# Patient Record
Sex: MALE | Race: WHITE | NOT HISPANIC OR LATINO | Employment: OTHER | URBAN - METROPOLITAN AREA
[De-identification: names, ages, dates, MRNs, and addresses within clinical notes are randomized per-mention and may not be internally consistent; named-entity substitution may affect disease eponyms.]

---

## 2023-04-04 DIAGNOSIS — I10 HYPERTENSION, UNSPECIFIED TYPE: Primary | ICD-10-CM

## 2023-04-04 RX ORDER — SPIRONOLACTONE 25 MG/1
25 TABLET ORAL
COMMUNITY
Start: 2023-02-28 | End: 2023-04-04 | Stop reason: SDUPTHER

## 2023-04-05 RX ORDER — SPIRONOLACTONE 25 MG/1
25 TABLET ORAL DAILY
Qty: 90 TABLET | Refills: 1 | Status: SHIPPED | OUTPATIENT
Start: 2023-04-05 | End: 2023-06-19

## 2023-05-30 ENCOUNTER — APPOINTMENT (OUTPATIENT)
Dept: PRIMARY CARE | Facility: CLINIC | Age: 68
End: 2023-05-30
Payer: MEDICARE

## 2023-06-13 PROBLEM — E66.9 OBESITY: Status: ACTIVE | Noted: 2023-06-13

## 2023-06-13 LAB
ALANINE AMINOTRANSFERASE (SGPT) (U/L) IN SER/PLAS: 23 U/L (ref 10–52)
ALBUMIN (G/DL) IN SER/PLAS: 4.6 G/DL (ref 3.4–5)
ALKALINE PHOSPHATASE (U/L) IN SER/PLAS: 65 U/L (ref 33–136)
ANION GAP IN SER/PLAS: 13 MMOL/L (ref 10–20)
ASPARTATE AMINOTRANSFERASE (SGOT) (U/L) IN SER/PLAS: 16 U/L (ref 9–39)
BILIRUBIN TOTAL (MG/DL) IN SER/PLAS: 0.4 MG/DL (ref 0–1.2)
CALCIUM (MG/DL) IN SER/PLAS: 10.3 MG/DL (ref 8.6–10.3)
CARBON DIOXIDE, TOTAL (MMOL/L) IN SER/PLAS: 24 MMOL/L (ref 21–32)
CHLORIDE (MMOL/L) IN SER/PLAS: 103 MMOL/L (ref 98–107)
CREATININE (MG/DL) IN SER/PLAS: 1.17 MG/DL (ref 0.5–1.3)
GFR MALE: 68 ML/MIN/1.73M2
GLUCOSE (MG/DL) IN SER/PLAS: 146 MG/DL (ref 74–99)
POTASSIUM (MMOL/L) IN SER/PLAS: 5 MMOL/L (ref 3.5–5.3)
PROSTATE SPECIFIC AG (NG/ML) IN SER/PLAS: 1.31 NG/ML (ref 0–4)
PROTEIN TOTAL: 8.1 G/DL (ref 6.4–8.2)
SODIUM (MMOL/L) IN SER/PLAS: 135 MMOL/L (ref 136–145)
THYROTROPIN (MIU/L) IN SER/PLAS BY DETECTION LIMIT <= 0.05 MIU/L: 9.36 MIU/L (ref 0.44–3.98)
THYROXINE (T4) FREE (NG/DL) IN SER/PLAS: 0.66 NG/DL (ref 0.61–1.12)
UREA NITROGEN (MG/DL) IN SER/PLAS: 21 MG/DL (ref 6–23)

## 2023-06-14 LAB
ESTIMATED AVERAGE GLUCOSE FOR HBA1C: 131 MG/DL
HEMOGLOBIN A1C/HEMOGLOBIN TOTAL IN BLOOD: 6.2 %

## 2023-06-19 ENCOUNTER — OFFICE VISIT (OUTPATIENT)
Dept: PRIMARY CARE | Facility: CLINIC | Age: 68
End: 2023-06-19
Payer: MEDICARE

## 2023-06-19 VITALS
SYSTOLIC BLOOD PRESSURE: 144 MMHG | OXYGEN SATURATION: 95 % | BODY MASS INDEX: 34.27 KG/M2 | DIASTOLIC BLOOD PRESSURE: 82 MMHG | HEIGHT: 72 IN | WEIGHT: 253 LBS | TEMPERATURE: 97.8 F | HEART RATE: 91 BPM

## 2023-06-19 DIAGNOSIS — R73.03 PREDIABETES: ICD-10-CM

## 2023-06-19 DIAGNOSIS — F32.0 MILD MAJOR DEPRESSION, SINGLE EPISODE (CMS-HCC): ICD-10-CM

## 2023-06-19 DIAGNOSIS — Z00.00 ROUTINE GENERAL MEDICAL EXAMINATION AT HEALTH CARE FACILITY: Primary | ICD-10-CM

## 2023-06-19 DIAGNOSIS — I10 BENIGN ESSENTIAL HYPERTENSION: ICD-10-CM

## 2023-06-19 DIAGNOSIS — E78.5 HYPERLIPIDEMIA, UNSPECIFIED HYPERLIPIDEMIA TYPE: ICD-10-CM

## 2023-06-19 DIAGNOSIS — K59.09 CHRONIC CONSTIPATION: ICD-10-CM

## 2023-06-19 DIAGNOSIS — E03.9 HYPOTHYROIDISM, UNSPECIFIED TYPE: ICD-10-CM

## 2023-06-19 PROBLEM — J30.9 ALLERGIC RHINITIS: Status: ACTIVE | Noted: 2021-09-15

## 2023-06-19 PROBLEM — I25.10 ATHEROSCLEROSIS OF NATIVE CORONARY ARTERY OF NATIVE HEART WITHOUT ANGINA PECTORIS: Status: ACTIVE | Noted: 2023-06-19

## 2023-06-19 PROBLEM — E55.9 VITAMIN D DEFICIENCY: Status: ACTIVE | Noted: 2023-06-19

## 2023-06-19 PROBLEM — E11.9 DIABETES MELLITUS, TYPE II (MULTI): Status: ACTIVE | Noted: 2023-06-19

## 2023-06-19 PROCEDURE — G0439 PPPS, SUBSEQ VISIT: HCPCS | Performed by: FAMILY MEDICINE

## 2023-06-19 PROCEDURE — 99214 OFFICE O/P EST MOD 30 MIN: CPT | Performed by: FAMILY MEDICINE

## 2023-06-19 PROCEDURE — 1159F MED LIST DOCD IN RCRD: CPT | Performed by: FAMILY MEDICINE

## 2023-06-19 PROCEDURE — 4010F ACE/ARB THERAPY RXD/TAKEN: CPT | Performed by: FAMILY MEDICINE

## 2023-06-19 PROCEDURE — 3044F HG A1C LEVEL LT 7.0%: CPT | Performed by: FAMILY MEDICINE

## 2023-06-19 PROCEDURE — 3077F SYST BP >= 140 MM HG: CPT | Performed by: FAMILY MEDICINE

## 2023-06-19 PROCEDURE — 3079F DIAST BP 80-89 MM HG: CPT | Performed by: FAMILY MEDICINE

## 2023-06-19 PROCEDURE — 1160F RVW MEDS BY RX/DR IN RCRD: CPT | Performed by: FAMILY MEDICINE

## 2023-06-19 PROCEDURE — 1170F FXNL STATUS ASSESSED: CPT | Performed by: FAMILY MEDICINE

## 2023-06-19 RX ORDER — LEVOTHYROXINE SODIUM 200 UG/1
TABLET ORAL
Qty: 120 TABLET | Refills: 2 | Status: SHIPPED | OUTPATIENT
Start: 2023-06-19 | End: 2023-09-20 | Stop reason: SDUPTHER

## 2023-06-19 RX ORDER — ATORVASTATIN CALCIUM 40 MG/1
40 TABLET, FILM COATED ORAL DAILY
Qty: 90 TABLET | Refills: 1 | Status: SHIPPED | OUTPATIENT
Start: 2023-06-19 | End: 2023-06-20 | Stop reason: SDUPTHER

## 2023-06-19 RX ORDER — SERTRALINE HYDROCHLORIDE 100 MG/1
TABLET, FILM COATED ORAL
COMMUNITY
End: 2023-06-19 | Stop reason: ALTCHOICE

## 2023-06-19 RX ORDER — ATORVASTATIN CALCIUM 40 MG/1
40 TABLET, FILM COATED ORAL DAILY
COMMUNITY
End: 2023-06-19 | Stop reason: SDUPTHER

## 2023-06-19 RX ORDER — ELETRIPTAN HYDROBROMIDE 20 MG/1
TABLET, FILM COATED ORAL
COMMUNITY

## 2023-06-19 RX ORDER — LEVOTHYROXINE SODIUM 200 UG/1
TABLET ORAL
COMMUNITY
End: 2023-06-19 | Stop reason: SDUPTHER

## 2023-06-19 RX ORDER — VERAPAMIL HYDROCHLORIDE 240 MG/1
240 TABLET, FILM COATED, EXTENDED RELEASE ORAL NIGHTLY
Qty: 90 TABLET | Refills: 1 | Status: SHIPPED | OUTPATIENT
Start: 2023-06-19 | End: 2023-09-20 | Stop reason: SDUPTHER

## 2023-06-19 RX ORDER — AMOXICILLIN 250 MG
1 CAPSULE ORAL 2 TIMES DAILY
Qty: 180 TABLET | Refills: 1 | Status: SHIPPED | OUTPATIENT
Start: 2023-06-19 | End: 2023-09-20 | Stop reason: SDUPTHER

## 2023-06-19 RX ORDER — ACETAMINOPHEN 500 MG
TABLET ORAL
COMMUNITY

## 2023-06-19 RX ORDER — IRBESARTAN 300 MG/1
300 TABLET ORAL DAILY
COMMUNITY
End: 2023-06-19 | Stop reason: SDUPTHER

## 2023-06-19 RX ORDER — BUPROPION HYDROCHLORIDE 150 MG/1
150 TABLET ORAL EVERY MORNING
Qty: 90 TABLET | Refills: 1 | Status: SHIPPED | OUTPATIENT
Start: 2023-06-19 | End: 2023-09-20 | Stop reason: SDUPTHER

## 2023-06-19 RX ORDER — VERAPAMIL HYDROCHLORIDE 240 MG/1
TABLET, FILM COATED, EXTENDED RELEASE ORAL
COMMUNITY
Start: 2017-05-02 | End: 2023-06-19 | Stop reason: SDUPTHER

## 2023-06-19 RX ORDER — ASPIRIN 81 MG/1
TABLET ORAL
COMMUNITY
Start: 2018-09-18

## 2023-06-19 RX ORDER — MINERAL OIL
ENEMA (ML) RECTAL
COMMUNITY

## 2023-06-19 RX ORDER — IRBESARTAN 300 MG/1
300 TABLET ORAL DAILY
Qty: 90 TABLET | Refills: 1 | Status: SHIPPED | OUTPATIENT
Start: 2023-06-19 | End: 2023-09-20 | Stop reason: SDUPTHER

## 2023-06-19 RX ORDER — SPIRONOLACTONE 50 MG/1
50 TABLET, FILM COATED ORAL DAILY
Qty: 90 TABLET | Refills: 1 | Status: SHIPPED | OUTPATIENT
Start: 2023-06-19 | End: 2023-09-20 | Stop reason: SDUPTHER

## 2023-06-19 RX ORDER — AMITRIPTYLINE HYDROCHLORIDE 100 MG/1
100 TABLET ORAL NIGHTLY
COMMUNITY
End: 2023-09-20 | Stop reason: SDUPTHER

## 2023-06-19 ASSESSMENT — ACTIVITIES OF DAILY LIVING (ADL)
DOING_HOUSEWORK: INDEPENDENT
GROCERY_SHOPPING: INDEPENDENT
MANAGING_FINANCES: INDEPENDENT
DRESSING: INDEPENDENT
BATHING: INDEPENDENT
TAKING_MEDICATION: INDEPENDENT

## 2023-06-19 ASSESSMENT — PATIENT HEALTH QUESTIONNAIRE - PHQ9
2. FEELING DOWN, DEPRESSED OR HOPELESS: NOT AT ALL
1. LITTLE INTEREST OR PLEASURE IN DOING THINGS: NOT AT ALL
SUM OF ALL RESPONSES TO PHQ9 QUESTIONS 1 AND 2: 0

## 2023-06-19 NOTE — PATIENT INSTRUCTIONS
- Thyroid medication: Take 1 tab PO daily except 2 tabs on Wednesdays and Sundays.    - Increase spironolactone to 50mg to help with BP    - Decrease sertraline to 100mg x1 week then stop. Start bupropion XL 150mg.

## 2023-06-19 NOTE — PROGRESS NOTES
"Subjective   Reason for Visit: Timoteo Victoria is an 68 y.o. male here for a Medicare Wellness visit.     Past Medical, Surgical, and Family History reviewed and updated in chart.         He presents for follow-up of chronic conditions and annual wellness visit.  Overall he has been feeling down.  Mom has been in and out of the hospital.  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  Is taking the sertraline, but does not feel that it is helping.    There was some confusion over his dose of thyroid medication.  He still has been taking the 200 mcg dose once a day.  Did not increase as instructed at last visit.    Blood pressures have been elevated.  He is taking his blood pressure medication as prescribed.  No missed doses.        Patient Care Team:  Dafne Bedolla DO as PCP - General  Dafne Bedolla DO as PCP - [employer] PCP     Review of Systems   Constitutional:  Negative for chills and fever.   Respiratory:  Negative for cough and shortness of breath.    Cardiovascular:  Negative for chest pain.   Gastrointestinal:  Negative for abdominal pain, diarrhea, nausea and vomiting.   Genitourinary:  Negative for dysuria.       Objective   Vitals:  /82   Pulse 91   Temp 36.6 °C (97.8 °F)   Ht 1.816 m (5' 11.5\")   Wt 115 kg (253 lb)   SpO2 95%   BMI 34.79 kg/m²       Physical Exam  Constitutional:       General: He is not in acute distress.     Appearance: Normal appearance.   HENT:      Head: Normocephalic.      Mouth/Throat:      Mouth: Mucous membranes are moist.   Eyes:      Extraocular Movements: Extraocular movements intact.      Conjunctiva/sclera: Conjunctivae normal.   Cardiovascular:      Rate and Rhythm: Normal rate and regular rhythm.      Heart sounds: No murmur heard.  Pulmonary:      Breath sounds: No wheezing or rhonchi.   Musculoskeletal:      Cervical back: Neck supple.   Skin:     General: Skin is warm and dry.   Neurological:      Mental Status: He is alert. "   Psychiatric:         Mood and Affect: Mood normal.         Behavior: Behavior normal.         Assessment/Plan   Problem List Items Addressed This Visit       Benign essential hypertension    Current Assessment & Plan     Increase spironolactone to 50mg. Continue verapamil and irbesartan.         Relevant Medications    irbesartan (Avapro) 300 mg tablet    spironolactone (Aldactone) 50 mg tablet    verapamil SR (Calan-SR) 240 mg ER tablet    Hyperlipidemia    Relevant Medications    atorvastatin (Lipitor) 40 mg tablet    Other Relevant Orders    Lipid Panel    Comprehensive Metabolic Panel    Hypothyroidism    Current Assessment & Plan     Increase levothyroxine to 1 tab PO daily except 2 tabs on Wednesdays and Sundays. Recheck labs in 12 weeks.            Relevant Medications    levothyroxine (Synthroid, Levoxyl) 200 mcg tablet    Other Relevant Orders    TSH with reflex to Free T4 if abnormal    Mild major depression, single episode (CMS/HCC)    Current Assessment & Plan     Decrease sertraline to 100mg x1 week then stop. Start bupropion XL 150mg.         Relevant Medications    buPROPion XL (Wellbutrin XL) 150 mg 24 hr tablet    Prediabetes    Relevant Orders    Hemoglobin A1C     Other Visit Diagnoses       Routine general medical examination at health care facility    -  Primary    Chronic constipation        Relevant Medications    sennosides-docusate sodium (Hope-Colace) 8.6-50 mg tablet

## 2023-06-20 RX ORDER — ATORVASTATIN CALCIUM 40 MG/1
40 TABLET, FILM COATED ORAL DAILY
Qty: 90 TABLET | Refills: 1 | Status: SHIPPED | OUTPATIENT
Start: 2023-06-20 | End: 2023-09-20 | Stop reason: SDUPTHER

## 2023-06-20 ASSESSMENT — ENCOUNTER SYMPTOMS
CHILLS: 0
NAUSEA: 0
DYSURIA: 0
ABDOMINAL PAIN: 0
SHORTNESS OF BREATH: 0
COUGH: 0
FEVER: 0
VOMITING: 0
DIARRHEA: 0

## 2023-06-20 NOTE — ASSESSMENT & PLAN NOTE
Increase levothyroxine to 1 tab PO daily except 2 tabs on Wednesdays and Sundays. Recheck labs in 12 weeks.

## 2023-09-13 ENCOUNTER — LAB (OUTPATIENT)
Dept: LAB | Facility: LAB | Age: 68
End: 2023-09-13
Payer: MEDICARE

## 2023-09-13 DIAGNOSIS — R73.03 PREDIABETES: ICD-10-CM

## 2023-09-13 DIAGNOSIS — E03.9 HYPOTHYROIDISM, UNSPECIFIED TYPE: ICD-10-CM

## 2023-09-13 DIAGNOSIS — E78.5 HYPERLIPIDEMIA, UNSPECIFIED HYPERLIPIDEMIA TYPE: ICD-10-CM

## 2023-09-13 LAB
ALANINE AMINOTRANSFERASE (SGPT) (U/L) IN SER/PLAS: 22 U/L (ref 10–52)
ALBUMIN (G/DL) IN SER/PLAS: 4.2 G/DL (ref 3.4–5)
ALKALINE PHOSPHATASE (U/L) IN SER/PLAS: 56 U/L (ref 33–136)
ANION GAP IN SER/PLAS: 11 MMOL/L (ref 10–20)
ASPARTATE AMINOTRANSFERASE (SGOT) (U/L) IN SER/PLAS: 16 U/L (ref 9–39)
BILIRUBIN TOTAL (MG/DL) IN SER/PLAS: 0.3 MG/DL (ref 0–1.2)
CALCIUM (MG/DL) IN SER/PLAS: 8.8 MG/DL (ref 8.6–10.3)
CARBON DIOXIDE, TOTAL (MMOL/L) IN SER/PLAS: 26 MMOL/L (ref 21–32)
CHLORIDE (MMOL/L) IN SER/PLAS: 106 MMOL/L (ref 98–107)
CHOLESTEROL (MG/DL) IN SER/PLAS: 117 MG/DL (ref 0–199)
CHOLESTEROL IN HDL (MG/DL) IN SER/PLAS: 28.6 MG/DL
CHOLESTEROL/HDL RATIO: 4.1
CREATININE (MG/DL) IN SER/PLAS: 1.06 MG/DL (ref 0.5–1.3)
ESTIMATED AVERAGE GLUCOSE FOR HBA1C: 128 MG/DL
GFR MALE: 77 ML/MIN/1.73M2
GLUCOSE (MG/DL) IN SER/PLAS: 139 MG/DL (ref 74–99)
HEMOGLOBIN A1C/HEMOGLOBIN TOTAL IN BLOOD: 6.1 %
LDL: 48 MG/DL (ref 0–99)
NON HDL CHOLESTEROL: 88 MG/DL
POTASSIUM (MMOL/L) IN SER/PLAS: 4.5 MMOL/L (ref 3.5–5.3)
PROTEIN TOTAL: 7 G/DL (ref 6.4–8.2)
SODIUM (MMOL/L) IN SER/PLAS: 138 MMOL/L (ref 136–145)
THYROTROPIN (MIU/L) IN SER/PLAS BY DETECTION LIMIT <= 0.05 MIU/L: 0.54 MIU/L (ref 0.44–3.98)
TRIGLYCERIDE (MG/DL) IN SER/PLAS: 204 MG/DL (ref 0–149)
UREA NITROGEN (MG/DL) IN SER/PLAS: 17 MG/DL (ref 6–23)
VLDL: 41 MG/DL (ref 0–40)

## 2023-09-13 PROCEDURE — 83036 HEMOGLOBIN GLYCOSYLATED A1C: CPT

## 2023-09-13 PROCEDURE — 80061 LIPID PANEL: CPT

## 2023-09-13 PROCEDURE — 36415 COLL VENOUS BLD VENIPUNCTURE: CPT

## 2023-09-13 PROCEDURE — 80053 COMPREHEN METABOLIC PANEL: CPT

## 2023-09-13 PROCEDURE — 84443 ASSAY THYROID STIM HORMONE: CPT

## 2023-09-20 ENCOUNTER — OFFICE VISIT (OUTPATIENT)
Dept: PRIMARY CARE | Facility: CLINIC | Age: 68
End: 2023-09-20
Payer: MEDICARE

## 2023-09-20 VITALS
HEART RATE: 95 BPM | BODY MASS INDEX: 34.66 KG/M2 | OXYGEN SATURATION: 97 % | TEMPERATURE: 97.6 F | WEIGHT: 252 LBS | SYSTOLIC BLOOD PRESSURE: 128 MMHG | DIASTOLIC BLOOD PRESSURE: 78 MMHG

## 2023-09-20 DIAGNOSIS — E78.5 HYPERLIPIDEMIA, UNSPECIFIED HYPERLIPIDEMIA TYPE: Chronic | ICD-10-CM

## 2023-09-20 DIAGNOSIS — F17.210 NICOTINE DEPENDENCE, CIGARETTES, UNCOMPLICATED: ICD-10-CM

## 2023-09-20 DIAGNOSIS — E03.9 HYPOTHYROIDISM, UNSPECIFIED TYPE: Primary | ICD-10-CM

## 2023-09-20 DIAGNOSIS — R73.03 PREDIABETES: ICD-10-CM

## 2023-09-20 DIAGNOSIS — G44.019 EPISODIC CLUSTER HEADACHE, NOT INTRACTABLE: ICD-10-CM

## 2023-09-20 DIAGNOSIS — E55.9 VITAMIN D DEFICIENCY: ICD-10-CM

## 2023-09-20 DIAGNOSIS — Z12.5 SCREENING FOR PROSTATE CANCER: ICD-10-CM

## 2023-09-20 DIAGNOSIS — J30.9 ALLERGIC RHINITIS, UNSPECIFIED SEASONALITY, UNSPECIFIED TRIGGER: ICD-10-CM

## 2023-09-20 DIAGNOSIS — I10 BENIGN ESSENTIAL HYPERTENSION: Chronic | ICD-10-CM

## 2023-09-20 DIAGNOSIS — F32.0 MILD MAJOR DEPRESSION, SINGLE EPISODE (CMS-HCC): Chronic | ICD-10-CM

## 2023-09-20 DIAGNOSIS — K59.09 CHRONIC CONSTIPATION: Chronic | ICD-10-CM

## 2023-09-20 PROBLEM — G43.009 MIGRAINE WITHOUT AURA AND WITHOUT STATUS MIGRAINOSUS, NOT INTRACTABLE: Chronic | Status: ACTIVE | Noted: 2023-09-20

## 2023-09-20 PROCEDURE — 99406 BEHAV CHNG SMOKING 3-10 MIN: CPT | Performed by: FAMILY MEDICINE

## 2023-09-20 PROCEDURE — 3078F DIAST BP <80 MM HG: CPT | Performed by: FAMILY MEDICINE

## 2023-09-20 PROCEDURE — 1160F RVW MEDS BY RX/DR IN RCRD: CPT | Performed by: FAMILY MEDICINE

## 2023-09-20 PROCEDURE — 1159F MED LIST DOCD IN RCRD: CPT | Performed by: FAMILY MEDICINE

## 2023-09-20 PROCEDURE — 3074F SYST BP LT 130 MM HG: CPT | Performed by: FAMILY MEDICINE

## 2023-09-20 PROCEDURE — 99214 OFFICE O/P EST MOD 30 MIN: CPT | Performed by: FAMILY MEDICINE

## 2023-09-20 RX ORDER — VERAPAMIL HYDROCHLORIDE 240 MG/1
240 TABLET, FILM COATED, EXTENDED RELEASE ORAL NIGHTLY
Qty: 90 TABLET | Refills: 1 | Status: SHIPPED | OUTPATIENT
Start: 2023-09-20 | End: 2023-12-21 | Stop reason: SDUPTHER

## 2023-09-20 RX ORDER — ATORVASTATIN CALCIUM 40 MG/1
40 TABLET, FILM COATED ORAL DAILY
Qty: 90 TABLET | Refills: 1 | Status: SHIPPED | OUTPATIENT
Start: 2023-09-20 | End: 2023-12-21 | Stop reason: SDUPTHER

## 2023-09-20 RX ORDER — AMOXICILLIN 250 MG
1 CAPSULE ORAL 2 TIMES DAILY
Qty: 180 TABLET | Refills: 1 | Status: SHIPPED | OUTPATIENT
Start: 2023-09-20 | End: 2023-12-21 | Stop reason: SDUPTHER

## 2023-09-20 RX ORDER — LEVOTHYROXINE SODIUM 200 UG/1
TABLET ORAL
Qty: 120 TABLET | Refills: 2 | Status: SHIPPED | OUTPATIENT
Start: 2023-09-20 | End: 2023-12-21 | Stop reason: SDUPTHER

## 2023-09-20 RX ORDER — SPIRONOLACTONE 50 MG/1
50 TABLET, FILM COATED ORAL DAILY
Qty: 90 TABLET | Refills: 1 | Status: SHIPPED | OUTPATIENT
Start: 2023-09-20 | End: 2023-12-21 | Stop reason: SDUPTHER

## 2023-09-20 RX ORDER — BUPROPION HYDROCHLORIDE 150 MG/1
150 TABLET ORAL EVERY MORNING
Qty: 90 TABLET | Refills: 1 | Status: SHIPPED | OUTPATIENT
Start: 2023-09-20 | End: 2023-12-21 | Stop reason: SDUPTHER

## 2023-09-20 RX ORDER — IRBESARTAN 300 MG/1
300 TABLET ORAL DAILY
Qty: 90 TABLET | Refills: 1 | Status: SHIPPED | OUTPATIENT
Start: 2023-09-20 | End: 2023-12-21 | Stop reason: SDUPTHER

## 2023-09-20 RX ORDER — AMITRIPTYLINE HYDROCHLORIDE 100 MG/1
100 TABLET ORAL NIGHTLY
Qty: 90 TABLET | Refills: 1 | Status: SHIPPED | OUTPATIENT
Start: 2023-09-20 | End: 2023-12-21 | Stop reason: SDUPTHER

## 2023-09-20 ASSESSMENT — ENCOUNTER SYMPTOMS
ABDOMINAL PAIN: 0
SHORTNESS OF BREATH: 0
DYSURIA: 0
COUGH: 0
DIARRHEA: 0
VOMITING: 0
CHILLS: 0
FEVER: 0
NAUSEA: 0

## 2023-09-20 NOTE — PROGRESS NOTES
Subjective   Patient ID: Timoteo Victoria is a 68 y.o. male who presents for Hypertension (Recheck), Hypothyroidism (Review BW), and Hyperlipidemia.    Rolo presents for follow-up of chronic conditions.  Overall he has been feeling well.  Is taking increased dose of thyroid medication as instructed.  Has not felt any different on this dose.    Mood has been stable.  Wellbutrin is working well.  Due to his levels, he is not ready to quit smoking yet.  He has reduced his amount of cigarettes per day.    Rarely has cluster headaches.  Amitriptyline working well.         Review of Systems   Constitutional:  Negative for chills and fever.   Respiratory:  Negative for cough and shortness of breath.    Cardiovascular:  Negative for chest pain.   Gastrointestinal:  Negative for abdominal pain, diarrhea, nausea and vomiting.   Genitourinary:  Negative for dysuria.       Objective   /78   Pulse 95   Temp 36.4 °C (97.6 °F)   Wt 114 kg (252 lb)   SpO2 97%   BMI 34.66 kg/m²     Physical Exam  Constitutional:       General: He is not in acute distress.     Appearance: Normal appearance.   HENT:      Head: Normocephalic.      Mouth/Throat:      Mouth: Mucous membranes are moist.   Eyes:      Extraocular Movements: Extraocular movements intact.      Conjunctiva/sclera: Conjunctivae normal.   Cardiovascular:      Rate and Rhythm: Normal rate and regular rhythm.      Heart sounds: No murmur heard.  Pulmonary:      Breath sounds: No wheezing or rhonchi.   Musculoskeletal:      Cervical back: Neck supple.   Skin:     General: Skin is warm and dry.   Neurological:      Mental Status: He is alert.   Psychiatric:         Mood and Affect: Mood normal.         Behavior: Behavior normal.         Assessment/Plan   Problem List Items Addressed This Visit       Allergic rhinitis    Benign essential hypertension (Chronic)     Controlled.          Relevant Medications    irbesartan (Avapro) 300 mg tablet    spironolactone (Aldactone)  50 mg tablet    verapamil SR (Calan-SR) 240 mg ER tablet    Other Relevant Orders    Comprehensive Metabolic Panel    Hyperlipidemia (Chronic)    Relevant Medications    atorvastatin (Lipitor) 40 mg tablet    Other Relevant Orders    Comprehensive Metabolic Panel    Lipid Panel    Hypothyroidism - Primary     TSH stabilized.  Continue current dose of levothyroxine.         Relevant Medications    levothyroxine (Synthroid, Levoxyl) 200 mcg tablet    Mild major depression, single episode (CMS/HCC) (Chronic)    Relevant Medications    buPROPion XL (Wellbutrin XL) 150 mg 24 hr tablet    Prediabetes     HgbA1c stabilized; medication not required.          Relevant Orders    Hemoglobin A1C    Nicotine dependence, cigarettes, uncomplicated     Spent >3 min but <10 minutes recommending smoking cessation. Patient not ready to quit.         Chronic constipation (Chronic)    Relevant Medications    sennosides-docusate sodium (Hope-Colace) 8.6-50 mg tablet    Episodic cluster headache, not intractable     Continue amitriptyline, eletriptan prn.          Relevant Medications    amitriptyline (Elavil) 100 mg tablet     Other Visit Diagnoses       Screening for prostate cancer        Relevant Orders    Prostate Specific Antigen    Vitamin D deficiency        Relevant Orders    Vitamin D 25-Hydroxy,Total (for eval of Vitamin D levels)

## 2023-12-13 ENCOUNTER — OFFICE VISIT (OUTPATIENT)
Dept: PRIMARY CARE | Facility: CLINIC | Age: 68
End: 2023-12-13
Payer: MEDICARE

## 2023-12-13 VITALS
TEMPERATURE: 97.3 F | DIASTOLIC BLOOD PRESSURE: 70 MMHG | OXYGEN SATURATION: 94 % | SYSTOLIC BLOOD PRESSURE: 124 MMHG | HEART RATE: 100 BPM

## 2023-12-13 DIAGNOSIS — J20.9 ACUTE BRONCHITIS, UNSPECIFIED ORGANISM: Primary | ICD-10-CM

## 2023-12-13 PROCEDURE — 1160F RVW MEDS BY RX/DR IN RCRD: CPT | Performed by: FAMILY MEDICINE

## 2023-12-13 PROCEDURE — 3078F DIAST BP <80 MM HG: CPT | Performed by: FAMILY MEDICINE

## 2023-12-13 PROCEDURE — 99214 OFFICE O/P EST MOD 30 MIN: CPT | Performed by: FAMILY MEDICINE

## 2023-12-13 PROCEDURE — 3074F SYST BP LT 130 MM HG: CPT | Performed by: FAMILY MEDICINE

## 2023-12-13 PROCEDURE — 87636 SARSCOV2 & INF A&B AMP PRB: CPT

## 2023-12-13 PROCEDURE — 87634 RSV DNA/RNA AMP PROBE: CPT

## 2023-12-13 PROCEDURE — 1159F MED LIST DOCD IN RCRD: CPT | Performed by: FAMILY MEDICINE

## 2023-12-13 RX ORDER — BENZONATATE 100 MG/1
100 CAPSULE ORAL 3 TIMES DAILY PRN
Qty: 42 CAPSULE | Refills: 0 | Status: SHIPPED | OUTPATIENT
Start: 2023-12-13 | End: 2023-12-21 | Stop reason: SDUPTHER

## 2023-12-13 RX ORDER — AZITHROMYCIN 250 MG/1
TABLET, FILM COATED ORAL
Qty: 6 TABLET | Refills: 0 | Status: SHIPPED | OUTPATIENT
Start: 2023-12-13 | End: 2023-12-18

## 2023-12-13 ASSESSMENT — ENCOUNTER SYMPTOMS
DIARRHEA: 0
CHILLS: 0
SINUS PRESSURE: 0
MYALGIAS: 1
FATIGUE: 1
WHEEZING: 0
SHORTNESS OF BREATH: 0
VOMITING: 0
FEVER: 0
COUGH: 1

## 2023-12-13 NOTE — PROGRESS NOTES
Subjective   Patient ID: Timoteo Victoria is a 68 y.o. male who presents for Cough (Chest congestion x 2 to 3 weeks), Generalized Body Aches, and Fatigue (X 1 week).    Rolo has been coughing for the last 2 or 3 weeks.  Cough is productive of yellow sputum.  Has not felt short of breath.  Has had occasional wheezing.  Illness acutely worsened yesterday.  Felt like he got hit by a truck.  Has been fatigued and having headaches.  Has been taking over-the-counter medicine with limited improvement.         Review of Systems   Constitutional:  Positive for fatigue. Negative for chills and fever.   HENT:  Positive for congestion. Negative for sinus pressure.    Respiratory:  Positive for cough. Negative for shortness of breath and wheezing.    Gastrointestinal:  Negative for diarrhea and vomiting.   Musculoskeletal:  Positive for myalgias.       Objective   /70   Pulse 100   Temp 36.3 °C (97.3 °F)   SpO2 94%     Physical Exam  Constitutional:       General: He is not in acute distress.     Appearance: He is not toxic-appearing.   HENT:      Right Ear: Tympanic membrane normal.      Left Ear: Tympanic membrane normal.      Nose: Congestion and rhinorrhea present.   Cardiovascular:      Rate and Rhythm: Normal rate and regular rhythm.      Heart sounds: No murmur heard.  Pulmonary:      Effort: No respiratory distress.      Breath sounds: No wheezing or rales.   Neurological:      Mental Status: He is alert.         Assessment/Plan   Diagnoses and all orders for this visit:  Acute bronchitis, unspecified organism  Comments:  Start azithromycin. Plan to add prednisone if COVID/flu/RSV negative. Wear mask around mom.  Orders:  -     Sars-CoV-2 PCR, Symptomatic; Future  -     Influenza A, and B PCR; Future  -     RSV PCR; Future  -     azithromycin (Zithromax) 250 mg tablet; Take 2 tablets (500 mg) by mouth once daily for 1 day, THEN 1 tablet (250 mg) once daily for 4 days. Take 2 tabs (500 mg) by mouth today, than  1 daily for 4 days..  -     benzonatate (Tessalon) 100 mg capsule; Take 1 capsule (100 mg) by mouth 3 times a day as needed for cough. Do not crush or chew.

## 2023-12-14 ENCOUNTER — TELEPHONE (OUTPATIENT)
Dept: PRIMARY CARE | Facility: CLINIC | Age: 68
End: 2023-12-14
Payer: MEDICARE

## 2023-12-14 DIAGNOSIS — U07.1 COVID: Primary | ICD-10-CM

## 2023-12-14 LAB
FLUAV RNA RESP QL NAA+PROBE: NOT DETECTED
FLUBV RNA RESP QL NAA+PROBE: NOT DETECTED
RSV RNA RESP QL NAA+PROBE: NOT DETECTED
SARS-COV-2 RNA RESP QL NAA+PROBE: DETECTED

## 2023-12-14 RX ORDER — PREDNISONE 20 MG/1
40 TABLET ORAL DAILY
Qty: 14 TABLET | Refills: 0 | Status: SHIPPED | OUTPATIENT
Start: 2023-12-14 | End: 2023-12-21

## 2023-12-14 NOTE — TELEPHONE ENCOUNTER
COVID+.  It might be a bit late for Paxlovid since he's been having the illness for weeks.  Some steroids might be helpful- I can send it in for him.

## 2023-12-18 ENCOUNTER — TELEPHONE (OUTPATIENT)
Dept: PRIMARY CARE | Facility: CLINIC | Age: 68
End: 2023-12-18

## 2023-12-18 ENCOUNTER — LAB (OUTPATIENT)
Dept: LAB | Facility: LAB | Age: 68
End: 2023-12-18
Payer: MEDICARE

## 2023-12-21 DIAGNOSIS — E78.5 HYPERLIPIDEMIA, UNSPECIFIED HYPERLIPIDEMIA TYPE: Chronic | ICD-10-CM

## 2023-12-21 DIAGNOSIS — F32.0 MILD MAJOR DEPRESSION, SINGLE EPISODE (CMS-HCC): Chronic | ICD-10-CM

## 2023-12-21 DIAGNOSIS — E03.9 HYPOTHYROIDISM, UNSPECIFIED TYPE: ICD-10-CM

## 2023-12-21 DIAGNOSIS — K59.09 CHRONIC CONSTIPATION: Chronic | ICD-10-CM

## 2023-12-21 DIAGNOSIS — G44.019 EPISODIC CLUSTER HEADACHE, NOT INTRACTABLE: ICD-10-CM

## 2023-12-21 DIAGNOSIS — I10 BENIGN ESSENTIAL HYPERTENSION: Chronic | ICD-10-CM

## 2023-12-21 DIAGNOSIS — J20.9 ACUTE BRONCHITIS, UNSPECIFIED ORGANISM: ICD-10-CM

## 2023-12-21 NOTE — TELEPHONE ENCOUNTER
Pr requests refills on all rx sent to [facility]  Verapamil  Spironolactone  Sennosides-Docusate sodium  Irbesartan  Bupropion  Atorvastatin  Amitriptyline  Levothyroxine  Benzonatate  Azithromycin

## 2023-12-22 ENCOUNTER — APPOINTMENT (OUTPATIENT)
Dept: PRIMARY CARE | Facility: CLINIC | Age: 68
End: 2023-12-22
Payer: MEDICARE

## 2023-12-22 RX ORDER — IRBESARTAN 300 MG/1
300 TABLET ORAL DAILY
Qty: 90 TABLET | Refills: 1 | Status: SHIPPED | OUTPATIENT
Start: 2023-12-22 | End: 2024-03-20 | Stop reason: SDUPTHER

## 2023-12-22 RX ORDER — LEVOTHYROXINE SODIUM 200 UG/1
TABLET ORAL
Qty: 120 TABLET | Refills: 2 | Status: SHIPPED | OUTPATIENT
Start: 2023-12-22 | End: 2024-03-20 | Stop reason: SDUPTHER

## 2023-12-22 RX ORDER — ATORVASTATIN CALCIUM 40 MG/1
40 TABLET, FILM COATED ORAL DAILY
Qty: 90 TABLET | Refills: 1 | Status: SHIPPED | OUTPATIENT
Start: 2023-12-22 | End: 2024-03-20 | Stop reason: SDUPTHER

## 2023-12-22 RX ORDER — BUPROPION HYDROCHLORIDE 150 MG/1
150 TABLET ORAL EVERY MORNING
Qty: 90 TABLET | Refills: 1 | Status: SHIPPED | OUTPATIENT
Start: 2023-12-22 | End: 2024-03-20 | Stop reason: ALTCHOICE

## 2023-12-22 RX ORDER — AMOXICILLIN 250 MG
1 CAPSULE ORAL 2 TIMES DAILY
Qty: 180 TABLET | Refills: 1 | Status: SHIPPED | OUTPATIENT
Start: 2023-12-22 | End: 2024-03-20 | Stop reason: SDUPTHER

## 2023-12-22 RX ORDER — AZITHROMYCIN 250 MG/1
TABLET, FILM COATED ORAL
Qty: 6 TABLET | Refills: 0 | OUTPATIENT
Start: 2023-12-22 | End: 2023-12-26

## 2023-12-22 RX ORDER — AMITRIPTYLINE HYDROCHLORIDE 100 MG/1
100 TABLET ORAL NIGHTLY
Qty: 90 TABLET | Refills: 1 | Status: SHIPPED | OUTPATIENT
Start: 2023-12-22 | End: 2024-03-20 | Stop reason: SDUPTHER

## 2023-12-22 RX ORDER — VERAPAMIL HYDROCHLORIDE 240 MG/1
240 TABLET, FILM COATED, EXTENDED RELEASE ORAL NIGHTLY
Qty: 90 TABLET | Refills: 1 | Status: SHIPPED | OUTPATIENT
Start: 2023-12-22 | End: 2024-03-20 | Stop reason: SDUPTHER

## 2023-12-22 RX ORDER — BENZONATATE 100 MG/1
100 CAPSULE ORAL 3 TIMES DAILY PRN
Qty: 42 CAPSULE | Refills: 0 | Status: SHIPPED | OUTPATIENT
Start: 2023-12-22 | End: 2024-01-21

## 2023-12-22 RX ORDER — SPIRONOLACTONE 50 MG/1
50 TABLET, FILM COATED ORAL DAILY
Qty: 90 TABLET | Refills: 1 | Status: SHIPPED | OUTPATIENT
Start: 2023-12-22 | End: 2024-03-20 | Stop reason: SDUPTHER

## 2023-12-23 ENCOUNTER — HOSPITAL ENCOUNTER (EMERGENCY)
Facility: HOSPITAL | Age: 68
Discharge: HOME | End: 2023-12-23
Attending: EMERGENCY MEDICINE
Payer: MEDICARE

## 2023-12-23 VITALS
WEIGHT: 245 LBS | SYSTOLIC BLOOD PRESSURE: 163 MMHG | DIASTOLIC BLOOD PRESSURE: 84 MMHG | TEMPERATURE: 98.9 F | OXYGEN SATURATION: 99 % | HEIGHT: 71 IN | HEART RATE: 104 BPM | BODY MASS INDEX: 34.3 KG/M2 | RESPIRATION RATE: 16 BRPM

## 2023-12-23 DIAGNOSIS — L02.91 ABSCESS: Primary | ICD-10-CM

## 2023-12-23 PROCEDURE — 99283 EMERGENCY DEPT VISIT LOW MDM: CPT | Performed by: EMERGENCY MEDICINE

## 2023-12-23 RX ORDER — SULFAMETHOXAZOLE AND TRIMETHOPRIM 800; 160 MG/1; MG/1
2 TABLET ORAL 2 TIMES DAILY
Qty: 28 TABLET | Refills: 0 | Status: SHIPPED | OUTPATIENT
Start: 2023-12-23 | End: 2023-12-30

## 2023-12-23 ASSESSMENT — PAIN SCALES - GENERAL: PAINLEVEL_OUTOF10: 5 - MODERATE PAIN

## 2023-12-23 ASSESSMENT — PAIN - FUNCTIONAL ASSESSMENT: PAIN_FUNCTIONAL_ASSESSMENT: 0-10

## 2023-12-24 NOTE — ED PROVIDER NOTES
HPI   Chief Complaint   Patient presents with    Rectal Pain     Pt has a sore spot/ lump near his rectum that is painful for 4 days       HPI:  68-year-old male with history of hypertension presents emergency department with chief complaint of perianal abscess.  He says that he has been trying to squeeze this area and yesterday took a heated needle and attempted to drain it however just got a little bit of blood but no pus.  The patient denies any fevers or chills no chest discomfort no shortness of breath no abdominal complaints has been having normal bowel movements and urinating normally.  Denies any abdominal complaints.    Limitations to history: None  Independent Historians: None  External Records Reviewed: EMR records  ------------------------------------------------------------------------------------------------------------------------------------------  ROS: a ten point review of systems was performed and negative except as per HPI.  ------------------------------------------------------------------------------------------------------------------------------------------  PMH / PSH: as per HPI, reviewed in EMR and discussed with the patient []  MEDS:  reviewed in EMR and discussed with the patient []  ALLERGIES: reviewed in EMR[]  SocH:  as per HPI, otherwise reviewed in EMR []  FH:  as per HPI, otherwise reviewed in EMR []  ------------------------------------------------------------------------------------------------------------------------------------------  Physical Exam:  VS: As documented in the triage note and EMR flowsheet from this visit was reviewed  General: Well appearing. No acute distress.   Eyes:  Extraocular movements grossly intact. No scleral icterus.   HEENT: Atraumatic. Normocephalic.    Neck: Supple. No gross masses  GI: Soft, non-distended  MSK: Symmetric muscle bulk. No gross step offs or deformities.  Skin: Warm, dry, no obvious rash.  : Small nickel sized area of firm induration  present in the perineum no areas of fluctuance no surrounding cellulitis small external hemorrhoid not engorged  Neuro: Speech fluent. Awake. Alert. Appropriate conversation.  Psych: Appropriate mood and affect for situation  ------------------------------------------------------------------------------------------------------------------------------------------  Hospital Course / Medical Decision Making:  Patient is well-appearing on my assessment physical exam was reassuring he does have a small nickel sized area of induration in the perineum consistent with early abscess formation.  Currently no areas of fluctuant and not amenable to I&D at the present time.  Patient was counseled to do warm compresses sitz bath soaks and will be placed on a course of Bactrim to help aid with resolution of the painful abscess.  He was advised of signs and symptoms of concern for which she would need to return immediately to the emergency department for which includes spreading of the infection worsening pain inability to take his medications fevers chills or any other concerns.  He voiced understanding and agreed with the plan of care.  He was discharged from the ED in stable condition    Final diagnosis and disposition: Abscess            Estrellita Weber MD                                      Misa Coma Scale Score: 15                  Patient History   Past Medical History:   Diagnosis Date    Personal history of other endocrine, nutritional and metabolic disease     History of hypothyroidism     Past Surgical History:   Procedure Laterality Date    CHOLECYSTECTOMY  09/18/2018    Cholecystectomy    CORONARY ARTERY BYPASS GRAFT  09/18/2018    CABG    HERNIA REPAIR  09/18/2018    Inguinal Hernia Repair     Family History   Problem Relation Name Age of Onset    COPD Mother       Social History     Tobacco Use    Smoking status: Every Day     Packs/day: 1     Types: Cigarettes    Smokeless tobacco: Never   Substance Use Topics     Alcohol use: Never    Drug use: Never       Physical Exam   ED Triage Vitals [12/23/23 2053]   Temp Heart Rate Resp BP   37.2 °C (98.9 °F) 104 16 163/84      SpO2 Temp Source Heart Rate Source Patient Position   99 % Tympanic Monitor --      BP Location FiO2 (%)     -- --       Physical Exam    ED Course & MDM   Diagnoses as of 12/23/23 2122   Abscess       Medical Decision Making      Procedure  Procedures     Estrellita Weber MD  12/23/23 2126

## 2024-03-12 ENCOUNTER — LAB (OUTPATIENT)
Dept: LAB | Facility: LAB | Age: 69
End: 2024-03-12
Payer: MEDICARE

## 2024-03-12 DIAGNOSIS — I10 BENIGN ESSENTIAL HYPERTENSION: Chronic | ICD-10-CM

## 2024-03-12 DIAGNOSIS — R73.03 PREDIABETES: ICD-10-CM

## 2024-03-12 DIAGNOSIS — Z12.5 SCREENING FOR PROSTATE CANCER: ICD-10-CM

## 2024-03-12 DIAGNOSIS — E78.5 HYPERLIPIDEMIA, UNSPECIFIED HYPERLIPIDEMIA TYPE: Chronic | ICD-10-CM

## 2024-03-12 DIAGNOSIS — E55.9 VITAMIN D DEFICIENCY: ICD-10-CM

## 2024-03-12 LAB
25(OH)D3 SERPL-MCNC: 66 NG/ML (ref 30–100)
ALBUMIN SERPL BCP-MCNC: 4.3 G/DL (ref 3.4–5)
ALP SERPL-CCNC: 55 U/L (ref 33–136)
ALT SERPL W P-5'-P-CCNC: 25 U/L (ref 10–52)
ANION GAP SERPL CALC-SCNC: 12 MMOL/L (ref 10–20)
AST SERPL W P-5'-P-CCNC: 16 U/L (ref 9–39)
BILIRUB SERPL-MCNC: 0.4 MG/DL (ref 0–1.2)
BUN SERPL-MCNC: 20 MG/DL (ref 6–23)
CALCIUM SERPL-MCNC: 9.7 MG/DL (ref 8.6–10.3)
CHLORIDE SERPL-SCNC: 101 MMOL/L (ref 98–107)
CHOLEST SERPL-MCNC: 129 MG/DL (ref 0–199)
CHOLESTEROL/HDL RATIO: 3.6
CO2 SERPL-SCNC: 24 MMOL/L (ref 21–32)
CREAT SERPL-MCNC: 1.18 MG/DL (ref 0.5–1.3)
EGFRCR SERPLBLD CKD-EPI 2021: 68 ML/MIN/1.73M*2
GLUCOSE SERPL-MCNC: 145 MG/DL (ref 74–99)
HDLC SERPL-MCNC: 35.5 MG/DL
LDLC SERPL CALC-MCNC: 68 MG/DL
NON HDL CHOLESTEROL: 94 MG/DL (ref 0–149)
POTASSIUM SERPL-SCNC: 4.6 MMOL/L (ref 3.5–5.3)
PROT SERPL-MCNC: 7.1 G/DL (ref 6.4–8.2)
PSA SERPL-MCNC: 0.71 NG/ML
SODIUM SERPL-SCNC: 132 MMOL/L (ref 136–145)
TRIGL SERPL-MCNC: 128 MG/DL (ref 0–149)
VLDL: 26 MG/DL (ref 0–40)

## 2024-03-12 PROCEDURE — 82306 VITAMIN D 25 HYDROXY: CPT

## 2024-03-12 PROCEDURE — 83036 HEMOGLOBIN GLYCOSYLATED A1C: CPT

## 2024-03-12 PROCEDURE — 36415 COLL VENOUS BLD VENIPUNCTURE: CPT

## 2024-03-12 PROCEDURE — 80053 COMPREHEN METABOLIC PANEL: CPT

## 2024-03-12 PROCEDURE — G0103 PSA SCREENING: HCPCS

## 2024-03-12 PROCEDURE — 80061 LIPID PANEL: CPT

## 2024-03-13 LAB
EST. AVERAGE GLUCOSE BLD GHB EST-MCNC: 143 MG/DL
HBA1C MFR BLD: 6.6 %

## 2024-03-20 ENCOUNTER — OFFICE VISIT (OUTPATIENT)
Dept: PRIMARY CARE | Facility: CLINIC | Age: 69
End: 2024-03-20
Payer: MEDICARE

## 2024-03-20 VITALS
TEMPERATURE: 97.4 F | WEIGHT: 254 LBS | DIASTOLIC BLOOD PRESSURE: 82 MMHG | HEART RATE: 76 BPM | BODY MASS INDEX: 35.43 KG/M2 | SYSTOLIC BLOOD PRESSURE: 130 MMHG

## 2024-03-20 DIAGNOSIS — E87.1 HYPONATREMIA: ICD-10-CM

## 2024-03-20 DIAGNOSIS — I10 BENIGN ESSENTIAL HYPERTENSION: Chronic | ICD-10-CM

## 2024-03-20 DIAGNOSIS — E11.9 TYPE 2 DIABETES MELLITUS WITHOUT COMPLICATION, WITHOUT LONG-TERM CURRENT USE OF INSULIN (MULTI): ICD-10-CM

## 2024-03-20 DIAGNOSIS — E03.9 HYPOTHYROIDISM, UNSPECIFIED TYPE: ICD-10-CM

## 2024-03-20 DIAGNOSIS — E66.01 OBESITY, MORBID (MULTI): ICD-10-CM

## 2024-03-20 DIAGNOSIS — F32.0 MILD MAJOR DEPRESSION, SINGLE EPISODE (CMS-HCC): Chronic | ICD-10-CM

## 2024-03-20 DIAGNOSIS — G44.019 EPISODIC CLUSTER HEADACHE, NOT INTRACTABLE: ICD-10-CM

## 2024-03-20 DIAGNOSIS — K59.09 CHRONIC CONSTIPATION: Chronic | ICD-10-CM

## 2024-03-20 DIAGNOSIS — Z00.00 ROUTINE GENERAL MEDICAL EXAMINATION AT HEALTH CARE FACILITY: Primary | ICD-10-CM

## 2024-03-20 DIAGNOSIS — E78.5 HYPERLIPIDEMIA, UNSPECIFIED HYPERLIPIDEMIA TYPE: Chronic | ICD-10-CM

## 2024-03-20 DIAGNOSIS — Z78.9 FULL CODE STATUS: ICD-10-CM

## 2024-03-20 PROBLEM — E55.9 VITAMIN D DEFICIENCY: Status: ACTIVE | Noted: 2024-03-20

## 2024-03-20 PROBLEM — I25.10 ATHEROSCLEROSIS OF CORONARY ARTERY: Status: ACTIVE | Noted: 2022-08-04

## 2024-03-20 PROCEDURE — 3079F DIAST BP 80-89 MM HG: CPT | Performed by: FAMILY MEDICINE

## 2024-03-20 PROCEDURE — 3075F SYST BP GE 130 - 139MM HG: CPT | Performed by: FAMILY MEDICINE

## 2024-03-20 PROCEDURE — 1159F MED LIST DOCD IN RCRD: CPT | Performed by: FAMILY MEDICINE

## 2024-03-20 PROCEDURE — 4010F ACE/ARB THERAPY RXD/TAKEN: CPT | Performed by: FAMILY MEDICINE

## 2024-03-20 PROCEDURE — 1158F ADVNC CARE PLAN TLK DOCD: CPT | Performed by: FAMILY MEDICINE

## 2024-03-20 PROCEDURE — 1170F FXNL STATUS ASSESSED: CPT | Performed by: FAMILY MEDICINE

## 2024-03-20 PROCEDURE — 1160F RVW MEDS BY RX/DR IN RCRD: CPT | Performed by: FAMILY MEDICINE

## 2024-03-20 PROCEDURE — G0439 PPPS, SUBSEQ VISIT: HCPCS | Performed by: FAMILY MEDICINE

## 2024-03-20 PROCEDURE — 1123F ACP DISCUSS/DSCN MKR DOCD: CPT | Performed by: FAMILY MEDICINE

## 2024-03-20 PROCEDURE — 3048F LDL-C <100 MG/DL: CPT | Performed by: FAMILY MEDICINE

## 2024-03-20 PROCEDURE — 99214 OFFICE O/P EST MOD 30 MIN: CPT | Performed by: FAMILY MEDICINE

## 2024-03-20 PROCEDURE — 3044F HG A1C LEVEL LT 7.0%: CPT | Performed by: FAMILY MEDICINE

## 2024-03-20 RX ORDER — SPIRONOLACTONE 50 MG/1
50 TABLET, FILM COATED ORAL DAILY
Qty: 90 TABLET | Refills: 1 | Status: SHIPPED | OUTPATIENT
Start: 2024-03-20 | End: 2025-03-20

## 2024-03-20 RX ORDER — AMOXICILLIN 250 MG
1 CAPSULE ORAL 2 TIMES DAILY
Qty: 180 TABLET | Refills: 1 | Status: SHIPPED | OUTPATIENT
Start: 2024-03-20 | End: 2025-03-20

## 2024-03-20 RX ORDER — LEVOTHYROXINE SODIUM 200 UG/1
TABLET ORAL
Qty: 120 TABLET | Refills: 2 | Status: SHIPPED | OUTPATIENT
Start: 2024-03-20

## 2024-03-20 RX ORDER — AMITRIPTYLINE HYDROCHLORIDE 100 MG/1
100 TABLET ORAL NIGHTLY
Qty: 90 TABLET | Refills: 1 | Status: SHIPPED | OUTPATIENT
Start: 2024-03-20

## 2024-03-20 RX ORDER — VERAPAMIL HYDROCHLORIDE 240 MG/1
240 TABLET, FILM COATED, EXTENDED RELEASE ORAL NIGHTLY
Qty: 90 TABLET | Refills: 1 | Status: SHIPPED | OUTPATIENT
Start: 2024-03-20

## 2024-03-20 RX ORDER — BUPROPION HYDROCHLORIDE 300 MG/1
300 TABLET ORAL EVERY MORNING
Qty: 90 TABLET | Refills: 1 | Status: SHIPPED | OUTPATIENT
Start: 2024-03-20 | End: 2024-06-05 | Stop reason: ALTCHOICE

## 2024-03-20 RX ORDER — IRBESARTAN 300 MG/1
300 TABLET ORAL DAILY
Qty: 90 TABLET | Refills: 1 | Status: SHIPPED | OUTPATIENT
Start: 2024-03-20

## 2024-03-20 RX ORDER — BUPROPION HYDROCHLORIDE 150 MG/1
150 TABLET ORAL EVERY MORNING
Qty: 90 TABLET | Refills: 1 | Status: CANCELLED | OUTPATIENT
Start: 2024-03-20 | End: 2024-09-16

## 2024-03-20 RX ORDER — ATORVASTATIN CALCIUM 40 MG/1
40 TABLET, FILM COATED ORAL DAILY
Qty: 90 TABLET | Refills: 1 | Status: SHIPPED | OUTPATIENT
Start: 2024-03-20

## 2024-03-20 ASSESSMENT — PATIENT HEALTH QUESTIONNAIRE - PHQ9
SUM OF ALL RESPONSES TO PHQ9 QUESTIONS 1 AND 2: 0
2. FEELING DOWN, DEPRESSED OR HOPELESS: NOT AT ALL
1. LITTLE INTEREST OR PLEASURE IN DOING THINGS: NOT AT ALL

## 2024-03-20 ASSESSMENT — ACTIVITIES OF DAILY LIVING (ADL)
BATHING: INDEPENDENT
GROCERY_SHOPPING: INDEPENDENT
TAKING_MEDICATION: INDEPENDENT
MANAGING_FINANCES: INDEPENDENT
DRESSING: INDEPENDENT
DOING_HOUSEWORK: INDEPENDENT

## 2024-03-20 NOTE — PROGRESS NOTES
Subjective   Reason for Visit: Timoteo Victoria is an 68 y.o. male here for a Medicare Wellness visit.     Past Medical, Surgical, and Family History reviewed and updated in chart.         Rolo has been taking care of his mother who recently transitioned to hospice care.  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 Has received good support from hospice. Has been experiencing more depression as he su with the current situation.    Bps have been stable.              Patient Care Team:  Dafne Bedolla DO as PCP - General  Dafne Bedolla DO as PCP - [employer] PCP     Review of Systems   Constitutional:  Negative for chills and fever.   Respiratory:  Negative for cough and shortness of breath.    Cardiovascular:  Negative for chest pain.   Gastrointestinal:  Negative for abdominal pain, diarrhea, nausea and vomiting.   Psychiatric/Behavioral:  Positive for dysphoric mood.        Objective   Vitals:  /82   Pulse 76   Temp 36.3 °C (97.4 °F)   Wt 115 kg (254 lb)   BMI 35.43 kg/m²       Physical Exam  Constitutional:       General: He is not in acute distress.     Appearance: Normal appearance.   HENT:      Head: Normocephalic.      Mouth/Throat:      Mouth: Mucous membranes are moist.   Eyes:      Extraocular Movements: Extraocular movements intact.      Conjunctiva/sclera: Conjunctivae normal.   Cardiovascular:      Rate and Rhythm: Normal rate and regular rhythm.      Heart sounds: No murmur heard.  Pulmonary:      Effort: Pulmonary effort is normal.      Breath sounds: No wheezing or rhonchi.   Musculoskeletal:      Cervical back: Neck supple.   Skin:     General: Skin is warm and dry.   Neurological:      General: No focal deficit present.      Mental Status: He is alert.   Psychiatric:         Mood and Affect: Mood normal.         Behavior: Behavior normal.         Assessment/Plan   Problem List Items Addressed This Visit       Obesity, morbid  (CMS/HCC)    Benign essential hypertension (Chronic)    Relevant Medications    irbesartan (Avapro) 300 mg tablet    spironolactone (Aldactone) 50 mg tablet    verapamil SR (Calan-SR) 240 mg ER tablet    Other Relevant Orders    Comprehensive Metabolic Panel    Hyperlipidemia (Chronic)    Relevant Medications    atorvastatin (Lipitor) 40 mg tablet    Other Relevant Orders    Lipid Panel    Hypothyroidism    Relevant Medications    levothyroxine (Synthroid, Levoxyl) 200 mcg tablet    Other Relevant Orders    Thyroid Stimulating Hormone    Mild major depression, single episode (CMS/HCC) (Chronic)    Current Assessment & Plan     Increase bupropion to 300mg.          Relevant Medications    buPROPion XL (Wellbutrin XL) 300 mg 24 hr tablet    Chronic constipation (Chronic)    Relevant Medications    sennosides-docusate sodium (Hope-Colace) 8.6-50 mg tablet    Episodic cluster headache, not intractable    Relevant Medications    amitriptyline (Elavil) 100 mg tablet    Type 2 diabetes mellitus (CMS/HCC)    Relevant Orders    Hemoglobin A1C    Hyponatremia    Current Assessment & Plan     Check serum and urine sodium/osmolality to further evaluate.          Relevant Orders    Basic Metabolic Panel    Osmolality    Osmolality, urine    Sodium, urine, random     Other Visit Diagnoses       Routine general medical examination at health care facility    -  Primary    Recommend Tdap and Shingrix.    Full code status        Code discussion conducted. Patient would like to remain full code.    Relevant Orders    Full code (Completed)

## 2024-03-22 PROBLEM — E66.01 OBESITY, MORBID (MULTI): Status: ACTIVE | Noted: 2023-06-13

## 2024-03-22 PROBLEM — E87.1 HYPONATREMIA: Status: ACTIVE | Noted: 2024-03-22

## 2024-03-22 ASSESSMENT — ENCOUNTER SYMPTOMS
DIARRHEA: 0
VOMITING: 0
NAUSEA: 0
COUGH: 0
DYSPHORIC MOOD: 1
CHILLS: 0
ABDOMINAL PAIN: 0
FEVER: 0
SHORTNESS OF BREATH: 0

## 2024-06-05 ENCOUNTER — TELEPHONE (OUTPATIENT)
Dept: PRIMARY CARE | Facility: CLINIC | Age: 69
End: 2024-06-05
Payer: MEDICARE

## 2024-06-05 DIAGNOSIS — F33.1 DEPRESSION, MAJOR, RECURRENT, MODERATE (MULTI): ICD-10-CM

## 2024-06-05 NOTE — TELEPHONE ENCOUNTER
Pt requesting starting a higher dose of depression medication. Would you like to see pt before increasing? If so can we take a block?

## 2024-06-06 NOTE — TELEPHONE ENCOUNTER
Attempted to call patient. Left message telling him to call back the office. Ok to increase his Wellbutrin. Looks like his insurance won't cover the 40mg tablet, but I can send in a 150mg tab and 300mg tab to take together. Just lmk what pharmacy, and I will send.

## 2024-06-07 RX ORDER — BUPROPION HYDROCHLORIDE 300 MG/1
300 TABLET ORAL EVERY MORNING
Qty: 90 TABLET | Refills: 1 | Status: SHIPPED | OUTPATIENT
Start: 2024-06-07

## 2024-06-07 RX ORDER — BUPROPION HYDROCHLORIDE 150 MG/1
150 TABLET ORAL EVERY MORNING
Qty: 90 TABLET | Refills: 1 | Status: SHIPPED | OUTPATIENT
Start: 2024-06-07 | End: 2024-12-04

## 2024-06-07 NOTE — TELEPHONE ENCOUNTER
Pt returned call- pt is agreeable to doing the additional 150mg dose on top of the 300mg.  Pt asked for this to be sent to [facility] (in EMR) Thanks, CG

## 2024-07-17 ENCOUNTER — LAB (OUTPATIENT)
Dept: LAB | Facility: LAB | Age: 69
End: 2024-07-17
Payer: MEDICARE

## 2024-07-17 ENCOUNTER — APPOINTMENT (OUTPATIENT)
Dept: PRIMARY CARE | Facility: CLINIC | Age: 69
End: 2024-07-17
Payer: MEDICARE

## 2024-07-17 DIAGNOSIS — E87.1 HYPONATREMIA: ICD-10-CM

## 2024-07-17 LAB
ANION GAP SERPL CALC-SCNC: 15 MMOL/L (ref 10–20)
BUN SERPL-MCNC: 14 MG/DL (ref 6–23)
CALCIUM SERPL-MCNC: 8.8 MG/DL (ref 8.6–10.3)
CHLORIDE SERPL-SCNC: 95 MMOL/L (ref 98–107)
CO2 SERPL-SCNC: 21 MMOL/L (ref 21–32)
CREAT SERPL-MCNC: 1.19 MG/DL (ref 0.5–1.3)
EGFRCR SERPLBLD CKD-EPI 2021: 67 ML/MIN/1.73M*2
GLUCOSE SERPL-MCNC: 114 MG/DL (ref 74–99)
POTASSIUM SERPL-SCNC: 4.7 MMOL/L (ref 3.5–5.3)
SODIUM SERPL-SCNC: 126 MMOL/L (ref 136–145)

## 2024-07-17 PROCEDURE — 36415 COLL VENOUS BLD VENIPUNCTURE: CPT

## 2024-07-17 PROCEDURE — 83930 ASSAY OF BLOOD OSMOLALITY: CPT

## 2024-07-17 PROCEDURE — 80048 BASIC METABOLIC PNL TOTAL CA: CPT

## 2024-07-18 LAB — OSMOLALITY SERPL: 264 MOSM/KG (ref 280–300)

## 2024-07-19 ENCOUNTER — APPOINTMENT (OUTPATIENT)
Dept: PRIMARY CARE | Facility: CLINIC | Age: 69
End: 2024-07-19
Payer: MEDICARE

## 2024-07-23 ENCOUNTER — TELEPHONE (OUTPATIENT)
Dept: PRIMARY CARE | Facility: CLINIC | Age: 69
End: 2024-07-23
Payer: MEDICARE

## 2024-07-23 DIAGNOSIS — E87.1 HYPONATREMIA: Primary | ICD-10-CM

## 2024-07-23 NOTE — TELEPHONE ENCOUNTER
Please let Rolo know that his sodium came back very low on recent labs. Unfortunately the lab did not collect the urine sample that was ordered. Has he been having any symptoms of low sodium such as nausea, vomiting, or  severe headaches? If he is having symptoms, he should go to the ER for a recheck. If not, I would like to have him go to the lab for some additional testing. He will need both a blood draw and urine sample.

## 2024-07-29 ENCOUNTER — LAB (OUTPATIENT)
Dept: LAB | Facility: LAB | Age: 69
End: 2024-07-29
Payer: MEDICARE

## 2024-07-29 DIAGNOSIS — E87.1 HYPONATREMIA: ICD-10-CM

## 2024-07-29 LAB
ANION GAP SERPL CALC-SCNC: 16 MMOL/L (ref 10–20)
BUN SERPL-MCNC: 17 MG/DL (ref 6–23)
CALCIUM SERPL-MCNC: 8.7 MG/DL (ref 8.6–10.3)
CHLORIDE SERPL-SCNC: 95 MMOL/L (ref 98–107)
CO2 SERPL-SCNC: 23 MMOL/L (ref 21–32)
CREAT SERPL-MCNC: 1.19 MG/DL (ref 0.5–1.3)
CREAT UR-MCNC: 113.5 MG/DL (ref 20–370)
EGFRCR SERPLBLD CKD-EPI 2021: 67 ML/MIN/1.73M*2
GLUCOSE SERPL-MCNC: 139 MG/DL (ref 74–99)
OSMOLALITY SERPL: 276 MOSM/KG (ref 280–300)
POTASSIUM SERPL-SCNC: 4.5 MMOL/L (ref 3.5–5.3)
SODIUM SERPL-SCNC: 129 MMOL/L (ref 136–145)
SODIUM UR-SCNC: 47 MMOL/L
SODIUM/CREAT UR-RTO: 41 MMOL/G CREAT

## 2024-07-29 PROCEDURE — 80048 BASIC METABOLIC PNL TOTAL CA: CPT

## 2024-07-29 PROCEDURE — 83935 ASSAY OF URINE OSMOLALITY: CPT

## 2024-07-29 PROCEDURE — 36415 COLL VENOUS BLD VENIPUNCTURE: CPT

## 2024-07-29 PROCEDURE — 83930 ASSAY OF BLOOD OSMOLALITY: CPT

## 2024-07-29 PROCEDURE — 82570 ASSAY OF URINE CREATININE: CPT

## 2024-07-29 PROCEDURE — 84300 ASSAY OF URINE SODIUM: CPT

## 2024-07-30 ENCOUNTER — TELEPHONE (OUTPATIENT)
Dept: PRIMARY CARE | Facility: CLINIC | Age: 69
End: 2024-07-30
Payer: MEDICARE

## 2024-07-30 DIAGNOSIS — E87.1 HYPONATREMIA: Primary | ICD-10-CM

## 2024-07-30 LAB — OSMOLALITY UR: 387 MOSM/KG (ref 200–1200)

## 2024-07-30 NOTE — TELEPHONE ENCOUNTER
----- Message from Lester Zambrano sent at 7/30/2024  9:25 AM EDT -----  Has to be first thing in the morning to get bloodwork

## 2024-07-30 NOTE — TELEPHONE ENCOUNTER
Pt called and states that he is not sure when he will be able to get the blood work done because his son has his truck and not sure when he will have it back but will get bw done as soon as he can

## 2024-07-30 NOTE — RESULT ENCOUNTER NOTE
We need some follow up blood work to keep figuring out what is causing his low sodium.  His kidneys are concentrating his sodium well so we need to figure out what else could be going on.

## 2024-07-30 NOTE — TELEPHONE ENCOUNTER
----- Message from Lester Zambrano sent at 7/30/2024  9:25 AM EDT -----  We need some follow up blood work to keep figuring out what is causing his low sodium.  His kidneys are concentrating his sodium well so we need to figure out what else could be going on.

## 2024-08-13 ENCOUNTER — TELEPHONE (OUTPATIENT)
Dept: PRIMARY CARE | Facility: CLINIC | Age: 69
End: 2024-08-13
Payer: MEDICARE

## 2024-08-14 NOTE — TELEPHONE ENCOUNTER
Unfortunately he does not meet medical criteria for exemption based on his current diagnoses. If there is something new going on, please make an appointment to discuss.

## 2024-09-17 ENCOUNTER — LAB (OUTPATIENT)
Dept: LAB | Facility: LAB | Age: 69
End: 2024-09-17
Payer: MEDICARE

## 2024-09-17 DIAGNOSIS — E78.5 HYPERLIPIDEMIA, UNSPECIFIED HYPERLIPIDEMIA TYPE: Chronic | ICD-10-CM

## 2024-09-17 DIAGNOSIS — E03.9 HYPOTHYROIDISM, UNSPECIFIED TYPE: ICD-10-CM

## 2024-09-17 DIAGNOSIS — E11.9 TYPE 2 DIABETES MELLITUS WITHOUT COMPLICATION, WITHOUT LONG-TERM CURRENT USE OF INSULIN (MULTI): ICD-10-CM

## 2024-09-17 DIAGNOSIS — I10 BENIGN ESSENTIAL HYPERTENSION: Chronic | ICD-10-CM

## 2024-09-17 LAB
ALBUMIN SERPL BCP-MCNC: 4 G/DL (ref 3.4–5)
ALP SERPL-CCNC: 74 U/L (ref 33–136)
ALT SERPL W P-5'-P-CCNC: 13 U/L (ref 10–52)
ANION GAP SERPL CALC-SCNC: 13 MMOL/L (ref 10–20)
AST SERPL W P-5'-P-CCNC: 11 U/L (ref 9–39)
BILIRUB SERPL-MCNC: 0.5 MG/DL (ref 0–1.2)
BUN SERPL-MCNC: 9 MG/DL (ref 6–23)
CALCIUM SERPL-MCNC: 8.8 MG/DL (ref 8.6–10.3)
CHLORIDE SERPL-SCNC: 103 MMOL/L (ref 98–107)
CHOLEST SERPL-MCNC: 113 MG/DL (ref 0–199)
CHOLESTEROL/HDL RATIO: 3.4
CO2 SERPL-SCNC: 24 MMOL/L (ref 21–32)
CREAT SERPL-MCNC: 1.03 MG/DL (ref 0.5–1.3)
EGFRCR SERPLBLD CKD-EPI 2021: 79 ML/MIN/1.73M*2
GLUCOSE SERPL-MCNC: 118 MG/DL (ref 74–99)
HDLC SERPL-MCNC: 33.4 MG/DL
LDLC SERPL CALC-MCNC: 61 MG/DL
NON HDL CHOLESTEROL: 80 MG/DL (ref 0–149)
POTASSIUM SERPL-SCNC: 3.9 MMOL/L (ref 3.5–5.3)
PROT SERPL-MCNC: 6.8 G/DL (ref 6.4–8.2)
SODIUM SERPL-SCNC: 136 MMOL/L (ref 136–145)
TRIGL SERPL-MCNC: 93 MG/DL (ref 0–149)
TSH SERPL-ACNC: <0.01 MIU/L (ref 0.44–3.98)
VLDL: 19 MG/DL (ref 0–40)

## 2024-09-17 PROCEDURE — 83036 HEMOGLOBIN GLYCOSYLATED A1C: CPT

## 2024-09-17 PROCEDURE — 84443 ASSAY THYROID STIM HORMONE: CPT

## 2024-09-17 PROCEDURE — 36415 COLL VENOUS BLD VENIPUNCTURE: CPT

## 2024-09-17 PROCEDURE — 80053 COMPREHEN METABOLIC PANEL: CPT

## 2024-09-17 PROCEDURE — 80061 LIPID PANEL: CPT

## 2024-09-18 LAB
EST. AVERAGE GLUCOSE BLD GHB EST-MCNC: 105 MG/DL
HBA1C MFR BLD: 5.3 %

## 2024-09-25 ENCOUNTER — APPOINTMENT (OUTPATIENT)
Dept: PRIMARY CARE | Facility: CLINIC | Age: 69
End: 2024-09-25
Payer: MEDICARE

## 2024-09-30 DIAGNOSIS — I10 BENIGN ESSENTIAL HYPERTENSION: Chronic | ICD-10-CM

## 2024-09-30 RX ORDER — SPIRONOLACTONE 50 MG/1
50 TABLET, FILM COATED ORAL DAILY
Qty: 90 TABLET | Refills: 0 | Status: SHIPPED | OUTPATIENT
Start: 2024-09-30 | End: 2025-09-30

## 2024-09-30 NOTE — TELEPHONE ENCOUNTER
Pt called requesting refill on spirolactone.  States he ran out over the weekend.  Please send to [facility]

## 2024-10-08 ENCOUNTER — LAB (OUTPATIENT)
Dept: LAB | Facility: LAB | Age: 69
End: 2024-10-08
Payer: MEDICARE

## 2024-10-08 ENCOUNTER — APPOINTMENT (OUTPATIENT)
Dept: PRIMARY CARE | Facility: CLINIC | Age: 69
End: 2024-10-08
Payer: MEDICARE

## 2024-10-08 ENCOUNTER — TELEPHONE (OUTPATIENT)
Dept: PRIMARY CARE | Facility: CLINIC | Age: 69
End: 2024-10-08

## 2024-10-08 VITALS
BODY MASS INDEX: 30.96 KG/M2 | TEMPERATURE: 97.1 F | OXYGEN SATURATION: 100 % | HEART RATE: 81 BPM | WEIGHT: 222 LBS | DIASTOLIC BLOOD PRESSURE: 64 MMHG | SYSTOLIC BLOOD PRESSURE: 132 MMHG

## 2024-10-08 DIAGNOSIS — N40.1 BENIGN PROSTATIC HYPERPLASIA WITH URINARY HESITANCY: Primary | ICD-10-CM

## 2024-10-08 DIAGNOSIS — R39.11 URINARY HESITANCY: ICD-10-CM

## 2024-10-08 DIAGNOSIS — Z12.5 SCREENING FOR PROSTATE CANCER: ICD-10-CM

## 2024-10-08 DIAGNOSIS — E11.9 TYPE 2 DIABETES MELLITUS WITHOUT COMPLICATION, WITHOUT LONG-TERM CURRENT USE OF INSULIN (MULTI): ICD-10-CM

## 2024-10-08 DIAGNOSIS — K59.09 CHRONIC CONSTIPATION: Chronic | ICD-10-CM

## 2024-10-08 DIAGNOSIS — R63.4 WEIGHT LOSS: ICD-10-CM

## 2024-10-08 DIAGNOSIS — I10 BENIGN ESSENTIAL HYPERTENSION: Chronic | ICD-10-CM

## 2024-10-08 DIAGNOSIS — E03.9 HYPOTHYROIDISM, UNSPECIFIED TYPE: ICD-10-CM

## 2024-10-08 DIAGNOSIS — G44.019 EPISODIC CLUSTER HEADACHE, NOT INTRACTABLE: ICD-10-CM

## 2024-10-08 DIAGNOSIS — R13.19 ESOPHAGEAL DYSPHAGIA: Primary | ICD-10-CM

## 2024-10-08 DIAGNOSIS — F33.1 DEPRESSION, MAJOR, RECURRENT, MODERATE: ICD-10-CM

## 2024-10-08 DIAGNOSIS — R39.11 BENIGN PROSTATIC HYPERPLASIA WITH URINARY HESITANCY: Primary | ICD-10-CM

## 2024-10-08 DIAGNOSIS — E78.5 HYPERLIPIDEMIA, UNSPECIFIED HYPERLIPIDEMIA TYPE: Chronic | ICD-10-CM

## 2024-10-08 PROBLEM — E87.1 HYPONATREMIA: Status: RESOLVED | Noted: 2024-03-22 | Resolved: 2024-10-08

## 2024-10-08 LAB
APPEARANCE UR: CLEAR
BILIRUB UR STRIP.AUTO-MCNC: NEGATIVE MG/DL
COLOR UR: NORMAL
CREAT UR-MCNC: 104.6 MG/DL (ref 20–370)
GLUCOSE UR STRIP.AUTO-MCNC: NORMAL MG/DL
KETONES UR STRIP.AUTO-MCNC: NEGATIVE MG/DL
LEUKOCYTE ESTERASE UR QL STRIP.AUTO: NEGATIVE
MICROALBUMIN UR-MCNC: 7.7 MG/L
MICROALBUMIN/CREAT UR: 7.4 UG/MG CREAT
NITRITE UR QL STRIP.AUTO: NEGATIVE
PH UR STRIP.AUTO: 6 [PH]
PROT UR STRIP.AUTO-MCNC: NEGATIVE MG/DL
RBC # UR STRIP.AUTO: NEGATIVE /UL
SP GR UR STRIP.AUTO: 1.01
UROBILINOGEN UR STRIP.AUTO-MCNC: NORMAL MG/DL

## 2024-10-08 PROCEDURE — 3048F LDL-C <100 MG/DL: CPT | Performed by: FAMILY MEDICINE

## 2024-10-08 PROCEDURE — 1159F MED LIST DOCD IN RCRD: CPT | Performed by: FAMILY MEDICINE

## 2024-10-08 PROCEDURE — 4010F ACE/ARB THERAPY RXD/TAKEN: CPT | Performed by: FAMILY MEDICINE

## 2024-10-08 PROCEDURE — 81003 URINALYSIS AUTO W/O SCOPE: CPT

## 2024-10-08 PROCEDURE — 82043 UR ALBUMIN QUANTITATIVE: CPT

## 2024-10-08 PROCEDURE — 3075F SYST BP GE 130 - 139MM HG: CPT | Performed by: FAMILY MEDICINE

## 2024-10-08 PROCEDURE — 3044F HG A1C LEVEL LT 7.0%: CPT | Performed by: FAMILY MEDICINE

## 2024-10-08 PROCEDURE — 3078F DIAST BP <80 MM HG: CPT | Performed by: FAMILY MEDICINE

## 2024-10-08 PROCEDURE — 82570 ASSAY OF URINE CREATININE: CPT

## 2024-10-08 PROCEDURE — 99215 OFFICE O/P EST HI 40 MIN: CPT | Performed by: FAMILY MEDICINE

## 2024-10-08 PROCEDURE — 1160F RVW MEDS BY RX/DR IN RCRD: CPT | Performed by: FAMILY MEDICINE

## 2024-10-08 PROCEDURE — 3061F NEG MICROALBUMINURIA REV: CPT | Performed by: FAMILY MEDICINE

## 2024-10-08 RX ORDER — LEVOTHYROXINE SODIUM 200 UG/1
TABLET ORAL
Qty: 90 TABLET | Refills: 1 | Status: SHIPPED | OUTPATIENT
Start: 2024-10-08

## 2024-10-08 RX ORDER — VERAPAMIL HCL 240 MG
240 TABLET, EXTENDED RELEASE ORAL NIGHTLY
Qty: 90 TABLET | Refills: 1 | Status: SHIPPED | OUTPATIENT
Start: 2024-10-08

## 2024-10-08 RX ORDER — BUPROPION HYDROCHLORIDE 300 MG/1
300 TABLET ORAL EVERY MORNING
Qty: 90 TABLET | Refills: 1 | Status: SHIPPED | OUTPATIENT
Start: 2024-10-08

## 2024-10-08 RX ORDER — AMITRIPTYLINE HYDROCHLORIDE 100 MG/1
100 TABLET ORAL NIGHTLY
Qty: 90 TABLET | Refills: 1 | Status: SHIPPED | OUTPATIENT
Start: 2024-10-08

## 2024-10-08 RX ORDER — SPIRONOLACTONE 50 MG/1
50 TABLET, FILM COATED ORAL DAILY
Qty: 90 TABLET | Refills: 1 | Status: SHIPPED | OUTPATIENT
Start: 2024-10-08 | End: 2025-10-08

## 2024-10-08 RX ORDER — AMOXICILLIN 250 MG
1 CAPSULE ORAL 2 TIMES DAILY
Qty: 180 TABLET | Refills: 1 | Status: SHIPPED | OUTPATIENT
Start: 2024-10-08 | End: 2025-10-08

## 2024-10-08 RX ORDER — HYDROXYZINE PAMOATE 25 MG/1
25 CAPSULE ORAL 4 TIMES DAILY PRN
Qty: 90 CAPSULE | Refills: 2 | Status: SHIPPED | OUTPATIENT
Start: 2024-10-08 | End: 2025-10-08

## 2024-10-08 RX ORDER — TAMSULOSIN HYDROCHLORIDE 0.4 MG/1
0.4 CAPSULE ORAL DAILY
Qty: 90 CAPSULE | Refills: 1 | Status: CANCELLED | OUTPATIENT
Start: 2024-10-08

## 2024-10-08 RX ORDER — OMEPRAZOLE 20 MG/1
20 CAPSULE, DELAYED RELEASE ORAL DAILY
Qty: 30 CAPSULE | Refills: 1 | Status: SHIPPED | OUTPATIENT
Start: 2024-10-08

## 2024-10-08 RX ORDER — ATORVASTATIN CALCIUM 40 MG/1
40 TABLET, FILM COATED ORAL DAILY
Qty: 90 TABLET | Refills: 1 | Status: SHIPPED | OUTPATIENT
Start: 2024-10-08

## 2024-10-08 RX ORDER — IRBESARTAN 300 MG/1
300 TABLET ORAL DAILY
Qty: 90 TABLET | Refills: 1 | Status: SHIPPED | OUTPATIENT
Start: 2024-10-08

## 2024-10-08 ASSESSMENT — ENCOUNTER SYMPTOMS
APPETITE CHANGE: 1
DYSURIA: 0
ABDOMINAL DISTENTION: 0
ABDOMINAL PAIN: 0
CHILLS: 0
DIARRHEA: 0
TROUBLE SWALLOWING: 1
VOMITING: 0
SHORTNESS OF BREATH: 0
FEVER: 0
BLOOD IN STOOL: 0
COUGH: 0
NAUSEA: 0

## 2024-10-08 NOTE — PROGRESS NOTES
Subjective   Patient ID: Timoteo Victoria is a 69 y.o. male who presents for Diabetes and Hypothyroidism (Review BW).    Rolo has been feeling well. Coping with changes in house since mother passed. Has had some lonely moments but has good support from his family.     Has been having urinary issues. Notices hesitancy in stream. Often starts and stops. No dysuria or urgency.    Also having issue swallowing over the past few months.  Symptoms are intermittent.  Feels like cannot get food or drinks past his throat.  Sometimes he has to bring the food up if he cannot swallow all the way.    Weight down 30 lbs since March. Reports eating less. Appetite decreased. Not cooking now that mom not around.         Review of Systems   Constitutional:  Positive for appetite change. Negative for chills and fever.   HENT:  Positive for trouble swallowing. Negative for congestion.    Respiratory:  Negative for cough and shortness of breath.    Cardiovascular:  Negative for chest pain.   Gastrointestinal:  Negative for abdominal distention, abdominal pain, blood in stool, diarrhea, nausea and vomiting.   Genitourinary:  Negative for dysuria.       Objective   /64   Pulse 81   Temp 36.2 °C (97.1 °F)   Wt 101 kg (222 lb)   SpO2 100%   BMI 30.96 kg/m²     Physical Exam  Constitutional:       General: He is not in acute distress.     Appearance: Normal appearance.   HENT:      Head: Normocephalic.      Mouth/Throat:      Mouth: Mucous membranes are moist.   Eyes:      Extraocular Movements: Extraocular movements intact.      Conjunctiva/sclera: Conjunctivae normal.   Cardiovascular:      Rate and Rhythm: Normal rate and regular rhythm.      Heart sounds: No murmur heard.  Pulmonary:      Breath sounds: No wheezing or rhonchi.   Musculoskeletal:      Cervical back: Neck supple.   Skin:     General: Skin is warm and dry.   Neurological:      Mental Status: He is alert.   Psychiatric:         Mood and Affect: Mood normal.          Behavior: Behavior normal.         Assessment/Plan   Problem List Items Addressed This Visit             ICD-10-CM    Benign essential hypertension (Chronic) I10     Controlled.         Relevant Medications    irbesartan (Avapro) 300 mg tablet    verapamil SR (Calan-SR) 240 mg ER tablet    spironolactone (Aldactone) 50 mg tablet    Other Relevant Orders    Comprehensive Metabolic Panel    Hyperlipidemia (Chronic) E78.5    Relevant Medications    atorvastatin (Lipitor) 40 mg tablet    Other Relevant Orders    Lipid Panel    Hypothyroidism E03.9     TSH depressed.  Reduce levothyroxine dose to 1 tablet daily (previously taking two tablets on Wednesday and Saturdays).         Relevant Medications    levothyroxine (Synthroid, Levoxyl) 200 mcg tablet    Other Relevant Orders    Thyroid Stimulating Hormone    CBC and Auto Differential    Thyroid Stimulating Hormone    Chronic constipation (Chronic) K59.09    Relevant Medications    sennosides-docusate sodium (Hope-Colace) 8.6-50 mg tablet    Episodic cluster headache, not intractable G44.019    Relevant Medications    amitriptyline (Elavil) 100 mg tablet    Type 2 diabetes mellitus E11.9     Hemoglobin A1c improved to 5.3%.         Relevant Orders    Albumin-Creatinine Ratio, Urine Random (Completed)    CBC and Auto Differential    Esophageal dysphagia - Primary R13.19     Possibly related to worsening acid reflux.  Start 2-week course of omeprazole.  If symptoms not improving, recommend GI referral to discuss an EGD since patient has had concomitant weight loss.         Relevant Medications    omeprazole (PriLOSEC) 20 mg DR capsule    Weight loss R63.4     Patient has had a weight loss of 30 pounds over the last 6 months.  Potentially due to changes in appetite and intake since his mother passed.  Also TSH is depressed.  Discussed GI referral, but patient would like to wait.          Urinary hesitancy R39.11     Urine testing ordered to further assess.  Patient unable  to give a sample in the office so he will go to the lab.         Relevant Orders    Urinalysis with Reflex Culture and Microscopic    Depression, major, recurrent, moderate F33.1     Stable.         Relevant Medications    buPROPion XL (Wellbutrin XL) 300 mg 24 hr tablet    hydrOXYzine pamoate (Vistaril) 25 mg capsule     Other Visit Diagnoses         Codes    Screening for prostate cancer     Z12.5    Relevant Orders    Prostate Specific Antigen             Time Based Billing:  - Prep Time on Date of Patient Encounter:  3 minutes  - Time Directly with Patient/Family/Caregiver:   34 minutes  - Documentation Time:   17 minutes    Total Minutes:  54

## 2024-10-08 NOTE — PATIENT INSTRUCTIONS
Please change your levothyroxine to 1 tab every day. Please go to the lab for non-fasting bloodwork around 11/21/24.

## 2024-10-08 NOTE — ASSESSMENT & PLAN NOTE
Patient has had a weight loss of 30 pounds over the last 6 months.  Potentially due to changes in appetite and intake since his mother passed.  Also TSH is depressed.  Discussed GI referral, but patient would like to wait.

## 2024-10-08 NOTE — ASSESSMENT & PLAN NOTE
TSH depressed.  Reduce levothyroxine dose to 1 tablet daily (previously taking two tablets on Wednesday and Saturdays).

## 2024-10-08 NOTE — TELEPHONE ENCOUNTER
Please let patient know that all of his urine testing was normal.  There is no sign of an infection.  As we discussed during his appointment, I suspect these urinary issues are related to an enlarged prostate.  I would recommend that we start a medicine called tamsulosin that is taken nightly. Please confirm that he would like this sent to .  Please let me know if his symptoms do not improve on the medication.

## 2024-10-08 NOTE — ASSESSMENT & PLAN NOTE
Urine testing ordered to further assess.  Patient unable to give a sample in the office so he will go to the lab.

## 2024-10-08 NOTE — ASSESSMENT & PLAN NOTE
Possibly related to worsening acid reflux.  Start 2-week course of omeprazole.  If symptoms not improving, recommend GI referral to discuss an EGD since patient has had concomitant weight loss.

## 2024-10-09 LAB — HOLD SPECIMEN: NORMAL

## 2024-10-09 RX ORDER — TAMSULOSIN HYDROCHLORIDE 0.4 MG/1
0.4 CAPSULE ORAL NIGHTLY
Qty: 90 CAPSULE | Refills: 1 | Status: SHIPPED | OUTPATIENT
Start: 2024-10-09

## 2024-10-09 NOTE — TELEPHONE ENCOUNTER
Pt called and informed of provider message  Pt states he does use [facility] and would like med send there

## 2024-10-29 ENCOUNTER — TELEPHONE (OUTPATIENT)
Dept: PRIMARY CARE | Facility: CLINIC | Age: 69
End: 2024-10-29
Payer: MEDICARE

## 2024-10-29 DIAGNOSIS — R13.19 ESOPHAGEAL DYSPHAGIA: Primary | ICD-10-CM

## 2024-10-29 DIAGNOSIS — R63.4 WEIGHT LOSS: ICD-10-CM

## 2024-10-29 DIAGNOSIS — R39.11 URINARY HESITANCY: ICD-10-CM

## 2024-11-12 ENCOUNTER — APPOINTMENT (OUTPATIENT)
Dept: PRIMARY CARE | Facility: CLINIC | Age: 69
End: 2024-11-12
Payer: MEDICARE

## 2024-11-20 ENCOUNTER — HOSPITAL ENCOUNTER (INPATIENT)
Facility: HOSPITAL | Age: 69
LOS: 1 days | Discharge: ANOTHER HOSPITAL | End: 2024-11-21
Attending: EMERGENCY MEDICINE | Admitting: STUDENT IN AN ORGANIZED HEALTH CARE EDUCATION/TRAINING PROGRAM
Payer: MEDICARE

## 2024-11-20 ENCOUNTER — APPOINTMENT (OUTPATIENT)
Dept: RADIOLOGY | Facility: HOSPITAL | Age: 69
End: 2024-11-20
Payer: MEDICARE

## 2024-11-20 DIAGNOSIS — R13.14 DYSPHAGIA, PHARYNGOESOPHAGEAL: ICD-10-CM

## 2024-11-20 DIAGNOSIS — R13.10 DYSPHAGIA: Primary | ICD-10-CM

## 2024-11-20 LAB
ALBUMIN SERPL BCP-MCNC: 4.3 G/DL (ref 3.4–5)
ALP SERPL-CCNC: 71 U/L (ref 33–136)
ALT SERPL W P-5'-P-CCNC: 8 U/L (ref 10–52)
ANION GAP SERPL CALC-SCNC: 14 MMOL/L (ref 10–20)
AST SERPL W P-5'-P-CCNC: 10 U/L (ref 9–39)
BASOPHILS # BLD AUTO: 0.07 X10*3/UL (ref 0–0.1)
BASOPHILS NFR BLD AUTO: 0.9 %
BILIRUB SERPL-MCNC: 0.8 MG/DL (ref 0–1.2)
BUN SERPL-MCNC: 10 MG/DL (ref 6–23)
CALCIUM SERPL-MCNC: 9.4 MG/DL (ref 8.6–10.3)
CHLORIDE SERPL-SCNC: 102 MMOL/L (ref 98–107)
CO2 SERPL-SCNC: 23 MMOL/L (ref 21–32)
CREAT SERPL-MCNC: 1.18 MG/DL (ref 0.5–1.3)
EGFRCR SERPLBLD CKD-EPI 2021: 67 ML/MIN/1.73M*2
EOSINOPHIL # BLD AUTO: 0.12 X10*3/UL (ref 0–0.7)
EOSINOPHIL NFR BLD AUTO: 1.6 %
ERYTHROCYTE [DISTWIDTH] IN BLOOD BY AUTOMATED COUNT: 12 % (ref 11.5–14.5)
GLUCOSE SERPL-MCNC: 94 MG/DL (ref 74–99)
HCT VFR BLD AUTO: 45.3 % (ref 41–52)
HGB BLD-MCNC: 15.6 G/DL (ref 13.5–17.5)
IMM GRANULOCYTES # BLD AUTO: 0.03 X10*3/UL (ref 0–0.7)
IMM GRANULOCYTES NFR BLD AUTO: 0.4 % (ref 0–0.9)
LYMPHOCYTES # BLD AUTO: 1.5 X10*3/UL (ref 1.2–4.8)
LYMPHOCYTES NFR BLD AUTO: 19.4 %
MAGNESIUM SERPL-MCNC: 1.89 MG/DL (ref 1.6–2.4)
MCH RBC QN AUTO: 30 PG (ref 26–34)
MCHC RBC AUTO-ENTMCNC: 34.4 G/DL (ref 32–36)
MCV RBC AUTO: 87 FL (ref 80–100)
MONOCYTES # BLD AUTO: 0.46 X10*3/UL (ref 0.1–1)
MONOCYTES NFR BLD AUTO: 6 %
NEUTROPHILS # BLD AUTO: 5.54 X10*3/UL (ref 1.2–7.7)
NEUTROPHILS NFR BLD AUTO: 71.7 %
NRBC BLD-RTO: 0 /100 WBCS (ref 0–0)
PLATELET # BLD AUTO: 191 X10*3/UL (ref 150–450)
POTASSIUM SERPL-SCNC: 3.9 MMOL/L (ref 3.5–5.3)
PROT SERPL-MCNC: 7.4 G/DL (ref 6.4–8.2)
RBC # BLD AUTO: 5.2 X10*6/UL (ref 4.5–5.9)
SODIUM SERPL-SCNC: 135 MMOL/L (ref 136–145)
WBC # BLD AUTO: 7.7 X10*3/UL (ref 4.4–11.3)

## 2024-11-20 PROCEDURE — 80053 COMPREHEN METABOLIC PANEL: CPT | Performed by: EMERGENCY MEDICINE

## 2024-11-20 PROCEDURE — 70491 CT SOFT TISSUE NECK W/DYE: CPT | Performed by: RADIOLOGY

## 2024-11-20 PROCEDURE — 70491 CT SOFT TISSUE NECK W/DYE: CPT

## 2024-11-20 PROCEDURE — 85025 COMPLETE CBC W/AUTO DIFF WBC: CPT | Performed by: EMERGENCY MEDICINE

## 2024-11-20 PROCEDURE — 83735 ASSAY OF MAGNESIUM: CPT | Performed by: EMERGENCY MEDICINE

## 2024-11-20 PROCEDURE — 99285 EMERGENCY DEPT VISIT HI MDM: CPT

## 2024-11-20 PROCEDURE — 2550000001 HC RX 255 CONTRASTS: Performed by: EMERGENCY MEDICINE

## 2024-11-20 PROCEDURE — 36415 COLL VENOUS BLD VENIPUNCTURE: CPT | Performed by: EMERGENCY MEDICINE

## 2024-11-20 ASSESSMENT — COLUMBIA-SUICIDE SEVERITY RATING SCALE - C-SSRS
6. HAVE YOU EVER DONE ANYTHING, STARTED TO DO ANYTHING, OR PREPARED TO DO ANYTHING TO END YOUR LIFE?: NO
2. HAVE YOU ACTUALLY HAD ANY THOUGHTS OF KILLING YOURSELF?: NO
1. IN THE PAST MONTH, HAVE YOU WISHED YOU WERE DEAD OR WISHED YOU COULD GO TO SLEEP AND NOT WAKE UP?: NO

## 2024-11-20 ASSESSMENT — PAIN - FUNCTIONAL ASSESSMENT: PAIN_FUNCTIONAL_ASSESSMENT: 0-10

## 2024-11-20 ASSESSMENT — PAIN SCALES - GENERAL: PAINLEVEL_OUTOF10: 0 - NO PAIN

## 2024-11-21 ENCOUNTER — ANESTHESIA EVENT (OUTPATIENT)
Dept: OPERATING ROOM | Facility: HOSPITAL | Age: 69
End: 2024-11-21
Payer: MEDICARE

## 2024-11-21 ENCOUNTER — ANESTHESIA (OUTPATIENT)
Dept: OPERATING ROOM | Facility: HOSPITAL | Age: 69
End: 2024-11-21
Payer: MEDICARE

## 2024-11-21 ENCOUNTER — APPOINTMENT (OUTPATIENT)
Dept: OPERATING ROOM | Facility: HOSPITAL | Age: 69
End: 2024-11-21
Payer: MEDICARE

## 2024-11-21 VITALS
RESPIRATION RATE: 15 BRPM | SYSTOLIC BLOOD PRESSURE: 149 MMHG | BODY MASS INDEX: 29.35 KG/M2 | HEART RATE: 86 BPM | DIASTOLIC BLOOD PRESSURE: 70 MMHG | HEIGHT: 70 IN | TEMPERATURE: 98.4 F | OXYGEN SATURATION: 95 % | WEIGHT: 205 LBS

## 2024-11-21 LAB
ANION GAP SERPL CALC-SCNC: 12 MMOL/L (ref 10–20)
BUN SERPL-MCNC: 11 MG/DL (ref 6–23)
CALCIUM SERPL-MCNC: 9.1 MG/DL (ref 8.6–10.3)
CHLORIDE SERPL-SCNC: 101 MMOL/L (ref 98–107)
CO2 SERPL-SCNC: 26 MMOL/L (ref 21–32)
CREAT SERPL-MCNC: 1.04 MG/DL (ref 0.5–1.3)
EGFRCR SERPLBLD CKD-EPI 2021: 78 ML/MIN/1.73M*2
ERYTHROCYTE [DISTWIDTH] IN BLOOD BY AUTOMATED COUNT: 12 % (ref 11.5–14.5)
GLUCOSE BLD MANUAL STRIP-MCNC: 73 MG/DL (ref 74–99)
GLUCOSE SERPL-MCNC: 70 MG/DL (ref 74–99)
HCT VFR BLD AUTO: 42.1 % (ref 41–52)
HGB BLD-MCNC: 14.4 G/DL (ref 13.5–17.5)
MCH RBC QN AUTO: 29.8 PG (ref 26–34)
MCHC RBC AUTO-ENTMCNC: 34.2 G/DL (ref 32–36)
MCV RBC AUTO: 87 FL (ref 80–100)
NRBC BLD-RTO: 0 /100 WBCS (ref 0–0)
PLATELET # BLD AUTO: 164 X10*3/UL (ref 150–450)
POTASSIUM SERPL-SCNC: 3.4 MMOL/L (ref 3.5–5.3)
RBC # BLD AUTO: 4.83 X10*6/UL (ref 4.5–5.9)
SODIUM SERPL-SCNC: 136 MMOL/L (ref 136–145)
T4 FREE SERPL-MCNC: 1.55 NG/DL (ref 0.61–1.12)
TSH SERPL-ACNC: 0.01 MIU/L (ref 0.44–3.98)
WBC # BLD AUTO: 7.5 X10*3/UL (ref 4.4–11.3)

## 2024-11-21 PROCEDURE — 99222 1ST HOSP IP/OBS MODERATE 55: CPT | Performed by: INTERNAL MEDICINE

## 2024-11-21 PROCEDURE — 3700000001 HC GENERAL ANESTHESIA TIME - INITIAL BASE CHARGE: Performed by: LICENSED PRACTICAL NURSE

## 2024-11-21 PROCEDURE — 84443 ASSAY THYROID STIM HORMONE: CPT | Performed by: STUDENT IN AN ORGANIZED HEALTH CARE EDUCATION/TRAINING PROGRAM

## 2024-11-21 PROCEDURE — 36415 COLL VENOUS BLD VENIPUNCTURE: CPT | Performed by: STUDENT IN AN ORGANIZED HEALTH CARE EDUCATION/TRAINING PROGRAM

## 2024-11-21 PROCEDURE — 80048 BASIC METABOLIC PNL TOTAL CA: CPT | Performed by: STUDENT IN AN ORGANIZED HEALTH CARE EDUCATION/TRAINING PROGRAM

## 2024-11-21 PROCEDURE — 0DB38ZX EXCISION OF LOWER ESOPHAGUS, VIA NATURAL OR ARTIFICIAL OPENING ENDOSCOPIC, DIAGNOSTIC: ICD-10-PCS | Performed by: FAMILY MEDICINE

## 2024-11-21 PROCEDURE — 85027 COMPLETE CBC AUTOMATED: CPT | Performed by: STUDENT IN AN ORGANIZED HEALTH CARE EDUCATION/TRAINING PROGRAM

## 2024-11-21 PROCEDURE — 7100000002 HC RECOVERY ROOM TIME - EACH INCREMENTAL 1 MINUTE: Performed by: LICENSED PRACTICAL NURSE

## 2024-11-21 PROCEDURE — 99233 SBSQ HOSP IP/OBS HIGH 50: CPT | Performed by: FAMILY MEDICINE

## 2024-11-21 PROCEDURE — 82947 ASSAY GLUCOSE BLOOD QUANT: CPT

## 2024-11-21 PROCEDURE — 7100000001 HC RECOVERY ROOM TIME - INITIAL BASE CHARGE: Performed by: LICENSED PRACTICAL NURSE

## 2024-11-21 PROCEDURE — 1210000001 HC SEMI-PRIVATE ROOM DAILY

## 2024-11-21 PROCEDURE — 2500000004 HC RX 250 GENERAL PHARMACY W/ HCPCS (ALT 636 FOR OP/ED): Performed by: LICENSED PRACTICAL NURSE

## 2024-11-21 PROCEDURE — 3700000002 HC GENERAL ANESTHESIA TIME - EACH INCREMENTAL 1 MINUTE: Performed by: LICENSED PRACTICAL NURSE

## 2024-11-21 PROCEDURE — 3600000002 HC OR TIME - INITIAL BASE CHARGE - PROCEDURE LEVEL TWO: Performed by: LICENSED PRACTICAL NURSE

## 2024-11-21 PROCEDURE — 84439 ASSAY OF FREE THYROXINE: CPT | Performed by: STUDENT IN AN ORGANIZED HEALTH CARE EDUCATION/TRAINING PROGRAM

## 2024-11-21 PROCEDURE — 92610 EVALUATE SWALLOWING FUNCTION: CPT | Mod: GN

## 2024-11-21 PROCEDURE — 99223 1ST HOSP IP/OBS HIGH 75: CPT | Performed by: STUDENT IN AN ORGANIZED HEALTH CARE EDUCATION/TRAINING PROGRAM

## 2024-11-21 PROCEDURE — 2500000004 HC RX 250 GENERAL PHARMACY W/ HCPCS (ALT 636 FOR OP/ED): Performed by: ANESTHESIOLOGY

## 2024-11-21 PROCEDURE — 43202 ESOPHAGOSCOPY FLEX BIOPSY: CPT | Performed by: INTERNAL MEDICINE

## 2024-11-21 PROCEDURE — 3600000007 HC OR TIME - EACH INCREMENTAL 1 MINUTE - PROCEDURE LEVEL TWO: Performed by: LICENSED PRACTICAL NURSE

## 2024-11-21 RX ORDER — PROPOFOL 10 MG/ML
INJECTION, EMULSION INTRAVENOUS AS NEEDED
Status: DISCONTINUED | OUTPATIENT
Start: 2024-11-21 | End: 2024-11-21

## 2024-11-21 RX ORDER — LEVOTHYROXINE SODIUM 100 UG/1
200 TABLET ORAL DAILY
Status: DISCONTINUED | OUTPATIENT
Start: 2024-11-21 | End: 2024-11-22 | Stop reason: HOSPADM

## 2024-11-21 RX ORDER — TALC
3 POWDER (GRAM) TOPICAL NIGHTLY PRN
Status: DISCONTINUED | OUTPATIENT
Start: 2024-11-21 | End: 2024-11-22 | Stop reason: HOSPADM

## 2024-11-21 RX ORDER — SPIRONOLACTONE 25 MG/1
50 TABLET ORAL DAILY
Status: DISCONTINUED | OUTPATIENT
Start: 2024-11-21 | End: 2024-11-22 | Stop reason: HOSPADM

## 2024-11-21 RX ORDER — BISACODYL 5 MG
10 TABLET, DELAYED RELEASE (ENTERIC COATED) ORAL DAILY PRN
Status: DISCONTINUED | OUTPATIENT
Start: 2024-11-21 | End: 2024-11-22 | Stop reason: HOSPADM

## 2024-11-21 RX ORDER — ATORVASTATIN CALCIUM 40 MG/1
40 TABLET, FILM COATED ORAL DAILY
Status: DISCONTINUED | OUTPATIENT
Start: 2024-11-21 | End: 2024-11-22 | Stop reason: HOSPADM

## 2024-11-21 RX ORDER — OXYCODONE HYDROCHLORIDE 5 MG/1
5 TABLET ORAL EVERY 4 HOURS PRN
Status: CANCELLED | OUTPATIENT
Start: 2024-11-21

## 2024-11-21 RX ORDER — ONDANSETRON HYDROCHLORIDE 2 MG/ML
4 INJECTION, SOLUTION INTRAVENOUS ONCE AS NEEDED
Status: CANCELLED | OUTPATIENT
Start: 2024-11-21

## 2024-11-21 RX ORDER — FAMOTIDINE 10 MG/ML
20 INJECTION INTRAVENOUS ONCE
Status: CANCELLED | OUTPATIENT
Start: 2024-11-21 | End: 2024-11-21

## 2024-11-21 RX ORDER — LIDOCAINE HYDROCHLORIDE 10 MG/ML
0.1 INJECTION, SOLUTION EPIDURAL; INFILTRATION; INTRACAUDAL; PERINEURAL ONCE
Status: CANCELLED | OUTPATIENT
Start: 2024-11-21 | End: 2024-11-21

## 2024-11-21 RX ORDER — FAMOTIDINE 10 MG/ML
20 INJECTION INTRAVENOUS ONCE
Status: COMPLETED | OUTPATIENT
Start: 2024-11-21 | End: 2024-11-21

## 2024-11-21 RX ORDER — AMITRIPTYLINE HYDROCHLORIDE 25 MG/1
100 TABLET, FILM COATED ORAL NIGHTLY
Status: DISCONTINUED | OUTPATIENT
Start: 2024-11-21 | End: 2024-11-22 | Stop reason: HOSPADM

## 2024-11-21 RX ORDER — TAMSULOSIN HYDROCHLORIDE 0.4 MG/1
0.4 CAPSULE ORAL NIGHTLY
Status: DISCONTINUED | OUTPATIENT
Start: 2024-11-21 | End: 2024-11-22 | Stop reason: HOSPADM

## 2024-11-21 RX ORDER — LABETALOL HYDROCHLORIDE 5 MG/ML
10 INJECTION, SOLUTION INTRAVENOUS ONCE
Status: DISCONTINUED | OUTPATIENT
Start: 2024-11-21 | End: 2024-11-22 | Stop reason: HOSPADM

## 2024-11-21 RX ORDER — FENTANYL CITRATE 50 UG/ML
INJECTION, SOLUTION INTRAMUSCULAR; INTRAVENOUS AS NEEDED
Status: DISCONTINUED | OUTPATIENT
Start: 2024-11-21 | End: 2024-11-21

## 2024-11-21 RX ORDER — ONDANSETRON 4 MG/1
4 TABLET, FILM COATED ORAL EVERY 8 HOURS PRN
Status: DISCONTINUED | OUTPATIENT
Start: 2024-11-21 | End: 2024-11-22 | Stop reason: HOSPADM

## 2024-11-21 RX ORDER — MORPHINE SULFATE 2 MG/ML
1 INJECTION, SOLUTION INTRAMUSCULAR; INTRAVENOUS EVERY 5 MIN PRN
Status: CANCELLED | OUTPATIENT
Start: 2024-11-21

## 2024-11-21 RX ORDER — VERAPAMIL HCL 240 MG
240 TABLET, EXTENDED RELEASE ORAL NIGHTLY
Status: DISCONTINUED | OUTPATIENT
Start: 2024-11-21 | End: 2024-11-22 | Stop reason: HOSPADM

## 2024-11-21 RX ORDER — GUAIFENESIN 600 MG/1
600 TABLET, EXTENDED RELEASE ORAL EVERY 12 HOURS PRN
Status: DISCONTINUED | OUTPATIENT
Start: 2024-11-21 | End: 2024-11-22 | Stop reason: HOSPADM

## 2024-11-21 RX ORDER — ONDANSETRON HYDROCHLORIDE 2 MG/ML
4 INJECTION, SOLUTION INTRAVENOUS EVERY 8 HOURS PRN
Status: DISCONTINUED | OUTPATIENT
Start: 2024-11-21 | End: 2024-11-22 | Stop reason: HOSPADM

## 2024-11-21 RX ORDER — LIDOCAINE HCL/PF 100 MG/5ML
SYRINGE (ML) INTRAVENOUS AS NEEDED
Status: DISCONTINUED | OUTPATIENT
Start: 2024-11-21 | End: 2024-11-21

## 2024-11-21 RX ORDER — BUPROPION HYDROCHLORIDE 150 MG/1
300 TABLET ORAL EVERY MORNING
Status: DISCONTINUED | OUTPATIENT
Start: 2024-11-21 | End: 2024-11-22 | Stop reason: HOSPADM

## 2024-11-21 RX ORDER — SODIUM CHLORIDE, SODIUM LACTATE, POTASSIUM CHLORIDE, CALCIUM CHLORIDE 600; 310; 30; 20 MG/100ML; MG/100ML; MG/100ML; MG/100ML
100 INJECTION, SOLUTION INTRAVENOUS CONTINUOUS
Status: CANCELLED | OUTPATIENT
Start: 2024-11-21 | End: 2024-11-21

## 2024-11-21 RX ORDER — LOSARTAN POTASSIUM 50 MG/1
100 TABLET ORAL DAILY
Status: DISCONTINUED | OUTPATIENT
Start: 2024-11-21 | End: 2024-11-22 | Stop reason: HOSPADM

## 2024-11-21 RX ORDER — BUPROPION HYDROCHLORIDE 150 MG/1
150 TABLET ORAL EVERY MORNING
Status: DISCONTINUED | OUTPATIENT
Start: 2024-11-21 | End: 2024-11-22 | Stop reason: HOSPADM

## 2024-11-21 RX ORDER — CETIRIZINE HYDROCHLORIDE 10 MG/1
10 TABLET ORAL DAILY
Status: DISCONTINUED | OUTPATIENT
Start: 2024-11-21 | End: 2024-11-22 | Stop reason: HOSPADM

## 2024-11-21 RX ORDER — BISACODYL 10 MG/1
10 SUPPOSITORY RECTAL DAILY PRN
Status: DISCONTINUED | OUTPATIENT
Start: 2024-11-21 | End: 2024-11-22 | Stop reason: HOSPADM

## 2024-11-21 RX ORDER — ASPIRIN 81 MG/1
81 TABLET ORAL DAILY
Status: DISCONTINUED | OUTPATIENT
Start: 2024-11-21 | End: 2024-11-22 | Stop reason: HOSPADM

## 2024-11-21 SDOH — SOCIAL STABILITY: SOCIAL INSECURITY: DO YOU FEEL ANYONE HAS EXPLOITED OR TAKEN ADVANTAGE OF YOU FINANCIALLY OR OF YOUR PERSONAL PROPERTY?: NO

## 2024-11-21 SDOH — ECONOMIC STABILITY: FOOD INSECURITY: HOW HARD IS IT FOR YOU TO PAY FOR THE VERY BASICS LIKE FOOD, HOUSING, MEDICAL CARE, AND HEATING?: NOT VERY HARD

## 2024-11-21 SDOH — SOCIAL STABILITY: SOCIAL INSECURITY: WITHIN THE LAST YEAR, HAVE YOU BEEN AFRAID OF YOUR PARTNER OR EX-PARTNER?: NO

## 2024-11-21 SDOH — SOCIAL STABILITY: SOCIAL INSECURITY: WITHIN THE LAST YEAR, HAVE YOU BEEN HUMILIATED OR EMOTIONALLY ABUSED IN OTHER WAYS BY YOUR PARTNER OR EX-PARTNER?: NO

## 2024-11-21 SDOH — SOCIAL STABILITY: SOCIAL INSECURITY: DOES ANYONE TRY TO KEEP YOU FROM HAVING/CONTACTING OTHER FRIENDS OR DOING THINGS OUTSIDE YOUR HOME?: NO

## 2024-11-21 SDOH — ECONOMIC STABILITY: HOUSING INSECURITY: IN THE LAST 12 MONTHS, WAS THERE A TIME WHEN YOU WERE NOT ABLE TO PAY THE MORTGAGE OR RENT ON TIME?: NO

## 2024-11-21 SDOH — ECONOMIC STABILITY: INCOME INSECURITY: IN THE PAST 12 MONTHS HAS THE ELECTRIC, GAS, OIL, OR WATER COMPANY THREATENED TO SHUT OFF SERVICES IN YOUR HOME?: NO

## 2024-11-21 SDOH — HEALTH STABILITY: MENTAL HEALTH: CURRENT SMOKER: 1

## 2024-11-21 SDOH — SOCIAL STABILITY: SOCIAL INSECURITY: HAVE YOU HAD ANY THOUGHTS OF HARMING ANYONE ELSE?: NO

## 2024-11-21 SDOH — ECONOMIC STABILITY: TRANSPORTATION INSECURITY: IN THE PAST 12 MONTHS, HAS LACK OF TRANSPORTATION KEPT YOU FROM MEDICAL APPOINTMENTS OR FROM GETTING MEDICATIONS?: NO

## 2024-11-21 SDOH — ECONOMIC STABILITY: HOUSING INSECURITY: AT ANY TIME IN THE PAST 12 MONTHS, WERE YOU HOMELESS OR LIVING IN A SHELTER (INCLUDING NOW)?: NO

## 2024-11-21 SDOH — SOCIAL STABILITY: SOCIAL INSECURITY: HAS ANYONE EVER THREATENED TO HURT YOUR FAMILY OR YOUR PETS?: NO

## 2024-11-21 SDOH — SOCIAL STABILITY: SOCIAL INSECURITY: ARE YOU OR HAVE YOU BEEN THREATENED OR ABUSED PHYSICALLY, EMOTIONALLY, OR SEXUALLY BY ANYONE?: NO

## 2024-11-21 SDOH — ECONOMIC STABILITY: FOOD INSECURITY: WITHIN THE PAST 12 MONTHS, YOU WORRIED THAT YOUR FOOD WOULD RUN OUT BEFORE YOU GOT THE MONEY TO BUY MORE.: NEVER TRUE

## 2024-11-21 SDOH — ECONOMIC STABILITY: FOOD INSECURITY: WITHIN THE PAST 12 MONTHS, THE FOOD YOU BOUGHT JUST DIDN'T LAST AND YOU DIDN'T HAVE MONEY TO GET MORE.: NEVER TRUE

## 2024-11-21 SDOH — SOCIAL STABILITY: SOCIAL INSECURITY: DO YOU FEEL UNSAFE GOING BACK TO THE PLACE WHERE YOU ARE LIVING?: NO

## 2024-11-21 SDOH — SOCIAL STABILITY: SOCIAL INSECURITY: ARE THERE ANY APPARENT SIGNS OF INJURIES/BEHAVIORS THAT COULD BE RELATED TO ABUSE/NEGLECT?: NO

## 2024-11-21 SDOH — SOCIAL STABILITY: SOCIAL INSECURITY
WITHIN THE LAST YEAR, HAVE YOU BEEN RAPED OR FORCED TO HAVE ANY KIND OF SEXUAL ACTIVITY BY YOUR PARTNER OR EX-PARTNER?: NO

## 2024-11-21 SDOH — SOCIAL STABILITY: SOCIAL INSECURITY: WERE YOU ABLE TO COMPLETE ALL THE BEHAVIORAL HEALTH SCREENINGS?: YES

## 2024-11-21 SDOH — SOCIAL STABILITY: SOCIAL INSECURITY: HAVE YOU HAD THOUGHTS OF HARMING ANYONE ELSE?: YES

## 2024-11-21 SDOH — SOCIAL STABILITY: SOCIAL INSECURITY
WITHIN THE LAST YEAR, HAVE YOU BEEN KICKED, HIT, SLAPPED, OR OTHERWISE PHYSICALLY HURT BY YOUR PARTNER OR EX-PARTNER?: NO

## 2024-11-21 SDOH — SOCIAL STABILITY: SOCIAL INSECURITY: ABUSE: ADULT

## 2024-11-21 SDOH — ECONOMIC STABILITY: HOUSING INSECURITY: IN THE PAST 12 MONTHS, HOW MANY TIMES HAVE YOU MOVED WHERE YOU WERE LIVING?: 1

## 2024-11-21 ASSESSMENT — COGNITIVE AND FUNCTIONAL STATUS - GENERAL
MOBILITY SCORE: 24
DAILY ACTIVITIY SCORE: 24
DAILY ACTIVITIY SCORE: 24
MOBILITY SCORE: 24
MOBILITY SCORE: 24
DAILY ACTIVITIY SCORE: 24
PATIENT BASELINE BEDBOUND: NO

## 2024-11-21 ASSESSMENT — PATIENT HEALTH QUESTIONNAIRE - PHQ9
1. LITTLE INTEREST OR PLEASURE IN DOING THINGS: NOT AT ALL
SUM OF ALL RESPONSES TO PHQ9 QUESTIONS 1 & 2: 0
2. FEELING DOWN, DEPRESSED OR HOPELESS: NOT AT ALL

## 2024-11-21 ASSESSMENT — COLUMBIA-SUICIDE SEVERITY RATING SCALE - C-SSRS
6. HAVE YOU EVER DONE ANYTHING, STARTED TO DO ANYTHING, OR PREPARED TO DO ANYTHING TO END YOUR LIFE?: NO
1. IN THE PAST MONTH, HAVE YOU WISHED YOU WERE DEAD OR WISHED YOU COULD GO TO SLEEP AND NOT WAKE UP?: NO
2. HAVE YOU ACTUALLY HAD ANY THOUGHTS OF KILLING YOURSELF?: NO

## 2024-11-21 ASSESSMENT — ENCOUNTER SYMPTOMS
LIGHT-HEADEDNESS: 0
SORE THROAT: 0
HEADACHES: 0
FREQUENCY: 0
MYALGIAS: 0
DIARRHEA: 0
FLANK PAIN: 0
DECREASED CONCENTRATION: 0
TROUBLE SWALLOWING: 1
COUGH: 0
ABDOMINAL PAIN: 0
SINUS PAIN: 0
WEAKNESS: 0
NAUSEA: 0
BACK PAIN: 0
APNEA: 0
CONFUSION: 0
SHORTNESS OF BREATH: 0
HEMATURIA: 0
VOMITING: 1
CONSTIPATION: 0
DIZZINESS: 0
ACTIVITY CHANGE: 0
COLOR CHANGE: 0
PALPITATIONS: 0
DYSURIA: 0
ABDOMINAL DISTENTION: 0
DIFFICULTY URINATING: 0
FATIGUE: 0
FEVER: 0
NERVOUS/ANXIOUS: 0
DIAPHORESIS: 0
RHINORRHEA: 0
STRIDOR: 0
WHEEZING: 0
AGITATION: 0
JOINT SWELLING: 0
CHEST TIGHTNESS: 0
WOUND: 0
APPETITE CHANGE: 0
NUMBNESS: 0
CHILLS: 0
ARTHRALGIAS: 0

## 2024-11-21 ASSESSMENT — ACTIVITIES OF DAILY LIVING (ADL)
TOILETING: INDEPENDENT
GROOMING: INDEPENDENT
LACK_OF_TRANSPORTATION: NO
WALKS IN HOME: INDEPENDENT
ADEQUATE_TO_COMPLETE_ADL: YES
LACK_OF_TRANSPORTATION: NO
HEARING - LEFT EAR: FUNCTIONAL
BATHING: INDEPENDENT
JUDGMENT_ADEQUATE_SAFELY_COMPLETE_DAILY_ACTIVITIES: YES
LACK_OF_TRANSPORTATION: NO
HEARING - RIGHT EAR: FUNCTIONAL
PATIENT'S MEMORY ADEQUATE TO SAFELY COMPLETE DAILY ACTIVITIES?: YES
FEEDING YOURSELF: INDEPENDENT
DRESSING YOURSELF: INDEPENDENT

## 2024-11-21 ASSESSMENT — LIFESTYLE VARIABLES
SKIP TO QUESTIONS 9-10: 1
AUDIT-C TOTAL SCORE: 0
HOW OFTEN DO YOU HAVE A DRINK CONTAINING ALCOHOL: NEVER
HOW OFTEN DO YOU HAVE 6 OR MORE DRINKS ON ONE OCCASION: NEVER
HOW MANY STANDARD DRINKS CONTAINING ALCOHOL DO YOU HAVE ON A TYPICAL DAY: PATIENT DOES NOT DRINK
AUDIT-C TOTAL SCORE: 0

## 2024-11-21 ASSESSMENT — PAIN - FUNCTIONAL ASSESSMENT
PAIN_FUNCTIONAL_ASSESSMENT: 0-10

## 2024-11-21 ASSESSMENT — PAIN SCALES - GENERAL
PAINLEVEL_OUTOF10: 0 - NO PAIN
PAIN_LEVEL: 0
PAINLEVEL_OUTOF10: 0 - NO PAIN

## 2024-11-21 NOTE — PROGRESS NOTES
Speech-Language Pathology Clinical Swallow Evaluation    Patient Name: Timoteo Victoria  MRN: [id number]  : 1955  Patient Room: [address] [place] [address]A  Today's Date: 24  Start time: Start Time: 936  Stop time: Stop Time: 954  Time calculation (min) : Time Calculation (min): 18 min    ASSESSMENT  Impressions:  Pt exhibiting severe pharyngeal and esophageal dysphagia impacting ability to tolerate PO intake. Pt is scheduled for an EDG this afternoon. Pt would benefit from ST follow up to further assess safe tolerance of PO intake. Pt demonstrated limited tolerance to trials this am.    Additional consult/referral: N/A     Prognosis: Good      PLAN  Recommendations:  MBSS recommended: SLP will continue to monitor to determine if MBSS is clinically warranted.  Solid consistency: NPO  Liquid consistency: NPO except for ice chips and single sips of thin water, after oral care  Medication administration: Small pills whole/large pills halved, Crushed, in thin liquid  Compensatory swallow strategies: - Upright positioning for all PO intake  - Remain upright for >30 min after meals  - Slow rate of intake  - Small bites  - Small sips    Recommended frequency/duration:  Skilled SLP services recommended: Yes  Frequency: 1x follow up  Duration: x1 follow-up visit to reassess   Discharge recommendation: Unable to determine at this time; please see follow-up notes for DC recommendation.  Strengths: Cognition, Motivation, and Baseline functional status  Barriers to participation in tx: N/A      Goals (start date 2024-2024):    Pt will consume trials of upgraded textures without overt s/sx aspiration in order for consideration of diet texture upgrade.   Status: Goal initiated this date   Progress this date: NA    Pt/family will demonstrate understanding of education related to dysphagia independently.   Status: Goal initiated this date   Progress this date: NA        REASON FOR ADMISSION:  CHIEF COMPLAINT: Dysphagia-  inability to swallow and keep food down    PMHx relevant to rehab: hypothyroidism, HLD, HTN, cluster headaches, longstanding tobacco abuse presenting with dysphagia.     Relevant imaging results: EGD scheduled later this afternoon      SUBJECTIVE  SLP Received On: 11/21/24  Patient Class: Inpatient  Living Environment: Home  Ordering Physician: Mejia  Reason for Referral: Dysphagia  Prior to Session Communication: Bedside nurse    RN cleared pt to participate in session and reported that pt has not been able to tolerate medications.    Pt/family reported progressing difficulty swallowing over the last few month, unable to swallow and keep anything down as of 11/18    Nutritional status: Signs of possible malnutrition          BaseLine Diet: Regular/thin  Current Diet : NPO    Pain Assessment  Pain Assessment: 0-10  0-10 (Numeric) Pain Score: 0 - No pain       Orientation: Ox4  Ability to follow functional commands: WFL     Baseline Vocal Quality: Normal  Volitional Cough: Strong  Volitional Swallow: Within Functional Limits  Patient positioning: Upright in bed      OBJECTIVE  Clinical swallow evaluation completed and consisted of interview, oral motor assessment, and PO trials (thin through straw).    ORAL MOTOR: Dentition: Dentures.  Oral Hygiene: Oral mucosa were pink, moist, and free of obvious lesions. Lingual strength and ROM were WFL. Labial strength/ROM were WFL. Labial seal was adequate. Palatal elevation: adequate    ORAL PHASE: Mastication of regular solids was not assessed this am.  A/P transit was not assessed.      PHARYNGEAL PHASE: Laryngeal elevation was visualized or palpated with all trials, however adequacy of hyolaryngeal elevation/excursion cannot be determined at bedside. No immediate or delayed s/sx aspiration/penetration were observed with 3 sips of thin through straw. Pt reporting feeling that water is stuck in his throat. Re-swallowing does not decrease sensation. Pt demonstrate strong  volitional throat clear. No wet VQ noted across trials.     ESOPHAGEAL PHASE: Pt unable to keep items down, feelings that all textures include small sips of thin are stuck in throat or chest.       Treatment/Education:  Results and recommendations were relayed to: Patient and Bedside nurse  Education provided: Yes   Learner: Patient   Barriers to learning: None   Method of teaching: Verbal   Topic: role of ST, results of assessment, recommended safe swallow strategies, swallow anatomy/physiology, and recommendation for dysphagia follow-up   Outcome of teaching: Pt/family demonstrated good understanding  Treatment provided: No    Next Treatment Priority: Assess PO intake

## 2024-11-21 NOTE — PROGRESS NOTES
Timoteo Victoria is a 69 y.o. male on day 0 of admission presenting with Dysphagia.      Subjective   69 y.o. male with PMHx s/f hypothyroidism, HLD, HTN, cluster headaches, longstanding tobacco abuse presenting with dysphagia. Pt says for the past month he has had trouble swallowing. Initially solids, but now liquids and medications. Says he vomits anything he takes PO back up quickly, often with quite a bit of saliva. No choking episodes, shortness of breath, neck pain, or chest pain. Does admit to some heartburn symptoms at times. He has been unable to take his medications the last few days. His PCP referred him to GI, but they do not have an opening until next month. He also is a longtime smoker and admits to losing 10 lbs in the last month and 40 lbs in the last 6 months.     ED Course (Summary - please note all labs, imaging studies, and interventions noted below have been personally reviewed and/or interpreted on day of admission):   Vitals on presentation: 97 F, 88 bpm, 20 rr, 154/81, 100% on RA  Labs: CMP Na 135  Mag 1.89  Cbc unremarkable  Imaging: CT soft tissue neck with IV contrast - New maxillary periapical lucencies with adjacent mucosal thickening.   No discrete fluid collection identified. Dental follow-up recommended.   No discrete mass or cervical lymphadenopathy identified however   direct visualization suggested.   Bilateral parotid mass/nodule similar to prior imaging.       11/21:                Today the patient is seen following his EGD.  Denies adverse effects of the procedure.  He is understandably distressed regarding the news of the EGD findings.  GI described malignant-appearing mass in the distal esophagus and unable to pass the endoscope beyond it.  In view of this discussed with thoracic surgery at [facility] who have accepted the patient.  Awaiting bed availability.  Likely will require TPN.  Discussed transfer with patient and he is agreeable to do so.    Review of Systems    Constitutional:  Negative for activity change, appetite change, chills, diaphoresis, fatigue and fever.   HENT:  Positive for trouble swallowing. Negative for congestion, ear pain, rhinorrhea, sinus pain and sore throat.    Respiratory:  Negative for apnea, cough, chest tightness, shortness of breath, wheezing and stridor.    Cardiovascular:  Negative for chest pain, palpitations and leg swelling.   Gastrointestinal:  Positive for vomiting. Negative for abdominal distention, abdominal pain, constipation, diarrhea and nausea.   Genitourinary:  Negative for difficulty urinating, dysuria, flank pain, frequency, hematuria and urgency.   Musculoskeletal:  Negative for arthralgias, back pain, gait problem, joint swelling and myalgias.   Skin:  Negative for color change, pallor, rash and wound.   Neurological:  Negative for dizziness, syncope, weakness, light-headedness, numbness and headaches.   Psychiatric/Behavioral:  Negative for agitation, behavioral problems, confusion and decreased concentration. The patient is not nervous/anxious.    All other systems reviewed and are negative.       Objective     Last Recorded Vitals  /78 (BP Location: Left leg, Patient Position: Lying)   Pulse 94   Temp 37.1 °C (98.7 °F) (Temporal)   Resp 16   Wt 93 kg (205 lb)   SpO2 97%   Intake/Output last 3 Shifts:    Intake/Output Summary (Last 24 hours) at 11/21/2024 1847  Last data filed at 11/21/2024 1724  Gross per 24 hour   Intake 0 ml   Output --   Net 0 ml       Admission Weight  Weight: 93.9 kg (207 lb) (11/20/24 2057)    Daily Weight  11/21/24 : 93 kg (205 lb)    Image Results  Esophagogastroduodenoscopy (EGD)  Table formatting from the original result was not included.  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       Indication  Dysphagia    Timoteo Victoria is a 69 y.o. male with a history of HTN, cluster   headaches, HLD, hypothyroidism, and tobacco abuse who presents for an EGD   to evaluate dysphagia.    He says that for the last month he has had progressive dysphagia. This was   initially mild dysphagia to solids, but has now progressed to the point   that he has been unable to tolerate solids or liquids. This has been   associated with weight loss of 40 pounds over the last 6 months.    Medications  See Anesthesia Record.     Preprocedure  A history and physical has been performed, and patient medication   allergies have been reviewed. The patient's tolerance of previous   anesthesia has been reviewed. The risks and benefits of the procedure and   the sedation options and risks were discussed with the patient. All   questions were answered and informed consent obtained.    Details of the Procedure  The patient underwent monitored anesthesia care, which was administered by   an anesthesia professional. The patient's blood pressure, ECG, ETCO2,   heart rate, level of consciousness, oxygen and respirations were monitored   throughout the procedure. The scope was introduced through the mouth and   advanced to the lower third of the esophagus. The patient's estimated   blood loss was minimal (<5 mL). The procedure was not difficult. The   patient tolerated the procedure well. There were no apparent adverse   events.   Prior to the procedure, a History and Physical was performed, and patient   medications and allergies were reviewed. Immediately prior to the   administration of medications, the patient was re-assessed for adequacy to   receive sedatives. The heart rate, respiratory rate, oxygen saturations,    blood pressure, adequacy of pulmonary ventilation, and response to care   were monitored throughout the procedure. The physical status of the   patient was re-assessed after the procedure.     The risks and benefits of the procedure and the sedation options and risks   were discussed with the patient and/or family including the risk of injury   to internal organs (including perforation) and the risk of missed lesions.   All questions were answered and informed consent was obtained.     Patient identification and proposed procedure were verified by the   physician, the nurse, the anesthetist and the technician in the   pre-procedure area and in the procedure room. After this verification the   upper endoscope was passed under direct vision. Anatomic landmarks were   photographed.    No sedation was administered by endoscopist. Sedation was administered by   the anesthesia staff. Refer to anesthesia documentation/charting for   details regarding medications administered and doses given.     Events  Procedure Events   Event Event Time   ENDO SCOPE IN TIME 11/21/2024  1:04 PM     Specimens  ID Type Source Tests Collected by Time   1 : ESOPHAGEAL MASS Tissue ESOPHAGUS DISTAL BIOPSY SURGICAL PATHOLOGY EXAM   Latisha Cohen RN 11/21/2024 1309     Procedure Location  [address] [id number] [phone]    Referring Provider  Jorge Luis Lu MD    Procedure Provider  Arnold Jones MD      Physical Exam  Constitutional: Pleasant and cooperative. Laying in bed in no acute distress. Conversant.   Skin: Warm and dry; no obvious lesions, rashes, pallor, or jaundice.   Eyes: EOMI. Anicteric sclera.   ENT: Mucous membranes moist; no obvious injury or deformity appreciated.   Head and Neck: Normocephalic, atraumatic. ROM preserved. Trachea midline. No appreciable JVD.   Respiratory: Nonlabored on RA. Lungs clear to auscultation bilaterally without obvious  adventitious sounds. Chest rise is equal.  Cardiovascular: RRR. No gross murmur, gallop, or rub. Extremities are warm and well-perfused with good capillary refill (< 3 seconds). No chest wall tenderness.   GI: Abdomen soft, nontender, nondistended. No obvious organomegaly appreciated. Bowel sounds are present.  : No CVA tenderness.   MSK: No gross abnormalities appreciated. No limitations to AROM/PROM appreciated.   Extremities: No cyanosis, edema, or clubbing evident. Neurovascularly intact.   Neuro: A&Ox3. CN 2-12 grossly intact. Able to respond to questions appropriately and clearly. No acute focal neurologic deficits appreciated.  Psych: Appropriate mood and behavior.     Relevant Results               Assessment/Plan                  Assessment & Plan  Dysphagia    Benign essential hypertension    Hypothyroidism    Episodic cluster headache, not intractable    Type 2 diabetes mellitus    Depression, major, recurrent, moderate    Nicotine dependence, cigarettes, uncomplicated    Progressive dysphagia:  GI consulted  NPO for now except ice chips  CT soft tissue neck negative for neck mass.  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  Unable to pass beyond it with endoscope.  At this point has been accepted at [facility] for thoracic surgery evaluation.     Nicotine dependence:  Recommended cessation.     Cluster headaches - continue home Elavil     HTN:  Continue home Cozaar, Aldactone, verapamil     Hypothyroidism:  Continue home Synthroid  Recheck TSH, last 9/24 was <0.01                 Jorge Luis Lu MD

## 2024-11-21 NOTE — ANESTHESIA POSTPROCEDURE EVALUATION
Patient: Timoteo Victoria    Procedure Summary       Date: 11/21/24 Room / Location: [facility] OR    Anesthesia Start: 1259 Anesthesia Stop: 1319    Procedure: EGD Diagnosis: Dysphagia    Scheduled Providers: Arnold Jones MD Responsible Provider: CORNELIUS Jeter    Anesthesia Type: MAC ASA Status: 3            Anesthesia Type: MAC    Vitals Value Taken Time   /63 11/21/24 1402   Temp 36.7 °C (98 °F) 11/21/24 1402   Pulse 85 11/21/24 1402   Resp 16 11/21/24 1402   SpO2 100 % 11/21/24 1402       Anesthesia Post Evaluation    Patient location during evaluation: PACU  Patient participation: complete - patient participated  Level of consciousness: awake  Pain score: 0  Pain management: adequate  Airway patency: patent  Cardiovascular status: acceptable  Respiratory status: acceptable  Hydration status: acceptable  Postoperative Nausea and Vomiting: none    There were no known notable events for this encounter.

## 2024-11-21 NOTE — CONSULTS
"Inpatient consult to Gastroenterology  Consult performed by: Arnold Jones MD  Consult ordered by: Darshan Ba,           Reason For Consult  \"Dysphagia\"      [facility] Gastroenterology Consultation Note    ASSESSMENT and PLAN:       Timoteo Victoria is a 69 y.o. male with a significant past medical history of HTN, cluster headaches, HLD, hypothyroidism, and tobacco abuse who presented with dysphagia. GI was consulted for \"Dysphagia\".       Dysphagia  Progressive dysphagia over the last month associated with significant/unexplained weight loss. With his history of tobacco abuse there is certainly a concern for underlying malignancy. Other causes of dysphagia including peptic stricture, candida, or motility disorder possible as well. EGD needed for evaluation.  - EGD today  - keep NPO          Arnold Jones MD        Senior Attending Physician, Gastroenterology    [facility]    Clinical   [facility] [employer]        HISTORY OF PRESENT ILLNESS:     History Of Present Illness:    Timoteo Victoria is a 69 y.o. male with a significant past medical history of HTN, cluster headaches, HLD, hypothyroidism, and tobacco abuse who presented with dysphagia. GI was consulted for \"Dysphagia\".    He says that for the last month he has had progressive dysphagia. Prior to that he was not having any GI symptoms and he denies any previous dysphagia. Initially about a month ago he started to have dysphagia to solids. This has progressed to the point that over the last week he has also had dysphagia to liquids. He has now been regurgitating anything that he attempts to swallow. He denies any history of heartburn and there is no odynophagia. Over the last 6 months he says that he last lost about 40 pounds. He says that he has never had an EGD before.    Endoscopy History:  11/14/2017 - Colonoscopy: 11 cecal polyps (TA on " path), 7 ascending polyps (TA on path)      Review of systems:     Patient denies any heartburn/GERD, N/V, odynophagia, abdominal pain, diarrhea, constipation, hematemesis, hematochezia, or melena.    I performed a complete 10 point review of systems and it is negative except as noted in HPI or above. All other systems have been reviewed and are negative.        PAST HISTORIES:       Past Medical History:  Patient Active Problem List   Diagnosis    Obesity, morbid (Multi)    Allergic rhinitis    Benign essential hypertension    Hyperlipidemia    Hypothyroidism    Mild major depression, single episode (CMS-[facility])    Nicotine dependence, cigarettes, uncomplicated    Chronic constipation    Episodic cluster headache, not intractable    Atherosclerosis of coronary artery    Type 2 diabetes mellitus    Vitamin D deficiency    Esophageal dysphagia    Weight loss    Urinary hesitancy    Depression, major, recurrent, moderate    Dysphagia     He has a past medical history of Personal history of other endocrine, nutritional and metabolic disease.    Past Surgical History:  He has a past surgical history that includes Cholecystectomy (09/18/2018); Hernia repair (09/18/2018); Coronary artery bypass graft (09/18/2018); and Cataract extraction (Right, 08/25/2023).      Social History:  He reports that he has been smoking cigarettes. He has never used smokeless tobacco. He reports that he does not currently use alcohol. He reports that he does not use drugs.    Family History:  No known family history of GI disease, specifically denies pancreatitis, Crohn's, colon cancer, gastroesophageal cancer, or ulcerative colitis.    Family History   Problem Relation Name Age of Onset    COPD Mother          Allergies:  Ibuprofen      OBJECTIVE:       Last Recorded Vitals:  Vitals:    11/21/24 0027 11/21/24 0057 11/21/24 0132 11/21/24 0502   BP: 178/79 165/88 148/72 156/80   BP Location: Left arm   Left arm   Patient Position: Lying   Lying  "  Pulse: 88   81   Resp: 18   19   Temp: 36.2 °C (97.2 °F)   37.2 °C (98.9 °F)   TempSrc: Temporal   Temporal   SpO2: 98%   99%   Weight:       Height:         /80 (BP Location: Left arm, Patient Position: Lying)   Pulse 81   Temp 37.2 °C (98.9 °F) (Temporal)   Resp 19   Ht 1.778 m (5' 10\")   Wt 93.9 kg (207 lb)   SpO2 99%   BMI 29.70 kg/m²      Physical Exam:    Physical Exam  Vitals reviewed.   Constitutional:       General: He is not in acute distress.     Appearance: He is obese. He is not ill-appearing.   HENT:      Head: Normocephalic and atraumatic.   Eyes:      General: No scleral icterus.  Cardiovascular:      Rate and Rhythm: Normal rate and regular rhythm.      Pulses: Normal pulses.      Heart sounds: Normal heart sounds. No murmur heard.  Pulmonary:      Effort: Pulmonary effort is normal. No respiratory distress.      Breath sounds: Normal breath sounds. No wheezing.   Abdominal:      General: Bowel sounds are normal.      Palpations: Abdomen is soft.      Tenderness: There is no abdominal tenderness. There is no rebound.   Musculoskeletal:         General: No swelling or deformity.   Skin:     General: Skin is warm and dry.      Coloration: Skin is not jaundiced.      Findings: No rash.   Neurological:      General: No focal deficit present.      Mental Status: He is alert and oriented to person, place, and time.   Psychiatric:         Mood and Affect: Mood normal.         Behavior: Behavior normal.         Thought Content: Thought content normal.         Judgment: Judgment normal.           Inpatient Medications:  amitriptyline, 100 mg, oral, Nightly  aspirin, 81 mg, oral, Daily  atorvastatin, 40 mg, oral, Daily  buPROPion XL, 150 mg, oral, q AM  buPROPion XL, 300 mg, oral, q AM  cetirizine, 10 mg, oral, Daily  labetaloL, 10 mg, intravenous, Once  levothyroxine, 200 mcg, oral, Daily  losartan, 100 mg, oral, Daily  spironolactone, 50 mg, oral, Daily  tamsulosin, 0.4 mg, oral, " Nightly  verapamil SR, 240 mg, oral, Nightly      PRN medications: bisacodyl, bisacodyl, guaiFENesin, melatonin, ondansetron **OR** ondansetron    Outpatient Medications:  Prior to Admission medications    Medication Sig Start Date End Date Taking? Authorizing Provider   amitriptyline (Elavil) 100 mg tablet Take 1 tablet (100 mg) by mouth once daily at bedtime. 10/8/24   Dafne Bedolla DO   aspirin 81 mg EC tablet Take by mouth. 9/18/18   Historical Provider, MD   atorvastatin (Lipitor) 40 mg tablet Take 1 tablet (40 mg) by mouth once daily. 10/8/24   Dafne Bedolla DO   buPROPion XL (Wellbutrin XL) 150 mg 24 hr tablet Take 1 tablet (150 mg) by mouth once daily in the morning. Take with 300mg tab for total of 450mg per day. 6/7/24 12/4/24  Dafne Bedolla DO   buPROPion XL (Wellbutrin XL) 300 mg 24 hr tablet Take 1 tablet (300 mg) by mouth once daily in the morning. Take with 150mg tab for total of 450mg per day. 10/8/24   Dafne Bedolla DO   cholecalciferol (Vitamin D-3) 50 mcg (2,000 unit) capsule Take by mouth.    Historical Provider, MD   eletriptan (Relpax) 20 mg tablet TAKE 1 TABLET BY MOUTH ONCE AS NEEDED FOR CLUSTER HEADACHE. MAY REPEAT DOSE ONCE AFTER 2 HOURS IF NEEDED    Historical Provider, MD   fexofenadine (Allegra Allergy) 180 mg tablet Take by mouth.    Historical Provider, MD   hydrocortisone 2.5 % cream APPLY TOPICALLY TO AFFECTED AREA 3 TIMES A DAY 7/7/23 10/8/24  Kareen Gaffney, APRN-CNP   hydrOXYzine pamoate (Vistaril) 25 mg capsule Take 1 capsule (25 mg) by mouth 4 times a day as needed for anxiety. 10/8/24 10/8/25  Dafne Bedolla DO   irbesartan (Avapro) 300 mg tablet Take 1 tablet (300 mg) by mouth once daily. 10/8/24   Dafne Bedolla DO   levothyroxine (Synthroid, Levoxyl) 200 mcg tablet Take 1 tab PO daily. 10/8/24   Dafne Bedolla,    omeprazole (PriLOSEC) 20 mg DR capsule Take 1 capsule (20 mg) by mouth once daily. Take 30 minutes before  "a meal. 10/8/24   Dafne Bedolla DO   sennosides-docusate sodium (Hope-Colace) 8.6-50 mg tablet Take 1 tablet by mouth 2 times a day. 10/8/24 10/8/25  Dafne Bedolla DO   spironolactone (Aldactone) 50 mg tablet Take 1 tablet (50 mg) by mouth once daily. 10/8/24 10/8/25  Dafne Bedolla DO   tamsulosin (Flomax) 0.4 mg 24 hr capsule Take 1 capsule (0.4 mg) by mouth once daily at bedtime. 10/9/24   Dafne Bedolla DO   verapamil SR (Calan-SR) 240 mg ER tablet Take 1 tablet (240 mg) by mouth once daily at bedtime. 10/8/24   Dafne Bedolla DO       LABS AND IMAGING:     Labs:  Recent labs reviewed in the EMR.    Lab Results   Component Value Date    WBC 7.5 11/21/2024    HGB 14.4 11/21/2024    HGB 15.6 11/20/2024    MCV 87 11/21/2024     11/21/2024     11/20/2024       Lab Results   Component Value Date     11/21/2024    K 3.4 (L) 11/21/2024     11/21/2024    BUN 11 11/21/2024    CREATININE 1.04 11/21/2024    CREATININE 1.18 11/20/2024       Lab Results   Component Value Date    BILITOT 0.8 11/20/2024    ALKPHOS 71 11/20/2024    AST 10 11/20/2024    ALT 8 (L) 11/20/2024       No results found for: \"CRP\", \"CALPS\"      Imaging:  CT soft tissue neck w IV contrast    Result Date: 11/20/2024  Interpreted By:  Ana María Dawn, STUDY: CT SOFT TISSUE NECK W IV CONTRAST;  11/20/2024 10:33 pm   INDICATION: Signs/Symptoms:progressive dysphagia, feels like things get stuck in throat.   COMPARISON: 07/07/2022   ACCESSION NUMBER(S): [id number]   ORDERING CLINICIAN: JELLY SHINE   TECHNIQUE: Axial CT images of the neck were obtained.  The patient received intravenous contrast agent. The images were reformatted in angled axial, coronal and sagittal planes.   FINDINGS: Oral Cavity, Pharynx and Larynx: Prominent periapical lucency about the right maxillary central and lateral incisor as well as the right maxillary 2nd premolar. There is adjacent overlying prominence of the buccal " mucosa which is new from prior imaging. Interval retraction of the left maxillary incisor. Evaluation of the oral cavity is limited by streak artifact from dental hardware.  The nasopharyngeal and oropharyngeal structures are unremarkable. The hypopharyngeal and laryngeal structures are unremarkable.   Retropharyngeal and Prevertebral Soft Tissues: Unremarkable.   Lymph nodes: There are few non specific bilateral neck nodes, probably reactive in etiology.   Neck vessels: Dense calcifications of the left greater than right carotid bulb extending into left greater than right cervical ICA. No critical stenosis identified. Vertebral arteries are grossly patent.   Thyroid gland: Calcification left parotid gland similar to prior imaging. The thyroid gland is unremarkable in size and appearance.   Parotid and submandibular glands: Posterior left parotid mass measuring up to 2.1 cm, previously 2.0 cm as well as additional left parotid nodules or lymph nodes. Right parotid nodules/lymph nodes measuring up to 10 mm, similar to prior imaging. Slightly heterogeneous appearance of the bilateral submandibular glands, similar to prior imaging.   Paranasal Sinuses and Mastoids: Visualized paranasal sinuses and bilateral mastoids are clear.   Visualized orbital structures are unremarkable.   Calcifications of the aortic arch and branch vessels. Patent appearing stent at the origin of the left subclavian artery. Scattered upper mediastinal lymph nodes measuring up to 10 mm. Status post median sternotomy with mediastinal clips suggestive of prior CABG. Centrilobular emphysematous changes.   Degenerative changes otherwise visualized cervical spine appears unremarkable.       New maxillary periapical lucencies with adjacent mucosal thickening. No discrete fluid collection identified. Dental follow-up recommended.   No discrete mass or cervical lymphadenopathy identified however direct visualization suggested.   Bilateral parotid  mass/nodule similar to prior imaging.   Postsurgical changes, emphysematous changes, mild mediastinal lymphadenopathy and additional findings as detailed.   MACRO: i   Signed by: Ana María Dawn 11/20/2024 11:38 PM Dictation workstation:   [id number]

## 2024-11-21 NOTE — H&P
[facility] - GENERAL MEDICINE HISTORY AND PHYSICAL    History Obtained From (Primary Source): PT  Collateral History (Secondary Sources): D/w ED physician    History Of Present Illness (HPI):  Timoteo Victoria is a 69 y.o. male with PMHx s/f hypothyroidism, HLD, HTN, cluster headaches, longstanding tobacco abuse presenting with dysphagia. Pt says for the past month he has had trouble swallowing. Initially solids, but now liquids and medications. Says he vomits anything he takes PO back up quickly, often with quite a bit of saliva. No choking episodes, shortness of breath, neck pain, or chest pain. Does admit to some heartburn symptoms at times. He has been unable to take his medications the last few days. His PCP referred him to GI, but they do not have an opening until next month. He also is a longtime smoker and admits to losing 10 lbs in the last month and 40 lbs in the last 6 months.    ED Course (Summary - please note all labs, imaging studies, and interventions noted below have been personally reviewed and/or interpreted on day of admission):   Vitals on presentation: 97 F, 88 bpm, 20 rr, 154/81, 100% on RA  Labs: CMP Na 135  Mag 1.89  Cbc unremarkable  Imaging: CT soft tissue neck with IV contrast - New maxillary periapical lucencies with adjacent mucosal thickening.   No discrete fluid collection identified. Dental follow-up recommended.   No discrete mass or cervical lymphadenopathy identified however   direct visualization suggested.   Bilateral parotid mass/nodule similar to prior imaging.   Interventions: Pt admitted for further care.    12-point ROS reviewed and found to be negative aside from aforementioned positives in HPI and/or noted in dedicated ROS section below.     ED Course (From ED Provider):  ED Course as of 11/21/24 0054 Wed Nov 20, 2024   2231 CBC, CMP, magnesium are within normal limits. [SP]      ED Course User Index  [SP] Lisa Lemons DO         Diagnoses as of 11/21/24  0054   Dysphagia     Relevant Results  Results for orders placed or performed during the hospital encounter of 11/20/24 (from the past 24 hours)   CBC and Auto Differential   Result Value Ref Range    WBC 7.7 4.4 - 11.3 x10*3/uL    nRBC 0.0 0.0 - 0.0 /100 WBCs    RBC 5.20 4.50 - 5.90 x10*6/uL    Hemoglobin 15.6 13.5 - 17.5 g/dL    Hematocrit 45.3 41.0 - 52.0 %    MCV 87 80 - 100 fL    MCH 30.0 26.0 - 34.0 pg    MCHC 34.4 32.0 - 36.0 g/dL    RDW 12.0 11.5 - 14.5 %    Platelets 191 150 - 450 x10*3/uL    Neutrophils % 71.7 40.0 - 80.0 %    Immature Granulocytes %, Automated 0.4 0.0 - 0.9 %    Lymphocytes % 19.4 13.0 - 44.0 %    Monocytes % 6.0 2.0 - 10.0 %    Eosinophils % 1.6 0.0 - 6.0 %    Basophils % 0.9 0.0 - 2.0 %    Neutrophils Absolute 5.54 1.20 - 7.70 x10*3/uL    Immature Granulocytes Absolute, Automated 0.03 0.00 - 0.70 x10*3/uL    Lymphocytes Absolute 1.50 1.20 - 4.80 x10*3/uL    Monocytes Absolute 0.46 0.10 - 1.00 x10*3/uL    Eosinophils Absolute 0.12 0.00 - 0.70 x10*3/uL    Basophils Absolute 0.07 0.00 - 0.10 x10*3/uL   Magnesium   Result Value Ref Range    Magnesium 1.89 1.60 - 2.40 mg/dL   Comprehensive metabolic panel   Result Value Ref Range    Glucose 94 74 - 99 mg/dL    Sodium 135 (L) 136 - 145 mmol/L    Potassium 3.9 3.5 - 5.3 mmol/L    Chloride 102 98 - 107 mmol/L    Bicarbonate 23 21 - 32 mmol/L    Anion Gap 14 10 - 20 mmol/L    Urea Nitrogen 10 6 - 23 mg/dL    Creatinine 1.18 0.50 - 1.30 mg/dL    eGFR 67 >60 mL/min/1.73m*2    Calcium 9.4 8.6 - 10.3 mg/dL    Albumin 4.3 3.4 - 5.0 g/dL    Alkaline Phosphatase 71 33 - 136 U/L    Total Protein 7.4 6.4 - 8.2 g/dL    AST 10 9 - 39 U/L    Bilirubin, Total 0.8 0.0 - 1.2 mg/dL    ALT 8 (L) 10 - 52 U/L      CT soft tissue neck w IV contrast    Result Date: 11/20/2024  Interpreted By:  Ana María Dawn, STUDY: CT SOFT TISSUE NECK W IV CONTRAST;  11/20/2024 10:33 pm   INDICATION: Signs/Symptoms:progressive dysphagia, feels like things get stuck in throat.    COMPARISON: 07/07/2022   ACCESSION NUMBER(S): [id number]   ORDERING CLINICIAN: JELLY SHINE   TECHNIQUE: Axial CT images of the neck were obtained.  The patient received intravenous contrast agent. The images were reformatted in angled axial, coronal and sagittal planes.   FINDINGS: Oral Cavity, Pharynx and Larynx: Prominent periapical lucency about the right maxillary central and lateral incisor as well as the right maxillary 2nd premolar. There is adjacent overlying prominence of the buccal mucosa which is new from prior imaging. Interval retraction of the left maxillary incisor. Evaluation of the oral cavity is limited by streak artifact from dental hardware.  The nasopharyngeal and oropharyngeal structures are unremarkable. The hypopharyngeal and laryngeal structures are unremarkable.   Retropharyngeal and Prevertebral Soft Tissues: Unremarkable.   Lymph nodes: There are few non specific bilateral neck nodes, probably reactive in etiology.   Neck vessels: Dense calcifications of the left greater than right carotid bulb extending into left greater than right cervical ICA. No critical stenosis identified. Vertebral arteries are grossly patent.   Thyroid gland: Calcification left parotid gland similar to prior imaging. The thyroid gland is unremarkable in size and appearance.   Parotid and submandibular glands: Posterior left parotid mass measuring up to 2.1 cm, previously 2.0 cm as well as additional left parotid nodules or lymph nodes. Right parotid nodules/lymph nodes measuring up to 10 mm, similar to prior imaging. Slightly heterogeneous appearance of the bilateral submandibular glands, similar to prior imaging.   Paranasal Sinuses and Mastoids: Visualized paranasal sinuses and bilateral mastoids are clear.   Visualized orbital structures are unremarkable.   Calcifications of the aortic arch and branch vessels. Patent appearing stent at the origin of the left subclavian artery. Scattered upper mediastinal lymph  nodes measuring up to 10 mm. Status post median sternotomy with mediastinal clips suggestive of prior CABG. Centrilobular emphysematous changes.   Degenerative changes otherwise visualized cervical spine appears unremarkable.       New maxillary periapical lucencies with adjacent mucosal thickening. No discrete fluid collection identified. Dental follow-up recommended.   No discrete mass or cervical lymphadenopathy identified however direct visualization suggested.   Bilateral parotid mass/nodule similar to prior imaging.   Postsurgical changes, emphysematous changes, mild mediastinal lymphadenopathy and additional findings as detailed.   MACRO: i   Signed by: Ana María Dawn 11/20/2024 11:38 PM Dictation workstation:   [id number]    Scheduled medications:  amitriptyline, 100 mg, oral, Nightly  aspirin, 81 mg, oral, Daily  atorvastatin, 40 mg, oral, Daily  buPROPion XL, 150 mg, oral, q AM  buPROPion XL, 300 mg, oral, q AM  cetirizine, 10 mg, oral, Daily  levothyroxine, 200 mcg, oral, Daily  losartan, 100 mg, oral, Daily  spironolactone, 50 mg, oral, Daily  tamsulosin, 0.4 mg, oral, Nightly  verapamil SR, 240 mg, oral, Nightly      Continuous medications:     PRN medications:  PRN medications: bisacodyl, bisacodyl, guaiFENesin, melatonin, ondansetron **OR** ondansetron     Past Medical History  He has a past medical history of Personal history of other endocrine, nutritional and metabolic disease.    Surgical History  He has a past surgical history that includes Cholecystectomy (09/18/2018); Hernia repair (09/18/2018); Coronary artery bypass graft (09/18/2018); and Cataract extraction (Right, 08/25/2023).     Social History  He reports that he has been smoking cigarettes. He has never used smokeless tobacco. He reports that he does not currently use alcohol. He reports that he does not use drugs.    Family History  Family History   Problem Relation Name Age of Onset    COPD Mother          Allergies  Ibuprofen    Code Status  Full Code     Review of Systems   Constitutional:  Negative for activity change, appetite change, chills, diaphoresis, fatigue and fever.   HENT:  Positive for trouble swallowing. Negative for congestion, ear pain, rhinorrhea, sinus pain and sore throat.    Respiratory:  Negative for apnea, cough, chest tightness, shortness of breath, wheezing and stridor.    Cardiovascular:  Negative for chest pain, palpitations and leg swelling.   Gastrointestinal:  Positive for vomiting. Negative for abdominal distention, abdominal pain, constipation, diarrhea and nausea.   Genitourinary:  Negative for difficulty urinating, dysuria, flank pain, frequency, hematuria and urgency.   Musculoskeletal:  Negative for arthralgias, back pain, gait problem, joint swelling and myalgias.   Skin:  Negative for color change, pallor, rash and wound.   Neurological:  Negative for dizziness, syncope, weakness, light-headedness, numbness and headaches.   Psychiatric/Behavioral:  Negative for agitation, behavioral problems, confusion and decreased concentration. The patient is not nervous/anxious.    All other systems reviewed and are negative.      Last Recorded Vitals  /79 (BP Location: Left arm, Patient Position: Lying)   Pulse 88   Temp 36.2 °C (97.2 °F) (Temporal)   Resp 18   Wt 93.9 kg (207 lb)   SpO2 98%      Physical Exam:  Vital signs and nursing notes reviewed.   Constitutional: Pleasant and cooperative. Laying in bed in no acute distress. Conversant.   Skin: Warm and dry; no obvious lesions, rashes, pallor, or jaundice.   Eyes: EOMI. Anicteric sclera.   ENT: Mucous membranes moist; no obvious injury or deformity appreciated.   Head and Neck: Normocephalic, atraumatic. ROM preserved. Trachea midline. No appreciable JVD.   Respiratory: Nonlabored on RA. Lungs clear to auscultation bilaterally without obvious adventitious sounds. Chest rise is equal.  Cardiovascular: RRR. No gross  murmur, gallop, or rub. Extremities are warm and well-perfused with good capillary refill (< 3 seconds). No chest wall tenderness.   GI: Abdomen soft, nontender, nondistended. No obvious organomegaly appreciated. Bowel sounds are present.  : No CVA tenderness.   MSK: No gross abnormalities appreciated. No limitations to AROM/PROM appreciated.   Extremities: No cyanosis, edema, or clubbing evident. Neurovascularly intact.   Neuro: A&Ox3. CN 2-12 grossly intact. Able to respond to questions appropriately and clearly. No acute focal neurologic deficits appreciated.  Psych: Appropriate mood and behavior.    Assessment/Plan     69 y.o. male with PMHx s/f hypothyroidism, HLD, HTN, cluster headaches, longstanding tobacco abuse presenting with dysphagia.    Plan:  Admit to gen med    Progressive dysphagia:  GI consulted  NPO for now except ice chips  CT soft tissue neck negative for neck mass.    Nicotine dependence:  Recommended cessation.    Cluster headaches - continue home Elavil    HTN:  Continue home Cozaar, Aldactone, verapamil    Hypothyroidism:  Continue home Synthroid  Recheck TSH, last 9/24 was <0.01    Diet: NPO   DVT Prophylaxis: SCDs  Code Status: Full code  Case Discussed With: ED provider  Additional Sources Reviewed: Prior PCP notes    Anticipated Length of Stay (LOS): 2-3 midnights for dysphagia workup.     DO Destiny Frey dictation software was used to dictate this note and thus there may be minor errors in translation/transcription including garbled speech or misspellings. Please contact for clarification if needed.   clear

## 2024-11-21 NOTE — ANESTHESIA PREPROCEDURE EVALUATION
Patient: Timoteo Victoria    Procedure Information       Date/Time: 11/21/24 1300    Scheduled providers: Arnold Jones MD    Procedure: EGD    Location: [facility] OR            Relevant Problems   Anesthesia (within normal limits)      Cardiac   (+) Atherosclerosis of coronary artery   (+) Benign essential hypertension   (+) Hyperlipidemia      Neuro   (+) Depression, major, recurrent, moderate   (+) Mild major depression, single episode (CMS-HCC)      GI   (+) Dysphagia   (+) Esophageal dysphagia      Endocrine   (+) Hypothyroidism   (+) Obesity, morbid (Multi)   (+) Type 2 diabetes mellitus       Clinical information reviewed:   Tobacco  Allergies  Meds   Med Hx  Surg Hx   Fam Hx  Soc Hx        NPO Detail:  NPO/Void Status  Date of Last Liquid: 11/20/24  Date of Last Solid: 11/20/24         Physical Exam    Airway  Mallampati: II  TM distance: >3 FB  Neck ROM: full     Cardiovascular - normal exam     Dental - normal exam     Pulmonary - normal exam     Abdominal - normal exam         Anesthesia Plan    History of general anesthesia?: yes  History of complications of general anesthesia?: no    ASA 3     MAC     The patient is a current smoker.  Patient was previously instructed to abstain from smoking on day of procedure.  Patient did not smoke on day of procedure.    intravenous induction   Anesthetic plan and risks discussed with patient.    Plan discussed with CRNA.

## 2024-11-21 NOTE — H&P (VIEW-ONLY)
"Inpatient consult to Gastroenterology  Consult performed by: Arnold Jones MD  Consult ordered by: Darshan Ba,           Reason For Consult  \"Dysphagia\"      [facility] Gastroenterology Consultation Note    ASSESSMENT and PLAN:       Timoteo Victoria is a 69 y.o. male with a significant past medical history of HTN, cluster headaches, HLD, hypothyroidism, and tobacco abuse who presented with dysphagia. GI was consulted for \"Dysphagia\".       Dysphagia  Progressive dysphagia over the last month associated with significant/unexplained weight loss. With his history of tobacco abuse there is certainly a concern for underlying malignancy. Other causes of dysphagia including peptic stricture, candida, or motility disorder possible as well. EGD needed for evaluation.  - EGD today  - keep NPO          Arnold Jones MD        Senior Attending Physician, Gastroenterology    [facility]    Clinical   [employer]        HISTORY OF PRESENT ILLNESS:     History Of Present Illness:    Timoteo Victoria is a 69 y.o. male with a significant past medical history of HTN, cluster headaches, HLD, hypothyroidism, and tobacco abuse who presented with dysphagia. GI was consulted for \"Dysphagia\".    He says that for the last month he has had progressive dysphagia. Prior to that he was not having any GI symptoms and he denies any previous dysphagia. Initially about a month ago he started to have dysphagia to solids. This has progressed to the point that over the last week he has also had dysphagia to liquids. He has now been regurgitating anything that he attempts to swallow. He denies any history of heartburn and there is no odynophagia. Over the last 6 months he says that he last lost about 40 pounds. He says that he has never had an EGD before.    Endoscopy History:  11/14/2017 - Colonoscopy: 11 cecal polyps (TA on " path), 7 ascending polyps (TA on path)      Review of systems:     Patient denies any heartburn/GERD, N/V, odynophagia, abdominal pain, diarrhea, constipation, hematemesis, hematochezia, or melena.    I performed a complete 10 point review of systems and it is negative except as noted in HPI or above. All other systems have been reviewed and are negative.        PAST HISTORIES:       Past Medical History:  Patient Active Problem List   Diagnosis    Obesity, morbid (Multi)    Allergic rhinitis    Benign essential hypertension    Hyperlipidemia    Hypothyroidism    Mild major depression, single episode (CMS-[facility])    Nicotine dependence, cigarettes, uncomplicated    Chronic constipation    Episodic cluster headache, not intractable    Atherosclerosis of coronary artery    Type 2 diabetes mellitus    Vitamin D deficiency    Esophageal dysphagia    Weight loss    Urinary hesitancy    Depression, major, recurrent, moderate    Dysphagia     He has a past medical history of Personal history of other endocrine, nutritional and metabolic disease.    Past Surgical History:  He has a past surgical history that includes Cholecystectomy (09/18/2018); Hernia repair (09/18/2018); Coronary artery bypass graft (09/18/2018); and Cataract extraction (Right, 08/25/2023).      Social History:  He reports that he has been smoking cigarettes. He has never used smokeless tobacco. He reports that he does not currently use alcohol. He reports that he does not use drugs.    Family History:  No known family history of GI disease, specifically denies pancreatitis, Crohn's, colon cancer, gastroesophageal cancer, or ulcerative colitis.    Family History   Problem Relation Name Age of Onset    COPD Mother          Allergies:  Ibuprofen      OBJECTIVE:       Last Recorded Vitals:  Vitals:    11/21/24 0027 11/21/24 0057 11/21/24 0132 11/21/24 0502   BP: 178/79 165/88 148/72 156/80   BP Location: Left arm   Left arm   Patient Position: Lying   Lying  "  Pulse: 88   81   Resp: 18   19   Temp: 36.2 °C (97.2 °F)   37.2 °C (98.9 °F)   TempSrc: Temporal   Temporal   SpO2: 98%   99%   Weight:       Height:         /80 (BP Location: Left arm, Patient Position: Lying)   Pulse 81   Temp 37.2 °C (98.9 °F) (Temporal)   Resp 19   Ht 1.778 m (5' 10\")   Wt 93.9 kg (207 lb)   SpO2 99%   BMI 29.70 kg/m²      Physical Exam:    Physical Exam  Vitals reviewed.   Constitutional:       General: He is not in acute distress.     Appearance: He is obese. He is not ill-appearing.   HENT:      Head: Normocephalic and atraumatic.   Eyes:      General: No scleral icterus.  Cardiovascular:      Rate and Rhythm: Normal rate and regular rhythm.      Pulses: Normal pulses.      Heart sounds: Normal heart sounds. No murmur heard.  Pulmonary:      Effort: Pulmonary effort is normal. No respiratory distress.      Breath sounds: Normal breath sounds. No wheezing.   Abdominal:      General: Bowel sounds are normal.      Palpations: Abdomen is soft.      Tenderness: There is no abdominal tenderness. There is no rebound.   Musculoskeletal:         General: No swelling or deformity.   Skin:     General: Skin is warm and dry.      Coloration: Skin is not jaundiced.      Findings: No rash.   Neurological:      General: No focal deficit present.      Mental Status: He is alert and oriented to person, place, and time.   Psychiatric:         Mood and Affect: Mood normal.         Behavior: Behavior normal.         Thought Content: Thought content normal.         Judgment: Judgment normal.           Inpatient Medications:  amitriptyline, 100 mg, oral, Nightly  aspirin, 81 mg, oral, Daily  atorvastatin, 40 mg, oral, Daily  buPROPion XL, 150 mg, oral, q AM  buPROPion XL, 300 mg, oral, q AM  cetirizine, 10 mg, oral, Daily  labetaloL, 10 mg, intravenous, Once  levothyroxine, 200 mcg, oral, Daily  losartan, 100 mg, oral, Daily  spironolactone, 50 mg, oral, Daily  tamsulosin, 0.4 mg, oral, " Nightly  verapamil SR, 240 mg, oral, Nightly      PRN medications: bisacodyl, bisacodyl, guaiFENesin, melatonin, ondansetron **OR** ondansetron    Outpatient Medications:  Prior to Admission medications    Medication Sig Start Date End Date Taking? Authorizing Provider   amitriptyline (Elavil) 100 mg tablet Take 1 tablet (100 mg) by mouth once daily at bedtime. 10/8/24   Dafne Bedolla DO   aspirin 81 mg EC tablet Take by mouth. 9/18/18   Historical Provider, MD   atorvastatin (Lipitor) 40 mg tablet Take 1 tablet (40 mg) by mouth once daily. 10/8/24   Dafne Bedolla DO   buPROPion XL (Wellbutrin XL) 150 mg 24 hr tablet Take 1 tablet (150 mg) by mouth once daily in the morning. Take with 300mg tab for total of 450mg per day. 6/7/24 12/4/24  Dafne Bedolla DO   buPROPion XL (Wellbutrin XL) 300 mg 24 hr tablet Take 1 tablet (300 mg) by mouth once daily in the morning. Take with 150mg tab for total of 450mg per day. 10/8/24   Dafne Bedolla DO   cholecalciferol (Vitamin D-3) 50 mcg (2,000 unit) capsule Take by mouth.    Historical Provider, MD   eletriptan (Relpax) 20 mg tablet TAKE 1 TABLET BY MOUTH ONCE AS NEEDED FOR CLUSTER HEADACHE. MAY REPEAT DOSE ONCE AFTER 2 HOURS IF NEEDED    Historical Provider, MD   fexofenadine (Allegra Allergy) 180 mg tablet Take by mouth.    Historical Provider, MD   hydrocortisone 2.5 % cream APPLY TOPICALLY TO AFFECTED AREA 3 TIMES A DAY 7/7/23 10/8/24  Kareen Gaffney, APRN-CNP   hydrOXYzine pamoate (Vistaril) 25 mg capsule Take 1 capsule (25 mg) by mouth 4 times a day as needed for anxiety. 10/8/24 10/8/25  Dafne Bedolla DO   irbesartan (Avapro) 300 mg tablet Take 1 tablet (300 mg) by mouth once daily. 10/8/24   Dafne Bedolla DO   levothyroxine (Synthroid, Levoxyl) 200 mcg tablet Take 1 tab PO daily. 10/8/24   Dafne Bedolla,    omeprazole (PriLOSEC) 20 mg DR capsule Take 1 capsule (20 mg) by mouth once daily. Take 30 minutes before  "a meal. 10/8/24   Dafne Bedolla DO   sennosides-docusate sodium (Hope-Colace) 8.6-50 mg tablet Take 1 tablet by mouth 2 times a day. 10/8/24 10/8/25  Dafne Bedolla DO   spironolactone (Aldactone) 50 mg tablet Take 1 tablet (50 mg) by mouth once daily. 10/8/24 10/8/25  Dafne Bedolla DO   tamsulosin (Flomax) 0.4 mg 24 hr capsule Take 1 capsule (0.4 mg) by mouth once daily at bedtime. 10/9/24   Dafne Bedolla DO   verapamil SR (Calan-SR) 240 mg ER tablet Take 1 tablet (240 mg) by mouth once daily at bedtime. 10/8/24   Dafne Bedolla DO       LABS AND IMAGING:     Labs:  Recent labs reviewed in the EMR.    Lab Results   Component Value Date    WBC 7.5 11/21/2024    HGB 14.4 11/21/2024    HGB 15.6 11/20/2024    MCV 87 11/21/2024     11/21/2024     11/20/2024       Lab Results   Component Value Date     11/21/2024    K 3.4 (L) 11/21/2024     11/21/2024    BUN 11 11/21/2024    CREATININE 1.04 11/21/2024    CREATININE 1.18 11/20/2024       Lab Results   Component Value Date    BILITOT 0.8 11/20/2024    ALKPHOS 71 11/20/2024    AST 10 11/20/2024    ALT 8 (L) 11/20/2024       No results found for: \"CRP\", \"CALPS\"      Imaging:  CT soft tissue neck w IV contrast    Result Date: 11/20/2024  Interpreted By:  Ana María Dawn, STUDY: CT SOFT TISSUE NECK W IV CONTRAST;  11/20/2024 10:33 pm   INDICATION: Signs/Symptoms:progressive dysphagia, feels like things get stuck in throat.   COMPARISON: 07/07/2022   ACCESSION NUMBER(S): [id number]   ORDERING CLINICIAN: JELLY SHINE   TECHNIQUE: Axial CT images of the neck were obtained.  The patient received intravenous contrast agent. The images were reformatted in angled axial, coronal and sagittal planes.   FINDINGS: Oral Cavity, Pharynx and Larynx: Prominent periapical lucency about the right maxillary central and lateral incisor as well as the right maxillary 2nd premolar. There is adjacent overlying prominence of the buccal " mucosa which is new from prior imaging. Interval retraction of the left maxillary incisor. Evaluation of the oral cavity is limited by streak artifact from dental hardware.  The nasopharyngeal and oropharyngeal structures are unremarkable. The hypopharyngeal and laryngeal structures are unremarkable.   Retropharyngeal and Prevertebral Soft Tissues: Unremarkable.   Lymph nodes: There are few non specific bilateral neck nodes, probably reactive in etiology.   Neck vessels: Dense calcifications of the left greater than right carotid bulb extending into left greater than right cervical ICA. No critical stenosis identified. Vertebral arteries are grossly patent.   Thyroid gland: Calcification left parotid gland similar to prior imaging. The thyroid gland is unremarkable in size and appearance.   Parotid and submandibular glands: Posterior left parotid mass measuring up to 2.1 cm, previously 2.0 cm as well as additional left parotid nodules or lymph nodes. Right parotid nodules/lymph nodes measuring up to 10 mm, similar to prior imaging. Slightly heterogeneous appearance of the bilateral submandibular glands, similar to prior imaging.   Paranasal Sinuses and Mastoids: Visualized paranasal sinuses and bilateral mastoids are clear.   Visualized orbital structures are unremarkable.   Calcifications of the aortic arch and branch vessels. Patent appearing stent at the origin of the left subclavian artery. Scattered upper mediastinal lymph nodes measuring up to 10 mm. Status post median sternotomy with mediastinal clips suggestive of prior CABG. Centrilobular emphysematous changes.   Degenerative changes otherwise visualized cervical spine appears unremarkable.       New maxillary periapical lucencies with adjacent mucosal thickening. No discrete fluid collection identified. Dental follow-up recommended.   No discrete mass or cervical lymphadenopathy identified however direct visualization suggested.   Bilateral parotid  mass/nodule similar to prior imaging.   Postsurgical changes, emphysematous changes, mild mediastinal lymphadenopathy and additional findings as detailed.   MACRO: i   Signed by: Ana María Dawn 11/20/2024 11:38 PM Dictation workstation:   [id number]

## 2024-11-21 NOTE — PROGRESS NOTES
11/21/24 1018   Discharge Planning   Living Arrangements Alone   Support Systems Friends/neighbors   Assistance Needed none   Type of Residence Private residence   Home or Post Acute Services None   Expected Discharge Disposition Home   Does the patient need discharge transport arranged? No   Housing Stability   At any time in the past 12 months, were you homeless or living in a shelter (including now)? N   Transportation Needs   In the past 12 months, has lack of transportation kept you from meetings, work, or from getting things needed for daily living? No     I spoke with patient to introduce discharge planning and explain role of TCC. Pt states he lives alone in a slab home.  His ex-wife lives next door should he need anything but he is independent, drives, denies use of any DME, and did have a recent fall moving an air conditioner.  He confirmed PCP is Dr. Bedolla.  He is scheduled for EGD today. Plans to return home on DC.  No discharge needs identified at this time.

## 2024-11-21 NOTE — NURSING NOTE
Dr Lu states meds with sips prior to EGD ok. SLP in with this RN to see pt. Had pt try sips of water before trying PO pills. Pt unable to clear liquid from esophagus, did not try any pills as pt not tolerating swallowing water right now.

## 2024-11-21 NOTE — ED PROVIDER NOTES
Timoteo Victoria is a 69 y.o. patient presenting to the ED for difficulty swallowing.  The patient states that over the last month he has had progressively worse difficulty swallowing.  It does not seem to be any better with liquids or solids.  Things feel like they get stuck in his throat and he frequently has to regurgitate them because they do not go down.  He has seen his primary care doctor and was referred to GI however the first available GI appointment is not until next month.  This morning he was unable to swallow his morning medications.  Since then he feels as though something is stuck in his throat however he believes he brought back up all of his medications.  He has intermittently had to spit out his saliva because he is not able to swallow it.  He denies any issues similar to this previously.  He has lost approximately 10 pounds in the last month and approximately 40 pounds in the last 6 months.  He is a longtime smoker.    Additional History Obtained from: None  Limitations to History: None  ------------------------------------------------------------------------------------------------------------------------------------------  Physical Exam:  Appearance: Alert, cooperative.  Skin: Warm, dry, appropriate color for ethnicity.  Eyes: Cornea clear. No scleral icterus or injection.   ENT: Mucous membranes moist.  Posterior oropharynx is symmetric and unremarkable.  Voice is clear.  No anterior neck masses are palpable.  No sublingual swelling.  Pulmonary: No accessory muscle use or stridor. Clear lung sounds bilaterally without rhonchi or wheezing.   Cardiac: Heart sounds regular without murmur. B/L radial pulses full and symmetric.   Abdomen: Soft, not tender.  No rebound or guarding.   Musculoskeletal: No gross deformities.   Neurological: Face symmetrical. Voice clear. Appropriately conversant.   Psychiatric: Appropriate mood and affect.    Medical Decision Making:  Chronic Medical Conditions  Significantly Affecting Care:  has a past medical history of Personal history of other endocrine, nutritional and metabolic disease.    Social Determinants of Health Significantly Affecting Care: Longtime smoker    Differential Diagnosis Considered but not limited to: Neck mass, abscess less likely due to lack of systemic symptoms.  Other esophageal pathology.      External Records Reviewed:   I reviewed recent and relevant outside records including:     Independent Interpretation of Studies: The following studies were ordered as part of the emergency department work up and independently interpreted by me. See ED Course for details.    CT of the soft tissues of the neck does not show any evidence of abscess or airway compromise.  There is noted parotid cyst and poor dentition, but none of this explains his inability to swallow.    ED Course as of 11/21/24 0521   Wed Nov 20, 2024 2231 CBC, CMP, magnesium are within normal limits. [SP]      ED Course User Index  [SP] Lisa Lemons DO         Diagnoses as of 11/21/24 0521   Dysphagia         Escalation of Care:  Given the inability to obtain close GI follow-up and the patient's escalating symptoms including not tolerating p.o. tonight I do recommend admission.  The patient is agreeable.      Discussion of Management with Other Providers:   I discussed the patient/results with: Dr. Ba, [facility], who will admit the patient for further care.       Lisa Lemons DO  11/21/24 0522

## 2024-11-21 NOTE — POST-PROCEDURE NOTE
Upper endoscopy (EGD) completed. Full report in the EMR.      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 This was not able to be traversed. Biopsies obtained.        Recommendations:  - pathology pending  - recommend transfer to [facility] for further evaluation/management by thoracic surgery and oncology  - best alternative route of nutrition will need to be determined by thoracic surgery/oncology as oral nutrition or endoscopically placed PEG would not be viable options        Arnold Jones MD      Senior Attending Physician, Gastroenterology    [facility]    Clinical   [employer]

## 2024-11-21 NOTE — CARE PLAN
The patient's goals for the shift include      The clinical goals for the shift include Pt will be hemodynamically stable throughout the shift    
The patient's goals for the shift include be able to swallow     The clinical goals for the shift include t to be able to swallow liquids and food for meals    Over the shift, the patient did not make progress toward the following goals. Barriers to progression include recent inability to swallow, food getting stuck. Recommendations to address these barriers include EGD.    
Awake

## 2024-11-22 ENCOUNTER — HOSPITAL ENCOUNTER (INPATIENT)
Facility: HOSPITAL | Age: 69
End: 2024-11-22
Attending: STUDENT IN AN ORGANIZED HEALTH CARE EDUCATION/TRAINING PROGRAM | Admitting: INTERNAL MEDICINE
Payer: MEDICARE

## 2024-11-22 ENCOUNTER — APPOINTMENT (OUTPATIENT)
Dept: RADIOLOGY | Facility: HOSPITAL | Age: 69
End: 2024-11-22
Payer: MEDICARE

## 2024-11-22 DIAGNOSIS — I25.10 ATHEROSCLEROSIS OF CORONARY ARTERY WITHOUT ANGINA PECTORIS, UNSPECIFIED VESSEL OR LESION TYPE, UNSPECIFIED WHETHER NATIVE OR TRANSPLANTED HEART: ICD-10-CM

## 2024-11-22 DIAGNOSIS — I10 PRIMARY HYPERTENSION: ICD-10-CM

## 2024-11-22 DIAGNOSIS — E03.9 HYPOTHYROIDISM, UNSPECIFIED TYPE: ICD-10-CM

## 2024-11-22 DIAGNOSIS — R13.19 ESOPHAGEAL DYSPHAGIA: ICD-10-CM

## 2024-11-22 DIAGNOSIS — C15.9 PRIMARY ESOPHAGEAL ADENOCARCINOMA (MULTI): ICD-10-CM

## 2024-11-22 DIAGNOSIS — I10 BENIGN ESSENTIAL HYPERTENSION: Chronic | ICD-10-CM

## 2024-11-22 DIAGNOSIS — E78.5 HYPERLIPIDEMIA, UNSPECIFIED HYPERLIPIDEMIA TYPE: Chronic | ICD-10-CM

## 2024-11-22 DIAGNOSIS — K22.89 ESOPHAGEAL MASS: Primary | ICD-10-CM

## 2024-11-22 DIAGNOSIS — G44.019 EPISODIC CLUSTER HEADACHE, NOT INTRACTABLE: ICD-10-CM

## 2024-11-22 PROCEDURE — 1100000001 HC PRIVATE ROOM DAILY

## 2024-11-22 PROCEDURE — 99223 1ST HOSP IP/OBS HIGH 75: CPT | Performed by: THORACIC SURGERY (CARDIOTHORACIC VASCULAR SURGERY)

## 2024-11-22 PROCEDURE — 99223 1ST HOSP IP/OBS HIGH 75: CPT | Performed by: STUDENT IN AN ORGANIZED HEALTH CARE EDUCATION/TRAINING PROGRAM

## 2024-11-22 PROCEDURE — 2550000001 HC RX 255 CONTRASTS: Performed by: INTERNAL MEDICINE

## 2024-11-22 PROCEDURE — 71260 CT THORAX DX C+: CPT | Performed by: RADIOLOGY

## 2024-11-22 PROCEDURE — 2500000004 HC RX 250 GENERAL PHARMACY W/ HCPCS (ALT 636 FOR OP/ED): Performed by: INTERNAL MEDICINE

## 2024-11-22 PROCEDURE — 74177 CT ABD & PELVIS W/CONTRAST: CPT | Performed by: RADIOLOGY

## 2024-11-22 PROCEDURE — 74177 CT ABD & PELVIS W/CONTRAST: CPT

## 2024-11-22 RX ORDER — LEVOTHYROXINE SODIUM 175 UG/1
175 TABLET ORAL DAILY
Status: DISCONTINUED | OUTPATIENT
Start: 2024-11-22 | End: 2024-11-30 | Stop reason: HOSPADM

## 2024-11-22 RX ORDER — ATORVASTATIN CALCIUM 40 MG/1
40 TABLET, FILM COATED ORAL DAILY
Status: DISCONTINUED | OUTPATIENT
Start: 2024-11-22 | End: 2024-11-30 | Stop reason: HOSPADM

## 2024-11-22 RX ORDER — DEXTROSE MONOHYDRATE, SODIUM CHLORIDE, AND POTASSIUM CHLORIDE 50; 1.49; 9 G/1000ML; G/1000ML; G/1000ML
100 INJECTION, SOLUTION INTRAVENOUS CONTINUOUS
Status: DISPENSED | OUTPATIENT
Start: 2024-11-22 | End: 2024-11-23

## 2024-11-22 RX ORDER — TAMSULOSIN HYDROCHLORIDE 0.4 MG/1
0.4 CAPSULE ORAL NIGHTLY
Status: DISCONTINUED | OUTPATIENT
Start: 2024-11-22 | End: 2024-11-30 | Stop reason: HOSPADM

## 2024-11-22 RX ORDER — ENOXAPARIN SODIUM 100 MG/ML
40 INJECTION SUBCUTANEOUS EVERY 24 HOURS
Status: DISCONTINUED | OUTPATIENT
Start: 2024-11-22 | End: 2024-11-30 | Stop reason: HOSPADM

## 2024-11-22 RX ORDER — ASPIRIN 81 MG/1
81 TABLET ORAL DAILY
Status: DISCONTINUED | OUTPATIENT
Start: 2024-11-22 | End: 2024-11-26

## 2024-11-22 RX ORDER — AMOXICILLIN 250 MG
1 CAPSULE ORAL NIGHTLY PRN
Status: DISCONTINUED | OUTPATIENT
Start: 2024-11-22 | End: 2024-11-30 | Stop reason: HOSPADM

## 2024-11-22 RX ORDER — LEVOTHYROXINE SODIUM 125 UG/1
250 TABLET ORAL DAILY
Status: DISCONTINUED | OUTPATIENT
Start: 2024-11-22 | End: 2024-11-22

## 2024-11-22 RX ORDER — SPIRONOLACTONE 25 MG/1
50 TABLET ORAL DAILY
Status: DISCONTINUED | OUTPATIENT
Start: 2024-11-22 | End: 2024-11-30 | Stop reason: HOSPADM

## 2024-11-22 RX ORDER — AMITRIPTYLINE HYDROCHLORIDE 100 MG/1
100 TABLET ORAL NIGHTLY
Status: DISCONTINUED | OUTPATIENT
Start: 2024-11-22 | End: 2024-11-30 | Stop reason: HOSPADM

## 2024-11-22 RX ORDER — CHOLECALCIFEROL (VITAMIN D3) 25 MCG
2000 TABLET ORAL DAILY
Status: DISCONTINUED | OUTPATIENT
Start: 2024-11-22 | End: 2024-11-30 | Stop reason: HOSPADM

## 2024-11-22 RX ORDER — VERAPAMIL HCL 240 MG
240 TABLET, EXTENDED RELEASE ORAL NIGHTLY
Status: DISCONTINUED | OUTPATIENT
Start: 2024-11-22 | End: 2024-11-25

## 2024-11-22 RX ORDER — PANTOPRAZOLE SODIUM 40 MG/1
40 TABLET, DELAYED RELEASE ORAL
Status: DISCONTINUED | OUTPATIENT
Start: 2024-11-22 | End: 2024-11-24

## 2024-11-22 RX ORDER — BUPROPION HYDROCHLORIDE 150 MG/1
450 TABLET ORAL DAILY
Status: DISCONTINUED | OUTPATIENT
Start: 2024-11-22 | End: 2024-11-30 | Stop reason: HOSPADM

## 2024-11-22 RX ORDER — LOSARTAN POTASSIUM 25 MG/1
100 TABLET ORAL DAILY
Status: DISCONTINUED | OUTPATIENT
Start: 2024-11-22 | End: 2024-11-30 | Stop reason: HOSPADM

## 2024-11-22 SDOH — SOCIAL STABILITY: SOCIAL INSECURITY: ARE THERE ANY APPARENT SIGNS OF INJURIES/BEHAVIORS THAT COULD BE RELATED TO ABUSE/NEGLECT?: NO

## 2024-11-22 SDOH — SOCIAL STABILITY: SOCIAL INSECURITY: HAVE YOU HAD THOUGHTS OF HARMING ANYONE ELSE?: NO

## 2024-11-22 SDOH — SOCIAL STABILITY: SOCIAL INSECURITY: WERE YOU ABLE TO COMPLETE ALL THE BEHAVIORAL HEALTH SCREENINGS?: YES

## 2024-11-22 SDOH — SOCIAL STABILITY: SOCIAL INSECURITY: HAVE YOU HAD ANY THOUGHTS OF HARMING ANYONE ELSE?: NO

## 2024-11-22 SDOH — SOCIAL STABILITY: SOCIAL INSECURITY: DOES ANYONE TRY TO KEEP YOU FROM HAVING/CONTACTING OTHER FRIENDS OR DOING THINGS OUTSIDE YOUR HOME?: NO

## 2024-11-22 SDOH — SOCIAL STABILITY: SOCIAL INSECURITY: ARE YOU OR HAVE YOU BEEN THREATENED OR ABUSED PHYSICALLY, EMOTIONALLY, OR SEXUALLY BY ANYONE?: NO

## 2024-11-22 SDOH — SOCIAL STABILITY: SOCIAL INSECURITY: ABUSE: ADULT

## 2024-11-22 SDOH — SOCIAL STABILITY: SOCIAL INSECURITY: HAS ANYONE EVER THREATENED TO HURT YOUR FAMILY OR YOUR PETS?: NO

## 2024-11-22 SDOH — SOCIAL STABILITY: SOCIAL INSECURITY: DO YOU FEEL ANYONE HAS EXPLOITED OR TAKEN ADVANTAGE OF YOU FINANCIALLY OR OF YOUR PERSONAL PROPERTY?: NO

## 2024-11-22 SDOH — SOCIAL STABILITY: SOCIAL INSECURITY: DO YOU FEEL UNSAFE GOING BACK TO THE PLACE WHERE YOU ARE LIVING?: NO

## 2024-11-22 ASSESSMENT — COGNITIVE AND FUNCTIONAL STATUS - GENERAL
PATIENT BASELINE BEDBOUND: NO
DAILY ACTIVITIY SCORE: 24
MOBILITY SCORE: 24

## 2024-11-22 ASSESSMENT — ENCOUNTER SYMPTOMS
PALPITATIONS: 0
SHORTNESS OF BREATH: 0
SEIZURES: 0
DIARRHEA: 0
VOMITING: 0
DIZZINESS: 0
WEAKNESS: 0
FEVER: 0
UNEXPECTED WEIGHT CHANGE: 1
CONSTIPATION: 0
HEADACHES: 0
APPETITE CHANGE: 1
TREMORS: 0
ACTIVITY CHANGE: 0
LIGHT-HEADEDNESS: 0
TROUBLE SWALLOWING: 1
FATIGUE: 0
NAUSEA: 0
HEMATURIA: 0
FACIAL ASYMMETRY: 0
ABDOMINAL PAIN: 0
COUGH: 0
NUMBNESS: 0
CHEST TIGHTNESS: 0
CHILLS: 0
SORE THROAT: 0

## 2024-11-22 ASSESSMENT — LIFESTYLE VARIABLES
HOW MANY STANDARD DRINKS CONTAINING ALCOHOL DO YOU HAVE ON A TYPICAL DAY: PATIENT DOES NOT DRINK
AUDIT-C TOTAL SCORE: 0
HOW OFTEN DO YOU HAVE A DRINK CONTAINING ALCOHOL: NEVER
AUDIT-C TOTAL SCORE: 0
HOW OFTEN DO YOU HAVE 6 OR MORE DRINKS ON ONE OCCASION: NEVER
SKIP TO QUESTIONS 9-10: 1

## 2024-11-22 ASSESSMENT — ACTIVITIES OF DAILY LIVING (ADL)
DRESSING YOURSELF: INDEPENDENT
JUDGMENT_ADEQUATE_SAFELY_COMPLETE_DAILY_ACTIVITIES: YES
PATIENT'S MEMORY ADEQUATE TO SAFELY COMPLETE DAILY ACTIVITIES?: YES
BATHING: INDEPENDENT
FEEDING YOURSELF: INDEPENDENT
ADEQUATE_TO_COMPLETE_ADL: YES
WALKS IN HOME: INDEPENDENT
HEARING - LEFT EAR: FUNCTIONAL
HEARING - RIGHT EAR: FUNCTIONAL
GROOMING: INDEPENDENT
TOILETING: INDEPENDENT

## 2024-11-22 ASSESSMENT — PAIN - FUNCTIONAL ASSESSMENT
PAIN_FUNCTIONAL_ASSESSMENT: 0-10
PAIN_FUNCTIONAL_ASSESSMENT: 0-10

## 2024-11-22 ASSESSMENT — COLUMBIA-SUICIDE SEVERITY RATING SCALE - C-SSRS
2. HAVE YOU ACTUALLY HAD ANY THOUGHTS OF KILLING YOURSELF?: NO
6. HAVE YOU EVER DONE ANYTHING, STARTED TO DO ANYTHING, OR PREPARED TO DO ANYTHING TO END YOUR LIFE?: NO
1. IN THE PAST MONTH, HAVE YOU WISHED YOU WERE DEAD OR WISHED YOU COULD GO TO SLEEP AND NOT WAKE UP?: NO

## 2024-11-22 ASSESSMENT — PATIENT HEALTH QUESTIONNAIRE - PHQ9
2. FEELING DOWN, DEPRESSED OR HOPELESS: NOT AT ALL
1. LITTLE INTEREST OR PLEASURE IN DOING THINGS: NOT AT ALL
SUM OF ALL RESPONSES TO PHQ9 QUESTIONS 1 & 2: 0

## 2024-11-22 ASSESSMENT — PAIN SCALES - GENERAL
PAINLEVEL_OUTOF10: 0 - NO PAIN
PAINLEVEL_OUTOF10: 0 - NO PAIN

## 2024-11-22 NOTE — H&P
History Of Present Illness  Timoteo Victoria is a 69 y.o. male w/ PMHx of CAD s/p 4x CABG (~2005), HTN, HLD, hx of cluster HA, hypothyroidism (on levothyroxine) and tobacco use presenting with dysphagia and EGD concerning for esophageal mass.    Patient endorses that he's been having a hard time to swallow food that started around 1 month or so and that initially it was mostly to solid and dry food and that it was progressively getting worse, to the point that he tried to eat mashed potatoes and couldn't, which motivated him to present to the ED @ [place]. At the early stages of the presentation he mentioned that if he changed the neck position and extended, it would work to alleviate the dysphagia, but that stopped working as well. Patient endorses that since April he lost a significant amount of weight unintentionally (although he mentions it coincides with the death of his mother in May/2024 and his appetite since has been very poor) going from around 255lb to 205lb. Denies fever, chills, night sweats or other symptoms. Do endorses feeling down since the death of his mother, but denies SI/HI.     At [facility] patient was evaluated by GI who performed an EGD and were unable to advance past the esophagus due almost occlusive mass (biopsied), so patient transferred to [facility] for further eval.     Past Medical History  Past Medical History:   Diagnosis Date    Hyperlipidemia     Hypertension     Personal history of other endocrine, nutritional and metabolic disease     History of hypothyroidism    Type 2 diabetes mellitus        Surgical History  Past Surgical History:   Procedure Laterality Date    CATARACT EXTRACTION Right 08/25/2023    CHOLECYSTECTOMY  09/18/2018    Cholecystectomy    CORONARY ARTERY BYPASS GRAFT  09/18/2018    CABG    HERNIA REPAIR  09/18/2018    Inguinal Hernia Repair        Social History  He reports that he has been smoking cigarettes. He has never used smokeless tobacco. He reports that he  does not currently use alcohol. He reports that he does not use drugs.    Family History  Family History   Problem Relation Name Age of Onset    COPD Mother          Allergies  Ibuprofen    Review of Systems   Constitutional:  Positive for appetite change and unexpected weight change. Negative for activity change, chills, fatigue and fever.   HENT:  Positive for trouble swallowing. Negative for postnasal drip and sore throat.    Respiratory:  Negative for cough, chest tightness and shortness of breath.    Cardiovascular:  Negative for chest pain, palpitations and leg swelling.   Gastrointestinal:  Negative for abdominal pain, constipation, diarrhea, nausea and vomiting.   Genitourinary:  Negative for enuresis, hematuria and urgency.   Neurological:  Negative for dizziness, tremors, seizures, syncope, facial asymmetry, weakness, light-headedness, numbness and headaches.        Physical Exam  Constitutional:       General: He is not in acute distress.     Appearance: Normal appearance. He is not ill-appearing.   HENT:      Head: Normocephalic.      Mouth/Throat:      Mouth: Mucous membranes are moist.   Eyes:      Extraocular Movements: Extraocular movements intact.      Pupils: Pupils are equal, round, and reactive to light.   Cardiovascular:      Rate and Rhythm: Normal rate and regular rhythm.      Heart sounds: No murmur heard.     No friction rub. No gallop.   Pulmonary:      Effort: Pulmonary effort is normal. No respiratory distress.      Breath sounds: Normal breath sounds. No stridor. No wheezing, rhonchi or rales.   Abdominal:      General: Abdomen is flat. There is no distension.      Palpations: Abdomen is soft. There is no mass.      Tenderness: There is no abdominal tenderness. There is no guarding.   Skin:     General: Skin is warm.      Capillary Refill: Capillary refill takes less than 2 seconds.   Neurological:      General: No focal deficit present.      Mental Status: He is alert and oriented to  person, place, and time.          Last Recorded Vitals  Blood pressure 162/81, pulse 98, temperature 36.6 °C (97.9 °F), resp. rate 17, SpO2 97%.    Relevant Results    Scheduled medications  amitriptyline, 100 mg, oral, Nightly  aspirin, 81 mg, oral, Daily  atorvastatin, 40 mg, oral, Daily  buPROPion XL, 450 mg, oral, Daily  cholecalciferol, 2,000 Units, oral, Daily  enoxaparin, 40 mg, subcutaneous, q24h  levothyroxine, 175 mcg, oral, Daily  losartan, 100 mg, oral, Daily  pantoprazole, 40 mg, oral, Daily before breakfast  spironolactone, 50 mg, oral, Daily  tamsulosin, 0.4 mg, oral, Nightly  verapamil SR, 240 mg, oral, Nightly      Continuous medications     PRN medications  PRN medications: sennosides-docusate sodium  Esophagogastroduodenoscopy (EGD)    Result Date: 11/21/2024  Table formatting from the original result was not included. 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  Indication Dysphagia Timoteo Victoria is a 69 y.o. male with a history of HTN, cluster headaches, HLD, hypothyroidism, and tobacco abuse who presents for an EGD to evaluate dysphagia. He says that for the last month he has had progressive dysphagia. This was initially mild dysphagia to solids, but has now progressed to the point that he has been unable to tolerate solids or liquids. This has been associated with weight loss of 40 pounds over the last 6 months. Medications See Anesthesia Record. Preprocedure A history and physical has been performed, and patient medication allergies have been reviewed. The patient's tolerance of previous  anesthesia has been reviewed. The risks and benefits of the procedure and the sedation options and risks were discussed with the patient. All questions were answered and informed consent obtained. Details of the Procedure The patient underwent monitored anesthesia care, which was administered by an anesthesia professional. The patient's blood pressure, ECG, ETCO2, heart rate, level of consciousness, oxygen and respirations were monitored throughout the procedure. The scope was introduced through the mouth and advanced to the lower third of the esophagus. The patient's estimated blood loss was minimal (<5 mL). The procedure was not difficult. The patient tolerated the procedure well. There were no apparent adverse events. Prior to the procedure, a History and Physical was performed, and patient medications and allergies were reviewed. Immediately prior to the administration of medications, the patient was re-assessed for adequacy to receive sedatives. The heart rate, respiratory rate, oxygen saturations, blood pressure, adequacy of pulmonary ventilation, and response to care were monitored throughout the procedure. The physical status of the patient was re-assessed after the procedure. The risks and benefits of the procedure and the sedation options and risks were discussed with the patient and/or family including the risk of injury to internal organs (including perforation) and the risk of missed lesions. All questions were answered and informed consent was obtained. Patient identification and proposed procedure were verified by the physician, the nurse, the anesthetist and the technician in the pre-procedure area and in the procedure room. After this verification the upper endoscope was passed under direct vision. Anatomic landmarks were photographed. No sedation was administered by endoscopist. Sedation was administered by the anesthesia staff. Refer to anesthesia documentation/charting for details regarding  medications administered and doses given. Events Procedure Events Event Event Time ENDO SCOPE IN TIME 11/21/2024  1:04 PM Specimens ID Type Source Tests Collected by Time 1 : ESOPHAGEAL MASS Tissue ESOPHAGUS DISTAL BIOPSY SURGICAL PATHOLOGY EXAM Latisha Cohen RN 11/21/2024 1309 Procedure Location [address] [id number] [phone] Referring Provider Jorge Luis Lu MD Procedure Provider Arnold Jones MD     CT soft tissue neck w IV contrast    Result Date: 11/20/2024  Interpreted By:  Ana María Dawn, STUDY: CT SOFT TISSUE NECK W IV CONTRAST;  11/20/2024 10:33 pm   INDICATION: Signs/Symptoms:progressive dysphagia, feels like things get stuck in throat.   COMPARISON: 07/07/2022   ACCESSION NUMBER(S): [id number]   ORDERING CLINICIAN: JELLY SHINE   TECHNIQUE: Axial CT images of the neck were obtained.  The patient received intravenous contrast agent. The images were reformatted in angled axial, coronal and sagittal planes.   FINDINGS: Oral Cavity, Pharynx and Larynx: Prominent periapical lucency about the right maxillary central and lateral incisor as well as the right maxillary 2nd premolar. There is adjacent overlying prominence of the buccal mucosa which is new from prior imaging. Interval retraction of the left maxillary incisor. Evaluation of the oral cavity is limited by streak artifact from dental hardware.  The nasopharyngeal and oropharyngeal structures are unremarkable. The hypopharyngeal and laryngeal structures are unremarkable.   Retropharyngeal and Prevertebral Soft Tissues: Unremarkable.   Lymph nodes: There are few non specific bilateral neck nodes, probably reactive in etiology.   Neck vessels: Dense calcifications of the left greater than right carotid bulb extending into left greater than right cervical ICA. No critical stenosis identified. Vertebral arteries are grossly patent.   Thyroid gland: Calcification left parotid gland  similar to prior imaging. The thyroid gland is unremarkable in size and appearance.   Parotid and submandibular glands: Posterior left parotid mass measuring up to 2.1 cm, previously 2.0 cm as well as additional left parotid nodules or lymph nodes. Right parotid nodules/lymph nodes measuring up to 10 mm, similar to prior imaging. Slightly heterogeneous appearance of the bilateral submandibular glands, similar to prior imaging.   Paranasal Sinuses and Mastoids: Visualized paranasal sinuses and bilateral mastoids are clear.   Visualized orbital structures are unremarkable.   Calcifications of the aortic arch and branch vessels. Patent appearing stent at the origin of the left subclavian artery. Scattered upper mediastinal lymph nodes measuring up to 10 mm. Status post median sternotomy with mediastinal clips suggestive of prior CABG. Centrilobular emphysematous changes.   Degenerative changes otherwise visualized cervical spine appears unremarkable.       New maxillary periapical lucencies with adjacent mucosal thickening. No discrete fluid collection identified. Dental follow-up recommended.   No discrete mass or cervical lymphadenopathy identified however direct visualization suggested.   Bilateral parotid mass/nodule similar to prior imaging.   Postsurgical changes, emphysematous changes, mild mediastinal lymphadenopathy and additional findings as detailed.   MACRO: i   Signed by: Ana María Dawn 11/20/2024 11:38 PM Dictation workstation:   [id number]     Results for orders placed or performed during the hospital encounter of 11/20/24 (from the past 24 hours)   TSH with reflex to Free T4 if abnormal   Result Value Ref Range    Thyroid Stimulating Hormone 0.01 (L) 0.44 - 3.98 mIU/L   Thyroxine, Free   Result Value Ref Range    Thyroxine, Free 1.55 (H) 0.61 - 1.12 ng/dL   CBC   Result Value Ref Range    WBC 7.5 4.4 - 11.3 x10*3/uL    nRBC 0.0 0.0 - 0.0 /100 WBCs    RBC 4.83 4.50 - 5.90 x10*6/uL    Hemoglobin 14.4  13.5 - 17.5 g/dL    Hematocrit 42.1 41.0 - 52.0 %    MCV 87 80 - 100 fL    MCH 29.8 26.0 - 34.0 pg    MCHC 34.2 32.0 - 36.0 g/dL    RDW 12.0 11.5 - 14.5 %    Platelets 164 150 - 450 x10*3/uL   Basic metabolic panel   Result Value Ref Range    Glucose 70 (L) 74 - 99 mg/dL    Sodium 136 136 - 145 mmol/L    Potassium 3.4 (L) 3.5 - 5.3 mmol/L    Chloride 101 98 - 107 mmol/L    Bicarbonate 26 21 - 32 mmol/L    Anion Gap 12 10 - 20 mmol/L    Urea Nitrogen 11 6 - 23 mg/dL    Creatinine 1.04 0.50 - 1.30 mg/dL    eGFR 78 >60 mL/min/1.73m*2    Calcium 9.1 8.6 - 10.3 mg/dL   POCT GLUCOSE   Result Value Ref Range    POCT Glucose 73 (L) 74 - 99 mg/dL        Assessment/Plan   Assessment & Plan      Timoteo Victoria is a 69 y.o. male w/ PMHx of CAD s/p 4x CABG (~2005), HTN, HLD, hx of cluster HA, hypothyroidism (on levothyroxine) and tobacco use presenting with dysphagia and EGD concerning for esophageal mass.    #Dysphagia  #Esophageal mass  :: EGD on 11/21 showing esophageal mass with malignant appearance, not able to be crossed  :: discussed with thoracic surgery @ [facility] who recommended transfer to [facility]  - thoracic surgery consult in the AM  - likely will need oncology and GI for multidisciplinary evaluation for consideration of nutrition as well  - will defer further work-up for after input of consult teams    #HTN  #CAD s/p CABG x4 ~ 2005  #HLD  :: patient asymptomatic from a CAD standpoint  :: on home lisinopril, faviola 50mg, verapamil 240mg and losartan 100mg  :: on home aspirin and atorva 40mg  :: RBBB noted on prior EKGs  - c/w home meds  - repeat EKG    #Hypothyroidism (on home levothyroxine)  :: TSH suppressed with elevated fT4 on admission  - given reports of weight loss (although unlikely related to thyroid disease), will reduce his dose to 175mcg  - will need to have TSH/fT4 checked in around 6 weeks    #Hx of cluster HA  :: per patient well controlled on amitriptyline  - c/w home amitriptyline    #MDD  :: on home  buproprion, recently increased  - c/w home dose    #Smoking  :: smokes 0.7PPD on average since 17 yrs of age  - offered nicotine patch but patient refused for now    F: PRN  E: PRN  N: NPO except ice chips  A: PIV  DVT: Enoxaparin  GI: Home PPI  NOK: Eulogio (brother) [phone]  Code: Full Code (confirmed on admission)              Robert Alfonso MD

## 2024-11-22 NOTE — NURSING NOTE
Patient to be transported to [facility] via [facility]. Report called to [facility] on [address] at 2030. [facility] patient at 2245. Belongings sent with patient, IV remains in place. No further needs at this time.

## 2024-11-22 NOTE — DISCHARGE SUMMARY
Discharge Diagnosis  Dysphagia    Issues Requiring Follow-Up  Esophageal mass, dysphagia    This discharge took greater than 35 minutes.    Test Results Pending At Discharge  Pending Labs       Order Current Status    Surgical Pathology Exam In process            Hospital Course   69 y.o. male with PMHx s/f hypothyroidism, HLD, HTN, cluster headaches, longstanding tobacco abuse presenting with dysphagia. Pt says for the past month he has had trouble swallowing. Initially solids, but now liquids and medications. Says he vomits anything he takes PO back up quickly, often with quite a bit of saliva. No choking episodes, shortness of breath, neck pain, or chest pain. Does admit to some heartburn symptoms at times. He has been unable to take his medications the last few days. His PCP referred him to GI, but they do not have an opening until next month. He also is a longtime smoker and admits to losing 10 lbs in the last month and 40 lbs in the last 6 months.     ED Course (Summary - please note all labs, imaging studies, and interventions noted below have been personally reviewed and/or interpreted on day of admission):   Vitals on presentation: 97 F, 88 bpm, 20 rr, 154/81, 100% on RA  Labs: CMP Na 135  Mag 1.89  Cbc unremarkable  Imaging: CT soft tissue neck with IV contrast - New maxillary periapical lucencies with adjacent mucosal thickening.   No discrete fluid collection identified. Dental follow-up recommended.   No discrete mass or cervical lymphadenopathy identified however   direct visualization suggested.   Bilateral parotid mass/nodule similar to prior imaging.         11/21:                Today the patient is seen following his EGD.  Denies adverse effects of the procedure.  He is understandably distressed regarding the news of the EGD findings.  GI described malignant-appearing mass in the distal esophagus and unable to pass the endoscope beyond it.  In view of this discussed with thoracic surgery at [facility]  who have accepted the patient.  Awaiting bed availability.  Likely will require TPN.  Discussed transfer with patient and he is agreeable to do so.    Addendum: Bed was available and patient was transferred to [facility] at approximately 10:45 PM 11/21/2024.     Review of Systems   Constitutional:  Negative for activity change, appetite change, chills, diaphoresis, fatigue and fever.   HENT:  Positive for trouble swallowing. Negative for congestion, ear pain, rhinorrhea, sinus pain and sore throat.    Respiratory:  Negative for apnea, cough, chest tightness, shortness of breath, wheezing and stridor.    Cardiovascular:  Negative for chest pain, palpitations and leg swelling.   Gastrointestinal:  Positive for vomiting. Negative for abdominal distention, abdominal pain, constipation, diarrhea and nausea.   Genitourinary:  Negative for difficulty urinating, dysuria, flank pain, frequency, hematuria and urgency.   Musculoskeletal:  Negative for arthralgias, back pain, gait problem, joint swelling and myalgias.   Skin:  Negative for color change, pallor, rash and wound.   Neurological:  Negative for dizziness, syncope, weakness, light-headedness, numbness and headaches.   Psychiatric/Behavioral:  Negative for agitation, behavioral problems, confusion and decreased concentration. The patient is not nervous/anxious.    All other systems reviewed and are negative.         Progressive dysphagia: Esophageal mass  GI consulted  NPO for now except ice chips  CT soft tissue neck negative for neck mass.  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  Unable to pass beyond it with endoscope.  At this point has been accepted at [facility] for thoracic surgery evaluation.     Nicotine dependence:  Recommended cessation.     Cluster headaches - continue home Elavil     HTN:  Continue home Cozaar, Aldactone, verapamil     Hypothyroidism:  Continue home Synthroid  Recheck TSH, last 9/24 was <0.01          Pertinent Physical  Exam At Time of Discharge  Physical Exam  Constitutional: Pleasant and cooperative. Laying in bed in no acute distress. Conversant.   Skin: Warm and dry; no obvious lesions, rashes, pallor, or jaundice.   Eyes: EOMI. Anicteric sclera.   ENT: Mucous membranes moist; no obvious injury or deformity appreciated.   Head and Neck: Normocephalic, atraumatic. ROM preserved. Trachea midline. No appreciable JVD.   Respiratory: Nonlabored on RA. Lungs clear to auscultation bilaterally without obvious adventitious sounds. Chest rise is equal.  Cardiovascular: RRR. No gross murmur, gallop, or rub. Extremities are warm and well-perfused with good capillary refill (< 3 seconds). No chest wall tenderness.   GI: Abdomen soft, nontender, nondistended. No obvious organomegaly appreciated. Bowel sounds are present.  : No CVA tenderness.   MSK: No gross abnormalities appreciated. No limitations to AROM/PROM appreciated.   Extremities: No cyanosis, edema, or clubbing evident. Neurovascularly intact.   Neuro: A&Ox3. CN 2-12 grossly intact. Able to respond to questions appropriately and clearly. No acute focal neurologic deficits appreciated.  Psych: Appropriate mood and behavior.  Home Medications     Medication List      ASK your doctor about these medications     Allegra Allergy 180 mg tablet; Generic drug: fexofenadine   amitriptyline 100 mg tablet; Commonly known as: Elavil; Take 1 tablet   (100 mg) by mouth once daily at bedtime.   aspirin 81 mg EC tablet   atorvastatin 40 mg tablet; Commonly known as: Lipitor; Take 1 tablet (40   mg) by mouth once daily.   * buPROPion  mg 24 hr tablet; Commonly known as: Wellbutrin XL;   Take 1 tablet (150 mg) by mouth once daily in the morning. Take with 300mg   tab for total of 450mg per day.   * buPROPion  mg 24 hr tablet; Commonly known as: Wellbutrin XL;   Take 1 tablet (300 mg) by mouth once daily in the morning. Take with 150mg   tab for total of 450mg per day.   cholecalciferol  50 mcg (2,000 unit) capsule; Commonly known as: Vitamin   D-3   eletriptan 20 mg tablet; Commonly known as: Relpax   hydrocortisone 2.5 % cream; APPLY TOPICALLY TO AFFECTED AREA 3 TIMES A   DAY   hydrOXYzine pamoate 25 mg capsule; Commonly known as: Vistaril; Take 1   capsule (25 mg) by mouth 4 times a day as needed for anxiety.   irbesartan 300 mg tablet; Commonly known as: Avapro; Take 1 tablet (300   mg) by mouth once daily.   levothyroxine 200 mcg tablet; Commonly known as: Synthroid, Levoxyl;   Take 1 tab PO daily.   omeprazole 20 mg DR capsule; Commonly known as: PriLOSEC; Take 1 capsule   (20 mg) by mouth once daily. Take 30 minutes before a meal.   sennosides-docusate sodium 8.6-50 mg tablet; Commonly known as:   Hope-Colace; Take 1 tablet by mouth 2 times a day.   spironolactone 50 mg tablet; Commonly known as: Aldactone; Take 1 tablet   (50 mg) by mouth once daily.   tamsulosin 0.4 mg 24 hr capsule; Commonly known as: Flomax; Take 1   capsule (0.4 mg) by mouth once daily at bedtime.   verapamil  mg ER tablet; Commonly known as: Calan-SR; Take 1   tablet (240 mg) by mouth once daily at bedtime.  * This list has 2 medication(s) that are the same as other medications   prescribed for you. Read the directions carefully, and ask your doctor or   other care provider to review them with you.       Outpatient Follow-Up  Transferred to [facility]    Jorge Luis Lu MD

## 2024-11-22 NOTE — CONSULTS
[facility]  THORACIC SURGERY - CONSULT    Patient Name: Timoteo Victoria  MRN: [id number]  Admit Date: 1122  : 1955  AGE: 69 y.o.   GENDER: male  ==============================================================================  TODAY'S ASSESSMENT AND PLAN OF CARE:  Timoteo Victoria is a 69 year old male with PMHx of CAD s/p CABGx4 (~), HTN, HLD, Headaches, hypothyroidism, and tobacco use who presents with new lower esophageal mass. Patient with 40 pounds of weight loss over the past 6 months. Now with dysphagia to liquids and solids and EGD showing lower esophageal mass. Patient will require staging imaging to determine if metastasis and determination of feeding plan.     Recommendations:   -CT Chest/Abdomen/Pelvis with IV contrast (patient unlikely to tolerate PO contrast)  -/CEA given family history of pancreatic cancer   -Further recommendations and determination of possible G vs J-tube pending imaging     D/w Dr. Maritza Gaitan MD  Pgy-2 Gen Surg  Thoracic Surgery [id number]    ==============================================================================  CHIEF COMPLAINT/REASON FOR CONSULT:  Timoteo Victoria is a 69 year old male with PMHx of CAD s/p CABGx4 (~), HTN, HLD, Headaches, hypothyroidism, and tobacco use who presents with new lower esophageal mass.     Patient states that he has had difficulty swallowing for the past few months that has significantly worsened over the past month. Patient endorses that initially he was able to adjust his neck etc. And get some softer foods/liquids down but over the past week he is no longer able to tolerate any liquids or solids. Patient however is able to tolerate his own salivary secretions. Patient states that over the past 6 months he has lost approximately 40 pounds (states partially due to lack of appetite with passing of his mother but also worsened due to not being able to swallow). Patient denies any  fevers, chills, chest pain, shortness of breath. States still able to complete other activities of daily living.     EGD at OSH performed with biopsies obtained of mass but at that time unable to be traversed and patient transferred to [facility] for further evaluation.     PAST MEDICAL HISTORY:   PMH:   Past Medical History:   Diagnosis Date    Hyperlipidemia     Hypertension     Personal history of other endocrine, nutritional and metabolic disease     History of hypothyroidism    Type 2 diabetes mellitus        PSH: Open Marilin, CABGx4, hernia repair with mesh (he states around chest incision)  Past Surgical History:   Procedure Laterality Date    CATARACT EXTRACTION Right 2023    CHOLECYSTECTOMY  2018    Cholecystectomy    CORONARY ARTERY BYPASS GRAFT  2018    CABG    HERNIA REPAIR  2018    Inguinal Hernia Repair     FH: Maternal Aunt and Grandmother with pancreatic cancer. Paternal aunt with hx of breast cancer   Family History   Problem Relation Name Age of Onset    COPD Mother       SOCIAL HISTORY:    Smokin/4 PPD smoker x50 years         Alcohol: Denies   Social History     Substance and Sexual Activity   Alcohol Use Not Currently       Drug use: Denies    MEDICATIONS:   Prior to Admission medications    Medication Sig Start Date End Date Taking? Authorizing Provider   amitriptyline (Elavil) 100 mg tablet Take 1 tablet (100 mg) by mouth once daily at bedtime. 10/8/24   Dafne Bedolla, DO   aspirin 81 mg EC tablet Take by mouth. 18   Historical Provider, MD   atorvastatin (Lipitor) 40 mg tablet Take 1 tablet (40 mg) by mouth once daily. 10/8/24   Dafne Bedolla DO   buPROPion XL (Wellbutrin XL) 150 mg 24 hr tablet Take 1 tablet (150 mg) by mouth once daily in the morning. Take with 300mg tab for total of 450mg per day. 24  Dafne Bedolla DO   buPROPion XL (Wellbutrin XL) 300 mg 24 hr tablet Take 1 tablet (300 mg) by mouth once daily in the morning.  Take with 150mg tab for total of 450mg per day. 10/8/24   Dafne Bedolla DO   cholecalciferol (Vitamin D-3) 50 mcg (2,000 unit) capsule Take by mouth.    Historical Provider, MD   eletriptan (Relpax) 20 mg tablet TAKE 1 TABLET BY MOUTH ONCE AS NEEDED FOR CLUSTER HEADACHE. MAY REPEAT DOSE ONCE AFTER 2 HOURS IF NEEDED    Historical Provider, MD   fexofenadine (Allegra Allergy) 180 mg tablet Take by mouth.    Historical Provider, MD   hydrocortisone 2.5 % cream APPLY TOPICALLY TO AFFECTED AREA 3 TIMES A DAY 7/7/23 10/8/24  Kareen Gaffney, APRN-CNP   hydrOXYzine pamoate (Vistaril) 25 mg capsule Take 1 capsule (25 mg) by mouth 4 times a day as needed for anxiety. 10/8/24 10/8/25  Dafne Bedolla DO   irbesartan (Avapro) 300 mg tablet Take 1 tablet (300 mg) by mouth once daily. 10/8/24   Dafne Bedolla DO   levothyroxine (Synthroid, Levoxyl) 200 mcg tablet Take 1 tab PO daily. 10/8/24   Dafne Bedolla DO   omeprazole (PriLOSEC) 20 mg DR capsule Take 1 capsule (20 mg) by mouth once daily. Take 30 minutes before a meal. 10/8/24   aDfne Bedolla DO   sennosides-docusate sodium (Hope-Colace) 8.6-50 mg tablet Take 1 tablet by mouth 2 times a day. 10/8/24 10/8/25  Dafne Bedolla DO   spironolactone (Aldactone) 50 mg tablet Take 1 tablet (50 mg) by mouth once daily. 10/8/24 10/8/25  Dafne Bedolla DO   tamsulosin (Flomax) 0.4 mg 24 hr capsule Take 1 capsule (0.4 mg) by mouth once daily at bedtime. 10/9/24   Dafne Bedolla DO   verapamil SR (Calan-SR) 240 mg ER tablet Take 1 tablet (240 mg) by mouth once daily at bedtime. 10/8/24   Dafne Bedolla DO     ALLERGIES:   Allergies   Allergen Reactions    Ibuprofen Swelling and Unknown       REVIEW OF SYSTEMS:  12 point ROS as per HPI     PHYSICAL EXAM:  Constitutional: NAD, A&Ox3   Head/Neck: NCAT  Eyes: Anicteric   Cardiovascular: Regular rate and rhythm per peripheral palpation   Respiratory: Breathing comfortably on  RA with symmetric chest rise. Midline Chest incision well-healed.   Abdominal: Soft, non tender, non-distended without rebound or guarding. RUQ abdominal incision well-healed.   : Deferred   Ext: MAEx4  Psych: Appropriate mood and affect     IMAGING SUMMARY:  (summary of findings, not a copy of dictation)  Esophagogastroduodenoscopy (EGD)  Table formatting from the original result was not included.  Impression  Single malignant-appearing and invasive mass (not traversable) in the   lower third of the esophagus, covering one half of the circumference;   performed cold forceps biopsy    Findings  Single malignant-appearing, friable and invasive mass (not traversable) in   the lower third of the esophagus, covering one half of the circumference;   performed cold forceps biopsy    Recommendation  Await pathology results   Return to hospital [facility] for ongoing care.       Indication  Dysphagia    Timoteo Victoria is a 69 y.o. male with a history of HTN, cluster   headaches, HLD, hypothyroidism, and tobacco abuse who presents for an EGD   to evaluate dysphagia.    He says that for the last month he has had progressive dysphagia. This was   initially mild dysphagia to solids, but has now progressed to the point   that he has been unable to tolerate solids or liquids. This has been   associated with weight loss of 40 pounds over the last 6 months.    Medications  See Anesthesia Record.     Preprocedure  A history and physical has been performed, and patient medication   allergies have been reviewed. The patient's tolerance of previous   anesthesia has been reviewed. The risks and benefits of the procedure and   the sedation options and risks were discussed with the patient. All   questions were answered and informed consent obtained.    Details of the Procedure  The patient underwent monitored anesthesia care, which was administered by   an anesthesia professional. The patient's blood pressure, ECG, ETCO2,   heart rate,  level of consciousness, oxygen and respirations were monitored   throughout the procedure. The scope was introduced through the mouth and   advanced to the lower third of the esophagus. The patient's estimated   blood loss was minimal (<5 mL). The procedure was not difficult. The   patient tolerated the procedure well. There were no apparent adverse   events.   Prior to the procedure, a History and Physical was performed, and patient   medications and allergies were reviewed. Immediately prior to the   administration of medications, the patient was re-assessed for adequacy to   receive sedatives. The heart rate, respiratory rate, oxygen saturations,   blood pressure, adequacy of pulmonary ventilation, and response to care   were monitored throughout the procedure. The physical status of the   patient was re-assessed after the procedure.     The risks and benefits of the procedure and the sedation options and risks   were discussed with the patient and/or family including the risk of injury   to internal organs (including perforation) and the risk of missed lesions.   All questions were answered and informed consent was obtained.     Patient identification and proposed procedure were verified by the   physician, the nurse, the anesthetist and the technician in the   pre-procedure area and in the procedure room. After this verification the   upper endoscope was passed under direct vision. Anatomic landmarks were   photographed.    No sedation was administered by endoscopist. Sedation was administered by   the anesthesia staff. Refer to anesthesia documentation/charting for   details regarding medications administered and doses given.     Events  Procedure Events   Event Event Time   ENDO SCOPE IN TIME 11/21/2024  1:04 PM     Specimens  ID Type Source Tests Collected by Time   1 : ESOPHAGEAL MASS Tissue ESOPHAGUS DISTAL BIOPSY SURGICAL PATHOLOGY EXAM   Latisha Cohen RN 11/21/2024 8305     Procedure Location  [facility]  [address] [id number] [phone]    Referring Provider  Jorge Luis Lu MD    Procedure Provider  Arnold Jones MD        LABS:  Results from last 7 days   Lab Units 11/21/24  0511 11/20/24  2130   WBC AUTO x10*3/uL 7.5 7.7   HEMOGLOBIN g/dL 14.4 15.6   HEMATOCRIT % 42.1 45.3   PLATELETS AUTO x10*3/uL 164 191   NEUTROS PCT AUTO %  --  71.7   LYMPHS PCT AUTO %  --  19.4   MONOS PCT AUTO %  --  6.0   EOS PCT AUTO %  --  1.6         Results from last 7 days   Lab Units 11/21/24  0511 11/20/24  2130   SODIUM mmol/L 136 135*   POTASSIUM mmol/L 3.4* 3.9   CHLORIDE mmol/L 101 102   CO2 mmol/L 26 23   BUN mg/dL 11 10   CREATININE mg/dL 1.04 1.18   CALCIUM mg/dL 9.1 9.4   PROTEIN TOTAL g/dL  --  7.4   BILIRUBIN TOTAL mg/dL  --  0.8   ALK PHOS U/L  --  71   ALT U/L  --  8*   AST U/L  --  10   GLUCOSE mg/dL 70* 94     Results from last 7 days   Lab Units 11/20/24  2130   BILIRUBIN TOTAL mg/dL 0.8             I have reviewed all laboratory and imaging results ordered/pertinent for this encounter.

## 2024-11-22 NOTE — PROGRESS NOTES
Timoteo Victoria is a 69 y.o. male on day 0 of admission presenting with Esophageal mass.    Transitional Care Coordination Progress Note:  Patient discussed during interdisciplinary rounds.   Team members present: MD MARLEEN  Plan per Medical/Surgical team: Pending CT of chest abd pelvis, possible surgical intervention  Payor: Medicare Advantage  Discharge disposition: Home  Potential Barriers: None  ADOD: 11/25    This TCC will continue to follow and update with any changes.  Elizabet WASHINGTON, [facility]  [phone]  [email or web address]

## 2024-11-22 NOTE — H&P (VIEW-ONLY)
[facility]  THORACIC SURGERY - CONSULT    Patient Name: Timoteo Victoria  MRN: [id number]  Admit Date: 1122  : 1955  AGE: 69 y.o.   GENDER: male  ==============================================================================  TODAY'S ASSESSMENT AND PLAN OF CARE:  Timoteo Victoria is a 69 year old male with PMHx of CAD s/p CABGx4 (~), HTN, HLD, Headaches, hypothyroidism, and tobacco use who presents with new lower esophageal mass. Patient with 40 pounds of weight loss over the past 6 months. Now with dysphagia to liquids and solids and EGD showing lower esophageal mass. Patient will require staging imaging to determine if metastasis and determination of feeding plan.     Recommendations:   -CT Chest/Abdomen/Pelvis with IV contrast (patient unlikely to tolerate PO contrast)  -/CEA given family history of pancreatic cancer   -Further recommendations and determination of possible G vs J-tube pending imaging     D/w Dr. Maritza Gaitan MD  Pgy-2 Gen Surg  Thoracic Surgery [id number]    ==============================================================================  CHIEF COMPLAINT/REASON FOR CONSULT:  Timoteo Victoria is a 69 year old male with PMHx of CAD s/p CABGx4 (~), HTN, HLD, Headaches, hypothyroidism, and tobacco use who presents with new lower esophageal mass.     Patient states that he has had difficulty swallowing for the past few months that has significantly worsened over the past month. Patient endorses that initially he was able to adjust his neck etc. And get some softer foods/liquids down but over the past week he is no longer able to tolerate any liquids or solids. Patient however is able to tolerate his own salivary secretions. Patient states that over the past 6 months he has lost approximately 40 pounds (states partially due to lack of appetite with passing of his mother but also worsened due to not being able to swallow). Patient denies any  fevers, chills, chest pain, shortness of breath. States still able to complete other activities of daily living.     EGD at OSH performed with biopsies obtained of mass but at that time unable to be traversed and patient transferred to [facility] for further evaluation.     PAST MEDICAL HISTORY:   PMH:   Past Medical History:   Diagnosis Date    Hyperlipidemia     Hypertension     Personal history of other endocrine, nutritional and metabolic disease     History of hypothyroidism    Type 2 diabetes mellitus        PSH: Open Marilin, CABGx4, hernia repair with mesh (he states around chest incision)  Past Surgical History:   Procedure Laterality Date    CATARACT EXTRACTION Right 2023    CHOLECYSTECTOMY  2018    Cholecystectomy    CORONARY ARTERY BYPASS GRAFT  2018    CABG    HERNIA REPAIR  2018    Inguinal Hernia Repair     FH: Maternal Aunt and Grandmother with pancreatic cancer. Paternal aunt with hx of breast cancer   Family History   Problem Relation Name Age of Onset    COPD Mother       SOCIAL HISTORY:    Smokin/4 PPD smoker x50 years         Alcohol: Denies   Social History     Substance and Sexual Activity   Alcohol Use Not Currently       Drug use: Denies    MEDICATIONS:   Prior to Admission medications    Medication Sig Start Date End Date Taking? Authorizing Provider   amitriptyline (Elavil) 100 mg tablet Take 1 tablet (100 mg) by mouth once daily at bedtime. 10/8/24   Dafne Bedolla, DO   aspirin 81 mg EC tablet Take by mouth. 18   Historical Provider, MD   atorvastatin (Lipitor) 40 mg tablet Take 1 tablet (40 mg) by mouth once daily. 10/8/24   Dafne Bedolla DO   buPROPion XL (Wellbutrin XL) 150 mg 24 hr tablet Take 1 tablet (150 mg) by mouth once daily in the morning. Take with 300mg tab for total of 450mg per day. 24  Dafne Bedolla DO   buPROPion XL (Wellbutrin XL) 300 mg 24 hr tablet Take 1 tablet (300 mg) by mouth once daily in the morning.  Take with 150mg tab for total of 450mg per day. 10/8/24   Dafne Bedolla DO   cholecalciferol (Vitamin D-3) 50 mcg (2,000 unit) capsule Take by mouth.    Historical Provider, MD   eletriptan (Relpax) 20 mg tablet TAKE 1 TABLET BY MOUTH ONCE AS NEEDED FOR CLUSTER HEADACHE. MAY REPEAT DOSE ONCE AFTER 2 HOURS IF NEEDED    Historical Provider, MD   fexofenadine (Allegra Allergy) 180 mg tablet Take by mouth.    Historical Provider, MD   hydrocortisone 2.5 % cream APPLY TOPICALLY TO AFFECTED AREA 3 TIMES A DAY 7/7/23 10/8/24  Kareen Gaffney, APRN-CNP   hydrOXYzine pamoate (Vistaril) 25 mg capsule Take 1 capsule (25 mg) by mouth 4 times a day as needed for anxiety. 10/8/24 10/8/25  Dafne Bedolla DO   irbesartan (Avapro) 300 mg tablet Take 1 tablet (300 mg) by mouth once daily. 10/8/24   Dafne Bedolla DO   levothyroxine (Synthroid, Levoxyl) 200 mcg tablet Take 1 tab PO daily. 10/8/24   Dafne Bedolla DO   omeprazole (PriLOSEC) 20 mg DR capsule Take 1 capsule (20 mg) by mouth once daily. Take 30 minutes before a meal. 10/8/24   Dafne Bedolla DO   sennosides-docusate sodium (Hope-Colace) 8.6-50 mg tablet Take 1 tablet by mouth 2 times a day. 10/8/24 10/8/25  Dafne Bedolla DO   spironolactone (Aldactone) 50 mg tablet Take 1 tablet (50 mg) by mouth once daily. 10/8/24 10/8/25  Dafne Bedolla DO   tamsulosin (Flomax) 0.4 mg 24 hr capsule Take 1 capsule (0.4 mg) by mouth once daily at bedtime. 10/9/24   Dafne Bedolla DO   verapamil SR (Calan-SR) 240 mg ER tablet Take 1 tablet (240 mg) by mouth once daily at bedtime. 10/8/24   Dafne Bedolla DO     ALLERGIES:   Allergies   Allergen Reactions    Ibuprofen Swelling and Unknown       REVIEW OF SYSTEMS:  12 point ROS as per HPI     PHYSICAL EXAM:  Constitutional: NAD, A&Ox3   Head/Neck: NCAT  Eyes: Anicteric   Cardiovascular: Regular rate and rhythm per peripheral palpation   Respiratory: Breathing comfortably on  RA with symmetric chest rise. Midline Chest incision well-healed.   Abdominal: Soft, non tender, non-distended without rebound or guarding. RUQ abdominal incision well-healed.   : Deferred   Ext: MAEx4  Psych: Appropriate mood and affect     IMAGING SUMMARY:  (summary of findings, not a copy of dictation)  Esophagogastroduodenoscopy (EGD)  Table formatting from the original result was not included.  Impression  Single malignant-appearing and invasive mass (not traversable) in the   lower third of the esophagus, covering one half of the circumference;   performed cold forceps biopsy    Findings  Single malignant-appearing, friable and invasive mass (not traversable) in   the lower third of the esophagus, covering one half of the circumference;   performed cold forceps biopsy    Recommendation  Await pathology results   Return to hospital [facility] for ongoing care.       Indication  Dysphagia    Timoteo Victoria is a 69 y.o. male with a history of HTN, cluster   headaches, HLD, hypothyroidism, and tobacco abuse who presents for an EGD   to evaluate dysphagia.    He says that for the last month he has had progressive dysphagia. This was   initially mild dysphagia to solids, but has now progressed to the point   that he has been unable to tolerate solids or liquids. This has been   associated with weight loss of 40 pounds over the last 6 months.    Medications  See Anesthesia Record.     Preprocedure  A history and physical has been performed, and patient medication   allergies have been reviewed. The patient's tolerance of previous   anesthesia has been reviewed. The risks and benefits of the procedure and   the sedation options and risks were discussed with the patient. All   questions were answered and informed consent obtained.    Details of the Procedure  The patient underwent monitored anesthesia care, which was administered by   an anesthesia professional. The patient's blood pressure, ECG, ETCO2,   heart rate,  level of consciousness, oxygen and respirations were monitored   throughout the procedure. The scope was introduced through the mouth and   advanced to the lower third of the esophagus. The patient's estimated   blood loss was minimal (<5 mL). The procedure was not difficult. The   patient tolerated the procedure well. There were no apparent adverse   events.   Prior to the procedure, a History and Physical was performed, and patient   medications and allergies were reviewed. Immediately prior to the   administration of medications, the patient was re-assessed for adequacy to   receive sedatives. The heart rate, respiratory rate, oxygen saturations,   blood pressure, adequacy of pulmonary ventilation, and response to care   were monitored throughout the procedure. The physical status of the   patient was re-assessed after the procedure.     The risks and benefits of the procedure and the sedation options and risks   were discussed with the patient and/or family including the risk of injury   to internal organs (including perforation) and the risk of missed lesions.   All questions were answered and informed consent was obtained.     Patient identification and proposed procedure were verified by the   physician, the nurse, the anesthetist and the technician in the   pre-procedure area and in the procedure room. After this verification the   upper endoscope was passed under direct vision. Anatomic landmarks were   photographed.    No sedation was administered by endoscopist. Sedation was administered by   the anesthesia staff. Refer to anesthesia documentation/charting for   details regarding medications administered and doses given.     Events  Procedure Events   Event Event Time   ENDO SCOPE IN TIME 11/21/2024  1:04 PM     Specimens  ID Type Source Tests Collected by Time   1 : ESOPHAGEAL MASS Tissue ESOPHAGUS DISTAL BIOPSY SURGICAL PATHOLOGY EXAM   Latisha Coehn RN 11/21/2024 3008     Procedure Location  [facility]  [address] [id number] [phone]    Referring Provider  Jorge Luis Lu MD    Procedure Provider  Arnold Jones MD        LABS:  Results from last 7 days   Lab Units 11/21/24  0511 11/20/24  2130   WBC AUTO x10*3/uL 7.5 7.7   HEMOGLOBIN g/dL 14.4 15.6   HEMATOCRIT % 42.1 45.3   PLATELETS AUTO x10*3/uL 164 191   NEUTROS PCT AUTO %  --  71.7   LYMPHS PCT AUTO %  --  19.4   MONOS PCT AUTO %  --  6.0   EOS PCT AUTO %  --  1.6         Results from last 7 days   Lab Units 11/21/24  0511 11/20/24  2130   SODIUM mmol/L 136 135*   POTASSIUM mmol/L 3.4* 3.9   CHLORIDE mmol/L 101 102   CO2 mmol/L 26 23   BUN mg/dL 11 10   CREATININE mg/dL 1.04 1.18   CALCIUM mg/dL 9.1 9.4   PROTEIN TOTAL g/dL  --  7.4   BILIRUBIN TOTAL mg/dL  --  0.8   ALK PHOS U/L  --  71   ALT U/L  --  8*   AST U/L  --  10   GLUCOSE mg/dL 70* 94     Results from last 7 days   Lab Units 11/20/24  2130   BILIRUBIN TOTAL mg/dL 0.8             I have reviewed all laboratory and imaging results ordered/pertinent for this encounter.

## 2024-11-23 LAB
ALBUMIN SERPL BCP-MCNC: 3.7 G/DL (ref 3.4–5)
ALP SERPL-CCNC: 69 U/L (ref 33–136)
ALT SERPL W P-5'-P-CCNC: 11 U/L (ref 10–52)
ANION GAP SERPL CALC-SCNC: 15 MMOL/L (ref 10–20)
AST SERPL W P-5'-P-CCNC: 17 U/L (ref 9–39)
BILIRUB SERPL-MCNC: 0.7 MG/DL (ref 0–1.2)
BUN SERPL-MCNC: 15 MG/DL (ref 6–23)
CALCIUM SERPL-MCNC: 9.4 MG/DL (ref 8.6–10.6)
CANCER AG19-9 SERPL-ACNC: 13.02 U/ML
CEA SERPL-MCNC: 0.8 UG/L
CHLORIDE SERPL-SCNC: 103 MMOL/L (ref 98–107)
CO2 SERPL-SCNC: 23 MMOL/L (ref 21–32)
CREAT SERPL-MCNC: 0.97 MG/DL (ref 0.5–1.3)
EGFRCR SERPLBLD CKD-EPI 2021: 85 ML/MIN/1.73M*2
ERYTHROCYTE [DISTWIDTH] IN BLOOD BY AUTOMATED COUNT: 11.8 % (ref 11.5–14.5)
GLUCOSE SERPL-MCNC: 60 MG/DL (ref 74–99)
HCT VFR BLD AUTO: 41.2 % (ref 41–52)
HGB BLD-MCNC: 14.2 G/DL (ref 13.5–17.5)
MCH RBC QN AUTO: 30.3 PG (ref 26–34)
MCHC RBC AUTO-ENTMCNC: 34.5 G/DL (ref 32–36)
MCV RBC AUTO: 88 FL (ref 80–100)
NRBC BLD-RTO: 0 /100 WBCS (ref 0–0)
PLATELET # BLD AUTO: 152 X10*3/UL (ref 150–450)
POTASSIUM SERPL-SCNC: 4 MMOL/L (ref 3.5–5.3)
PROT SERPL-MCNC: 6.4 G/DL (ref 6.4–8.2)
RBC # BLD AUTO: 4.69 X10*6/UL (ref 4.5–5.9)
SODIUM SERPL-SCNC: 137 MMOL/L (ref 136–145)
WBC # BLD AUTO: 7.6 X10*3/UL (ref 4.4–11.3)

## 2024-11-23 PROCEDURE — 80053 COMPREHEN METABOLIC PANEL: CPT | Performed by: INTERNAL MEDICINE

## 2024-11-23 PROCEDURE — 2500000004 HC RX 250 GENERAL PHARMACY W/ HCPCS (ALT 636 FOR OP/ED): Performed by: INTERNAL MEDICINE

## 2024-11-23 PROCEDURE — 99232 SBSQ HOSP IP/OBS MODERATE 35: CPT | Performed by: INTERNAL MEDICINE

## 2024-11-23 PROCEDURE — 36415 COLL VENOUS BLD VENIPUNCTURE: CPT | Performed by: INTERNAL MEDICINE

## 2024-11-23 PROCEDURE — 85027 COMPLETE CBC AUTOMATED: CPT | Performed by: INTERNAL MEDICINE

## 2024-11-23 PROCEDURE — 86301 IMMUNOASSAY TUMOR CA 19-9: CPT | Performed by: INTERNAL MEDICINE

## 2024-11-23 PROCEDURE — 82378 CARCINOEMBRYONIC ANTIGEN: CPT | Performed by: INTERNAL MEDICINE

## 2024-11-23 PROCEDURE — 1100000001 HC PRIVATE ROOM DAILY

## 2024-11-23 RX ORDER — DEXTROSE MONOHYDRATE, SODIUM CHLORIDE, AND POTASSIUM CHLORIDE 50; 1.49; 9 G/1000ML; G/1000ML; G/1000ML
125 INJECTION, SOLUTION INTRAVENOUS CONTINUOUS
Status: DISPENSED | OUTPATIENT
Start: 2024-11-23 | End: 2024-11-24

## 2024-11-23 ASSESSMENT — COGNITIVE AND FUNCTIONAL STATUS - GENERAL
DAILY ACTIVITIY SCORE: 24
MOBILITY SCORE: 24

## 2024-11-23 ASSESSMENT — PAIN SCALES - GENERAL
PAINLEVEL_OUTOF10: 0 - NO PAIN
PAINLEVEL_OUTOF10: 0 - NO PAIN

## 2024-11-23 ASSESSMENT — PAIN - FUNCTIONAL ASSESSMENT: PAIN_FUNCTIONAL_ASSESSMENT: 0-10

## 2024-11-23 NOTE — PROGRESS NOTES
Timoteo Victoria is a 69 y.o. male on day 1 of admission presenting with Esophageal mass.      Subjective   No events over night,     Reports no c/o pain.        Objective     Last Recorded Vitals  /79   Pulse 79   Temp 36.9 °C (98.4 °F) (Temporal)   Resp 18   Wt 93 kg (205 lb)   SpO2 100%   Intake/Output last 3 Shifts:  No intake or output data in the 24 hours ending 11/23/24 1641    Admission Weight  Weight: 93 kg (205 lb) (11/22/24 0045)    Daily Weight  11/22/24 : 93 kg (205 lb)    Image Results  CT chest abdomen pelvis w IV contrast  Narrative: Interpreted By:  Dimitri Lujan,  and [place]   STUDY:  CT CHEST ABDOMEN PELVIS W IV CONTRAST;  11/22/2024 8:21 pm      INDICATION:  Signs/Symptoms:esophageal mass occluding lumen, ? Metastatic lesions,  for operative treatment planning..      COMPARISON:  CT soft tissue neck 11/20/2024      ACCESSION NUMBER(S):  [id number]      ORDERING CLINICIAN:  LINDY SOLANO      TECHNIQUE:  CT of the chest, abdomen, and pelvis was performed.  Contiguous axial  images were obtained at 3 mm slice thickness through the chest,  abdomen and pelvis. Coronal and sagittal reconstructions at 3 mm  slice thickness were performed. 80 ML of Omnipaque 350 was  administered intravenously without immediate complication.      FINDINGS:  CHEST:      LUNG/PLEURA/LARGE AIRWAYS:  The large airways are patent without endobronchial mass. Small amount  of mucus in the proximal right mainstem bronchus. Mild apically  predominant centrilobular emphysema. There is a 0.4 cm ground-glass  nodule in the left upper lobe (series 204, image 108). The area  centrilobular nodularity in the right middle lobe adjacent to the  fissure (series 204, image 153). Scarring/atelectasis of the lingula.  No pleural effusion. No pneumothorax.      VESSELS:  Aorta and pulmonary arteries are normal caliber.  Severe  atherosclerotic changes are noted of the aorta and branching vessels.  Severe  coronary artery calcifications are present. Postoperative  changes of coronary artery bypass grafting are noted.      HEART:  The heart is normal in size.  There is no pericardial effusion.      MEDIASTINUM AND NAYA:  No mediastinal, hilar or axillary lymphadenopathy is present.  There  is an area of the irregular masslike thickening of the mid to distal  esophagus, immediately followed by small-to-moderate sized hiatal  hernia. The mid esophagus demonstrates circumferential wall  thickening.      CHEST WALL AND LOWER NECK:  The soft tissues of the chest wall demonstrate no gross abnormality.  Sternal cerclage wires are intact. The visualized thyroid gland  appears within normal limits.      ABDOMEN:      LIVER:  The liver is normal in size and homogeneous in enhancement. There is  an indeterminate hypoattenuating lesion along the inferior margin of  the right hepatic lobe which may represent hemangioma or focal fat  deposition.      BILE DUCTS:  The intrahepatic and extrahepatic ducts are not dilated.      GALLBLADDER:  The gallbladder is surgically absent.      PANCREAS:  The pancreas appears unremarkable without evidence of ductal  dilatation or masses.      SPLEEN:  The spleen is normal in size without focal lesions.      ADRENAL GLANDS:  Bilateral adrenal glands appear normal.      KIDNEYS AND URETERS:  The kidneys are normal in size and enhance symmetrically. Numerous  hypoattenuating lesions are consistent with simple cysts. There is a  small nonobstructing stone in the midpole of the left kidney  measuring 0.4 cm. No hydronephrosis.      PELVIS:      BLADDER:  The urinary bladder is unremarkable.      REPRODUCTIVE ORGANS:  The prostate is not enlarged.      BOWEL:  Small-to-moderate size hiatal hernia. Small and large bowel loops are  normal in caliber without focal wall thickening or evidence to  suggest obstruction. The appendix is normal.          VESSELS:  There is no aneurysmal dilatation of the  abdominal aorta. The IVC  appears normal.      PERITONEUM/RETROPERITONEUM/LYMPH NODES:  There is no free or loculated fluid collection, no free  intraperitoneal air. The retroperitoneum appears normal.  No  abdominopelvic lymphadenopathy is present.      BONE AND SOFT TISSUE:  No suspicious osseous lesions are identified.  Postsurgical changes  of mesh repair of a ventral abdominal hernia in the epigastric region.      Impression: 1. Irregular masslike thickening of the mid to distal esophagus is  difficult to accurately measure, and may represent a neoplastic  process. Correlate with recent endoscopic imaging and biopsy results.  There is also proximal esophageal wall thickening, and a moderate  size hiatal hernia.  2. Small area of centrilobular nodularity in the right middle lobe  may represent focal aspiration/mucoid impaction or less likely  atypical infection.  3. Mild apical predominant centrilobular emphysema.  4. Severe coronary artery calcifications. Please note this exam is  not optimized for evaluation of the coronary arteries.  5. Nonobstructing 0.4 cm calculus in the left kidney.      I personally reviewed the image(s)/study and resident interpretation.  I agree with the findings as stated by resident Dony Gallegos.  Data analyzed and images interpreted at [facility], [place], [place].      MACRO:  Incidental Finding:  A ground glass pulmonary nodule measuring less  than 6 mm.  (**-YCF-**)      Instructions:  No further follow-up is required, however, if the  nodule morphology is suspicious, consider follow up at 2 and 4 years;  if grows or increasingly solid, consider resection. (Basilio Ac  et al., Guidelines for management of incidental pulmonary nodules  detected on CT images: From the Fleischner Society 2017, Radiology.  2017 Jul;284 (1):228-243.) POLY.ACR.IF.6      Signed by: Dimitri Lujan 11/23/2024 10:36 AM  Dictation workstation:    [id number]    Vitals:    11/23/24 1404   BP: 152/79   Pulse: 79   Resp: 18   Temp: 36.9 °C (98.4 °F)   SpO2: 100%       Physical Exam  Constitutional:       Appearance: Normal appearance.   HENT:      Head: Normocephalic.      Nose: Nose normal.   Eyes:      Extraocular Movements: Extraocular movements intact.      Pupils: Pupils are equal, round, and reactive to light.   Cardiovascular:      Rate and Rhythm: Normal rate and regular rhythm.      Heart sounds: Normal heart sounds. No murmur heard.  Pulmonary:      Effort: No respiratory distress.      Breath sounds: Normal breath sounds. No wheezing.   Abdominal:      General: There is no distension.      Palpations: Abdomen is soft.      Tenderness: There is no abdominal tenderness.   Musculoskeletal:         General: Normal range of motion.      Cervical back: Normal range of motion.   Skin:     General: Skin is warm.   Neurological:      General: No focal deficit present.      Mental Status: He is alert and oriented to person, place, and time.   Psychiatric:         Mood and Affect: Mood normal.         Relevant Results  Scheduled medications  amitriptyline, 100 mg, oral, Nightly  aspirin, 81 mg, oral, Daily  atorvastatin, 40 mg, oral, Daily  buPROPion XL, 450 mg, oral, Daily  cholecalciferol, 2,000 Units, oral, Daily  enoxaparin, 40 mg, subcutaneous, q24h  levothyroxine, 175 mcg, oral, Daily  losartan, 100 mg, oral, Daily  pantoprazole, 40 mg, oral, Daily before breakfast  spironolactone, 50 mg, oral, Daily  tamsulosin, 0.4 mg, oral, Nightly  verapamil SR, 240 mg, oral, Nightly      Continuous medications  potassium chloride-D5-0.9%NaCl, 100 mL/hr      PRN medications  PRN medications: sennosides-docusate sodium    Results for orders placed or performed during the hospital encounter of 11/22/24 (from the past 24 hours)   CBC   Result Value Ref Range    WBC 7.6 4.4 - 11.3 x10*3/uL    nRBC 0.0 0.0 - 0.0 /100 WBCs    RBC 4.69 4.50 - 5.90 x10*6/uL    Hemoglobin  14.2 13.5 - 17.5 g/dL    Hematocrit 41.2 41.0 - 52.0 %    MCV 88 80 - 100 fL    MCH 30.3 26.0 - 34.0 pg    MCHC 34.5 32.0 - 36.0 g/dL    RDW 11.8 11.5 - 14.5 %    Platelets 152 150 - 450 x10*3/uL   Comprehensive Metabolic Panel   Result Value Ref Range    Glucose 60 (L) 74 - 99 mg/dL    Sodium 137 136 - 145 mmol/L    Potassium 4.0 3.5 - 5.3 mmol/L    Chloride 103 98 - 107 mmol/L    Bicarbonate 23 21 - 32 mmol/L    Anion Gap 15 10 - 20 mmol/L    Urea Nitrogen 15 6 - 23 mg/dL    Creatinine 0.97 0.50 - 1.30 mg/dL    eGFR 85 >60 mL/min/1.73m*2    Calcium 9.4 8.6 - 10.6 mg/dL    Albumin 3.7 3.4 - 5.0 g/dL    Alkaline Phosphatase 69 33 - 136 U/L    Total Protein 6.4 6.4 - 8.2 g/dL    AST 17 9 - 39 U/L    Bilirubin, Total 0.7 0.0 - 1.2 mg/dL    ALT 11 10 - 52 U/L       Assessment/Plan      Timoteo Victoria is a 69 y.o. male w/ PMHx of CAD s/p 4x CABG (~2005), HTN, HLD, hx of cluster HA, hypothyroidism (on levothyroxine) and tobacco use presenting with dysphagia and EGD showed distal  esophageal mass, concerning for malignancy     #Dysphagia  #Esophageal mass  :: EGD on 11/21 showing esophageal mass with malignant appearance, not able to be crossed  :: discussed with thoracic surgery @ [facility] who recommended [place] [facility] [place]  - thoracic surgery consulted,   -CT chest, abdomen, pelvis showed no evidence of Mets.      #HTN  #CAD s/p CABG x4 ~ 2005  #HLD  :: patient asymptomatic from a CAD standpoint  :: on home lisinopril, faviola 50mg, verapamil 240mg and losartan 100mg  :: on home aspirin and atorva 40mg  :: RBBB noted on prior EKGs  - c/w home meds  Base line EKG     #Hypothyroidism (on home levothyroxine)  :: TSH suppressed with elevated fT4 on admission  - given reports of weight loss (although unlikely related to thyroid disease), reduced  dose to 175mcg  - will need to have TSH/fT4 checked in around 6 weeks     #Hx of cluster HA  :: per patient well controlled on amitriptyline  - c/w home amitriptyline     #MDD  :: on  home buproprion, recently increased  - c/w home dose     #Smoking  :: smokes 0.7PPD on average since 17 yrs of age  - offered nicotine patch but patient refused for now     F: PRN  E: PRN  N: NPO except ice chips  A: PIV  DVT: Enoxaparin  GI: Home PPI  NOK: Eulogio (brother) [phone]  Code: Full Code (confirmed on admission)       Dispo: pending Thoracic surgery intervention.       Cameron Graves MD

## 2024-11-24 VITALS
TEMPERATURE: 97.3 F | HEIGHT: 70 IN | RESPIRATION RATE: 18 BRPM | SYSTOLIC BLOOD PRESSURE: 131 MMHG | WEIGHT: 205 LBS | BODY MASS INDEX: 29.35 KG/M2 | HEART RATE: 75 BPM | DIASTOLIC BLOOD PRESSURE: 74 MMHG | OXYGEN SATURATION: 100 %

## 2024-11-24 LAB
ABO GROUP (TYPE) IN BLOOD: NORMAL
ALBUMIN SERPL BCP-MCNC: 3.7 G/DL (ref 3.4–5)
ALP SERPL-CCNC: 62 U/L (ref 33–136)
ALT SERPL W P-5'-P-CCNC: 14 U/L (ref 10–52)
ANION GAP SERPL CALC-SCNC: 12 MMOL/L (ref 10–20)
ANTIBODY SCREEN: NORMAL
AST SERPL W P-5'-P-CCNC: 20 U/L (ref 9–39)
BILIRUB SERPL-MCNC: 0.7 MG/DL (ref 0–1.2)
BUN SERPL-MCNC: 10 MG/DL (ref 6–23)
CALCIUM SERPL-MCNC: 9.6 MG/DL (ref 8.6–10.6)
CHLORIDE SERPL-SCNC: 104 MMOL/L (ref 98–107)
CO2 SERPL-SCNC: 26 MMOL/L (ref 21–32)
CREAT SERPL-MCNC: 0.87 MG/DL (ref 0.5–1.3)
EGFRCR SERPLBLD CKD-EPI 2021: >90 ML/MIN/1.73M*2
ERYTHROCYTE [DISTWIDTH] IN BLOOD BY AUTOMATED COUNT: 11.6 % (ref 11.5–14.5)
GLUCOSE SERPL-MCNC: 84 MG/DL (ref 74–99)
HCT VFR BLD AUTO: 40.1 % (ref 41–52)
HGB BLD-MCNC: 13.9 G/DL (ref 13.5–17.5)
INR PPP: 1.2 (ref 0.9–1.1)
MCH RBC QN AUTO: 30.6 PG (ref 26–34)
MCHC RBC AUTO-ENTMCNC: 34.7 G/DL (ref 32–36)
MCV RBC AUTO: 88 FL (ref 80–100)
NRBC BLD-RTO: 0 /100 WBCS (ref 0–0)
PLATELET # BLD AUTO: 149 X10*3/UL (ref 150–450)
POTASSIUM SERPL-SCNC: 4 MMOL/L (ref 3.5–5.3)
PROT SERPL-MCNC: 6.1 G/DL (ref 6.4–8.2)
PROTHROMBIN TIME: 13.3 SECONDS (ref 9.8–12.8)
RBC # BLD AUTO: 4.54 X10*6/UL (ref 4.5–5.9)
RH FACTOR (ANTIGEN D): NORMAL
SODIUM SERPL-SCNC: 138 MMOL/L (ref 136–145)
WBC # BLD AUTO: 6.6 X10*3/UL (ref 4.4–11.3)

## 2024-11-24 PROCEDURE — 36415 COLL VENOUS BLD VENIPUNCTURE: CPT | Performed by: INTERNAL MEDICINE

## 2024-11-24 PROCEDURE — 2500000004 HC RX 250 GENERAL PHARMACY W/ HCPCS (ALT 636 FOR OP/ED): Performed by: STUDENT IN AN ORGANIZED HEALTH CARE EDUCATION/TRAINING PROGRAM

## 2024-11-24 PROCEDURE — 85610 PROTHROMBIN TIME: CPT | Performed by: INTERNAL MEDICINE

## 2024-11-24 PROCEDURE — 2500000004 HC RX 250 GENERAL PHARMACY W/ HCPCS (ALT 636 FOR OP/ED): Performed by: INTERNAL MEDICINE

## 2024-11-24 PROCEDURE — 1100000001 HC PRIVATE ROOM DAILY

## 2024-11-24 PROCEDURE — 99232 SBSQ HOSP IP/OBS MODERATE 35: CPT | Performed by: INTERNAL MEDICINE

## 2024-11-24 PROCEDURE — 85027 COMPLETE CBC AUTOMATED: CPT | Performed by: INTERNAL MEDICINE

## 2024-11-24 PROCEDURE — 86901 BLOOD TYPING SEROLOGIC RH(D): CPT | Performed by: INTERNAL MEDICINE

## 2024-11-24 PROCEDURE — 80053 COMPREHEN METABOLIC PANEL: CPT | Performed by: INTERNAL MEDICINE

## 2024-11-24 PROCEDURE — 99233 SBSQ HOSP IP/OBS HIGH 50: CPT | Performed by: THORACIC SURGERY (CARDIOTHORACIC VASCULAR SURGERY)

## 2024-11-24 RX ORDER — PANTOPRAZOLE SODIUM 40 MG/10ML
40 INJECTION, POWDER, LYOPHILIZED, FOR SOLUTION INTRAVENOUS DAILY
Status: DISCONTINUED | OUTPATIENT
Start: 2024-11-24 | End: 2024-11-30 | Stop reason: HOSPADM

## 2024-11-24 RX ORDER — ACETAMINOPHEN 10 MG/ML
500 INJECTION, SOLUTION INTRAVENOUS ONCE
Status: COMPLETED | OUTPATIENT
Start: 2024-11-24 | End: 2024-11-25

## 2024-11-24 RX ORDER — DEXTROSE MONOHYDRATE, SODIUM CHLORIDE, AND POTASSIUM CHLORIDE 50; 1.49; 9 G/1000ML; G/1000ML; G/1000ML
125 INJECTION, SOLUTION INTRAVENOUS CONTINUOUS
Status: DISCONTINUED | OUTPATIENT
Start: 2024-11-24 | End: 2024-11-25

## 2024-11-24 ASSESSMENT — COGNITIVE AND FUNCTIONAL STATUS - GENERAL
MOBILITY SCORE: 24
DAILY ACTIVITIY SCORE: 24

## 2024-11-24 ASSESSMENT — PAIN SCALES - GENERAL
PAINLEVEL_OUTOF10: 0 - NO PAIN
PAINLEVEL_OUTOF10: 0 - NO PAIN

## 2024-11-24 NOTE — SIGNIFICANT EVENT
Plan for lap/possible open J tube placement tomorrow   Please have the patient NPO at midnight, type and screen, coags drawn     Patient discussed with Dr. Salas, attending surgeon     Manuela Haque, DO

## 2024-11-24 NOTE — PROGRESS NOTES
"Timoteo Victoria is a 69 y.o. male on day 2 of admission presenting with Esophageal mass.    Subjective   No acute events overnight.        Objective     Physical Exam  Constitutional: NAD, A&Ox3   Head/Neck: NCAT  Eyes: Anicteric   Cardiovascular: Regular rate and rhythm per peripheral palpation   Respiratory: Breathing comfortably on RA with symmetric chest rise. Midline Chest incision well-healed.   Abdominal: Soft, non tender, non-distended without rebound or guarding. RUQ abdominal incision well-healed.   : Deferred   Ext: MAEx4  Psych: Appropriate mood and affect   Last Recorded Vitals  Blood pressure 127/61, pulse 86, temperature 36.2 °C (97.2 °F), temperature source Temporal, resp. rate 18, height 1.778 m (5' 10\"), weight 93 kg (205 lb), SpO2 98%.  Intake/Output last 3 Shifts:  I/O last 3 completed shifts:  In: 120 (1.3 mL/kg) [P.O.:120]  Out: - (0 mL/kg)   Weight: 93 kg     Relevant Results               CT chest abdomen pelvis w IV contrast    Result Date: 11/23/2024  Interpreted By:  Dimitri Lujan,  and [place] STUDY: CT CHEST ABDOMEN PELVIS W IV CONTRAST;  11/22/2024 8:21 pm   INDICATION: Signs/Symptoms:esophageal mass occluding lumen, ? Metastatic lesions, for operative treatment planning..   COMPARISON: CT soft tissue neck 11/20/2024   ACCESSION NUMBER(S): [id number]   ORDERING CLINICIAN: LINDY SOLANO   TECHNIQUE: CT of the chest, abdomen, and pelvis was performed.  Contiguous axial images were obtained at 3 mm slice thickness through the chest, abdomen and pelvis. Coronal and sagittal reconstructions at 3 mm slice thickness were performed. 80 ML of Omnipaque 350 was administered intravenously without immediate complication.   FINDINGS: CHEST:   LUNG/PLEURA/LARGE AIRWAYS: The large airways are patent without endobronchial mass. Small amount of mucus in the proximal right mainstem bronchus. Mild apically predominant centrilobular emphysema. There is a 0.4 cm ground-glass nodule " in the left upper lobe (series 204, image 108). The area centrilobular nodularity in the right middle lobe adjacent to the fissure (series 204, image 153). Scarring/atelectasis of the lingula. No pleural effusion. No pneumothorax.   VESSELS: Aorta and pulmonary arteries are normal caliber.  Severe atherosclerotic changes are noted of the aorta and branching vessels. Severe coronary artery calcifications are present. Postoperative changes of coronary artery bypass grafting are noted.   HEART: The heart is normal in size.  There is no pericardial effusion.   MEDIASTINUM AND NAYA: No mediastinal, hilar or axillary lymphadenopathy is present.  There is an area of the irregular masslike thickening of the mid to distal esophagus, immediately followed by small-to-moderate sized hiatal hernia. The mid esophagus demonstrates circumferential wall thickening.   CHEST WALL AND LOWER NECK: The soft tissues of the chest wall demonstrate no gross abnormality. Sternal cerclage wires are intact. The visualized thyroid gland appears within normal limits.   ABDOMEN:   LIVER: The liver is normal in size and homogeneous in enhancement. There is an indeterminate hypoattenuating lesion along the inferior margin of the right hepatic lobe which may represent hemangioma or focal fat deposition.   BILE DUCTS: The intrahepatic and extrahepatic ducts are not dilated.   GALLBLADDER: The gallbladder is surgically absent.   PANCREAS: The pancreas appears unremarkable without evidence of ductal dilatation or masses.   SPLEEN: The spleen is normal in size without focal lesions.   ADRENAL GLANDS: Bilateral adrenal glands appear normal.   KIDNEYS AND URETERS: The kidneys are normal in size and enhance symmetrically. Numerous hypoattenuating lesions are consistent with simple cysts. There is a small nonobstructing stone in the midpole of the left kidney measuring 0.4 cm. No hydronephrosis.   PELVIS:   BLADDER: The urinary bladder is unremarkable.    REPRODUCTIVE ORGANS: The prostate is not enlarged.   BOWEL: Small-to-moderate size hiatal hernia. Small and large bowel loops are normal in caliber without focal wall thickening or evidence to suggest obstruction. The appendix is normal.     VESSELS: There is no aneurysmal dilatation of the abdominal aorta. The IVC appears normal.   PERITONEUM/RETROPERITONEUM/LYMPH NODES: There is no free or loculated fluid collection, no free intraperitoneal air. The retroperitoneum appears normal.  No abdominopelvic lymphadenopathy is present.   BONE AND SOFT TISSUE: No suspicious osseous lesions are identified.  Postsurgical changes of mesh repair of a ventral abdominal hernia in the epigastric region.       1. Irregular masslike thickening of the mid to distal esophagus is difficult to accurately measure, and may represent a neoplastic process. Correlate with recent endoscopic imaging and biopsy results. There is also proximal esophageal wall thickening, and a moderate size hiatal hernia. 2. Small area of centrilobular nodularity in the right middle lobe may represent focal aspiration/mucoid impaction or less likely atypical infection. 3. Mild apical predominant centrilobular emphysema. 4. Severe coronary artery calcifications. Please note this exam is not optimized for evaluation of the coronary arteries. 5. Nonobstructing 0.4 cm calculus in the left kidney.   I personally reviewed the image(s)/study and resident interpretation. I agree with the findings as stated by resident Dony Gallegos. Data analyzed and images interpreted at [facility], [place], [place].   MACRO: Incidental Finding:  A ground glass pulmonary nodule measuring less than 6 mm.  (**-YCF-**)   Instructions:  No further follow-up is required, however, if the nodule morphology is suspicious, consider follow up at 2 and 4 years; if grows or increasingly solid, consider resection. (Basliio Ac et al., Guidelines for management  of incidental pulmonary nodules detected on CT images: From the Fleischner Society 2017, Radiology. 2017 Jul;284 (1):228-243.) [email or web address]   Signed by: Dimitri Lujan 11/23/2024 10:36 AM Dictation workstation:   [id number]    Esophagogastroduodenoscopy (EGD)    Result Date: 11/21/2024  Table formatting from the original result was not included. 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  Indication Dysphagia Timoteo Victoria is a 69 y.o. male with a history of HTN, cluster headaches, HLD, hypothyroidism, and tobacco abuse who presents for an EGD to evaluate dysphagia. He says that for the last month he has had progressive dysphagia. This was initially mild dysphagia to solids, but has now progressed to the point that he has been unable to tolerate solids or liquids. This has been associated with weight loss of 40 pounds over the last 6 months. Medications See Anesthesia Record. Preprocedure A history and physical has been performed, and patient medication allergies have been reviewed. The patient's tolerance of previous anesthesia has been reviewed. The risks and benefits of the procedure and the sedation options and risks were discussed with the patient. All questions were answered and informed consent obtained. Details of the Procedure The patient underwent monitored anesthesia care, which was administered by an anesthesia professional. The patient's blood pressure, ECG, ETCO2, heart rate, level of consciousness, oxygen and respirations were monitored throughout the procedure. The scope was  introduced through the mouth and advanced to the lower third of the esophagus. The patient's estimated blood loss was minimal (<5 mL). The procedure was not difficult. The patient tolerated the procedure well. There were no apparent adverse events. Prior to the procedure, a History and Physical was performed, and patient medications and allergies were reviewed. Immediately prior to the administration of medications, the patient was re-assessed for adequacy to receive sedatives. The heart rate, respiratory rate, oxygen saturations, blood pressure, adequacy of pulmonary ventilation, and response to care were monitored throughout the procedure. The physical status of the patient was re-assessed after the procedure. The risks and benefits of the procedure and the sedation options and risks were discussed with the patient and/or family including the risk of injury to internal organs (including perforation) and the risk of missed lesions. All questions were answered and informed consent was obtained. Patient identification and proposed procedure were verified by the physician, the nurse, the anesthetist and the technician in the pre-procedure area and in the procedure room. After this verification the upper endoscope was passed under direct vision. Anatomic landmarks were photographed. No sedation was administered by endoscopist. Sedation was administered by the anesthesia staff. Refer to anesthesia documentation/charting for details regarding medications administered and doses given. Events Procedure Events Event Event Time ENDO SCOPE IN TIME 11/21/2024  1:04 PM Specimens ID Type Source Tests Collected by Time 1 : ESOPHAGEAL MASS Tissue ESOPHAGUS DISTAL BIOPSY SURGICAL PATHOLOGY EXAM Latisha Cohen RN 11/21/2024 1309 Procedure Location [address] [address] [phone] Referring Provider Jorge Luis Lu MD Procedure Provider Arnold Jones MD     CT  soft tissue neck w IV contrast    Result Date: 11/20/2024  Interpreted By:  Ana María Dawn, STUDY: CT SOFT TISSUE NECK W IV CONTRAST;  11/20/2024 10:33 pm   INDICATION: Signs/Symptoms:progressive dysphagia, feels like things get stuck in throat.   COMPARISON: 07/07/2022   ACCESSION NUMBER(S): [id number]   ORDERING CLINICIAN: JELLY SHINE   TECHNIQUE: Axial CT images of the neck were obtained.  The patient received intravenous contrast agent. The images were reformatted in angled axial, coronal and sagittal planes.   FINDINGS: Oral Cavity, Pharynx and Larynx: Prominent periapical lucency about the right maxillary central and lateral incisor as well as the right maxillary 2nd premolar. There is adjacent overlying prominence of the buccal mucosa which is new from prior imaging. Interval retraction of the left maxillary incisor. Evaluation of the oral cavity is limited by streak artifact from dental hardware.  The nasopharyngeal and oropharyngeal structures are unremarkable. The hypopharyngeal and laryngeal structures are unremarkable.   Retropharyngeal and Prevertebral Soft Tissues: Unremarkable.   Lymph nodes: There are few non specific bilateral neck nodes, probably reactive in etiology.   Neck vessels: Dense calcifications of the left greater than right carotid bulb extending into left greater than right cervical ICA. No critical stenosis identified. Vertebral arteries are grossly patent.   Thyroid gland: Calcification left parotid gland similar to prior imaging. The thyroid gland is unremarkable in size and appearance.   Parotid and submandibular glands: Posterior left parotid mass measuring up to 2.1 cm, previously 2.0 cm as well as additional left parotid nodules or lymph nodes. Right parotid nodules/lymph nodes measuring up to 10 mm, similar to prior imaging. Slightly heterogeneous appearance of the bilateral submandibular glands, similar to prior imaging.   Paranasal Sinuses and Mastoids: Visualized paranasal  sinuses and bilateral mastoids are clear.   Visualized orbital structures are unremarkable.   Calcifications of the aortic arch and branch vessels. Patent appearing stent at the origin of the left subclavian artery. Scattered upper mediastinal lymph nodes measuring up to 10 mm. Status post median sternotomy with mediastinal clips suggestive of prior CABG. Centrilobular emphysematous changes.   Degenerative changes otherwise visualized cervical spine appears unremarkable.       New maxillary periapical lucencies with adjacent mucosal thickening. No discrete fluid collection identified. Dental follow-up recommended.   No discrete mass or cervical lymphadenopathy identified however direct visualization suggested.   Bilateral parotid mass/nodule similar to prior imaging.   Postsurgical changes, emphysematous changes, mild mediastinal lymphadenopathy and additional findings as detailed.   MACRO: i   Signed by: Ana María Dawn 11/20/2024 11:38 PM Dictation workstation:   [id number]     Results for orders placed or performed during the hospital encounter of 11/22/24 (from the past 24 hours)   Comprehensive Metabolic Panel   Result Value Ref Range    Glucose 84 74 - 99 mg/dL    Sodium 138 136 - 145 mmol/L    Potassium 4.0 3.5 - 5.3 mmol/L    Chloride 104 98 - 107 mmol/L    Bicarbonate 26 21 - 32 mmol/L    Anion Gap 12 10 - 20 mmol/L    Urea Nitrogen 10 6 - 23 mg/dL    Creatinine 0.87 0.50 - 1.30 mg/dL    eGFR >90 >60 mL/min/1.73m*2    Calcium 9.6 8.6 - 10.6 mg/dL    Albumin 3.7 3.4 - 5.0 g/dL    Alkaline Phosphatase 62 33 - 136 U/L    Total Protein 6.1 (L) 6.4 - 8.2 g/dL    AST 20 9 - 39 U/L    Bilirubin, Total 0.7 0.0 - 1.2 mg/dL    ALT 14 10 - 52 U/L   CBC   Result Value Ref Range    WBC 6.6 4.4 - 11.3 x10*3/uL    nRBC 0.0 0.0 - 0.0 /100 WBCs    RBC 4.54 4.50 - 5.90 x10*6/uL    Hemoglobin 13.9 13.5 - 17.5 g/dL    Hematocrit 40.1 (L) 41.0 - 52.0 %    MCV 88 80 - 100 fL    MCH 30.6 26.0 - 34.0 pg    MCHC 34.7 32.0 - 36.0  g/dL    RDW 11.6 11.5 - 14.5 %    Platelets 149 (L) 150 - 450 x10*3/uL   Type and screen   Result Value Ref Range    ABO TYPE O     Rh TYPE POS     ANTIBODY SCREEN NEG    Protime-INR   Result Value Ref Range    Protime 13.3 (H) 9.8 - 12.8 seconds    INR 1.2 (H) 0.9 - 1.1                    Assessment/Plan   Assessment & Plan  Esophageal mass    Esophageal dysphagia    Timoteo Victoria is a 69 year old male with PMHx of CAD s/p CABGx4 (~2005), HTN, HLD, Headaches, hypothyroidism, and tobacco use who presents with new lower esophageal mass. Patient with 40 pounds of weight loss over the past 6 months. Now with dysphagia to liquids and solids and EGD showing lower esophageal mass. Patient will require staging imaging to determine if metastasis and determination of feeding plan.      Recommendations:   Lap possible open J tube tomorrow   NPO at midnight   Type and screen and coags with morning labs   Patient is already consented and added to OR board      D/w Dr. Maritza Haque DO

## 2024-11-24 NOTE — CARE PLAN
The patient's goals for the shift include      The clinical goals for the shift include patient will remain safe and free from injury throughout shift.      Problem: Pain - Adult  Goal: Verbalizes/displays adequate comfort level or baseline comfort level  Outcome: Progressing     Problem: Safety - Adult  Goal: Free from fall injury  Outcome: Progressing     Problem: Discharge Planning  Goal: Discharge to home or other facility with appropriate resources  Outcome: Progressing     Problem: Chronic Conditions and Co-morbidities  Goal: Patient's chronic conditions and co-morbidity symptoms are monitored and maintained or improved  Outcome: Progressing

## 2024-11-24 NOTE — PROGRESS NOTES
Timoteo Victoria is a 69 y.o. male on day 2 of admission presenting with Esophageal mass.      Subjective   No events over night,     Reports no c/o pain.        Objective     Last Recorded Vitals  /73   Pulse 61   Temp 36.4 °C (97.5 °F)   Resp 18   Wt 93 kg (205 lb)   SpO2 100%   Intake/Output last 3 Shifts:    Intake/Output Summary (Last 24 hours) at 11/24/2024 0824  Last data filed at 11/23/2024 1404  Gross per 24 hour   Intake 120 ml   Output --   Net 120 ml       Admission Weight  Weight: 93 kg (205 lb) (11/22/24 0045)    Daily Weight  11/22/24 : 93 kg (205 lb)    Image Results  CT chest abdomen pelvis w IV contrast  Narrative: Interpreted By:  Dimitri Lujan,  and El Napoles   STUDY:  CT CHEST ABDOMEN PELVIS W IV CONTRAST;  11/22/2024 8:21 pm      INDICATION:  Signs/Symptoms:esophageal mass occluding lumen, ? Metastatic lesions,  for operative treatment planning..      COMPARISON:  CT soft tissue neck 11/20/2024      ACCESSION NUMBER(S):  [id number]      ORDERING CLINICIAN:  LINDY SOLANO      TECHNIQUE:  CT of the chest, abdomen, and pelvis was performed.  Contiguous axial  images were obtained at 3 mm slice thickness through the chest,  abdomen and pelvis. Coronal and sagittal reconstructions at 3 mm  slice thickness were performed. 80 ML of Omnipaque 350 was  administered intravenously without immediate complication.      FINDINGS:  CHEST:      LUNG/PLEURA/LARGE AIRWAYS:  The large airways are patent without endobronchial mass. Small amount  of mucus in the proximal right mainstem bronchus. Mild apically  predominant centrilobular emphysema. There is a 0.4 cm ground-glass  nodule in the left upper lobe (series 204, image 108). The area  centrilobular nodularity in the right middle lobe adjacent to the  fissure (series 204, image 153). Scarring/atelectasis of the lingula.  No pleural effusion. No pneumothorax.      VESSELS:  Aorta and pulmonary arteries are normal caliber.   Severe  atherosclerotic changes are noted of the aorta and branching vessels.  Severe coronary artery calcifications are present. Postoperative  changes of coronary artery bypass grafting are noted.      HEART:  The heart is normal in size.  There is no pericardial effusion.      MEDIASTINUM AND NAYA:  No mediastinal, hilar or axillary lymphadenopathy is present.  There  is an area of the irregular masslike thickening of the mid to distal  esophagus, immediately followed by small-to-moderate sized hiatal  hernia. The mid esophagus demonstrates circumferential wall  thickening.      CHEST WALL AND LOWER NECK:  The soft tissues of the chest wall demonstrate no gross abnormality.  Sternal cerclage wires are intact. The visualized thyroid gland  appears within normal limits.      ABDOMEN:      LIVER:  The liver is normal in size and homogeneous in enhancement. There is  an indeterminate hypoattenuating lesion along the inferior margin of  the right hepatic lobe which may represent hemangioma or focal fat  deposition.      BILE DUCTS:  The intrahepatic and extrahepatic ducts are not dilated.      GALLBLADDER:  The gallbladder is surgically absent.      PANCREAS:  The pancreas appears unremarkable without evidence of ductal  dilatation or masses.      SPLEEN:  The spleen is normal in size without focal lesions.      ADRENAL GLANDS:  Bilateral adrenal glands appear normal.      KIDNEYS AND URETERS:  The kidneys are normal in size and enhance symmetrically. Numerous  hypoattenuating lesions are consistent with simple cysts. There is a  small nonobstructing stone in the midpole of the left kidney  measuring 0.4 cm. No hydronephrosis.      PELVIS:      BLADDER:  The urinary bladder is unremarkable.      REPRODUCTIVE ORGANS:  The prostate is not enlarged.      BOWEL:  Small-to-moderate size hiatal hernia. Small and large bowel loops are  normal in caliber without focal wall thickening or evidence to  suggest obstruction. The  appendix is normal.          VESSELS:  There is no aneurysmal dilatation of the abdominal aorta. The IVC  appears normal.      PERITONEUM/RETROPERITONEUM/LYMPH NODES:  There is no free or loculated fluid collection, no free  intraperitoneal air. The retroperitoneum appears normal.  No  abdominopelvic lymphadenopathy is present.      BONE AND SOFT TISSUE:  No suspicious osseous lesions are identified.  Postsurgical changes  of mesh repair of a ventral abdominal hernia in the epigastric region.      Impression: 1. Irregular masslike thickening of the mid to distal esophagus is  difficult to accurately measure, and may represent a neoplastic  process. Correlate with recent endoscopic imaging and biopsy results.  There is also proximal esophageal wall thickening, and a moderate  size hiatal hernia.  2. Small area of centrilobular nodularity in the right middle lobe  may represent focal aspiration/mucoid impaction or less likely  atypical infection.  3. Mild apical predominant centrilobular emphysema.  4. Severe coronary artery calcifications. Please note this exam is  not optimized for evaluation of the coronary arteries.  5. Nonobstructing 0.4 cm calculus in the left kidney.      I personally reviewed the image(s)/study and resident interpretation.  I agree with the findings as stated by resident Dony Gallegos.  Data analyzed and images interpreted at [facility], [place], [place].      MACRO:  Incidental Finding:  A ground glass pulmonary nodule measuring less  than 6 mm.  (**-YCF-**)      Instructions:  No further follow-up is required, however, if the  nodule morphology is suspicious, consider follow up at 2 and 4 years;  if grows or increasingly solid, consider resection. (Basilio Ac  et al., Guidelines for management of incidental pulmonary nodules  detected on CT images: From the Fleischner Society 2017, Radiology.  2017 Jul;284 (1):228-243.) [email or web address]      Signed  by: Dimitri Lujan 11/23/2024 10:36 AM  Dictation workstation:   [id number]    Vitals:    11/24/24 0517   BP: 167/73   Pulse: 61   Resp: 18   Temp: 36.4 °C (97.5 °F)   SpO2: 100%       Physical Exam  Constitutional:       Appearance: Normal appearance.   HENT:      Head: Normocephalic.      Nose: Nose normal.   Eyes:      Extraocular Movements: Extraocular movements intact.      Pupils: Pupils are equal, round, and reactive to light.   Cardiovascular:      Rate and Rhythm: Normal rate and regular rhythm.      Heart sounds: Normal heart sounds. No murmur heard.  Pulmonary:      Effort: No respiratory distress.      Breath sounds: Normal breath sounds. No wheezing.   Abdominal:      General: There is no distension.      Palpations: Abdomen is soft.      Tenderness: There is no abdominal tenderness.   Musculoskeletal:         General: Normal range of motion.      Cervical back: Normal range of motion.   Skin:     General: Skin is warm.   Neurological:      General: No focal deficit present.      Mental Status: He is alert and oriented to person, place, and time.   Psychiatric:         Mood and Affect: Mood normal.         Relevant Results  Scheduled medications  amitriptyline, 100 mg, oral, Nightly  aspirin, 81 mg, oral, Daily  atorvastatin, 40 mg, oral, Daily  buPROPion XL, 450 mg, oral, Daily  cholecalciferol, 2,000 Units, oral, Daily  enoxaparin, 40 mg, subcutaneous, q24h  levothyroxine, 175 mcg, oral, Daily  losartan, 100 mg, oral, Daily  pantoprazole, 40 mg, oral, Daily before breakfast  spironolactone, 50 mg, oral, Daily  tamsulosin, 0.4 mg, oral, Nightly  verapamil SR, 240 mg, oral, Nightly      Continuous medications  potassium chloride-D5-0.9%NaCl, 125 mL/hr, Last Rate: 125 mL/hr (11/24/24 6428)      PRN medications  PRN medications: sennosides-docusate sodium    Results for orders placed or performed during the hospital encounter of 11/22/24 (from the past 24 hours)   Carcinoembryonic Antigen   Result  Value Ref Range    Carcinoembryonic AG 0.8 ug/L   Cancer Antigen 19-9   Result Value Ref Range    Cancer AG 19-9 13.02 <35.00 U/mL   Comprehensive Metabolic Panel   Result Value Ref Range    Glucose 84 74 - 99 mg/dL    Sodium 138 136 - 145 mmol/L    Potassium 4.0 3.5 - 5.3 mmol/L    Chloride 104 98 - 107 mmol/L    Bicarbonate 26 21 - 32 mmol/L    Anion Gap 12 10 - 20 mmol/L    Urea Nitrogen 10 6 - 23 mg/dL    Creatinine 0.87 0.50 - 1.30 mg/dL    eGFR >90 >60 mL/min/1.73m*2    Calcium 9.6 8.6 - 10.6 mg/dL    Albumin 3.7 3.4 - 5.0 g/dL    Alkaline Phosphatase 62 33 - 136 U/L    Total Protein 6.1 (L) 6.4 - 8.2 g/dL    AST 20 9 - 39 U/L    Bilirubin, Total 0.7 0.0 - 1.2 mg/dL    ALT 14 10 - 52 U/L   CBC   Result Value Ref Range    WBC 6.6 4.4 - 11.3 x10*3/uL    nRBC 0.0 0.0 - 0.0 /100 WBCs    RBC 4.54 4.50 - 5.90 x10*6/uL    Hemoglobin 13.9 13.5 - 17.5 g/dL    Hematocrit 40.1 (L) 41.0 - 52.0 %    MCV 88 80 - 100 fL    MCH 30.6 26.0 - 34.0 pg    MCHC 34.7 32.0 - 36.0 g/dL    RDW 11.6 11.5 - 14.5 %    Platelets 149 (L) 150 - 450 x10*3/uL       Assessment/Plan      Timoteo Victoria is a 69 y.o. male w/ PMHx of CAD s/p 4x CABG (~2005), HTN, HLD, hx of cluster HA, hypothyroidism (on levothyroxine) and tobacco use presenting with dysphagia and EGD showed distal  esophageal mass, concerning for malignancy     #Dysphagia  #Esophageal mass  :: EGD on 11/21 showing esophageal mass with malignant appearance, not able to be crossed  :: discussed with thoracic surgery @ [facility] who recommended transfer to [facility]  - thoracic surgery consulted,   -CT chest, abdomen, pelvis showed no evidence of Mets.      #HTN  #CAD s/p CABG x4 ~ 2005  #HLD  :: patient asymptomatic from a CAD standpoint  :: on home lisinopril, faviola 50mg, verapamil 240mg and losartan 100mg  :: on home aspirin and atorva 40mg  :: RBBB noted on prior EKGs  - c/w home meds  Base line EKG     #Hypothyroidism (on home levothyroxine)  :: TSH suppressed with elevated fT4 on  admission  - given reports of weight loss (although unlikely related to thyroid disease), reduced  dose to 175mcg  - will need to have TSH/fT4 checked in around 6 weeks     #Hx of cluster HA  :: per patient well controlled on amitriptyline  - c/w home amitriptyline     #MDD  :: on home buproprion, recently increased  - c/w home dose     #Smoking  :: smokes 0.7PPD on average since 17 yrs of age  - offered nicotine patch but patient refused for now     F: PRN  E: PRN  N: NPO except ice chips  A: PIV  DVT: Enoxaparin  GI: Home PPI  NOK: Eulogio (brother) [phone]  Code: Full Code (confirmed on admission)       Dispo: pending Thoracic surgery intervention.       Cameron Graves MD

## 2024-11-25 ENCOUNTER — ANESTHESIA (OUTPATIENT)
Dept: OPERATING ROOM | Facility: HOSPITAL | Age: 69
End: 2024-11-25
Payer: MEDICARE

## 2024-11-25 ENCOUNTER — ANESTHESIA EVENT (OUTPATIENT)
Dept: OPERATING ROOM | Facility: HOSPITAL | Age: 69
End: 2024-11-25
Payer: MEDICARE

## 2024-11-25 LAB
ALBUMIN SERPL BCP-MCNC: 3.6 G/DL (ref 3.4–5)
ALP SERPL-CCNC: 62 U/L (ref 33–136)
ALT SERPL W P-5'-P-CCNC: 14 U/L (ref 10–52)
ANION GAP SERPL CALC-SCNC: 12 MMOL/L (ref 10–20)
AST SERPL W P-5'-P-CCNC: 19 U/L (ref 9–39)
BILIRUB SERPL-MCNC: 0.7 MG/DL (ref 0–1.2)
BUN SERPL-MCNC: 5 MG/DL (ref 6–23)
CALCIUM SERPL-MCNC: 9.1 MG/DL (ref 8.6–10.6)
CHLORIDE SERPL-SCNC: 106 MMOL/L (ref 98–107)
CO2 SERPL-SCNC: 24 MMOL/L (ref 21–32)
CREAT SERPL-MCNC: 0.79 MG/DL (ref 0.5–1.3)
EGFRCR SERPLBLD CKD-EPI 2021: >90 ML/MIN/1.73M*2
ERYTHROCYTE [DISTWIDTH] IN BLOOD BY AUTOMATED COUNT: 11.7 % (ref 11.5–14.5)
GLUCOSE BLD MANUAL STRIP-MCNC: 113 MG/DL (ref 74–99)
GLUCOSE SERPL-MCNC: 91 MG/DL (ref 74–99)
HCT VFR BLD AUTO: 40.2 % (ref 41–52)
HGB BLD-MCNC: 13.7 G/DL (ref 13.5–17.5)
MCH RBC QN AUTO: 30.1 PG (ref 26–34)
MCHC RBC AUTO-ENTMCNC: 34.1 G/DL (ref 32–36)
MCV RBC AUTO: 88 FL (ref 80–100)
NRBC BLD-RTO: 0 /100 WBCS (ref 0–0)
PLATELET # BLD AUTO: 145 X10*3/UL (ref 150–450)
POTASSIUM SERPL-SCNC: 3.6 MMOL/L (ref 3.5–5.3)
PROT SERPL-MCNC: 6.1 G/DL (ref 6.4–8.2)
RBC # BLD AUTO: 4.55 X10*6/UL (ref 4.5–5.9)
SODIUM SERPL-SCNC: 138 MMOL/L (ref 136–145)
WBC # BLD AUTO: 7 X10*3/UL (ref 4.4–11.3)

## 2024-11-25 PROCEDURE — 7100000002 HC RECOVERY ROOM TIME - EACH INCREMENTAL 1 MINUTE: Performed by: THORACIC SURGERY (CARDIOTHORACIC VASCULAR SURGERY)

## 2024-11-25 PROCEDURE — A44186 PR LAP, SURG JEJUNOSTOMY: Performed by: ANESTHESIOLOGIST ASSISTANT

## 2024-11-25 PROCEDURE — 2500000004 HC RX 250 GENERAL PHARMACY W/ HCPCS (ALT 636 FOR OP/ED): Performed by: THORACIC SURGERY (CARDIOTHORACIC VASCULAR SURGERY)

## 2024-11-25 PROCEDURE — 2500000004 HC RX 250 GENERAL PHARMACY W/ HCPCS (ALT 636 FOR OP/ED): Performed by: INTERNAL MEDICINE

## 2024-11-25 PROCEDURE — A44186 PR LAP, SURG JEJUNOSTOMY: Performed by: ANESTHESIOLOGY

## 2024-11-25 PROCEDURE — 2500000001 HC RX 250 WO HCPCS SELF ADMINISTERED DRUGS (ALT 637 FOR MEDICARE OP): Performed by: STUDENT IN AN ORGANIZED HEALTH CARE EDUCATION/TRAINING PROGRAM

## 2024-11-25 PROCEDURE — 3700000002 HC GENERAL ANESTHESIA TIME - EACH INCREMENTAL 1 MINUTE: Performed by: THORACIC SURGERY (CARDIOTHORACIC VASCULAR SURGERY)

## 2024-11-25 PROCEDURE — 2500000004 HC RX 250 GENERAL PHARMACY W/ HCPCS (ALT 636 FOR OP/ED): Performed by: ANESTHESIOLOGIST ASSISTANT

## 2024-11-25 PROCEDURE — 3E0H76Z INTRODUCTION OF NUTRITIONAL SUBSTANCE INTO LOWER GI, VIA NATURAL OR ARTIFICIAL OPENING: ICD-10-PCS | Performed by: THORACIC SURGERY (CARDIOTHORACIC VASCULAR SURGERY)

## 2024-11-25 PROCEDURE — 0DHA3UZ INSERTION OF FEEDING DEVICE INTO JEJUNUM, PERCUTANEOUS APPROACH: ICD-10-PCS | Performed by: THORACIC SURGERY (CARDIOTHORACIC VASCULAR SURGERY)

## 2024-11-25 PROCEDURE — 85027 COMPLETE CBC AUTOMATED: CPT | Performed by: INTERNAL MEDICINE

## 2024-11-25 PROCEDURE — 2500000001 HC RX 250 WO HCPCS SELF ADMINISTERED DRUGS (ALT 637 FOR MEDICARE OP): Performed by: INTERNAL MEDICINE

## 2024-11-25 PROCEDURE — 3600000008 HC OR TIME - EACH INCREMENTAL 1 MINUTE - PROCEDURE LEVEL THREE: Performed by: THORACIC SURGERY (CARDIOTHORACIC VASCULAR SURGERY)

## 2024-11-25 PROCEDURE — 82947 ASSAY GLUCOSE BLOOD QUANT: CPT

## 2024-11-25 PROCEDURE — 2500000004 HC RX 250 GENERAL PHARMACY W/ HCPCS (ALT 636 FOR OP/ED): Performed by: ANESTHESIOLOGY

## 2024-11-25 PROCEDURE — 99232 SBSQ HOSP IP/OBS MODERATE 35: CPT | Performed by: INTERNAL MEDICINE

## 2024-11-25 PROCEDURE — 2720000007 HC OR 272 NO HCPCS: Performed by: THORACIC SURGERY (CARDIOTHORACIC VASCULAR SURGERY)

## 2024-11-25 PROCEDURE — 7100000001 HC RECOVERY ROOM TIME - INITIAL BASE CHARGE: Performed by: THORACIC SURGERY (CARDIOTHORACIC VASCULAR SURGERY)

## 2024-11-25 PROCEDURE — 36415 COLL VENOUS BLD VENIPUNCTURE: CPT | Performed by: INTERNAL MEDICINE

## 2024-11-25 PROCEDURE — 80053 COMPREHEN METABOLIC PANEL: CPT | Performed by: INTERNAL MEDICINE

## 2024-11-25 PROCEDURE — 3600000003 HC OR TIME - INITIAL BASE CHARGE - PROCEDURE LEVEL THREE: Performed by: THORACIC SURGERY (CARDIOTHORACIC VASCULAR SURGERY)

## 2024-11-25 PROCEDURE — 1100000001 HC PRIVATE ROOM DAILY

## 2024-11-25 PROCEDURE — 3700000001 HC GENERAL ANESTHESIA TIME - INITIAL BASE CHARGE: Performed by: THORACIC SURGERY (CARDIOTHORACIC VASCULAR SURGERY)

## 2024-11-25 PROCEDURE — 49441 PLACE DUOD/JEJ TUBE PERC: CPT | Performed by: THORACIC SURGERY (CARDIOTHORACIC VASCULAR SURGERY)

## 2024-11-25 RX ORDER — ALBUTEROL SULFATE 0.83 MG/ML
2.5 SOLUTION RESPIRATORY (INHALATION) ONCE AS NEEDED
Status: DISCONTINUED | OUTPATIENT
Start: 2024-11-25 | End: 2024-11-25 | Stop reason: HOSPADM

## 2024-11-25 RX ORDER — ROCURONIUM BROMIDE 10 MG/ML
INJECTION, SOLUTION INTRAVENOUS AS NEEDED
Status: DISCONTINUED | OUTPATIENT
Start: 2024-11-25 | End: 2024-11-25

## 2024-11-25 RX ORDER — ACETAMINOPHEN 325 MG/1
975 TABLET ORAL ONCE
Status: DISCONTINUED | OUTPATIENT
Start: 2024-11-25 | End: 2024-11-25 | Stop reason: HOSPADM

## 2024-11-25 RX ORDER — VERAPAMIL HYDROCHLORIDE 80 MG/1
80 TABLET ORAL EVERY 8 HOURS SCHEDULED
Status: DISCONTINUED | OUTPATIENT
Start: 2024-11-25 | End: 2024-11-30 | Stop reason: HOSPADM

## 2024-11-25 RX ORDER — ROCURONIUM BROMIDE 10 MG/ML
INJECTION, SOLUTION INTRAVENOUS
Status: DISCONTINUED
Start: 2024-11-25 | End: 2024-11-30 | Stop reason: HOSPADM

## 2024-11-25 RX ORDER — PROPOFOL 10 MG/ML
INJECTION, EMULSION INTRAVENOUS AS NEEDED
Status: DISCONTINUED | OUTPATIENT
Start: 2024-11-25 | End: 2024-11-25

## 2024-11-25 RX ORDER — MIDAZOLAM HYDROCHLORIDE 1 MG/ML
INJECTION INTRAMUSCULAR; INTRAVENOUS
Status: COMPLETED
Start: 2024-11-25 | End: 2024-11-25

## 2024-11-25 RX ORDER — CEFAZOLIN 1 G/1
INJECTION, POWDER, FOR SOLUTION INTRAVENOUS
Status: DISCONTINUED
Start: 2024-11-25 | End: 2024-11-30 | Stop reason: HOSPADM

## 2024-11-25 RX ORDER — VERAPAMIL HCL 240 MG
240 TABLET, EXTENDED RELEASE ORAL NIGHTLY
Status: DISCONTINUED | OUTPATIENT
Start: 2024-11-25 | End: 2024-11-25

## 2024-11-25 RX ORDER — DEXTROSE MONOHYDRATE, SODIUM CHLORIDE, AND POTASSIUM CHLORIDE 50; 1.49; 9 G/1000ML; G/1000ML; G/1000ML
125 INJECTION, SOLUTION INTRAVENOUS CONTINUOUS
Status: DISPENSED | OUTPATIENT
Start: 2024-11-25 | End: 2024-11-26

## 2024-11-25 RX ORDER — ACETAMINOPHEN 160 MG/5ML
650 SOLUTION ORAL EVERY 6 HOURS PRN
Status: DISCONTINUED | OUTPATIENT
Start: 2024-11-25 | End: 2024-11-30 | Stop reason: HOSPADM

## 2024-11-25 RX ORDER — ONDANSETRON HYDROCHLORIDE 2 MG/ML
INJECTION, SOLUTION INTRAVENOUS
Status: COMPLETED
Start: 2024-11-25 | End: 2024-11-25

## 2024-11-25 RX ORDER — LABETALOL HYDROCHLORIDE 5 MG/ML
5 INJECTION, SOLUTION INTRAVENOUS ONCE AS NEEDED
Status: DISCONTINUED | OUTPATIENT
Start: 2024-11-25 | End: 2024-11-25 | Stop reason: HOSPADM

## 2024-11-25 RX ORDER — LIDOCAINE HCL/PF 100 MG/5ML
SYRINGE (ML) INTRAVENOUS
Status: COMPLETED
Start: 2024-11-25 | End: 2024-11-25

## 2024-11-25 RX ORDER — FENTANYL CITRATE 50 UG/ML
INJECTION, SOLUTION INTRAMUSCULAR; INTRAVENOUS
Status: COMPLETED
Start: 2024-11-25 | End: 2024-11-25

## 2024-11-25 RX ORDER — SEVOFLURANE 250 ML/250ML
LIQUID RESPIRATORY (INHALATION)
Status: DISCONTINUED
Start: 2024-11-25 | End: 2024-11-30 | Stop reason: HOSPADM

## 2024-11-25 RX ORDER — FENTANYL CITRATE 50 UG/ML
INJECTION, SOLUTION INTRAMUSCULAR; INTRAVENOUS AS NEEDED
Status: DISCONTINUED | OUTPATIENT
Start: 2024-11-25 | End: 2024-11-25

## 2024-11-25 RX ORDER — LIDOCAINE HYDROCHLORIDE 10 MG/ML
0.1 INJECTION, SOLUTION INFILTRATION; PERINEURAL ONCE
Status: DISCONTINUED | OUTPATIENT
Start: 2024-11-25 | End: 2024-11-25 | Stop reason: HOSPADM

## 2024-11-25 RX ORDER — PHENYLEPHRINE HCL IN 0.9% NACL 0.4MG/10ML
SYRINGE (ML) INTRAVENOUS
Status: DISCONTINUED
Start: 2024-11-25 | End: 2024-11-30 | Stop reason: HOSPADM

## 2024-11-25 RX ORDER — SODIUM CHLORIDE, SODIUM LACTATE, POTASSIUM CHLORIDE, CALCIUM CHLORIDE 600; 310; 30; 20 MG/100ML; MG/100ML; MG/100ML; MG/100ML
INJECTION, SOLUTION INTRAVENOUS
Status: COMPLETED
Start: 2024-11-25 | End: 2024-11-25

## 2024-11-25 RX ORDER — MIDAZOLAM HYDROCHLORIDE 1 MG/ML
INJECTION INTRAMUSCULAR; INTRAVENOUS AS NEEDED
Status: DISCONTINUED | OUTPATIENT
Start: 2024-11-25 | End: 2024-11-25

## 2024-11-25 RX ORDER — CEFAZOLIN 1 G/1
INJECTION, POWDER, FOR SOLUTION INTRAVENOUS
Status: COMPLETED
Start: 2024-11-25 | End: 2024-11-25

## 2024-11-25 RX ORDER — SODIUM CHLORIDE, SODIUM LACTATE, POTASSIUM CHLORIDE, CALCIUM CHLORIDE 600; 310; 30; 20 MG/100ML; MG/100ML; MG/100ML; MG/100ML
INJECTION, SOLUTION INTRAVENOUS CONTINUOUS PRN
Status: DISCONTINUED | OUTPATIENT
Start: 2024-11-25 | End: 2024-11-25

## 2024-11-25 RX ORDER — ROCURONIUM BROMIDE 10 MG/ML
INJECTION, SOLUTION INTRAVENOUS
Status: COMPLETED
Start: 2024-11-25 | End: 2024-11-25

## 2024-11-25 RX ORDER — HYDRALAZINE HYDROCHLORIDE 20 MG/ML
5 INJECTION INTRAMUSCULAR; INTRAVENOUS EVERY 30 MIN PRN
Status: DISCONTINUED | OUTPATIENT
Start: 2024-11-25 | End: 2024-11-25 | Stop reason: HOSPADM

## 2024-11-25 RX ORDER — ONDANSETRON HYDROCHLORIDE 2 MG/ML
4 INJECTION, SOLUTION INTRAVENOUS ONCE AS NEEDED
Status: DISCONTINUED | OUTPATIENT
Start: 2024-11-25 | End: 2024-11-25 | Stop reason: HOSPADM

## 2024-11-25 RX ORDER — SODIUM CHLORIDE, SODIUM LACTATE, POTASSIUM CHLORIDE, CALCIUM CHLORIDE 600; 310; 30; 20 MG/100ML; MG/100ML; MG/100ML; MG/100ML
100 INJECTION, SOLUTION INTRAVENOUS CONTINUOUS
Status: DISCONTINUED | OUTPATIENT
Start: 2024-11-25 | End: 2024-11-25 | Stop reason: HOSPADM

## 2024-11-25 RX ORDER — GLYCOPYRROLATE 0.2 MG/ML
INJECTION INTRAMUSCULAR; INTRAVENOUS
Status: DISCONTINUED
Start: 2024-11-25 | End: 2024-11-30 | Stop reason: HOSPADM

## 2024-11-25 RX ORDER — OXYCODONE HYDROCHLORIDE 5 MG/1
5 TABLET ORAL EVERY 4 HOURS PRN
Status: DISCONTINUED | OUTPATIENT
Start: 2024-11-25 | End: 2024-11-25 | Stop reason: HOSPADM

## 2024-11-25 RX ORDER — DIPHENHYDRAMINE HCL 12.5MG/5ML
25 LIQUID (ML) ORAL EVERY 6 HOURS PRN
Status: DISCONTINUED | OUTPATIENT
Start: 2024-11-25 | End: 2024-11-30 | Stop reason: HOSPADM

## 2024-11-25 RX ORDER — CEFAZOLIN 1 G/1
INJECTION, POWDER, FOR SOLUTION INTRAVENOUS AS NEEDED
Status: DISCONTINUED | OUTPATIENT
Start: 2024-11-25 | End: 2024-11-25

## 2024-11-25 RX ORDER — ONDANSETRON HYDROCHLORIDE 2 MG/ML
INJECTION, SOLUTION INTRAVENOUS AS NEEDED
Status: DISCONTINUED | OUTPATIENT
Start: 2024-11-25 | End: 2024-11-25

## 2024-11-25 RX ORDER — ESMOLOL HYDROCHLORIDE 10 MG/ML
INJECTION INTRAVENOUS
Status: DISCONTINUED
Start: 2024-11-25 | End: 2024-11-30 | Stop reason: HOSPADM

## 2024-11-25 RX ORDER — PROPOFOL 10 MG/ML
INJECTION, EMULSION INTRAVENOUS
Status: COMPLETED
Start: 2024-11-25 | End: 2024-11-25

## 2024-11-25 RX ORDER — LIDOCAINE HCL/PF 100 MG/5ML
SYRINGE (ML) INTRAVENOUS AS NEEDED
Status: DISCONTINUED | OUTPATIENT
Start: 2024-11-25 | End: 2024-11-25

## 2024-11-25 RX ORDER — BUPIVACAINE HCL/EPINEPHRINE 0.25-.0005
VIAL (ML) INJECTION AS NEEDED
Status: DISCONTINUED | OUTPATIENT
Start: 2024-11-25 | End: 2024-11-25 | Stop reason: HOSPADM

## 2024-11-25 RX ORDER — HYDROMORPHONE HYDROCHLORIDE 0.2 MG/ML
0.2 INJECTION INTRAMUSCULAR; INTRAVENOUS; SUBCUTANEOUS EVERY 5 MIN PRN
Status: DISCONTINUED | OUTPATIENT
Start: 2024-11-25 | End: 2024-11-25 | Stop reason: HOSPADM

## 2024-11-25 RX ORDER — OXYCODONE HYDROCHLORIDE 5 MG/1
10 TABLET ORAL EVERY 4 HOURS PRN
Status: DISCONTINUED | OUTPATIENT
Start: 2024-11-25 | End: 2024-11-25 | Stop reason: HOSPADM

## 2024-11-25 SDOH — HEALTH STABILITY: MENTAL HEALTH: CURRENT SMOKER: 1

## 2024-11-25 ASSESSMENT — PAIN - FUNCTIONAL ASSESSMENT
PAIN_FUNCTIONAL_ASSESSMENT: 0-10

## 2024-11-25 ASSESSMENT — COLUMBIA-SUICIDE SEVERITY RATING SCALE - C-SSRS
1. IN THE PAST MONTH, HAVE YOU WISHED YOU WERE DEAD OR WISHED YOU COULD GO TO SLEEP AND NOT WAKE UP?: NO
6. HAVE YOU EVER DONE ANYTHING, STARTED TO DO ANYTHING, OR PREPARED TO DO ANYTHING TO END YOUR LIFE?: NO
2. HAVE YOU ACTUALLY HAD ANY THOUGHTS OF KILLING YOURSELF?: NO

## 2024-11-25 ASSESSMENT — PAIN DESCRIPTION - ORIENTATION: ORIENTATION: LEFT

## 2024-11-25 ASSESSMENT — PAIN SCALES - GENERAL
PAINLEVEL_OUTOF10: 0 - NO PAIN
PAINLEVEL_OUTOF10: 4
PAINLEVEL_OUTOF10: 0 - NO PAIN
PAINLEVEL_OUTOF10: 7
PAINLEVEL_OUTOF10: 0 - NO PAIN
PAINLEVEL_OUTOF10: 0 - NO PAIN
PAINLEVEL_OUTOF10: 8
PAINLEVEL_OUTOF10: 5 - MODERATE PAIN
PAINLEVEL_OUTOF10: 7

## 2024-11-25 ASSESSMENT — COGNITIVE AND FUNCTIONAL STATUS - GENERAL
DAILY ACTIVITIY SCORE: 24
MOBILITY SCORE: 24

## 2024-11-25 ASSESSMENT — PAIN DESCRIPTION - LOCATION: LOCATION: ABDOMEN

## 2024-11-25 NOTE — OP NOTE
Jejunostomy Tube placement Operative Note  Patient: Timoteo Victoria  : 1955  MRN: [id number]    Preoperative Diagnosis: Esophageal mass [K22.89]  Esophageal dysphagia [R13.19]  Postoperative Diagnosis: Esophageal mass [K22.89]  Esophageal dysphagia [R13.19]    Procedure: Procedure(s) (LRB):  Jejunostomy Tube placement (N/A)    Surgeon: Surgeon(s):  Fabio Salas MD    Other Staff:   Surgeons and Role:  * No surgeons found with a matching role *   Circulator: Gianna Rodriguez RN; Cindy Narvaez RN  Relief Circulator: Citlalli Cardoso RN  Relief Scrub: Samuel Gilliam  Scrub Person: Cayetano Alvarez    Anesthesia Type: Anesthesia type not filed in the log.    Indications: Timoteo Victoria is an 69 y.o. male who presents for Esophageal mass [K22.89]  Esophageal dysphagia [R13.19]. I recommended surgical jejunostomy for enteral access as the mass was obstructing and preventing him from taking oral intake    The patient was seen in the preoperative area. The risks, benefits, complications, treatment options, non-operative alternatives, expected recovery and outcomes were discussed with the patient. The possibilities of reaction to medication, pulmonary aspiration, injury to surrounding structures, bleeding, recurrent infection, the need for additional procedures, failure to diagnose a condition, and creating a complication requiring transfusion or operation were discussed with the patient. The patient concurred with the proposed plan, giving informed consent.  The site of surgery was properly noted/marked if necessary per policy.     Procedure Details:   The patient was placed on the operating table. A safety huddle was performed. General endotracheal anesthesia was initiated.      The patient was positioned in supine position. The patient was prepped with chlorhexidine & alcohol.    I entered the abdomen using a Visiport technique with a 5 mm port.  I used insufflation and established pneumoperitoneum.  There were  adhesions in the epigastrium presumably related to his previous hernia repair.  I placed 2 additional 5 mm ports.  I did not appreciate any visual evidence of metastatic disease.  In my mind, the adhesions in the epigastrium decreased the efficacy of peritoneal washings that establishing metastasis, and therefore this was not performed.  I turned my attention to performing a jejunostomy tube.  I located the ligament of Treitz and progressed distally on the small bowel at least 40 cm.  I chose a site for jejunostomy tube insertion.  I used T-fasteners to stabilize the bowel and prepare a site for jejunostomy tube insertion.  I then placed the needle through the abdominal wall and into the lumen of the jejunum.  I used this needle to place a wire into the lumen of the bowel using Seldinger technique.  Once this was in the lumen, I placed a dilator and sheath over the wire.  I removed the dilator and used the sheath to insert a 14 Romansh jejunostomy tube into the lumen of the small bowel.  I confirmed that the tube was intraluminal using insufflation.  I inflated the jejunostomy tube balloon with 7 mL of water.  I placed 2 additional T-fasteners distal to the jejunostomy site which had the effect of tenting the jejunum to the abdominal wall.  Again I checked the bowel for injury and performed an insufflation test using the jejunostomy tube which I felt were both appropriate.  I then desufflated the abdomen, removed the 5 mm ports, and closed the skin.    The wounds were closed in layers.  The skin was dressed with Dermabond.  The procedure was completed and patient was brought to Recovery without evidence of immediate complications.              Estimated blood loss: minimal  Complications: None  Disposition: Recovery  Condition: stable    Attending Attestation: I was present and scrubbed for the entire procedure.    Specimens:   No specimens collected    Fabio Salas  Phone Number: [phone]

## 2024-11-25 NOTE — ANESTHESIA PROCEDURE NOTES
Airway  Date/Time: 11/25/2024 12:05 PM  Urgency: elective    Airway not difficult    Staffing  Performed: ESPINOZA   Authorized by: Escobar Mc MD    Performed by: BRYSON Varghese  Patient location during procedure: OR    Indications and Patient Condition  Indications for airway management: anesthesia  Spontaneous Ventilation: absent  Sedation level: deep  Preoxygenated: yes  Patient position: sniffing  MILS not maintained throughout  Mask difficulty assessment: 1 - vent by mask  Planned trial extubation    Final Airway Details  Final airway type: endotracheal airway      Successful airway: ETT  Cuffed: yes   Successful intubation technique: direct laryngoscopy  Facilitating devices/methods: intubating stylet  Endotracheal tube insertion site: oral  Blade: Juan A  Blade size: #4  ETT size (mm): 7.5  Cormack-Lehane Classification: grade I - full view of glottis  Placement verified by: capnometry   Measured from: gums  ETT to gums (cm): 22  Number of attempts at approach: 1  Ventilation between attempts: none  Number of other approaches attempted: 0    Additional Comments  Lips and teeth in preanesthetic condition. Cloth tie.

## 2024-11-25 NOTE — PROGRESS NOTES
Timoteo Victoria is a 69 y.o. male on day 3 of admission presenting with Esophageal mass.      Subjective   No events over night,     Reports no c/o pain.        Objective     Last Recorded Vitals  /82 (BP Location: Left arm, Patient Position: Sitting)   Pulse 58   Temp 36.2 °C (97.2 °F) (Temporal)   Resp 16   Wt 93 kg (205 lb)   SpO2 99%   Intake/Output last 3 Shifts:  No intake or output data in the 24 hours ending 11/25/24 0802      Admission Weight  Weight: 93 kg (205 lb) (11/22/24 0045)    Daily Weight  11/22/24 : 93 kg (205 lb)    Image Results  CT chest abdomen pelvis w IV contrast  Narrative: Interpreted By:  Dimitri Lujan,  and El Napoles   STUDY:  CT CHEST ABDOMEN PELVIS W IV CONTRAST;  11/22/2024 8:21 pm      INDICATION:  Signs/Symptoms:esophageal mass occluding lumen, ? Metastatic lesions,  for operative treatment planning..      COMPARISON:  CT soft tissue neck 11/20/2024      ACCESSION NUMBER(S):  [id number]      ORDERING CLINICIAN:  LINDY SOLANO      TECHNIQUE:  CT of the chest, abdomen, and pelvis was performed.  Contiguous axial  images were obtained at 3 mm slice thickness through the chest,  abdomen and pelvis. Coronal and sagittal reconstructions at 3 mm  slice thickness were performed. 80 ML of Omnipaque 350 was  administered intravenously without immediate complication.      FINDINGS:  CHEST:      LUNG/PLEURA/LARGE AIRWAYS:  The large airways are patent without endobronchial mass. Small amount  of mucus in the proximal right mainstem bronchus. Mild apically  predominant centrilobular emphysema. There is a 0.4 cm ground-glass  nodule in the left upper lobe (series 204, image 108). The area  centrilobular nodularity in the right middle lobe adjacent to the  fissure (series 204, image 153). Scarring/atelectasis of the lingula.  No pleural effusion. No pneumothorax.      VESSELS:  Aorta and pulmonary arteries are normal caliber.  Severe  atherosclerotic changes are  noted of the aorta and branching vessels.  Severe coronary artery calcifications are present. Postoperative  changes of coronary artery bypass grafting are noted.      HEART:  The heart is normal in size.  There is no pericardial effusion.      MEDIASTINUM AND NAYA:  No mediastinal, hilar or axillary lymphadenopathy is present.  There  is an area of the irregular masslike thickening of the mid to distal  esophagus, immediately followed by small-to-moderate sized hiatal  hernia. The mid esophagus demonstrates circumferential wall  thickening.      CHEST WALL AND LOWER NECK:  The soft tissues of the chest wall demonstrate no gross abnormality.  Sternal cerclage wires are intact. The visualized thyroid gland  appears within normal limits.      ABDOMEN:      LIVER:  The liver is normal in size and homogeneous in enhancement. There is  an indeterminate hypoattenuating lesion along the inferior margin of  the right hepatic lobe which may represent hemangioma or focal fat  deposition.      BILE DUCTS:  The intrahepatic and extrahepatic ducts are not dilated.      GALLBLADDER:  The gallbladder is surgically absent.      PANCREAS:  The pancreas appears unremarkable without evidence of ductal  dilatation or masses.      SPLEEN:  The spleen is normal in size without focal lesions.      ADRENAL GLANDS:  Bilateral adrenal glands appear normal.      KIDNEYS AND URETERS:  The kidneys are normal in size and enhance symmetrically. Numerous  hypoattenuating lesions are consistent with simple cysts. There is a  small nonobstructing stone in the midpole of the left kidney  measuring 0.4 cm. No hydronephrosis.      PELVIS:      BLADDER:  The urinary bladder is unremarkable.      REPRODUCTIVE ORGANS:  The prostate is not enlarged.      BOWEL:  Small-to-moderate size hiatal hernia. Small and large bowel loops are  normal in caliber without focal wall thickening or evidence to  suggest obstruction. The appendix is normal.           VESSELS:  There is no aneurysmal dilatation of the abdominal aorta. The IVC  appears normal.      PERITONEUM/RETROPERITONEUM/LYMPH NODES:  There is no free or loculated fluid collection, no free  intraperitoneal air. The retroperitoneum appears normal.  No  abdominopelvic lymphadenopathy is present.      BONE AND SOFT TISSUE:  No suspicious osseous lesions are identified.  Postsurgical changes  of mesh repair of a ventral abdominal hernia in the epigastric region.      Impression: 1. Irregular masslike thickening of the mid to distal esophagus is  difficult to accurately measure, and may represent a neoplastic  process. Correlate with recent endoscopic imaging and biopsy results.  There is also proximal esophageal wall thickening, and a moderate  size hiatal hernia.  2. Small area of centrilobular nodularity in the right middle lobe  may represent focal aspiration/mucoid impaction or less likely  atypical infection.  3. Mild apical predominant centrilobular emphysema.  4. Severe coronary artery calcifications. Please note this exam is  not optimized for evaluation of the coronary arteries.  5. Nonobstructing 0.4 cm calculus in the left kidney.      I personally reviewed the image(s)/study and resident interpretation.  I agree with the findings as stated by resident Dony Gallegos.  Data analyzed and images interpreted at [facility], [place], [place].      MACRO:  Incidental Finding:  A ground glass pulmonary nodule measuring less  than 6 mm.  (**-YCF-**)      Instructions:  No further follow-up is required, however, if the  nodule morphology is suspicious, consider follow up at 2 and 4 years;  if grows or increasingly solid, consider resection. (Basilio Cagehomarcio  et al., Guidelines for management of incidental pulmonary nodules  detected on CT images: From the Fleischner Society 2017, Radiology.  2017 Jul;284 (1):228-243.) [email or web address]      Signed by: Dimitri Lujan  11/23/2024 10:36 AM  Dictation workstation:   [id number]    Vitals:    11/25/24 0637   BP: 144/82   Pulse: 58   Resp: 16   Temp: 36.2 °C (97.2 °F)   SpO2: 99%       Physical Exam  Constitutional:       Appearance: Normal appearance.   HENT:      Head: Normocephalic.      Nose: Nose normal.   Eyes:      Extraocular Movements: Extraocular movements intact.      Pupils: Pupils are equal, round, and reactive to light.   Cardiovascular:      Rate and Rhythm: Normal rate and regular rhythm.      Heart sounds: Normal heart sounds. No murmur heard.  Pulmonary:      Effort: No respiratory distress.      Breath sounds: Normal breath sounds. No wheezing.   Abdominal:      General: There is no distension.      Palpations: Abdomen is soft.      Tenderness: There is no abdominal tenderness.   Musculoskeletal:         General: Normal range of motion.      Cervical back: Normal range of motion.   Skin:     General: Skin is warm.   Neurological:      General: No focal deficit present.      Mental Status: He is alert and oriented to person, place, and time.   Psychiatric:         Mood and Affect: Mood normal.         Relevant Results  Scheduled medications  acetaminophen, 500 mg, intravenous, Once  amitriptyline, 100 mg, oral, Nightly  aspirin, 81 mg, oral, Daily  atorvastatin, 40 mg, oral, Daily  buPROPion XL, 450 mg, oral, Daily  cholecalciferol, 2,000 Units, oral, Daily  enoxaparin, 40 mg, subcutaneous, q24h  levothyroxine, 175 mcg, oral, Daily  losartan, 100 mg, oral, Daily  pantoprazole, 40 mg, intravenous, Daily  spironolactone, 50 mg, oral, Daily  tamsulosin, 0.4 mg, oral, Nightly  verapamil SR, 240 mg, oral, Nightly      Continuous medications  potassium chloride-D5-0.9%NaCl, 125 mL/hr, Last Rate: 125 mL/hr (11/25/24 0311)      PRN medications  PRN medications: sennosides-docusate sodium    Results for orders placed or performed during the hospital encounter of 11/22/24 (from the past 24 hours)   Type and screen   Result  Value Ref Range    ABO TYPE O     Rh TYPE POS     ANTIBODY SCREEN NEG    Protime-INR   Result Value Ref Range    Protime 13.3 (H) 9.8 - 12.8 seconds    INR 1.2 (H) 0.9 - 1.1   CBC   Result Value Ref Range    WBC 7.0 4.4 - 11.3 x10*3/uL    nRBC 0.0 0.0 - 0.0 /100 WBCs    RBC 4.55 4.50 - 5.90 x10*6/uL    Hemoglobin 13.7 13.5 - 17.5 g/dL    Hematocrit 40.2 (L) 41.0 - 52.0 %    MCV 88 80 - 100 fL    MCH 30.1 26.0 - 34.0 pg    MCHC 34.1 32.0 - 36.0 g/dL    RDW 11.7 11.5 - 14.5 %    Platelets 145 (L) 150 - 450 x10*3/uL   Comprehensive Metabolic Panel   Result Value Ref Range    Glucose 91 74 - 99 mg/dL    Sodium 138 136 - 145 mmol/L    Potassium 3.6 3.5 - 5.3 mmol/L    Chloride 106 98 - 107 mmol/L    Bicarbonate 24 21 - 32 mmol/L    Anion Gap 12 10 - 20 mmol/L    Urea Nitrogen 5 (L) 6 - 23 mg/dL    Creatinine 0.79 0.50 - 1.30 mg/dL    eGFR >90 >60 mL/min/1.73m*2    Calcium 9.1 8.6 - 10.6 mg/dL    Albumin 3.6 3.4 - 5.0 g/dL    Alkaline Phosphatase 62 33 - 136 U/L    Total Protein 6.1 (L) 6.4 - 8.2 g/dL    AST 19 9 - 39 U/L    Bilirubin, Total 0.7 0.0 - 1.2 mg/dL    ALT 14 10 - 52 U/L       Assessment/Plan      Timoteo Victoria is a 69 y.o. male w/ PMHx of CAD s/p 4x CABG (~2005), HTN, HLD, hx of cluster HA, hypothyroidism (on levothyroxine) and tobacco use presenting with dysphagia and EGD showed distal  esophageal mass, concerning for malignancy     #Dysphagia  #Esophageal mass  :: EGD on 11/21 showing esophageal mass with malignant appearance, not able to be crossed  :: discussed with thoracic surgery @ [place] who recommended transfer to [facility] [place]  - thoracic surgery consulted,   -CT chest, abdomen, pelvis showed no evidence of Mets.   S/p J tube placement by Thoracic surgery today.   Still waiting for surgical pathology results, ( pending from 11/21)  Planning for neoadjuvant chemo and surgery later,     Dysphagia/ lack of nutrition:  S/p J tube on 11/25,   Can start Trickle feeds at 10 PM  Nutrition consulted, for J tube  feeds.        #HTN  #CAD s/p CABG x4 ~ 2005  #HLD  :: patient asymptomatic from a CAD standpoint  :: on home lisinopril, faviola 50mg, verapamil 240mg and losartan 100mg  :: on home aspirin and atorva 40mg  :: RBBB noted on prior EKGs  - c/w home meds  Base line EKG     #Hypothyroidism (on home levothyroxine)  :: TSH suppressed with elevated fT4 on admission  - given reports of weight loss (although unlikely related to thyroid disease),   reduced  dose to 175mcg  - will need to have TSH/fT4 checked in around 6 weeks     #Hx of cluster HA  :: per patient well controlled on amitriptyline  - c/w home amitriptyline     #MDD  :: on home buproprion, recently increased  - c/w home dose     #Smoking  :: smokes 0.7PPD on average since 17 yrs of age  - offered nicotine patch but patient refused for now     F: PRN  E: PRN  N: NPO except ice chips  A: PIV  DVT: Enoxaparin  GI: Home PPI  NOK: Eulogio (brother) [phone]  Code: Full Code (confirmed on admission)       Dispo: pending J tube feeds and further Thoracic surgery recs. Oncology consult once the surgical pathology results available.       Cameron Graves MD

## 2024-11-25 NOTE — INTERVAL H&P NOTE
H&P reviewed. The patient was examined and there are no changes to the H&P. Planned for lap possible open J tube placement.

## 2024-11-25 NOTE — ANESTHESIA PROCEDURE NOTES
Peripheral IV  Date/Time: 11/25/2024 12:10 PM      Placement  Needle size: 18 G  Laterality: left  Location: hand  Local anesthetic: none  Site prep: alcohol  Technique: anatomical landmarks  Attempts: 2

## 2024-11-25 NOTE — CARE PLAN
NEW PATIENT VISIT  Chief Complaint   Patient presents with    New Patient     NP recurrent ear infections/ tubes removed 06/2023     History of Present Illness:     Chris Woods is a 4 y.o. female brought by mother presenting with a history of OM as an infant and had tubes which were in place for 2.5 years and then removed (left, right fell out spontaneously) and patch; had an MRI for headaches which noted an effusion on the left and mother was concerned about it. She did note that she had an OM/infection around the time of the MRI (reviewed with mother); less OM this year        No Known Allergies    Current Outpatient Medications   Medication Sig Dispense Refill    albuterol (PROVENTIL) (2.5 MG/3ML) 0.083% nebulizer solution Inhale 3 mLs into the lungs every 4 hours as needed       No current facility-administered medications for this visit. History reviewed. No pertinent past medical history.     Past Surgical History:   Procedure Laterality Date    EAR TUBE REMOVAL Bilateral     MYRINGOTOMY W/ TUBES Bilateral        Timing Age of Onset infant   Duration Increasing in Severity No   Days of school missed in last year n/a      Modifying Factors Seasonal variation No   Facial growth concerns No        Associated Symptoms Mouth breathing No    Speech concerns No    Problems swallowing No    Hyponasal voice No    Hypernasal voice No    Halitosis No   Chronic conditions  Aspirin/coumadin/plavix No   Herbal supplements No        Past History Previous Hospitalizations No   Conditions or syndromes No      Medical Normal Pregnancy Yes   Normal Delivery Yes   Immunizations up to date Yes      Family History Family members with similar conditions No   Family history of bleeding concerns No   Family history of anesthia concerns No      Social History Tobacco exposure No   Currently in /school Yes        Review of Symptoms:    Constitutional Weight appropriate No   Eyes Stabismus / Diplopia No   Ear, Nose, The patient's goals for the shift include      The clinical goals for the shift include Pt will remain safe without injury throughout shift.      Problem: Pain - Adult  Goal: Verbalizes/displays adequate comfort level or baseline comfort level  Outcome: Progressing     Problem: Safety - Adult  Goal: Free from fall injury  Outcome: Progressing     Problem: Discharge Planning  Goal: Discharge to home or other facility with appropriate resources  Outcome: Progressing     Problem: Chronic Conditions and Co-morbidities  Goal: Patient's chronic conditions and co-morbidity symptoms are monitored and maintained or improved  Outcome: Progressing     Problem: Diabetes  Goal: Achieve decreasing blood glucose levels by end of shift  Outcome: Progressing  Goal: Increase stability of blood glucose readings by end of shift  Outcome: Progressing  Goal: Decrease in ketones present in urine by end of shift  Outcome: Progressing  Goal: Maintain electrolyte levels within acceptable range throughout shift  Outcome: Progressing  Goal: Maintain glucose levels >70mg/dl to <250mg/dl throughout shift  Outcome: Progressing  Goal: No changes in neurological exam by end of shift  Outcome: Progressing  Goal: Learn about and adhere to nutrition recommendations by end of shift  Outcome: Progressing  Goal: Vital signs within normal range for age by end of shift  Outcome: Progressing  Goal: Increase self care and/or family involovement by end of shift  Outcome: Progressing  Goal: Receive DSME education by end of shift  Outcome: Progressing

## 2024-11-25 NOTE — ANESTHESIA POSTPROCEDURE EVALUATION
Patient: Timoteo Victoria    Procedure Summary       Date: 11/25/24 Room / Location: [facility] OR 14 / Virtual [facility] OR    Anesthesia Start: 1158 Anesthesia Stop:     Procedure: Jejunostomy Tube placement (Abdomen) Diagnosis:       Esophageal mass      Esophageal dysphagia      (Esophageal mass [K22.89])      (Esophageal dysphagia [R13.19])    Surgeons: Fabio Salas MD Responsible Provider: Escobar Mc MD    Anesthesia Type: general ASA Status: 3            Anesthesia Type: general    Vitals Value Taken Time   /76 11/25/24 1257   Temp 36 11/25/24 1257   Pulse 68 11/25/24 1254   Resp 18 11/25/24 1254   SpO2 100 % 11/25/24 1254   Vitals shown include unfiled device data.    Anesthesia Post Evaluation    Patient location during evaluation: PACU  Patient participation: complete - patient participated  Level of consciousness: awake and alert  Pain management: adequate  Airway patency: patent  Cardiovascular status: acceptable  Respiratory status: acceptable and face mask  Hydration status: acceptable  Postoperative Nausea and Vomiting: none       Bg 113      No notable events documented.

## 2024-11-25 NOTE — ANESTHESIA PREPROCEDURE EVALUATION
Patient: Timoteo Victoria    Procedure Information       Date/Time: 11/25/24 1045    Procedure: laparoscopic (posible open)  Jejunostomy Tube placement (Abdomen)    Location: [facility] OR 14 / Virtual [facility] OR    Surgeons: Fabio Salas MD        Timoteo Victoria is a 69 year old male with PMHx of CAD s/p CABGx4 (~2005), HTN, HLD, Headaches, DMII, hypothyroidism, and tobacco use who presents with new lower esophageal mass. Patient with 40 pounds of weight loss over the past 6 months. Now with dysphagia to liquids and solids and EGD showing lower esophageal mass.     ALLERGIES:  Allergies   Allergen Reactions    Ibuprofen Swelling and Unknown     Relevant Problems   Cardiac   (+) Atherosclerosis of coronary artery   (+) Benign essential hypertension   (+) Hyperlipidemia      Neuro   (+) Depression, major, recurrent, moderate   (+) Mild major depression, single episode (CMS-HCC)      GI   (+) Dysphagia   (+) Esophageal dysphagia      Endocrine   (+) Hypothyroidism   (+) Obesity, morbid (Multi)   (+) Type 2 diabetes mellitus        SURGICAL HISTORY:  Past Surgical History:   Procedure Laterality Date    CATARACT EXTRACTION Right 08/25/2023    CHOLECYSTECTOMY  09/18/2018    Cholecystectomy    CORONARY ARTERY BYPASS GRAFT  09/18/2018    CABG    HERNIA REPAIR  09/18/2018    Inguinal Hernia Repair        MEDICATIONS:  Current Outpatient Medications   Medication Instructions    amitriptyline (ELAVIL) 100 mg, oral, Nightly    aspirin 81 mg EC tablet oral    atorvastatin (LIPITOR) 40 mg, oral, Daily    buPROPion XL (WELLBUTRIN XL) 150 mg, oral, Every morning, Take with 300mg tab for total of 450mg per day.    buPROPion XL (WELLBUTRIN XL) 300 mg, oral, Every morning, Take with 150mg tab for total of 450mg per day.    cholecalciferol (Vitamin D-3) 50 mcg (2,000 unit) capsule oral    eletriptan (Relpax) 20 mg tablet TAKE 1 TABLET BY MOUTH ONCE AS NEEDED FOR CLUSTER HEADACHE. MAY REPEAT DOSE ONCE AFTER 2 HOURS IF  NEEDED    FEVERFEW ORAL 1 tablet, oral, Daily    fexofenadine (Allegra Allergy) 180 mg tablet oral    hydrocortisone 2.5 % cream APPLY TOPICALLY TO AFFECTED AREA 3 TIMES A DAY    hydrOXYzine pamoate (VISTARIL) 25 mg, oral, 4 times daily PRN    irbesartan (AVAPRO) 300 mg, oral, Daily    levothyroxine (Synthroid, Levoxyl) 200 mcg tablet Take 1 tab PO daily.    omeprazole (PRILOSEC) 20 mg, oral, Daily, Take 30 minutes before a meal.    sennosides-docusate sodium (Hope-Colace) 8.6-50 mg tablet 1 tablet, oral, 2 times daily    spironolactone (ALDACTONE) 50 mg, oral, Daily    tamsulosin (FLOMAX) 0.4 mg, oral, Nightly    verapamil SR (CALAN-SR) 240 mg, oral, Nightly      VITALS:      11/25/2024     6:37 AM 11/24/2024     9:34 PM 11/24/2024     1:05 PM   Vitals   Systolic 144 131 127   Diastolic 82 74 61   BP Location Left arm Right arm Right arm   Heart Rate 58 75 86   Temp 36.2 °C (97.2 °F) 36.3 °C (97.3 °F) 36.2 °C (97.2 °F)   Resp 16 18 18     LABS:   BMP   Lab Results   Component Value Date    GLUCOSE 91 11/25/2024    CALCIUM 9.1 11/25/2024     11/25/2024    K 3.6 11/25/2024    CO2 24 11/25/2024     11/25/2024    BUN 5 (L) 11/25/2024    CREATININE 0.79 11/25/2024   , LFT   Lab Results   Component Value Date    ALT 14 11/25/2024    AST 19 11/25/2024    ALKPHOS 62 11/25/2024    BILITOT 0.7 11/25/2024   , CBC  Lab Results   Component Value Date    WBC 7.0 11/25/2024    HGB 13.7 11/25/2024    HCT 40.2 (L) 11/25/2024    MCV 88 11/25/2024     (L) 11/25/2024     Lab Results   Component Value Date/Time    PROTIME 13.3 (H) 11/24/2024 1528    INR 1.2 (H) 11/24/2024 1528        IMAGES:  10/16/2018 GANGA:  Moderate concentric LVH, LVEF 55%  Dilate RV, moderate systolic dysfunction  Trace MR  Trace TR    SOCIAL:  Social History     Tobacco Use   Smoking Status Every Day    Current packs/day: 1.00    Types: Cigarettes   Smokeless Tobacco Never      Social History     Substance and Sexual Activity   Alcohol Use Not  Currently      Social History     Substance and Sexual Activity   Drug Use Never        NPO STATUS:  No data recorded    Clinical Areas Reviewed:    Allergies  Meds               Anesthesia Assessment:    Physical Exam    Airway  Mallampati: I  TM distance: >3 FB  Neck ROM: full     Cardiovascular - normal exam     Dental        Pulmonary - normal exam     Abdominal - normal exam             Anesthesia Plan    History of general anesthesia?: yes  History of complications of general anesthesia?: no    ASA 3     The patient is a current smoker.  Patient was previously instructed to abstain from smoking on day of procedure.  Patient did not smoke on day of procedure.    intravenous induction   Postoperative administration of opioids is intended.  Trial extubation is planned.  Anesthetic plan and risks discussed with patient.  Use of blood products discussed with patient who.

## 2024-11-25 NOTE — CARE PLAN
The patient's goals for the shift include      The clinical goals for the shift include Patient will remain safe and free frim injury throughout shift      Problem: Pain - Adult  Goal: Verbalizes/displays adequate comfort level or baseline comfort level  Outcome: Progressing     Problem: Safety - Adult  Goal: Free from fall injury  Outcome: Progressing     Problem: Discharge Planning  Goal: Discharge to home or other facility with appropriate resources  Outcome: Progressing     Problem: Chronic Conditions and Co-morbidities  Goal: Patient's chronic conditions and co-morbidity symptoms are monitored and maintained or improved  Outcome: Progressing

## 2024-11-26 LAB
ANION GAP SERPL CALC-SCNC: 11 MMOL/L (ref 10–20)
BUN SERPL-MCNC: 5 MG/DL (ref 6–23)
CALCIUM SERPL-MCNC: 9.3 MG/DL (ref 8.6–10.6)
CHLORIDE SERPL-SCNC: 101 MMOL/L (ref 98–107)
CO2 SERPL-SCNC: 28 MMOL/L (ref 21–32)
CREAT SERPL-MCNC: 0.81 MG/DL (ref 0.5–1.3)
EGFRCR SERPLBLD CKD-EPI 2021: >90 ML/MIN/1.73M*2
ERYTHROCYTE [DISTWIDTH] IN BLOOD BY AUTOMATED COUNT: 11.5 % (ref 11.5–14.5)
GLUCOSE SERPL-MCNC: 81 MG/DL (ref 74–99)
HCT VFR BLD AUTO: 41.5 % (ref 41–52)
HGB BLD-MCNC: 14.2 G/DL (ref 13.5–17.5)
MCH RBC QN AUTO: 30 PG (ref 26–34)
MCHC RBC AUTO-ENTMCNC: 34.2 G/DL (ref 32–36)
MCV RBC AUTO: 88 FL (ref 80–100)
NRBC BLD-RTO: 0 /100 WBCS (ref 0–0)
PLATELET # BLD AUTO: 153 X10*3/UL (ref 150–450)
POTASSIUM SERPL-SCNC: 3.9 MMOL/L (ref 3.5–5.3)
RBC # BLD AUTO: 4.74 X10*6/UL (ref 4.5–5.9)
SODIUM SERPL-SCNC: 136 MMOL/L (ref 136–145)
WBC # BLD AUTO: 8 X10*3/UL (ref 4.4–11.3)

## 2024-11-26 PROCEDURE — 99233 SBSQ HOSP IP/OBS HIGH 50: CPT | Performed by: STUDENT IN AN ORGANIZED HEALTH CARE EDUCATION/TRAINING PROGRAM

## 2024-11-26 PROCEDURE — 2500000001 HC RX 250 WO HCPCS SELF ADMINISTERED DRUGS (ALT 637 FOR MEDICARE OP): Performed by: STUDENT IN AN ORGANIZED HEALTH CARE EDUCATION/TRAINING PROGRAM

## 2024-11-26 PROCEDURE — 85027 COMPLETE CBC AUTOMATED: CPT | Performed by: INTERNAL MEDICINE

## 2024-11-26 PROCEDURE — 2500000002 HC RX 250 W HCPCS SELF ADMINISTERED DRUGS (ALT 637 FOR MEDICARE OP, ALT 636 FOR OP/ED): Performed by: INTERNAL MEDICINE

## 2024-11-26 PROCEDURE — 80048 BASIC METABOLIC PNL TOTAL CA: CPT | Performed by: INTERNAL MEDICINE

## 2024-11-26 PROCEDURE — 2500000001 HC RX 250 WO HCPCS SELF ADMINISTERED DRUGS (ALT 637 FOR MEDICARE OP): Performed by: INTERNAL MEDICINE

## 2024-11-26 PROCEDURE — 36415 COLL VENOUS BLD VENIPUNCTURE: CPT | Performed by: INTERNAL MEDICINE

## 2024-11-26 PROCEDURE — 1100000001 HC PRIVATE ROOM DAILY

## 2024-11-26 PROCEDURE — 2500000004 HC RX 250 GENERAL PHARMACY W/ HCPCS (ALT 636 FOR OP/ED): Performed by: INTERNAL MEDICINE

## 2024-11-26 RX ORDER — TRAMADOL HYDROCHLORIDE 50 MG/1
50 TABLET ORAL EVERY 6 HOURS PRN
Status: DISCONTINUED | OUTPATIENT
Start: 2024-11-26 | End: 2024-11-30 | Stop reason: HOSPADM

## 2024-11-26 RX ORDER — BUPROPION HYDROCHLORIDE 75 MG/1
150 TABLET ORAL 3 TIMES DAILY
Status: DISCONTINUED | OUTPATIENT
Start: 2024-11-26 | End: 2024-11-30 | Stop reason: HOSPADM

## 2024-11-26 RX ORDER — NAPROXEN SODIUM 220 MG/1
81 TABLET, FILM COATED ORAL DAILY
Status: DISCONTINUED | OUTPATIENT
Start: 2024-11-26 | End: 2024-11-30 | Stop reason: HOSPADM

## 2024-11-26 ASSESSMENT — PAIN SCALES - GENERAL
PAINLEVEL_OUTOF10: 5 - MODERATE PAIN
PAINLEVEL_OUTOF10: 7
PAINLEVEL_OUTOF10: 8
PAINLEVEL_OUTOF10: 4

## 2024-11-26 ASSESSMENT — PAIN - FUNCTIONAL ASSESSMENT
PAIN_FUNCTIONAL_ASSESSMENT: 0-10

## 2024-11-26 ASSESSMENT — COGNITIVE AND FUNCTIONAL STATUS - GENERAL
DAILY ACTIVITIY SCORE: 24
MOBILITY SCORE: 24

## 2024-11-26 NOTE — PROGRESS NOTES
"Timoteo Victoria is a 69 y.o. male on day 4 of admission presenting with Esophageal mass.    Subjective   NAEO. Patient started trickle tube feeds overnight. States felt some pressure with feeds running but denies any N/V/CP/SOB. States pain is mostly controlled with current medications.     Objective   Constitutional: NAD, A&Ox3   Head/Neck: NCAT  Eyes: Anicteric   Cardiovascular: Regular rate and rhythm per peripheral palpation   Respiratory: Breathing comfortably on RA with symmetric chest rise   Abdominal: Soft, non-distended, appropriate mild tenderness to palpation around J-tube site. 4 t-fasteners in place C/D/I. Incisions C/D/I with dermabond.   : Deferred   Ext: MAEx4  Psych: Appropriate mood and affect     Last Recorded Vitals  Blood pressure 163/75, pulse 70, temperature 36.2 °C (97.2 °F), temperature source Temporal, resp. rate 18, height 1.778 m (5' 10\"), weight 93.9 kg (207 lb), SpO2 100%.  Intake/Output last 3 Shifts:  I/O last 3 completed shifts:  In: 1395 (14.9 mL/kg) [I.V.:770 (8.2 mL/kg); IV Piggyback:625]  Out: 0 (0 mL/kg)   Weight: 93.9 kg     Relevant Results  Results for orders placed or performed during the hospital encounter of 11/22/24 (from the past 24 hours)   POCT GLUCOSE   Result Value Ref Range    POCT Glucose 113 (H) 74 - 99 mg/dL   CBC   Result Value Ref Range    WBC 8.0 4.4 - 11.3 x10*3/uL    nRBC 0.0 0.0 - 0.0 /100 WBCs    RBC 4.74 4.50 - 5.90 x10*6/uL    Hemoglobin 14.2 13.5 - 17.5 g/dL    Hematocrit 41.5 41.0 - 52.0 %    MCV 88 80 - 100 fL    MCH 30.0 26.0 - 34.0 pg    MCHC 34.2 32.0 - 36.0 g/dL    RDW 11.5 11.5 - 14.5 %    Platelets 153 150 - 450 x10*3/uL   Basic metabolic panel   Result Value Ref Range    Glucose 81 74 - 99 mg/dL    Sodium 136 136 - 145 mmol/L    Potassium 3.9 3.5 - 5.3 mmol/L    Chloride 101 98 - 107 mmol/L    Bicarbonate 28 21 - 32 mmol/L    Anion Gap 11 10 - 20 mmol/L    Urea Nitrogen 5 (L) 6 - 23 mg/dL    Creatinine 0.81 0.50 - 1.30 mg/dL    eGFR >90 >60 " mL/min/1.73m*2    Calcium 9.3 8.6 - 10.6 mg/dL       Assessment/Plan   Timoteo Victoria is a 69 year old male with PMHx of CAD s/p CABGx4 (~2005), HTN, HLD, Headaches, hypothyroidism, and tobacco use who presents with new lower esophageal mass. Patient with 40 pounds of weight loss over the past 6 months. Now with dysphagia to liquids and solids and EGD showing lower esophageal mass. S/p lap J-tube placement on 11/25.     Recommendations:   -Continue to slowly advance tube feeds as tolerated to goal   -Follow up biopsy results  -Appreciate remainder of care per primary team     D/w Dr. Maritza Gaitan MD  PGY-2 Gen Surg  Thoracic Surgery [id number]

## 2024-11-26 NOTE — PROGRESS NOTES
"Timoteo Victoria is a 69 y.o. male on hospital day 4 of admission presenting with Esophageal mass.    Subjective     The patient has no complaints at this time. Awaiting biopsy results.    Objective     GENERAL APPEARANCE: A&Ox3, appears in no acute distress  HEAD: normocephalic, atraumatic  THROAT: Oral cavity and pharynx normal. No inflammation, swelling, exudate, or lesions.  NECK: Neck supple, non-tender without lymphadenopathy, masses or thyromegaly.  CARDIAC: Normal S1 and S2. No S3, S4 or murmurs. Rhythm is regular. There is no peripheral edema, cyanosis or pallor. Extremities are warm and well perfused. No carotid bruits.  LUNGS: Clear to auscultation bilaterally without rales, rhonchi, wheezing or diminished breath sounds.  ABDOMEN: Positive bowel sounds. Soft, nondistended, nontender. No guarding or rebound. No masses.  EXTREMITIES: No significant deformity or joint abnormality. No edema. Peripheral pulses intact. No varicosities.  SKIN: Skin normal color, texture and turgor with no lesions or rash  PSYCHIATRIC: oriented to person, place, and time, good judgement and reason, without hallucinations, abnormal affect or abnormal behaviors during the examination. Patient is not suicidal.        Last Recorded Vitals  Blood pressure 158/79, pulse 75, temperature 36.4 °C (97.5 °F), temperature source Temporal, resp. rate 18, height 1.778 m (5' 10\"), weight 93.9 kg (207 lb), SpO2 96%.  Intake/Output last 3 Shifts:  I/O last 3 completed shifts:  In: 1395 (14.9 mL/kg) [I.V.:770 (8.2 mL/kg); IV Piggyback:625]  Out: 0 (0 mL/kg)   Weight: 93.9 kg     Relevant Results  Lab Results   Component Value Date    WBC 8.0 11/26/2024    HGB 14.2 11/26/2024    HCT 41.5 11/26/2024    MCV 88 11/26/2024     11/26/2024      Lab Results   Component Value Date    GLUCOSE 81 11/26/2024    CALCIUM 9.3 11/26/2024     11/26/2024    K 3.9 11/26/2024    CO2 28 11/26/2024     11/26/2024    BUN 5 (L) 11/26/2024    " CREATININE 0.81 11/26/2024     Scheduled medications  amitriptyline, 100 mg, oral, Nightly  aspirin, 81 mg, j-tube, Daily  atorvastatin, 40 mg, oral, Daily  buPROPion, 150 mg, j-tube, TID  [Held by provider] buPROPion XL, 450 mg, oral, Daily  ceFAZolin, , ,   cholecalciferol, 2,000 Units, oral, Daily  enoxaparin, 40 mg, subcutaneous, q24h  esmolol, , ,   glycopyrrolate, , ,   levothyroxine, 175 mcg, oral, Daily  losartan, 100 mg, oral, Daily  pantoprazole, 40 mg, intravenous, Daily  phenylephrine HCl in 0.9% NaCl, , ,   rocuronium, , ,   sevoflurane, , ,   spironolactone, 50 mg, oral, Daily  [Held by provider] tamsulosin, 0.4 mg, oral, Nightly  verapamil, 80 mg, j-tube, q8h OZIEL      Continuous medications  potassium chloride-D5-0.9%NaCl, 125 mL/hr, Last Rate: 125 mL/hr (11/26/24 0723)      PRN medications  PRN medications: acetaminophen, ceFAZolin, diphenhydrAMINE, esmolol, glycopyrrolate, phenylephrine HCl in 0.9% NaCl, rocuronium, sennosides-docusate sodium, sevoflurane, traMADol    Assessment/Plan     Timoteo Victoria is a 69 y.o. male w/ PMHx of CAD s/p 4x CABG (~2005), HTN, HLD, hx of cluster HA, hypothyroidism (on levothyroxine) and tobacco use presenting with dysphagia and EGD showed distal  esophageal mass, concerning for malignancy     Dysphagia  Esophageal mass  - EGD on 11/21 showing esophageal mass with malignant appearance, not able to be crossed  - discussed with thoracic surgery @ [facility] who recommended transfer to [facility]  - thoracic surgery consulted,   - CT chest, abdomen, pelvis showed no evidence of Mets.   S/p J tube placement by Thoracic surgery today.   Still waiting for surgical pathology results, ( pending from 11/21)  - Planning for neoadjuvant chemo and surgery later, awaiting for biopsy     Dysphagia/ lack of nutrition:  - S/p J tube on 11/25,   - Can start Trickle feeds at 10 PM  - Nutrition consulted, for J tube feeds.      HTN  CAD s/p CABG x4 ~ 2005  HLD  - patient asymptomatic from a  CAD standpoint  - on home lisinopril, faviola 50mg, verapamil 240mg and losartan 100mg  - on home aspirin and atorva 40mg  - RBBB noted on prior EKGs  - c/w home meds     Hypothyroidism (on home levothyroxine)  - TSH suppressed with elevated fT4 on admission  - given reports of weight loss (although unlikely related to thyroid disease),   reduced  dose to 175mcg  - will need to have TSH/fT4 checked in around 6 weeks     Hx of cluster HA  - per patient well controlled on amitriptyline  - c/w home amitriptyline     MDD  - on home buproprion, recently increased  - c/w home dose     Smoking  - smokes 0.7PPD on average since 17 yrs of age  - offered nicotine patch but patient refused for now       N: NPO except ice chips  A: PIV  DVT: Enoxaparin  GI: Home PPI  NOK: Eulogio (brother) [phone]  Code: Full Code (confirmed on admission)   Dispo: Biopsy pending    Venkatesh Shi MD     The patient encounter includes all but not limited to; Evaluation of laboratory results, pertinent imaging, and vital signs. Daily updates are discussed with any consulting services and family/medical power of  as needed. The patient's discharge  process begins at admission and daily contact with the patient's TCC and SW is pertinent in their efficient and safe discharge.

## 2024-11-26 NOTE — CONSULTS
"Nutrition Initial Assessment:   Nutrition Assessment    Reason for Assessment: Provider consult order, Enteral assessment/recommendation (TF), Initiate and manage tube feeding (S/p J tube for esophageal mass, enteral assessment/recommendation)    Patient is a 69 y.o. male presenting with  Esophageal mass.     PMHx of CAD s/p 4x CABG (~2005), HTN, HLD, hx of cluster HA, hypothyroidism (on levothyroxine) and tobacco use     Nutrition History:  Food and Nutrient History: Per review of records: Difficulty swallowing past few months, significantly worse over past month, unable to tolerate solids and liquids x 1 week, 40# weight loss over 6 months related to dysphagia, loss of mom, loss of appetite. SLP rec NPO 11/21/24. J tube placed 11/25/24.  Patient was lying in bed upon visit this morning, Isosource 1.5 infusing @ 10ml/hr.  Reports not following any special diet prior to admit.  Weight loss started in September then worsened with dysphagia in May.  Ate what he could up until he could not for example wanted a baked potato prior to hospital admit but could not get it down.  He had not tried any nutrition shakes such as Boost/Ensure etc.  Denies hx of ETOH intake.  He is looking forward to being able to eat again after his treatment and surgery.  Vitamin/Herbal Supplement Use: D3  Food Allergies:  None  GI Symptoms: Vomiting PTA  Oral Problems: Swallowing difficulty and missing teeth       Anthropometrics:  Height: 177.8 cm (5' 10\")   Weight: 93.9 kg (207 lb)   BMI (Calculated): 29.7  IBW/kg (Dietitian Calculated): 75.5 kg  Percent of IBW: 124 %       Weight History:   Wt Readings from Last 7 Encounters:   11/25/24 93.9 kg (207 lb)   11/21/24 93 kg (205 lb)   10/08/24 101 kg (222 lb)   03/20/24 115 kg (254 lb)   12/23/23 111 kg (245 lb)   09/20/23 114 kg (252 lb)   06/19/23 115 kg (253 lb)       Weight Change %:  Weight History / % Weight Change: 11/25/24) 93.9kg, 10/8/24) 101kg, 3/20/24) 115kg, 9/20/23) 114kg.  7% loss " over 1 month, 18% loss >6months  Significant Weight Loss: Yes  Interpretation of Weight Loss: >5% in 1 month    Nutrition Focused Physical Exam Findings:    Subcutaneous Fat Loss:   Orbital Fat Pads: Well nourished (slightly bulging fat pads)  Buccal Fat Pads: Well nourished (full, rounded cheeks)  Triceps: Mild-Moderate (less than ample fat tissue)  Muscle Wasting:  Temporalis: Mild-Moderate (slight depression)  Pectoralis (Clavicular Region): Mild-Moderate (some protrusion of clavicle)  Deltoid/Trapezius: Mild-Moderate (slight protrusion of acromion process)  Interosseous: Mild-Moderate (slightly depressed area between thumb and forefinger)  Trapezius/Infraspinatus/Supraspinatus (Scapular Region): Defer  Quadriceps: Mild-Moderate (mild depression on inner and outer thigh)  Gastrocnemius: Well nourished (well developed bulbous muscle)  Edema:     Physical Findings:  Eyes: Negative  Mouth: Positive (missing teeth)  Nails: Positive (thin/short)  Skin: Negative    Nutrition Significant Labs:  CBC Trend:   Results from last 7 days   Lab Units 11/26/24  0521 11/25/24  0517 11/24/24  0515 11/23/24  0502   WBC AUTO x10*3/uL 8.0 7.0 6.6 7.6   RBC AUTO x10*6/uL 4.74 4.55 4.54 4.69   HEMOGLOBIN g/dL 14.2 13.7 13.9 14.2   HEMATOCRIT % 41.5 40.2* 40.1* 41.2   MCV fL 88 88 88 88   PLATELETS AUTO x10*3/uL 153 145* 149* 152    , Renal Lab Trend:   Results from last 7 days   Lab Units 11/26/24  0521 11/25/24  0517 11/24/24  0515 11/23/24  0502 11/21/24  0511 11/20/24  2130   POTASSIUM mmol/L 3.9 3.6 4.0 4.0   < > 3.9   SODIUM mmol/L 136 138 138 137   < > 135*   MAGNESIUM mg/dL  --   --   --   --   --  1.89   EGFR mL/min/1.73m*2 >90 >90 >90 85   < > 67   BUN mg/dL 5* 5* 10 15   < > 10   CREATININE mg/dL 0.81 0.79 0.87 0.97   < > 1.18    < > = values in this interval not displayed.     Lab Results   Component Value Date    CALCIUM 9.3 11/26/2024          Nutrition Specific Medications:  Scheduled medications  amitriptyline, 100 mg,  oral, Nightly  aspirin, 81 mg, j-tube, Daily  atorvastatin, 40 mg, oral, Daily  buPROPion, 150 mg, j-tube, TID  [Held by provider] buPROPion XL, 450 mg, oral, Daily  ceFAZolin, , ,   cholecalciferol, 2,000 Units, oral, Daily  enoxaparin, 40 mg, subcutaneous, q24h  esmolol, , ,   glycopyrrolate, , ,   levothyroxine, 175 mcg, oral, Daily  losartan, 100 mg, oral, Daily  pantoprazole, 40 mg, intravenous, Daily  phenylephrine HCl in 0.9% NaCl, , ,   rocuronium, , ,   sevoflurane, , ,   spironolactone, 50 mg, oral, Daily  [Held by provider] tamsulosin, 0.4 mg, oral, Nightly  verapamil, 80 mg, j-tube, q8h OZIEL      Continuous medications  potassium chloride-D5-0.9%NaCl, 125 mL/hr, Last Rate: 125 mL/hr (11/26/24 0723)      I/O:    ;      Dietary Orders (From admission, onward)       Start     Ordered    11/25/24 1852  Enteral feeding with NPO Isosource 1.5; start at 22.00 on Nov 25th; 10  Diet effective now        Question Answer Comment   Tube feeding formula: Isosource 1.5    Tube feeding cyclic (start / stop time): start at 22.00 on Nov 25th    Tube feeding cyclic rate (mL/hr): 10        11/25/24 1852 11/22/24 0705  May Not Participate in Room Service  ( ROOM SERVICE MAY NOT PARTICIPATE)  Once        Question:  .  Answer:  Yes    11/22/24 0704                     Estimated Needs:   Total Energy Estimated Needs (kCal): 2500 kCal  Method for Estimating Needs: 32kcal/kg/DBW  Total Protein Estimated Needs (g): 115 g  Method for Estimating Needs: 1.5g/kg/DBW  Total Fluid Estimated Needs (mL): 2400 mL  Method for Estimating Needs: 25ml/kg        Nutrition Diagnosis   Malnutrition Diagnosis  Patient has Malnutrition Diagnosis: Yes  Diagnosis Status: New  Malnutrition Diagnosis: Severe malnutrition related to chronic disease or condition  As Evidenced by: <75% estimated intake > 1 month, >5% weight loss over 1 month and generalized mild/moderate muscle loss            Nutrition Interventions/Recommendations         Nutrition  Prescription:  Individualized Nutrition Prescription Provided for :     Recommend:     1) Add phosphorus to labs and replete if low.      2) Order daily Thiamine supplement 100mg and daily liquid multivitamin for 7-10days due to risk for refeeding syndrome.      3) For enteral nutrition can continue Isosource 1.5 formula, increase to 20ml/hr now then increase by 10ml every 12hrs to goal of 70ml/hr to provide 2520kcal, 114g protein and 1284ml H20/d.      4) Monitor daily electrolytes and replete PRN. Do not increase Tf rate if major shifts in blood sugars >180 or electrolytes occurs.      5) Once off IVF flush with 300ml H20 QID for total of ~2500ml H20/d.     6) Once patient tolerates goal tube feed rate for at least 24hrs can transition to nocturnal feeds with Isosource 1.5 @ 105ml/hr x 16hrs.      Nutrition Education:      Nutrition plan discussed with patient and questions answered.     Nutrition Monitoring and Evaluation   Food/Nutrient Related History Monitoring  Monitoring and Evaluation Plan: Enteral and parenteral nutrition intake  Criteria: Tf tolerance         Biochemical Data, Medical Tests and Procedures  Monitoring and Evaluation Plan: Electrolyte/renal panel, Glucose/endocrine profile  Criteria: WNL              Time Spent (min): 75 minutes

## 2024-11-26 NOTE — CARE PLAN
The patient's goals for the shift include      The clinical goals for the shift include Patient will tolerate TF throughout shift.      Problem: Pain - Adult  Goal: Verbalizes/displays adequate comfort level or baseline comfort level  Outcome: Progressing     Problem: Safety - Adult  Goal: Free from fall injury  Outcome: Progressing     Problem: Discharge Planning  Goal: Discharge to home or other facility with appropriate resources  Outcome: Progressing     Problem: Chronic Conditions and Co-morbidities  Goal: Patient's chronic conditions and co-morbidity symptoms are monitored and maintained or improved  Outcome: Progressing     Problem: Diabetes  Goal: No changes in neurological exam by end of shift  Outcome: Progressing  Goal: Vital signs within normal range for age by end of shift  Outcome: Progressing

## 2024-11-27 LAB
LAB AP ASR DISCLAIMER: NORMAL
LABORATORY COMMENT REPORT: NORMAL
PATH REPORT.FINAL DX SPEC: NORMAL
PATH REPORT.GROSS SPEC: NORMAL
PATH REPORT.RELEVANT HX SPEC: NORMAL
PATH REPORT.TOTAL CANCER: NORMAL
RESIDENT REVIEW: NORMAL

## 2024-11-27 PROCEDURE — 1100000001 HC PRIVATE ROOM DAILY

## 2024-11-27 PROCEDURE — 99233 SBSQ HOSP IP/OBS HIGH 50: CPT | Performed by: STUDENT IN AN ORGANIZED HEALTH CARE EDUCATION/TRAINING PROGRAM

## 2024-11-27 PROCEDURE — 2500000001 HC RX 250 WO HCPCS SELF ADMINISTERED DRUGS (ALT 637 FOR MEDICARE OP): Performed by: STUDENT IN AN ORGANIZED HEALTH CARE EDUCATION/TRAINING PROGRAM

## 2024-11-27 PROCEDURE — 2500000001 HC RX 250 WO HCPCS SELF ADMINISTERED DRUGS (ALT 637 FOR MEDICARE OP): Performed by: INTERNAL MEDICINE

## 2024-11-27 PROCEDURE — 2500000002 HC RX 250 W HCPCS SELF ADMINISTERED DRUGS (ALT 637 FOR MEDICARE OP, ALT 636 FOR OP/ED): Performed by: INTERNAL MEDICINE

## 2024-11-27 PROCEDURE — 99223 1ST HOSP IP/OBS HIGH 75: CPT | Performed by: STUDENT IN AN ORGANIZED HEALTH CARE EDUCATION/TRAINING PROGRAM

## 2024-11-27 PROCEDURE — 2500000004 HC RX 250 GENERAL PHARMACY W/ HCPCS (ALT 636 FOR OP/ED): Performed by: INTERNAL MEDICINE

## 2024-11-27 ASSESSMENT — COGNITIVE AND FUNCTIONAL STATUS - GENERAL
DAILY ACTIVITIY SCORE: 24
MOBILITY SCORE: 24

## 2024-11-27 ASSESSMENT — PAIN SCALES - GENERAL
PAINLEVEL_OUTOF10: 5 - MODERATE PAIN
PAINLEVEL_OUTOF10: 5 - MODERATE PAIN
PAINLEVEL_OUTOF10: 7
PAINLEVEL_OUTOF10: 7
PAINLEVEL_OUTOF10: 4
PAINLEVEL_OUTOF10: 5 - MODERATE PAIN

## 2024-11-27 ASSESSMENT — PAIN - FUNCTIONAL ASSESSMENT
PAIN_FUNCTIONAL_ASSESSMENT: 0-10

## 2024-11-27 NOTE — PROGRESS NOTES
"Timoteo Victoria is a 69 y.o. male on hospital day 5 of admission presenting with Esophageal mass.    Subjective     Biopsy of mass showed intramucosal adenocarcinoma. Will consult oncology.    Objective     GENERAL APPEARANCE: A&Ox3, appears in no acute distress  HEAD: normocephalic, atraumatic  THROAT: Oral cavity and pharynx normal. No inflammation, swelling, exudate, or lesions.  NECK: Neck supple, non-tender without lymphadenopathy, masses or thyromegaly.  CARDIAC: Normal S1 and S2. No S3, S4 or murmurs. Rhythm is regular. There is no peripheral edema, cyanosis or pallor. Extremities are warm and well perfused. No carotid bruits.  LUNGS: Clear to auscultation bilaterally without rales, rhonchi, wheezing or diminished breath sounds.  ABDOMEN: Positive bowel sounds. Soft, nondistended, nontender. No guarding or rebound. No masses.  EXTREMITIES: No significant deformity or joint abnormality. No edema. Peripheral pulses intact. No varicosities.  SKIN: Skin normal color, texture and turgor with no lesions or rash  PSYCHIATRIC: oriented to person, place, and time, good judgement and reason, without hallucinations, abnormal affect or abnormal behaviors during the examination. Patient is not suicidal.        Last Recorded Vitals  Blood pressure 140/62, pulse 63, temperature 36.3 °C (97.3 °F), temperature source Temporal, resp. rate 18, height 1.778 m (5' 10\"), weight 93.9 kg (207 lb), SpO2 100%.  Intake/Output last 3 Shifts:  I/O last 3 completed shifts:  In: 660 (7 mL/kg) [P.O.:60; NG/GT:600]  Out: - (0 mL/kg)   Weight: 93.9 kg     Relevant Results  Lab Results   Component Value Date    WBC 8.0 11/26/2024    HGB 14.2 11/26/2024    HCT 41.5 11/26/2024    MCV 88 11/26/2024     11/26/2024      Lab Results   Component Value Date    GLUCOSE 81 11/26/2024    CALCIUM 9.3 11/26/2024     11/26/2024    K 3.9 11/26/2024    CO2 28 11/26/2024     11/26/2024    BUN 5 (L) 11/26/2024    CREATININE 0.81 11/26/2024 "     Scheduled medications  amitriptyline, 100 mg, oral, Nightly  aspirin, 81 mg, j-tube, Daily  atorvastatin, 40 mg, oral, Daily  buPROPion, 150 mg, j-tube, TID  [Held by provider] buPROPion XL, 450 mg, oral, Daily  ceFAZolin, , ,   cholecalciferol, 2,000 Units, oral, Daily  enoxaparin, 40 mg, subcutaneous, q24h  esmolol, , ,   glycopyrrolate, , ,   levothyroxine, 175 mcg, oral, Daily  losartan, 100 mg, oral, Daily  pantoprazole, 40 mg, intravenous, Daily  phenylephrine HCl in 0.9% NaCl, , ,   rocuronium, , ,   sevoflurane, , ,   spironolactone, 50 mg, oral, Daily  [Held by provider] tamsulosin, 0.4 mg, oral, Nightly  verapamil, 80 mg, j-tube, q8h OZIEL      Continuous medications     PRN medications  PRN medications: acetaminophen, ceFAZolin, diphenhydrAMINE, esmolol, glycopyrrolate, phenylephrine HCl in 0.9% NaCl, rocuronium, sennosides-docusate sodium, sevoflurane, traMADol    Assessment/Plan     Timoteo Victoria is a 69 y.o. male w/ PMHx of CAD s/p 4x CABG (~2005), HTN, HLD, hx of cluster HA, hypothyroidism (on levothyroxine) and tobacco use presenting with dysphagia and EGD showed distal  esophageal mass, concerning for malignancy. 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     Dysphagia  Esophageal mass - intramucosal adenocarcinoma  - EGD on 11/21 showing esophageal mass with malignant appearance, not able to be crossed  - discussed with thoracic surgery @ [place] who recommended transfer to [facility] [place]  - thoracic surgery consulted,   - CT chest, abdomen, pelvis showed no evidence of Mets.   S/p J tube placement by Thoracic surgery today.   Still waiting for surgical pathology results, ( pending from 11/21)  - Mass biopsy shows intramucosal adenocarcinoma, will consult oncology for further treatment     Dysphagia/ lack of nutrition:  - S/p J tube on 11/25,   - Can start Trickle feeds at 10 PM  - Nutrition consulted, for J tube feeds.      HTN  CAD s/p CABG x4 ~ 2005  HLD  - patient asymptomatic from  a CAD standpoint  - on home lisinopril, faviola 50mg, verapamil 240mg and losartan 100mg  - on home aspirin and atorva 40mg  - RBBB noted on prior EKGs  - c/w home meds     Hypothyroidism (on home levothyroxine)  - TSH suppressed with elevated fT4 on admission  - given reports of weight loss (although unlikely related to thyroid disease),   reduced  dose to 175mcg  - will need to have TSH/fT4 checked in around 6 weeks     Hx of cluster HA  - per patient well controlled on amitriptyline  - c/w home amitriptyline     MDD  - on home buproprion, recently increased  - c/w home dose     Smoking  - smokes 0.7PPD on average since 17 yrs of age  - offered nicotine patch but patient refused for now        N: NPO except ice chips  A: PIV  DVT: Enoxaparin  GI: Home PPI  NOK: Eulogio (brother) [phone]  Code: Full Code (confirmed on admission)   Dispo: Biopsy shows intramucosal adenocarcinoma, will consult oncology     Venkatesh Shi MD     The patient encounter includes all but not limited to; Evaluation of laboratory results, pertinent imaging, and vital signs. Daily updates are discussed with any consulting services and family/medical power of  as needed. The patient's discharge  process begins at admission and daily contact with the patient's TCC and SW is pertinent in their efficient and safe discharge.

## 2024-11-27 NOTE — CONSULTS
Name: Timoteo Victoria  MRN: [id number]  Admit Date: 11/22/2024  Encounter Date: 11/27/2024  PCP: Dafne Bedolla DO    Reason for consult: newly diagnosed esophageal adenocarcinoma  Attending provider: Hector Shi MD  Consult attending provider: Dr. Wilson    Oncology Consult Note      History of Present Illness   Timoteo Victoria is a 69 y.o. male with a notable history of CAD (CABG 2005), HTN, HLD, chronic tobacco use, hypothyroidism presented on 11/25 as a transfer from [facility] for cancer workup. He had been having trouble swallowing for the past month, initially solids and progressing to liquids and medications. He has lost 40 lbs in the past 6 months. EGD on 11/21/24 showed an esophageal mass with a malignant appearance, with biopsy showing intramucosal adenocarcinoma. The patient had a J tube placed on 11/25. Oncology is consulted for further management of this newly diagnosed esophageal adenocarcinoma.    Currently he feels well. He does have some tenderness at the site of the J tube placement.    Onc History   11/21/24 [facility] admission for weight loss, dysphagia; EGD showed esophageal mass with biopsy confirming intramucosal adenocarcinoma  11/20/24 CT neck with no masses  11/22/24 CT CAP with contrast with irregular masslike thickening of distal esophagus, no obvious metastases  11/25/24 J tube placement      Oncology History    No history exists.       Past Medical History     Past Medical History:   Diagnosis Date    Hyperlipidemia     Hypertension     Personal history of other endocrine, nutritional and metabolic disease     History of hypothyroidism    Type 2 diabetes mellitus          Past Surgical History     Past Surgical History:   Procedure Laterality Date    CATARACT EXTRACTION Right 08/25/2023    CHOLECYSTECTOMY  09/18/2018    Cholecystectomy    CORONARY ARTERY BYPASS GRAFT  09/18/2018    CABG    HERNIA REPAIR  09/18/2018    Inguinal Hernia Repair         Family History       Family History   Problem Relation Name Age of Onset    COPD Mother     Maternal grandmother  of pancreatic cancer  Maternal aunt  of pancreatic cancer  Paternal aunt had breast cancer      Social History   Worked at a box factory, retired a few years ago  Lives alone in [place]  Social History     Socioeconomic History    Marital status:    Tobacco Use    Smoking status: Every Day     Current packs/day: 1.00     Average packs/day: 1 pack/day for 45.9 years (45.9 ttl pk-yrs)     Types: Cigarettes     Start date:     Smokeless tobacco: Never   Vaping Use    Vaping status: Never Used   Substance and Sexual Activity    Alcohol use: Not Currently    Drug use: Never     Social Drivers of Health     Financial Resource Strain: Low Risk  (2024)    Overall Financial Resource Strain (CARDIA)     Difficulty of Paying Living Expenses: Not very hard   Food Insecurity: No Food Insecurity (2024)    Hunger Vital Sign     Worried About Running Out of Food in the Last Year: Never true     Ran Out of Food in the Last Year: Never true   Transportation Needs: No Transportation Needs (2024)    PRAPARE - Transportation     Lack of Transportation (Medical): No     Lack of Transportation (Non-Medical): No   Intimate Partner Violence: Not At Risk (2024)    Humiliation, Afraid, Rape, and Kick questionnaire     Fear of Current or Ex-Partner: No     Emotionally Abused: No     Physically Abused: No     Sexually Abused: No   Housing Stability: Low Risk  (2024)    Housing Stability Vital Sign     Unable to Pay for Housing in the Last Year: No     Number of Times Moved in the Last Year: 1     Homeless in the Last Year: No         Allergies     Allergies   Allergen Reactions    Ibuprofen Swelling and Unknown       Medications   amitriptyline, 100 mg, Nightly  aspirin, 81 mg, Daily  atorvastatin, 40 mg, Daily  buPROPion, 150 mg, TID  [Held by provider] buPROPion XL, 450 mg, Daily  ceFAZolin, ,  "  cholecalciferol, 2,000 Units, Daily  enoxaparin, 40 mg, q24h  esmolol, ,   glycopyrrolate, ,   levothyroxine, 175 mcg, Daily  losartan, 100 mg, Daily  pantoprazole, 40 mg, Daily  phenylephrine HCl in 0.9% NaCl, ,   rocuronium, ,   sevoflurane, ,   spironolactone, 50 mg, Daily  [Held by provider] tamsulosin, 0.4 mg, Nightly  verapamil, 80 mg, q8h OZIEL         acetaminophen, 650 mg, q6h PRN  ceFAZolin, ,   diphenhydrAMINE, 25 mg, q6h PRN  esmolol, ,   glycopyrrolate, ,   phenylephrine HCl in 0.9% NaCl, ,   rocuronium, ,   sennosides-docusate sodium, 1 tablet, Nightly PRN  sevoflurane, ,   traMADol, 50 mg, q6h PRN        Review of Systems   12-point ROS negative, except as specified in the HPI.    Physical Exam   /62 (BP Location: Right arm, Patient Position: Lying)   Pulse 63   Temp 36.3 °C (97.3 °F) (Temporal)   Resp 18   Ht 1.778 m (5' 10\")   Wt 93.9 kg (207 lb)   SpO2 100%   BMI 29.70 kg/m²     General: pleasant, awake, alert, no acute distress  HEENT: normocephalic, atraumatic  Neck: no palpable lymphadenopathy  CV: normal rate, regular rhythm, no MRG  Resp: CTAB, no labored breathing  Abdominal: soft, non-tender, non-distended, active bowel sounds; tenderness in the LUQ at the site of the J tube placement  Extremities: full ROM, no lower extremity swelling  Neuro: no focal neuro deficits  Skin: no rashes, erythema, or ecchymoses  Psych: normal affect and mood, appropriate judgment    Labs   Reviewed    Imaging   Reviewed    Assessment/Plan     Timoteo Victoria is a 69 y.o. male with a notable history of CAD (CABG 2005), HTN, HLD, chronic tobacco use, hypothyroidism presented on 11/25 as a transfer from [facility] for cancer workup. He had been having trouble swallowing for the past month, initially solids and progressing to liquids and medications. He has lost 40 lbs in the past 6 months. EGD on 11/21/24 showed an esophageal mass with a malignant appearance, with biopsy showing intramucosal " adenocarcinoma. The patient had a J tube placed on 11/25. Oncology is consulted for further management of this newly diagnosed esophageal adenocarcinoma.    I discussed with the patient it appears the cancer is localized, and the final decision to be made about any neoadjuvant therapy will be done in the outpatient setting. The patient will also follow up with GI and thoracic surgery.    Recommendations:  Stat PET/CT scan in the outpatient setting to fully complete the staging.  Patient's tumor will need MMR status checked.  Please ensure patient sees medical oncology at [facility] within the next 1-2 weeks.    Thank you for this consult. Patient was discussed with Dr. Wilson.    Jg Talbot MD  Medical Oncology Fellow  11/27/2024

## 2024-11-27 NOTE — CARE PLAN
Problem: Pain - Adult  Goal: Verbalizes/displays adequate comfort level or baseline comfort level  Outcome: Progressing     Problem: Safety - Adult  Goal: Free from fall injury  Outcome: Progressing     Problem: Chronic Conditions and Co-morbidities  Goal: Patient's chronic conditions and co-morbidity symptoms are monitored and maintained or improved  Outcome: Progressing     Problem: Diabetes  Goal: Maintain electrolyte levels within acceptable range throughout shift  Outcome: Progressing  Goal: No changes in neurological exam by end of shift  Outcome: Progressing  Goal: Vital signs within normal range for age by end of shift  Flowsheets (Taken 11/26/2024 7489)  Vital signs within normal range for age by end of shift: Med administration/monitoring of effect

## 2024-11-27 NOTE — PROGRESS NOTES
"Timoteo Victoria is a 69 y.o. male on day 5 of admission presenting with an Esophageal mass.    Subjective   Pt feeling okay today. Reports mild pain around the J-tube insertion site especially with movement and engagement of the abdominal muscles. Otherwise, he is tolerating tube feeds at 30cc/hr without nausea, vomiting, or abdominal distension.    Objective   Constitutional: NAD, A&Ox3, resting comfortably in bed  Head/Neck: NCAT  Eyes: Anicteric   Respiratory: Breathing comfortably on RA with symmetric chest rise   Abdominal: Soft, non-distended, abdomen. Appropriate mild tenderness to palpation around J-tube site. 30cc/hr of tube feeds infusing.  Extremities: Moving all four extremities   Psych: Appropriate mood and affect   Skin: Warm and dry    Last Recorded Vitals  Blood pressure 136/76, pulse 76, temperature 36 °C (96.8 °F), temperature source Temporal, resp. rate 16, height 1.778 m (5' 10\"), weight 93.9 kg (207 lb), SpO2 99%.    Intake/Output last 3 Shifts:  I/O last 3 completed shifts:  In: 660 (7 mL/kg) [P.O.:60; NG/GT:600]  Out: - (0 mL/kg)   Weight: 93.9 kg     Scheduled medications  amitriptyline, 100 mg, oral, Nightly  aspirin, 81 mg, j-tube, Daily  atorvastatin, 40 mg, oral, Daily  buPROPion, 150 mg, j-tube, TID  [Held by provider] buPROPion XL, 450 mg, oral, Daily  ceFAZolin, , ,   cholecalciferol, 2,000 Units, oral, Daily  enoxaparin, 40 mg, subcutaneous, q24h  esmolol, , ,   glycopyrrolate, , ,   levothyroxine, 175 mcg, oral, Daily  losartan, 100 mg, oral, Daily  pantoprazole, 40 mg, intravenous, Daily  phenylephrine HCl in 0.9% NaCl, , ,   rocuronium, , ,   sevoflurane, , ,   spironolactone, 50 mg, oral, Daily  [Held by provider] tamsulosin, 0.4 mg, oral, Nightly  verapamil, 80 mg, j-tube, q8h OZIEL      Continuous medications     PRN medications: acetaminophen, ceFAZolin, diphenhydrAMINE, esmolol, glycopyrrolate, phenylephrine HCl in 0.9% NaCl, rocuronium, sennosides-docusate sodium, " sevoflurane, traMADol     Relevant Results  No results found for this or any previous visit (from the past 24 hours).     Assessment/Plan   Timoteo Victoria is a 69 year old male with a past medical history of CAD s/p CABGx4 (~2005), HTN, HLD, headaches, hypothyroidism, and tobacco use who presented with a 40 pound weight loss over the past 6 months and dysphagia to liquids and solids. An EGD showed a lower esophageal mass. Thoracic surgery was consulted for recommendations on management and possible feeding tube placement.     Pt is now s/p lap J-tube placement on 11/25 with Dr. Salas. Tube feeds currently running at 30cc/hr with good tolerance.    Recommendations:   -Continue to slowly advance tube feeds as tolerated to goal (70cc/hr), as per nutrition's recs  -Follow up biopsy results  -Pt to follow up with Dr. Salas in the outpatient setting on 12/12/24 for a postop check of the feeding tube and to discuss biopsy results  -Appreciate remainder of care per primary team   -Thoracic surgery will continue to be available but may not follow daily  -Please page [phone] with any thoracic surgery questions or concerns    Pt seen and evaluated. Pt discussed with Dr. Salas.    Betsy Barlow PA-C

## 2024-11-28 PROCEDURE — 1100000001 HC PRIVATE ROOM DAILY

## 2024-11-28 PROCEDURE — 2500000001 HC RX 250 WO HCPCS SELF ADMINISTERED DRUGS (ALT 637 FOR MEDICARE OP): Performed by: INTERNAL MEDICINE

## 2024-11-28 PROCEDURE — 99233 SBSQ HOSP IP/OBS HIGH 50: CPT | Performed by: STUDENT IN AN ORGANIZED HEALTH CARE EDUCATION/TRAINING PROGRAM

## 2024-11-28 PROCEDURE — 2500000001 HC RX 250 WO HCPCS SELF ADMINISTERED DRUGS (ALT 637 FOR MEDICARE OP): Performed by: STUDENT IN AN ORGANIZED HEALTH CARE EDUCATION/TRAINING PROGRAM

## 2024-11-28 PROCEDURE — 2500000004 HC RX 250 GENERAL PHARMACY W/ HCPCS (ALT 636 FOR OP/ED): Performed by: INTERNAL MEDICINE

## 2024-11-28 PROCEDURE — 2500000002 HC RX 250 W HCPCS SELF ADMINISTERED DRUGS (ALT 637 FOR MEDICARE OP, ALT 636 FOR OP/ED): Performed by: INTERNAL MEDICINE

## 2024-11-28 RX ORDER — VERAPAMIL HYDROCHLORIDE 80 MG/1
80 TABLET ORAL EVERY 8 HOURS SCHEDULED
Start: 2024-11-28 | End: 2024-11-30

## 2024-11-28 RX ORDER — AMOXICILLIN 250 MG
1 CAPSULE ORAL NIGHTLY PRN
Start: 2024-11-28 | End: 2024-11-30

## 2024-11-28 RX ORDER — NAPROXEN SODIUM 220 MG/1
81 TABLET, FILM COATED ORAL DAILY
Start: 2024-11-29 | End: 2024-11-30

## 2024-11-28 RX ORDER — TRAMADOL HYDROCHLORIDE 50 MG/1
50 TABLET ORAL EVERY 6 HOURS PRN
Start: 2024-11-28 | End: 2024-11-30

## 2024-11-28 RX ORDER — LOSARTAN POTASSIUM 100 MG/1
100 TABLET ORAL DAILY
Start: 2024-11-29 | End: 2024-11-30

## 2024-11-28 RX ORDER — BUPROPION HYDROCHLORIDE 75 MG/1
150 TABLET ORAL 3 TIMES DAILY
Start: 2024-11-28 | End: 2024-11-30

## 2024-11-28 ASSESSMENT — COGNITIVE AND FUNCTIONAL STATUS - GENERAL
MOBILITY SCORE: 24
DAILY ACTIVITIY SCORE: 24
MOBILITY SCORE: 24
DAILY ACTIVITIY SCORE: 24

## 2024-11-28 ASSESSMENT — PAIN SCALES - GENERAL
PAINLEVEL_OUTOF10: 5 - MODERATE PAIN
PAINLEVEL_OUTOF10: 5 - MODERATE PAIN
PAINLEVEL_OUTOF10: 7

## 2024-11-28 NOTE — PROGRESS NOTES
11/28/24 1105   Discharge Planning   Living Arrangements Alone   Support Systems Children   Type of Residence Private residence   Who is requesting discharge planning? Provider   Home or Post Acute Services In home services   Type of Home Care Services Home nursing visits   Expected Discharge Disposition Home H  ( HC)   Does the patient need discharge transport arranged? No     Patient is medically ready for discharge.  Patient new J tube requiring enteral nutrition.  Referral placed with [facility] and HC referral to  HC.

## 2024-11-28 NOTE — CARE PLAN
Problem: Pain - Adult  Goal: Verbalizes/displays adequate comfort level or baseline comfort level  Outcome: Progressing     Problem: Safety - Adult  Goal: Free from fall injury  Outcome: Progressing     Problem: Discharge Planning  Goal: Discharge to home or other facility with appropriate resources  Outcome: Progressing     Problem: Chronic Conditions and Co-morbidities  Goal: Patient's chronic conditions and co-morbidity symptoms are monitored and maintained or improved  Outcome: Progressing     Problem: Diabetes  Goal: Achieve decreasing blood glucose levels by end of shift  Outcome: Progressing  Goal: Increase stability of blood glucose readings by end of shift  Outcome: Progressing  Goal: Decrease in ketones present in urine by end of shift  Outcome: Progressing  Goal: Maintain electrolyte levels within acceptable range throughout shift  Outcome: Progressing  Goal: Maintain glucose levels >70mg/dl to <250mg/dl throughout shift  Outcome: Progressing  Goal: No changes in neurological exam by end of shift  Outcome: Progressing  Goal: Learn about and adhere to nutrition recommendations by end of shift  Outcome: Progressing  Goal: Vital signs within normal range for age by end of shift  Outcome: Progressing  Goal: Increase self care and/or family involovement by end of shift  Outcome: Progressing  Goal: Receive DSME education by end of shift  Outcome: Progressing      no

## 2024-11-28 NOTE — CARE PLAN
The patient's goals for the shift include      The clinical goals for the shift include patient will report tolerable pain levels throughout the night.      Problem: Pain - Adult  Goal: Verbalizes/displays adequate comfort level or baseline comfort level  Outcome: Progressing     Problem: Safety - Adult  Goal: Free from fall injury  Outcome: Progressing     Problem: Discharge Planning  Goal: Discharge to home or other facility with appropriate resources  Outcome: Progressing     Problem: Chronic Conditions and Co-morbidities  Goal: Patient's chronic conditions and co-morbidity symptoms are monitored and maintained or improved  Outcome: Progressing     Problem: Diabetes  Goal: No changes in neurological exam by end of shift  Outcome: Progressing

## 2024-11-28 NOTE — PROGRESS NOTES
"Timoteo Victoria is a 69 y.o. male on hospital day 6 of admission presenting with Esophageal mass.    Subjective     The patient was seen and examined at bedside. Discharge pending home care referral.     Objective     GENERAL APPEARANCE: A&Ox3, appears in no acute distress  HEAD: normocephalic, atraumatic  THROAT: Oral cavity and pharynx normal. No inflammation, swelling, exudate, or lesions.  NECK: Neck supple, non-tender without lymphadenopathy, masses or thyromegaly.  CARDIAC: Normal S1 and S2. No S3, S4 or murmurs. Rhythm is regular. There is no peripheral edema, cyanosis or pallor. Extremities are warm and well perfused. No carotid bruits.  LUNGS: Clear to auscultation bilaterally without rales, rhonchi, wheezing or diminished breath sounds.  ABDOMEN: Positive bowel sounds. Soft, nondistended, nontender. No guarding or rebound. No masses.  EXTREMITIES: No significant deformity or joint abnormality. No edema. Peripheral pulses intact. No varicosities.  SKIN: Skin normal color, texture and turgor with no lesions or rash  PSYCHIATRIC: oriented to person, place, and time, good judgement and reason, without hallucinations, abnormal affect or abnormal behaviors during the examination. Patient is not suicidal.        Last Recorded Vitals  Blood pressure 125/61, pulse 66, temperature 36.3 °C (97.3 °F), resp. rate 19, height 1.778 m (5' 10\"), weight 93.9 kg (207 lb), SpO2 97%.  Intake/Output last 3 Shifts:  I/O last 3 completed shifts:  In: 1560 (16.6 mL/kg) [P.O.:60; NG/GT:1500]  Out: - (0 mL/kg)   Weight: 93.9 kg     Relevant Results  Lab Results   Component Value Date    WBC 8.0 11/26/2024    HGB 14.2 11/26/2024    HCT 41.5 11/26/2024    MCV 88 11/26/2024     11/26/2024      Lab Results   Component Value Date    GLUCOSE 81 11/26/2024    CALCIUM 9.3 11/26/2024     11/26/2024    K 3.9 11/26/2024    CO2 28 11/26/2024     11/26/2024    BUN 5 (L) 11/26/2024    CREATININE 0.81 11/26/2024     Scheduled " medications  amitriptyline, 100 mg, oral, Nightly  aspirin, 81 mg, j-tube, Daily  atorvastatin, 40 mg, oral, Daily  buPROPion, 150 mg, j-tube, TID  [Held by provider] buPROPion XL, 450 mg, oral, Daily  ceFAZolin, , ,   cholecalciferol, 2,000 Units, oral, Daily  enoxaparin, 40 mg, subcutaneous, q24h  esmolol, , ,   glycopyrrolate, , ,   levothyroxine, 175 mcg, oral, Daily  losartan, 100 mg, oral, Daily  pantoprazole, 40 mg, intravenous, Daily  phenylephrine HCl in 0.9% NaCl, , ,   rocuronium, , ,   sevoflurane, , ,   spironolactone, 50 mg, oral, Daily  [Held by provider] tamsulosin, 0.4 mg, oral, Nightly  verapamil, 80 mg, j-tube, q8h OZIEL      Continuous medications     PRN medications  PRN medications: acetaminophen, ceFAZolin, diphenhydrAMINE, esmolol, glycopyrrolate, phenylephrine HCl in 0.9% NaCl, rocuronium, sennosides-docusate sodium, sevoflurane, traMADol    Assessment/Plan     Timoteo Victoria is a 69 y.o. male w/ PMHx of CAD s/p 4x CABG (~2005), HTN, HLD, hx of cluster HA, hypothyroidism (on levothyroxine) and tobacco use presenting with dysphagia and EGD showed distal  esophageal mass, concerning for malignancy. 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     Dysphagia  Esophageal mass - intramucosal adenocarcinoma  - EGD on 11/21 showing esophageal mass with malignant appearance, not able to be crossed  - discussed with thoracic surgery @ [place] who recommended transfer to [facility]  - thoracic surgery consulted,   - CT chest, abdomen, pelvis showed no evidence of Mets.   S/p J tube placement by Thoracic surgery today.   Still waiting for surgical pathology results, ( pending from 11/21)  - Mass biopsy shows intramucosal adenocarcinoma, oncology to follow-up as an outpatient, will discharge when HC set up for enteral feeds     Dysphagia/ lack of nutrition:  - S/p J tube on 11/25,   - Can start Trickle feeds at 10 PM  - Nutrition consulted, for J tube feeds.      HTN  CAD s/p CABG x4 ~  2005  HLD  - patient asymptomatic from a CAD standpoint  - on home lisinopril, faviola 50mg, verapamil 240mg and losartan 100mg  - on home aspirin and atorva 40mg  - RBBB noted on prior EKGs  - c/w home meds     Hypothyroidism (on home levothyroxine)  - TSH suppressed with elevated fT4 on admission  - given reports of weight loss (although unlikely related to thyroid disease),   reduced  dose to 175mcg  - will need to have TSH/fT4 checked in around 6 weeks     Hx of cluster HA  - per patient well controlled on amitriptyline  - c/w home amitriptyline     MDD  - on home buproprion, recently increased  - c/w home dose     Smoking  - smokes 0.7PPD on average since 17 yrs of age  - offered nicotine patch but patient refused for now        N: NPO except ice chips  A: PIV  DVT: Enoxaparin  GI: Home PPI  NOK: Eulogio (brother) [phone]  Code: Full Code (confirmed on admission)   Dispo: Biopsy shows intramucosal adenocarcinoma, oncology to follow-up as outpatient, will need PET scan on discharge, HC referral placed, can be discharged when accepted     Venkatesh Shi MD     The patient encounter includes all but not limited to; Evaluation of laboratory results, pertinent imaging, and vital signs. Daily updates are discussed with any consulting services and family/medical power of  as needed. The patient's discharge  process begins at admission and daily contact with the patient's TCC and SW is pertinent in their efficient and safe discharge.

## 2024-11-29 PROCEDURE — 2500000001 HC RX 250 WO HCPCS SELF ADMINISTERED DRUGS (ALT 637 FOR MEDICARE OP): Performed by: STUDENT IN AN ORGANIZED HEALTH CARE EDUCATION/TRAINING PROGRAM

## 2024-11-29 PROCEDURE — 1100000001 HC PRIVATE ROOM DAILY

## 2024-11-29 PROCEDURE — 2500000002 HC RX 250 W HCPCS SELF ADMINISTERED DRUGS (ALT 637 FOR MEDICARE OP, ALT 636 FOR OP/ED): Performed by: INTERNAL MEDICINE

## 2024-11-29 PROCEDURE — 99233 SBSQ HOSP IP/OBS HIGH 50: CPT | Performed by: STUDENT IN AN ORGANIZED HEALTH CARE EDUCATION/TRAINING PROGRAM

## 2024-11-29 PROCEDURE — 2500000001 HC RX 250 WO HCPCS SELF ADMINISTERED DRUGS (ALT 637 FOR MEDICARE OP): Performed by: INTERNAL MEDICINE

## 2024-11-29 PROCEDURE — 2500000004 HC RX 250 GENERAL PHARMACY W/ HCPCS (ALT 636 FOR OP/ED): Performed by: INTERNAL MEDICINE

## 2024-11-29 ASSESSMENT — COGNITIVE AND FUNCTIONAL STATUS - GENERAL
MOBILITY SCORE: 24
MOBILITY SCORE: 24
DAILY ACTIVITIY SCORE: 24
DAILY ACTIVITIY SCORE: 24

## 2024-11-29 ASSESSMENT — PAIN SCALES - GENERAL
PAINLEVEL_OUTOF10: 4
PAINLEVEL_OUTOF10: 4
PAINLEVEL_OUTOF10: 5 - MODERATE PAIN

## 2024-11-29 ASSESSMENT — PAIN - FUNCTIONAL ASSESSMENT: PAIN_FUNCTIONAL_ASSESSMENT: 0-10

## 2024-11-29 NOTE — PROGRESS NOTES
11/29/24 1819   Discharge Planning   Home or Post Acute Services In home services  (Patient new J tube requiring enteral nutrition.  Referral placed with [facility] and HC referral to [facility].)   Expected Discharge Disposition Home H   Does the patient need discharge transport arranged? No     Pt  Patient is medically ready for discharge. Patient new J tube requiring enteral nutrition. Referral placed with [facility]. Pankaj verified they can be at pt's house with supplies by tomorrow. Pt states he prefers [facility]. SOC date will need to be verified with [facility]. Pt states his ride will be able to pick him up tomorrow. MD aware. This TCC will continue to monitor and update with any changes.    Elizabet Smiley  PRN, TCC  [phone]  [email or web address]

## 2024-11-29 NOTE — CARE PLAN
The patient's goals for the shift include    Problem: Pain - Adult  Goal: Verbalizes/displays adequate comfort level or baseline comfort level  Outcome: Progressing     Problem: Safety - Adult  Goal: Free from fall injury  Outcome: Progressing     Problem: Discharge Planning  Goal: Discharge to home or other facility with appropriate resources  Outcome: Progressing     Problem: Chronic Conditions and Co-morbidities  Goal: Patient's chronic conditions and co-morbidity symptoms are monitored and maintained or improved  Outcome: Progressing     Problem: Diabetes  Goal: Achieve decreasing blood glucose levels by end of shift  Outcome: Progressing  Goal: Increase stability of blood glucose readings by end of shift  Outcome: Progressing  Goal: Decrease in ketones present in urine by end of shift  Outcome: Progressing  Goal: Maintain electrolyte levels within acceptable range throughout shift  Outcome: Progressing  Goal: Maintain glucose levels >70mg/dl to <250mg/dl throughout shift  Outcome: Progressing  Goal: No changes in neurological exam by end of shift  Outcome: Progressing  Goal: Learn about and adhere to nutrition recommendations by end of shift  Outcome: Progressing  Goal: Vital signs within normal range for age by end of shift  Outcome: Progressing  Goal: Increase self care and/or family involovement by end of shift  Outcome: Progressing  Goal: Receive DSME education by end of shift  Outcome: Progressing       The clinical goals for the shift include Pt will remain safe, HDS and free from injury and falls this shift.

## 2024-11-29 NOTE — CARE PLAN
The patient's goals for the shift include      The clinical goals for the shift include Pt will remain fall free per shift    Problem: Pain - Adult  Goal: Verbalizes/displays adequate comfort level or baseline comfort level  Outcome: Progressing     Problem: Safety - Adult  Goal: Free from fall injury  Outcome: Progressing     Problem: Discharge Planning  Goal: Discharge to home or other facility with appropriate resources  Outcome: Progressing     Problem: Chronic Conditions and Co-morbidities  Goal: Patient's chronic conditions and co-morbidity symptoms are monitored and maintained or improved  Outcome: Progressing     Problem: Diabetes  Goal: Achieve decreasing blood glucose levels by end of shift  Outcome: Progressing  Goal: Increase stability of blood glucose readings by end of shift  Outcome: Progressing  Goal: Decrease in ketones present in urine by end of shift  Outcome: Progressing  Goal: Maintain electrolyte levels within acceptable range throughout shift  Outcome: Progressing  Goal: Maintain glucose levels >70mg/dl to <250mg/dl throughout shift  Outcome: Progressing  Goal: No changes in neurological exam by end of shift  Outcome: Progressing  Goal: Learn about and adhere to nutrition recommendations by end of shift  Outcome: Progressing  Goal: Vital signs within normal range for age by end of shift  Outcome: Progressing  Goal: Increase self care and/or family involovement by end of shift  Outcome: Progressing  Goal: Receive DSME education by end of shift  Outcome: Progressing

## 2024-11-29 NOTE — PROGRESS NOTES
"Timoteo Victoria is a 69 y.o. male on hospital day 7 of admission presenting with Esophageal mass.    Subjective     Patient complains of some discomfort at the J-tube site. He states his PRN regimen is mostly adequate.    Objective     GENERAL APPEARANCE: A&Ox3, appears in no acute distress  HEAD: normocephalic, atraumatic  THROAT: Oral cavity and pharynx normal. No inflammation, swelling, exudate, or lesions.  NECK: Neck supple, non-tender without lymphadenopathy, masses or thyromegaly.  CARDIAC: Normal S1 and S2. No S3, S4 or murmurs. Rhythm is regular. There is no peripheral edema, cyanosis or pallor. Extremities are warm and well perfused. No carotid bruits.  LUNGS: Clear to auscultation bilaterally without rales, rhonchi, wheezing or diminished breath sounds.  ABDOMEN: Positive bowel sounds. Soft, nondistended, nontender. No guarding or rebound. No masses.  EXTREMITIES: No significant deformity or joint abnormality. No edema. Peripheral pulses intact. No varicosities.  SKIN: Skin normal color, texture and turgor with no lesions or rash  PSYCHIATRIC: oriented to person, place, and time, good judgement and reason, without hallucinations, abnormal affect or abnormal behaviors during the examination. Patient is not suicidal.        Last Recorded Vitals  Blood pressure 147/71, pulse 77, temperature 36.3 °C (97.3 °F), resp. rate 16, height 1.778 m (5' 10\"), weight 93.9 kg (207 lb), SpO2 97%.  Intake/Output last 3 Shifts:  I/O last 3 completed shifts:  In: 2480 (26.4 mL/kg) [P.O.:60; NG/GT:2420]  Out: 0 (0 mL/kg)   Weight: 93.9 kg     Relevant Results  Lab Results   Component Value Date    WBC 8.0 11/26/2024    HGB 14.2 11/26/2024    HCT 41.5 11/26/2024    MCV 88 11/26/2024     11/26/2024      Lab Results   Component Value Date    GLUCOSE 81 11/26/2024    CALCIUM 9.3 11/26/2024     11/26/2024    K 3.9 11/26/2024    CO2 28 11/26/2024     11/26/2024    BUN 5 (L) 11/26/2024    CREATININE 0.81 " 11/26/2024     Scheduled medications  amitriptyline, 100 mg, oral, Nightly  aspirin, 81 mg, j-tube, Daily  atorvastatin, 40 mg, oral, Daily  buPROPion, 150 mg, j-tube, TID  [Held by provider] buPROPion XL, 450 mg, oral, Daily  ceFAZolin, , ,   cholecalciferol, 2,000 Units, oral, Daily  enoxaparin, 40 mg, subcutaneous, q24h  esmolol, , ,   glycopyrrolate, , ,   levothyroxine, 175 mcg, oral, Daily  losartan, 100 mg, oral, Daily  pantoprazole, 40 mg, intravenous, Daily  phenylephrine HCl in 0.9% NaCl, , ,   rocuronium, , ,   sevoflurane, , ,   spironolactone, 50 mg, oral, Daily  [Held by provider] tamsulosin, 0.4 mg, oral, Nightly  verapamil, 80 mg, j-tube, q8h OZIEL      Continuous medications     PRN medications  PRN medications: acetaminophen, ceFAZolin, diphenhydrAMINE, esmolol, glycopyrrolate, phenylephrine HCl in 0.9% NaCl, rocuronium, sennosides-docusate sodium, sevoflurane, traMADol    Assessment/Plan     Timoteo Victoria is a 69 y.o. male w/ PMHx of CAD s/p 4x CABG (~2005), HTN, HLD, hx of cluster HA, hypothyroidism (on levothyroxine) and tobacco use presenting with dysphagia and EGD showed distal  esophageal mass, concerning for malignancy. 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     Dysphagia  Esophageal mass - intramucosal adenocarcinoma  - EGD on 11/21 showing esophageal mass with malignant appearance, not able to be crossed  - discussed with thoracic surgery @ [place] who recommended transfer to [facility]  - thoracic surgery consulted,   - CT chest, abdomen, pelvis showed no evidence of Mets.   S/p J tube placement by Thoracic surgery today.   Still waiting for surgical pathology results, ( pending from 11/21)  - Mass biopsy shows intramucosal adenocarcinoma, oncology to follow-up as an outpatient, will discharge when HC set up for enteral feeds     Dysphagia/ lack of nutrition:  - S/p J tube on 11/25,   - Can start Trickle feeds at 10 PM  - Nutrition consulted, for J tube feeds.       HTN  CAD s/p CABG x4 ~ 2005  HLD  - patient asymptomatic from a CAD standpoint  - on home lisinopril, faviola 50mg, verapamil 240mg and losartan 100mg  - on home aspirin and atorva 40mg  - RBBB noted on prior EKGs  - c/w home meds     Hypothyroidism (on home levothyroxine)  - TSH suppressed with elevated fT4 on admission  - given reports of weight loss (although unlikely related to thyroid disease),   reduced  dose to 175mcg  - will need to have TSH/fT4 checked in around 6 weeks     Hx of cluster HA  - per patient well controlled on amitriptyline  - c/w home amitriptyline     MDD  - on home buproprion, recently increased  - c/w home dose     Smoking  - smokes 0.7PPD on average since 17 yrs of age  - offered nicotine patch but patient refused for now        N: NPO except ice chips  A: PIV  DVT: Enoxaparin  GI: Home PPI  NOK: Eulogio (brother) [phone]  Code: Full Code (confirmed on admission)   Dispo: Biopsy shows intramucosal adenocarcinoma, oncology to follow-up as outpatient, will need PET scan on discharge, HC referral placed and accepted. Awaiting for supply company to approve tube feeds then can dc     Venkatesh Shi MD     The patient encounter includes all but not limited to; Evaluation of laboratory results, pertinent imaging, and vital signs. Daily updates are discussed with any consulting services and family/medical power of  as needed. The patient's discharge  process begins at admission and daily contact with the patient's TCC and SW is pertinent in their efficient and safe discharge.

## 2024-11-30 ENCOUNTER — HOME CARE VISIT (OUTPATIENT)
Dept: HOME HEALTH SERVICES | Facility: HOME HEALTH | Age: 69
End: 2024-11-30
Payer: MEDICARE

## 2024-11-30 ENCOUNTER — HOME HEALTH ADMISSION (OUTPATIENT)
Dept: HOME HEALTH SERVICES | Facility: HOME HEALTH | Age: 69
End: 2024-11-30
Payer: MEDICARE

## 2024-11-30 ENCOUNTER — DOCUMENTATION (OUTPATIENT)
Dept: HOME HEALTH SERVICES | Facility: HOME HEALTH | Age: 69
End: 2024-11-30
Payer: MEDICARE

## 2024-11-30 VITALS
HEART RATE: 74 BPM | WEIGHT: 207 LBS | TEMPERATURE: 97.7 F | HEIGHT: 70 IN | OXYGEN SATURATION: 98 % | SYSTOLIC BLOOD PRESSURE: 145 MMHG | DIASTOLIC BLOOD PRESSURE: 72 MMHG | BODY MASS INDEX: 29.63 KG/M2 | RESPIRATION RATE: 18 BRPM

## 2024-11-30 VITALS
BODY MASS INDEX: 30.93 KG/M2 | OXYGEN SATURATION: 96 % | RESPIRATION RATE: 16 BRPM | SYSTOLIC BLOOD PRESSURE: 132 MMHG | TEMPERATURE: 98.1 F | DIASTOLIC BLOOD PRESSURE: 64 MMHG | HEART RATE: 82 BPM | WEIGHT: 241 LBS | HEIGHT: 74 IN

## 2024-11-30 PROCEDURE — 2500000001 HC RX 250 WO HCPCS SELF ADMINISTERED DRUGS (ALT 637 FOR MEDICARE OP): Performed by: STUDENT IN AN ORGANIZED HEALTH CARE EDUCATION/TRAINING PROGRAM

## 2024-11-30 PROCEDURE — G0299 HHS/HOSPICE OF RN EA 15 MIN: HCPCS | Mod: HHH

## 2024-11-30 PROCEDURE — 2500000002 HC RX 250 W HCPCS SELF ADMINISTERED DRUGS (ALT 637 FOR MEDICARE OP, ALT 636 FOR OP/ED): Performed by: INTERNAL MEDICINE

## 2024-11-30 PROCEDURE — 2500000001 HC RX 250 WO HCPCS SELF ADMINISTERED DRUGS (ALT 637 FOR MEDICARE OP): Performed by: INTERNAL MEDICINE

## 2024-11-30 PROCEDURE — 169592 NO-PAY CLAIM PROCEDURE

## 2024-11-30 PROCEDURE — 2500000004 HC RX 250 GENERAL PHARMACY W/ HCPCS (ALT 636 FOR OP/ED): Performed by: INTERNAL MEDICINE

## 2024-11-30 RX ORDER — AMITRIPTYLINE HYDROCHLORIDE 100 MG/1
100 TABLET ORAL NIGHTLY
Qty: 30 TABLET | Refills: 1 | Status: SHIPPED | OUTPATIENT
Start: 2024-11-30 | End: 2025-01-29

## 2024-11-30 RX ORDER — ATORVASTATIN CALCIUM 40 MG/1
40 TABLET, FILM COATED ORAL DAILY
Qty: 30 TABLET | Refills: 1 | Status: SHIPPED | OUTPATIENT
Start: 2024-11-30 | End: 2025-01-29

## 2024-11-30 RX ORDER — AMOXICILLIN 250 MG
1 CAPSULE ORAL NIGHTLY PRN
Qty: 30 TABLET | Refills: 0 | Status: SHIPPED | OUTPATIENT
Start: 2024-11-30

## 2024-11-30 RX ORDER — NAPROXEN SODIUM 220 MG/1
81 TABLET, FILM COATED ORAL DAILY
Qty: 30 TABLET | Refills: 1 | Status: SHIPPED | OUTPATIENT
Start: 2024-11-30 | End: 2025-01-29

## 2024-11-30 RX ORDER — SPIRONOLACTONE 50 MG/1
50 TABLET, FILM COATED ORAL DAILY
Qty: 30 TABLET | Refills: 1 | Status: SHIPPED | OUTPATIENT
Start: 2024-11-30 | End: 2025-01-29

## 2024-11-30 RX ORDER — TRAMADOL HYDROCHLORIDE 50 MG/1
50 TABLET ORAL EVERY 6 HOURS PRN
Qty: 30 TABLET | Refills: 0 | Status: SHIPPED | OUTPATIENT
Start: 2024-11-30

## 2024-11-30 RX ORDER — LOSARTAN POTASSIUM 100 MG/1
100 TABLET ORAL DAILY
Qty: 30 TABLET | Refills: 1 | Status: SHIPPED | OUTPATIENT
Start: 2024-11-30 | End: 2025-01-29

## 2024-11-30 RX ORDER — BUPROPION HYDROCHLORIDE 75 MG/1
150 TABLET ORAL 3 TIMES DAILY
Qty: 180 TABLET | Refills: 1 | Status: SHIPPED | OUTPATIENT
Start: 2024-11-30 | End: 2025-01-29

## 2024-11-30 RX ORDER — LEVOTHYROXINE SODIUM 175 UG/1
175 TABLET ORAL DAILY
Qty: 30 TABLET | Refills: 1 | Status: SHIPPED | OUTPATIENT
Start: 2024-12-01 | End: 2025-01-30

## 2024-11-30 RX ORDER — VERAPAMIL HYDROCHLORIDE 80 MG/1
80 TABLET ORAL EVERY 8 HOURS SCHEDULED
Qty: 90 TABLET | Refills: 1 | Status: SHIPPED | OUTPATIENT
Start: 2024-11-30 | End: 2025-01-29

## 2024-11-30 ASSESSMENT — ENCOUNTER SYMPTOMS
LOWEST PAIN SEVERITY IN PAST 24 HOURS: 1/10
PAIN LOCATION - PAIN FREQUENCY: INTERMITTENT
PAIN: 1
PAIN SEVERITY GOAL: 0/10
PAIN LOCATION: ABDOMEN
PERSON REPORTING PAIN: PATIENT
SUBJECTIVE PAIN PROGRESSION: GRADUALLY IMPROVING
APPETITE LEVEL: FAIR
HIGHEST PAIN SEVERITY IN PAST 24 HOURS: 3/10
PAIN LOCATION - PAIN QUALITY: SHARP
PAIN LOCATION - PAIN SEVERITY: 3/10

## 2024-11-30 ASSESSMENT — PAIN SCALES - GENERAL
PAINLEVEL_OUTOF10: 0 - NO PAIN
PAINLEVEL_OUTOF10: 7

## 2024-11-30 ASSESSMENT — ACTIVITIES OF DAILY LIVING (ADL)
ENTERING_EXITING_HOME: MINIMUM ASSIST
OASIS_M1830: 01

## 2024-11-30 NOTE — PROGRESS NOTES
11/30/24 1315   Discharge Planning   Who is requesting discharge planning? Provider   Home or Post Acute Services In home services   Type of Home Care Services DME or oxygen;Home nursing visits   Expected Discharge Disposition Home H     Per attending pt is medically ready for discharge home. Pt will discharge on continuous nocturnal tube feeding via J-tube. Per TCC sign out tube feeding supplies were ordered we are just awaiting SOC from [facility] for discharge. [employer] confirmed receipt of enteral nutrition order and their  will deliver tube feeding supplies to pt when he arrives home today. [facility] confirmed SOC for this evening for RN/LPN to assist with tube feeding teaching. Pt provided with [employer] phone number and instructed to call them when he arrives home so that  can deliver supplies. Pt stated he was already contacted by [facility] and instructed to call the nurse when he arrives home for initial home care visit. Pt voiced no additional questions or concerns regarding discharge planning and stated his sister will pick him up at time of discharge. Attending and nursing updated of above. Care coordinator will continue to follow for discharge planning needs.    Ortega Falcon RN  Transitional Care Coordinator (TCC)  [phone] or [phone]

## 2024-11-30 NOTE — HH CARE COORDINATION
Home Care received a Referral for Nursing, Physical Therapy, and Occupational Therapy. We have processed the referral for a Start of Care on 11-30-24.     If you have any questions or concerns, please feel free to contact us at [phone]. Follow the prompts, enter your five digit zip code, and you will be directed to your care team on [facility].

## 2024-11-30 NOTE — CARE PLAN
The clinical goals for the shift include Patient will remain injury free this shift.      Problem: Pain - Adult  Goal: Verbalizes/displays adequate comfort level or baseline comfort level  Outcome: Progressing     Problem: Safety - Adult  Goal: Free from fall injury  Outcome: Progressing     Problem: Discharge Planning  Goal: Discharge to home or other facility with appropriate resources  Outcome: Progressing     Problem: Chronic Conditions and Co-morbidities  Goal: Patient's chronic conditions and co-morbidity symptoms are monitored and maintained or improved  Outcome: Progressing     Problem: Diabetes  Goal: Achieve decreasing blood glucose levels by end of shift  Outcome: Progressing  Goal: Increase stability of blood glucose readings by end of shift  Outcome: Progressing  Goal: Decrease in ketones present in urine by end of shift  Outcome: Progressing  Goal: Maintain electrolyte levels within acceptable range throughout shift  Outcome: Progressing  Goal: Maintain glucose levels >70mg/dl to <250mg/dl throughout shift  Outcome: Progressing  Goal: No changes in neurological exam by end of shift  Outcome: Progressing  Goal: Learn about and adhere to nutrition recommendations by end of shift  Outcome: Progressing  Goal: Vital signs within normal range for age by end of shift  Outcome: Progressing  Goal: Increase self care and/or family involovement by end of shift  Outcome: Progressing  Goal: Receive DSME education by end of shift  Outcome: Progressing

## 2024-12-01 ENCOUNTER — APPOINTMENT (OUTPATIENT)
Dept: HOME HEALTH SERVICES | Facility: HOME HEALTH | Age: 69
End: 2024-12-01
Payer: MEDICARE

## 2024-12-02 ENCOUNTER — HOME CARE VISIT (OUTPATIENT)
Dept: HOME HEALTH SERVICES | Facility: HOME HEALTH | Age: 69
End: 2024-12-02
Payer: MEDICARE

## 2024-12-02 ENCOUNTER — PATIENT OUTREACH (OUTPATIENT)
Dept: PRIMARY CARE | Facility: CLINIC | Age: 69
End: 2024-12-02
Payer: MEDICARE

## 2024-12-02 NOTE — CASE COMMUNICATION
Hello,    Telephone call to patient to schedule home physical therapy evaluation. Patient stated that he does not need home physical therapy. He said that his mobility is fine. Just an FYI.

## 2024-12-02 NOTE — PROGRESS NOTES
Discharge Facility: [facility]  Discharge Diagnosis: Mass of esophagus  Admission Date: 22 Nov 24  Discharge Date: 30 Nov 24    PCP Appointment Date: Tasked to office  Specialist Appointment Date: 12 Dec 24 (Thoracic Surg, [place])  Hospital Encounter and Summary Linked: Yes    No contact on discharge outreach after 2 attempts. Tasked to office for scheduling. Will attempt outreach again in 2 wks.

## 2024-12-03 ENCOUNTER — HOME CARE VISIT (OUTPATIENT)
Dept: HOME HEALTH SERVICES | Facility: HOME HEALTH | Age: 69
End: 2024-12-03
Payer: MEDICARE

## 2024-12-03 NOTE — PROGRESS NOTES
450 pt was able to move up.  Called pt and lmom informing pt of appt date and time and to call back to let us know if he can make that appt

## 2024-12-04 ENCOUNTER — HOME CARE VISIT (OUTPATIENT)
Dept: HOME HEALTH SERVICES | Facility: HOME HEALTH | Age: 69
End: 2024-12-04
Payer: MEDICARE

## 2024-12-08 LAB
LAB AP ASR DISCLAIMER: NORMAL
LABORATORY COMMENT REPORT: NORMAL
PATH REPORT.ADDENDUM SPEC: NORMAL
PATH REPORT.FINAL DX SPEC: NORMAL
PATH REPORT.GROSS SPEC: NORMAL
PATH REPORT.RELEVANT HX SPEC: NORMAL
PATH REPORT.TOTAL CANCER: NORMAL
RESIDENT REVIEW: NORMAL

## 2024-12-10 DIAGNOSIS — E11.9 TYPE 2 DIABETES MELLITUS WITHOUT COMPLICATION, WITHOUT LONG-TERM CURRENT USE OF INSULIN (MULTI): Primary | ICD-10-CM

## 2024-12-10 NOTE — PROGRESS NOTES
"Subjective   Timoteo Victoria  is a 69 y.o. male who presents for evaluation of dysphagia and esophageal mass status post J-tube placement on 11/25/24.    This patient presents with a notable history of CAD (CABG 2005), HTN, HLD, chronic tobacco use, hypothyroidism presented on 11/25 as a transfer from [facility] for cancer workup. He had been having trouble swallowing for the past month, initially solids and progressing to liquids and medications. He has lost 40 lbs over ~6 months. EGD on 11/21/24 showed an esophageal mass with a malignant appearance, with biopsy showing intramucosal adenocarcinoma. The patient had a J tube placed on 11/25. Oncology was consulted for further management of this newly diagnosed esophageal adenocarcinoma and recommended outpatient PET.      Currently the patient is presenting for post-operative evaluation. He is able to feed using hte tube \"no problem\". Normal BM's.  He denies the following symptoms: chest pain, shortness of breath at rest, hemoptysis, fevers, chills, weight loss, and wound complications.      He  reports that he has been smoking cigarettes. He started smoking about 45 years ago. He has a 45.9 pack-year smoking history. He has never used smokeless tobacco. He reports that he does not currently use alcohol. He reports that he does not use drugs.    Objective   Physical Exam  The patient is well-appearing and in no acute distress. The trachea is midline and there is no crepitus. The lungs were clear to auscultation, there was no dullness to percussion, no fremitus or egophony. There was good effort and excursion. The heart had a regular rate and rhythm. The abdomen was soft, nontender and nondistended. The extremities had no edema. Surgical incisions are healing well.  Diagnostic Studies    A. Esophagus, Distal, Mass, Biopsy:  -- Intramucosal adenocarcinoma. See note.    Assessment/Plan   I believe that the patient has a diagnosis of esophageal cancer this patient has " a new diagnosis of esophageal cancer.      He requires the full extent of disease workup with a PET/CT.   Given the likely locally advanced nature of his cancer, he should likely receive neoadjuvant treatment followed by surgical resection.    From the perspective of his feeding jejunostomy, this appears to be functional, and I encouraged him to continue to use this for enteral nutrition.    I recommend PET CT and medical oncology evaluation    I discussed this in detail with the patient, including a discussion of alternatives. They were comfortable with this approach.     Fabio Salas MD  [phone]

## 2024-12-11 ENCOUNTER — APPOINTMENT (OUTPATIENT)
Dept: PRIMARY CARE | Facility: CLINIC | Age: 69
End: 2024-12-11
Payer: MEDICARE

## 2024-12-12 ENCOUNTER — ONCOLOGY MEDICATION OUTREACH (OUTPATIENT)
Dept: HEMATOLOGY/ONCOLOGY | Facility: HOSPITAL | Age: 69
End: 2024-12-12

## 2024-12-12 ENCOUNTER — SOCIAL WORK (OUTPATIENT)
Dept: CASE MANAGEMENT | Facility: HOSPITAL | Age: 69
End: 2024-12-12

## 2024-12-12 ENCOUNTER — OFFICE VISIT (OUTPATIENT)
Dept: HEMATOLOGY/ONCOLOGY | Facility: HOSPITAL | Age: 69
End: 2024-12-12
Payer: MEDICARE

## 2024-12-12 ENCOUNTER — OFFICE VISIT (OUTPATIENT)
Dept: SURGERY | Facility: HOSPITAL | Age: 69
End: 2024-12-12
Payer: MEDICARE

## 2024-12-12 VITALS
WEIGHT: 204.59 LBS | BODY MASS INDEX: 26.27 KG/M2 | TEMPERATURE: 96.8 F | SYSTOLIC BLOOD PRESSURE: 119 MMHG | OXYGEN SATURATION: 100 % | DIASTOLIC BLOOD PRESSURE: 66 MMHG | RESPIRATION RATE: 22 BRPM | HEART RATE: 110 BPM

## 2024-12-12 VITALS — HEIGHT: 70 IN | BODY MASS INDEX: 29.45 KG/M2

## 2024-12-12 DIAGNOSIS — C15.9 PRIMARY ESOPHAGEAL ADENOCARCINOMA (MULTI): Primary | ICD-10-CM

## 2024-12-12 DIAGNOSIS — C15.9 ESOPHAGEAL ADENOCARCINOMA (MULTI): Primary | ICD-10-CM

## 2024-12-12 DIAGNOSIS — K22.89 ESOPHAGEAL MASS: Primary | ICD-10-CM

## 2024-12-12 DIAGNOSIS — K22.89 ESOPHAGEAL MASS: ICD-10-CM

## 2024-12-12 PROCEDURE — 3044F HG A1C LEVEL LT 7.0%: CPT | Performed by: INTERNAL MEDICINE

## 2024-12-12 PROCEDURE — 1111F DSCHRG MED/CURRENT MED MERGE: CPT | Performed by: INTERNAL MEDICINE

## 2024-12-12 PROCEDURE — 4010F ACE/ARB THERAPY RXD/TAKEN: CPT | Performed by: THORACIC SURGERY (CARDIOTHORACIC VASCULAR SURGERY)

## 2024-12-12 PROCEDURE — 3074F SYST BP LT 130 MM HG: CPT | Performed by: THORACIC SURGERY (CARDIOTHORACIC VASCULAR SURGERY)

## 2024-12-12 PROCEDURE — 99215 OFFICE O/P EST HI 40 MIN: CPT | Performed by: INTERNAL MEDICINE

## 2024-12-12 PROCEDURE — 99215 OFFICE O/P EST HI 40 MIN: CPT | Performed by: THORACIC SURGERY (CARDIOTHORACIC VASCULAR SURGERY)

## 2024-12-12 PROCEDURE — 1159F MED LIST DOCD IN RCRD: CPT | Performed by: THORACIC SURGERY (CARDIOTHORACIC VASCULAR SURGERY)

## 2024-12-12 PROCEDURE — 99214 OFFICE O/P EST MOD 30 MIN: CPT | Performed by: THORACIC SURGERY (CARDIOTHORACIC VASCULAR SURGERY)

## 2024-12-12 PROCEDURE — 1126F AMNT PAIN NOTED NONE PRSNT: CPT | Performed by: THORACIC SURGERY (CARDIOTHORACIC VASCULAR SURGERY)

## 2024-12-12 PROCEDURE — 3061F NEG MICROALBUMINURIA REV: CPT | Performed by: INTERNAL MEDICINE

## 2024-12-12 PROCEDURE — 4004F PT TOBACCO SCREEN RCVD TLK: CPT | Performed by: INTERNAL MEDICINE

## 2024-12-12 PROCEDURE — 4004F PT TOBACCO SCREEN RCVD TLK: CPT | Performed by: THORACIC SURGERY (CARDIOTHORACIC VASCULAR SURGERY)

## 2024-12-12 PROCEDURE — 3061F NEG MICROALBUMINURIA REV: CPT | Performed by: THORACIC SURGERY (CARDIOTHORACIC VASCULAR SURGERY)

## 2024-12-12 PROCEDURE — 3044F HG A1C LEVEL LT 7.0%: CPT | Performed by: THORACIC SURGERY (CARDIOTHORACIC VASCULAR SURGERY)

## 2024-12-12 PROCEDURE — 3048F LDL-C <100 MG/DL: CPT | Performed by: INTERNAL MEDICINE

## 2024-12-12 PROCEDURE — 1111F DSCHRG MED/CURRENT MED MERGE: CPT | Performed by: THORACIC SURGERY (CARDIOTHORACIC VASCULAR SURGERY)

## 2024-12-12 PROCEDURE — 3048F LDL-C <100 MG/DL: CPT | Performed by: THORACIC SURGERY (CARDIOTHORACIC VASCULAR SURGERY)

## 2024-12-12 PROCEDURE — 4010F ACE/ARB THERAPY RXD/TAKEN: CPT | Performed by: INTERNAL MEDICINE

## 2024-12-12 PROCEDURE — 3078F DIAST BP <80 MM HG: CPT | Performed by: THORACIC SURGERY (CARDIOTHORACIC VASCULAR SURGERY)

## 2024-12-12 RX ORDER — LOPERAMIDE HYDROCHLORIDE 2 MG/1
CAPSULE ORAL
Qty: 100 CAPSULE | Refills: 2 | Status: SHIPPED | OUTPATIENT
Start: 2024-12-12

## 2024-12-12 RX ORDER — HEPARIN 100 UNIT/ML
500 SYRINGE INTRAVENOUS AS NEEDED
OUTPATIENT
Start: 2024-12-12

## 2024-12-12 RX ORDER — ONDANSETRON 8 MG/1
8 TABLET, ORALLY DISINTEGRATING ORAL EVERY 8 HOURS PRN
Qty: 30 TABLET | Refills: 5 | Status: SHIPPED | OUTPATIENT
Start: 2024-12-12

## 2024-12-12 RX ORDER — ONDANSETRON HYDROCHLORIDE 8 MG/1
8 TABLET, FILM COATED ORAL EVERY 8 HOURS PRN
Qty: 30 TABLET | Refills: 5 | Status: SHIPPED | OUTPATIENT
Start: 2024-12-12 | End: 2024-12-12

## 2024-12-12 RX ORDER — DEXAMETHASONE 4 MG/1
TABLET ORAL
Qty: 10 TABLET | Refills: 4 | Status: SHIPPED | OUTPATIENT
Start: 2024-12-12

## 2024-12-12 RX ORDER — PROCHLORPERAZINE MALEATE 10 MG
10 TABLET ORAL EVERY 6 HOURS PRN
Qty: 30 TABLET | Refills: 5 | Status: SHIPPED | OUTPATIENT
Start: 2024-12-12

## 2024-12-12 RX ORDER — HEPARIN SODIUM,PORCINE/PF 10 UNIT/ML
50 SYRINGE (ML) INTRAVENOUS AS NEEDED
OUTPATIENT
Start: 2024-12-12

## 2024-12-12 ASSESSMENT — NCCN CANCER DISTRESS MANAGEMENT
NCCN PHYSICAL CONCERNS: 8
NCCN PHYSICAL CONCERNS: 4
NCCN EMOTIONAL CONCERNS: 4

## 2024-12-12 ASSESSMENT — PAIN SCALES - GENERAL: PAINLEVEL_OUTOF10: 0-NO PAIN

## 2024-12-12 ASSESSMENT — PATIENT HEALTH QUESTIONNAIRE - PHQ9
2. FEELING DOWN, DEPRESSED OR HOPELESS: NOT AT ALL
SUM OF ALL RESPONSES TO PHQ9 QUESTIONS 1 AND 2: 0
1. LITTLE INTEREST OR PLEASURE IN DOING THINGS: NOT AT ALL

## 2024-12-12 NOTE — H&P (VIEW-ONLY)
Patient ID: Timoteo Victoria is a 69 y.o. male.    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 S/p J tube placement 11/25/2024.    Follow up plan  -PET CT 12/20  -port placement  -will start FLOT4 4 cycles followed by surgery; he'll need additional adjuvant 4 cycle of FLOT4.    I discussed FLOT for chemotherapy regimen with him in details.  I ordered the chemotherapy and the port placement.  Tentatively he will start chemotherapy on January 2, 2025.    I have placed all orders as outlined above. I advised the patient to schedule the tests and follow-up appointment as discussed by contacting the  on the way out or calling by phone.    Providers:  Surgeon: Dr. Maritza Mark: Dr Katie Apple:    Chief complaint: progressive dysphagia    HPI:  Timoteo Victoria is a 69 y.o. male with a notable history of CAD (CABG 2005), HTN, HLD, chronic tobacco use, hypothyroidism presented on 11/25 as a transfer from [facility] for cancer workup. He had been having trouble swallowing for the past month, initially solids and progressing to liquids and medications. He has lost 40 lbs in the past 6 months. EGD on 11/21/24 showed an esophageal mass with a malignant appearance, with biopsy showing intramucosal adenocarcinoma. The patient had a J tube placed on 11/25. He's coming in to oncology outpatient clinic for the treatment of his esophageal cancer.    He's been doing great; he's been using J-tube for nutrition and also able to eat applesauce, pudding, and other drinks although he needs  to slowly swallow.     Today (12/12/2024), we explained about his disease (Esophageal cancer likely stage 2-3) and he'll need 4 cycles of chemo followed by surgery and then will need additional 4 cycles of chemo after the surgery. We went over all the side effects that FLOT4 chemo regimen can cause and he'll get this every 2 weeks. He agreed to start his chemotherapy on 1/2/2025. He'll get PET/CT and port placement before then. Dr Salas, a Thoracic surgeon, will closely follow him.    ONCOLOGIC HISTORY-  11/21/24  [place] admission for weight loss, dysphagia; EGD showed esophageal mass with biopsy confirming intramucosal adenocarcinoma  11/20/24 CT neck with no masses  11/22/24 CT CAP with contrast with irregular masslike thickening of distal esophagus, no obvious metastases  11/25/24 J tube placement    Interval history: N/A    Past Medical History:   Past Medical History:  No date: Hyperlipidemia  No date: Hypertension  No date: Personal history of other endocrine, nutritional and   metabolic disease      Comment:  History of hypothyroidism  No date: Type 2 diabetes mellitus   Surgical History:    Past Surgical History:   Procedure Laterality Date    CATARACT EXTRACTION Right 08/25/2023    CHOLECYSTECTOMY  09/18/2018    Cholecystectomy    CORONARY ARTERY BYPASS GRAFT  09/18/2018    CABG    HERNIA REPAIR  09/18/2018    Inguinal Hernia Repair      Family History:    Family History   Problem Relation Name Age of Onset    COPD Mother       Family Oncology History:    Cancer-related family history is not on file.  Social History:    Social History     Tobacco Use    Smoking status: Every Day     Current packs/day: 1.00     Average packs/day: 1 pack/day for 45.9 years (45.9 ttl pk-yrs)     Types: Cigarettes     Start date: 1979    Smokeless tobacco: Never   Vaping Use    Vaping status: Never Used   Substance Use Topics    Alcohol use: Not Currently    Drug use: Never        Allergies  Allergies   Allergen Reactions     Ibuprofen Swelling and Unknown        Medications  Current Outpatient Medications   Medication Instructions    amitriptyline (ELAVIL) 100 mg, j-tube, Nightly    aspirin 81 mg, j-tube, Daily    atorvastatin (LIPITOR) 40 mg, j-tube, Daily    buPROPion (WELLBUTRIN) 150 mg, j-tube, 3 times daily    cholecalciferol (VITAMIN D-3) 2,000 Units, j-tube, Daily    levothyroxine (SYNTHROID, LEVOXYL) 175 mcg, j-tube, Daily, Take on an empty stomach at the same time each day, either 30 to 60 minutes prior to breakfast    losartan (COZAAR) 100 mg, j-tube, Daily    sennosides-docusate sodium (Hope-Colace) 8.6-50 mg tablet 1 tablet, j-tube, Nightly PRN    spironolactone (ALDACTONE) 50 mg, j-tube, Daily    traMADol (ULTRAM) 50 mg, g-tube, Every 6 hours PRN    verapamil (CALAN) 80 mg, j-tube, Every 8 hours scheduled          Objective   VS: There were no vitals taken for this visit.  Weight: There were no vitals filed for this visit.     PHYSICAL EXAMINATION  ECOG performance status- 1  VS:  There were no vitals taken for this visit.  Weight: There were no vitals filed for this visit.    BSA: There is no height or weight on file to calculate BSA.   Pain Scale: 0    Constitutional: Awake/alert/oriented x3, cooperative and answers questions appropriately.     Eyes: No pallor, conjunctival injection, clear sclera.    Mouth: No ulceration. No thrush.     Head/Neck: Neck supple, no apparent injury, thyroid without mass or tenderness, No JVD, trachea midline, no bruits.     Respiratory/Thorax: Normal breath sounds with bilaterally symmetrical chest expansion. No dullness.      Cardiovascular: No audible murmurs, normal heart sounds. No pericardial rub.     Gastrointestinal: Nondistended, soft, non-tender, no rebound tenderness or guarding, no masses palpable, no organomegaly, +BS, no bruits. No ascites.     Musculoskeletal: No joint swelling, redness.      Extremities: normal extremities, no cyanosis edema, contusions or wounds, no  clubbing.     Lymphatic: No significant lymphadenopathy.     Skin: Warm and dry, no lesions, no rashes.     Labs     Unremarkable                    Image  EGD (11/21/2024)  Single malignant-appearing and invasive mass (not traversable) in the lower third of the esophagus, covering one half of the circumference; performed cold forceps biopsy     A. Esophagus, Distal, Mass, Biopsy:  -- Intramucosal adenocarcinoma. See note.     Note: The endoscopic impression of a friable and invasive lesion in the lower third of the esophagus is noted. This biopsy may be not representative of the entire lesion. Clinical correlation is recommended.    MISMATCH REPAIR PROTEIN EXPRESSION                    Protein:           Result                 MLH-1:             Expression Present                                                   PMS-2:            Expression Present                                                   MSH-2:            Expression Present                                                   MSH-6:            Expression Present                                       INTERPRETATION: Neoplasm with normal mismatch repair protein expression.     C/A/P CT (11/22/2024)  IMPRESSION:  1. Irregular masslike thickening of the mid to distal esophagus is  difficult to accurately measure, and may represent a neoplastic  process. Correlate with recent endoscopic imaging and biopsy results.  There is also proximal esophageal wall thickening, and a moderate  size hiatal hernia.  2. Small area of centrilobular nodularity in the right middle lobe  may represent focal aspiration/mucoid impaction or less likely  atypical infection.  3. Mild apical predominant centrilobular emphysema.  4. Severe coronary artery calcifications. Please note this exam is  not optimized for evaluation of the coronary arteries.  5. Nonobstructing 0.4 cm calculus in the left kidney.    Neck CT (11/20/2024)  IMPRESSION:  New maxillary periapical lucencies with  adjacent mucosal thickening.  No discrete fluid collection identified. Dental follow-up recommended.      No discrete mass or cervical lymphadenopathy identified however  direct visualization suggested.      Bilateral parotid mass/nodule similar to prior imaging.      Postsurgical changes, emphysematous changes, mild mediastinal  lymphadenopathy and additional findings as detailed.  This note was created using a voice recognition system ( the Dragon dictation system). Inaccuracies and misspellings are unintentional.     Madan Wilson MD.

## 2024-12-12 NOTE — PROGRESS NOTES
ONCOLOGY CLINICAL PHARMACY NOTE     Subjective  Timoteo Victoria is a 69 y.o. male with esophageal adenocarcinoma, here for education.        Treatment history  Treatment Details   Treatment goal [No plan goal]   Plan Name Venous Access Orders   Status Active   Start Date 12/12/2024   End Date Until discontinued   Provider Madan Wilson MD   Chemotherapy [No matching medication found in this treatment plan]     Treatment Details   Treatment goal Curative   Plan Name FLOT (Fluorouracil Continuous Infusion / Leucovorin / Oxaliplatin / DOCEtaxel), 14 Day Cycles   Status Active   Start Date 1/2/2025 (Planned)   End Date 4/11/2025 (Planned)   Provider Madan Wilson MD   Chemotherapy fluorouracil (Adrucil) 5,550 mg in sodium chloride 0.9% 240 mL IV via Home Infusion, 2,600 mg/m2 = 5,550 mg, intravenous, Once, 0 of 8 cycles    methylPREDNISolone sod succinate (SOLU-Medrol) 40 mg/mL injection 40 mg, 40 mg, intravenous, As needed, 0 of 8 cycles    leucovorin 420 mg in dextrose 5% 121 mL IV, 200 mg/m2 = 420 mg, intravenous, Once, 0 of 8 cycles    palonosetron (Aloxi) injection 250 mcg, 250 mcg, intravenous, Once, 0 of 8 cycles    DOCEtaxeL (Taxotere) 100 mg in dextrose 5% 255 mL IV, 50 mg/m2 = 100 mg, intravenous, Once, 0 of 8 cycles    OXALIplatin (Eloxatin) 180 mg in dextrose 5% 536 mL IV, 85 mg/m2 = 180 mg, intravenous, Once, 0 of 8 cycles    pegfilgrastim-fpgk (Stimufend) injection 6 mg, 6 mg, subcutaneous, Once, 0 of 8 cycles    LORazepam (Ativan) injection 1 mg, 1 mg, intravenous, As needed, 0 of 8 cycles          Objective  There were no vitals taken for this visit.  Lab Results   Component Value Date    WBC 8.0 11/26/2024    HGB 14.2 11/26/2024    HCT 41.5 11/26/2024    MCV 88 11/26/2024     11/26/2024      Lab Results   Component Value Date    GLUCOSE 81 11/26/2024    CALCIUM 9.3 11/26/2024     11/26/2024    K 3.9 11/26/2024    CO2 28 11/26/2024     11/26/2024    BUN 5 (L) 11/26/2024     CREATININE 0.81 11/26/2024     Lab Results   Component Value Date    ALT 14 11/25/2024    AST 19 11/25/2024    ALKPHOS 62 11/25/2024    BILITOT 0.7 11/25/2024       Allergies and Medications   Allergies   Allergen Reactions    Ibuprofen Swelling and Unknown       Current Outpatient Medications:     amitriptyline (Elavil) 100 mg tablet, 1 tablet (100 mg) by j-tube route once daily at bedtime., Disp: 30 tablet, Rfl: 1    aspirin 81 mg chewable tablet, 1 tablet (81 mg) by j-tube route once daily., Disp: 30 tablet, Rfl: 1    atorvastatin (Lipitor) 40 mg tablet, 1 tablet (40 mg) by j-tube route once daily., Disp: 30 tablet, Rfl: 1    buPROPion (Wellbutrin) 75 mg tablet, 2 tablets (150 mg) by j-tube route 3 times a day., Disp: 180 tablet, Rfl: 1    cholecalciferol (Vitamin D-3) 50 mcg (2,000 unit) capsule, 1 capsule (50 mcg) by j-tube route once daily., Disp: , Rfl:     dexAMETHasone (Decadron) 4 mg tablet, Take 8 mg (2 tablets) by mouth twice daily the day before treatment, once the evening of treatment, and twice daily the day after treatment., Disp: 10 tablet, Rfl: 4    levothyroxine (Synthroid, Levoxyl) 175 mcg tablet, 1 tablet (175 mcg) by j-tube route early in the morning.. Take on an empty stomach at the same time each day, either 30 to 60 minutes prior to breakfast, Disp: 30 tablet, Rfl: 1    loperamide (Imodium) 2 mg capsule, Take 2 capsules (4 mg) by mouth with the first episode of diarrhea and 1 capsule (2 mg) by mouth with any additional episodes. Maximum 8 capsules (16 mg) per day., Disp: 100 capsule, Rfl: 2    losartan (Cozaar) 100 mg tablet, 1 tablet (100 mg) by j-tube route once daily., Disp: 30 tablet, Rfl: 1    ondansetron ODT (Zofran-ODT) 8 mg disintegrating tablet, Dissolve 1 tablet (8 mg) in the mouth every 8 hours if needed for nausea or vomiting., Disp: 30 tablet, Rfl: 5    prochlorperazine (Compazine) 10 mg tablet, Take 1 tablet (10 mg) by mouth every 6 hours if needed for nausea or vomiting.,  Disp: 30 tablet, Rfl: 5    sennosides-docusate sodium (Hope-Colace) 8.6-50 mg tablet, 1 tablet by j-tube route as needed at bedtime for constipation., Disp: 30 tablet, Rfl: 0    spironolactone (Aldactone) 50 mg tablet, 1 tablet (50 mg) by j-tube route once daily., Disp: 30 tablet, Rfl: 1    traMADol (Ultram) 50 mg tablet, 1 tablet (50 mg) by g-tube route every 6 hours if needed for severe pain (7 - 10)., Disp: 30 tablet, Rfl: 0    verapamil (Calan) 80 mg tablet, 1 tablet (80 mg) by j-tube route every 8 hours., Disp: 90 tablet, Rfl: 1    Assessment and Plan  Timoteo Victoria is a 69 y.o. male with esophageal adenocarcinoma, to be treated with FLOT.    Chemotherapy  Drug/Regimen: FLOT  Dosing:   - Fluorouracil: 2600 mg/m2 over 24 hours  - Leucovorin: OMITTED for national fluid shortage  - Oxaliplatin: 85 mg/m2 over 2 hours  - Docetaxel: 50 mg/m2 over 1 hour  Premedications: Dexamethasone 12 mg IV, diphenhydramine 25 mg PO, palonosetron 250 mcg IV  Baseline labs: ALT 14, AST 19, T.bili 0.7, CrCl ~100 ml/min (Adj BW 81 kg)  Home medications:   - Ondansetron ODT: 8 mg every 8 hours PRN  - Prochlorperazine: 10 mg every 6 hours PRN  - Loperamide: 4 mg with first episode of diarrhea and 2 mg with each subsequent episode (max 16 mg/day)  - Dexamethasone: 8 mg BID for three days starting the day before chemotherapy (omit morning dose on days of chemotherapy)   Drug-Drug Interactions:   - Docetaxel - Verapamil, Category C, may increase serum concentration of Docetaxel  Education materials provided: Education handout on FLOT provided to pt/family.     Flavio Pérez, PharmD, BCOP  Clinical Pharmacy Specialist  [address] [place] [address]  Ph: [phone]

## 2024-12-12 NOTE — PROGRESS NOTES
Patient ID: Timoteo Victoria is a 69 y.o. male.    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 S/p J tube placement 11/25/2024.    Follow up plan  -PET CT 12/20  -port placement  -will start FLOT4 4 cycles followed by surgery; he'll need additional adjuvant 4 cycle of FLOT4.    I discussed FLOT for chemotherapy regimen with him in details.  I ordered the chemotherapy and the port placement.  Tentatively he will start chemotherapy on January 2, 2025.    I have placed all orders as outlined above. I advised the patient to schedule the tests and follow-up appointment as discussed by contacting the  on the way out or calling by phone.    Providers:  Surgeon: Dr. Maritza Mark: Dr Katie Apple:    Chief complaint: progressive dysphagia    HPI:  Timoteo Victoria is a 69 y.o. male with a notable history of CAD (CABG 2005), HTN, HLD, chronic tobacco use, hypothyroidism presented on 11/25 as a transfer from [facility] for cancer workup. He had been having trouble swallowing for the past month, initially solids and progressing to liquids and medications. He has lost 40 lbs in the past 6 months. EGD on 11/21/24 showed an esophageal mass with a malignant appearance, with biopsy showing intramucosal adenocarcinoma. The patient had a J tube placed on 11/25. He's coming in to oncology outpatient clinic for the treatment of his esophageal cancer.    He's been doing great; he's been using J-tube for nutrition and also able to eat applesauce, pudding, and other drinks although he needs  to slowly swallow.     Today (12/12/2024), we explained about his disease (Esophageal cancer likely stage 2-3) and he'll need 4 cycles of chemo followed by surgery and then will need additional 4 cycles of chemo after the surgery. We went over all the side effects that FLOT4 chemo regimen can cause and he'll get this every 2 weeks. He agreed to start his chemotherapy on 1/2/2025. He'll get PET/CT and port placement before then. Dr Salas, a Thoracic surgeon, will closely follow him.    ONCOLOGIC HISTORY-  11/21/24  [facility] admission for weight loss, dysphagia; EGD showed esophageal mass with biopsy confirming intramucosal adenocarcinoma  11/20/24 CT neck with no masses  11/22/24 CT CAP with contrast with irregular masslike thickening of distal esophagus, no obvious metastases  11/25/24 J tube placement    Interval history: N/A    Past Medical History:   Past Medical History:  No date: Hyperlipidemia  No date: Hypertension  No date: Personal history of other endocrine, nutritional and   metabolic disease      Comment:  History of hypothyroidism  No date: Type 2 diabetes mellitus   Surgical History:    Past Surgical History:   Procedure Laterality Date    CATARACT EXTRACTION Right 08/25/2023    CHOLECYSTECTOMY  09/18/2018    Cholecystectomy    CORONARY ARTERY BYPASS GRAFT  09/18/2018    CABG    HERNIA REPAIR  09/18/2018    Inguinal Hernia Repair      Family History:    Family History   Problem Relation Name Age of Onset    COPD Mother       Family Oncology History:    Cancer-related family history is not on file.  Social History:    Social History     Tobacco Use    Smoking status: Every Day     Current packs/day: 1.00     Average packs/day: 1 pack/day for 45.9 years (45.9 ttl pk-yrs)     Types: Cigarettes     Start date: 1979    Smokeless tobacco: Never   Vaping Use    Vaping status: Never Used   Substance Use Topics    Alcohol use: Not Currently    Drug use: Never        Allergies  Allergies   Allergen Reactions     Ibuprofen Swelling and Unknown        Medications  Current Outpatient Medications   Medication Instructions    amitriptyline (ELAVIL) 100 mg, j-tube, Nightly    aspirin 81 mg, j-tube, Daily    atorvastatin (LIPITOR) 40 mg, j-tube, Daily    buPROPion (WELLBUTRIN) 150 mg, j-tube, 3 times daily    cholecalciferol (VITAMIN D-3) 2,000 Units, j-tube, Daily    levothyroxine (SYNTHROID, LEVOXYL) 175 mcg, j-tube, Daily, Take on an empty stomach at the same time each day, either 30 to 60 minutes prior to breakfast    losartan (COZAAR) 100 mg, j-tube, Daily    sennosides-docusate sodium (Hope-Colace) 8.6-50 mg tablet 1 tablet, j-tube, Nightly PRN    spironolactone (ALDACTONE) 50 mg, j-tube, Daily    traMADol (ULTRAM) 50 mg, g-tube, Every 6 hours PRN    verapamil (CALAN) 80 mg, j-tube, Every 8 hours scheduled          Objective   VS: There were no vitals taken for this visit.  Weight: There were no vitals filed for this visit.     PHYSICAL EXAMINATION  ECOG performance status- 1  VS:  There were no vitals taken for this visit.  Weight: There were no vitals filed for this visit.    BSA: There is no height or weight on file to calculate BSA.   Pain Scale: 0    Constitutional: Awake/alert/oriented x3, cooperative and answers questions appropriately.     Eyes: No pallor, conjunctival injection, clear sclera.    Mouth: No ulceration. No thrush.     Head/Neck: Neck supple, no apparent injury, thyroid without mass or tenderness, No JVD, trachea midline, no bruits.     Respiratory/Thorax: Normal breath sounds with bilaterally symmetrical chest expansion. No dullness.      Cardiovascular: No audible murmurs, normal heart sounds. No pericardial rub.     Gastrointestinal: Nondistended, soft, non-tender, no rebound tenderness or guarding, no masses palpable, no organomegaly, +BS, no bruits. No ascites.     Musculoskeletal: No joint swelling, redness.      Extremities: normal extremities, no cyanosis edema, contusions or wounds, no  clubbing.     Lymphatic: No significant lymphadenopathy.     Skin: Warm and dry, no lesions, no rashes.     Labs     Unremarkable                    Image  EGD (11/21/2024)  Single malignant-appearing and invasive mass (not traversable) in the lower third of the esophagus, covering one half of the circumference; performed cold forceps biopsy     A. Esophagus, Distal, Mass, Biopsy:  -- Intramucosal adenocarcinoma. See note.     Note: The endoscopic impression of a friable and invasive lesion in the lower third of the esophagus is noted. This biopsy may be not representative of the entire lesion. Clinical correlation is recommended.    MISMATCH REPAIR PROTEIN EXPRESSION                    Protein:           Result                 MLH-1:             Expression Present                                                   PMS-2:            Expression Present                                                   MSH-2:            Expression Present                                                   MSH-6:            Expression Present                                       INTERPRETATION: Neoplasm with normal mismatch repair protein expression.     C/A/P CT (11/22/2024)  IMPRESSION:  1. Irregular masslike thickening of the mid to distal esophagus is  difficult to accurately measure, and may represent a neoplastic  process. Correlate with recent endoscopic imaging and biopsy results.  There is also proximal esophageal wall thickening, and a moderate  size hiatal hernia.  2. Small area of centrilobular nodularity in the right middle lobe  may represent focal aspiration/mucoid impaction or less likely  atypical infection.  3. Mild apical predominant centrilobular emphysema.  4. Severe coronary artery calcifications. Please note this exam is  not optimized for evaluation of the coronary arteries.  5. Nonobstructing 0.4 cm calculus in the left kidney.    Neck CT (11/20/2024)  IMPRESSION:  New maxillary periapical lucencies with  adjacent mucosal thickening.  No discrete fluid collection identified. Dental follow-up recommended.      No discrete mass or cervical lymphadenopathy identified however  direct visualization suggested.      Bilateral parotid mass/nodule similar to prior imaging.      Postsurgical changes, emphysematous changes, mild mediastinal  lymphadenopathy and additional findings as detailed.  This note was created using a voice recognition system ( the Dragon dictation system). Inaccuracies and misspellings are unintentional.     Madan Wilson MD.

## 2024-12-12 NOTE — PROGRESS NOTES
Pt is here for a new patient visit with Dr. Wilson. Medications, pharmacy preference and allergies were reviewed with patient and updated in the medical record by MD.  Education handouts on FLOT chemo treatment, red chemo bag, and disease binder were given to pt. Port placement was demonstrated and handouts were given. Instructed pt to call with any questions or concerns. Patient verbalized understanding and agreement regarding discussed information/plan. Pt escorted to scheduling.

## 2024-12-13 ENCOUNTER — HOME CARE VISIT (OUTPATIENT)
Dept: HOME HEALTH SERVICES | Facility: HOME HEALTH | Age: 69
End: 2024-12-13
Payer: MEDICARE

## 2024-12-13 ASSESSMENT — ACTIVITIES OF DAILY LIVING (ADL)
HOME_HEALTH_OASIS: 00
OASIS_M1830: 00

## 2024-12-13 NOTE — PROGRESS NOTES
PSYCHOSOCIAL ASSESSMENT     Demographic Information  Timoteo Victoria  1955  [id number]  Preferred Name: Rolo  Pronouns: He/Him  Assessment Type:  Initial Psychosocial  Date of assessment: 12/13/24  Provider(s): Dr. Wilson  Diagnosis: Esophageal Adenocarcinoma  Person(s) present during assessment: Pt, pt's adult daughter and son  Primary language: English  Interpretive services used: NA    Distress Thermometer  Distress Score: 6  Distress Concerns: Physical Concerns: Changes in eating and Emotional Concerns: Grief or loss                Living Environment/Support Systems  Partner Status: Single  Children: Two adult children  Support systems: Family and friends  Primary caregiver: Self  Current Living Situation: House Own  Resides with: Self  Concerns with Housing Environment: None  Comments:     Safety  Patient safe at home? Yes  History of Domestic Violence: None reported or indicated      Functional Status  Functional status: Independent  Patient currently ambulates: Independently  Patient has following equipment: None  Other physical health issues that the patient is experiencing: Type 2 diabetes  What supports are in place to assist the patient: None  Transportation:  Self  Comments: Plans to have friends or family transport if he is not feeling well        Finances/Insurance  Insurance: [facility] [employer]  Does the patient have any pending insurance applications: No   Hospital Financial Assistance: None  Patient's income source: SSI/SSDI and Pension x2  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 No   Applicable [facility]: Unknown at this time  Comments:      Advance Directives  No Advance Directives- Additional information provided  Health Care Agent Status: NA  Health Care Agent, When applicable: Declined to answer  Comments:    Legal  "Involvement  Relevant current or previous legal concerns: None reported      Episcopalian or Spiritual Identity  Comments: [employer], belongs to a [employer] in [facility]    Mental Health  Active SI/HI: Remote History, reported having cluster headaches that were debilitating, he wanted \"the pain to stop\"; Safety Plan, Medication (Ambien) for cluster headaches, coping skills to manage pain   Mental Health Diagnosis, if applicable: Major Depressive Disorder  Mood: \"Well...\" Pt presented as stoic  Concerns relating to substance use (including alcohol/tobacco): Reports smoking 2-3 cigarettes daily  Patient identified coping skills: Just get through it  Learning Preferences: Written, prefers information be sent via [employer]  Cognitive Comments: NA  Relevant Providers: NA  Comments:       Assessment  Potential Barriers to Care:  None Identified  Patient Strengths:  Motivated for Treatment, Self-Advocate, and Strong Support System    Plan  Referrals: [facility]  Applications: N/A  Other:  Delphine Nuno, dietician    Narrative: Sw met with the Pt and family during his NPV with Dr. Wilson. The Pt was initially stoic, throughout the duration of the interaction he became more animated. The Pt reported feeling overwhelmed with information. Sw validated his feelings and provided suggestions on how to process the material provided. The Pt lives in [facility] [place] in a one story home where he has resided for 32 years. He is an active member of a [employer] also located in [place]. Today the Pt reports feeling physically well but is struggling with losing weight. He reports taking the majority of his intake via feeding tube. He is eating a little pudding consistency soft foods when he has an appetite. Sw offered to consult JAEL Nuno dietician to assist. The Pt denied meeting her during this interaction, but is open to a phone call on a later date. Sw to place referral. The Pt identified within his distress screener increased grief/ " "loss. The Pt expressed feelings regarding facing mortality and the possible reality of death. Ken validated and explored these emotions further. Ken provided empathetic listening and social support. The Pt is currently retired. His monthly income includes SSI and 2 pension checks. He reports his income is approximately $2800/mo. He describes his financial concerns as \"well everyone has some financial worry\". He is not concerned about losing his housing, having access to fresh food, and is not concerned he will lose his insurance coverage. Ken to place referral to [facility] and [facility]. Ken provided the Pt and family with contact information. No other needs identified at this time.       Bonnie Stanley, MSW, LSW    "

## 2024-12-16 RX ORDER — ONDANSETRON HYDROCHLORIDE 2 MG/ML
4 INJECTION, SOLUTION INTRAVENOUS EVERY 6 HOURS PRN
Status: CANCELLED | OUTPATIENT
Start: 2024-12-16

## 2024-12-16 RX ORDER — HEPARIN 100 UNIT/ML
500 SYRINGE INTRAVENOUS ONCE AS NEEDED
Status: CANCELLED | OUTPATIENT
Start: 2024-12-16

## 2024-12-17 ENCOUNTER — PATIENT OUTREACH (OUTPATIENT)
Dept: PRIMARY CARE | Facility: CLINIC | Age: 69
End: 2024-12-17
Payer: MEDICARE

## 2024-12-17 NOTE — PROGRESS NOTES
No contact on 2 wk call back attempt. Phone picked up and hung back up immediately. Unable to leave message. No follow up with PCP made within 14 day window.

## 2024-12-18 ENCOUNTER — HOSPITAL ENCOUNTER (OUTPATIENT)
Dept: CARDIOLOGY | Facility: HOSPITAL | Age: 69
Discharge: HOME | End: 2024-12-18
Payer: MEDICARE

## 2024-12-18 VITALS
HEART RATE: 65 BPM | SYSTOLIC BLOOD PRESSURE: 123 MMHG | DIASTOLIC BLOOD PRESSURE: 83 MMHG | OXYGEN SATURATION: 97 % | RESPIRATION RATE: 16 BRPM | TEMPERATURE: 96.2 F

## 2024-12-18 DIAGNOSIS — C15.9 PRIMARY ESOPHAGEAL ADENOCARCINOMA (MULTI): ICD-10-CM

## 2024-12-18 PROCEDURE — 36561 INSERT TUNNELED CV CATH: CPT | Mod: RT | Performed by: RADIOLOGY

## 2024-12-18 PROCEDURE — 99153 MOD SED SAME PHYS/QHP EA: CPT

## 2024-12-18 PROCEDURE — 7100000010 HC PHASE TWO TIME - EACH INCREMENTAL 1 MINUTE

## 2024-12-18 PROCEDURE — 99152 MOD SED SAME PHYS/QHP 5/>YRS: CPT

## 2024-12-18 PROCEDURE — 2500000005 HC RX 250 GENERAL PHARMACY W/O HCPCS: Performed by: RADIOLOGY

## 2024-12-18 PROCEDURE — 32408 CORE NDL BX LNG/MED PERQ: CPT | Performed by: RADIOLOGY

## 2024-12-18 PROCEDURE — 2500000004 HC RX 250 GENERAL PHARMACY W/ HCPCS (ALT 636 FOR OP/ED): Performed by: RADIOLOGY

## 2024-12-18 PROCEDURE — 7100000009 HC PHASE TWO TIME - INITIAL BASE CHARGE

## 2024-12-18 PROCEDURE — 99152 MOD SED SAME PHYS/QHP 5/>YRS: CPT | Performed by: RADIOLOGY

## 2024-12-18 PROCEDURE — 2780000003 HC OR 278 NO HCPCS

## 2024-12-18 PROCEDURE — 2720000007 HC OR 272 NO HCPCS

## 2024-12-18 PROCEDURE — 76942 ECHO GUIDE FOR BIOPSY: CPT | Performed by: RADIOLOGY

## 2024-12-18 PROCEDURE — C1894 INTRO/SHEATH, NON-LASER: HCPCS

## 2024-12-18 PROCEDURE — C1788 PORT, INDWELLING, IMP: HCPCS

## 2024-12-18 RX ORDER — HEPARIN 100 UNIT/ML
SYRINGE INTRAVENOUS
Status: COMPLETED | OUTPATIENT
Start: 2024-12-18 | End: 2024-12-18

## 2024-12-18 RX ORDER — MIDAZOLAM HYDROCHLORIDE 1 MG/ML
INJECTION, SOLUTION INTRAMUSCULAR; INTRAVENOUS
Status: COMPLETED | OUTPATIENT
Start: 2024-12-18 | End: 2024-12-18

## 2024-12-18 RX ORDER — FENTANYL CITRATE 50 UG/ML
INJECTION, SOLUTION INTRAMUSCULAR; INTRAVENOUS
Status: COMPLETED | OUTPATIENT
Start: 2024-12-18 | End: 2024-12-18

## 2024-12-18 RX ORDER — LIDOCAINE HYDROCHLORIDE 20 MG/ML
INJECTION, SOLUTION EPIDURAL; INFILTRATION; INTRACAUDAL; PERINEURAL
Status: COMPLETED | OUTPATIENT
Start: 2024-12-18 | End: 2024-12-18

## 2024-12-18 ASSESSMENT — PAIN SCALES - GENERAL
PAINLEVEL_OUTOF10: 0 - NO PAIN
PAINLEVEL_OUTOF10: 0 - NO PAIN
PAINLEVEL_OUTOF10: 4
PAINLEVEL_OUTOF10: 0 - NO PAIN

## 2024-12-18 ASSESSMENT — PAIN DESCRIPTION - DESCRIPTORS: DESCRIPTORS: TENDER

## 2024-12-18 ASSESSMENT — PAIN - FUNCTIONAL ASSESSMENT: PAIN_FUNCTIONAL_ASSESSMENT: 0-10

## 2024-12-18 NOTE — NURSING NOTE
Final access site assessment WNL, no oozing or hematoma. Dressing clean, dry, and intact. IV removed and dressing applied.     Homegoing instructions specific to procedure given, patient verbalized understanding.  Discharge criteria met: patient easily arousable, responding appropriately. Vital signs +/- 20% of preprocedure baseline. Significant complications absent. Ambulates without dizziness. Pulse ox > 92% on room air or baseline O2.     Patient discharged to home, accompanied by family. Discharged via wheelchair.

## 2024-12-18 NOTE — POST-PROCEDURE NOTE
Interventional Radiology Brief Postprocedure Note    Attending: Timoteo Michel MD      Assistant: none    Diagnosis: esophageal ca    Description of procedure: port placement     Anesthesia:  Local, mod sed    Complications: None    Estimated Blood Loss: minimal      See detailed result report with images in PACS.    The patient tolerated the procedure well without incident or complication.

## 2024-12-18 NOTE — DISCHARGE INSTRUCTIONS
Instructions after Your Port Placement   Today, you had your port placed in Interventional Radiology/Specials department. Your port will be used for blood draws, imaging studies like CT-scans or MRIs and used for infusions and/ or chemotherapy etc.     Activity  For the first day, rest with light walking as tolerated. You might feel more tired than usual.   Do not lift anything heavier than 10 lbs. (around the weight of 1 gallon milk jug) for 24 hours. You can lift weights heavier than 10 lbs. on __12/19/24_____      .  Do not drive for the first 24 hours. You can drive on _12/19/24_______________.   The following day after the procedure, you can get back to your normal activities.    Port incision and neck area care   Keep your dressing over your chest area clean, dry and in place for 5-7 days. If you have a scheduled infusion before your dressing removal date, it is okay to let the staff remove your dressing and use your port.   You may remove chest dressing on __Tuesday, 12/24/24___________ and leave the area uncovered.   You may remove your clear neck dressing 2 days after your procedure.   You may remove neck dressing on ___Saturday, 12/21/24_________ and leave the area uncovered.   Do not remove the strips of tape on your chest or neck area. Let theses fall off on their own. Do not peel off any glue that might be over your incision.  The stitches used to close the skin around the port will dissolve on their own.   You may shower 3 days after your procedure. Continue to protect the dressing from direct water.   Do not submerge your port area in bath tub, swimming pool or hot tub until it is fully healed.   Once you remove a dressing, gently clean the area with soap and water and pat it dry with clean towel.   Check your port area every day for any signs of infection   Redness   Drainage  Increased warmth around the port site   Pus or foul odor   Fever or chills or increased pain at the port site  If you  have any of these signs or symptoms of infection: Please call and leave a message for our IR/Specials Nurse Practitioner Cathryn Fortino at [phone]. She will return your call the same business day. If unable to reach Cathryn, please call [phone] and ask to speak with a specials nurse. We are here M-F 7-4 PM.    Pain  After your procedure, when the numbing medicine wears off you might experience mild to moderate pain in the neck and chest area.   To help with pain you may apply ice pack 3 to 4 times a day for 20 minutes at a time to the chest and neck area. Be careful to not get the dressing area wet.      Medications for pain  Please refer to the medications on your discharge paperwork   If you are having pain, please take what you normally would to help relieve your pain.   If you have a prescription medication for chronic pain, continue taking your current regiment and that will help reduce or alleviate pain at the site of the incision as well.   Pain at the incision typically lasts 3-7 days and continues to lessen each day      Bleeding  If you have oozing from the port site that extends to the edges of the dressing. Hold pressure over the neck and chest area. If oozing does not stop in 5 minutes, call 911 or come straight to the emergency room to be evaluated.

## 2024-12-18 NOTE — PRE-SEDATION DOCUMENTATION
Interventional Radiology Preprocedure Note    Timoteo Victoria   Indication for procedure: The encounter diagnosis was Primary esophageal adenocarcinoma (Multi). Pt here for port placement.     Relevant review of systems: NA      /73   Pulse 78   Temp 35.7 °C (96.2 °F) (Tympanic)   Resp 16   SpO2 100%    Relevant Labs:   Lab Results   Component Value Date    CREATININE 0.81 11/26/2024    EGFR >90 11/26/2024    INR 1.2 (H) 11/24/2024    PROTIME 13.3 (H) 11/24/2024       Planned Sedation/Anesthesia: Moderate    Airway assessment: normal    Directed physical examination:    Alert and oriented, lungs: CTAB, heart sounds normal rate, S1 & S2     Mallampati: II (hard and soft palate, upper portion of tonsils and uvula visible)    ASA Score: ASA 3 - Patient with moderate systemic disease with functional limitations    Benefits, risks and alternatives of procedure and planned sedation have been discussed with the patient and/or their representative. All questions answered and they agree to proceed.     Corinne Freitas, APRN-CNP

## 2024-12-20 ENCOUNTER — HOSPITAL ENCOUNTER (OUTPATIENT)
Dept: RADIOLOGY | Facility: HOSPITAL | Age: 69
Discharge: HOME | End: 2024-12-20
Payer: MEDICARE

## 2024-12-20 DIAGNOSIS — C15.9 ESOPHAGEAL ADENOCARCINOMA (MULTI): ICD-10-CM

## 2024-12-20 PROCEDURE — 3430000001 HC RX 343 DIAGNOSTIC RADIOPHARMACEUTICALS: Performed by: THORACIC SURGERY (CARDIOTHORACIC VASCULAR SURGERY)

## 2024-12-20 PROCEDURE — A9552 F18 FDG: HCPCS | Performed by: THORACIC SURGERY (CARDIOTHORACIC VASCULAR SURGERY)

## 2024-12-20 PROCEDURE — 78815 PET IMAGE W/CT SKULL-THIGH: CPT | Mod: PI

## 2024-12-20 RX ORDER — FLUDEOXYGLUCOSE F 18 200 MCI/ML
13.3 INJECTION, SOLUTION INTRAVENOUS
Status: COMPLETED | OUTPATIENT
Start: 2024-12-20 | End: 2024-12-20

## 2024-12-23 ENCOUNTER — APPOINTMENT (OUTPATIENT)
Facility: CLINIC | Age: 69
End: 2024-12-23
Payer: MEDICARE

## 2024-12-23 NOTE — DOCUMENTATION CLARIFICATION NOTE
"    PATIENT:               ILAN MONSON  ACCT #:                  [id number]  MRN:                       [id number]  :                       1955  ADMIT DATE:       2024 12:19 AM  DISCH DATE:        2024 4:05 PM  RESPONDING PROVIDER #:        [id number]          PROVIDER RESPONSE TEXT:    I agree with dietician diagnosis of Severe Malnutrition on 2024    CDI QUERY TEXT:    Clarification    Instruction:    Based on your assessment of the patient and the clinical information, please provide the requested documentation by clicking on the appropriate radio button and enter any additional information if prompted.    Question: Please further clarify this patient nutritional status as    When answering this query, please exercise your independent professional judgment. The fact that a question is being asked, does not imply that any particular answer is desired or expected.    The patient's clinical indicators include:  Clinical Information: 69 YOM presenting with Esophageal mass.    Clinical Indicators: BMI 29.7    2024 Nutrition Consult note: \"Nutrition Diagnosis  Malnutrition Diagnosis  Patient has Malnutrition Diagnosis: Yes  Diagnosis Status: New  Malnutrition Diagnosis: Severe malnutrition related to chronic disease or condition  As Evidenced by: <75% estimated intake > 1 month, >5% weight loss over 1 month and generalized mild/moderate muscle loss\"    Treatment: Nutrition consult, Pt receiving enteral nutrition with Isosource 1.5 to a goal of 70ml/hr to provide 2520kcal, 114g protein and 1284ml H20/d, Daily lab monitoring with electrolyte repletion PRN    Risk Factors: Esophageal mass, <75% estimated intake > 1 month, >5% weight loss over 1 month and generalized mild/moderate muscle loss  Options provided:  -- I agree with dietician diagnosis of Severe Malnutrition on 2024  -- Other - I will add my own diagnosis  -- Refer to Clinical Documentation Reviewer    Query created by: " Laila John on 12/19/2024 10:32 AM      Electronically signed by:  OLGA AGRAWAL MD 12/22/2024 10:32 PM

## 2024-12-26 NOTE — DISCHARGE SUMMARY
Discharge Diagnosis  Esophageal mass    Issues Requiring Follow-Up  N/A    Discharge Meds     Medication List      START taking these medications     aspirin 81 mg chewable tablet; 1 tablet (81 mg) by j-tube route once   daily.; Replaces: aspirin 81 mg EC tablet   buPROPion 75 mg tablet; Commonly known as: Wellbutrin; 2 tablets (150   mg) by j-tube route 3 times a day.; Replaces: buPROPion  mg 24 hr   tablet   losartan 100 mg tablet; Commonly known as: Cozaar; 1 tablet (100 mg) by   j-tube route once daily.   traMADol 50 mg tablet; Commonly known as: Ultram; 1 tablet (50 mg) by   g-tube route every 6 hours if needed for severe pain (7 - 10).   verapamil 80 mg tablet; Commonly known as: Calan; 1 tablet (80 mg) by   j-tube route every 8 hours.; Replaces: verapamil  mg ER tablet     CHANGE how you take these medications     amitriptyline 100 mg tablet; Commonly known as: Elavil; 1 tablet (100   mg) by j-tube route once daily at bedtime.; What changed: how to take this   atorvastatin 40 mg tablet; Commonly known as: Lipitor; 1 tablet (40 mg)   by j-tube route once daily.; What changed: how to take this   levothyroxine 175 mcg tablet; Commonly known as: Synthroid, Levoxyl; 1   tablet (175 mcg) by j-tube route early in the morning.. Take on an empty   stomach at the same time each day, either 30 to 60 minutes prior to   breakfast; What changed: medication strength, how much to take, how to   take this, when to take this, additional instructions   sennosides-docusate sodium 8.6-50 mg tablet; Commonly known as:   Hope-Colace; 1 tablet by j-tube route as needed at bedtime for   constipation.; What changed: how to take this, when to take this, reasons   to take this   spironolactone 50 mg tablet; Commonly known as: Aldactone; 1 tablet (50   mg) by j-tube route once daily.; What changed: how to take this     CONTINUE taking these medications     cholecalciferol 50 mcg (2,000 unit) capsule; Commonly known as:  Vitamin   D-3     STOP taking these medications     Allegra Allergy 180 mg tablet; Generic drug: fexofenadine   aspirin 81 mg EC tablet; Replaced by: aspirin 81 mg chewable tablet   buPROPion  mg 24 hr tablet; Commonly known as: Wellbutrin XL;   Replaced by: buPROPion 75 mg tablet   buPROPion  mg 24 hr tablet; Commonly known as: Wellbutrin XL   eletriptan 20 mg tablet; Commonly known as: Relpax   FEVERFEW ORAL   hydrocortisone 2.5 % cream   hydrOXYzine pamoate 25 mg capsule; Commonly known as: Vistaril   irbesartan 300 mg tablet; Commonly known as: Avapro   omeprazole 20 mg DR capsule; Commonly known as: PriLOSEC   tamsulosin 0.4 mg 24 hr capsule; Commonly known as: Flomax   verapamil  mg ER tablet; Commonly known as: Calan-SR; Replaced by:   verapamil 80 mg tablet       Test Results Pending At Discharge  N/A    Hospital Course    Timoteo Victoria is a 69 y.o. male w/ PMHx of CAD s/p 4x CABG (~2005), HTN, HLD, hx of cluster HA, hypothyroidism (on levothyroxine) and tobacco use presenting with dysphagia and EGD showed distal  esophageal mass, concerning for malignancy. 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 CT chest/abdomen and pelvis showed no evidence of mets. The patient underwent J-tube placement with thoracic surgery, and the patient was started on tube feeds. Oncology was consulted, and will follow-up with the patient as an outpatient to determine and start treatment. He will be discharged with HC, and will cont. With tube feeds. He was discharged home in stable condition with minimal pain. Prescriptions were sent to his pharmacy.         Pertinent Physical Exam At Time of Discharge  GENERAL APPEARANCE: A&Ox3, appears in no acute distress  HEAD: normocephalic, atraumatic  THROAT: Oral cavity and pharynx normal. No inflammation, swelling, exudate, or lesions.  NECK: Neck supple, non-tender without lymphadenopathy, masses or thyromegaly.  CARDIAC: Normal S1 and S2. No S3,  S4 or murmurs. Rhythm is regular. There is no peripheral edema, cyanosis or pallor. Extremities are warm and well perfused. No carotid bruits.  LUNGS: Clear to auscultation bilaterally without rales, rhonchi, wheezing or diminished breath sounds.  ABDOMEN: Positive bowel sounds. Soft, nondistended, nontender. No guarding or rebound. No masses.  EXTREMITIES: No significant deformity or joint abnormality. No edema. Peripheral pulses intact. No varicosities.  SKIN: Skin normal color, texture and turgor with no lesions or rash  PSYCHIATRIC: oriented to person, place, and time, good judgement and reason, without hallucinations, abnormal affect or abnormal behaviors during the examination. Patient is not suicidal.        Outpatient Follow-Up  Future Appointments   Date Time Provider Department Center   1/2/2025  7:40 AM [facility] CENTRAL LINE 02 [id number] Academic   1/2/2025  8:00 AM Madan Wilson MD [id number] Academic   1/2/2025  8:15 AM INF 03 [facility] [id number] Academic   2/5/2025  4:30 PM Dafne Bedolla DO [id number] [facility]         Hector Shi MD

## 2024-12-30 ENCOUNTER — PATIENT OUTREACH (OUTPATIENT)
Dept: PRIMARY CARE | Facility: CLINIC | Age: 69
End: 2024-12-30
Payer: MEDICARE

## 2025-01-02 ENCOUNTER — NUTRITION (OUTPATIENT)
Dept: HEMATOLOGY/ONCOLOGY | Facility: HOSPITAL | Age: 70
End: 2025-01-02

## 2025-01-02 ENCOUNTER — INFUSION (OUTPATIENT)
Dept: HEMATOLOGY/ONCOLOGY | Facility: HOSPITAL | Age: 70
End: 2025-01-02
Payer: MEDICARE

## 2025-01-02 ENCOUNTER — LAB (OUTPATIENT)
Dept: HEMATOLOGY/ONCOLOGY | Facility: HOSPITAL | Age: 70
End: 2025-01-02
Payer: MEDICARE

## 2025-01-02 ENCOUNTER — OFFICE VISIT (OUTPATIENT)
Dept: HEMATOLOGY/ONCOLOGY | Facility: HOSPITAL | Age: 70
End: 2025-01-02
Payer: MEDICARE

## 2025-01-02 VITALS
WEIGHT: 206.13 LBS | RESPIRATION RATE: 20 BRPM | HEART RATE: 102 BPM | BODY MASS INDEX: 29.68 KG/M2 | OXYGEN SATURATION: 100 % | DIASTOLIC BLOOD PRESSURE: 66 MMHG | TEMPERATURE: 97.3 F | SYSTOLIC BLOOD PRESSURE: 133 MMHG

## 2025-01-02 DIAGNOSIS — C15.9 PRIMARY ESOPHAGEAL ADENOCARCINOMA (MULTI): ICD-10-CM

## 2025-01-02 DIAGNOSIS — E04.1 THYROID NODULE: Primary | ICD-10-CM

## 2025-01-02 LAB
ALBUMIN SERPL BCP-MCNC: 4.5 G/DL (ref 3.4–5)
ALP SERPL-CCNC: 65 U/L (ref 33–136)
ALT SERPL W P-5'-P-CCNC: 25 U/L (ref 10–52)
ANION GAP SERPL CALC-SCNC: 15 MMOL/L (ref 10–20)
AST SERPL W P-5'-P-CCNC: 14 U/L (ref 9–39)
BASOPHILS # BLD AUTO: 0.03 X10*3/UL (ref 0–0.1)
BASOPHILS NFR BLD AUTO: 0.2 %
BILIRUB SERPL-MCNC: 0.5 MG/DL (ref 0–1.2)
BUN SERPL-MCNC: 32 MG/DL (ref 6–23)
CALCIUM SERPL-MCNC: 9.7 MG/DL (ref 8.6–10.3)
CHLORIDE SERPL-SCNC: 96 MMOL/L (ref 98–107)
CO2 SERPL-SCNC: 23 MMOL/L (ref 21–32)
CREAT SERPL-MCNC: 0.98 MG/DL (ref 0.5–1.3)
EGFRCR SERPLBLD CKD-EPI 2021: 84 ML/MIN/1.73M*2
EOSINOPHIL # BLD AUTO: 0 X10*3/UL (ref 0–0.7)
EOSINOPHIL NFR BLD AUTO: 0 %
ERYTHROCYTE [DISTWIDTH] IN BLOOD BY AUTOMATED COUNT: 13 % (ref 11.5–14.5)
GLUCOSE SERPL-MCNC: 147 MG/DL (ref 74–99)
HBV SURFACE AG SERPL QL IA: NONREACTIVE
HCT VFR BLD AUTO: 36.1 % (ref 41–52)
HGB BLD-MCNC: 12.7 G/DL (ref 13.5–17.5)
IMM GRANULOCYTES # BLD AUTO: 0.21 X10*3/UL (ref 0–0.7)
IMM GRANULOCYTES NFR BLD AUTO: 1.3 % (ref 0–0.9)
LYMPHOCYTES # BLD AUTO: 1.36 X10*3/UL (ref 1.2–4.8)
LYMPHOCYTES NFR BLD AUTO: 8.4 %
MCH RBC QN AUTO: 30.9 PG (ref 26–34)
MCHC RBC AUTO-ENTMCNC: 35.2 G/DL (ref 32–36)
MCV RBC AUTO: 88 FL (ref 80–100)
MONOCYTES # BLD AUTO: 0.25 X10*3/UL (ref 0.1–1)
MONOCYTES NFR BLD AUTO: 1.5 %
NEUTROPHILS # BLD AUTO: 14.41 X10*3/UL (ref 1.2–7.7)
NEUTROPHILS NFR BLD AUTO: 88.6 %
NRBC BLD-RTO: 0 /100 WBCS (ref 0–0)
PLATELET # BLD AUTO: 225 X10*3/UL (ref 150–450)
POTASSIUM SERPL-SCNC: 4.8 MMOL/L (ref 3.5–5.3)
PROT SERPL-MCNC: 7.1 G/DL (ref 6.4–8.2)
RBC # BLD AUTO: 4.11 X10*6/UL (ref 4.5–5.9)
SODIUM SERPL-SCNC: 129 MMOL/L (ref 136–145)
WBC # BLD AUTO: 16.3 X10*3/UL (ref 4.4–11.3)

## 2025-01-02 PROCEDURE — 3075F SYST BP GE 130 - 139MM HG: CPT | Performed by: INTERNAL MEDICINE

## 2025-01-02 PROCEDURE — 87340 HEPATITIS B SURFACE AG IA: CPT

## 2025-01-02 PROCEDURE — 1159F MED LIST DOCD IN RCRD: CPT | Performed by: INTERNAL MEDICINE

## 2025-01-02 PROCEDURE — 4010F ACE/ARB THERAPY RXD/TAKEN: CPT | Performed by: INTERNAL MEDICINE

## 2025-01-02 PROCEDURE — 85025 COMPLETE CBC W/AUTO DIFF WBC: CPT

## 2025-01-02 PROCEDURE — 36591 DRAW BLOOD OFF VENOUS DEVICE: CPT

## 2025-01-02 PROCEDURE — 84075 ASSAY ALKALINE PHOSPHATASE: CPT

## 2025-01-02 PROCEDURE — 3078F DIAST BP <80 MM HG: CPT | Performed by: INTERNAL MEDICINE

## 2025-01-02 PROCEDURE — 1126F AMNT PAIN NOTED NONE PRSNT: CPT | Performed by: INTERNAL MEDICINE

## 2025-01-02 PROCEDURE — 99214 OFFICE O/P EST MOD 30 MIN: CPT | Performed by: INTERNAL MEDICINE

## 2025-01-02 PROCEDURE — 96413 CHEMO IV INFUSION 1 HR: CPT

## 2025-01-02 PROCEDURE — 4004F PT TOBACCO SCREEN RCVD TLK: CPT | Performed by: INTERNAL MEDICINE

## 2025-01-02 PROCEDURE — 96416 CHEMO PROLONG INFUSE W/PUMP: CPT

## 2025-01-02 PROCEDURE — 96367 TX/PROPH/DG ADDL SEQ IV INF: CPT

## 2025-01-02 PROCEDURE — 2500000004 HC RX 250 GENERAL PHARMACY W/ HCPCS (ALT 636 FOR OP/ED): Performed by: INTERNAL MEDICINE

## 2025-01-02 PROCEDURE — 96375 TX/PRO/DX INJ NEW DRUG ADDON: CPT | Mod: INF

## 2025-01-02 PROCEDURE — 96417 CHEMO IV INFUS EACH ADDL SEQ: CPT

## 2025-01-02 RX ORDER — LORAZEPAM 2 MG/ML
1 INJECTION INTRAMUSCULAR AS NEEDED
Status: CANCELLED | OUTPATIENT
Start: 2025-01-02

## 2025-01-02 RX ORDER — FAMOTIDINE 10 MG/ML
20 INJECTION INTRAVENOUS ONCE AS NEEDED
Status: DISCONTINUED | OUTPATIENT
Start: 2025-01-02 | End: 2025-01-02 | Stop reason: HOSPADM

## 2025-01-02 RX ORDER — DIPHENHYDRAMINE HYDROCHLORIDE 50 MG/ML
25 INJECTION INTRAMUSCULAR; INTRAVENOUS ONCE
Status: COMPLETED | OUTPATIENT
Start: 2025-01-02 | End: 2025-01-02

## 2025-01-02 RX ORDER — DIPHENHYDRAMINE HYDROCHLORIDE 50 MG/ML
50 INJECTION INTRAMUSCULAR; INTRAVENOUS AS NEEDED
Status: DISCONTINUED | OUTPATIENT
Start: 2025-01-02 | End: 2025-01-02 | Stop reason: HOSPADM

## 2025-01-02 RX ORDER — HEPARIN SODIUM,PORCINE/PF 10 UNIT/ML
50 SYRINGE (ML) INTRAVENOUS AS NEEDED
Status: CANCELLED | OUTPATIENT
Start: 2025-01-02

## 2025-01-02 RX ORDER — PROCHLORPERAZINE EDISYLATE 5 MG/ML
10 INJECTION INTRAMUSCULAR; INTRAVENOUS EVERY 6 HOURS PRN
Status: CANCELLED | OUTPATIENT
Start: 2025-01-02

## 2025-01-02 RX ORDER — DIPHENHYDRAMINE HYDROCHLORIDE 50 MG/ML
50 INJECTION INTRAMUSCULAR; INTRAVENOUS AS NEEDED
Status: CANCELLED | OUTPATIENT
Start: 2025-01-02

## 2025-01-02 RX ORDER — DIPHENHYDRAMINE HCL 25 MG
25 CAPSULE ORAL ONCE
Status: CANCELLED | OUTPATIENT
Start: 2025-01-02

## 2025-01-02 RX ORDER — HEPARIN 100 UNIT/ML
500 SYRINGE INTRAVENOUS AS NEEDED
Status: CANCELLED | OUTPATIENT
Start: 2025-01-02

## 2025-01-02 RX ORDER — ALBUTEROL SULFATE 0.83 MG/ML
3 SOLUTION RESPIRATORY (INHALATION) AS NEEDED
Status: DISCONTINUED | OUTPATIENT
Start: 2025-01-02 | End: 2025-01-02 | Stop reason: HOSPADM

## 2025-01-02 RX ORDER — PROCHLORPERAZINE EDISYLATE 5 MG/ML
10 INJECTION INTRAMUSCULAR; INTRAVENOUS EVERY 6 HOURS PRN
Status: DISCONTINUED | OUTPATIENT
Start: 2025-01-02 | End: 2025-01-02 | Stop reason: HOSPADM

## 2025-01-02 RX ORDER — ALBUTEROL SULFATE 0.83 MG/ML
3 SOLUTION RESPIRATORY (INHALATION) AS NEEDED
Status: CANCELLED | OUTPATIENT
Start: 2025-01-02

## 2025-01-02 RX ORDER — PROCHLORPERAZINE MALEATE 10 MG
10 TABLET ORAL EVERY 6 HOURS PRN
Status: CANCELLED | OUTPATIENT
Start: 2025-01-02

## 2025-01-02 RX ORDER — EPINEPHRINE 0.3 MG/.3ML
0.3 INJECTION SUBCUTANEOUS EVERY 5 MIN PRN
Status: CANCELLED | OUTPATIENT
Start: 2025-01-02

## 2025-01-02 RX ORDER — EPINEPHRINE 0.3 MG/.3ML
0.3 INJECTION SUBCUTANEOUS EVERY 5 MIN PRN
Status: DISCONTINUED | OUTPATIENT
Start: 2025-01-02 | End: 2025-01-02 | Stop reason: HOSPADM

## 2025-01-02 RX ORDER — FAMOTIDINE 10 MG/ML
20 INJECTION INTRAVENOUS ONCE AS NEEDED
Status: CANCELLED | OUTPATIENT
Start: 2025-01-03

## 2025-01-02 RX ORDER — FAMOTIDINE 10 MG/ML
20 INJECTION INTRAVENOUS ONCE AS NEEDED
Status: CANCELLED | OUTPATIENT
Start: 2025-01-02

## 2025-01-02 RX ORDER — LORAZEPAM 2 MG/ML
1 INJECTION INTRAMUSCULAR AS NEEDED
Status: DISCONTINUED | OUTPATIENT
Start: 2025-01-02 | End: 2025-01-02 | Stop reason: HOSPADM

## 2025-01-02 RX ORDER — EPINEPHRINE 0.3 MG/.3ML
0.3 INJECTION SUBCUTANEOUS EVERY 5 MIN PRN
Status: CANCELLED | OUTPATIENT
Start: 2025-01-03

## 2025-01-02 RX ORDER — DIPHENHYDRAMINE HYDROCHLORIDE 50 MG/ML
50 INJECTION INTRAMUSCULAR; INTRAVENOUS AS NEEDED
Status: CANCELLED | OUTPATIENT
Start: 2025-01-03

## 2025-01-02 RX ORDER — PALONOSETRON 0.05 MG/ML
0.25 INJECTION, SOLUTION INTRAVENOUS ONCE
Status: CANCELLED | OUTPATIENT
Start: 2025-01-02

## 2025-01-02 RX ORDER — PALONOSETRON 0.05 MG/ML
0.25 INJECTION, SOLUTION INTRAVENOUS ONCE
Status: COMPLETED | OUTPATIENT
Start: 2025-01-02 | End: 2025-01-02

## 2025-01-02 RX ORDER — PROCHLORPERAZINE MALEATE 10 MG
10 TABLET ORAL EVERY 6 HOURS PRN
Status: DISCONTINUED | OUTPATIENT
Start: 2025-01-02 | End: 2025-01-02 | Stop reason: HOSPADM

## 2025-01-02 RX ORDER — ALBUTEROL SULFATE 0.83 MG/ML
3 SOLUTION RESPIRATORY (INHALATION) AS NEEDED
Status: CANCELLED | OUTPATIENT
Start: 2025-01-03

## 2025-01-02 RX ADMIN — DEXAMETHASONE SODIUM PHOSPHATE 12 MG: 10 INJECTION, SOLUTION INTRAMUSCULAR; INTRAVENOUS at 09:50

## 2025-01-02 RX ADMIN — PALONOSETRON HYDROCHLORIDE 250 MCG: 0.25 INJECTION INTRAVENOUS at 09:39

## 2025-01-02 RX ADMIN — FLUOROURACIL 5550 MG: 50 INJECTION, SOLUTION INTRAVENOUS at 14:05

## 2025-01-02 RX ADMIN — DOCETAXEL 110 MG: 20 INJECTION, SOLUTION, CONCENTRATE INTRAVENOUS at 10:28

## 2025-01-02 RX ADMIN — DIPHENHYDRAMINE HYDROCHLORIDE 25 MG: 50 INJECTION INTRAMUSCULAR; INTRAVENOUS at 09:39

## 2025-01-02 RX ADMIN — OXALIPLATIN 180 MG: 5 INJECTION, SOLUTION INTRAVENOUS at 11:47

## 2025-01-02 ASSESSMENT — PAIN SCALES - GENERAL: PAINLEVEL_OUTOF10: 0-NO PAIN

## 2025-01-02 NOTE — PROGRESS NOTES
NUTRITION Assessment NOTE    Nutrition Assessment     Reason for Visit:  Timoteo Victoria is a 69 y.o. male who presents for nutrition evaluation and education.      FLOT- begins today - 1/2/2025  Pt with esophageal     Has a j-tube 11-    Lab Results   Component Value Date/Time    GLUCOSE 147 (H) 01/02/2025 0750     (L) 01/02/2025 0750    K 4.8 01/02/2025 0750    CL 96 (L) 01/02/2025 0750    CO2 23 01/02/2025 0750    ANIONGAP 15 01/02/2025 0750    BUN 32 (H) 01/02/2025 0750    CREATININE 0.98 01/02/2025 0750    EGFR 84 01/02/2025 0750    CALCIUM 9.7 01/02/2025 0750    ALBUMIN 4.5 01/02/2025 0750    ALKPHOS 65 01/02/2025 0750    PROT 7.1 01/02/2025 0750    AST 14 01/02/2025 0750    BILITOT 0.5 01/02/2025 0750    ALT 25 01/02/2025 0750    MG 1.89 11/20/2024 2130     Lab Results   Component Value Date/Time    VITD25 66 03/12/2024 1533          Anthropometrics:     Steady weight with feeding tube  HT:  177.8 cm- 5'10  IBW:  75.5 kg  123.8% IBW    Wt Readings from Last 10 Encounters:   01/02/25 93.5 kg (206 lb 2.1 oz)   12/12/24 92.8 kg (204 lb 9.4 oz)   11/30/24 109 kg (241 lb)   11/25/24 93.9 kg (207 lb)   11/21/24 93 kg (205 lb)   10/08/24 101 kg (222 lb)   03/20/24 115 kg (254 lb)   12/23/23 111 kg (245 lb)   09/20/23 114 kg (252 lb)   06/19/23 115 kg (253 lb)        Meds noted           Food And Nutrient Intake:         Has a feeding tube  Isosource 1.5  5 cartons per day  105 ml per hour  11 hours   Has a pole   120ml    Could go up to rate of 120 ml and 6 cartons if needed       Yesterday had:  4 popsicles- double   Ice cream- 4 to 5 TBSP  Iced tea- 2 quarts- SF  Will not be be able to take     Does get stuck if he eats too fast     Tomorrow between 1-4                                                      Nutrition Focused Physical Exam Findings:                          Energy Needs     Dosing weight: 75.5 to 93.5 kg  Calories per day: 2265 to 2800 determined by 30 kcal/kg  Protein (g) per day: 90  to 112 determined by 1.2 g/kg  Estimated fluid needs: 2265 to 2800 determined by 1 kcal/mL       Nutrition Diagnosis      In house on 11- pt was defined as severely malnourished  Since feeding tube has been placed pt with stable weight and consistent intake of  1875 calories 83% or estimated caloric needs and 85 g protein 94.4% of estimated protein needs  Issue with  severe malnutrition is currently resolved   Nutrition Diagnosis  Patient has Nutrition Diagnosis: Yes  Diagnosis Status (1): New  Nutrition Diagnosis 1: Altered GI function  Related to (1): diagnosis and inability to take adequate nutrtion po  As Evidenced by (1): need for enteral nutrtion to be sole source of nutrtion    Nutrition Interventions/Recommendations   Nutrition Prescription       Food and Nutrition Delivery       Nutrition Education       Coordination of Care       There are no Patient Instructions on file for this visit.    Nutrition Monitoring and Evaluation

## 2025-01-02 NOTE — PROGRESS NOTES
Education handouts on how to take zofran, compazine, and imodium given to patient. He can crush all tablets for his PEG as needed. Instructed pt to call with any questions or concerns.

## 2025-01-02 NOTE — PROGRESS NOTES
Patient ID: Timoteo Victoria is a 69 y.o. male.    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  PET-avid thyroid nodule.    Genomic profile:  Normal MMR expresison    Assessment and Plan:  69M with CAD (CABG 2005), HTN, HLD, chronic tobacco use, hypothyroidism, presented with 4-6 weeks of progressive dysphagia (solid to liquid/mediations) 40lb wt loss over 6 months, and was found to have distal esophageal mass covering half of lumen on EGD 11/21/24  and Bx showed intramucosal adenocarcinoma with normal MMR expression. S/p J tube placement 11/25/2024.  PET-CT showed no distant mets.    I discussed FLOT for chemotherapy regimen with him in details. After a detailed discussion about the chemo, he consented.  I ordered the chemotherapy today.    Plan: 1. FLOT-4 for 4 cycles followed by surgery followed by 4 more cycles of FLOT-4.  2. Thyroid lesion- endocrinology referral.    I have placed all orders as outlined above. I advised the patient to schedule the tests and follow-up appointment as discussed by contacting the  on the way out or calling by phone.    Providers:  Surgeon: Dr. Maritza Mark: Dr Katie Apple:    Chief complaint: progressive dysphagia    HPI:  Timoteo Victoria is a 69 y.o. male with a notable history of CAD (CABG 2005), HTN, HLD, chronic tobacco use, hypothyroidism presented on 11/25/2024 as a transfer from [facility] for cancer workup. He had been having trouble swallowing for the past month, initially solids and progressing to liquids and medications. He has lost 40 lbs in the past 6 months.     EGD on 11/21/24 showed an esophageal mass with a malignant appearance, with biopsy showing intramucosal adenocarcinoma. The patient had a J tube placed on 11/25. He's coming in to oncology outpatient clinic for the treatment of his esophageal cancer.    He's been doing great; he's been using J-tube for nutrition and also able to eat  applesauce, pudding, and other drinks although he needs to slowly swallow.    ONCOLOGIC HISTORY-  11/21/24  [facility] admission for weight loss, dysphagia; EGD showed esophageal mass with biopsy confirming intramucosal adenocarcinoma  11/20/24 CT neck with no masses  11/22/24 CT CAP with contrast with irregular masslike thickening of distal esophagus, no obvious metastases  11/25/24 J tube placement.    12-: pet-ct showed-  IMPRESSION:  1. Hypermetabolic esophageal mass as described above is consistent  with biopsy-proven esophageal adenocarcinoma. Few minimally  hypermetabolic subcentimeter periesophageal lymph nodes are likely  reactive.  2. No other evidence of hypermetabolic thoracic or abdominal  lymphadenopathy or hypermetabolic metastatic disease.  3. Multiple hypermetabolic intraparotid nodules/lymph nodes, which  have been present since 2022, likely represents a benign and indolent  process. However, recommend continued attention on follow-up.  4. Asymmetrically increased hypermetabolic activity within the right  thyroid gland. Correlate with thyroid ultrasound may be of value.    1-2-2025: started FLOT-4.    Interval history:   Overall feels well. Denies nausea. Tolerating tube feeding. Denies any significant pain.    Past Medical History:   Past Medical History:  No date: Hyperlipidemia  No date: Hypertension  No date: Personal history of other endocrine, nutritional and   metabolic disease      Comment:  History of hypothyroidism  No date: Type 2 diabetes mellitus   Surgical History:    Past Surgical History:   Procedure Laterality Date    CATARACT EXTRACTION Right 08/25/2023    CHOLECYSTECTOMY  09/18/2018    Cholecystectomy    CORONARY ARTERY BYPASS GRAFT  09/18/2018    CABG    HERNIA REPAIR  09/18/2018    Inguinal Hernia Repair      Family History:    Family History   Problem Relation Name Age of Onset    COPD Mother       Family Oncology History:    Cancer-related family history is not on  file.  Social History:    Social History     Tobacco Use    Smoking status: Every Day     Current packs/day: 0.25     Average packs/day: 1 pack/day for 46.0 years (45.3 ttl pk-yrs)     Types: Cigarettes     Start date: 1979    Smokeless tobacco: Never   Vaping Use    Vaping status: Never Used   Substance Use Topics    Alcohol use: Not Currently    Drug use: Never        Allergies  Allergies   Allergen Reactions    Ibuprofen Swelling and Unknown        Medications  Current Outpatient Medications   Medication Instructions    amitriptyline (ELAVIL) 100 mg, j-tube, Nightly    aspirin 81 mg, j-tube, Daily    atorvastatin (LIPITOR) 40 mg, j-tube, Daily    buPROPion (WELLBUTRIN) 150 mg, j-tube, 3 times daily    cholecalciferol (VITAMIN D-3) 2,000 Units, j-tube, Daily    dexAMETHasone (Decadron) 4 mg tablet Take 8 mg (2 tablets) by mouth twice daily the day before treatment, once the evening of treatment, and twice daily the day after treatment.    levothyroxine (SYNTHROID, LEVOXYL) 175 mcg, j-tube, Daily, Take on an empty stomach at the same time each day, either 30 to 60 minutes prior to breakfast    loperamide (Imodium) 2 mg capsule Take 2 capsules (4 mg) by mouth with the first episode of diarrhea and 1 capsule (2 mg) by mouth with any additional episodes. Maximum 8 capsules (16 mg) per day.    losartan (COZAAR) 100 mg, j-tube, Daily    ondansetron ODT (ZOFRAN-ODT) 8 mg, oral, Every 8 hours PRN    prochlorperazine (COMPAZINE) 10 mg, oral, Every 6 hours PRN    sennosides-docusate sodium (Hope-Colace) 8.6-50 mg tablet 1 tablet, j-tube, Nightly PRN    spironolactone (ALDACTONE) 50 mg, j-tube, Daily    traMADol (ULTRAM) 50 mg, g-tube, Every 6 hours PRN    verapamil (CALAN) 80 mg, j-tube, Every 8 hours scheduled          Objective   VS: /66 (BP Location: Left arm, Patient Position: Sitting, BP Cuff Size: Adult)   Pulse 102   Temp 36.3 °C (97.3 °F) (Temporal)   Resp 20   Wt 93.5 kg (206 lb 2.1 oz)   SpO2 100%    BMI 29.68 kg/m²   Weight:   Vitals:    01/02/25 0819   Weight: 93.5 kg (206 lb 2.1 oz)        PHYSICAL EXAMINATION  ECOG performance status- 1  VS:  /66 (BP Location: Left arm, Patient Position: Sitting, BP Cuff Size: Adult)   Pulse 102   Temp 36.3 °C (97.3 °F) (Temporal)   Resp 20   Wt 93.5 kg (206 lb 2.1 oz)   SpO2 100%   BMI 29.68 kg/m²   Weight:   Vitals:    01/02/25 0819   Weight: 93.5 kg (206 lb 2.1 oz)       BSA: 2.15 meters squared   Pain Scale: 0    Constitutional: Awake/alert/oriented x3, cooperative and answers questions appropriately.     Eyes: No pallor, conjunctival injection, clear sclera.    Mouth: No ulceration. No thrush.     Head/Neck: Neck supple, no apparent injury, thyroid without mass or tenderness, No JVD, trachea midline, no bruits.     Respiratory/Thorax: Normal breath sounds with bilaterally symmetrical chest expansion. No dullness.      Cardiovascular: No audible murmurs, normal heart sounds. No pericardial rub.     Gastrointestinal: Nondistended, soft, non-tender, no rebound tenderness or guarding, no masses palpable, no organomegaly, +BS, no bruits. No ascites.     Musculoskeletal: No joint swelling, redness.      Extremities: normal extremities, no cyanosis edema, contusions or wounds, no clubbing.     Lymphatic: No significant lymphadenopathy.     Skin: Warm and dry, no lesions, no rashes.     Labs  Results from last 7 days   Lab Units 01/02/25  0750   WBC AUTO x10*3/uL 16.3*   HEMOGLOBIN g/dL 12.7*   HEMATOCRIT % 36.1*   PLATELETS AUTO x10*3/uL 225   NEUTROS ABS x10*3/uL 14.41*   LYMPHS ABS AUTO x10*3/uL 1.36   MONOS ABS AUTO x10*3/uL 0.25   EOS ABS AUTO x10*3/uL 0.00   NEUTROS PCT AUTO % 88.6   LYMPHS PCT AUTO % 8.4   MONOS PCT AUTO % 1.5   EOS PCT AUTO % 0.0    Unremarkable                    Image  EGD (11/21/2024)  Single malignant-appearing and invasive mass (not traversable) in the lower third of the esophagus, covering one half of the circumference; performed cold  forceps biopsy     A. Esophagus, Distal, Mass, Biopsy:  -- Intramucosal adenocarcinoma. See note.     Note: The endoscopic impression of a friable and invasive lesion in the lower third of the esophagus is noted. This biopsy may be not representative of the entire lesion. Clinical correlation is recommended.    MISMATCH REPAIR PROTEIN EXPRESSION                    Protein:           Result                 MLH-1:             Expression Present                                                   PMS-2:            Expression Present                                                   MSH-2:            Expression Present                                                   MSH-6:            Expression Present                                       INTERPRETATION: Neoplasm with normal mismatch repair protein expression.     C/A/P CT (11/22/2024)  IMPRESSION:  1. Irregular masslike thickening of the mid to distal esophagus is  difficult to accurately measure, and may represent a neoplastic  process. Correlate with recent endoscopic imaging and biopsy results.  There is also proximal esophageal wall thickening, and a moderate  size hiatal hernia.  2. Small area of centrilobular nodularity in the right middle lobe  may represent focal aspiration/mucoid impaction or less likely  atypical infection.  3. Mild apical predominant centrilobular emphysema.  4. Severe coronary artery calcifications. Please note this exam is  not optimized for evaluation of the coronary arteries.  5. Nonobstructing 0.4 cm calculus in the left kidney.    Neck CT (11/20/2024)  IMPRESSION:  New maxillary periapical lucencies with adjacent mucosal thickening.  No discrete fluid collection identified. Dental follow-up recommended.      No discrete mass or cervical lymphadenopathy identified however  direct visualization suggested.      Bilateral parotid mass/nodule similar to prior imaging.      Postsurgical changes, emphysematous changes, mild  mediastinal  lymphadenopathy and additional findings as detailed.  This note was created using a voice recognition system ( the Dragon dictation system). Inaccuracies and misspellings are unintentional.     Madan Wilson MD.

## 2025-01-02 NOTE — PROGRESS NOTES
Patient arrived ambulatory to infusion for scheduled tx of FLOT. Saw Dr. Weiss in clinic prior. Denies any new or worsening sx. Patient received handouts about chemo therapy that was given today and when to call provider. Patient complained about peg tube pain and by patients consent, picture was placed in chart. Patient stated provider looked at it and was told to use  antibiotic cream.  Amy Lejeune dietitian, spoke with patient about peg tube and tube feedings. Patient watched infusesystem video and signed consent via Ipad. All questions answered at this time. Patient made aware of 2 pm pump disconnect at [place] for tomorrow.  Tolerated infusion without issue. Discharged in stable condition.

## 2025-01-03 ENCOUNTER — INFUSION (OUTPATIENT)
Dept: HEMATOLOGY/ONCOLOGY | Facility: CLINIC | Age: 70
End: 2025-01-03
Payer: MEDICARE

## 2025-01-03 VITALS
RESPIRATION RATE: 16 BRPM | DIASTOLIC BLOOD PRESSURE: 73 MMHG | HEART RATE: 104 BPM | OXYGEN SATURATION: 100 % | TEMPERATURE: 97.3 F | SYSTOLIC BLOOD PRESSURE: 131 MMHG

## 2025-01-03 DIAGNOSIS — C15.9 PRIMARY ESOPHAGEAL ADENOCARCINOMA (MULTI): ICD-10-CM

## 2025-01-03 PROCEDURE — 96523 IRRIG DRUG DELIVERY DEVICE: CPT

## 2025-01-03 PROCEDURE — 2500000004 HC RX 250 GENERAL PHARMACY W/ HCPCS (ALT 636 FOR OP/ED): Mod: JZ,JG,TB | Performed by: INTERNAL MEDICINE

## 2025-01-03 PROCEDURE — 96372 THER/PROPH/DIAG INJ SC/IM: CPT

## 2025-01-03 RX ORDER — HEPARIN SODIUM,PORCINE/PF 10 UNIT/ML
50 SYRINGE (ML) INTRAVENOUS AS NEEDED
OUTPATIENT
Start: 2025-01-03

## 2025-01-03 RX ORDER — HEPARIN 100 UNIT/ML
500 SYRINGE INTRAVENOUS AS NEEDED
OUTPATIENT
Start: 2025-01-03

## 2025-01-03 RX ORDER — HEPARIN 100 UNIT/ML
500 SYRINGE INTRAVENOUS AS NEEDED
Status: DISCONTINUED | OUTPATIENT
Start: 2025-01-03 | End: 2025-01-03 | Stop reason: HOSPADM

## 2025-01-03 RX ADMIN — PEGFILGRASTIM-CBQV 6 MG: 6 INJECTION, SOLUTION SUBCUTANEOUS at 14:09

## 2025-01-03 RX ADMIN — HEPARIN 500 UNITS: 100 SYRINGE at 14:09

## 2025-01-03 ASSESSMENT — PAIN SCALES - GENERAL: PAINLEVEL_OUTOF10: 0-NO PAIN

## 2025-01-03 NOTE — PROGRESS NOTES
Patient presents for pump disconnect and Udenyca shot. Has no complaints alert and oriented x 4.     CADD pump disconnected, verified total volume infused.  + blood return noted, flushed with 10cc normal saline and 5cc 10 units heparin.  Mae needle removed and site covered with bandaid. Patient tolerated procedure well.    Patient educated on Udencya and shot was given. Patient also educated to reach out to on call team over the weekend if he needs any assistance. Patient verbalized understanding of all teaching and education.      Pt tolerated all procedures well. Patient feels well and has no complaints, vital signs stable. Patient discharged in stable condition with no further needs.

## 2025-01-05 ENCOUNTER — DOCUMENTATION (OUTPATIENT)
Dept: SURGERY | Facility: HOSPITAL | Age: 70
End: 2025-01-05
Payer: MEDICARE

## 2025-01-05 DIAGNOSIS — T81.49XA SURGICAL SITE INFECTION: Primary | ICD-10-CM

## 2025-01-05 RX ORDER — CEPHALEXIN 500 MG/1
500 CAPSULE ORAL 4 TIMES DAILY
Qty: 28 CAPSULE | Refills: 0 | Status: ON HOLD | OUTPATIENT
Start: 2025-01-05 | End: 2025-01-12

## 2025-01-05 NOTE — PROGRESS NOTES
Received a page to the on call pager around 1815 that patient was experiencing increased drainage around his J-tube. Spoke with patient on the phone. In brief, this is a 70yo M currently receiving FLOT for esophageal adenocarcinoma. He is s/p J tube placement by Dr. Salas on 11/25/2024 for progressive dysphagia. Patient endorses ongoing drainage around J tube site since T fasteners were removed in clinic. This drainage acutely increased over the past few days, to the point that it is saturating patient's dressings three times daily. He also endorses erythema and a small amount of blood around the J tube. He denies fevers, chills, n/v, diarrhea, significant abdominal pain aside from around J tube. He was given an antimicrobial ointment for this during a chemo session earlier this week but has not taken any PO antibiotics. He is using the J tube nightly for tube feeds, which he is tolerating well.    A/P:  Patient appears to have a superficial wound infection at J tube site. As he has no systemic signs of infection, will send a 7 day course of Keflex to patient's pharmacy and arrange follow up with Dr. Salas in clinic this Thursday, 1/9. Discussed that if patient experiences fevers/chills, n/v, increased abdominal pain, new or concerning symptoms, he should call the on call pager or proceed to the ED if emergent. Patient verbalized understanding and is agreeable to this plan.    Discussed with Dr. Tiffanie Hermosillo MD  Thoracic Surgery

## 2025-01-06 ENCOUNTER — APPOINTMENT (OUTPATIENT)
Dept: PHARMACY | Facility: HOSPITAL | Age: 70
End: 2025-01-06
Payer: MEDICARE

## 2025-01-07 LAB
DPYD GENE MUT ANL BLD/T: NORMAL
ELECTRONICALLY SIGNED BY: NORMAL

## 2025-01-09 ENCOUNTER — APPOINTMENT (OUTPATIENT)
Dept: SURGERY | Facility: HOSPITAL | Age: 70
End: 2025-01-09
Payer: MEDICARE

## 2025-01-12 ENCOUNTER — HOSPITAL ENCOUNTER (INPATIENT)
Facility: HOSPITAL | Age: 70
LOS: 6 days | Discharge: HOME | End: 2025-01-18
Attending: EMERGENCY MEDICINE | Admitting: INTERNAL MEDICINE
Payer: MEDICARE

## 2025-01-12 ENCOUNTER — APPOINTMENT (OUTPATIENT)
Dept: RADIOLOGY | Facility: HOSPITAL | Age: 70
End: 2025-01-12
Payer: MEDICARE

## 2025-01-12 ENCOUNTER — CLINICAL SUPPORT (OUTPATIENT)
Dept: EMERGENCY MEDICINE | Facility: HOSPITAL | Age: 70
End: 2025-01-12
Payer: MEDICARE

## 2025-01-12 DIAGNOSIS — K22.89 ESOPHAGEAL MASS: ICD-10-CM

## 2025-01-12 DIAGNOSIS — C15.9 PRIMARY ESOPHAGEAL ADENOCARCINOMA (MULTI): ICD-10-CM

## 2025-01-12 DIAGNOSIS — I10 PRIMARY HYPERTENSION: ICD-10-CM

## 2025-01-12 DIAGNOSIS — E03.9 HYPOTHYROIDISM, UNSPECIFIED TYPE: ICD-10-CM

## 2025-01-12 DIAGNOSIS — N17.9 ACUTE KIDNEY INJURY (CMS-HCC): ICD-10-CM

## 2025-01-12 DIAGNOSIS — I95.9 HYPOTENSION, UNSPECIFIED HYPOTENSION TYPE: Primary | ICD-10-CM

## 2025-01-12 DIAGNOSIS — R13.19 ESOPHAGEAL DYSPHAGIA: ICD-10-CM

## 2025-01-12 LAB
ALBUMIN SERPL BCP-MCNC: 3.9 G/DL (ref 3.4–5)
ALP SERPL-CCNC: 64 U/L (ref 33–136)
ALT SERPL W P-5'-P-CCNC: 36 U/L (ref 10–52)
ANION GAP BLDV CALCULATED.4IONS-SCNC: 11 MMOL/L (ref 10–25)
ANION GAP SERPL CALC-SCNC: 17 MMOL/L (ref 10–20)
AST SERPL W P-5'-P-CCNC: 25 U/L (ref 9–39)
BASE EXCESS BLDV CALC-SCNC: 0.8 MMOL/L (ref -2–3)
BASOPHILS # BLD AUTO: 0.11 X10*3/UL (ref 0–0.1)
BASOPHILS # BLD MANUAL: 0 X10*3/UL (ref 0–0.1)
BASOPHILS NFR BLD AUTO: 0.7 %
BASOPHILS NFR BLD MANUAL: 0 %
BILIRUB SERPL-MCNC: 0.5 MG/DL (ref 0–1.2)
BNP SERPL-MCNC: 35 PG/ML (ref 0–99)
BODY TEMPERATURE: 37 DEGREES CELSIUS
BUN SERPL-MCNC: 42 MG/DL (ref 6–23)
CA-I BLDV-SCNC: 1.15 MMOL/L (ref 1.1–1.33)
CALCIUM SERPL-MCNC: 9 MG/DL (ref 8.6–10.6)
CARDIAC TROPONIN I PNL SERPL HS: 5 NG/L (ref 0–53)
CARDIAC TROPONIN I PNL SERPL HS: 5 NG/L (ref 0–53)
CHLORIDE BLDV-SCNC: 96 MMOL/L (ref 98–107)
CHLORIDE SERPL-SCNC: 98 MMOL/L (ref 98–107)
CO2 SERPL-SCNC: 19 MMOL/L (ref 21–32)
CREAT SERPL-MCNC: 1.7 MG/DL (ref 0.5–1.3)
EGFRCR SERPLBLD CKD-EPI 2021: 43 ML/MIN/1.73M*2
EOSINOPHIL # BLD AUTO: 0.03 X10*3/UL (ref 0–0.7)
EOSINOPHIL # BLD MANUAL: 0 X10*3/UL (ref 0–0.7)
EOSINOPHIL NFR BLD AUTO: 0.2 %
EOSINOPHIL NFR BLD MANUAL: 0 %
ERYTHROCYTE [DISTWIDTH] IN BLOOD BY AUTOMATED COUNT: 13.4 % (ref 11.5–14.5)
ERYTHROCYTE [DISTWIDTH] IN BLOOD BY AUTOMATED COUNT: 13.5 % (ref 11.5–14.5)
FLUAV RNA RESP QL NAA+PROBE: NOT DETECTED
FLUBV RNA RESP QL NAA+PROBE: NOT DETECTED
GLUCOSE BLD MANUAL STRIP-MCNC: 119 MG/DL (ref 74–99)
GLUCOSE BLDV-MCNC: 160 MG/DL (ref 74–99)
GLUCOSE SERPL-MCNC: 157 MG/DL (ref 74–99)
HCO3 BLDV-SCNC: 25.3 MMOL/L (ref 22–26)
HCT VFR BLD AUTO: 29 % (ref 41–52)
HCT VFR BLD AUTO: 35.8 % (ref 41–52)
HCT VFR BLD EST: 41 % (ref 41–52)
HGB BLD-MCNC: 10.4 G/DL (ref 13.5–17.5)
HGB BLD-MCNC: 12.9 G/DL (ref 13.5–17.5)
HGB BLDV-MCNC: 13.5 G/DL (ref 13.5–17.5)
HOLD SPECIMEN: NORMAL
IMM GRANULOCYTES # BLD AUTO: 1.71 X10*3/UL (ref 0–0.7)
IMM GRANULOCYTES # BLD AUTO: 2.39 X10*3/UL (ref 0–0.7)
IMM GRANULOCYTES NFR BLD AUTO: 10.7 % (ref 0–0.9)
IMM GRANULOCYTES NFR BLD AUTO: 12.2 % (ref 0–0.9)
LACTATE BLDV-SCNC: 2.7 MMOL/L (ref 0.4–2)
LYMPHOCYTES # BLD AUTO: 2.14 X10*3/UL (ref 1.2–4.8)
LYMPHOCYTES # BLD MANUAL: 3.29 X10*3/UL (ref 1.2–4.8)
LYMPHOCYTES NFR BLD AUTO: 13.3 %
LYMPHOCYTES NFR BLD MANUAL: 16.8 %
MAGNESIUM SERPL-MCNC: 1.96 MG/DL (ref 1.6–2.4)
MCH RBC QN AUTO: 31 PG (ref 26–34)
MCH RBC QN AUTO: 31.1 PG (ref 26–34)
MCHC RBC AUTO-ENTMCNC: 35.9 G/DL (ref 32–36)
MCHC RBC AUTO-ENTMCNC: 36 G/DL (ref 32–36)
MCV RBC AUTO: 86 FL (ref 80–100)
MCV RBC AUTO: 87 FL (ref 80–100)
MONOCYTES # BLD AUTO: 1.14 X10*3/UL (ref 0.1–1)
MONOCYTES # BLD MANUAL: 0.14 X10*3/UL (ref 0.1–1)
MONOCYTES NFR BLD AUTO: 7.1 %
MONOCYTES NFR BLD MANUAL: 0.7 %
NEUTROPHILS # BLD AUTO: 10.91 X10*3/UL (ref 1.2–7.7)
NEUTROPHILS NFR BLD AUTO: 68 %
NEUTS SEG # BLD MANUAL: 13.68 X10*3/UL (ref 1.2–7)
NEUTS SEG NFR BLD MANUAL: 69.8 %
NRBC BLD-RTO: 0.2 /100 WBCS (ref 0–0)
NRBC BLD-RTO: 0.2 /100 WBCS (ref 0–0)
OXYHGB MFR BLDV: 13.8 % (ref 45–75)
PCO2 BLDV: 39 MM HG (ref 41–51)
PH BLDV: 7.42 PH (ref 7.33–7.43)
PHOSPHATE SERPL-MCNC: 3.5 MG/DL (ref 2.5–4.9)
PLATELET # BLD AUTO: 219 X10*3/UL (ref 150–450)
PLATELET # BLD AUTO: 307 X10*3/UL (ref 150–450)
PO2 BLDV: 17 MM HG (ref 35–45)
POLYCHROMASIA BLD QL SMEAR: ABNORMAL
POLYCHROMASIA BLD QL SMEAR: NORMAL
POTASSIUM BLDV-SCNC: 5.1 MMOL/L (ref 3.5–5.3)
POTASSIUM SERPL-SCNC: 4.9 MMOL/L (ref 3.5–5.3)
PROT SERPL-MCNC: 6.6 G/DL (ref 6.4–8.2)
RBC # BLD AUTO: 3.35 X10*6/UL (ref 4.5–5.9)
RBC # BLD AUTO: 4.15 X10*6/UL (ref 4.5–5.9)
RBC MORPH BLD: ABNORMAL
RBC MORPH BLD: NORMAL
SAO2 % BLDV: 14 % (ref 45–75)
SARS-COV-2 RNA RESP QL NAA+PROBE: NOT DETECTED
SODIUM BLDV-SCNC: 127 MMOL/L (ref 136–145)
SODIUM SERPL-SCNC: 129 MMOL/L (ref 136–145)
T4 FREE SERPL-MCNC: 1.06 NG/DL (ref 0.78–1.48)
TOTAL CELLS COUNTED BLD: 149
TSH SERPL-ACNC: 16.04 MIU/L (ref 0.44–3.98)
VARIANT LYMPHS # BLD MANUAL: 2.49 X10*3/UL (ref 0–0.5)
VARIANT LYMPHS NFR BLD: 12.7 %
WBC # BLD AUTO: 16 X10*3/UL (ref 4.4–11.3)
WBC # BLD AUTO: 19.6 X10*3/UL (ref 4.4–11.3)

## 2025-01-12 PROCEDURE — 85025 COMPLETE CBC W/AUTO DIFF WBC: CPT | Performed by: EMERGENCY MEDICINE

## 2025-01-12 PROCEDURE — 87075 CULTR BACTERIA EXCEPT BLOOD: CPT

## 2025-01-12 PROCEDURE — 87636 SARSCOV2 & INF A&B AMP PRB: CPT | Performed by: EMERGENCY MEDICINE

## 2025-01-12 PROCEDURE — 96365 THER/PROPH/DIAG IV INF INIT: CPT

## 2025-01-12 PROCEDURE — 2500000004 HC RX 250 GENERAL PHARMACY W/ HCPCS (ALT 636 FOR OP/ED): Mod: JZ,JG,TB | Performed by: EMERGENCY MEDICINE

## 2025-01-12 PROCEDURE — 85027 COMPLETE CBC AUTOMATED: CPT

## 2025-01-12 PROCEDURE — 85007 BL SMEAR W/DIFF WBC COUNT: CPT

## 2025-01-12 PROCEDURE — 96375 TX/PRO/DX INJ NEW DRUG ADDON: CPT

## 2025-01-12 PROCEDURE — 99285 EMERGENCY DEPT VISIT HI MDM: CPT | Mod: 25 | Performed by: EMERGENCY MEDICINE

## 2025-01-12 PROCEDURE — 99223 1ST HOSP IP/OBS HIGH 75: CPT

## 2025-01-12 PROCEDURE — 36415 COLL VENOUS BLD VENIPUNCTURE: CPT

## 2025-01-12 PROCEDURE — 71045 X-RAY EXAM CHEST 1 VIEW: CPT

## 2025-01-12 PROCEDURE — 2500000004 HC RX 250 GENERAL PHARMACY W/ HCPCS (ALT 636 FOR OP/ED)

## 2025-01-12 PROCEDURE — 84484 ASSAY OF TROPONIN QUANT: CPT

## 2025-01-12 PROCEDURE — 84439 ASSAY OF FREE THYROXINE: CPT

## 2025-01-12 PROCEDURE — 82810 BLOOD GASES O2 SAT ONLY: CPT

## 2025-01-12 PROCEDURE — 82330 ASSAY OF CALCIUM: CPT

## 2025-01-12 PROCEDURE — 83735 ASSAY OF MAGNESIUM: CPT

## 2025-01-12 PROCEDURE — 84439 ASSAY OF FREE THYROXINE: CPT | Performed by: NURSE PRACTITIONER

## 2025-01-12 PROCEDURE — 71045 X-RAY EXAM CHEST 1 VIEW: CPT | Mod: FOREIGN READ | Performed by: RADIOLOGY

## 2025-01-12 PROCEDURE — 82947 ASSAY GLUCOSE BLOOD QUANT: CPT

## 2025-01-12 PROCEDURE — 83880 ASSAY OF NATRIURETIC PEPTIDE: CPT

## 2025-01-12 PROCEDURE — 93010 ELECTROCARDIOGRAM REPORT: CPT | Performed by: EMERGENCY MEDICINE

## 2025-01-12 PROCEDURE — 96374 THER/PROPH/DIAG INJ IV PUSH: CPT | Mod: 59

## 2025-01-12 PROCEDURE — 51798 US URINE CAPACITY MEASURE: CPT

## 2025-01-12 PROCEDURE — 84443 ASSAY THYROID STIM HORMONE: CPT

## 2025-01-12 PROCEDURE — 1170000001 HC PRIVATE ONCOLOGY ROOM DAILY

## 2025-01-12 PROCEDURE — 99285 EMERGENCY DEPT VISIT HI MDM: CPT | Performed by: EMERGENCY MEDICINE

## 2025-01-12 PROCEDURE — 84100 ASSAY OF PHOSPHORUS: CPT | Performed by: EMERGENCY MEDICINE

## 2025-01-12 PROCEDURE — 96361 HYDRATE IV INFUSION ADD-ON: CPT

## 2025-01-12 PROCEDURE — 93005 ELECTROCARDIOGRAM TRACING: CPT

## 2025-01-12 PROCEDURE — 84295 ASSAY OF SERUM SODIUM: CPT

## 2025-01-12 RX ORDER — AMOXICILLIN 250 MG
1 CAPSULE ORAL NIGHTLY PRN
Status: DISCONTINUED | OUTPATIENT
Start: 2025-01-12 | End: 2025-01-14

## 2025-01-12 RX ORDER — VANCOMYCIN HYDROCHLORIDE 1 G/20ML
INJECTION, POWDER, LYOPHILIZED, FOR SOLUTION INTRAVENOUS DAILY PRN
Status: DISCONTINUED | OUTPATIENT
Start: 2025-01-12 | End: 2025-01-14

## 2025-01-12 RX ORDER — CHOLECALCIFEROL (VITAMIN D3) 25 MCG
2000 TABLET ORAL DAILY
Status: DISCONTINUED | OUTPATIENT
Start: 2025-01-13 | End: 2025-01-18 | Stop reason: HOSPADM

## 2025-01-12 RX ORDER — ONDANSETRON HYDROCHLORIDE 2 MG/ML
4 INJECTION, SOLUTION INTRAVENOUS EVERY 6 HOURS PRN
Status: DISCONTINUED | OUTPATIENT
Start: 2025-01-12 | End: 2025-01-18 | Stop reason: HOSPADM

## 2025-01-12 RX ORDER — CEFEPIME HYDROCHLORIDE 2 G/50ML
2 INJECTION, SOLUTION INTRAVENOUS EVERY 12 HOURS
Status: DISCONTINUED | OUTPATIENT
Start: 2025-01-13 | End: 2025-01-14

## 2025-01-12 RX ORDER — VANCOMYCIN HYDROCHLORIDE
1250 EVERY 24 HOURS
Status: DISCONTINUED | OUTPATIENT
Start: 2025-01-13 | End: 2025-01-13

## 2025-01-12 RX ORDER — POLYETHYLENE GLYCOL 3350 17 G/17G
17 POWDER, FOR SOLUTION ORAL DAILY PRN
Status: DISCONTINUED | OUTPATIENT
Start: 2025-01-12 | End: 2025-01-13

## 2025-01-12 RX ORDER — NAPROXEN SODIUM 220 MG/1
81 TABLET, FILM COATED ORAL DAILY
Status: DISCONTINUED | OUTPATIENT
Start: 2025-01-13 | End: 2025-01-18 | Stop reason: HOSPADM

## 2025-01-12 RX ORDER — BUPROPION HYDROCHLORIDE 75 MG/1
150 TABLET ORAL 2 TIMES DAILY
Status: DISCONTINUED | OUTPATIENT
Start: 2025-01-12 | End: 2025-01-18 | Stop reason: HOSPADM

## 2025-01-12 RX ORDER — CEFEPIME HYDROCHLORIDE 2 G/50ML
2 INJECTION, SOLUTION INTRAVENOUS ONCE
Status: COMPLETED | OUTPATIENT
Start: 2025-01-12 | End: 2025-01-12

## 2025-01-12 RX ORDER — ENOXAPARIN SODIUM 100 MG/ML
40 INJECTION SUBCUTANEOUS EVERY 24 HOURS
Status: DISCONTINUED | OUTPATIENT
Start: 2025-01-13 | End: 2025-01-18 | Stop reason: HOSPADM

## 2025-01-12 RX ORDER — VERAPAMIL HYDROCHLORIDE 80 MG/1
80 TABLET ORAL EVERY 8 HOURS SCHEDULED
Status: DISCONTINUED | OUTPATIENT
Start: 2025-01-12 | End: 2025-01-18 | Stop reason: HOSPADM

## 2025-01-12 RX ORDER — AMITRIPTYLINE HYDROCHLORIDE 100 MG/1
100 TABLET ORAL NIGHTLY
Status: DISCONTINUED | OUTPATIENT
Start: 2025-01-12 | End: 2025-01-18 | Stop reason: HOSPADM

## 2025-01-12 RX ORDER — ATORVASTATIN CALCIUM 40 MG/1
40 TABLET, FILM COATED ORAL DAILY
Status: DISCONTINUED | OUTPATIENT
Start: 2025-01-13 | End: 2025-01-18 | Stop reason: HOSPADM

## 2025-01-12 RX ORDER — PANTOPRAZOLE SODIUM 40 MG/10ML
40 INJECTION, POWDER, LYOPHILIZED, FOR SOLUTION INTRAVENOUS DAILY
Status: DISCONTINUED | OUTPATIENT
Start: 2025-01-13 | End: 2025-01-13

## 2025-01-12 RX ORDER — SODIUM CHLORIDE 9 MG/ML
75 INJECTION, SOLUTION INTRAVENOUS CONTINUOUS
Status: ACTIVE | OUTPATIENT
Start: 2025-01-12 | End: 2025-01-13

## 2025-01-12 RX ORDER — VANCOMYCIN 2 GRAM/500 ML IN 0.9 % SODIUM CHLORIDE INTRAVENOUS
2000 ONCE
Status: COMPLETED | OUTPATIENT
Start: 2025-01-12 | End: 2025-01-12

## 2025-01-12 RX ORDER — LOSARTAN POTASSIUM 100 MG/1
100 TABLET ORAL DAILY
Status: DISCONTINUED | OUTPATIENT
Start: 2025-01-13 | End: 2025-01-18 | Stop reason: HOSPADM

## 2025-01-12 RX ADMIN — CEFEPIME HYDROCHLORIDE 2 G: 2 INJECTION, SOLUTION INTRAVENOUS at 18:48

## 2025-01-12 RX ADMIN — SODIUM CHLORIDE, POTASSIUM CHLORIDE, SODIUM LACTATE AND CALCIUM CHLORIDE 1000 ML: 600; 310; 30; 20 INJECTION, SOLUTION INTRAVENOUS at 18:43

## 2025-01-12 RX ADMIN — SODIUM CHLORIDE, POTASSIUM CHLORIDE, SODIUM LACTATE AND CALCIUM CHLORIDE 1000 ML: 600; 310; 30; 20 INJECTION, SOLUTION INTRAVENOUS at 20:21

## 2025-01-12 RX ADMIN — SODIUM CHLORIDE, POTASSIUM CHLORIDE, SODIUM LACTATE AND CALCIUM CHLORIDE 1000 ML: 600; 310; 30; 20 INJECTION, SOLUTION INTRAVENOUS at 18:45

## 2025-01-12 RX ADMIN — Medication 2000 MG: at 20:21

## 2025-01-12 RX ADMIN — SODIUM CHLORIDE 75 ML/HR: 9 INJECTION, SOLUTION INTRAVENOUS at 23:06

## 2025-01-12 RX ADMIN — HYDROCORTISONE SODIUM SUCCINATE 50 MG: 100 INJECTION, POWDER, FOR SOLUTION INTRAMUSCULAR; INTRAVENOUS at 20:35

## 2025-01-12 SDOH — SOCIAL STABILITY: SOCIAL INSECURITY: WITHIN THE LAST YEAR, HAVE YOU BEEN AFRAID OF YOUR PARTNER OR EX-PARTNER?: NO

## 2025-01-12 SDOH — SOCIAL STABILITY: SOCIAL INSECURITY: WERE YOU ABLE TO COMPLETE ALL THE BEHAVIORAL HEALTH SCREENINGS?: YES

## 2025-01-12 SDOH — ECONOMIC STABILITY: HOUSING INSECURITY: IN THE PAST 12 MONTHS, HOW MANY TIMES HAVE YOU MOVED WHERE YOU WERE LIVING?: 0

## 2025-01-12 SDOH — ECONOMIC STABILITY: HOUSING INSECURITY: IN THE LAST 12 MONTHS, WAS THERE A TIME WHEN YOU WERE NOT ABLE TO PAY THE MORTGAGE OR RENT ON TIME?: NO

## 2025-01-12 SDOH — ECONOMIC STABILITY: FOOD INSECURITY: HOW HARD IS IT FOR YOU TO PAY FOR THE VERY BASICS LIKE FOOD, HOUSING, MEDICAL CARE, AND HEATING?: NOT VERY HARD

## 2025-01-12 SDOH — SOCIAL STABILITY: SOCIAL INSECURITY: DO YOU FEEL ANYONE HAS EXPLOITED OR TAKEN ADVANTAGE OF YOU FINANCIALLY OR OF YOUR PERSONAL PROPERTY?: NO

## 2025-01-12 SDOH — SOCIAL STABILITY: SOCIAL INSECURITY: ARE YOU OR HAVE YOU BEEN THREATENED OR ABUSED PHYSICALLY, EMOTIONALLY, OR SEXUALLY BY ANYONE?: NO

## 2025-01-12 SDOH — ECONOMIC STABILITY: FOOD INSECURITY: WITHIN THE PAST 12 MONTHS, THE FOOD YOU BOUGHT JUST DIDN'T LAST AND YOU DIDN'T HAVE MONEY TO GET MORE.: NEVER TRUE

## 2025-01-12 SDOH — SOCIAL STABILITY: SOCIAL INSECURITY: WITHIN THE LAST YEAR, HAVE YOU BEEN HUMILIATED OR EMOTIONALLY ABUSED IN OTHER WAYS BY YOUR PARTNER OR EX-PARTNER?: NO

## 2025-01-12 SDOH — SOCIAL STABILITY: SOCIAL INSECURITY: HAVE YOU HAD ANY THOUGHTS OF HARMING ANYONE ELSE?: NO

## 2025-01-12 SDOH — SOCIAL STABILITY: SOCIAL INSECURITY: ARE THERE ANY APPARENT SIGNS OF INJURIES/BEHAVIORS THAT COULD BE RELATED TO ABUSE/NEGLECT?: NO

## 2025-01-12 SDOH — ECONOMIC STABILITY: HOUSING INSECURITY: AT ANY TIME IN THE PAST 12 MONTHS, WERE YOU HOMELESS OR LIVING IN A SHELTER (INCLUDING NOW)?: NO

## 2025-01-12 SDOH — ECONOMIC STABILITY: FOOD INSECURITY: WITHIN THE PAST 12 MONTHS, YOU WORRIED THAT YOUR FOOD WOULD RUN OUT BEFORE YOU GOT THE MONEY TO BUY MORE.: NEVER TRUE

## 2025-01-12 SDOH — SOCIAL STABILITY: SOCIAL INSECURITY: HAVE YOU HAD THOUGHTS OF HARMING ANYONE ELSE?: NO

## 2025-01-12 SDOH — SOCIAL STABILITY: SOCIAL INSECURITY: ABUSE: ADULT

## 2025-01-12 SDOH — ECONOMIC STABILITY: TRANSPORTATION INSECURITY: IN THE PAST 12 MONTHS, HAS LACK OF TRANSPORTATION KEPT YOU FROM MEDICAL APPOINTMENTS OR FROM GETTING MEDICATIONS?: NO

## 2025-01-12 SDOH — SOCIAL STABILITY: SOCIAL INSECURITY: HAS ANYONE EVER THREATENED TO HURT YOUR FAMILY OR YOUR PETS?: NO

## 2025-01-12 SDOH — SOCIAL STABILITY: SOCIAL INSECURITY: DOES ANYONE TRY TO KEEP YOU FROM HAVING/CONTACTING OTHER FRIENDS OR DOING THINGS OUTSIDE YOUR HOME?: NO

## 2025-01-12 SDOH — SOCIAL STABILITY: SOCIAL INSECURITY: DO YOU FEEL UNSAFE GOING BACK TO THE PLACE WHERE YOU ARE LIVING?: NO

## 2025-01-12 SDOH — ECONOMIC STABILITY: INCOME INSECURITY: IN THE PAST 12 MONTHS HAS THE ELECTRIC, GAS, OIL, OR WATER COMPANY THREATENED TO SHUT OFF SERVICES IN YOUR HOME?: NO

## 2025-01-12 ASSESSMENT — PAIN SCALES - GENERAL
PAINLEVEL_OUTOF10: 0 - NO PAIN
PAINLEVEL_OUTOF10: 0 - NO PAIN

## 2025-01-12 ASSESSMENT — LIFESTYLE VARIABLES
TOTAL SCORE: 0
AUDIT-C TOTAL SCORE: 0
EVER HAD A DRINK FIRST THING IN THE MORNING TO STEADY YOUR NERVES TO GET RID OF A HANGOVER: NO
HOW OFTEN DO YOU HAVE A DRINK CONTAINING ALCOHOL: NEVER
SKIP TO QUESTIONS 9-10: 1
EVER FELT BAD OR GUILTY ABOUT YOUR DRINKING: NO
HOW OFTEN DO YOU HAVE 6 OR MORE DRINKS ON ONE OCCASION: NEVER
HAVE YOU EVER FELT YOU SHOULD CUT DOWN ON YOUR DRINKING: NO
AUDIT-C TOTAL SCORE: 0
HOW MANY STANDARD DRINKS CONTAINING ALCOHOL DO YOU HAVE ON A TYPICAL DAY: PATIENT DOES NOT DRINK
HAVE PEOPLE ANNOYED YOU BY CRITICIZING YOUR DRINKING: NO

## 2025-01-12 ASSESSMENT — COGNITIVE AND FUNCTIONAL STATUS - GENERAL
STANDING UP FROM CHAIR USING ARMS: A LITTLE
DAILY ACTIVITIY SCORE: 24
MOBILITY SCORE: 21
WALKING IN HOSPITAL ROOM: A LITTLE
PATIENT BASELINE BEDBOUND: NO
CLIMB 3 TO 5 STEPS WITH RAILING: A LITTLE

## 2025-01-12 ASSESSMENT — ACTIVITIES OF DAILY LIVING (ADL)
HEARING - RIGHT EAR: FUNCTIONAL
LACK_OF_TRANSPORTATION: NO
DRESSING YOURSELF: NEEDS ASSISTANCE
HEARING - LEFT EAR: FUNCTIONAL
WALKS IN HOME: INDEPENDENT
GROOMING: INDEPENDENT
PATIENT'S MEMORY ADEQUATE TO SAFELY COMPLETE DAILY ACTIVITIES?: YES
TOILETING: INDEPENDENT
JUDGMENT_ADEQUATE_SAFELY_COMPLETE_DAILY_ACTIVITIES: YES
LACK_OF_TRANSPORTATION: NO
BATHING: INDEPENDENT
ADEQUATE_TO_COMPLETE_ADL: YES
FEEDING YOURSELF: INDEPENDENT

## 2025-01-12 ASSESSMENT — PATIENT HEALTH QUESTIONNAIRE - PHQ9
SUM OF ALL RESPONSES TO PHQ9 QUESTIONS 1 & 2: 0
2. FEELING DOWN, DEPRESSED OR HOPELESS: NOT AT ALL
1. LITTLE INTEREST OR PLEASURE IN DOING THINGS: NOT AT ALL

## 2025-01-12 ASSESSMENT — PAIN - FUNCTIONAL ASSESSMENT
PAIN_FUNCTIONAL_ASSESSMENT: 0-10
PAIN_FUNCTIONAL_ASSESSMENT: 0-10

## 2025-01-12 NOTE — ED TRIAGE NOTES
Pt with esophageal cancer currently undergoing chemotherapy to ED for weakness and SOB. Pt states he is concerned his PEG tube may be infected due to increased drainage. Pt is hypotensive in triage, critical patient called and pt taken to  3

## 2025-01-13 ENCOUNTER — APPOINTMENT (OUTPATIENT)
Dept: RADIOLOGY | Facility: HOSPITAL | Age: 70
End: 2025-01-13
Payer: MEDICARE

## 2025-01-13 LAB
ALBUMIN SERPL BCP-MCNC: 3 G/DL (ref 3.4–5)
ALP SERPL-CCNC: 52 U/L (ref 33–136)
ALT SERPL W P-5'-P-CCNC: 33 U/L (ref 10–52)
ANION GAP SERPL CALC-SCNC: 14 MMOL/L (ref 10–20)
APPEARANCE UR: CLEAR
AST SERPL W P-5'-P-CCNC: 19 U/L (ref 9–39)
ATRIAL RATE: 77 BPM
BILIRUB SERPL-MCNC: 0.4 MG/DL (ref 0–1.2)
BILIRUB UR STRIP.AUTO-MCNC: NEGATIVE MG/DL
BUN SERPL-MCNC: 33 MG/DL (ref 6–23)
CALCIUM SERPL-MCNC: 8.1 MG/DL (ref 8.6–10.6)
CHLORIDE SERPL-SCNC: 102 MMOL/L (ref 98–107)
CO2 SERPL-SCNC: 22 MMOL/L (ref 21–32)
COLOR UR: YELLOW
CREAT SERPL-MCNC: 1.12 MG/DL (ref 0.5–1.3)
EGFRCR SERPLBLD CKD-EPI 2021: 72 ML/MIN/1.73M*2
GLUCOSE SERPL-MCNC: 82 MG/DL (ref 74–99)
GLUCOSE UR STRIP.AUTO-MCNC: NORMAL MG/DL
KETONES UR STRIP.AUTO-MCNC: NEGATIVE MG/DL
LEUKOCYTE ESTERASE UR QL STRIP.AUTO: NEGATIVE
NITRITE UR QL STRIP.AUTO: NEGATIVE
P OFFSET: 183 MS
P ONSET: 116 MS
PH UR STRIP.AUTO: 7.5 [PH]
POTASSIUM SERPL-SCNC: 4.5 MMOL/L (ref 3.5–5.3)
PROT SERPL-MCNC: 4.8 G/DL (ref 6.4–8.2)
PROT UR STRIP.AUTO-MCNC: NORMAL MG/DL
Q ONSET: 194 MS
QRS COUNT: 13 BEATS
QRS DURATION: 130 MS
QT INTERVAL: 440 MS
QTC CALCULATION(BAZETT): 519 MS
QTC FREDERICIA: 492 MS
R AXIS: -23 DEGREES
RBC # UR STRIP.AUTO: NEGATIVE /UL
RBC #/AREA URNS AUTO: NORMAL /HPF
SODIUM SERPL-SCNC: 133 MMOL/L (ref 136–145)
SP GR UR STRIP.AUTO: 1.02
T AXIS: 90 DEGREES
T OFFSET: 414 MS
T4 FREE SERPL-MCNC: 1.14 NG/DL (ref 0.78–1.48)
UROBILINOGEN UR STRIP.AUTO-MCNC: NORMAL MG/DL
VANCOMYCIN SERPL-MCNC: 7.9 UG/ML (ref 5–20)
VENTRICULAR RATE: 84 BPM
WBC #/AREA URNS AUTO: NORMAL /HPF

## 2025-01-13 PROCEDURE — 2500000002 HC RX 250 W HCPCS SELF ADMINISTERED DRUGS (ALT 637 FOR MEDICARE OP, ALT 636 FOR OP/ED)

## 2025-01-13 PROCEDURE — 2500000004 HC RX 250 GENERAL PHARMACY W/ HCPCS (ALT 636 FOR OP/ED): Performed by: EMERGENCY MEDICINE

## 2025-01-13 PROCEDURE — 81001 URINALYSIS AUTO W/SCOPE: CPT

## 2025-01-13 PROCEDURE — 99222 1ST HOSP IP/OBS MODERATE 55: CPT | Performed by: STUDENT IN AN ORGANIZED HEALTH CARE EDUCATION/TRAINING PROGRAM

## 2025-01-13 PROCEDURE — 74018 RADEX ABDOMEN 1 VIEW: CPT | Performed by: RADIOLOGY

## 2025-01-13 PROCEDURE — 80053 COMPREHEN METABOLIC PANEL: CPT | Performed by: NURSE PRACTITIONER

## 2025-01-13 PROCEDURE — 2500000001 HC RX 250 WO HCPCS SELF ADMINISTERED DRUGS (ALT 637 FOR MEDICARE OP)

## 2025-01-13 PROCEDURE — 2500000004 HC RX 250 GENERAL PHARMACY W/ HCPCS (ALT 636 FOR OP/ED)

## 2025-01-13 PROCEDURE — 2500000001 HC RX 250 WO HCPCS SELF ADMINISTERED DRUGS (ALT 637 FOR MEDICARE OP): Performed by: NURSE PRACTITIONER

## 2025-01-13 PROCEDURE — 99233 SBSQ HOSP IP/OBS HIGH 50: CPT | Performed by: INTERNAL MEDICINE

## 2025-01-13 PROCEDURE — 80202 ASSAY OF VANCOMYCIN: CPT

## 2025-01-13 PROCEDURE — 74018 RADEX ABDOMEN 1 VIEW: CPT

## 2025-01-13 PROCEDURE — 1170000001 HC PRIVATE ONCOLOGY ROOM DAILY

## 2025-01-13 PROCEDURE — 87077 CULTURE AEROBIC IDENTIFY: CPT | Performed by: NURSE PRACTITIONER

## 2025-01-13 RX ORDER — POLYETHYLENE GLYCOL 3350 17 G/17G
17 POWDER, FOR SOLUTION ORAL 2 TIMES DAILY
Status: DISCONTINUED | OUTPATIENT
Start: 2025-01-13 | End: 2025-01-18 | Stop reason: HOSPADM

## 2025-01-13 RX ORDER — OXYCODONE HYDROCHLORIDE 5 MG/1
5 TABLET ORAL EVERY 6 HOURS PRN
Status: DISCONTINUED | OUTPATIENT
Start: 2025-01-13 | End: 2025-01-14

## 2025-01-13 RX ORDER — VERAPAMIL HYDROCHLORIDE 80 MG/1
240 TABLET ORAL DAILY
Qty: 90 TABLET | Refills: 1 | Status: SHIPPED | OUTPATIENT
Start: 2025-01-13 | End: 2025-03-14

## 2025-01-13 RX ORDER — BUPROPION HYDROCHLORIDE 75 MG/1
150 TABLET ORAL 2 TIMES DAILY
Qty: 120 TABLET | Refills: 1 | Status: SHIPPED | OUTPATIENT
Start: 2025-01-13 | End: 2025-03-14

## 2025-01-13 RX ORDER — SIMETHICONE 80 MG
80 TABLET,CHEWABLE ORAL ONCE
Status: COMPLETED | OUTPATIENT
Start: 2025-01-13 | End: 2025-01-13

## 2025-01-13 RX ORDER — ESOMEPRAZOLE MAGNESIUM 40 MG/1
40 GRANULE, DELAYED RELEASE ORAL
Status: DISCONTINUED | OUTPATIENT
Start: 2025-01-14 | End: 2025-01-18 | Stop reason: HOSPADM

## 2025-01-13 RX ADMIN — HYDROCORTISONE SODIUM SUCCINATE 50 MG: 100 INJECTION, POWDER, FOR SOLUTION INTRAMUSCULAR; INTRAVENOUS at 04:39

## 2025-01-13 RX ADMIN — ENOXAPARIN SODIUM 40 MG: 40 INJECTION SUBCUTANEOUS at 09:12

## 2025-01-13 RX ADMIN — VERAPAMIL HYDROCHLORIDE 80 MG: 80 TABLET ORAL at 16:30

## 2025-01-13 RX ADMIN — LEVOTHYROXINE SODIUM 175 MCG: 0.1 TABLET ORAL at 06:10

## 2025-01-13 RX ADMIN — PANTOPRAZOLE SODIUM 40 MG: 40 INJECTION, POWDER, FOR SOLUTION INTRAVENOUS at 09:12

## 2025-01-13 RX ADMIN — ASPIRIN 81 MG CHEWABLE TABLET 81 MG: 81 TABLET CHEWABLE at 09:13

## 2025-01-13 RX ADMIN — ATORVASTATIN CALCIUM 40 MG: 40 TABLET, FILM COATED ORAL at 09:13

## 2025-01-13 RX ADMIN — CEFEPIME HYDROCHLORIDE 2 G: 2 INJECTION, SOLUTION INTRAVENOUS at 06:10

## 2025-01-13 RX ADMIN — BUPROPION HYDROCHLORIDE 150 MG: 75 TABLET, FILM COATED ORAL at 21:01

## 2025-01-13 RX ADMIN — CEFEPIME HYDROCHLORIDE 2 G: 2 INJECTION, SOLUTION INTRAVENOUS at 18:17

## 2025-01-13 RX ADMIN — BUPROPION HYDROCHLORIDE 150 MG: 75 TABLET, FILM COATED ORAL at 16:30

## 2025-01-13 RX ADMIN — AMITRIPTYLINE HYDROCHLORIDE 100 MG: 100 TABLET ORAL at 21:01

## 2025-01-13 RX ADMIN — SIMETHICONE 80 MG: 80 TABLET, CHEWABLE ORAL at 00:51

## 2025-01-13 RX ADMIN — SODIUM CHLORIDE 75 ML/HR: 9 INJECTION, SOLUTION INTRAVENOUS at 18:18

## 2025-01-13 RX ADMIN — Medication 2000 UNITS: at 09:13

## 2025-01-13 RX ADMIN — VERAPAMIL HYDROCHLORIDE 80 MG: 80 TABLET ORAL at 21:01

## 2025-01-13 RX ADMIN — Medication 15 ML: at 18:18

## 2025-01-13 ASSESSMENT — ENCOUNTER SYMPTOMS
ENDOCRINE NEGATIVE: 1
CHEST TIGHTNESS: 0
PALPITATIONS: 0
AGITATION: 0
BLOOD IN STOOL: 0
HEADACHES: 0
APPETITE CHANGE: 1
DIARRHEA: 0
BRUISES/BLEEDS EASILY: 0
FREQUENCY: 0
EYES NEGATIVE: 1
VOMITING: 0
SHORTNESS OF BREATH: 0
UNEXPECTED WEIGHT CHANGE: 0
DIZZINESS: 1
SINUS PRESSURE: 0
ACTIVITY CHANGE: 0
HEMATURIA: 0
CONSTIPATION: 0
WHEEZING: 0
ABDOMINAL DISTENTION: 1
CONFUSION: 0
FEVER: 0
WEAKNESS: 1
SEIZURES: 0
NAUSEA: 0
ABDOMINAL PAIN: 1
FATIGUE: 1
COUGH: 1
DYSURIA: 0
ADENOPATHY: 0
NUMBNESS: 0
MUSCULOSKELETAL NEGATIVE: 1

## 2025-01-13 ASSESSMENT — COGNITIVE AND FUNCTIONAL STATUS - GENERAL
DAILY ACTIVITIY SCORE: 24
MOBILITY SCORE: 24

## 2025-01-13 ASSESSMENT — ACTIVITIES OF DAILY LIVING (ADL): LACK_OF_TRANSPORTATION: NO

## 2025-01-13 ASSESSMENT — PAIN SCALES - GENERAL: PAINLEVEL_OUTOF10: 0 - NO PAIN

## 2025-01-13 ASSESSMENT — PAIN - FUNCTIONAL ASSESSMENT: PAIN_FUNCTIONAL_ASSESSMENT: 0-10

## 2025-01-13 NOTE — ED PROVIDER NOTES
HPI   Chief Complaint   Patient presents with    Weakness, Gen       HPI  Patient is a 69-year-old past medical history of CAD status post CABG, hypothyroidism, esophageal cancer on chemo who was triggered as a critical presenting with generalized weakness.  According to patient, for the past 3 days he has felt weak and short of breath.  Patient denies any sick contact.  Patient did have his chemotherapy last week.  Patient denies any fever, nausea and vomiting.  There has not been any chest pain.  Patient has not had any long car ride, and has not traveled outside the country.  Patient shortness of breath came on suddenly.  He has not noticed anything that makes it better or worse.      Patient History   Past Medical History:   Diagnosis Date    Hyperlipidemia     Hypertension     Personal history of other endocrine, nutritional and metabolic disease     History of hypothyroidism    Type 2 diabetes mellitus      Past Surgical History:   Procedure Laterality Date    CATARACT EXTRACTION Right 08/25/2023    CHOLECYSTECTOMY  09/18/2018    Cholecystectomy    CORONARY ARTERY BYPASS GRAFT  09/18/2018    CABG    HERNIA REPAIR  09/18/2018    Inguinal Hernia Repair     Family History   Problem Relation Name Age of Onset    COPD Mother       Social History     Tobacco Use    Smoking status: Every Day     Current packs/day: 0.25     Average packs/day: 1 pack/day for 46.0 years (45.3 ttl pk-yrs)     Types: Cigarettes     Start date: 1979    Smokeless tobacco: Never   Vaping Use    Vaping status: Never Used   Substance Use Topics    Alcohol use: Not Currently    Drug use: Never       Physical Exam   ED Triage Vitals [01/12/25 1757]   Temperature Heart Rate Respirations BP   35.6 °C (96.1 °F) 93 20 (!) 62/44      Pulse Ox Temp src Heart Rate Source Patient Position   98 % -- -- --      BP Location FiO2 (%)     -- --       Physical Exam  Constitutional:       Appearance: Normal appearance.   HENT:      Head: Normocephalic and  atraumatic.   Cardiovascular:      Rate and Rhythm: Normal rate and regular rhythm.      Pulses: Normal pulses.      Heart sounds: Normal heart sounds.   Pulmonary:      Effort: Pulmonary effort is normal.      Breath sounds: Normal breath sounds.   Abdominal:      General: Abdomen is flat. Bowel sounds are normal.      Palpations: Abdomen is soft.      Comments: PEG tube.  No purulent drainage.  No erythema.  No signs of infection.   Musculoskeletal:      Comments: Bilateral upper and lower extremity weakness.   Skin:     General: Skin is dry.   Neurological:      General: No focal deficit present.      Mental Status: He is alert and oriented to person, place, and time. Mental status is at baseline.           ED Course & MDM   Diagnoses as of 01/12/25 1951   Hypotension, unspecified hypotension type   Acute kidney injury (CMS-HCC)                 No data recorded     Misa Coma Scale Score: 15 (01/12/25 1755 : Terese Mcgregor RN)                           Medical Decision Making  Patient is a 69-year-old past medical history of CAD status post CABG, hypothyroidism, esophageal cancer on chemo who was triggered as a critical presenting with generalized weakness.  The differential diagnoses considered for this patient were infection, myxedema coma, tumor lysis syndrome, CHF and myocardial infarction.  At bedside patient was hypotensive at 90/60.  Patient was then started on 1 L of bolus fluid.  Patient EKG showed normal sinus rhythm, no NSTEMI/STEMI, no long QT, and no T wave inversion.  Patient initial troponin was 5 and repeat was also less than 5 so myocardial infarction is less likely.  Patient BNP was normal and chest x-ray did not show any fluid overload. This makes CHF exacerbation less likely.  Patient tested negative for flu, and COVID.  Patient CBC showed leukocytosis of 19.6 with elevated absolute neutrophil left shift of 10.9.  Due to patient being immunosuppressed and having leukocytosis with  hypotension, infection was a concern.  Patient was then started on cefepime and vancomycin.  Patient CMP was unremarkable except for hyponatremia of 129, elevated creatinine of 1.70 and decreased GFR of 43.  Patient normal baseline of creatinine is 0.90.  Therefore patient has an acute kidney injury.  There was also no sign of hyperkalemia, hypocalcemia, and hyperphosphatemia.  This show the patient is not suffering from tumor lysis syndrome.  Patient tested negative for flu A, RSV and coronavirus.  TSH was elevated at 16.04 but had normal T3.  Therefore myxedema coma was less likely.  During my time of signout patient was pending urinalysis and disposition.  The oncoming physician will follow-up with patient care.    Procedure  Procedures     Maximino Flowers MD  Resident  01/13/25 8147

## 2025-01-13 NOTE — CONSULTS
"Nutrition Initial Assessment:   Nutrition Assessment    ---->Reason for Assessment: Admission nursing screening    Patient is a 69 y.o. male with recently diagnosed Esophageal Adenocarcinoma, admitted for further work-up and treatment of weakness, SOB, and c/f J-tube site infection.    Since admit, Thoracic Surgery has been consulted to evaluate J-tube site/possible need for up-sizing the tube.    Nutrition History:  This writer met with pt earlier today, was laying in bed at time of visit with him.    Lane J-tube was placed end of 11/2024. His home TF regimen is Isosource 1.5, run @ 105 ml/hr x 12 hours/night (~5 cartons/night). Was also able to eat by mouth, softer foods, like puddings, ice cream, jello, and liquids, though over the last several days has felt like anything he takes in by mouth is getting stuck, so has not tried to eat anything since then.    Since placement of the tube, feels he has had issues with pain at the site + also recently noted TF leaking from the tube. Denied any n/v or increase in abdominal pains when TF is running. Diarrhea, \"comes and goes.\"     Since admit yesterday, c/o ongoing abdominal pain at the site of the tube + also abdominal cramping. Has only taken small sips of water. Did not run the TF last night, \"we're waiting to see what the surgeon says about the tube.\"     --Vitamin/Herbal Supplement Use: none  --Food Allergies/Intolerances: none       Anthropometrics:  Height: 177.8 cm (5' 10\")   Weight: 88.9 kg (196 lb)   BMI (Calculated): 28.12  IBW/kg (Dietitian Calculated): 75.5 kg  Percent of IBW: 118 %       Weight History:     Wt Readings per review of EMR:   01/12/25 88.9 kg (196 lb) (current wt)   01/02/25 93.5 kg (206 lb 2.1 oz)--->5% wt loss from this date to present (significant)   12/12/24 92.8 kg (204 lb 9.4 oz)   11/30/24 109 kg (241 lb)---?   11/25/24 93.9 kg (207 lb)   11/21/24 93 kg (205 lb)   10/08/24 101 kg (222 lb)--->12% wt loss from this date to present " (significant)   03/20/24 115 kg (254 lb)   12/23/23 111 kg (245 lb)   09/20/23 114 kg (252 lb)     Nutrition Focused Physical Exam Findings:  Subcutaneous Fat Loss:   Orbital Fat Pads: Well nourished (slightly bulging fat pads)  Buccal Fat Pads: Well nourished (full, rounded cheeks)  Triceps: Well nourished (ample fat tissue)  Ribs: Defer  Muscle Wasting:  Temporalis: Well nourished (well-defined muscle)  Pectoralis (Clavicular Region): Well nourished (clavicle not visible)  Deltoid/Trapezius: Well nourished (rounded appearance at arm, shoulder, neck)  Interosseous: Well nourished (muscle bulges)  Trapezius/Infraspinatus/Supraspinatus (Scapular Region): Defer  Quadriceps: Defer  Gastrocnemius: Defer  Edema:  Edema: none  Physical Findings:  Hair: Negative  Eyes: Negative  Nails: Negative  Skin: Positive (redness around J-tube site)    Nutrition Significant Labs:  CBC Trend:   Results from last 7 days   Lab Units 01/12/25 2021 01/12/25  1825   WBC AUTO x10*3/uL 16.0* 19.6*   RBC AUTO x10*6/uL 3.35* 4.15*   HEMOGLOBIN g/dL 10.4* 12.9*   HEMATOCRIT % 29.0* 35.8*   MCV fL 87 86   PLATELETS AUTO x10*3/uL 219 307   BMP Trend:   Results from last 7 days   Lab Units 01/13/25  1158 01/12/25  1825   GLUCOSE mg/dL 82 157*   CALCIUM mg/dL 8.1* 9.0   SODIUM mmol/L 133* 129*   POTASSIUM mmol/L 4.5 4.9   CO2 mmol/L 22 19*   CHLORIDE mmol/L 102 98   BUN mg/dL 33* 42*   CREATININE mg/dL 1.12 1.70*   A1C:  Lab Results   Component Value Date    HGBA1C 5.3 09/17/2024   BG POCT trend:   Results from last 7 days   Lab Units 01/12/25  1756   POCT GLUCOSE mg/dL 119*   Liver Function Trend:   Results from last 7 days   Lab Units 01/13/25  1158 01/12/25  1825   ALK PHOS U/L 52 64   AST U/L 19 25   ALT U/L 33 36   BILIRUBIN TOTAL mg/dL 0.4 0.5   Renal Lab Trend:   Results from last 7 days   Lab Units 01/13/25  1158 01/12/25  1825   POTASSIUM mmol/L 4.5 4.9   PHOSPHORUS mg/dL  --  3.5   SODIUM mmol/L 133* 129*   MAGNESIUM mg/dL  --  1.96    EGFR mL/min/1.73m*2 72 43*   BUN mg/dL 33* 42*   CREATININE mg/dL 1.12 1.70*   Vit D:   Lab Results   Component Value Date    VITD25 66 03/12/2024   Vit B12:   Lab Results   Component Value Date    EUCRWPOO93 709 04/26/2021     Medications:  Scheduled medications  amitriptyline, 100 mg, j-tube, Nightly  aspirin, 81 mg, j-tube, Daily  atorvastatin, 40 mg, j-tube, Daily  buPROPion, 150 mg, j-tube, BID  cefepime, 2 g, intravenous, q12h  cholecalciferol, 2,000 Units, oral, Daily  enoxaparin, 40 mg, subcutaneous, q24h  [START ON 1/14/2025] esomeprazole, 40 mg, j-tube, Daily before breakfast  levothyroxine, 175 mcg, j-tube, Daily  [Held by provider] losartan, 100 mg, j-tube, Daily  polyethylene glycol, 17 g, oral, BID  verapamil, 80 mg, j-tube, q8h OZIEL    Continuous medications  sodium chloride 0.9%, 75 mL/hr, Last Rate: 75 mL/hr (01/13/25 1212)    PRN medications  PRN medications: alteplase, ondansetron, oxyCODONE, sennosides-docusate sodium, vancomycin    I/O:   Last BM Date: 01/11/25    Dietary Orders (From admission, onward)       Start     Ordered    01/14/25 2000  Enteral feeding WITH diet order Diet type: Full Liquid; Tube feeding formula: Isosource 1.5; 2000/0800; 105; 60; Water  Continuous        Comments: 60ml pre/post meds + FWF 200ml QID   Question Answer Comment   Diet type Full Liquid    Tube feeding formula: Isosource 1.5    Feeding route: JT (jejunostomy tube)    Tube feeding cyclic (start / stop time): 2000/0800    Tube feeding cyclic rate (mL/hr): 105    Tube feeding flush (mL): 60    Flush type: Water        01/13/25 0925    01/12/25 2254  May Participate in Room Service  ( ROOM SERVICE MAY PARTICIPATE)  Once        Question:  .  Answer:  Yes    01/12/25 2253                Estimated Needs:   Total Energy Estimated Needs (kCal): 2250+  Method for Estimating Needs: 76 (IBW) x ~30+  Total Protein Estimated Needs (g): 100+  Method for Estimating Needs: 76 (IBW) x ~1.2g/kg+  Total Fluid Estimated Needs  (mL): 1ml/kcal or per MD/team        Nutrition Diagnosis   Malnutrition Diagnosis  Patient has Malnutrition Diagnosis--Suspicion for malnutrition, considering h/o wt losses, though no s/s of muscle and fat losses + RDN unclear as to how much pt TF pt has been actually recieving, as pt reports issues with tube leaking at home.    Nutrition Diagnosis  Patient has Nutrition Diagnosis: Yes  Diagnosis Status (1): New  Nutrition Diagnosis 1: Increased nutrient needs  Related to (1): increased metabolic demand  As Evidenced by (1): pt with esohageal CA, undergoing treatment       Nutrition Interventions/Recommendations     1. When medically appropriate, would resume pt's home TF regimen of Isosource 1.5, goal rate of 105 ml/hr x 12 hours/night (total of ~5 cartons/night)  --FWF, per MD/team (TF @ goal rate provides a total of 962 mls free water/day)  --Above TF regimen provides 1890 kcals, 85 g pro--meets 84% estimated kcal needs, 85% estimated pro needs    2. If pt tolerates above TF regimen when TF is resumed, would then increase TF of Isosource 1.5 to a new goal rate of 120ml/hr x 12 hours/night, as tolerated, to better meet pt's estimated needs, particularly since he reports he is no longer taking in much by mouth (total of ~6 cartons/night)  --FWF, per MD/team (TF @ goal rate provides a total of 1100 mls free water/day)  --Above TF regimen provides a total of 2160 kcals, 97 g pro--meets 96% estimated kcal needs, 97% estimated pro needs    TF Monitoring:  --RFP + mag at least once/day  --Accuchecks Q6H or per team  --Daily, standing weights    3. Consider SLP eval to assure pt safe for PO intakes.         Nutrition Prescription for Enteral Nutrition: 2250+ kcals/day, 100+ g pro/day        Nutrition Interventions:   Interventions: Enteral intake  Enteral Intake: Other--continue TF via J-tube, when medically appropriate  Goal: TF of Isosource 1.5 @ goal rate of 105ml/hr x 12 hours/night=1890 kcals, 85 g pro    Nutrition  Education: Pt denied any questions for RDN at this time.       Nutrition Monitoring and Evaluation   Food/Nutrient Related History Monitoring  Monitoring and Evaluation Plan: Enteral and parenteral nutrition intake  Enteral and Parenteral Nutrition Intake: Enteral nutrition intake  Criteria: tolerate TF @ goal rate    Body Composition/Growth/Weight History  Monitoring and Evaluation Plan: Weight  Weight: Measured weight  Criteria: maintain stable wt    Biochemical Data, Medical Tests and Procedures  Monitoring and Evaluation Plan: Electrolyte/renal panel, Glucose/endocrine profile  Electrolyte and Renal Panel: Magnesium, Phosphorus, Potassium, Sodium  Criteria: WNL  Glucose/Endocrine Profile: Glucose, casual, Glucose, fasting  Criteria: WNL          Time Spent (min): 60 minutes

## 2025-01-13 NOTE — CONSULTS
Inpatient consult to Endocrinology  Consult performed by: Connie Pang MD  Consult ordered by: Tamara Granados, APRN-CNP  Reason for consult: hypothyroid, on levothox 175, tsh 16 on admit          Reason For Consult   hypothyroid, on levothox 175, tsh 16 on admit    History Of Present Illness  Timoteo Victoria is a 69 y.o. male with a past medical history significant of but not limited to HTN, HLD, CAD s/p CABG x 4, cluster headaches, hypothyroidism, esophageal adenocarcinoma and nicotine dependence disorder presented to the ED on 01/12 for weakness and SOB. He was admitted for management of PEG tube site infection and DYANA/dehydration.    TFTs were checked in the ED as the patient was hypotensive which improved with fluid resuscitation. Tsh was 16.04 and FT4 was 1.06. The patient was started on solu cortef injection 50 mg Q8 hours for myxedema coma. Endocrinology has been consulted for management of hypothyroidism.    Per chart review, the patient was given 1 dose of dexamethasone in 11/2024, started on dexamethasone 12 mg IV for pre chemo regimen, dexamethasone 8 mg BID ( 3 days before chemo), solu-medrol 40 mg IV during chemo.    Upon my evaluation, he said that he has had hypothyroidism since years, and has always been compliant with his LT4 however he does take it with his other medications in the morning. His hypothyroidism has been managed by his PCP. He has a lot of fatigue, along with constipation and mentions losing about 50 lbs since May of 2024. He denied any temperature intolerance, muscle weakness, cramps. Also he is on TF at home and at the hospital.    His last contrast exposure was in 11/2024.      Results from Most Recent A1C  Hemoglobin A1C   Date/Time Value Ref Range Status   09/17/2024 03:45 PM 5.3 See comment % Final        Diabetes Problem List Entries with Dates  Problem List:  2023-06: Type 2 diabetes mellitus         Past Medical History  He has a past medical history of Hyperlipidemia,  "Hypertension, Personal history of other endocrine, nutritional and metabolic disease, and Type 2 diabetes mellitus.    Surgical History  He has a past surgical history that includes Cholecystectomy (09/18/2018); Hernia repair (09/18/2018); Coronary artery bypass graft (09/18/2018); and Cataract extraction (Right, 08/25/2023).     Social History  He reports that he has been smoking cigarettes. He started smoking about 46 years ago. He has a 45.3 pack-year smoking history. He has never used smokeless tobacco. He reports that he does not currently use alcohol. He reports that he does not use drugs.    Family History  Family History   Problem Relation Name Age of Onset    COPD Mother          Allergies  Ibuprofen    Review of Systems  Negative unless noted above     Physical Exam  General: elderly male patient in NAD, multiple tatoos  HEENT: AT/NC, retracted eye lids  Neck: trachea in midline, small goiter with rubbery isthmus  CVS: normal s1 and s2  Abdomen: soft and non tender to palpation, BS+  Skin: warm, dry and intact  Neuro: AAO x3, DTR 2+  Psych: cooperative     ROS, PMH, FH/SH, surgical history and allergies have been reviewed.    Last Recorded Vitals  Blood pressure 125/69, pulse 73, temperature 37.2 °C (99 °F), temperature source Temporal, resp. rate 16, height 1.778 m (5' 10\"), weight 88.9 kg (196 lb), SpO2 97%.    Relevant Results  Results from last 7 days   Lab Units 01/12/25  1825 01/12/25  1756   POCT GLUCOSE mg/dL  --  119*   GLUCOSE mg/dL 157*  --           Assessment/Plan   Assessment & Plan  Hypotension, unspecified hypotension type    Hypothyroidism      The patient is a 69 year old male who is admitted to the hospital for management of PEG tube site infection. Endocrinology has been consulted for management of hypothyroidism.     01/12/2025 : TSH 16.04, FT4 1.06    From the labs and his clinical presentation it does not look like the patient is in myxedema coma.     - Recommend continuing LT4 175 " mcg daily however please hold TF 1 hour before and 2 hours after the TF administration  - We do not think he needs glucocorticoids at this point from the thyroid stand point, thus it will be better to discontinue them. We will assess for it electrolytes.  - Repeat labs ( TSH, FT4) on 01/19.    Thank you for the consult. The patient was seen and discussed with Dr. Aldrich.      Connie Pang MD  PGY-4 Endocrinology Fellow

## 2025-01-13 NOTE — CONSULTS
Reason For Consult  Painful J tube site with possible leakage    History Of Present Illness  Timoteo Victoria is a 69 y.o. male with history of CAD (CABG 2005), HTN, HLD, chronic tobacco use, hypothyroidism, esophageal mass s/p J tube placement by Dr. Salas 11/25/24 who is presenting with weakness and SOB. Thoracic surgery consulted for pain at J tube site.    Patient describes the pain as mostly at the skin just adjacent to the J tube site. He reports the pain has been there since placement. Patient reports he is able to tolerate tube feeds through the tube without pain, he does not notice drainage from around the tube during feeds and does not see any feeds leaking from the tube or site during feeds. Patient is having regular BM and passing flatus. He denies abdominal pain beyond the surface level pain at the tube, he denies nausea, vomiting, fevers, chills.      Past Medical History  He has a past medical history of Hyperlipidemia, Hypertension, Personal history of other endocrine, nutritional and metabolic disease, and Type 2 diabetes mellitus.    Surgical History  He has a past surgical history that includes Cholecystectomy (09/18/2018); Hernia repair (09/18/2018); Coronary artery bypass graft (09/18/2018); and Cataract extraction (Right, 08/25/2023).     Social History  He reports that he has been smoking cigarettes. He started smoking about 46 years ago. He has a 45.3 pack-year smoking history. He has never used smokeless tobacco. He reports that he does not currently use alcohol. He reports that he does not use drugs.    Family History  Family History   Problem Relation Name Age of Onset    COPD Mother          Allergies  Ibuprofen    Review of Systems  ROS completed and negative except for what is stated in HPI.     Physical Exam  Physical Exam  Constitutional:       General: He is not in acute distress.  HENT:      Head: Normocephalic and atraumatic.   Eyes:      Conjunctiva/sclera: Conjunctivae normal.  "  Cardiovascular:      Rate and Rhythm: Normal rate.   Pulmonary:      Effort: Pulmonary effort is normal.      Comments: RA  Abdominal:      General: There is no distension.      Palpations: Abdomen is soft.      Tenderness: There is abdominal tenderness. There is no guarding or rebound.      Comments: J tube in place, no fluctuance, no induration, twists freely   Musculoskeletal:         General: No swelling.   Skin:     Comments: Erythema surrounding J tube site with some old, yellow, crusted discharge at inferior aspect, tender to palpation   Neurological:      General: No focal deficit present.      Mental Status: He is alert.   Psychiatric:         Behavior: Behavior normal.            Last Recorded Vitals  Blood pressure 144/78, pulse 82, temperature 36.4 °C (97.5 °F), temperature source Temporal, resp. rate 18, height 1.778 m (5' 10\"), weight 88.9 kg (196 lb), SpO2 95%.    Relevant Results  Results for orders placed or performed during the hospital encounter of 01/12/25 (from the past 24 hours)   POCT GLUCOSE   Result Value Ref Range    POCT Glucose 119 (H) 74 - 99 mg/dL   Blood Gas Venous Full Panel Unsolicited   Result Value Ref Range    POCT pH, Venous 7.42 7.33 - 7.43 pH    POCT pCO2, Venous 39 (L) 41 - 51 mm Hg    POCT pO2, Venous 17 (L) 35 - 45 mm Hg    POCT SO2, Venous 14 (L) 45 - 75 %    POCT Oxy Hemoglobin, Venous 13.8 (L) 45.0 - 75.0 %    POCT Hematocrit Calculated, Venous 41.0 41.0 - 52.0 %    POCT Sodium, Venous 127 (L) 136 - 145 mmol/L    POCT Potassium, Venous 5.1 3.5 - 5.3 mmol/L    POCT Chloride, Venous 96 (L) 98 - 107 mmol/L    POCT Ionized Calicum, Venous 1.15 1.10 - 1.33 mmol/L    POCT Glucose, Venous 160 (H) 74 - 99 mg/dL    POCT Lactate, Venous 2.7 (H) 0.4 - 2.0 mmol/L    POCT Base Excess, Venous 0.8 -2.0 - 3.0 mmol/L    POCT HCO3 Calculated, Venous 25.3 22.0 - 26.0 mmol/L    POCT Hemoglobin, Venous 13.5 13.5 - 17.5 g/dL    POCT Anion Gap, Venous 11.0 10.0 - 25.0 mmol/L    Patient " Temperature 37.0 degrees Celsius   CBC and Auto Differential   Result Value Ref Range    WBC 19.6 (H) 4.4 - 11.3 x10*3/uL    nRBC 0.2 (H) 0.0 - 0.0 /100 WBCs    RBC 4.15 (L) 4.50 - 5.90 x10*6/uL    Hemoglobin 12.9 (L) 13.5 - 17.5 g/dL    Hematocrit 35.8 (L) 41.0 - 52.0 %    MCV 86 80 - 100 fL    MCH 31.1 26.0 - 34.0 pg    MCHC 36.0 32.0 - 36.0 g/dL    RDW 13.4 11.5 - 14.5 %    Platelets 307 150 - 450 x10*3/uL    Immature Granulocytes %, Automated 12.2 (H) 0.0 - 0.9 %    Immature Granulocytes Absolute, Automated 2.39 (H) 0.00 - 0.70 x10*3/uL   Comprehensive metabolic panel   Result Value Ref Range    Glucose 157 (H) 74 - 99 mg/dL    Sodium 129 (L) 136 - 145 mmol/L    Potassium 4.9 3.5 - 5.3 mmol/L    Chloride 98 98 - 107 mmol/L    Bicarbonate 19 (L) 21 - 32 mmol/L    Anion Gap 17 10 - 20 mmol/L    Urea Nitrogen 42 (H) 6 - 23 mg/dL    Creatinine 1.70 (H) 0.50 - 1.30 mg/dL    eGFR 43 (L) >60 mL/min/1.73m*2    Calcium 9.0 8.6 - 10.6 mg/dL    Albumin 3.9 3.4 - 5.0 g/dL    Alkaline Phosphatase 64 33 - 136 U/L    Total Protein 6.6 6.4 - 8.2 g/dL    AST 25 9 - 39 U/L    Bilirubin, Total 0.5 0.0 - 1.2 mg/dL    ALT 36 10 - 52 U/L   Magnesium   Result Value Ref Range    Magnesium 1.96 1.60 - 2.40 mg/dL   B-Type Natriuretic Peptide   Result Value Ref Range    BNP 35 0 - 99 pg/mL   Troponin I, High Sensitivity, Initial   Result Value Ref Range    Troponin I, High Sensitivity (CMC) 5 0 - 53 ng/L   Blood Culture    Specimen: Peripheral Venipuncture; Blood culture   Result Value Ref Range    Blood Culture Loaded on Instrument - Culture in progress    Blood Culture    Specimen: Peripheral Venipuncture; Blood culture   Result Value Ref Range    Blood Culture Loaded on Instrument - Culture in progress    TSH with reflex to Free T4 if abnormal   Result Value Ref Range    Thyroid Stimulating Hormone 16.04 (H) 0.44 - 3.98 mIU/L   Sars-CoV-2 and Influenza A/B PCR   Result Value Ref Range    Flu A Result Not Detected Not Detected    Flu B  Result Not Detected Not Detected    Coronavirus 2019, PCR Not Detected Not Detected   Thyroxine, Free   Result Value Ref Range    Thyroxine, Free 1.06 0.78 - 1.48 ng/dL   Phosphorus   Result Value Ref Range    Phosphorus 3.5 2.5 - 4.9 mg/dL   Manual Differential   Result Value Ref Range    Neutrophils %, Manual 69.8 40.0 - 80.0 %    Lymphocytes %, Manual 16.8 13.0 - 44.0 %    Monocytes %, Manual 0.7 2.0 - 10.0 %    Eosinophils %, Manual 0.0 0.0 - 6.0 %    Basophils %, Manual 0.0 0.0 - 2.0 %    Atypical Lymphocytes %, Manual 12.7 0.0 - 2.0 %    Seg Neutrophils Absolute, Manual 13.68 (H) 1.20 - 7.00 x10*3/uL    Lymphocytes Absolute, Manual 3.29 1.20 - 4.80 x10*3/uL    Monocytes Absolute, Manual 0.14 0.10 - 1.00 x10*3/uL    Eosinophils Absolute, Manual 0.00 0.00 - 0.70 x10*3/uL    Basophils Absolute, Manual 0.00 0.00 - 0.10 x10*3/uL    Atypical Lymphs Absolute, Manual 2.49 (H) 0.00 - 0.50 x10*3/uL    Total Cells Counted 149     RBC Morphology See Below     Polychromasia Mild    ECG 12 lead   Result Value Ref Range    Ventricular Rate 84 BPM    Atrial Rate 77 BPM    QRS Duration 130 ms    QT Interval 440 ms    QTC Calculation(Bazett) 519 ms    R Axis -23 degrees    T Axis 90 degrees    QRS Count 13 beats    Q Onset 194 ms    P Onset 116 ms    P Offset 183 ms    T Offset 414 ms    QTC Fredericia 492 ms   Troponin, High Sensitivity, 1 Hour   Result Value Ref Range    Troponin I, High Sensitivity (CMC) 5 0 - 53 ng/L   T4, free   Result Value Ref Range    Thyroxine, Free 1.14 0.78 - 1.48 ng/dL   CBC and Auto Differential   Result Value Ref Range    WBC 16.0 (H) 4.4 - 11.3 x10*3/uL    nRBC 0.2 (H) 0.0 - 0.0 /100 WBCs    RBC 3.35 (L) 4.50 - 5.90 x10*6/uL    Hemoglobin 10.4 (L) 13.5 - 17.5 g/dL    Hematocrit 29.0 (L) 41.0 - 52.0 %    MCV 87 80 - 100 fL    MCH 31.0 26.0 - 34.0 pg    MCHC 35.9 32.0 - 36.0 g/dL    RDW 13.5 11.5 - 14.5 %    Platelets 219 150 - 450 x10*3/uL    Neutrophils % 68.0 40.0 - 80.0 %    Immature  Granulocytes %, Automated 10.7 (H) 0.0 - 0.9 %    Lymphocytes % 13.3 13.0 - 44.0 %    Monocytes % 7.1 2.0 - 10.0 %    Eosinophils % 0.2 0.0 - 6.0 %    Basophils % 0.7 0.0 - 2.0 %    Neutrophils Absolute 10.91 (H) 1.20 - 7.70 x10*3/uL    Immature Granulocytes Absolute, Automated 1.71 (H) 0.00 - 0.70 x10*3/uL    Lymphocytes Absolute 2.14 1.20 - 4.80 x10*3/uL    Monocytes Absolute 1.14 (H) 0.10 - 1.00 x10*3/uL    Eosinophils Absolute 0.03 0.00 - 0.70 x10*3/uL    Basophils Absolute 0.11 (H) 0.00 - 0.10 x10*3/uL   Light Blue Top   Result Value Ref Range    Extra Tube Hold for add-ons.    Morphology   Result Value Ref Range    RBC Morphology See Below     Polychromasia Mild    Urinalysis with Reflex Microscopic   Result Value Ref Range    Color, Urine Yellow Light-Yellow, Yellow, Dark-Yellow    Appearance, Urine Clear Clear    Specific Gravity, Urine 1.019 1.005 - 1.035    pH, Urine 7.5 5.0, 5.5, 6.0, 6.5, 7.0, 7.5, 8.0    Protein, Urine 10 (TRACE) NEGATIVE, 10 (TRACE), 20 (TRACE) mg/dL    Glucose, Urine Normal Normal mg/dL    Blood, Urine NEGATIVE NEGATIVE    Ketones, Urine NEGATIVE NEGATIVE mg/dL    Bilirubin, Urine NEGATIVE NEGATIVE    Urobilinogen, Urine Normal Normal mg/dL    Nitrite, Urine NEGATIVE NEGATIVE    Leukocyte Esterase, Urine NEGATIVE NEGATIVE   Microscopic Only, Urine   Result Value Ref Range    WBC, Urine 1-5 1-5, NONE /HPF    RBC, Urine 1-2 NONE, 1-2, 3-5 /HPF   Comprehensive metabolic panel   Result Value Ref Range    Glucose 82 74 - 99 mg/dL    Sodium 133 (L) 136 - 145 mmol/L    Potassium 4.5 3.5 - 5.3 mmol/L    Chloride 102 98 - 107 mmol/L    Bicarbonate 22 21 - 32 mmol/L    Anion Gap 14 10 - 20 mmol/L    Urea Nitrogen 33 (H) 6 - 23 mg/dL    Creatinine 1.12 0.50 - 1.30 mg/dL    eGFR 72 >60 mL/min/1.73m*2    Calcium 8.1 (L) 8.6 - 10.6 mg/dL    Albumin 3.0 (L) 3.4 - 5.0 g/dL    Alkaline Phosphatase 52 33 - 136 U/L    Total Protein 4.8 (L) 6.4 - 8.2 g/dL    AST 19 9 - 39 U/L    Bilirubin, Total 0.4 0.0  - 1.2 mg/dL    ALT 33 10 - 52 U/L         Assessment/Plan     Timoteo Victoria is a 69 y.o. male with history of CAD (CABG 2005), HTN, HLD, chronic tobacco use, hypothyroidism, esophageal mass s/p J tube placement by Dr. Salas 11/25/24 who is presenting with weakness and SOB. Thoracic surgery consulted for pain at J tube site. Pain appears to be related to local erythema at the surface level, low concern for infectious process including cellulitis or deeper infection given appearance of the skin and lack of systemic symptoms. Suspect this is localized erythema due to drainage and friction from the J tube and bumper.     - Recommend local wound care and barrier cream at J tube site to prevent further irritation. Can consider using duoderm at the site to further protect the skin  - No further imaging needed to assess J tube at this time, no need for surgical intervention  - Please reach out with questions or concerns    Patient seen with cardiothoracic fellow Dr. Han and discussed with attending surgeon Dr. Maritza Corrales MD  Gen Surg PGY2  Thoracic Surgery [id number]

## 2025-01-13 NOTE — CARE PLAN
Problem: Pain - Adult  Goal: Verbalizes/displays adequate comfort level or baseline comfort level  Outcome: Progressing     Problem: Safety - Adult  Goal: Free from fall injury  Outcome: Progressing     Problem: Discharge Planning  Goal: Discharge to home or other facility with appropriate resources  Outcome: Progressing     Problem: Chronic Conditions and Co-morbidities  Goal: Patient's chronic conditions and co-morbidity symptoms are monitored and maintained or improved  Outcome: Progressing     VSS, afebrile, no complaints N/V/D this shift. Pt remained safe and free of falls/injury. Patient c/o of discomfort around J tube insertion site that is reddened and draining serous fluids. RN did wound care w/ vashe wound cleanser, dried area, and applied split gauze above bumber to aid in collection of drainage. RN to place wound care c/s for J tube site. Otherwise, patient remained on IVF and antibiotics throughout the day.     Rachna Boston RN

## 2025-01-13 NOTE — CONSULTS
Vancomycin Dosing by Pharmacy- INITIAL    Timoteo Victoria is a 69 y.o. year old male who Pharmacy has been consulted for vancomycin dosing for cellulitis, skin and soft tissue. Based on the patient's indication and renal status this patient will be dosed based on a goal AUC of 400-600.     Renal function is currently stable.    Visit Vitals  BP 84/53   Pulse 70   Temp 35.6 °C (96.1 °F)   Resp 18        Lab Results   Component Value Date    CREATININE 1.70 (H) 2025    CREATININE 0.98 2025    CREATININE 0.81 2024    CREATININE 0.79 2024        Patient weight is as follows:   Vitals:    25   Weight: 93.5 kg (206 lb 2.1 oz)       Cultures:  No results found for the encounter in last 14 days.        No intake/output data recorded.  I/O during current shift:  No intake/output data recorded.    Temp (24hrs), Av.6 °C (96.1 °F), Min:35.6 °C (96.1 °F), Max:35.6 °C (96.1 °F)         Assessment/Plan     Patient will be given a loading dose of 2000 mg.  Will initiate vancomycin maintenance,  1250 mg every 24 hours.    This dosing regimen is predicted by [employer] to result in the following pharmacokinetic parameters:  Loading dose: 2000 mg at 00:00 2025.  Regimen: 1250 mg IV every 24 hours.  Start time: 00:00 on 2025  Exposure target: AUC24 (range)400-600 mg/L.hr   [id number]: 445 mg/L.hr  AUC24,ss: 511 mg/L.hr  Probability of AUC24 > 400: 75 %  Ctrough,ss: 16.1 mg/L  Probability of Ctrough,ss > 20: 31 %    Follow-up level will be ordered on  w/AM labs unless clinically indicated sooner.  Will continue to monitor renal function daily while on vancomycin and order serum creatinine at least every 48 hours if not already ordered.  Follow for continued vancomycin needs, clinical response, and signs/symptoms of toxicity.       Willian Andrade, DiegoD

## 2025-01-13 NOTE — PROGRESS NOTES
01/13/25 1526   Discharge Planning   Living Arrangements Alone   Support Systems Children;Family members   Assistance Needed none   Type of Residence Private residence   Home or Post Acute Services In home services   Type of Home Care Services Home nursing visits   Expected Discharge Disposition Home H   Does the patient need discharge transport arranged? No   Financial Resource Strain   How hard is it for you to pay for the very basics like food, housing, medical care, and heating? Not very   Housing Stability   In the last 12 months, was there a time when you were not able to pay the mortgage or rent on time? N   In the past 12 months, how many times have you moved where you were living? 0   At any time in the past 12 months, were you homeless or living in a shelter (including now)? N   Transportation Needs   In the past 12 months, has lack of transportation kept you from medical appointments or from getting medications? no   In the past 12 months, has lack of transportation kept you from meetings, work, or from getting things needed for daily living? No   Patient Choice   Patient / Family choosing to utilize agency / facility established prior to hospitalization Yes     Pt with esophageal adenocarcinoma was admitted due to J-tube site infection and leaking.  Met with the pt to discuss his home going plan.  He plans to return home to his house, where he lives independently.  No assistive devices are needed at this time. The pt states he was supposed to have [facility] after his J-tube was placed, however never connected with home care.  He would like to try again.  An order has been placed for a RN through [facility].   The pt gives himself tube feeding, which is supplied through [facility].  His MD is Dr. Wilson.  Will continue to follow to assist with the home going plan.  Manuela Alexander RN, TCC

## 2025-01-13 NOTE — PROGRESS NOTES
"Timoteo Victoria is a 69 y.o. male on day 1 of admission presenting with Hypotension, unspecified hypotension type.    Subjective   Feeling ok this morning, in pain around J tube site. Says TF leaks out of site (he smells it), as well as yellow, sticky pus throughout the day. Has been painful since insertion in 11/2024, however has recently x 1 week gotten more painful and he has noticed redness around site. Is still taking some liquids by mouth but small amounts. Completed keflex course rx'd by outpatient Thoracic Surgery team last week for presumed superficial skin infection, states the redness, pain, drainage have not improved. Discussed his TSH and levothyroxine regimen. He would like C to be involved on discharge, says he wants to make sure \"I'm doing it right\" when he's home. Having Bms, LBM yesterday.     Denies any HA, dizziness/lightheadedness, fevers/chills, cough, congestion, rhinorrhea, sore throat, SOB, CP, palpitations, abdominal pain, n/v/d/c, melena, dysuria, urgency/frequency, hematuria, numbness/tingling, bruising/bleeding or rashes. Denies any sick contacts. ROS otherwise negative.       Objective     Physical Exam  Vitals reviewed.   Constitutional:       Appearance: Normal appearance.   HENT:      Head: Normocephalic and atraumatic.      Nose: Nose normal.      Mouth/Throat:      Mouth: Mucous membranes are moist.      Pharynx: Oropharynx is clear.   Eyes:      Extraocular Movements: Extraocular movements intact.      Pupils: Pupils are equal, round, and reactive to light.   Cardiovascular:      Rate and Rhythm: Normal rate and regular rhythm.      Pulses: Normal pulses.      Heart sounds: Normal heart sounds.   Pulmonary:      Effort: Pulmonary effort is normal.      Breath sounds: Normal breath sounds.   Abdominal:      General: Bowel sounds are normal.      Palpations: Abdomen is soft.      Comments: J tube   Musculoskeletal:         General: Normal range of motion.   Skin:     General: " "Skin is warm.      Comments: LLQ J tube with surrounding edema and crusting. Thick yellow drainage present. Erythema in previous tape locations   Neurological:      General: No focal deficit present.      Mental Status: He is alert and oriented to person, place, and time. Mental status is at baseline.   Psychiatric:         Mood and Affect: Mood normal.         Behavior: Behavior normal.       Last Recorded Vitals  Blood pressure 125/69, pulse 73, temperature 37.2 °C (99 °F), temperature source Temporal, resp. rate 16, height 1.778 m (5' 10\"), weight 88.9 kg (196 lb), SpO2 97%.  Intake/Output last 3 Shifts:  I/O last 3 completed shifts:  In: 671.3 (7.6 mL/kg) [I.V.:571.3 (6.4 mL/kg); NG/GT:50; IV Piggyback:50]  Out: - (0 mL/kg)   Weight: 88.9 kg           This patient has a central line   Reason for the central line remaining today? Parenteral medication       Assessment/Plan   Assessment & Plan  Hypotension, unspecified hypotension type    Timoteo Victoria is a 69 y.o. male w/ PMHx of recently diagnosed stage III esophageal adenocarcinoma (dx 12/2024, s/p 1 cycle FLOT), CAD s/p 4x CABG (~2005), HTN, HLD, hx of cluster HA, hypothyroidism (on levothyroxine), and tobacco use who presented to the ED with weakness, and pain and drainage around J tube site. CXR 1/12 without acute process, KUB 1/13 with stool burden otherwise unremarkable. Started on vancomycin and cefepime (1/13- current) and admitted for management of possible J tube infection. Thoracic Surgery placed J tube 11/25/24, consulted (1/13) for consideration of J tube upsizing as pt with persistent TF leakage. TSH of 16 (1/13), Endo consulted and rec'd continuing current levothyroxine dose, and repeat Tsh/FT4 in one week.     Updates 1/13:   - Thoracic Surg consulted- they placed initial J tube in 11/2024, assessing possible need for upsizing given persistent TF leakage   - keep vanc and cefepime (1/13- current)   - Endo consulted for TSH of 16, rec'd " continuing current levothyroxine dose, and repeat Tsh/FT4 in one week  - wound culture ordered 1/13  - home TF ordered to start 1/14 PM (cycled) to give poss infectious process time   - oxycodone 5mg q6h PRN started for J tube site pain     # J tube site infection  - J tube (14F) placed by Thoracic Surgery 11/25/24, Dr. Salas   - has been using for Tfs and tolerating well, however with persistent TF leakage (per pt report, smells like TF); now x 1 week with thick yellow drainage from site   - s/p 1 week keflex course (1/6/25- 1/12/25) rx'd by outpatient surgery team for superficial wound infection   - KUB in ED 1/12 with mild gaseous distention of bowel, dense colonic stool burden   - continue vancomycin and cefepime (1/13- current)   - blood cx x 2 1/12 NGTD  - leukocytosis of 16 (1/12) noted, likely in setting of infection  - wound cx collection pending 1/13  - erythema surrounding J tube site + previous tape application sites- pt instructed to avoid tape use at this time given skin irritation   - Thoracic Surgery consulted 1/13 for consideration of upsizing given persistent TF leakage- recs pending   - home TF regimen: isosource 1.5 cycled (pt uses 3am-3pm d/t his sleep schedule) @ 105ml/hr + FWF; ordered to start 1/14 PM. Liquid diet as tolerated   - will defer imaging at this time given low suspicion for abscess- however if concern arises, could consider U/S vs CT   - oxycodone 5mg q6h PRN started 1/13 for pain     # DYANA with hyponatremia - improving   - cr on admit 1.70 (BL ~ 1), s/p 3L IVF in ED for hypotension. Improved to 1.12 (1/13)   - Na 129 (1/12), improved to 133 (1/13)   - continue mIVF (1/12- current) until TF started   - home anti- HTNs held on admit in setting of hypotension and DYANA - plan to resume gradually pending DYANA improvement; verapamil resumed 1/13     # recently diagnosed stage III esophageal cancer   -11/21/24  [facility] admitted for weight loss, dysphagia; EGD showed esophageal mass with  biopsy confirming intramucosal adenocarcinoma   -11/22/24 CT CAP with contrast with irregular masslike thickening of distal esophagus, no obvious metastases. PET showed thyroid nodule   -11/25/24 J tube placement with Thoracic Surgery Dr. Salas   -Completed C1FLOT on 1/3; C2 FLOT plan on 1/17   - Primary Onc Dr. Wilson notified of admission   - pancytopenia of malignancy noted      #HTN   #CAD s/p CABG x4 ~ 2005   #HLD   -patient asymptomatic from a CAD standpoint   -Home faviola 50 mg, verapamil and losartan 100mg held on admit for DYANA and hypotension in ED   - verapamil 80mg TID resumed 1/13   - plan to resume home spironolactone and losartan as able pending BP and DYANA improvement   - continue home aspirin and atorva 40mg      #Hypothyroidism    - on levothyroxine 175mcg daily via J tube- continue   - TSH 16 in ED, FT4 pending (1/13)  - started on solu cortef 50mg IVP TID in ED for c/f myxedema coma - held 1/13 pending Endo recs  - Endo consulted 1/13 and recs pending     #Hx of cluster HA   - c/w home amitriptyline      #MDD   -c/w home buproprion 150mg BID    PPX: lovenox subcutaneous, encourage SCDs and ambulation as able     DIPSO  - FULL CODE, confirmed on admission  - discharge home with new HHC pending improvement in J tube site infection and pain  - FUVs 1/16 [facility], 1/17 chemo  - NOK: Eulogio (brother) [phone]          I spent 60 minutes in the professional and overall care of this patient.    Tamara Granados, APRN-CNP

## 2025-01-13 NOTE — H&P
History Of Present Illness  Timoteo Victoria is a 69 y.o. male presenting with PMHx of CAD s/p 4x CABG (~2005), HTN, HLD, hx of cluster HA, hypothyroidism, recently dx esophageal adenocarcinoma and tobacco use presenting to ED for weakness and SOB. Pt states he is concerned his PEG tube may be infected due to increased drainage.Admitted to [facility] for possible PEG tube site infection and DYANA/Dehydration.  On admission pt for the past 3 days he has felt weak and short of breath. Is endorsing decreased p.o. intake orally, although he is taking his prescribed oral supplementation through his J-tube.  Patient denies any sick contact. enies N/V/D/C, CP,hematuria and Hematochezia,dizziness or cough.     ED Course   T 35.6  RR  18  HR 93  BP 62/44   SPO2 98% RA  Cr 1.7  Hgb  10.4 Na 129  S/p 2 L IVF, Vanco, cefepime and solu cortef.    Past Medical History  He has a past medical history of Hyperlipidemia, Hypertension, Personal history of other endocrine, nutritional and metabolic disease, and Type 2 diabetes mellitus.    Surgical History  He has a past surgical history that includes Cholecystectomy (09/18/2018); Hernia repair (09/18/2018); Coronary artery bypass graft (09/18/2018); and Cataract extraction (Right, 08/25/2023).    Oncology History   Primary esophageal adenocarcinoma (Multi)   12/12/2024 Initial Diagnosis    Primary esophageal adenocarcinoma (Multi)     1/2/2025 -  Chemotherapy    FLOT (Fluorouracil Continuous Infusion / Leucovorin / Oxaliplatin / DOCEtaxel), 14 Day Cycles          Social History  He reports that he has been smoking cigarettes. He started smoking about 46 years ago. He has a 45.3 pack-year smoking history. He has never used smokeless tobacco. He reports that he does not currently use alcohol. He reports that he does not use drugs.     Allergies  Ibuprofen    Review of Systems   Constitutional:  Positive for appetite change and fatigue. Negative for activity change, fever and unexpected  weight change.   HENT:  Negative for congestion, mouth sores and sinus pressure.    Eyes: Negative.    Respiratory:  Positive for cough. Negative for chest tightness, shortness of breath and wheezing.    Cardiovascular:  Negative for chest pain, palpitations and leg swelling.   Gastrointestinal:  Positive for abdominal distention and abdominal pain (feel tender on palpation around peg tube.). Negative for blood in stool, constipation, diarrhea, nausea and vomiting.   Endocrine: Negative.    Genitourinary:  Negative for decreased urine volume, dysuria, frequency, hematuria and urgency.   Musculoskeletal: Negative.    Skin:  Positive for rash (erythma around peg tube).   Neurological:  Positive for dizziness and weakness. Negative for seizures, numbness and headaches.   Hematological:  Negative for adenopathy. Does not bruise/bleed easily.   Psychiatric/Behavioral:  Negative for agitation, behavioral problems and confusion.         Physical Exam  Constitutional:       Appearance: He is not ill-appearing or diaphoretic.   HENT:      Head: Normocephalic and atraumatic.      Nose: Nose normal. No congestion or rhinorrhea.      Mouth/Throat:      Mouth: Mucous membranes are moist.   Eyes:      Extraocular Movements: Extraocular movements intact.      Conjunctiva/sclera: Conjunctivae normal.      Pupils: Pupils are equal, round, and reactive to light.   Cardiovascular:      Rate and Rhythm: Normal rate.      Pulses: Normal pulses.      Heart sounds: Normal heart sounds.   Pulmonary:      Effort: Pulmonary effort is normal.      Breath sounds: Normal breath sounds. No wheezing or rales.   Chest:      Chest wall: No tenderness.   Abdominal:      General: Bowel sounds are normal.      Tenderness: There is abdominal tenderness (around peg tube site). There is no right CVA tenderness or left CVA tenderness.   Musculoskeletal:      Cervical back: Normal range of motion.      Right lower leg: No edema.      Left lower leg: No  "edema.   Skin:     Coloration: Skin is not jaundiced.      Findings: Rash (around peg tube site) present. No lesion.   Neurological:      General: No focal deficit present.      Mental Status: He is alert and oriented to person, place, and time. Mental status is at baseline.      Motor: Weakness present.   Psychiatric:         Mood and Affect: Mood normal.         Behavior: Behavior normal.         Thought Content: Thought content normal.          Last Recorded Vitals  Blood pressure 104/54, pulse 68, temperature 35.6 °C (96.1 °F), resp. rate 12, height 1.778 m (5' 10\"), weight 93.5 kg (206 lb 2.1 oz), SpO2 99%.    Relevant Results  Scheduled medications  amitriptyline, 100 mg, j-tube, Nightly  [START ON 1/13/2025] aspirin, 81 mg, j-tube, Daily  [START ON 1/13/2025] atorvastatin, 40 mg, j-tube, Daily  buPROPion, 150 mg, j-tube, BID  [START ON 1/13/2025] cefepime, 2 g, intravenous, q12h  [START ON 1/13/2025] cholecalciferol, 2,000 Units, oral, Daily  [START ON 1/13/2025] enoxaparin, 40 mg, subcutaneous, q24h  hydrocortisone sodium succinate, 50 mg, intravenous, q8h  [START ON 1/13/2025] levothyroxine, 175 mcg, j-tube, Daily  [START ON 1/13/2025] losartan, 100 mg, j-tube, Daily  [START ON 1/13/2025] pantoprazole, 40 mg, intravenous, Daily  vancomycin, 2,000 mg, intravenous, Once  [START ON 1/13/2025] vancomycin, 1,250 mg, intravenous, q24h  verapamil, 80 mg, j-tube, q8h OZIEL      Continuous medications     PRN medications  PRN medications: alteplase, ondansetron, polyethylene glycol, sennosides-docusate sodium, vancomycin  XR chest 1 view    Result Date: 1/12/2025  STUDY: Chest Radiograph;  1/12/25, 7:47PM. INDICATION: Assess for pneumonia, hypotensive, cough. COMPARISON: CT CAP 11/22/24. ACCESSION NUMBER(S): [id number] ORDERING CLINICIAN: THAD JACKSON TECHNIQUE:  Frontal chest was obtained at 19:47 hours. FINDINGS: Median sternotomy sutures are present.  A right-sided Mediport is present.  No acute opacities or " effusions are visualized.  Heart size is slightly enlarged.  There is no evidence of a pneumothorax.    Mild cardiomegaly. Signed by Moise Neumann MD    NM PET CT esophageal diagnosis    Result Date: 12/23/2024  Interpreted By:  Tristan Card and Bera Kaustav STUDY: NM PET CT ESOPHAGEAL DIAGNOSIS;  12/20/2024 3:39 pm   INDICATION: Signs/Symptoms:diagnostic.   COMPARISON: CT chest abdomen pelvis on 11/22/2024 CT neck with contrast on 11/20/2024, 07/07/2022   ACCESSION NUMBER(S): [id number]   ORDERING CLINICIAN: TERE DOUGLAS   TECHNIQUE: DIVISION OF NUCLEAR MEDICINE POSITRON EMISSION TOMOGRAPHY (PET-CT)   The patient received an intravenous dose of 13.3 mCi of Fluorine-18 fluorodeoxyglucose (FDG).  Positron emission tomographic (PET) images from mid thigh to skull base were then acquired after a one hour delay. Also acquired was a contemporaneous low dose non-contrast CT scan performed for attenuation correction of PET images and anatomic localization.  The PET and CT images were digitally fused for display.  All images were acquired on a combined PET-CT scanner unit. Some areas of FDG accumulation may be described in standardized uptake value (SUV) units.   CODING: Initial Treatment Strategy (PI)   CALIBRATION: Dose Injection-to-Scan Interval (mins): 64 min Mediastinal bloodpool SUV (normal 1.5-2.5): 1.9 Blood glucose: 118 mg/dL   FINDINGS: HEAD AND NECK: No evidence of focal FDG avid lesion in the partially visualized brain parenchyma, noting that evaluation is limited because of the expected physiologic diffuse FDG uptake in the brain. No focal FDG avid  soft tissue lesion is seen in the neck. Multiple left-greater-than-right bilateral cervical lymph nodes, especially in the intraparotid region, with the dominant left intraparotid lymph node with SUV max of 12.5., with additional hypermetabolic subcentimeter intraparotid lymph nodes seen bilaterally. No paranasal sinus diease. Asymmetrically increased FDG  avidity seen within the right thyroid lobe with SUV max of 4.9.   CHEST: No focal FDG avid  lesion is seen in the lung parenchyma. No evidence of FDG avid mediastinal, hilar or axillary lymphadenopathy. Esophageal mass extending from the distal esophagus through the GE junction with SUV max of 7.3. Few subcentimeter periesophageal lymph node with minimal hypermetabolic activity with SUV max of 2.3.   ABDOMEN AND PELVIS: No FDG avid  soft tissue lesion is present in the abdomen and pelvis. No evidence of FDG avid  lymphadenopathy. Bilateral adrenal glands are unremarkable, without hypermetabolic activity. Cholecystectomy. Peg tube in place. Bowel appears decompressed, with mildly increased hypermetabolic activity, adherent to the anterior abdominal wall, similar to prior   Physiologic radiotracer uptake is present in the liver and spleen with excretion into the bowel loops and the genitourinary tract.   MUSCULOSKELETAL: No focal FDG avid  lesion is seen in the axial or appendicular to suggest osseous metastasis. Nonspecific hypermetabolic activity seen within the muscles, likely exertional.       1. Hypermetabolic esophageal mass as described above is consistent with biopsy-proven esophageal adenocarcinoma. Few minimally hypermetabolic subcentimeter periesophageal lymph nodes are likely reactive. 2. No other evidence of hypermetabolic thoracic or abdominal lymphadenopathy or hypermetabolic metastatic disease. 3. Multiple hypermetabolic intraparotid nodules/lymph nodes, which have been present since 2022, likely represents a benign and indolent process. However, recommend continued attention on follow-up. 4. Asymmetrically increased hypermetabolic activity within the right thyroid gland. Correlate with thyroid ultrasound may be of value.     I personally reviewed the image(s) / study and agree with the findings and interpretation as stated. This study was interpreted at [facility].    Signed by: Tristan Card 12/23/2024 10:54 AM Dictation workstation:   [id number]    IR CVC port placement    Result Date: 12/18/2024  Interpreted By:  Timoteo Michel, STUDY: IR CVC PORT PLACEMENT;  12/18/2024 1:15 pm   INDICATION: Signs/Symptoms:needs chemo.   ,C15.9 Malignant neoplasm of esophagus, unspecified   COMPARISON: None.   ACCESSION NUMBER(S): [id number]   ORDERING CLINICIAN: FARTUN VELA   TECHNIQUE:   CONSENT: The patient/patient's POA/next of kin was informed of the nature of the proposed procedure. The purposes, alternatives, risks, and benefits were explained and discussed. All questions were answered and consent was obtained.   RADIATION EXPOSURE: Fluoroscopy time: 0.4 min. Dose: 4.5  mGy. Dose Area Product (DAP): 1.36   SEDATION: Moderate conscious IV sedation services (supervision of administration, induction, and maintenance) were provided by the physician performing the procedure with intravenous fentanyl 100 mcg and versed 1 mg for 30 minutes. The physician was assisted by an independent trained observer, an interventional radiology nurse, in the continuous monitoring of patient level of consciousness and physiologic status.   MEDICATION/CONTRAST: No additional   TIME OUT: A time out was performed immediately prior to procedure start with the interventional team, correctly identifying the patient name, date of birth, MRN, procedure, anatomy (including marking of site and side), patient position, procedure consent form, relevant laboratory and imaging test results, antibiotic administration, safety precautions, and procedure-specific equipment needs.   COMPLICATIONS: No immediate adverse events identified.   FINDINGS: Maximum sterile barrier technique was implemented. In the recumbent position, the patient was positioned on the angiography table. The right supraclavicular and infraclavicular cutaneous tissues were prepared and draped in usual sterile manner.   The supraclavicular access  site was screened with gray-scale ultrasound with subsequent subcutaneous instillation of Lidocaine 1% local anesthesia. Ultrasound images demonstrate a patent  right internal jugular vein. Under direct ultrasound guidance and Seldinger/micropuncture technique, the  right internal jugular vein was accessed. An ultrasound digital spot image was acquired and stored on the  PACS. After confirmation of location, a 018 [place]-Mandril guidewire was inserted to secure location. The guidewire was advanced into the inferior vena cava utilizing intermittent fluoroscopy. The micro-access needle was removed over the guidewire. Utilizing a 5-on-4 coaxial dilator sheath system, upsize to a 035 3-J guidewire was performed. Subsequent access tract dilation was performed to an eventual  8.5-Citizen of [place] peel-away sheath dilator system.   Following Lidocaine 1% local anesthesia, a superolateral Mediport pocket was created in the subcutaneous infraclavicular chest wall. a subcutaneous tract was then created from the Mediport pocket to the venous access site. The  8-Citizen of [place] port catheter was introduced maintaining continuity from the Mediport pocket to the venous access site and into the central venous system. An optimal length of catheter was measured with the tip at the right atrial/superior vena caval junction.   The catheter was trimmed to the optimal length and the external portion was connected to the Mediport reservoir hub. After insertion into the Mediport pocket, a Mae needle was then utilized to access the reservoir to assess for leaks, evaluate for aspiration and flushability. The Mediport was then irrigated with low-dose heparinized saline.   A fluoroscopic spot image of the chest was acquired in the AP projection to confirm optimal course and location of the subcutaneous and central venous catheter. In addition, optimal orientation and continuity to the Mediport reservoir were identified.   After confirmation of optimal  Mediport functioning, the Mediport pocket site was closed with subcutaneous absorbable Polysorb sutures in addition to cutaneous subcuticular closure. Sterile dressings were then placed at the Mediport reservoir and venotomy access site.   The patient tolerated the procedure without complication.       1. Uncomplicated placement of a  right infraclavicular  single-lumen Mediport- 8-[place] catheter tip residing at the cavoatrial junction. 2. CT power-injection compatible Mediport. 3. MRI-compatible Mediport.   Optimal functioning device and ready for utilization.     Performed and dictated at [facility].   MACRO: None   Signed by: Timoteo Michel 12/18/2024 1:46 PM Dictation workstation:   [id number]   Results for orders placed or performed during the hospital encounter of 01/12/25 (from the past 24 hours)   POCT GLUCOSE   Result Value Ref Range    POCT Glucose 119 (H) 74 - 99 mg/dL   Blood Gas Venous Full Panel Unsolicited   Result Value Ref Range    POCT pH, Venous 7.42 7.33 - 7.43 pH    POCT pCO2, Venous 39 (L) 41 - 51 mm Hg    POCT pO2, Venous 17 (L) 35 - 45 mm Hg    POCT SO2, Venous 14 (L) 45 - 75 %    POCT Oxy Hemoglobin, Venous 13.8 (L) 45.0 - 75.0 %    POCT Hematocrit Calculated, Venous 41.0 41.0 - 52.0 %    POCT Sodium, Venous 127 (L) 136 - 145 mmol/L    POCT Potassium, Venous 5.1 3.5 - 5.3 mmol/L    POCT Chloride, Venous 96 (L) 98 - 107 mmol/L    POCT Ionized Calicum, Venous 1.15 1.10 - 1.33 mmol/L    POCT Glucose, Venous 160 (H) 74 - 99 mg/dL    POCT Lactate, Venous 2.7 (H) 0.4 - 2.0 mmol/L    POCT Base Excess, Venous 0.8 -2.0 - 3.0 mmol/L    POCT HCO3 Calculated, Venous 25.3 22.0 - 26.0 mmol/L    POCT Hemoglobin, Venous 13.5 13.5 - 17.5 g/dL    POCT Anion Gap, Venous 11.0 10.0 - 25.0 mmol/L    Patient Temperature 37.0 degrees Celsius   CBC and Auto Differential   Result Value Ref Range    WBC 19.6 (H) 4.4 - 11.3 x10*3/uL    nRBC 0.2 (H) 0.0 - 0.0 /100 WBCs    RBC 4.15 (L)  4.50 - 5.90 x10*6/uL    Hemoglobin 12.9 (L) 13.5 - 17.5 g/dL    Hematocrit 35.8 (L) 41.0 - 52.0 %    MCV 86 80 - 100 fL    MCH 31.1 26.0 - 34.0 pg    MCHC 36.0 32.0 - 36.0 g/dL    RDW 13.4 11.5 - 14.5 %    Platelets 307 150 - 450 x10*3/uL    Immature Granulocytes %, Automated 12.2 (H) 0.0 - 0.9 %    Immature Granulocytes Absolute, Automated 2.39 (H) 0.00 - 0.70 x10*3/uL   Comprehensive metabolic panel   Result Value Ref Range    Glucose 157 (H) 74 - 99 mg/dL    Sodium 129 (L) 136 - 145 mmol/L    Potassium 4.9 3.5 - 5.3 mmol/L    Chloride 98 98 - 107 mmol/L    Bicarbonate 19 (L) 21 - 32 mmol/L    Anion Gap 17 10 - 20 mmol/L    Urea Nitrogen 42 (H) 6 - 23 mg/dL    Creatinine 1.70 (H) 0.50 - 1.30 mg/dL    eGFR 43 (L) >60 mL/min/1.73m*2    Calcium 9.0 8.6 - 10.6 mg/dL    Albumin 3.9 3.4 - 5.0 g/dL    Alkaline Phosphatase 64 33 - 136 U/L    Total Protein 6.6 6.4 - 8.2 g/dL    AST 25 9 - 39 U/L    Bilirubin, Total 0.5 0.0 - 1.2 mg/dL    ALT 36 10 - 52 U/L   Magnesium   Result Value Ref Range    Magnesium 1.96 1.60 - 2.40 mg/dL   B-Type Natriuretic Peptide   Result Value Ref Range    BNP 35 0 - 99 pg/mL   Troponin I, High Sensitivity, Initial   Result Value Ref Range    Troponin I, High Sensitivity (CMC) 5 0 - 53 ng/L   Blood Culture    Specimen: Peripheral Venipuncture; Blood culture   Result Value Ref Range    Blood Culture Loaded on Instrument - Culture in progress    Blood Culture    Specimen: Peripheral Venipuncture; Blood culture   Result Value Ref Range    Blood Culture Loaded on Instrument - Culture in progress    TSH with reflex to Free T4 if abnormal   Result Value Ref Range    Thyroid Stimulating Hormone 16.04 (H) 0.44 - 3.98 mIU/L   Sars-CoV-2 and Influenza A/B PCR   Result Value Ref Range    Flu A Result Not Detected Not Detected    Flu B Result Not Detected Not Detected    Coronavirus 2019, PCR Not Detected Not Detected   Thyroxine, Free   Result Value Ref Range    Thyroxine, Free 1.06 0.78 - 1.48 ng/dL    Phosphorus   Result Value Ref Range    Phosphorus 3.5 2.5 - 4.9 mg/dL   Manual Differential   Result Value Ref Range    Neutrophils %, Manual 69.8 40.0 - 80.0 %    Lymphocytes %, Manual 16.8 13.0 - 44.0 %    Monocytes %, Manual 0.7 2.0 - 10.0 %    Eosinophils %, Manual 0.0 0.0 - 6.0 %    Basophils %, Manual 0.0 0.0 - 2.0 %    Atypical Lymphocytes %, Manual 12.7 0.0 - 2.0 %    Seg Neutrophils Absolute, Manual 13.68 (H) 1.20 - 7.00 x10*3/uL    Lymphocytes Absolute, Manual 3.29 1.20 - 4.80 x10*3/uL    Monocytes Absolute, Manual 0.14 0.10 - 1.00 x10*3/uL    Eosinophils Absolute, Manual 0.00 0.00 - 0.70 x10*3/uL    Basophils Absolute, Manual 0.00 0.00 - 0.10 x10*3/uL    Atypical Lymphs Absolute, Manual 2.49 (H) 0.00 - 0.50 x10*3/uL    Total Cells Counted 149     RBC Morphology See Below     Polychromasia Mild    Troponin, High Sensitivity, 1 Hour   Result Value Ref Range    Troponin I, High Sensitivity (CMC) 5 0 - 53 ng/L   CBC and Auto Differential   Result Value Ref Range    WBC 16.0 (H) 4.4 - 11.3 x10*3/uL    nRBC 0.2 (H) 0.0 - 0.0 /100 WBCs    RBC 3.35 (L) 4.50 - 5.90 x10*6/uL    Hemoglobin 10.4 (L) 13.5 - 17.5 g/dL    Hematocrit 29.0 (L) 41.0 - 52.0 %    MCV 87 80 - 100 fL    MCH 31.0 26.0 - 34.0 pg    MCHC 35.9 32.0 - 36.0 g/dL    RDW 13.5 11.5 - 14.5 %    Platelets 219 150 - 450 x10*3/uL    Neutrophils % 68.0 40.0 - 80.0 %    Immature Granulocytes %, Automated 10.7 (H) 0.0 - 0.9 %    Lymphocytes % 13.3 13.0 - 44.0 %    Monocytes % 7.1 2.0 - 10.0 %    Eosinophils % 0.2 0.0 - 6.0 %    Basophils % 0.7 0.0 - 2.0 %    Neutrophils Absolute 10.91 (H) 1.20 - 7.70 x10*3/uL    Immature Granulocytes Absolute, Automated 1.71 (H) 0.00 - 0.70 x10*3/uL    Lymphocytes Absolute 2.14 1.20 - 4.80 x10*3/uL    Monocytes Absolute 1.14 (H) 0.10 - 1.00 x10*3/uL    Eosinophils Absolute 0.03 0.00 - 0.70 x10*3/uL    Basophils Absolute 0.11 (H) 0.00 - 0.10 x10*3/uL   Light Blue Top   Result Value Ref Range    Extra Tube Hold for add-ons.     Morphology   Result Value Ref Range    RBC Morphology See Below     Polychromasia Mild           Assessment/Plan   Assessment & Plan  Hypotension, unspecified hypotension type    Timoteo Victoria is a 69 y.o. male w/ PMHx of CAD s/p 4x CABG (~2005), HTN, HLD, hx of cluster HA, hypothyroidism (on levothyroxine) and tobacco use presenting to ED for weakness and SOB. Pt states he is concerned his PEG tube may be infected due to increased drainage.Admitted to [facility] for possible PEG tube site infection and DYANA/Dehydration.    #DYANA   #Dehydration  -sCr 1.70 in ED   -S/p 3 L of IVF in ED   -started IVF at 75 ml/hr on admit  -Monitor CMP daily    #Possible PEG Tube infection   -WBC 16  and Afebrile in ED  -increased drainage from PEG tube site. He also endorses erythema and a small amount of blood around the J tube. Recently completed 7day course of keflex for superficial skin infection.   -S/p vanco and cefepime and cont on admit   -Pt nutrition is currently maintained by a J-tube and overnight feeds, although patient is able to still eat some soft pudding or pureed things by mouth.   -Covid and Flu neg   -KUB ordered for increased drainage from PEG tube and to rule out any ileus or obstructions.  -Consult dietician in am for TF.    #Esophageal cancer   -11/21/24  [facility] admission for weight loss, dysphagia; EGD showed esophageal mass with biopsy confirming intramucosal adenocarcinoma   -11/20/24 CT neck with no masses   -11/22/24 CT CAP with contrast with irregular masslike thickening of distal esophagus, no obvious metastases   -11/25/24 J tube placement   -Completed C1FLOT on 1/3   -C2 FLOT plan on 1/17   -CXR shows mild cardiomegaly in ED      #Anemia   -Hgb 10.4 in ED   -Transfuse if Hgb<7   -Monitor CBC daily     #Hyponatremia  -Na 129 in ED  -Started on IVF NS on admit  -Monitor Na daily    #HTN   #CAD s/p CABG x4 ~ 2005   #HLD   -patient asymptomatic from a CAD standpoint   -Home faviola 50 mg, verapamil and  losartan 100mg on hold for DYANA improvement and hypotension.  -on home aspirin and atorva 40mg     #Hypothyroidism    #Myxedema Coma  -TSH 16 in ED  -Started on solu cortef inj for Myxedema coma 50 mg every 8 hrs in ED and cont on admit.  -c/w home levothyroxine  -Consult Endo for hypothyroid myxedema coma in am    #Hx of cluster HA   - c/w home amitriptyline     #MDD   -c/w home buproprion, recently increased       F: cont NS at 75 ml/hr    E: PRN     N: Nightly bolus of TF    A: PIV     DVT: Enoxaparin     GI: IV PPI     NOK: Eulogio (brother) [phone]     Code: Full Code (confirmed on admission)     I spent 55 minutes in the professional and overall care of this patient.      Carolina Nguyễn, APRN-CNP

## 2025-01-13 NOTE — PROGRESS NOTES
Pharmacy Medication History Review    Timoteo Victoria is a 69 y.o. male admitted for Hypotension, unspecified hypotension type. Pharmacy reviewed the patient's fhsev-[id number] medications and allergies for accuracy.    Medications ADDED:  None  Medications CHANGED:  None  Medications REMOVED:   Vitamin D3  Decadron  Imodium  Hope-colace  Tramadol     The list below reflects the updated PTA list.   Prior to Admission Medications   Prescriptions Informant   amitriptyline (Elavil) 100 mg tablet Self   Si tablet (100 mg) by j-tube route once daily at bedtime.   aspirin 81 mg chewable tablet Self   Si tablet (81 mg) by j-tube route once daily.   atorvastatin (Lipitor) 40 mg tablet Self   Si tablet (40 mg) by j-tube route once daily.   buPROPion (Wellbutrin) 75 mg tablet    Si tablets (150 mg) by j-tube route 3 times a day.   Patient taking differently: 2 tablets (150 mg) by j-tube route 2 times a day.   cephalexin (Keflex) 500 mg capsule Self   Sig: Take 1 capsule (500 mg) by mouth 4 times a day for 7 days.   levothyroxine (Synthroid, Levoxyl) 175 mcg tablet Self   Si tablet (175 mcg) by j-tube route early in the morning.. Take on an empty stomach at the same time each day, either 30 to 60 minutes prior to breakfast   losartan (Cozaar) 100 mg tablet Self   Si tablet (100 mg) by j-tube route once daily.   ondansetron ODT (Zofran-ODT) 8 mg disintegrating tablet Self   Sig: Dissolve 1 tablet (8 mg) in the mouth every 8 hours if needed for nausea or vomiting.   prochlorperazine (Compazine) 10 mg tablet Self   Sig: Take 1 tablet (10 mg) by mouth every 6 hours if needed for nausea or vomiting.   spironolactone (Aldactone) 50 mg tablet Self   Si tablet (50 mg) by j-tube route once daily.   verapamil (Calan) 80 mg tablet    Si tablet (80 mg) by j-tube route every 8 hours.   Patient taking differently: 3 tablets (240 mg) by j-tube route once daily.      Facility-Administered Medications: None  "       The list below reflects the updated allergy list. Please review each documented allergy for additional clarification and justification.  Allergies  Reviewed by Fabio Hillman PharmD on 1/13/2025        Severity Reactions Comments    Ibuprofen Low Swelling             Patient declines M2B at discharge.     Sources:   OAS  Pharmacy dispense history  Patient interview Moderate historian  Chart Review  Care Everywhere  1/6/25  pharmacist telemedicine note    Additional Comments:  Patient was able to verify PTA med list with prompting      Fabio Hillman PharmD  Transitions of Care Pharmacist  01/13/25     Secure Chat preferred   If no response call [phone] or Vocera \"Med Rec\"    "

## 2025-01-14 ENCOUNTER — HOME HEALTH ADMISSION (OUTPATIENT)
Dept: HOME HEALTH SERVICES | Facility: HOME HEALTH | Age: 70
End: 2025-01-14
Payer: MEDICARE

## 2025-01-14 LAB
ALBUMIN SERPL BCP-MCNC: 2.9 G/DL (ref 3.4–5)
ALP SERPL-CCNC: 56 U/L (ref 33–136)
ALT SERPL W P-5'-P-CCNC: 28 U/L (ref 10–52)
AMPHETAMINES UR QL SCN: NORMAL
ANION GAP SERPL CALC-SCNC: 8 MMOL/L (ref 10–20)
AST SERPL W P-5'-P-CCNC: 16 U/L (ref 9–39)
BARBITURATES UR QL SCN: NORMAL
BASOPHILS # BLD MANUAL: 0 X10*3/UL (ref 0–0.1)
BASOPHILS NFR BLD MANUAL: 0 %
BENZODIAZ UR QL SCN: NORMAL
BILIRUB SERPL-MCNC: 0.3 MG/DL (ref 0–1.2)
BUN SERPL-MCNC: 24 MG/DL (ref 6–23)
BZE UR QL SCN: NORMAL
CALCIUM SERPL-MCNC: 8 MG/DL (ref 8.6–10.6)
CANNABINOIDS UR QL SCN: NORMAL
CHLORIDE SERPL-SCNC: 105 MMOL/L (ref 98–107)
CO2 SERPL-SCNC: 26 MMOL/L (ref 21–32)
CREAT SERPL-MCNC: 1.05 MG/DL (ref 0.5–1.3)
EGFRCR SERPLBLD CKD-EPI 2021: 77 ML/MIN/1.73M*2
EOSINOPHIL # BLD MANUAL: 0 X10*3/UL (ref 0–0.7)
EOSINOPHIL NFR BLD MANUAL: 0 %
ERYTHROCYTE [DISTWIDTH] IN BLOOD BY AUTOMATED COUNT: 14 % (ref 11.5–14.5)
FENTANYL+NORFENTANYL UR QL SCN: NORMAL
GLUCOSE SERPL-MCNC: 72 MG/DL (ref 74–99)
HCT VFR BLD AUTO: 27.7 % (ref 41–52)
HGB BLD-MCNC: 9.5 G/DL (ref 13.5–17.5)
IMM GRANULOCYTES # BLD AUTO: 1.13 X10*3/UL (ref 0–0.7)
IMM GRANULOCYTES NFR BLD AUTO: 7.6 % (ref 0–0.9)
LYMPHOCYTES # BLD MANUAL: 3.4 X10*3/UL (ref 1.2–4.8)
LYMPHOCYTES NFR BLD MANUAL: 22.8 %
MAGNESIUM SERPL-MCNC: 1.82 MG/DL (ref 1.6–2.4)
MCH RBC QN AUTO: 31.4 PG (ref 26–34)
MCHC RBC AUTO-ENTMCNC: 34.3 G/DL (ref 32–36)
MCV RBC AUTO: 91 FL (ref 80–100)
METAMYELOCYTES # BLD MANUAL: 0.13 X10*3/UL
METAMYELOCYTES NFR BLD MANUAL: 0.9 %
METHADONE UR QL SCN: NORMAL
MONOCYTES # BLD MANUAL: 0.66 X10*3/UL (ref 0.1–1)
MONOCYTES NFR BLD MANUAL: 4.4 %
NEUTROPHILS # BLD MANUAL: 10.72 X10*3/UL (ref 1.2–7.7)
NEUTS BAND # BLD MANUAL: 0.39 X10*3/UL (ref 0–0.7)
NEUTS BAND NFR BLD MANUAL: 2.6 %
NEUTS SEG # BLD MANUAL: 10.33 X10*3/UL (ref 1.2–7)
NEUTS SEG NFR BLD MANUAL: 69.3 %
NRBC BLD-RTO: 0 /100 WBCS (ref 0–0)
OPIATES UR QL SCN: NORMAL
OVALOCYTES BLD QL SMEAR: ABNORMAL
OXYCODONE+OXYMORPHONE UR QL SCN: NORMAL
PATH REVIEW-CBC DIFFERENTIAL: NORMAL
PCP UR QL SCN: NORMAL
PLATELET # BLD AUTO: 170 X10*3/UL (ref 150–450)
POLYCHROMASIA BLD QL SMEAR: ABNORMAL
POTASSIUM SERPL-SCNC: 3.7 MMOL/L (ref 3.5–5.3)
PROT SERPL-MCNC: 4.9 G/DL (ref 6.4–8.2)
RBC # BLD AUTO: 3.03 X10*6/UL (ref 4.5–5.9)
RBC MORPH BLD: ABNORMAL
SODIUM SERPL-SCNC: 135 MMOL/L (ref 136–145)
TOTAL CELLS COUNTED BLD: 114
VANCOMYCIN SERPL-MCNC: 16.7 UG/ML (ref 5–20)
WBC # BLD AUTO: 14.9 X10*3/UL (ref 4.4–11.3)

## 2025-01-14 PROCEDURE — 2500000001 HC RX 250 WO HCPCS SELF ADMINISTERED DRUGS (ALT 637 FOR MEDICARE OP): Performed by: NURSE PRACTITIONER

## 2025-01-14 PROCEDURE — 80202 ASSAY OF VANCOMYCIN: CPT | Performed by: EMERGENCY MEDICINE

## 2025-01-14 PROCEDURE — 1170000001 HC PRIVATE ONCOLOGY ROOM DAILY

## 2025-01-14 PROCEDURE — 2500000004 HC RX 250 GENERAL PHARMACY W/ HCPCS (ALT 636 FOR OP/ED)

## 2025-01-14 PROCEDURE — 99233 SBSQ HOSP IP/OBS HIGH 50: CPT

## 2025-01-14 PROCEDURE — 36416 COLLJ CAPILLARY BLOOD SPEC: CPT | Performed by: NURSE PRACTITIONER

## 2025-01-14 PROCEDURE — 80307 DRUG TEST PRSMV CHEM ANLYZR: CPT

## 2025-01-14 PROCEDURE — 80053 COMPREHEN METABOLIC PANEL: CPT | Performed by: NURSE PRACTITIONER

## 2025-01-14 PROCEDURE — 2500000002 HC RX 250 W HCPCS SELF ADMINISTERED DRUGS (ALT 637 FOR MEDICARE OP, ALT 636 FOR OP/ED)

## 2025-01-14 PROCEDURE — 85027 COMPLETE CBC AUTOMATED: CPT | Performed by: NURSE PRACTITIONER

## 2025-01-14 PROCEDURE — 2500000001 HC RX 250 WO HCPCS SELF ADMINISTERED DRUGS (ALT 637 FOR MEDICARE OP)

## 2025-01-14 PROCEDURE — 85007 BL SMEAR W/DIFF WBC COUNT: CPT | Performed by: NURSE PRACTITIONER

## 2025-01-14 PROCEDURE — 83735 ASSAY OF MAGNESIUM: CPT | Performed by: NURSE PRACTITIONER

## 2025-01-14 PROCEDURE — 2500000004 HC RX 250 GENERAL PHARMACY W/ HCPCS (ALT 636 FOR OP/ED): Mod: JZ,TB

## 2025-01-14 PROCEDURE — 2500000005 HC RX 250 GENERAL PHARMACY W/O HCPCS: Performed by: EMERGENCY MEDICINE

## 2025-01-14 PROCEDURE — 2500000004 HC RX 250 GENERAL PHARMACY W/ HCPCS (ALT 636 FOR OP/ED): Performed by: EMERGENCY MEDICINE

## 2025-01-14 RX ORDER — OXYCODONE HYDROCHLORIDE 5 MG/1
5 TABLET ORAL EVERY 4 HOURS PRN
Status: DISCONTINUED | OUTPATIENT
Start: 2025-01-14 | End: 2025-01-16

## 2025-01-14 RX ORDER — DOXYCYCLINE HYCLATE 100 MG
100 TABLET ORAL EVERY 12 HOURS SCHEDULED
Status: DISCONTINUED | OUTPATIENT
Start: 2025-01-14 | End: 2025-01-15

## 2025-01-14 RX ORDER — SODIUM CHLORIDE 9 MG/ML
75 INJECTION, SOLUTION INTRAVENOUS CONTINUOUS
Status: DISCONTINUED | OUTPATIENT
Start: 2025-01-14 | End: 2025-01-14

## 2025-01-14 RX ORDER — AMOXICILLIN 250 MG
1 CAPSULE ORAL 2 TIMES DAILY
Status: DISCONTINUED | OUTPATIENT
Start: 2025-01-14 | End: 2025-01-15

## 2025-01-14 RX ORDER — ACETAMINOPHEN 160 MG/5ML
650 SOLUTION ORAL EVERY 6 HOURS
Status: DISCONTINUED | OUTPATIENT
Start: 2025-01-14 | End: 2025-01-18 | Stop reason: HOSPADM

## 2025-01-14 RX ORDER — GABAPENTIN 250 MG/5ML
300 SOLUTION ORAL NIGHTLY
Status: DISCONTINUED | OUTPATIENT
Start: 2025-01-14 | End: 2025-01-18 | Stop reason: HOSPADM

## 2025-01-14 RX ORDER — SPIRONOLACTONE 25 MG/1
50 TABLET ORAL DAILY
Status: DISCONTINUED | OUTPATIENT
Start: 2025-01-14 | End: 2025-01-18 | Stop reason: HOSPADM

## 2025-01-14 RX ADMIN — GABAPENTIN 300 MG: 250 SOLUTION ORAL at 21:02

## 2025-01-14 RX ADMIN — VANCOMYCIN HYDROCHLORIDE 1250 MG: 5 INJECTION, POWDER, LYOPHILIZED, FOR SOLUTION INTRAVENOUS at 01:50

## 2025-01-14 RX ADMIN — ASPIRIN 81 MG CHEWABLE TABLET 81 MG: 81 TABLET CHEWABLE at 11:30

## 2025-01-14 RX ADMIN — ACETAMINOPHEN 650 MG: 160 SOLUTION ORAL at 21:02

## 2025-01-14 RX ADMIN — POLYETHYLENE GLYCOL 3350 17 G: 17 POWDER, FOR SOLUTION ORAL at 15:24

## 2025-01-14 RX ADMIN — SODIUM CHLORIDE 75 ML/HR: 9 INJECTION, SOLUTION INTRAVENOUS at 15:37

## 2025-01-14 RX ADMIN — BUPROPION HYDROCHLORIDE 150 MG: 75 TABLET, FILM COATED ORAL at 21:03

## 2025-01-14 RX ADMIN — ENOXAPARIN SODIUM 40 MG: 40 INJECTION SUBCUTANEOUS at 12:53

## 2025-01-14 RX ADMIN — DOXYCYCLINE HYCLATE 100 MG: 100 TABLET, COATED ORAL at 15:28

## 2025-01-14 RX ADMIN — SPIRONOLACTONE 50 MG: 25 TABLET ORAL at 11:30

## 2025-01-14 RX ADMIN — VERAPAMIL HYDROCHLORIDE 80 MG: 80 TABLET ORAL at 21:03

## 2025-01-14 RX ADMIN — VERAPAMIL HYDROCHLORIDE 80 MG: 80 TABLET ORAL at 06:15

## 2025-01-14 RX ADMIN — BUPROPION HYDROCHLORIDE 150 MG: 75 TABLET, FILM COATED ORAL at 11:30

## 2025-01-14 RX ADMIN — AMITRIPTYLINE HYDROCHLORIDE 100 MG: 100 TABLET ORAL at 21:02

## 2025-01-14 RX ADMIN — VERAPAMIL HYDROCHLORIDE 80 MG: 80 TABLET ORAL at 15:00

## 2025-01-14 RX ADMIN — LEVOTHYROXINE SODIUM 175 MCG: 0.1 TABLET ORAL at 06:15

## 2025-01-14 RX ADMIN — DOXYCYCLINE HYCLATE 100 MG: 100 TABLET, COATED ORAL at 21:02

## 2025-01-14 RX ADMIN — ACETAMINOPHEN 650 MG: 160 SOLUTION ORAL at 12:48

## 2025-01-14 RX ADMIN — Medication 2000 UNITS: at 11:30

## 2025-01-14 RX ADMIN — CEFEPIME HYDROCHLORIDE 2 G: 2 INJECTION, SOLUTION INTRAVENOUS at 06:15

## 2025-01-14 RX ADMIN — OXYCODONE 5 MG: 5 TABLET ORAL at 15:23

## 2025-01-14 RX ADMIN — ATORVASTATIN CALCIUM 40 MG: 40 TABLET, FILM COATED ORAL at 11:30

## 2025-01-14 RX ADMIN — ESOMEPRAZOLE MAGNESIUM 40 MG: 40 FOR SUSPENSION ORAL at 11:00

## 2025-01-14 ASSESSMENT — PAIN SCALES - GENERAL
PAINLEVEL_OUTOF10: 7
PAINLEVEL_OUTOF10: 0 - NO PAIN
PAINLEVEL_OUTOF10: 3
PAINLEVEL_OUTOF10: 5 - MODERATE PAIN
PAINLEVEL_OUTOF10: 0 - NO PAIN
PAINLEVEL_OUTOF10: 3

## 2025-01-14 ASSESSMENT — PAIN DESCRIPTION - DESCRIPTORS
DESCRIPTORS: DISCOMFORT;NAGGING
DESCRIPTORS: BURNING;DISCOMFORT;SORE
DESCRIPTORS: TENDER

## 2025-01-14 ASSESSMENT — PAIN - FUNCTIONAL ASSESSMENT
PAIN_FUNCTIONAL_ASSESSMENT: 0-10

## 2025-01-14 ASSESSMENT — PAIN SCALES - PAIN ASSESSMENT IN ADVANCED DEMENTIA (PAINAD)
TOTALSCORE: REPOSITIONED
TOTALSCORE: MEDICATION (SEE MAR)

## 2025-01-14 ASSESSMENT — PAIN DESCRIPTION - LOCATION: LOCATION: ABDOMEN

## 2025-01-14 NOTE — PROGRESS NOTES
Vancomycin Dosing by Pharmacy- FOLLOW UP    Timoteo Victoria is a 69 y.o. year old male who Pharmacy has been consulted for vancomycin dosing for pneumonia. Based on the patient's indication and renal status this patient is being dosed based on a goal AUC of 400-600.     Renal function is currently stable.    Current vancomycin dose: 1250 mg given every 24 hours    Most recent random level: 16.7 mcg/mL    Visit Vitals  /65 (BP Location: Left arm, Patient Position: Lying)   Pulse 70   Temp 36.5 °C (97.7 °F) (Temporal)   Resp 16        Lab Results   Component Value Date    CREATININE 1.05 2025    CREATININE 1.12 2025    CREATININE 1.70 (H) 2025    CREATININE 0.98 2025        Patient weight is as follows:   Vitals:    25 2302   Weight: 88.9 kg (196 lb)       Cultures:  No results found for the encounter in last 14 days.       I/O last 3 completed shifts:  In: 2462.5 (27.7 mL/kg) [P.O.:120; I.V.:1792.5 (20.2 mL/kg); NG/GT:450; IV Piggyback:100]  Out: 150 (1.7 mL/kg) [Urine:150 (0 mL/kg/hr)]  Weight: 88.9 kg   I/O during current shift:  No intake/output data recorded.    Temp (24hrs), Av.4 °C (97.5 °F), Min:36.1 °C (97 °F), Max:36.6 °C (97.9 °F)      Assessment/Plan    Below goal AUC. Orders placed for new vancomcyin regimen of 1500 every 24 hours to begin at 21:00.     This dosing regimen is predicted by [employer] to result in the following pharmacokinetic parameters:    Loading dose: N/A  Regimen: 1500 mg IV every 24 hours.  Start time: 01:50 on 01/15/2025  Exposure target: AUC24 (range)400-600 mg/L.hr   [id number]: 382 mg/L.hr  AUC24,ss: 400 mg/L.hr  Probability of AUC24 > 400: 50 %  Ctrough,ss: 10.7 mg/L  Probability of Ctrough,ss > 20: 3 %    The next level will be obtained on 1/15 at AM labs. May be obtained sooner if clinically indicated.   Will continue to monitor renal function daily while on vancomycin and order serum creatinine at least every 48 hours if not already  ordered.  Follow for continued vancomycin needs, clinical response, and signs/symptoms of toxicity.       FUAD TAFOYA

## 2025-01-14 NOTE — PROGRESS NOTES
Vancomycin Dosing by Pharmacy- Cessation of Therapy    Consult to pharmacy for vancomycin dosing has been discontinued by the prescriber, pharmacy will sign off at this time.    Please call pharmacy if there are further questions or re-enter a consult if vancomycin is resumed.     FUAD TAFOYA

## 2025-01-14 NOTE — PROGRESS NOTES
Timoteo Victoria is a 69 y.o. male on day 2 of admission presenting with Hypotension, unspecified hypotension type.    Subjective   Seen at bedside this morning. Feeling okay, pain continues around J tube site with redness and yellow discharge. Has been painful since insertion in 11/2024, however has recently x 1 week gotten more painful and he has noticed redness around site. Is still taking some liquids by mouth but small amounts. Completed keflex course rx'd by outpatient Thoracic Surgery team last week for presumed superficial skin infection, states the redness, pain, drainage have not improved. Dicussed thoracic surgery recommendations and that surgery is not needed. He discussed that he does not want to leave until this is all resolved. Discussed pain, denies taking any pain medication at home but is willing to try tylenol today scheduled and PRN oxycodone to see how this improves J tube site pain. Discussed restarting TF tonight, patient agreeable. Also discussed seeing wound care RN today for further recs.    Denies any HA, dizziness/lightheadedness, fevers/chills, cough, congestion, rhinorrhea, sore throat, SOB, CP, palpitations, abdominal pain, n/v/d/c, melena, dysuria, urgency/frequency, hematuria, numbness/tingling, bruising/bleeding or rashes. Denies any sick contacts. ROS otherwise negative.       Objective     Physical Exam  Vitals reviewed.   Constitutional:       Appearance: Normal appearance.   HENT:      Head: Normocephalic and atraumatic.      Nose: Nose normal.      Mouth/Throat:      Mouth: Mucous membranes are moist.      Pharynx: Oropharynx is clear.   Eyes:      Extraocular Movements: Extraocular movements intact.      Pupils: Pupils are equal, round, and reactive to light.   Cardiovascular:      Rate and Rhythm: Normal rate and regular rhythm.      Pulses: Normal pulses.      Heart sounds: Normal heart sounds.   Pulmonary:      Effort: Pulmonary effort is normal.      Breath sounds: Normal  "breath sounds.   Abdominal:      General: Bowel sounds are normal.      Palpations: Abdomen is soft.      Comments: J tube   Musculoskeletal:         General: Normal range of motion.   Skin:     General: Skin is warm.      Comments: LLQ J tube with surrounding edema and crusting. Thick yellow drainage present. Erythema in previous tape locations   Neurological:      General: No focal deficit present.      Mental Status: He is alert and oriented to person, place, and time. Mental status is at baseline.   Psychiatric:         Mood and Affect: Mood normal.         Behavior: Behavior normal.       Last Recorded Vitals  Blood pressure 126/69, pulse 67, temperature 36.5 °C (97.7 °F), temperature source Temporal, resp. rate 16, height 1.778 m (5' 10\"), weight 88.9 kg (196 lb), SpO2 98%.  Intake/Output last 3 Shifts:  I/O last 3 completed shifts:  In: 2462.5 (27.7 mL/kg) [P.O.:120; I.V.:1792.5 (20.2 mL/kg); NG/GT:450; IV Piggyback:100]  Out: 150 (1.7 mL/kg) [Urine:150 (0 mL/kg/hr)]  Weight: 88.9 kg           This patient has a central line   Reason for the central line remaining today? Parenteral medication       Assessment/Plan   Assessment & Plan  Hypotension, unspecified hypotension type    Hypothyroidism    Timoteo Victoria is a 69 y.o. male w/ PMHx of recently diagnosed stage III esophageal adenocarcinoma (dx 12/2024, s/p 1 cycle FLOT), CAD s/p 4x CABG (~2005), HTN, HLD, hx of cluster HA, hypothyroidism (on levothyroxine), and tobacco use who presented to the ED with weakness, and pain and drainage around J tube site. CXR 1/12 without acute process, KUB 1/13 with stool burden otherwise unremarkable. Started on vancomycin and cefepime (1/13- current) and admitted for management of possible J tube infection. Thoracic Surgery placed J tube 11/25/24, consulted (1/13) for consideration of J tube upsizing as pt with persistent TF leakage, rec local wound care and barrier cream at J tube site, no further imaging needed, no " surgical intervention needed. Wound culture NGTD (1/13). Wound care consulted, recs pending. TSH of 16 (1/13), Endo consulted and rec'd continuing current levothyroxine dose, and repeat Tsh/FT4 in one week. Discharge home with [facility] pending pain control, antibiotic pain, and TF tolerance.     Updates 1/14:   - Thoracic Surg recs; rec local wound care and barrier cream at J tube site, no further imaging needed, no surgical intervention needed    - vanc and cefepime (1/13- current)   - wound culture NGTD 1/13  - wound care consulted   - home TF ordered to start 1/14 PM (cycled) to give poss infectious process time   - Scheduled tylenol and PRN oxycodone 5mg q6h started for J tube site pain   - continue mIVF (1/12- current) until TF started     # J tube site infection  - J tube (14F) placed by Thoracic Surgery 11/25/24, Dr. Salas   - has been using for Tfs and tolerating well, however with persistent TF leakage (per pt report, smells like TF); now x 1 week with thick yellow drainage from site   - s/p 1 week keflex course (1/6/25- 1/12/25) rx'd by outpatient surgery team for superficial wound infection   - KUB in ED 1/12 with mild gaseous distention of bowel, dense colonic stool burden   - continue vancomycin and cefepime (1/13- current)   - blood cx x 2 1/12 NGTD  - leukocytosis of 16 (1/12) noted, likely in setting of infection --> 14.9 (1/14)   - wound cx NGTD 1/13  - erythema surrounding J tube site + previous tape application sites- pt instructed to avoid tape use at this time given skin irritation   - Thoracic Surgery consulted 1/13 for consideration of upsizing given persistent TF leakage- rec local wound care and barrier cream at J tube site, no further imaging needed, no surgical intervention needed    - home TF regimen: isosource 1.5 cycled (pt uses 3am-3pm d/t his sleep schedule) @ 105ml/hr + FWF; ordered to start 1/14 PM. Liquid diet as tolerated   - will defer imaging at this time given low suspicion for  abscess- however if concern arises, could consider U/S vs CT   - oxycodone 5mg q6h PRN started 1/13 for pain, started scheduled 650mg Q6hrs tylenol as patient was hesitant to start opioids for J tube site pain   - LBM 1/13 (reported very constipated); continue daily miralax and started senna BID (1/14)     # DYANA with hyponatremia - improving   - cr on admit 1.70 (BL ~ 1), s/p 3L IVF in ED for hypotension. Improved to 1.12 (1/13); AM labs pending 1/14  - Na 129 (1/12), improved to 133 (1/13)   - continue mIVF (1/12- current) until TF started   - home anti- HTNs held on admit in setting of hypotension and DYANA - plan to resume gradually pending DYANA improvement; verapamil resumed 1/13     # recently diagnosed stage III esophageal cancer   - Primary Onc Dr. Wilson notified of admission   - 11/21/24  [facility] admitted for weight loss, dysphagia; EGD showed esophageal mass with biopsy confirming intramucosal adenocarcinoma   - 11/22/24 CT CAP with contrast with irregular masslike thickening of distal esophagus, no obvious metastases. PET showed thyroid nodule   - 11/25/24 J tube placement with Thoracic Surgery Dr. Salas   - Completed C1FLOT on 1/3; C2 FLOT plan on 1/17   - pancytopenia of malignancy noted      #HTN   #CAD s/p CABG x4 ~ 2005   #HLD   - patient asymptomatic from a CAD standpoint   - Home faviola 50 mg, verapamil and losartan 100mg held on admit for DYANA and hypotension in ED   - verapamil 80mg TID resumed 1/13   - plan to resume home spironolactone and losartan as able pending BP and DYANA improvement   - continue home aspirin and atorva 40mg      #Hypothyroidism    - on levothyroxine 175mcg daily via J tube- continue   - TSH 16 in ED, FT4 pending (1/13)  - started on solu cortef 50mg IVP TID in ED for c/f myxedema coma - held 1/13 pending Endo recs  - Endo consulted 1/13 rec'd continuing current levothyroxine dose, and repeat Tsh/FT4 in one week, no need for further steroid dosing     #Hx of cluster HA   -  c/w home amitriptyline      #MDD   -c/w home buproprion 150mg BID    PPX: lovenox subcutaneous, encourage SCDs and ambulation as able     DIPSO  - FULL CODE, confirmed on admission  - discharge home with new HHC pending improvement in J tube site infection and pain  - FUVs 1/16 [facility], 1/17 chemo  - NOK: Eulogio (brother) [phone]       I spent 60 minutes in the professional and overall care of this patient.    Assessment and plan as above discussed with attending physician Dr. Dina Mi, APRN-CNP

## 2025-01-14 NOTE — PROGRESS NOTES
"Subjective   Timoteo Victoria  is a 69 y.o. male who presents for evaluation of infection around J-tube (placed on 11/25/24).     This patient presents with a notable history of CAD (CABG 2005), HTN, HLD, chronic tobacco use, hypothyroidism presented on 11/25 as a transfer from [facility] for cancer workup. He had been having trouble swallowing for the past month, initially solids and progressing to liquids and medications. He has lost 40 lbs over ~6 months. EGD on 11/21/24 showed an esophageal mass with a malignant appearance, with biopsy showing intramucosal adenocarcinoma. The patient had a J tube placed on 11/25. Oncology was consulted for further management of this newly diagnosed esophageal adenocarcinoma and recommended outpatient PET.      Currently the patient is {Usual state of health:68731}. He {CWT report deny:85223}. {WILDorBLANK:83331::\" \"}    {CWT [facility] stuff:07446}    He  reports that he has been smoking cigarettes. He started smoking about 46 years ago. He has a 45.3 pack-year smoking history. He has never used smokeless tobacco. He reports that he does not currently use alcohol. He reports that he does not use drugs.    Objective   Physical Exam  {CWT exam:88767}  Diagnostic Studies  {CWT reviewed:85525}    Assessment/Plan   I believe that the patient {CWT doing well:32332}.     {CWT plan smart text:33831}    I recommend {CWTworkup:26790}    I discussed this in detail with the patient, including a discussion of alternatives. They were comfortable with this approach.     Fabio Salas MD  [phone]    "

## 2025-01-14 NOTE — PROGRESS NOTES
Vancomycin Dosing by Pharmacy- FOLLOW UP    Timoteo Victoria is a 69 y.o. year old male who Pharmacy has been consulted for vancomycin dosing for cellulitis, skin and soft tissue. Based on the patient's indication and renal status this patient is being dosed based on a goal AUC of 400-600.     Renal function is currently improving, recently bumped, but recovering.       Visit Vitals  /69 (BP Location: Left arm, Patient Position: Lying)   Pulse 70   Temp 36.2 °C (97.2 °F) (Temporal)   Resp 18        Lab Results   Component Value Date    CREATININE 1.12 2025    CREATININE 1.70 (H) 2025    CREATININE 0.98 2025    CREATININE 0.81 2024        Patient weight is as follows:   Vitals:    25 2302   Weight: 88.9 kg (196 lb)       Cultures:  No results found for the encounter in last 14 days.       I/O last 3 completed shifts:  In: 2031.3 (22.8 mL/kg) [P.O.:120; I.V.:1361.3 (15.3 mL/kg); NG/GT:450; IV Piggyback:100]  Out: 150 (1.7 mL/kg) [Urine:150 (0 mL/kg/hr)]  Weight: 88.9 kg   I/O during current shift:  No intake/output data recorded.    Temp (24hrs), Av.4 °C (97.6 °F), Min:36.1 °C (97 °F), Max:37.2 °C (99 °F)      Assessment/Plan    Below goal AUC. Orders placed for new vancomcyin regimen of 1500 every 24 hours to begin at 2230.     This dosing regimen is predicted by [employer] to result in the following pharmacokinetic parameters:  Loading dose: N/A  Regimen: 1500 mg IV every 24 hours.  Start time: 21:34 on 2025  Exposure target: AUC24 (range)400-600 mg/L.hr   [id number]: 387 mg/L.hr  AUC24,ss: 414 mg/L.hr  Probability of AUC24 > 400: 58 %  Ctrough,ss: 11.6 mg/L  Probability of Ctrough,ss > 20: 4 %      The next level will be obtained on 25 at AM. May be obtained sooner if clinically indicated.   Will continue to monitor renal function daily while on vancomycin and order serum creatinine at least every 48 hours if not already ordered.  Follow for continued vancomycin  needs, clinical response, and signs/symptoms of toxicity.       Louise Menchaca, PharmD

## 2025-01-14 NOTE — CONSULTS
SUPPORTIVE AND PALLIATIVE ONCOLOGY CONSULT    Inpatient consult to [facility] Adult Supportive Oncology  Consult performed by: SALO Banegas  Consult ordered by: SALO Perez        SERVICE DATE: 1/14/2025      PALLIATIVE MEDICINE OUTPATIENT PROVIDER:  None  CURRENT ATTENDING PROVIDER: Jojo Arroyo MD     Medical Oncologist: Madan Wilson MD   Radiation Oncologist: No care team member to display  Primary Physician: Dafne Bedolla  [phone]    REASON FOR CONSULT/CHIEF CONSULT COMPLAINT: pain management    Subjective   HISTORY OF PRESENT ILLNESS: Timoteo Victoria is a 69 y.o. male diagnosed with recently dx esophageal adenocarcinoma. PMH significant for CAD s/p 4x CABG (~2005), HTN, HLD, hx of cluster HA, hypothyroidism. Admitted 1/12/2025 for further evaluation and management of weakness, SOB and PEG drainage, pain. Course complicated by hypotension. Supportive and Palliative Oncology is consulted for pain management.     Pain Assessment:  Onset: 6 Weeks  Location:  PEG tube site  Duration: Constant  Characteristics:   Rating: Moderate   Descriptors: throbbing, sharp, and burning   Aggravating: movement    Relieving: Analgesics, Positioning, and Modifying activity   Intolerances:Timoteo Victoria is allergic to ibuprofen.   Interference with Function: Somewhat   Coping Strategies: distraction and relaxation   Emotional Response: Psychological distress   Barriers to Pain Management: None    Opioid Requirements  Total 24h OME use:  0    OARRS/PDMP reviewed - no aberrant behavior noted.    Symptom Assessment:  Pain:very much   Headache: none  Dizziness:somewhat  Lack of energy: very much  Difficulty sleeping: a little  Worrying: somewhat  Anxiety: a little  Depressive symptoms: a little  Pain in mouth/swallowing: none  Dry mouth: none  Taste changes: none  Shortness of breath: somewhat  Lack of appetite: somewhat   Nausea: none  Vomiting: none  Constipation:  somewhat  Diarrhea: none  Sore muscles: none  Numbness or tingling in hands/feet/other: none  Weight loss: very much    Information obtained from: chart review, interview of patient, discussion with RN, and discussion with primary team  ______________________________________________________________________     Oncology History   Primary esophageal adenocarcinoma (Multi)   12/12/2024 Initial Diagnosis    Primary esophageal adenocarcinoma (Multi)     1/2/2025 -  Chemotherapy    FLOT (Fluorouracil Continuous Infusion / Leucovorin / Oxaliplatin / DOCEtaxel), 14 Day Cycles         Past Medical History:   Diagnosis Date    Hyperlipidemia     Hypertension     Personal history of other endocrine, nutritional and metabolic disease     History of hypothyroidism    Type 2 diabetes mellitus      Past Surgical History:   Procedure Laterality Date    CATARACT EXTRACTION Right 08/25/2023    CHOLECYSTECTOMY  09/18/2018    Cholecystectomy    CORONARY ARTERY BYPASS GRAFT  09/18/2018    CABG    HERNIA REPAIR  09/18/2018    Inguinal Hernia Repair     Family History   Problem Relation Name Age of Onset    COPD Mother          SOCIAL HISTORY:  .  Four children.  Lives alone.  Retired from [employer].   Social History:  reports that he has been smoking cigarettes. He started smoking about 46 years ago. He has a 45.3 pack-year smoking history. He has never used smokeless tobacco. He reports that he does not currently use alcohol. He reports that he does not use drugs.    REVIEW OF SYSTEMS:  Review of systems negative unless noted in HPI.       Objective       Lab Results   Component Value Date    WBC 14.9 (H) 01/14/2025    HGB 9.5 (L) 01/14/2025    HCT 27.7 (L) 01/14/2025    MCV 91 01/14/2025     01/14/2025      Lab Results   Component Value Date    GLUCOSE 72 (L) 01/14/2025    CALCIUM 8.0 (L) 01/14/2025     (L) 01/14/2025    K 3.7 01/14/2025    CO2 26 01/14/2025     01/14/2025    BUN 24 (H) 01/14/2025     CREATININE 1.05 01/14/2025     Lab Results   Component Value Date    ALT 28 01/14/2025    AST 16 01/14/2025    ALKPHOS 56 01/14/2025    BILITOT 0.3 01/14/2025     Estimated Creatinine Clearance: 75.6 mL/min (by C-G formula based on SCr of 1.05 mg/dL).     Encounter Date: 01/12/25   ECG 12 lead   Result Value    Ventricular Rate 84    Atrial Rate 77    QRS Duration 130    QT Interval 440    QTC Calculation(Bazett) 519    R Axis -23    T Axis 90    QRS Count 13    Q Onset 194    P Onset 116    P Offset 183    T Offset 414    QTC Fredericia 492    Narrative    Wide QRS rhythm with occasional and consecutive Premature ventricular complexes  Right bundle branch block  Possible Lateral infarct , age undetermined  Abnormal ECG  When compared with ECG of 12-JAN-2025 18:36,  Wide QRS rhythm has replaced Sinus rhythm      See ED provider note for full interpretation and clinical correlation  Confirmed by Ximena Ray ([id number]) on 1/13/2025 2:35:10 AM     Wt Readings from Last 5 Encounters:   01/12/25 88.9 kg (196 lb)   01/02/25 93.5 kg (206 lb 2.1 oz)   12/12/24 92.8 kg (204 lb 9.4 oz)   11/30/24 109 kg (241 lb)   11/25/24 93.9 kg (207 lb)       Current Outpatient Medications   Medication Instructions    amitriptyline (ELAVIL) 100 mg, j-tube, Nightly    aspirin 81 mg, j-tube, Daily    atorvastatin (LIPITOR) 40 mg, j-tube, Daily    buPROPion (WELLBUTRIN) 150 mg, j-tube, 2 times daily    levothyroxine (SYNTHROID, LEVOXYL) 175 mcg, j-tube, Daily, Take on an empty stomach at the same time each day, either 30 to 60 minutes prior to breakfast    losartan (COZAAR) 100 mg, j-tube, Daily    ondansetron ODT (ZOFRAN-ODT) 8 mg, oral, Every 8 hours PRN    prochlorperazine (COMPAZINE) 10 mg, oral, Every 6 hours PRN    spironolactone (ALDACTONE) 50 mg, j-tube, Daily    verapamil (CALAN) 240 mg, j-tube, Daily     Scheduled medications   acetaminophen, 650 mg, oral, q6h  amitriptyline, 100 mg, j-tube, Nightly  aspirin, 81 mg, j-tube,  Daily  atorvastatin, 40 mg, j-tube, Daily  buPROPion, 150 mg, j-tube, BID  cholecalciferol, 2,000 Units, oral, Daily  doxycylcine, 100 mg, oral, q12h OZIEL  enoxaparin, 40 mg, subcutaneous, q24h  esomeprazole, 40 mg, j-tube, Daily before breakfast  gabapentin, 300 mg, oral, Nightly  levothyroxine, 175 mcg, j-tube, Daily  [Held by provider] losartan, 100 mg, j-tube, Daily  polyethylene glycol, 17 g, oral, BID  sennosides-docusate sodium, 1 tablet, j-tube, BID  spironolactone, 50 mg, oral, Daily  verapamil, 80 mg, j-tube, q8h OZIEL      Continuous medications  sodium chloride 0.9%, 75 mL/hr      PRN medications  alteplase, 2 mg, PRN  moisturizing mouth, 15 mL, TID PRN  ondansetron, 4 mg, q6h PRN  oxyCODONE, 5 mg, q4h PRN         Allergies:   Allergies   Allergen Reactions    Ibuprofen Swelling                PHYSICAL EXAMINATION:  Vital Signs:   Vital signs reviewed  Vitals:    01/14/25 0905   BP: 131/65   Pulse: 70   Resp: 16   Temp: 36.5 °C (97.7 °F)   SpO2: 97%     Pain Score: 0 - No pain     Physical Exam  Well-developed [place] M Laying in bed, NAD  A&O x 3, pleasant and cooperative with interview & exam  Breathing comfortably on RA  Abd soft, ND, +PEG with erythema and +ttp  No edema in ext      ASSESSMENT/PLAN:  Timoteo Victoria is a 69 y.o. male diagnosed with recently dx esophageal adenocarcinoma. PMH significant for CAD s/p 4x CABG (~2005), HTN, HLD, hx of cluster HA, hypothyroidism. Admitted 1/12/2025 for further evaluation and management of weakness, SOB and PEG drainage, pain. Course complicated by hypotension. Supportive and Palliative Oncology is consulted for pain management.      Pain:  ABD pain at PEG site related to drainage, infection  Pain is: cancer related pain  Type: somatic  Pain control: sub-optimally controlled  Home regimen:  Acetaminophen 650mg, 50mg tramadol  Intolerances/previously tried: N/A  Total OME usage for the past 24 hours:  0  ABX as per primary team  Continue 650mg tylenol q6h  scheduled  INCREASE to 5mg oxycodone q4h PRN  START 300mg gabapentin at bedtime    Nausea:  At risk for nausea with vomiting related to opioids   Home regimen:  Ondansetron   Well-controlled  Continue 4mg zofran q4h PRN    Constipation  At risk for constipation related to medication side effects (including opioids), currently constipated  Usual bowel pattern: every 2-3 days  Home regimen: Miralax 17 gm daily  LBM yesterday  CHANGE senna-s to 5mL sennasides liquid daily  Goal to have BM without straining q48-72h, adjust regimen as needed    Decreased appetite:  Appetite loss related to malignancy and disease process  Nutrition following  Weight loss 50 lbs in 6 mos  Home regimen:   TF  Defer to Nutrition on TF    Medical Decision Making/Goals of Care/Advance Care Planning:  Patient's current clinical condition, including diagnosis, prognosis, and management plan, and goals of care were discussed.   Life limiting disease: malignancy  Family: Supportive children  Performance status: Moderate limitations due to pain  Joys/meaning/strength: Family  Understanding of health:   1/14/25: Demonstrates good understanding of disease process, understands PEG is being evaluated for infection and leaking.  He is also being worked up for hypotension.  Pt wants leaking improved prior to discharge.  Pt has 4 more chemo sessions, then surgery, then more chemo after.  Information:Wants full disclosure  Goals: symptom control and cancer directed therapy  Worries and fears now and future: ongoing symptoms     Advance Directives  Existence of Advance Directives:  Not discussed  Decision maker: Surrogate decision maker is 4 adult children  Code Status: Full code    Introduction to Supportive and Palliative Oncology:  Spoke with pt at bedside  Introduced the role and philosophy of Supportive and Palliative oncology in the evaluation and management of symptoms during cancer treatment  Palliative care was introduced as a service for patients  with serious illness to help with symptoms, assist with goals of care conversations, navigate complex decision making, improve quality of life for patients, and provide support both patients and families.  Patient seemed to appreciate the extra layer of support.     Supportive Interventions: Interventions: Music Therapy: referral placed, Art Therapy: referral placed, SPO Spiritual Care: referral placed    Disposition:  Please  start the process of having prior authorization with meds to beds deliver medications to patient prior to discharge via [facility] pharmacy. Prescriptions will need to be sent 48-72 hours prior to discharge so that a prior authorization can be completed.     Discharge date: unknown pending acute issues  Will assess if patient needs an appointment with Outpatient Supportive Oncology       Signature and billing:  Medical complexity was high level due to due to complexity of problems, extensive data review, and high risk of management/treatment.    DATA   Diagnostic tests and information reviewed for today's visit:  Conversation with primary team, Most recent labs and imaging results, Medications       Some elements copied from  H&P  note on 1/12/25, the elements have been updated and all reflect current decision making from today, 1/14/2025.    Plan of Care discussed with: Provider, RN, Patient    Thank you for asking Supportive and Palliative Oncology to assist with care of this patient.  Recommendations will be communicated back to the consulting service by way of shared electronic medical record/secure chat/email or face-to-face.   We will continue to follow.  Please contact us for additional questions or concerns.      SIGNATURE: SALO Banegas  PAGER/CONTACT:  Contact information:  Supportive and Palliative Oncology  Monday-Friday 8 AM-5 PM  Epic Secure chat or pager [phone].  After hours and weekends:  pager [phone]

## 2025-01-15 ENCOUNTER — APPOINTMENT (OUTPATIENT)
Dept: RADIOLOGY | Facility: HOSPITAL | Age: 70
End: 2025-01-15
Payer: MEDICARE

## 2025-01-15 LAB
ALBUMIN SERPL BCP-MCNC: 3.2 G/DL (ref 3.4–5)
ALP SERPL-CCNC: 67 U/L (ref 33–136)
ALT SERPL W P-5'-P-CCNC: 29 U/L (ref 10–52)
ANION GAP SERPL CALC-SCNC: 13 MMOL/L (ref 10–20)
AST SERPL W P-5'-P-CCNC: 19 U/L (ref 9–39)
BASOPHILS # BLD MANUAL: 0 X10*3/UL (ref 0–0.1)
BASOPHILS NFR BLD MANUAL: 0 %
BILIRUB SERPL-MCNC: 0.2 MG/DL (ref 0–1.2)
BUN SERPL-MCNC: 21 MG/DL (ref 6–23)
CALCIUM SERPL-MCNC: 8.3 MG/DL (ref 8.6–10.6)
CHLORIDE SERPL-SCNC: 110 MMOL/L (ref 98–107)
CO2 SERPL-SCNC: 20 MMOL/L (ref 21–32)
CREAT SERPL-MCNC: 1.02 MG/DL (ref 0.5–1.3)
EGFRCR SERPLBLD CKD-EPI 2021: 80 ML/MIN/1.73M*2
EOSINOPHIL # BLD MANUAL: 0.13 X10*3/UL (ref 0–0.7)
EOSINOPHIL NFR BLD MANUAL: 0.9 %
ERYTHROCYTE [DISTWIDTH] IN BLOOD BY AUTOMATED COUNT: 14 % (ref 11.5–14.5)
GLUCOSE BLD MANUAL STRIP-MCNC: 87 MG/DL (ref 74–99)
GLUCOSE SERPL-MCNC: 97 MG/DL (ref 74–99)
HCT VFR BLD AUTO: 30.8 % (ref 41–52)
HGB BLD-MCNC: 10.5 G/DL (ref 13.5–17.5)
IMM GRANULOCYTES # BLD AUTO: 1.17 X10*3/UL (ref 0–0.7)
IMM GRANULOCYTES NFR BLD AUTO: 8.3 % (ref 0–0.9)
LYMPHOCYTES # BLD MANUAL: 2.1 X10*3/UL (ref 1.2–4.8)
LYMPHOCYTES NFR BLD MANUAL: 14.9 %
MAGNESIUM SERPL-MCNC: 1.95 MG/DL (ref 1.6–2.4)
MCH RBC QN AUTO: 31.5 PG (ref 26–34)
MCHC RBC AUTO-ENTMCNC: 34.1 G/DL (ref 32–36)
MCV RBC AUTO: 93 FL (ref 80–100)
MONOCYTES # BLD MANUAL: 0.37 X10*3/UL (ref 0.1–1)
MONOCYTES NFR BLD MANUAL: 2.6 %
MYELOCYTES # BLD MANUAL: 0.37 X10*3/UL
MYELOCYTES NFR BLD MANUAL: 2.6 %
NEUTS SEG # BLD MANUAL: 11.01 X10*3/UL (ref 1.2–7)
NEUTS SEG NFR BLD MANUAL: 78.1 %
NRBC BLD-RTO: 0 /100 WBCS (ref 0–0)
PLASMA CELLS # BLD MANUAL: 0.13 X10*3/UL
PLASMA CELLS NFR BLD MANUAL: 0.9 %
PLATELET # BLD AUTO: 176 X10*3/UL (ref 150–450)
POTASSIUM SERPL-SCNC: 4.3 MMOL/L (ref 3.5–5.3)
PROT SERPL-MCNC: 5.3 G/DL (ref 6.4–8.2)
RBC # BLD AUTO: 3.33 X10*6/UL (ref 4.5–5.9)
RBC MORPH BLD: ABNORMAL
SODIUM SERPL-SCNC: 139 MMOL/L (ref 136–145)
TOTAL CELLS COUNTED BLD: 114
WBC # BLD AUTO: 14.1 X10*3/UL (ref 4.4–11.3)

## 2025-01-15 PROCEDURE — 2500000002 HC RX 250 W HCPCS SELF ADMINISTERED DRUGS (ALT 637 FOR MEDICARE OP, ALT 636 FOR OP/ED)

## 2025-01-15 PROCEDURE — 74177 CT ABD & PELVIS W/CONTRAST: CPT

## 2025-01-15 PROCEDURE — 2500000001 HC RX 250 WO HCPCS SELF ADMINISTERED DRUGS (ALT 637 FOR MEDICARE OP)

## 2025-01-15 PROCEDURE — 99233 SBSQ HOSP IP/OBS HIGH 50: CPT

## 2025-01-15 PROCEDURE — 83735 ASSAY OF MAGNESIUM: CPT | Performed by: NURSE PRACTITIONER

## 2025-01-15 PROCEDURE — 2500000001 HC RX 250 WO HCPCS SELF ADMINISTERED DRUGS (ALT 637 FOR MEDICARE OP): Performed by: NURSE PRACTITIONER

## 2025-01-15 PROCEDURE — 0D2DXUZ CHANGE FEEDING DEVICE IN LOWER INTESTINAL TRACT, EXTERNAL APPROACH: ICD-10-PCS

## 2025-01-15 PROCEDURE — 2550000001 HC RX 255 CONTRASTS

## 2025-01-15 PROCEDURE — 2500000004 HC RX 250 GENERAL PHARMACY W/ HCPCS (ALT 636 FOR OP/ED)

## 2025-01-15 PROCEDURE — 1170000001 HC PRIVATE ONCOLOGY ROOM DAILY

## 2025-01-15 PROCEDURE — 74018 RADEX ABDOMEN 1 VIEW: CPT | Performed by: RADIOLOGY

## 2025-01-15 PROCEDURE — 2550000001 HC RX 255 CONTRASTS: Performed by: INTERNAL MEDICINE

## 2025-01-15 PROCEDURE — 85007 BL SMEAR W/DIFF WBC COUNT: CPT | Performed by: NURSE PRACTITIONER

## 2025-01-15 PROCEDURE — 80053 COMPREHEN METABOLIC PANEL: CPT | Performed by: NURSE PRACTITIONER

## 2025-01-15 PROCEDURE — 85027 COMPLETE CBC AUTOMATED: CPT | Performed by: NURSE PRACTITIONER

## 2025-01-15 PROCEDURE — 74018 RADEX ABDOMEN 1 VIEW: CPT

## 2025-01-15 PROCEDURE — 82947 ASSAY GLUCOSE BLOOD QUANT: CPT

## 2025-01-15 RX ORDER — SENNOSIDES 8.8 MG/5ML
5 LIQUID ORAL DAILY
Status: DISCONTINUED | OUTPATIENT
Start: 2025-01-15 | End: 2025-01-15

## 2025-01-15 RX ORDER — DOXYCYCLINE HYCLATE 100 MG
100 TABLET ORAL EVERY 12 HOURS SCHEDULED
Status: DISCONTINUED | OUTPATIENT
Start: 2025-01-15 | End: 2025-01-16

## 2025-01-15 RX ORDER — DIATRIZOATE MEGLUMINE AND DIATRIZOATE SODIUM 660; 100 MG/ML; MG/ML
60 SOLUTION ORAL; RECTAL ONCE
Status: COMPLETED | OUTPATIENT
Start: 2025-01-15 | End: 2025-01-15

## 2025-01-15 RX ORDER — SENNOSIDES 8.8 MG/5ML
5 LIQUID ORAL 2 TIMES DAILY
Status: DISCONTINUED | OUTPATIENT
Start: 2025-01-15 | End: 2025-01-18 | Stop reason: HOSPADM

## 2025-01-15 RX ORDER — OXYCODONE HYDROCHLORIDE 5 MG/1
10 TABLET ORAL EVERY 4 HOURS PRN
Status: DISCONTINUED | OUTPATIENT
Start: 2025-01-15 | End: 2025-01-16

## 2025-01-15 RX ADMIN — ACETAMINOPHEN 650 MG: 160 SOLUTION ORAL at 15:09

## 2025-01-15 RX ADMIN — OXYCODONE HYDROCHLORIDE 10 MG: 5 TABLET ORAL at 15:09

## 2025-01-15 RX ADMIN — DOXYCYCLINE HYCLATE 100 MG: 100 TABLET, COATED ORAL at 09:16

## 2025-01-15 RX ADMIN — ACETAMINOPHEN 650 MG: 160 SOLUTION ORAL at 03:52

## 2025-01-15 RX ADMIN — OXYCODONE HYDROCHLORIDE 10 MG: 5 TABLET ORAL at 21:36

## 2025-01-15 RX ADMIN — LEVOTHYROXINE SODIUM 175 MCG: 0.1 TABLET ORAL at 07:04

## 2025-01-15 RX ADMIN — Medication 2000 UNITS: at 09:16

## 2025-01-15 RX ADMIN — ATORVASTATIN CALCIUM 40 MG: 40 TABLET, FILM COATED ORAL at 09:16

## 2025-01-15 RX ADMIN — SENNOSIDES 5 ML: 8.8 LIQUID ORAL at 21:37

## 2025-01-15 RX ADMIN — ACETAMINOPHEN 650 MG: 160 SOLUTION ORAL at 21:36

## 2025-01-15 RX ADMIN — ACETAMINOPHEN 650 MG: 160 SOLUTION ORAL at 09:16

## 2025-01-15 RX ADMIN — BUPROPION HYDROCHLORIDE 150 MG: 75 TABLET, FILM COATED ORAL at 09:16

## 2025-01-15 RX ADMIN — SPIRONOLACTONE 50 MG: 25 TABLET ORAL at 09:16

## 2025-01-15 RX ADMIN — VERAPAMIL HYDROCHLORIDE 80 MG: 80 TABLET ORAL at 21:37

## 2025-01-15 RX ADMIN — ONDANSETRON 4 MG: 2 INJECTION INTRAMUSCULAR; INTRAVENOUS at 07:04

## 2025-01-15 RX ADMIN — OXYCODONE 5 MG: 5 TABLET ORAL at 09:19

## 2025-01-15 RX ADMIN — DOXYCYCLINE HYCLATE 100 MG: 100 TABLET, COATED ORAL at 21:37

## 2025-01-15 RX ADMIN — ASPIRIN 81 MG CHEWABLE TABLET 81 MG: 81 TABLET CHEWABLE at 09:16

## 2025-01-15 RX ADMIN — SENNOSIDES 5 ML: 8.8 LIQUID ORAL at 09:16

## 2025-01-15 RX ADMIN — GABAPENTIN 300 MG: 250 SOLUTION ORAL at 21:36

## 2025-01-15 RX ADMIN — AMITRIPTYLINE HYDROCHLORIDE 100 MG: 100 TABLET ORAL at 21:37

## 2025-01-15 RX ADMIN — ENOXAPARIN SODIUM 40 MG: 40 INJECTION SUBCUTANEOUS at 09:16

## 2025-01-15 RX ADMIN — DIATRIZOATE MEGLUMINE AND DIATRIZOATE SODIUM 60 ML: 660; 100 LIQUID ORAL; RECTAL at 17:10

## 2025-01-15 RX ADMIN — IOHEXOL 75 ML: 350 INJECTION, SOLUTION INTRAVENOUS at 13:39

## 2025-01-15 RX ADMIN — VERAPAMIL HYDROCHLORIDE 80 MG: 80 TABLET ORAL at 07:04

## 2025-01-15 RX ADMIN — OXYCODONE 5 MG: 5 TABLET ORAL at 03:52

## 2025-01-15 RX ADMIN — ESOMEPRAZOLE MAGNESIUM 40 MG: 40 FOR SUSPENSION ORAL at 07:03

## 2025-01-15 RX ADMIN — BUPROPION HYDROCHLORIDE 150 MG: 75 TABLET, FILM COATED ORAL at 21:37

## 2025-01-15 ASSESSMENT — COGNITIVE AND FUNCTIONAL STATUS - GENERAL
MOBILITY SCORE: 24
DAILY ACTIVITIY SCORE: 24

## 2025-01-15 ASSESSMENT — PAIN - FUNCTIONAL ASSESSMENT
PAIN_FUNCTIONAL_ASSESSMENT: 0-10

## 2025-01-15 ASSESSMENT — PAIN DESCRIPTION - LOCATION
LOCATION: ABDOMEN

## 2025-01-15 ASSESSMENT — PAIN DESCRIPTION - DESCRIPTORS: DESCRIPTORS: TENDER

## 2025-01-15 ASSESSMENT — PAIN SCALES - GENERAL
PAINLEVEL_OUTOF10: 8
PAINLEVEL_OUTOF10: 5 - MODERATE PAIN
PAINLEVEL_OUTOF10: 6
PAINLEVEL_OUTOF10: 6
PAINLEVEL_OUTOF10: 8

## 2025-01-15 NOTE — PROGRESS NOTES
"Timoteo Victoria is a 69 y.o. male on day 3 of admission presenting with Hypotension, unspecified hypotension type.    Subjective   Patient reports pain is the same near tube site, was seen by wound care team who placed dressing underneath bumper. Tolerating feeds. Passing flatus but not BM.        Objective     Physical Exam  Constitutional:       General: He is not in acute distress.     Appearance: He is not toxic-appearing.   Pulmonary:      Effort: Pulmonary effort is normal.   Abdominal:      General: There is no distension.      Palpations: Abdomen is soft.      Tenderness: There is abdominal tenderness.      Comments: Tender when skin manipulated near tube, no tenderness to deep palpation, no area of fluctuance near tube   Skin:     Comments: Area of erythema surrounding tube site, similar distribution to last exam, appears more moist due to dressing, no drainage noted on exam today   Neurological:      General: No focal deficit present.      Mental Status: He is alert.   Psychiatric:         Behavior: Behavior normal.         Last Recorded Vitals  Blood pressure 127/50, pulse 69, temperature 36.5 °C (97.7 °F), temperature source Temporal, resp. rate 18, height 1.778 m (5' 10\"), weight 88.9 kg (196 lb), SpO2 100%.  Intake/Output last 3 Shifts:  I/O last 3 completed shifts:  In: 1242.5 (14 mL/kg) [I.V.:1062.5 (12 mL/kg); NG/GT:180]  Out: - (0 mL/kg)   Weight: 88.9 kg     Relevant Results  N/A    Assessment/Plan   Assessment & Plan  Hypotension, unspecified hypotension type    Hypothyroidism    Timoteo Victoria is a 69 y.o. male with history of CAD (CABG 2005), HTN, HLD, chronic tobacco use, hypothyroidism, esophageal mass s/p J tube placement by Dr. Salas 11/25/24 who is presenting with weakness and SOB. Thoracic surgery consulted for pain at J tube site. Pain appears to be related to local erythema at the surface level, low concern for infectious process including cellulitis or deeper infection given " appearance of the skin and lack of systemic symptoms. Suspect this is localized erythema due to drainage of enteric content and friction from the J tube and bumper. J tube was exchanged at bedside and sutured in place to assist with closing up the site and hopefully prevent leakage. Tube passed easily and enteric contents were successfully aspirated after placement, however recommend confirmation with tube study.    - Please obtain tube study: 60cc gastograffin with 30cc water through J tube followed by bedside KUB  - Once placement is confirmed keep tube patent with q6hr water flushes and resume tube feeds   - Recommend local wound care and barrier cream at J tube site to prevent further irritation. Can consider using duoderm at the site to further protect the skin. Current dressing that was used appears to have increased moisture to the area  - Recommend increasing bowel regimen as patient has chronic constipation and has not had a recent BM  - No need for surgical intervention  - Please reach out with questions or concerns     Patient seen with cardiothoracic fellow Dr. Han and discussed with attending surgeon Dr. Maritza Corrales MD  Gen Surg PGY2  Thoracic Surgery [id number]

## 2025-01-15 NOTE — PROGRESS NOTES
Timoteo Victoria is a 69 y.o. male on day 3 of admission presenting with Hypotension, unspecified hypotension type.    Subjective   Timoteo is doing fine this morning. States his TF went well overnight however he is still experiencing pain at his J tube. He states his PRN oxy helped initially but when he recently took it it did not help with the pain. We discussed talking to supp onc for pain control. Pt also states there was significant drainage still from his J tube site, we discussed talking to thoracic surgery again today. Admitted some lightheadedness this morning, but states it resolved immediately and currently denies any lightheadedness.    Denies any HA, fevers/chills, cough, congestion, rhinorrhea, sore throat, SOB, CP, palpitations, abdominal pain, n/v/d/c, melena, dysuria, urgency/frequency, hematuria, numbness/tingling, bruising/bleeding or rashes. Denies any sick contacts. ROS otherwise negative.       Objective     Physical Exam  Vitals reviewed.   Constitutional:       Appearance: Normal appearance.   HENT:      Head: Normocephalic and atraumatic.      Nose: Nose normal.      Mouth/Throat:      Mouth: Mucous membranes are moist.      Pharynx: Oropharynx is clear.   Eyes:      Extraocular Movements: Extraocular movements intact.      Pupils: Pupils are equal, round, and reactive to light.   Cardiovascular:      Rate and Rhythm: Normal rate and regular rhythm.      Pulses: Normal pulses.      Heart sounds: Normal heart sounds.   Pulmonary:      Effort: Pulmonary effort is normal.      Breath sounds: Normal breath sounds.   Abdominal:      General: Bowel sounds are normal.      Palpations: Abdomen is soft.      Comments: J tube   Musculoskeletal:         General: Normal range of motion.   Skin:     General: Skin is warm.      Comments: LLQ J tube with surrounding edema and crusting. Thick yellow drainage present. Erythema in previous tape locations   Neurological:      General: No focal deficit present.  "     Mental Status: He is alert and oriented to person, place, and time. Mental status is at baseline.   Psychiatric:         Mood and Affect: Mood normal.         Behavior: Behavior normal.       Last Recorded Vitals  Blood pressure 136/74, pulse 71, temperature 36.8 °C (98.2 °F), temperature source Temporal, resp. rate 18, height 1.778 m (5' 10\"), weight 88.9 kg (196 lb), SpO2 100%.  Intake/Output last 3 Shifts:  I/O last 3 completed shifts:  In: 1242.5 (14 mL/kg) [I.V.:1062.5 (12 mL/kg); NG/GT:180]  Out: - (0 mL/kg)   Weight: 88.9 kg           This patient has a central line   Reason for the central line remaining today? Parenteral medication       Assessment/Plan   Assessment & Plan  Hypotension, unspecified hypotension type    Hypothyroidism    Timoteo Victoria is a 69 y.o. male w/ PMHx of recently diagnosed stage III esophageal adenocarcinoma (dx 12/2024, s/p 1 cycle FLOT), CAD s/p 4x CABG (~2005), HTN, HLD, hx of cluster HA, hypothyroidism (on levothyroxine), and tobacco use who presented to the ED with weakness, and pain and drainage around J tube site. CXR 1/12 without acute process, KUB 1/13 with stool burden otherwise unremarkable. S/p started on vancomycin and cefepime (1/13- 1/14) and admitted for management of possible J tube infection. Thoracic Surgery placed J tube 11/25/24, consulted (1/13) for consideration of J tube upsizing as pt with persistent TF leakage, rec local wound care and barrier cream at J tube site, no further imaging needed, no surgical intervention needed. Wound culture NGTD (1/13). Wound care consulted, recs pending. TSH of 16 (1/13), Endo consulted and rec'd continuing current levothyroxine dose, and repeat Tsh/FT4 in one week. (1/14-current) start PO doxycycline BID for purulent cellulitis per attending. Discharge home with [facility] pending pain control, antibiotic pain, and TF tolerance.     Updates 1/15:   - Thoracic Surg recs called again today for continued leakage from J tube, " pending recs  - s/p vanc and cefepime (1/13-1/14), start PO doxycycline 100mg BID for purulent cellulitis coverage per attending (1/14-current)   - wound culture NGTD 1/13, Blood cx NGTD 1/12  - wound care consulted - placed cavilon on, dressed it with some foam, and taped the tube in place.  - home TF started 1/14 PM (cycled) to give poss infectious process time, tolerated well  - started Scheduled tylenol and started PRN oxycodone 5mg q6h yesterday for J tube site pain, supp onc recs today since oxy not helpful for pain  - s/p mIVF (1/12-1/14)  - CT A/P for further eval of J tube pain, r/o abscess    # J tube site infection  - J tube (14F) placed by Thoracic Surgery 11/25/24, Dr. Salas   - has been using for Tfs and tolerating well, however with persistent TF leakage (per pt report, smells like TF); now x 1 week with thick yellow drainage from site   - s/p 1 week keflex course (1/6/25- 1/12/25) rx'd by outpatient surgery team for superficial wound infection   - KUB in ED 1/12 with mild gaseous distention of bowel, dense colonic stool burden   - s/p vancomycin and cefepime (1/13-1/14)   - (1/14-current) start doxycycline 100mg BID for purulent cellulitis coverage per attending  - blood cx x 2 1/12 NGTD  - utox neg (1/14)  - leukocytosis of 16 (1/12) noted, likely in setting of infection --> 14.1 (1/15)   - wound cx NGTD 1/13  - erythema surrounding J tube site + previous tape application sites- pt instructed to avoid tape use at this time given skin irritation   - Thoracic Surgery consulted 1/13 for consideration of upsizing given persistent TF leakage- rec local wound care and barrier cream at J tube site, no further imaging needed, no surgical intervention needed    - wound care consulted, placed cavilon on, dressed it with some foam, and taped the tube in place, orders placed   - reached out to thoracic surg 1/15 for continued leakage and pain at J tube site, pending recs  - home TF regimen: isosource 1.5 cycled  (pt uses 3am-3pm d/t his sleep schedule) @ 105ml/hr + FWF; ordered to start 1/14 PM. Liquid diet as tolerated   - CT A/P (1/15) for further eval of J tube pain, pending  - oxycodone 5mg q6h PRN started 1/13 for pain, started scheduled 650mg Q6hrs tylenol as patient was hesitant to start opioids for J tube site pain, 300mg gabapentin nightly  - LBM 1/13 (reported very constipated); continue daily miralax and started senna daily (1/14)     # DYANA with hyponatremia - improved  - cr on admit 1.70 (BL ~ 1), s/p 3L IVF in ED for hypotension. Improved to 1.12 (1/13); sCr 1.02 (1/15)  - Na 129 (1/12), improved to 133 (1/13) --> Na 139 (1/15)  - s/p mIVF (1/12- 1/14), TF was started 1/14  - home anti- HTNs held on admit in setting of hypotension and DYANA - plan to resume gradually pending DYANA improvement; verapamil resumed 1/13, spironolactone 1/14    # recently diagnosed stage III esophageal cancer   - Primary Onc Dr. Wilson notified of admission   - 11/21/24  [place] admitted for weight loss, dysphagia; EGD showed esophageal mass with biopsy confirming intramucosal adenocarcinoma   - 11/22/24 CT CAP with contrast with irregular masslike thickening of distal esophagus, no obvious metastases. PET showed thyroid nodule   - 11/25/24 J tube placement with Thoracic Surgery Dr. Salas   - Completed C1FLOT on 1/3; C2 FLOT plan on 1/17   - pancytopenia of malignancy noted      #HTN   #CAD s/p CABG x4 ~ 2005   #HLD   - patient asymptomatic from a CAD standpoint   - Home faviola 50 mg, verapamil and losartan 100mg held on admit for DYANA and hypotension in ED   - verapamil 80mg TID resumed 1/13   - spironolactone restarted 1/14  - plan to resume home losartan as able pending BP improvement   - continue home aspirin and atorva 40mg      #Hypothyroidism    - on levothyroxine 175mcg daily via J tube- continue   - TSH 16 in ED, FT4 1.14 (1/13)  - started on solu cortef 50mg IVP TID in ED for c/f myxedema coma - held 1/13 per endocrine  recs  - Endo consulted 1/13 rec'd continuing current levothyroxine dose, and repeat Tsh/FT4 in one week, no need for further steroid dosing     #Hx of cluster HA   - c/w home amitriptyline      #MDD   - c/w home buproprion 150mg BID    PPX: lovenox subcutaneous, encourage SCDs and ambulation as able     DIPSO  - FULL CODE, confirmed on admission  - discharge home with new [facility] pending improvement in J tube site infection and pain  - FUVs 1/16 [facility], 1/17 chemo - plan to request reschedule  - NOK: Eulogio (brother) [phone]     I spent 60 minutes in the professional and overall care of this patient.    Assessment and plan as above discussed with attending physician Dr. Dina Camargo PA-C

## 2025-01-15 NOTE — PROGRESS NOTES
Timoteo Victoria is a 69 y.o. male on day 3 of admission presenting with Hypotension, unspecified hypotension type.  SUPPORTIVE AND PALLIATIVE ONCOLOGY PROGRESS NOTE      SERVICE DATE: 1/15/2025      SUBJECTIVE:  Interval Events:  Pt seen this AM- reports oxycodone and gabapentin helped some but could have better pain control.  He has spoken to surgery team and is hoping to be scheduled for a procedure to improve j tube drainage.    No BM x 4 days- reports this is typical all his life.  Discussed opioids can worsen constipation.  Agreeable to miralax today.    Pt is having CT today.    Pain Assessment:  Location:  PEG tube site  Duration: Constant  Characteristics:              Rating: Moderate              Descriptors: throbbing, sharp, and burning              Aggravating: movement               Relieving: Analgesics, Positioning, and Modifying activity              Intolerances:Timoteo Victoria is allergic to ibuprofen.              Interference with Function: Somewhat    Opioid Requirements  Past 24 h opioid requirements (1/14/25 at 0800 to 01/15/25 at 0800):   Oxycodone IR 5 mg PO x 3 doses = 15 mg = 22.5 OME  Total 24h OME use:  22.5    Symptom Assessment:  Constipation very much   Anxiety a little  Lack of energy somewhat  Shortness of breath none    Information obtained from: chart review, interview of patient, discussion with RN, and discussion with primary team  ______________________________________________________________________        Objective       Lab Results   Component Value Date    WBC 14.1 (H) 01/15/2025    HGB 10.5 (L) 01/15/2025    HCT 30.8 (L) 01/15/2025    MCV 93 01/15/2025     01/15/2025      Lab Results   Component Value Date    GLUCOSE 97 01/15/2025    CALCIUM 8.3 (L) 01/15/2025     01/15/2025    K 4.3 01/15/2025    CO2 20 (L) 01/15/2025     (H) 01/15/2025    BUN 21 01/15/2025    CREATININE 1.02 01/15/2025     Lab Results   Component Value Date    ALT 29 01/15/2025     AST 19 01/15/2025    ALKPHOS 67 01/15/2025    BILITOT 0.2 01/15/2025     Estimated Creatinine Clearance: 77.8 mL/min (by C-G formula based on SCr of 1.02 mg/dL).       Scheduled medications   acetaminophen, 650 mg, oral, q6h  amitriptyline, 100 mg, j-tube, Nightly  aspirin, 81 mg, j-tube, Daily  atorvastatin, 40 mg, j-tube, Daily  buPROPion, 150 mg, j-tube, BID  cholecalciferol, 2,000 Units, oral, Daily  doxycylcine, 100 mg, oral, q12h OZIEL  enoxaparin, 40 mg, subcutaneous, q24h  esomeprazole, 40 mg, j-tube, Daily before breakfast  gabapentin, 300 mg, oral, Nightly  levothyroxine, 175 mcg, j-tube, Daily  [Held by provider] losartan, 100 mg, j-tube, Daily  polyethylene glycol, 17 g, oral, BID  senna, 5 mL, j-tube, Daily  spironolactone, 50 mg, oral, Daily  verapamil, 80 mg, j-tube, q8h OZIEL      Continuous medications     PRN medications  alteplase, 2 mg, PRN  moisturizing mouth, 15 mL, TID PRN  ondansetron, 4 mg, q6h PRN  oxyCODONE, 5 mg, q4h PRN                    PHYSICAL EXAMINATION:  Vital Signs:   Vital signs reviewed  Vitals:    01/15/25 0843   BP: 130/66   Pulse: 72   Resp: 20   Temp: 36.1 °C (97 °F)   SpO2: 97%     Pain Score: 5 - Moderate pain     Physical Exam  Well-developed [place] M Laying in bed, NAD  A&O x 3, pleasant and cooperative with interview & exam  Breathing comfortably on RA  Abd soft, ND, +PEG with erythema and +ttp  No edema in ext    ASSESSMENT/PLAN:  Timoteo Victoria is a 69 y.o. male diagnosed with recently dx esophageal adenocarcinoma. PMH significant for CAD s/p 4x CABG (~2005), HTN, HLD, hx of cluster HA, hypothyroidism. Admitted 1/12/2025 for further evaluation and management of weakness, SOB and PEG drainage, pain. Course complicated by hypotension. Supportive and Palliative Oncology is consulted for pain management.      Pain:  ABD pain at PEG site related to drainage, infection  Pain is: cancer related pain  Type: somatic  Pain control: sub-optimally controlled  Home regimen:   "Acetaminophen 650mg, 50mg tramadol  Intolerances/previously tried: N/A  Total OME usage for the past 24 hours:  22.5  ABX as per primary team  Continue 650mg tylenol q6h scheduled  INCREASE to 10mg oxycodone q4h PRN for severe pain  Continue 5mg oxycodone q4h PRN for moderate pain  Continue 300mg gabapentin at bedtime     Nausea:  At risk for nausea with vomiting related to opioids   Home regimen:  Ondansetron   Well-controlled  Continue 4mg zofran q4h PRN     Constipation  At risk for constipation related to medication side effects (including opioids), currently constipated  Usual bowel pattern: every 2-3 days  Home regimen: Miralax 17 gm daily  LBM 1/11  Continue miralax BID  INCREASE senna-s to 5mL sennasides liquid to BID  Goal to have BM without straining q48-72h, adjust regimen as needed     Decreased appetite:  Appetite loss related to malignancy and disease process  Nutrition following  Weight loss 50 lbs in 6 mos  Home regimen:   TF  Defer to Nutrition on TF     Medical Decision Making/Goals of Care/Advance Care Planning:  Patient's current clinical condition, including diagnosis, prognosis, and management plan, and goals of care were discussed.   Life limiting disease: malignancy  Family: Supportive children  Performance status: Moderate limitations due to pain  Joys/meaning/strength: Family  Understanding of health:   1/14/25: Demonstrates good understanding of disease process, understands PEG is being evaluated for infection and leaking.  He is also being worked up for hypotension.  Pt wants leaking improved prior to discharge.  Pt has 4 more chemo sessions, then surgery, then more chemo after.  1/15/25:  Pt expressed worry/ anxiety stating \"Maybe I'm just being a baby about this- but the leaking is really affecting my QOL and I just don't think I can continue like that for 3 months.\"  Provided active listening and validation of feelings.  Commended him for advocating for himself.  Information:Wants full " disclosure  Goals: symptom control and cancer directed therapy  Worries and fears now and future: ongoing symptoms      Advance Directives  Existence of Advance Directives:  Not discussed  Decision maker: Surrogate decision maker is 4 adult children  Code Status: Full code     Supportive Interventions: Interventions: Music Therapy: referral placed, Art Therapy: referral placed, SPO Spiritual Care: referral placed     Disposition:  Please  start the process of having prior authorization with meds to beds deliver medications to patient prior to discharge via [facility] pharmacy. Prescriptions will need to be sent 48-72 hours prior to discharge so that a prior authorization can be completed.      Discharge date: unknown pending acute issues  Will assess if patient needs an appointment with Outpatient Supportive Oncology      Supportive and Palliative Oncology encounter:  Spoke with patient at bedside  Emotional support provided  Coordination of care:  medication changes    Signature and billing:  Medical complexity was high level due to due to complexity of problems, extensive data review, and high risk of management/treatment.  DATA   Diagnostic tests and information reviewed for today's visit:  Conversation with primary team, Most recent labs and imaging results, Medications       Some elements copied from my note on 1/14/25, the elements have been updated and all reflect current decision making from today, 1/15/2025.    Plan of Care discussed with: Provider, RN, Patient    Thank you for asking Supportive and Palliative Oncology to assist with care of this patient.  Recommendations will be communicated back to the consulting service by way of shared electronic medical record/secure chat/email or face-to-face.   We will continue to follow.  Please contact us for additional questions or concerns.      SIGNATURE: SALO Banegas  PAGER/CONTACT:  Contact information:  Supportive and Palliative  Oncology  Monday-Friday 8 AM-5 PM  Epic Secure chat or pager [phone].  After hours and weekends:  pager [phone]

## 2025-01-15 NOTE — CONSULTS
Wound Care Consult     Visit Date: 1/14/2025      Patient Name: Timoteo Victoria         MRN: [id number]           YOB: 1955     Reason for Consult: Moisture associated skin dermatitis around feeding tube             Pertinent Labs:   Albumin   Date Value Ref Range Status   01/14/2025 2.9 (L) 3.4 - 5.0 g/dL Final     ALBUMIN (MG/L) IN URINE   Date Value Ref Range Status   04/26/2021 <7.0 Not Established mg/L Final     Albumin, Urine Random   Date Value Ref Range Status   10/08/2024 7.7 Not established mg/L Final       Wound Assessment:  Wound 01/13/25 Moisture Associated Skin Damage Abdomen Left;Upper (Active)   Site Assessment Blanchable erythema;Denuded 01/14/25 1630   Wound Length (cm) 4 cm 01/14/25 1630   Wound Width (cm) 5 cm 01/14/25 1630   Wound Surface Area (cm^2) 20 cm^2 01/14/25 1630   Margins Undefined edges 01/14/25 1630   Drainage Description None 01/14/25 1630   Drainage Amount None 01/14/25 1630   Dressing Other (Comment) 01/14/25 1630   Dressing Changed Changed 01/14/25 1630   Dressing Status Clean;Dry 01/14/25 1630       Wound Team Summary Assessment: Per patient, has been dealing with constant leaking (saying he has to change drain sponge 2-3 times a day) and that tube is painful.  Tube is loose and mobile. Patient also has lost weight which could contribute to leakage.  Primary team alerted to concerns, recommend assessment of tube by surgery team.    Clean area with vashe (soak gauze with vashe and lay on denuded area for 3-5 minutes), cover with cavilon no sting skin barrier film and place mepilex lite under tube.  Secure tube with securement device or tape to keep from moving.  Change at least daily and as needed     Wound Team Plan: Wound care to sign off at this time.  Wound care may be reconsulted if wound worsens or new area of concern appears.      Indu Amos, MSN, RN, CWCN, COCN  01/14/25 7:04 PM

## 2025-01-15 NOTE — CARE PLAN
The patient's goals for the shift include      Problem: Fall/Injury  Goal: Not fall by end of shift  1/15/2025 0531 by Mikaela Franco RN  Outcome: Progressing  1/15/2025 0041 by Mikaela Franco RN  Outcome: Progressing  1/15/2025 0041 by Mikaela Franco, RN  Outcome: Progressing  Goal: Be free from injury by end of the shift  1/15/2025 0531 by Mikaela Franco, RN  Outcome: Progressing  1/15/2025 0041 by Mikaela Franco, RN  Outcome: Progressing  1/15/2025 0041 by Mikaela Franco RN  Outcome: Progressing  Goal: Verbalize understanding of personal risk factors for fall in the hospital  1/15/2025 0531 by Mikaela Franco RN  Outcome: Progressing  1/15/2025 0041 by Mikaela Franco, RN  Outcome: Progressing  1/15/2025 0041 by Mikaela Franco RN  Outcome: Progressing  Goal: Verbalize understanding of risk factor reduction measures to prevent injury from fall in the home  1/15/2025 0531 by Mikaela Franco, RN  Outcome: Progressing  1/15/2025 0041 by Mikaela Franco, RN  Outcome: Progressing  1/15/2025 0041 by Mikaela Franco RN  Outcome: Progressing  Goal: Use assistive devices by end of the shift  1/15/2025 0531 by Mikaela Franco, RN  Outcome: Progressing  1/15/2025 0041 by Mikaela Franco, RN  Outcome: Progressing  1/15/2025 0041 by Mikaela Franco, RN  Outcome: Progressing  Goal: Pace activities to prevent fatigue by end of the shift  1/15/2025 0531 by Mikaela Franco RN  Outcome: Progressing  1/15/2025 0041 by Mikaela Franco, RN  Outcome: Progressing  1/15/2025 0041 by Mikaela Franco, RN  Outcome: Progressing     Problem: Fall/Injury  Goal: Be free from injury by end of the shift  1/15/2025 0531 by Mikaela Franco, RN  Outcome: Progressing  1/15/2025 0041 by Mikaela Franco, RN  Outcome: Progressing  1/15/2025 0041 by Mikaela Franco RN  Outcome: Progressing     Problem: Fall/Injury  Goal: Verbalize understanding of personal risk factors for fall in the hospital  1/15/2025 0531 by Mikaela  KALLIE Franco  Outcome: Progressing  1/15/2025 0041 by Mikaela Franco RN  Outcome: Progressing  1/15/2025 0041 by Mikaela Franco RN  Outcome: Progressing     Problem: Fall/Injury  Goal: Verbalize understanding of risk factor reduction measures to prevent injury from fall in the home  1/15/2025 0531 by Mikaela Franco RN  Outcome: Progressing  1/15/2025 0041 by Mikaela Franco RN  Outcome: Progressing  1/15/2025 0041 by Mikaela Franco RN  Outcome: Progressing     Problem: Pain - Adult  Goal: Verbalizes/displays adequate comfort level or baseline comfort level  1/15/2025 0531 by Mikaela Franco RN  Outcome: Progressing  1/15/2025 0041 by Mikaela Franco RN  Outcome: Progressing  1/15/2025 0041 by Mikaela Franco RN  Outcome: Progressing     Problem: Safety - Adult  Goal: Free from fall injury  1/15/2025 0531 by Mikaela Franco RN  Outcome: Progressing  1/15/2025 0041 by Mikaela Franco RN  Outcome: Progressing  1/15/2025 0041 by Mikaela Franco RN  Outcome: Progressing     Problem: Discharge Planning  Goal: Discharge to home or other facility with appropriate resources  1/15/2025 0531 by Mikaela Franco RN  Outcome: Progressing  1/15/2025 0041 by Mikaela Franco RN  Outcome: Progressing  1/15/2025 0041 by Mikaela Franco RN  Outcome: Progressing     The clinical goals for the shift include Remain HDS

## 2025-01-15 NOTE — PROGRESS NOTES
Spiritual Care Visit  Spiritual Care Request    Reason for Visit:  Routine Visit: Introduction  Continue Visiting: Yes     Request Received From:  Referral From: Patient    Focus of Care:  Visited With: Patient         Refer to :  Referral To:        Spiritual Care Assessment    Spiritual Assessment:  Patient Spiritual Care Encounters  Fear Level: Moderate  Feelings of Loneliness: Fair  Feelings of Hopelessness: Fair  Coping: Sometimes demonstrated                   Care Provided:       Sense of Community and or Moravian Affiliation:  Christianity         Addressed Needs/Concerns and/or Oziel Through:  Moravian Encounters  Moravian Needs: Spiritual care brochure, Prayer       Outcome:  Outcome of Spiritual Care Visit: Other (Comment), Personal disclosure/revelation, Crisis subsided/resolved, Empowerment, Identifying spiritual/emotional issues (grief counseling)     Advance Directives:         Spiritual Care Annotation    Annotation:  Pt very approachable and engaged, does not like confrontational conversations.  Felt bad he asserted himself so to receive the care he needs.  In addition pt lives alone and has concerns in this regard.  Pt also has had several significant recent losses - support provided.  Plus pt doing life review and processing his regrets.  Supportive pastoral care provided.

## 2025-01-16 ENCOUNTER — APPOINTMENT (OUTPATIENT)
Dept: HEMATOLOGY/ONCOLOGY | Facility: HOSPITAL | Age: 70
End: 2025-01-16
Payer: MEDICARE

## 2025-01-16 ENCOUNTER — TELEPHONE (OUTPATIENT)
Dept: PALLIATIVE MEDICINE | Facility: CLINIC | Age: 70
End: 2025-01-16
Payer: MEDICARE

## 2025-01-16 ENCOUNTER — APPOINTMENT (OUTPATIENT)
Dept: SURGERY | Facility: HOSPITAL | Age: 70
End: 2025-01-16
Payer: MEDICARE

## 2025-01-16 DIAGNOSIS — C15.9 PRIMARY ESOPHAGEAL ADENOCARCINOMA (MULTI): Primary | ICD-10-CM

## 2025-01-16 LAB
ALBUMIN SERPL BCP-MCNC: 2.9 G/DL (ref 3.4–5)
ALP SERPL-CCNC: 55 U/L (ref 33–136)
ALT SERPL W P-5'-P-CCNC: 26 U/L (ref 10–52)
ANION GAP SERPL CALC-SCNC: 10 MMOL/L (ref 10–20)
AST SERPL W P-5'-P-CCNC: 21 U/L (ref 9–39)
BACTERIA BLD CULT: NORMAL
BACTERIA BLD CULT: NORMAL
BASOPHILS # BLD MANUAL: 0 X10*3/UL (ref 0–0.1)
BASOPHILS NFR BLD MANUAL: 0 %
BILIRUB SERPL-MCNC: 0.3 MG/DL (ref 0–1.2)
BUN SERPL-MCNC: 16 MG/DL (ref 6–23)
CALCIUM SERPL-MCNC: 8 MG/DL (ref 8.6–10.6)
CHLORIDE SERPL-SCNC: 104 MMOL/L (ref 98–107)
CO2 SERPL-SCNC: 26 MMOL/L (ref 21–32)
CREAT SERPL-MCNC: 1.12 MG/DL (ref 0.5–1.3)
EGFRCR SERPLBLD CKD-EPI 2021: 72 ML/MIN/1.73M*2
EOSINOPHIL # BLD MANUAL: 0.36 X10*3/UL (ref 0–0.7)
EOSINOPHIL NFR BLD MANUAL: 2.6 %
ERYTHROCYTE [DISTWIDTH] IN BLOOD BY AUTOMATED COUNT: 14.9 % (ref 11.5–14.5)
GLUCOSE BLD MANUAL STRIP-MCNC: 132 MG/DL (ref 74–99)
GLUCOSE BLD MANUAL STRIP-MCNC: 60 MG/DL (ref 74–99)
GLUCOSE BLD MANUAL STRIP-MCNC: 62 MG/DL (ref 74–99)
GLUCOSE SERPL-MCNC: 150 MG/DL (ref 74–99)
HCT VFR BLD AUTO: 29.3 % (ref 41–52)
HGB BLD-MCNC: 9.6 G/DL (ref 13.5–17.5)
IMM GRANULOCYTES # BLD AUTO: 1.04 X10*3/UL (ref 0–0.7)
IMM GRANULOCYTES NFR BLD AUTO: 7.6 % (ref 0–0.9)
LYMPHOCYTES # BLD MANUAL: 1.64 X10*3/UL (ref 1.2–4.8)
LYMPHOCYTES NFR BLD MANUAL: 12 %
MAGNESIUM SERPL-MCNC: 1.79 MG/DL (ref 1.6–2.4)
MCH RBC QN AUTO: 31 PG (ref 26–34)
MCHC RBC AUTO-ENTMCNC: 32.8 G/DL (ref 32–36)
MCV RBC AUTO: 95 FL (ref 80–100)
METAMYELOCYTES # BLD MANUAL: 0.23 X10*3/UL
METAMYELOCYTES NFR BLD MANUAL: 1.7 %
MONOCYTES # BLD MANUAL: 0.59 X10*3/UL (ref 0.1–1)
MONOCYTES NFR BLD MANUAL: 4.3 %
MYELOCYTES # BLD MANUAL: 0.12 X10*3/UL
MYELOCYTES NFR BLD MANUAL: 0.9 %
NEUTS SEG # BLD MANUAL: 9.95 X10*3/UL (ref 1.2–7)
NEUTS SEG NFR BLD MANUAL: 72.6 %
NRBC BLD-RTO: 0 /100 WBCS (ref 0–0)
OVALOCYTES BLD QL SMEAR: ABNORMAL
PLATELET # BLD AUTO: 150 X10*3/UL (ref 150–450)
POTASSIUM SERPL-SCNC: 3.9 MMOL/L (ref 3.5–5.3)
PROMYELOCYTES # BLD MANUAL: 0.11 X10*3/UL
PROMYELOCYTES NFR BLD MANUAL: 0.8 %
PROT SERPL-MCNC: 5 G/DL (ref 6.4–8.2)
RBC # BLD AUTO: 3.1 X10*6/UL (ref 4.5–5.9)
RBC MORPH BLD: ABNORMAL
SODIUM SERPL-SCNC: 136 MMOL/L (ref 136–145)
TOTAL CELLS COUNTED BLD: 117
VARIANT LYMPHS # BLD MANUAL: 0.7 X10*3/UL (ref 0–0.5)
VARIANT LYMPHS NFR BLD: 5.1 %
WBC # BLD AUTO: 13.7 X10*3/UL (ref 4.4–11.3)

## 2025-01-16 PROCEDURE — 36416 COLLJ CAPILLARY BLOOD SPEC: CPT | Performed by: NURSE PRACTITIONER

## 2025-01-16 PROCEDURE — 2500000001 HC RX 250 WO HCPCS SELF ADMINISTERED DRUGS (ALT 637 FOR MEDICARE OP)

## 2025-01-16 PROCEDURE — 82947 ASSAY GLUCOSE BLOOD QUANT: CPT

## 2025-01-16 PROCEDURE — 99233 SBSQ HOSP IP/OBS HIGH 50: CPT

## 2025-01-16 PROCEDURE — 2500000001 HC RX 250 WO HCPCS SELF ADMINISTERED DRUGS (ALT 637 FOR MEDICARE OP): Performed by: NURSE PRACTITIONER

## 2025-01-16 PROCEDURE — 85027 COMPLETE CBC AUTOMATED: CPT | Performed by: NURSE PRACTITIONER

## 2025-01-16 PROCEDURE — 2500000002 HC RX 250 W HCPCS SELF ADMINISTERED DRUGS (ALT 637 FOR MEDICARE OP, ALT 636 FOR OP/ED)

## 2025-01-16 PROCEDURE — 99222 1ST HOSP IP/OBS MODERATE 55: CPT | Performed by: INTERNAL MEDICINE

## 2025-01-16 PROCEDURE — 83735 ASSAY OF MAGNESIUM: CPT | Performed by: NURSE PRACTITIONER

## 2025-01-16 PROCEDURE — 1170000001 HC PRIVATE ONCOLOGY ROOM DAILY

## 2025-01-16 PROCEDURE — 80053 COMPREHEN METABOLIC PANEL: CPT | Performed by: NURSE PRACTITIONER

## 2025-01-16 PROCEDURE — 2500000004 HC RX 250 GENERAL PHARMACY W/ HCPCS (ALT 636 FOR OP/ED)

## 2025-01-16 PROCEDURE — 85007 BL SMEAR W/DIFF WBC COUNT: CPT | Performed by: NURSE PRACTITIONER

## 2025-01-16 PROCEDURE — 2500000005 HC RX 250 GENERAL PHARMACY W/O HCPCS

## 2025-01-16 RX ORDER — GUAIFENESIN 100 MG/5ML
200 SOLUTION ORAL EVERY 4 HOURS PRN
Status: DISCONTINUED | OUTPATIENT
Start: 2025-01-16 | End: 2025-01-18 | Stop reason: HOSPADM

## 2025-01-16 RX ORDER — OXYCODONE HCL 5 MG/5 ML
5 SOLUTION, ORAL ORAL EVERY 4 HOURS PRN
Status: DISCONTINUED | OUTPATIENT
Start: 2025-01-16 | End: 2025-01-18 | Stop reason: HOSPADM

## 2025-01-16 RX ORDER — OXYCODONE HCL 5 MG/5 ML
10 SOLUTION, ORAL ORAL EVERY 4 HOURS PRN
Status: DISCONTINUED | OUTPATIENT
Start: 2025-01-16 | End: 2025-01-16

## 2025-01-16 RX ORDER — OXYCODONE HCL 5 MG/5 ML
5 SOLUTION, ORAL ORAL EVERY 4 HOURS PRN
Status: DISCONTINUED | OUTPATIENT
Start: 2025-01-16 | End: 2025-01-16

## 2025-01-16 RX ORDER — SULFAMETHOXAZOLE AND TRIMETHOPRIM 200; 40 MG/5ML; MG/5ML
320 SUSPENSION ORAL EVERY 8 HOURS
Status: DISCONTINUED | OUTPATIENT
Start: 2025-01-16 | End: 2025-01-18 | Stop reason: HOSPADM

## 2025-01-16 RX ORDER — DEXTROSE 50 % IN WATER (D50W) INTRAVENOUS SYRINGE
25
Status: DISCONTINUED | OUTPATIENT
Start: 2025-01-16 | End: 2025-01-18 | Stop reason: HOSPADM

## 2025-01-16 RX ORDER — SULFAMETHOXAZOLE AND TRIMETHOPRIM 200; 40 MG/5ML; MG/5ML
320 SUSPENSION ORAL EVERY 8 HOURS
Status: DISCONTINUED | OUTPATIENT
Start: 2025-01-16 | End: 2025-01-16

## 2025-01-16 RX ORDER — DEXTROSE 50 % IN WATER (D50W) INTRAVENOUS SYRINGE
12.5
Status: DISCONTINUED | OUTPATIENT
Start: 2025-01-16 | End: 2025-01-18 | Stop reason: HOSPADM

## 2025-01-16 RX ORDER — OXYCODONE HCL 5 MG/5 ML
10 SOLUTION, ORAL ORAL EVERY 4 HOURS PRN
Status: DISCONTINUED | OUTPATIENT
Start: 2025-01-16 | End: 2025-01-18 | Stop reason: HOSPADM

## 2025-01-16 RX ORDER — DEXTROSE MONOHYDRATE AND SODIUM CHLORIDE 5; .45 G/100ML; G/100ML
75 INJECTION, SOLUTION INTRAVENOUS CONTINUOUS
Status: ACTIVE | OUTPATIENT
Start: 2025-01-16 | End: 2025-01-16

## 2025-01-16 RX ORDER — LACTULOSE 10 G/15ML
20 SOLUTION ORAL ONCE
Status: DISCONTINUED | OUTPATIENT
Start: 2025-01-16 | End: 2025-01-18 | Stop reason: HOSPADM

## 2025-01-16 RX ADMIN — POLYETHYLENE GLYCOL 3350 17 G: 17 POWDER, FOR SOLUTION ORAL at 20:54

## 2025-01-16 RX ADMIN — DEXTROSE AND SODIUM CHLORIDE 75 ML/HR: 5; 450 INJECTION, SOLUTION INTRAVENOUS at 07:57

## 2025-01-16 RX ADMIN — ACETAMINOPHEN 650 MG: 160 SOLUTION ORAL at 06:25

## 2025-01-16 RX ADMIN — SULFAMETHOXAZOLE AND TRIMETHOPRIM 320 MG OF TRIMETHOPRIM: 200; 40 SUSPENSION ORAL at 18:03

## 2025-01-16 RX ADMIN — BUPROPION HYDROCHLORIDE 150 MG: 75 TABLET, FILM COATED ORAL at 20:54

## 2025-01-16 RX ADMIN — DEXTROSE MONOHYDRATE 12.5 G: 25 INJECTION, SOLUTION INTRAVENOUS at 06:57

## 2025-01-16 RX ADMIN — ENOXAPARIN SODIUM 40 MG: 40 INJECTION SUBCUTANEOUS at 10:00

## 2025-01-16 RX ADMIN — SENNOSIDES 5 ML: 8.8 LIQUID ORAL at 20:53

## 2025-01-16 RX ADMIN — ACETAMINOPHEN 650 MG: 160 SOLUTION ORAL at 20:53

## 2025-01-16 RX ADMIN — ASPIRIN 81 MG CHEWABLE TABLET 81 MG: 81 TABLET CHEWABLE at 11:11

## 2025-01-16 RX ADMIN — ACETAMINOPHEN 650 MG: 160 SOLUTION ORAL at 14:47

## 2025-01-16 RX ADMIN — OXYCODONE HYDROCHLORIDE 5 MG: 5 SOLUTION ORAL at 18:15

## 2025-01-16 RX ADMIN — VERAPAMIL HYDROCHLORIDE 80 MG: 80 TABLET ORAL at 06:25

## 2025-01-16 RX ADMIN — OXYCODONE HYDROCHLORIDE 10 MG: 5 TABLET ORAL at 06:39

## 2025-01-16 RX ADMIN — POLYETHYLENE GLYCOL 3350 17 G: 17 POWDER, FOR SOLUTION ORAL at 12:00

## 2025-01-16 RX ADMIN — ATORVASTATIN CALCIUM 40 MG: 40 TABLET, FILM COATED ORAL at 11:12

## 2025-01-16 RX ADMIN — VERAPAMIL HYDROCHLORIDE 80 MG: 80 TABLET ORAL at 21:01

## 2025-01-16 RX ADMIN — ACETAMINOPHEN 650 MG: 160 SOLUTION ORAL at 02:13

## 2025-01-16 RX ADMIN — VERAPAMIL HYDROCHLORIDE 80 MG: 80 TABLET ORAL at 14:08

## 2025-01-16 RX ADMIN — AMITRIPTYLINE HYDROCHLORIDE 100 MG: 100 TABLET ORAL at 20:54

## 2025-01-16 RX ADMIN — GABAPENTIN 300 MG: 250 SOLUTION ORAL at 20:53

## 2025-01-16 RX ADMIN — LEVOTHYROXINE SODIUM 175 MCG: 0.1 TABLET ORAL at 06:25

## 2025-01-16 RX ADMIN — DOXYCYCLINE HYCLATE 100 MG: 100 TABLET, COATED ORAL at 11:33

## 2025-01-16 ASSESSMENT — COGNITIVE AND FUNCTIONAL STATUS - GENERAL
MOBILITY SCORE: 24
MOBILITY SCORE: 24
DAILY ACTIVITIY SCORE: 24

## 2025-01-16 ASSESSMENT — PAIN SCALES - GENERAL
PAINLEVEL_OUTOF10: 0 - NO PAIN
PAINLEVEL_OUTOF10: 7
PAINLEVEL_OUTOF10: 7
PAINLEVEL_OUTOF10: 2
PAINLEVEL_OUTOF10: 2

## 2025-01-16 ASSESSMENT — PAIN - FUNCTIONAL ASSESSMENT
PAIN_FUNCTIONAL_ASSESSMENT: 0-10

## 2025-01-16 ASSESSMENT — PAIN DESCRIPTION - DESCRIPTORS
DESCRIPTORS: TENDER
DESCRIPTORS: TENDER

## 2025-01-16 NOTE — CONSULTS
Inpatient consult to Infectious Diseases  Consult performed by: Ramona Almaraz MD  Consult ordered by: Ashley lElis PA-C        Primary MD: Dafne Bedolla DO  Reason For Consult Co-management of possible J tube site infection     History Of Present Illness  Timoteo Victoria is a 69 y.o. male with PMHx of recently diagnosed stage III esophageal adenocarcinoma (dx 12/2024, s/p 1 cycle FLOT, no metastasis on PET CT 12/20), CAD s/p 4x CABG (~2005) presented to the ED with weakness, and pain and thick yellow drainage around J tube site on 1/12.     Patient originally presented with increased drainage from J tube site since T fasteners were removed. Patient was treated with a 1 week keflex course (1/6/25- 1/12/25) by outpatient surgery team for superficial wound infection, which was not helpful to improve. On 1/9, patient felt weak and decreased PO intake, although he was taking oral supplementation through his J-tube. Patient was afebrile. WBC was 16.0. Patient was started on vancomycin and cefepime and admitted for management of possible J tube infection. Switched to po doxycycline given purulent cellulitis per primary team. Wound culture 1/13 grew with Steno and Serratia. Blood culture was negative 1/12. CT A/P 1/15 showed left lower quadrant end jejunostomy without surrounding inflammatory changes or fluid collection to suggest abscess.    Patient had originally J tube placed 11/25/24 for progressive dysphagia by thoracic surgery. CTS was consulted (1/13) for consideration of J tube upsizing as pt with persistent feeding tube leakage. Recommended local wound care and barrier cream at J tube site, no further imaging needed, no surgical intervention needed.  On 1/15, J tube was exchanged at bedside and sutured in place to assist with closing up the site and hopefully prevent leakage.     Past Medical History  He has a past medical history of Hyperlipidemia, Hypertension, Personal history of other endocrine,  nutritional and metabolic disease, and Type 2 diabetes mellitus.    Surgical History  He has a past surgical history that includes Cholecystectomy (09/18/2018); Hernia repair (09/18/2018); Coronary artery bypass graft (09/18/2018); and Cataract extraction (Right, 08/25/2023).   Allergies  Ibuprofen   Objective  Range of Vitals (last 24 hours)  Heart Rate:  [66-75]   Temp:  [36 °C (96.8 °F)-37 °C (98.6 °F)]   Resp:  [16-19]   BP: (113-139)/(50-77)   SpO2:  [96 %-100 %]   Daily Weight  01/12/25 : 88.9 kg (196 lb)    Body mass index is 28.12 kg/m².     Physical Exam  General: alert, able to answer questions  HEENT: no conjunctival injection. anicteric.  CVS: RRR. Normal S1 and S2. No m/r/g.   RESP: ctab no w/r/r, no increased wob  Abd: Soft and lax. ND.   Ext: No swelling of the LE b/l.   Neuro: Answers questions appropriately.  Integumentary: 9x 4 cm erythema surrounding J tube site  Rheumatologic: No joint swelling or edema    Relevant Results    Labs  Results from last 72 hours   Lab Units 01/15/25  0637 01/14/25  0720   WBC AUTO x10*3/uL 14.1* 14.9*   HEMOGLOBIN g/dL 10.5* 9.5*   HEMATOCRIT % 30.8* 27.7*   PLATELETS AUTO x10*3/uL 176 170   LYMPHO PCT MAN % 14.9 22.8   MONO PCT MAN % 2.6 4.4   EOSINO PCT MAN % 0.9 0.0     Results from last 72 hours   Lab Units 01/15/25  0637 01/14/25  0720 01/13/25  1158   SODIUM mmol/L 139 135* 133*   POTASSIUM mmol/L 4.3 3.7 4.5   CHLORIDE mmol/L 110* 105 102   CO2 mmol/L 20* 26 22   BUN mg/dL 21 24* 33*   CREATININE mg/dL 1.02 1.05 1.12   GLUCOSE mg/dL 97 72* 82   CALCIUM mg/dL 8.3* 8.0* 8.1*   ANION GAP mmol/L 13 8* 14   EGFR mL/min/1.73m*2 80 77 72     Results from last 72 hours   Lab Units 01/15/25  0637 01/14/25  0720 01/13/25  1158   ALK PHOS U/L 67 56 52   BILIRUBIN TOTAL mg/dL 0.2 0.3 0.4   PROTEIN TOTAL g/dL 5.3* 4.9* 4.8*   ALT U/L 29 28 33   AST U/L 19 16 19   ALBUMIN g/dL 3.2* 2.9* 3.0*     Estimated Creatinine Clearance: 77.8 mL/min (by C-G formula based on SCr of  "1.02 mg/dL).  No results found for: \"CRP\", \"SEDRATE\"  No results found for: \"HIV1X2\", \"HIVCONF\", \"[id number]\"  Hepatitis C Ab   Date Value Ref Range Status   11/28/2022 NONREACTIVE NONREACTIVE Final     Comment:      Results from patients taking biotin supplements or receiving   high-dose biotin therapy should be interpreted with caution   due to possible interference with this test. Providers may    contact their local laboratory for further information.       Microbiology  Susceptibility data from last 90 days.  Collected Specimen Info Organism   01/13/25 Tissue/Biopsy from Other (specify in comments) Serratia marcescens group     Stenotrophomonas maltophilia group   Imaging  CT A/P w/contrast 1/15   IMPRESSION:  1.  Left lower quadrant end jejunostomy without surrounding  inflammatory changes or fluid collection to suggest abscess.  2. Increased prominence of left pleural effusion.  3. Indeterminate right middle lobe dystrophic calcification, too  small to characterize. Recommend follow-up surveillance chest CT in 3  months.  4. Enlarged prostate, correlate with PSA as clinically indicated.  5. Remaining chronic and nonacute findings are described above.    Microbiology  1/12 Blood NGTD     Antimicrobial agents  1/12-1/13 Vancomycin   1/12-1/13 Cefepime  1/14- Doxycycline    Assessment/Plan  # Stenotrophomonas maltophilia and S. Marcescens cellulitis surrounding J tube site  # Esophageal adenocarcinoma s/p chemothrapy    A 70 yo male with esophageal adenocarcinoma s/p chemothrapy (1 cycle) developed cellulitis surrounding J tube site, which was not improved after 1 week Keflex. Still known leakage and J tube was exchanged and sutured in place to assist with closing up the site and prevent leakage on 1/15. Blood culture was negative 1/12. CT did not show any fluid collection. Wound culture grew with Stenotrophomonas maltophilia (S; bactrim) and S. Marcescens (Susceptibility pending).     Would be reasonable to " treat cellulitis without complication. Can stop doxycycline. Would recommend start liquid bactrim. Will need to confirm the coverage of Serratia (usually susceptible). CT A/P 1/15 showed left lower quadrant end jejunostomy without surrounding inflammatory changes or fluid collection to suggest abscess.    Recommendations  -Stop doxycycline  -Start Bactrim liquid 320mg q8h via J tube  -Anticipate 10-14 days   -Will follow wound culture finalized     Discussed with the team and Dr. Salcido. Please text me via Epic chat if you have any questions or concerns regarding this patient. ID will continue to follow up this patient.    Ramona Almaraz MD  ID [id number]  Team A   ID pager [phone]

## 2025-01-16 NOTE — PROGRESS NOTES
Timoteo Victoria is a 69 y.o. male on day 4 of admission presenting with Hypotension, unspecified hypotension type.  SUPPORTIVE AND PALLIATIVE ONCOLOGY PROGRESS NOTE      SERVICE DATE: 1/16/2025      SUBJECTIVE:  Interval Events:  Pt seen this AM laying in bed.  Reports improvement in drainage and pain since tube was exchanged yesterday.    Still no BM- has refused miralax but agreed to take it now.    Pain Assessment:  Location:  PEG tube site  Duration: Constant  Characteristics:              Rating: Moderate              Descriptors: throbbing, sharp, and burning              Aggravating: movement               Relieving: Analgesics, Positioning, and Modifying activity              Intolerances:Timoteo Victoria is allergic to ibuprofen.              Interference with Function: Somewhat    Opioid Requirements  Past 24 h opioid requirements (01/15/25 at 0800 to 01/16/25 at 0800):   Oxycodone IR 10 mg PO x 3 doses = 30 mg = 45 OME  Oxycodone IR 5 mg PO x 1 doses = 5 mg = 7.5 OME  Total 24h OME use:  52.5    Symptom Assessment:  Constipation very much   Anxiety a little  Lack of energy somewhat  Shortness of breath none    Information obtained from: chart review, interview of patient, discussion with RN, and discussion with primary team  ______________________________________________________________________        Objective       Lab Results   Component Value Date    WBC 13.7 (H) 01/16/2025    HGB 9.6 (L) 01/16/2025    HCT 29.3 (L) 01/16/2025    MCV 95 01/16/2025     01/16/2025      Lab Results   Component Value Date    GLUCOSE 150 (H) 01/16/2025    CALCIUM 8.0 (L) 01/16/2025     01/16/2025    K 3.9 01/16/2025    CO2 26 01/16/2025     01/16/2025    BUN 16 01/16/2025    CREATININE 1.12 01/16/2025     Lab Results   Component Value Date    ALT 26 01/16/2025    AST 21 01/16/2025    ALKPHOS 55 01/16/2025    BILITOT 0.3 01/16/2025     Estimated Creatinine Clearance: 70.9 mL/min (by C-G formula based on  SCr of 1.12 mg/dL).       Scheduled medications   acetaminophen, 650 mg, oral, q6h  amitriptyline, 100 mg, j-tube, Nightly  aspirin, 81 mg, j-tube, Daily  atorvastatin, 40 mg, j-tube, Daily  buPROPion, 150 mg, j-tube, BID  cholecalciferol, 2,000 Units, oral, Daily  doxycylcine, 100 mg, oral, q12h OZIEL  enoxaparin, 40 mg, subcutaneous, q24h  esomeprazole, 40 mg, j-tube, Daily before breakfast  gabapentin, 300 mg, oral, Nightly  levothyroxine, 175 mcg, j-tube, Daily  [Held by provider] losartan, 100 mg, j-tube, Daily  polyethylene glycol, 17 g, oral, BID  senna, 5 mL, j-tube, BID  spironolactone, 50 mg, oral, Daily  verapamil, 80 mg, j-tube, q8h OZIEL      Continuous medications  dextrose 5%-0.45 % sodium chloride, 75 mL/hr, Last Rate: 75 mL/hr (01/16/25 0757)      PRN medications  alteplase, 2 mg, PRN  dextrose, 12.5 g, q15 min PRN  dextrose, 25 g, q15 min PRN  glucagon, 1 mg, q15 min PRN  glucagon, 1 mg, q15 min PRN  guaiFENesin, 200 mg, q4h PRN  moisturizing mouth, 15 mL, TID PRN  ondansetron, 4 mg, q6h PRN  oxyCODONE, 10 mg, q4h PRN  oxyCODONE, 5 mg, q4h PRN                    PHYSICAL EXAMINATION:  Vital Signs:   Vital signs reviewed  Vitals:    01/16/25 1130   BP: 117/64   Pulse: 61   Resp: 20   Temp: 36.7 °C (98.1 °F)   SpO2: 98%     Pain Score: 0 - No pain     Physical Exam  Well-developed [place] M Laying in bed, NAD  A&O x 3, pleasant and cooperative with interview & exam  Breathing comfortably on RA  Abd soft, ND, +j tube with erythema and +ttp  No edema in ex    ASSESSMENT/PLAN:  Timoteo Victoria is a 69 y.o. male diagnosed with recently dx esophageal adenocarcinoma. PMH significant for CAD s/p 4x CABG (~2005), HTN, HLD, hx of cluster HA, hypothyroidism. Admitted 1/12/2025 for further evaluation and management of weakness, SOB and PEG drainage, pain. Course complicated by hypotension. Supportive and Palliative Oncology is consulted for pain management.      Pain:  ABD pain at PEG site related to drainage,  "infection  Pain is: cancer related pain  Type: somatic  Pain control: sub-optimally controlled  Home regimen:  Acetaminophen 650mg, 50mg tramadol  Intolerances/previously tried: N/A  Total OME usage for the past 24 hours:  52.5  ABX as per primary team  Continue 650mg tylenol q6h scheduled  Continue 10mg oxycodone q4h PRN for severe pain  Continue 5mg oxycodone q4h PRN for moderate pain  Continue 300mg gabapentin at bedtime     Nausea:  At risk for nausea with vomiting related to opioids   Home regimen:  Ondansetron   Well-controlled  Continue 4mg zofran q4h PRN     Constipation  At risk for constipation related to medication side effects (including opioids), currently constipated  Usual bowel pattern: every 2-3 days  Home regimen: Miralax 17 gm daily  LBM 1/11  Continue miralax BID  Continue senna-s to 5mL sennasides liquid to BID  Give suppository if no BM today  Goal to have BM without straining q48-72h, adjust regimen as needed     Decreased appetite:  Appetite loss related to malignancy and disease process  Nutrition following  Weight loss 50 lbs in 6 mos  Home regimen:   TF  Defer to Nutrition on TF     Medical Decision Making/Goals of Care/Advance Care Planning:  Patient's current clinical condition, including diagnosis, prognosis, and management plan, and goals of care were discussed.   Life limiting disease: malignancy  Family: Supportive children  Performance status: Moderate limitations due to pain  Joys/meaning/strength: Family  Understanding of health:   1/14/25: Demonstrates good understanding of disease process, understands PEG is being evaluated for infection and leaking.  He is also being worked up for hypotension.  Pt wants leaking improved prior to discharge.  Pt has 4 more chemo sessions, then surgery, then more chemo after.  1/15/25:  Pt expressed worry/ anxiety stating \"Maybe I'm just being a baby about this- but the leaking is really affecting my QOL and I just don't think I can continue like " "that for 3 months.\"  Provided active listening and validation of feelings.  Commended him for advocating for himself.  Information:Wants full disclosure  Goals: symptom control and cancer directed therapy  Worries and fears now and future: ongoing symptoms      Advance Directives  Existence of Advance Directives:  Not discussed  Decision maker: Surrogate decision maker is 4 adult children  Code Status: Full code     Supportive Interventions: Interventions: Music Therapy: referral placed, Art Therapy: referral placed, [facility] Spiritual Care: referral placed     Disposition:  Please  start the process of having prior authorization with meds to beds deliver medications to patient prior to discharge via [facility] pharmacy. Prescriptions will need to be sent 48-72 hours prior to discharge so that a prior authorization can be completed.       Discharge date: unknown pending acute issues  Have requested an appointment with Outpatient Supportive Oncology     Supportive and Palliative Oncology encounter:  Spoke with patient at bedside  Emotional support provided  Coordination of care:  home-going prescription recommendations    Signature and billing:  Medical complexity was high level due to due to complexity of problems, extensive data review, and high risk of management/treatment.    DATA   Diagnostic tests and information reviewed for today's visit:  Conversation with primary team, Most recent labs and imaging results, Medications       Some elements copied from my note on 1/15/25, the elements have been updated and all reflect current decision making from today, 1/16/2025.    Plan of Care discussed with: Provider, RN, Patient    Thank you for asking Supportive and Palliative Oncology to assist with care of this patient.  Recommendations will be communicated back to the consulting service by way of shared electronic medical record/secure chat/email or face-to-face.   We will continue to follow.  Please contact us for additional " questions or concerns.      SIGNATURE: SALO Banegas  PAGER/CONTACT:  Contact information:  Supportive and Palliative Oncology  Monday-Friday 8 AM-5 PM  Epic Secure chat or pager [phone].  After hours and weekends:  pager [phone]

## 2025-01-16 NOTE — CARE PLAN
Problem: Fall/Injury  Goal: Not fall by end of shift  Outcome: Progressing  Goal: Be free from injury by end of the shift  Outcome: Progressing  Goal: Verbalize understanding of personal risk factors for fall in the hospital  Outcome: Progressing  Goal: Verbalize understanding of risk factor reduction measures to prevent injury from fall in the home  Outcome: Progressing  Goal: Use assistive devices by end of the shift  Outcome: Progressing  Goal: Pace activities to prevent fatigue by end of the shift  Outcome: Progressing     Problem: Pain - Adult  Goal: Verbalizes/displays adequate comfort level or baseline comfort level  Outcome: Progressing   The patient's goals for the shift include      The clinical goals for the shift include Remain HDS

## 2025-01-16 NOTE — CARE PLAN
Problem: Fall/Injury  Goal: Not fall by end of shift  1/16/2025 0343 by Taty Little RN  Outcome: Progressing  1/16/2025 0000 by Taty Little RN  Outcome: Progressing  Goal: Be free from injury by end of the shift  1/16/2025 0343 by Taty Little RN  Outcome: Progressing  1/16/2025 0000 by Taty Little RN  Outcome: Progressing  Goal: Verbalize understanding of personal risk factors for fall in the hospital  1/16/2025 0343 by Taty Little RN  Outcome: Progressing  1/16/2025 0000 by Taty Little RN  Outcome: Progressing  Goal: Verbalize understanding of risk factor reduction measures to prevent injury from fall in the home  1/16/2025 0343 by Taty Little RN  Outcome: Progressing  1/16/2025 0000 by Taty Little RN  Outcome: Progressing  Goal: Use assistive devices by end of the shift  1/16/2025 0343 by Taty Little RN  Outcome: Progressing  1/16/2025 0000 by Taty Little RN  Outcome: Progressing  Goal: Pace activities to prevent fatigue by end of the shift  1/16/2025 0343 by Taty Little RN  Outcome: Progressing  1/16/2025 0000 by Taty Little RN  Outcome: Progressing     Problem: Pain - Adult  Goal: Verbalizes/displays adequate comfort level or baseline comfort level  1/16/2025 0343 by Taty Little RN  Outcome: Progressing  1/16/2025 0000 by Taty Little RN  Outcome: Progressing     Problem: Safety - Adult  Goal: Free from fall injury  1/16/2025 0343 by Taty Little RN  Outcome: Progressing  1/16/2025 0000 by Taty Little RN  Outcome: Progressing     Problem: Pain  Goal: Takes deep breaths with improved pain control throughout the shift  1/16/2025 0343 by Taty Little RN  Outcome: Progressing  1/16/2025 0000 by Taty Little RN  Outcome: Progressing  Goal: Turns in bed with improved pain control throughout the shift  1/16/2025 0343 by Taty Little RN  Outcome: Progressing  1/16/2025 0000  by Taty Little RN  Outcome: Progressing  Goal: Walks with improved pain control throughout the shift  1/16/2025 0343 by Taty Little RN  Outcome: Progressing  1/16/2025 0000 by Taty Little RN  Outcome: Progressing  Goal: Performs ADL's with improved pain control throughout shift  1/16/2025 0343 by Taty Little RN  Outcome: Progressing  1/16/2025 0000 by Taty Little RN  Outcome: Progressing  Goal: Participates in PT with improved pain control throughout the shift  1/16/2025 0343 by Taty Little RN  Outcome: Progressing  1/16/2025 0000 by Taty Little RN  Outcome: Progressing  Goal: Free from opioid side effects throughout the shift  1/16/2025 0343 by Taty Little RN  Outcome: Progressing  1/16/2025 0000 by Taty Little RN  Outcome: Progressing  Goal: Free from acute confusion related to pain meds throughout the shift  1/16/2025 0343 by Taty Little RN  Outcome: Progressing  1/16/2025 0000 by Taty Little RN  Outcome: Progressing   The patient's goals for the shift include      The clinical goals for the shift include pt will remain HDS through EOS

## 2025-01-16 NOTE — PROGRESS NOTES
Timoteo Victoria is a 69 y.o. male on day 4 of admission presenting with Hypotension, unspecified hypotension type.    Subjective   Timoteo is doing fine this morning. We discussed waiting for tube study Xray to be read to be able to start his TF, xray was read and J tube is correctly placed. States his J tube feels much more comfortable since thoracic surgery upsized it yesterday. He states he also felt a little light headed when taking the 10mg of oxy, but does not feel that way when taking the 5mg. He states his pain has improved since upsizing his tube, we discussed getting rid of 10mg oxy, pt agreed. Admits to some congestion, will start robitussin PRN.     Denies any HA, fevers/chills, cough, rhinorrhea, sore throat, SOB, CP, palpitations, abdominal pain, n/v/d/c, melena, dysuria, urgency/frequency, hematuria, numbness/tingling, bruising/bleeding or rashes. Denies any sick contacts. ROS otherwise negative.       Objective     Physical Exam  Vitals reviewed.   Constitutional:       Appearance: Normal appearance.   HENT:      Head: Normocephalic and atraumatic.      Nose: Nose normal.      Mouth/Throat:      Mouth: Mucous membranes are moist.      Pharynx: Oropharynx is clear.   Eyes:      Extraocular Movements: Extraocular movements intact.      Pupils: Pupils are equal, round, and reactive to light.   Cardiovascular:      Rate and Rhythm: Normal rate and regular rhythm.      Pulses: Normal pulses.      Heart sounds: Normal heart sounds.   Pulmonary:      Effort: Pulmonary effort is normal.      Breath sounds: Normal breath sounds.   Abdominal:      General: Bowel sounds are normal.      Palpations: Abdomen is soft.      Comments: J tube   Musculoskeletal:         General: Normal range of motion.   Skin:     General: Skin is warm.      Comments: LLQ J tube with surrounding edema and crusting. Thick yellow drainage present. Erythema in previous tape locations   Neurological:      General: No focal deficit  "present.      Mental Status: He is alert and oriented to person, place, and time. Mental status is at baseline.   Psychiatric:         Mood and Affect: Mood normal.         Behavior: Behavior normal.       Last Recorded Vitals  Blood pressure 134/68, pulse 68, temperature 36.5 °C (97.7 °F), temperature source Temporal, resp. rate 18, height 1.778 m (5' 10\"), weight 88.9 kg (196 lb), SpO2 97%.  Intake/Output last 3 Shifts:  I/O last 3 completed shifts:  In: 931.3 (10.5 mL/kg) [I.V.:611.3 (6.9 mL/kg); NG/GT:320]  Out: - (0 mL/kg)   Weight: 88.9 kg      Imaging  XR abdomen 1 view    Result Date: 1/16/2025  Interpreted By:  Isaías Lane,  and Xavier Lofton STUDY: XR ABDOMEN 1 VIEW;  1/15/2025 6:19 pm   INDICATION: Signs/Symptoms:tube study (60 cc gastrograffin with 30cc water through tube followed by KUB at bedside), checking for placement.     COMPARISON: Abdominal x-ray from 01/13/2025   ACCESSION NUMBER(S): [id number]   ORDERING CLINICIAN: DAYNE TOVAR   FINDINGS: Nonobstructive bowel gas pattern. There has been injection of contrast via a J-tube with contrast located within small bowel without extravasation. Contrast is seen within the urinary bladder consistent with a recent CT scan. Limited evaluation of pneumoperitoneum on supine imaging, however no gross evidence of free air is noted.   Visualized lungs bases are clear.   Osseous structures demonstrate no acute bony changes.       1.  Injection of contrast via J-tube with contrast in expected location of small bowel without extravasation.     I personally reviewed the images/study and I agree with the findings as stated by Rolly Park MD, PGY-2 this study was interpreted at [facility], [place], [place].   MACRO: None   Signed by: Isaías Lane 1/16/2025 12:05 PM Dictation workstation:   [id number]    CT abdomen pelvis w IV contrast    Result Date: 1/15/2025  Interpreted By:  Raimundo De Los Santos, STUDY: CT ABDOMEN " PELVIS W IV CONTRAST;  1/15/2025 1:36 pm   INDICATION: Signs/Symptoms:continued J tube pain, c/f abscess.     COMPARISON: CT on 11/22/2024 PET-CT on 12/2024.   ACCESSION NUMBER(S): [id number]   ORDERING CLINICIAN: DAYNE TOVAR   TECHNIQUE: CT of the abdomen and pelvis was performed.  Standard contiguous axial images were obtained at 3 mm slice thickness through the abdomen and pelvis. Coronal and sagittal reconstructions at 3 mm slice thickness were performed.  75 ML of Omnipaque 350 was administered intravenously without immediate complication.   FINDINGS: Median sternotomy wires are visualized on  imaging..   LOWER CHEST: Small left pleural effusion. Right middle lobe cluster of calcification too small to characterize.   Coronary artery calcifications/stents again visualized.   The heart is normal in size without pericardial effusion. No pleural effusion is present.   The visualized distal esophagus again demonstrates small hiatal as well as asymmetric thickening of the distal esophagus in keeping with recent diagnosis of esophageal cancer. This thickening extends approximately 7.2 cm which is similar to prior PET-CT previously measuring 7.0 cm.   ABDOMEN:   LIVER: The liver is normal in size without evidence of focal liver lesions.   BILE DUCTS: The intrahepatic and extrahepatic ducts are not dilated.   GALLBLADDER: The gallbladder is surgically absent.   PANCREAS: Within normal limits.   SPLEEN: Within normal limits.   ADRENAL GLANDS: Bilateral adrenal glands appear normal.   KIDNEYS AND URETERS: The kidneys are normal in size and enhance symmetrically. Left inferior pole renal cyst (series 2, image 71) measuring up to 3.3 cm. Right peripelvic cyst measuring 1.2 cm and adjacent exophytic 1.4 cm cyst (series 2, image 64). No hydroureteronephrosis or nephroureterolithiasis is identified.   PELVIS:   BLADDER: The urinary bladder appears normal without abnormal wall thickening.   REPRODUCTIVE ORGANS:  Enlarged prostate measuring 4.6 cm.   BOWEL: Lower quadrant end jejunostomy is again visualized without significant wall thickening or inflammatory changes. There is no adjacent fluid collection.     The stomach is nondistended similar limiting evaluation, but again demonstrates prominent rugal folds. There is a small hiatal hernia with distal esophageal thickening in keeping with diagnosis of esophageal cancer. The appendix is not definitely visualized. There is however no pericecal stranding or fluid.   There is prominent formed stool visualized in the rectum as well as moderate stool burden visualized along the colon.   VESSELS: There is no aneurysmal dilatation of the abdominal aorta. The IVC appears normal. Moderate-to-severe calcified atherosclerosis of the abdominal aorta and its branches.   PERITONEUM/RETROPERITONEUM/LYMPH NODES: No ascites or free air, no fluid collection.  No abdominopelvic lymphadenopathy is present.   BONES AND ABDOMINAL WALL: No suspicious osseous lesions are identified.  Postsurgical changes from mesh repair of a ventral abdominal hernia, similar to prior.       1.  Left lower quadrant end jejunostomy without surrounding inflammatory changes or fluid collection to suggest abscess. 2. Increased prominence of left pleural effusion. 3. Indeterminate right middle lobe dystrophic calcification, too small to characterize. Recommend follow-up surveillance chest CT in 3 months. 4. Enlarged prostate, correlate with PSA as clinically indicated. 5. Remaining chronic and nonacute findings are described above.   I personally reviewed the images/study and agree with the findings as stated by Raimundo De Los Santos MD (Resident Physician). This study was interpreted at [facility], [place], [place].   MACRO: None     Dictation workstation:   [id number]        This patient has a central line   Reason for the central line remaining today? Parenteral medication        Assessment/Plan   Assessment & Plan  Hypotension, unspecified hypotension type    Hypothyroidism    Timoteo Victoria is a 69 y.o. male w/ PMHx of recently diagnosed stage III esophageal adenocarcinoma (dx 12/2024, s/p 1 cycle FLOT), CAD s/p 4x CABG (~2005), HTN, HLD, hx of cluster HA, hypothyroidism (on levothyroxine), and tobacco use who presented to the ED with weakness, and pain and drainage around J tube site. CXR 1/12 without acute process, KUB 1/13 with stool burden otherwise unremarkable. S/p started on vancomycin and cefepime (1/13- 1/14) and admitted for management of possible J tube infection. Thoracic Surgery placed J tube 11/25/24, consulted (1/13) for consideration of J tube upsizing as pt with persistent TF leakage, rec local wound care and barrier cream at J tube site, no further imaging needed, no surgical intervention needed. Wound culture NGTD (1/13). Wound care consulted, recs pending. TSH of 16 (1/13), Endo consulted and rec'd continuing current levothyroxine dose, and repeat Tsh/FT4 in one week. (1/14-current) start PO doxycycline BID for purulent cellulitis per attending. Wound culture + (1/15), ID consulted pending recs. J tube upsized by thoracic sx (1/15), tube study xray showed correct placement. Discharge home with [facility] pending pain control, antibiotic pain, and TF tolerance.     Updates 1/16:   - Thoracic Surg exchanged J tube 1/15, wanted tube study done for placement confirmation  - KUB with contrast through J tube (1/15) Injection of contrast via J-tube with contrast in expected location of small bowel without extravasation.   - s/p vanc and cefepime (1/13-1/14), start PO doxycycline 100mg BID for purulent cellulitis coverage per attending (1/14-current)   - Wound culture + strenotrophomonas maltophilia and serratia marcescens, ID consulted - per attending Dr. Arroyo, continue doxycycline 100mg BID pending ID recs  - Blood cx NGTD 1/12  - wound care consulted - placed cavilon on,  dressed it with some foam, and taped the tube in place.  - home TF started 1/14 PM (cycled) to give poss infectious process time, tolerated well, however held overnight 1/15 as KUB was not read  - s/p mIVF (1/12-1/14), start IVF (1/16) x 12 hours as pt was not able to start TF overnight and wants to wait until tonight to start again  - CT A/P for further eval of J tube pain (1/15) showed no acute process    # J tube site infection  # Wound culture + strenotrophomonas maltophilia and serratia marcescens  - J tube (14F) placed by Thoracic Surgery 11/25/24, Dr. Salas   - has been using for Tfs and tolerating well, however with persistent TF leakage (per pt report, smells like TF); now x 1 week with thick yellow drainage from site   - s/p 1 week keflex course (1/6/25- 1/12/25) rx'd by outpatient surgery team for superficial wound infection   - KUB in ED 1/12 with mild gaseous distention of bowel, dense colonic stool burden   - s/p vancomycin and cefepime (1/13-1/14)   - (1/14-current) start doxycycline 100mg BID for purulent cellulitis coverage per attending  - blood cx x 2 1/12 NGTD  - utox neg (1/14)  - leukocytosis of 16 (1/12) noted, likely in setting of infection --> 13.7 (1/16)   - wound cx NGTD 1/13, 1/15 + strenotrophomonas maltophilia and serratia marcescens  - ID consulted (1/15) pending recs - per Dr. Arroyo, deniz to continue doxycycline 100mg BID for now pending ID recs  - erythema surrounding J tube site + previous tape application sites- pt instructed to avoid tape use at this time given skin irritation   - Thoracic Surgery consulted 1/13 for consideration of upsizing given persistent TF leakage- rec local wound care and barrier cream at J tube site, no further imaging needed, no surgical intervention needed    - wound care consulted, placed cavilon on, dressed it with some foam, and taped the tube in place, orders placed   - reached out to thoracic surg (1/15) for continued leakage and pain at J tube  site, thoracic surgery exchanged J tube and bumper and sutured it in place. Rec obtaining tube study: 60cc gastograffin with 30cc water through J tube followed by bedside KUB   - KUB for tube study (1/15) Injection of contrast via J-tube with contrast in expected location of small bowel without extravasation.   - Free water flushes q6h ordered and plan to restart TF tonight 1/16  - home TF regimen: isosource 1.5 cycled (pt uses 3am-3pm d/t his sleep schedule) @ 105ml/hr + FWF; Liquid diet as tolerated   - CT A/P (1/15) for further eval of J tube pain showing Left lower quadrant end jejunostomy without surrounding inflammatory changes or fluid collection to suggest abscess. Increased prominence of left pleural effusion.   - oxycodone 5mg q6h PRN started 1/13 for pain, started scheduled 650mg Q6hrs tylenol as patient was hesitant to start opioids for J tube site pain, 300mg gabapentin nightly  - LBM 1/13 (reported very constipated); continue daily miralax and started senna daily (1/14)   - Trial of lactulose 1x for continued constipation (1/16)    # DYANA with hyponatremia - improved  - cr on admit 1.70 (BL ~ 1), s/p 3L IVF in ED for hypotension. Improved to 1.12 (1/13); sCr 1.02 (1/15)  - Na 129 (1/12), improved to 133 (1/13) --> Na 139 (1/15)  - s/p mIVF (1/12- 1/14), TF was started 1/14  - home anti- HTNs held on admit in setting of hypotension and DYANA - plan to resume gradually pending DYANA improvement; verapamil resumed 1/13, spironolactone 1/14    # recently diagnosed stage III esophageal cancer   - Primary Onc Dr. Wilson notified of admission   - 11/21/24  Davie admitted for weight loss, dysphagia; EGD showed esophageal mass with biopsy confirming intramucosal adenocarcinoma   - 11/22/24 CT CAP with contrast with irregular masslike thickening of distal esophagus, no obvious metastases. PET showed thyroid nodule   - 11/25/24 J tube placement with Thoracic Surgery Dr. Towe   - Completed C1FLOT on 1/3; C2  FLOT plan on 1/17   - pancytopenia of malignancy noted      #HTN   #CAD s/p CABG x4 ~ 2005   #HLD   - patient asymptomatic from a CAD standpoint   - Home faviola 50 mg, verapamil and losartan 100mg held on admit for DYANA and hypotension in ED   - verapamil 80mg TID resumed 1/13   - spironolactone restarted 1/14  - plan to resume home losartan as able pending BP improvement   - continue home aspirin and atorva 40mg      #Hypothyroidism    - on levothyroxine 175mcg daily via J tube- continue   - TSH 16 in ED, FT4 1.14 (1/13)  - started on solu cortef 50mg IVP TID in ED for c/f myxedema coma - held 1/13 per endocrine recs  - Endo consulted 1/13 rec'd continuing current levothyroxine dose, and repeat Tsh/FT4 in one week, no need for further steroid dosing     #Hx of cluster HA   - c/w home amitriptyline      #MDD   - c/w home buproprion 150mg BID    PPX: lovenox subcutaneous, encourage SCDs and ambulation as able     DIPSO  - FULL CODE, confirmed on admission  - discharge home with new [facility] pending improvement in J tube site infection and pain  - FUVs 1/16 [facility], 1/17 chemo - request reschedule  - NOK: Eulogio (brother) [phone]     I spent 60 minutes in the professional and overall care of this patient.    Assessment and plan as above discussed with attending physician Dr. Dina Camargo, CHARLENE

## 2025-01-16 NOTE — TELEPHONE ENCOUNTER
Patient referred to supportive oncology/palliative care by inpatient supportive oncology. Patient with esophageal adenocarcinoma experiencing pain, anxiety, depression, and constipation. Patient scheduled for NPV with Kareen Lee CNP at [facility] on Fri. 1/31/25 at 2:30pm. Inpatient team notified to update patient and if appointment needs rescheduled to let our team know.

## 2025-01-17 ENCOUNTER — APPOINTMENT (OUTPATIENT)
Dept: HEMATOLOGY/ONCOLOGY | Facility: CLINIC | Age: 70
End: 2025-01-17
Payer: MEDICARE

## 2025-01-17 ENCOUNTER — DOCUMENTATION (OUTPATIENT)
Dept: HOME HEALTH SERVICES | Facility: HOME HEALTH | Age: 70
End: 2025-01-17
Payer: MEDICARE

## 2025-01-17 LAB
ALBUMIN SERPL BCP-MCNC: 2.9 G/DL (ref 3.4–5)
ALP SERPL-CCNC: 65 U/L (ref 33–136)
ALT SERPL W P-5'-P-CCNC: 25 U/L (ref 10–52)
ANION GAP SERPL CALC-SCNC: 11 MMOL/L (ref 10–20)
AST SERPL W P-5'-P-CCNC: 18 U/L (ref 9–39)
BASOPHILS # BLD MANUAL: 0.24 X10*3/UL (ref 0–0.1)
BASOPHILS NFR BLD MANUAL: 1.8 %
BILIRUB SERPL-MCNC: 0.2 MG/DL (ref 0–1.2)
BUN SERPL-MCNC: 12 MG/DL (ref 6–23)
CALCIUM SERPL-MCNC: 8.2 MG/DL (ref 8.6–10.6)
CHLORIDE SERPL-SCNC: 106 MMOL/L (ref 98–107)
CO2 SERPL-SCNC: 24 MMOL/L (ref 21–32)
CREAT SERPL-MCNC: 0.97 MG/DL (ref 0.5–1.3)
EGFRCR SERPLBLD CKD-EPI 2021: 85 ML/MIN/1.73M*2
EOSINOPHIL # BLD MANUAL: 0.12 X10*3/UL (ref 0–0.7)
EOSINOPHIL NFR BLD MANUAL: 0.9 %
ERYTHROCYTE [DISTWIDTH] IN BLOOD BY AUTOMATED COUNT: 15 % (ref 11.5–14.5)
GLUCOSE BLD MANUAL STRIP-MCNC: 113 MG/DL (ref 74–99)
GLUCOSE SERPL-MCNC: 72 MG/DL (ref 74–99)
HCT VFR BLD AUTO: 29.1 % (ref 41–52)
HGB BLD-MCNC: 9.9 G/DL (ref 13.5–17.5)
IMM GRANULOCYTES # BLD AUTO: 0.72 X10*3/UL (ref 0–0.7)
IMM GRANULOCYTES NFR BLD AUTO: 5.3 % (ref 0–0.9)
LYMPHOCYTES # BLD MANUAL: 1.41 X10*3/UL (ref 1.2–4.8)
LYMPHOCYTES NFR BLD MANUAL: 10.4 %
MAGNESIUM SERPL-MCNC: 1.81 MG/DL (ref 1.6–2.4)
MCH RBC QN AUTO: 31.5 PG (ref 26–34)
MCHC RBC AUTO-ENTMCNC: 34 G/DL (ref 32–36)
MCV RBC AUTO: 93 FL (ref 80–100)
MONOCYTES # BLD MANUAL: 0.35 X10*3/UL (ref 0.1–1)
MONOCYTES NFR BLD MANUAL: 2.6 %
MYELOCYTES # BLD MANUAL: 0.35 X10*3/UL
MYELOCYTES NFR BLD MANUAL: 2.6 %
NEUTROPHILS # BLD MANUAL: 10.41 X10*3/UL (ref 1.2–7.7)
NEUTS BAND # BLD MANUAL: 2.6 X10*3/UL (ref 0–0.7)
NEUTS BAND NFR BLD MANUAL: 19.1 %
NEUTS SEG # BLD MANUAL: 7.81 X10*3/UL (ref 1.2–7)
NEUTS SEG NFR BLD MANUAL: 57.4 %
NRBC BLD-RTO: 0 /100 WBCS (ref 0–0)
OVALOCYTES BLD QL SMEAR: ABNORMAL
PLATELET # BLD AUTO: 157 X10*3/UL (ref 150–450)
POTASSIUM SERPL-SCNC: 3.8 MMOL/L (ref 3.5–5.3)
PROT SERPL-MCNC: 5.1 G/DL (ref 6.4–8.2)
RBC # BLD AUTO: 3.14 X10*6/UL (ref 4.5–5.9)
RBC MORPH BLD: ABNORMAL
SODIUM SERPL-SCNC: 137 MMOL/L (ref 136–145)
TOTAL CELLS COUNTED BLD: 115
VARIANT LYMPHS # BLD MANUAL: 0.71 X10*3/UL (ref 0–0.5)
VARIANT LYMPHS NFR BLD: 5.2 %
WBC # BLD AUTO: 13.6 X10*3/UL (ref 4.4–11.3)

## 2025-01-17 PROCEDURE — 1170000001 HC PRIVATE ONCOLOGY ROOM DAILY

## 2025-01-17 PROCEDURE — 2500000001 HC RX 250 WO HCPCS SELF ADMINISTERED DRUGS (ALT 637 FOR MEDICARE OP)

## 2025-01-17 PROCEDURE — 2500000001 HC RX 250 WO HCPCS SELF ADMINISTERED DRUGS (ALT 637 FOR MEDICARE OP): Performed by: NURSE PRACTITIONER

## 2025-01-17 PROCEDURE — 2500000002 HC RX 250 W HCPCS SELF ADMINISTERED DRUGS (ALT 637 FOR MEDICARE OP, ALT 636 FOR OP/ED)

## 2025-01-17 PROCEDURE — RXMED WILLOW AMBULATORY MEDICATION CHARGE

## 2025-01-17 PROCEDURE — 99233 SBSQ HOSP IP/OBS HIGH 50: CPT

## 2025-01-17 PROCEDURE — 85007 BL SMEAR W/DIFF WBC COUNT: CPT

## 2025-01-17 PROCEDURE — 2500000004 HC RX 250 GENERAL PHARMACY W/ HCPCS (ALT 636 FOR OP/ED)

## 2025-01-17 PROCEDURE — 85027 COMPLETE CBC AUTOMATED: CPT

## 2025-01-17 PROCEDURE — 83735 ASSAY OF MAGNESIUM: CPT

## 2025-01-17 PROCEDURE — 82947 ASSAY GLUCOSE BLOOD QUANT: CPT

## 2025-01-17 PROCEDURE — 80053 COMPREHEN METABOLIC PANEL: CPT

## 2025-01-17 PROCEDURE — 36416 COLLJ CAPILLARY BLOOD SPEC: CPT

## 2025-01-17 RX ORDER — ESOMEPRAZOLE MAGNESIUM 40 MG/1
40 GRANULE, DELAYED RELEASE ORAL
Qty: 30 PACKET | Refills: 11 | Status: SHIPPED | OUTPATIENT
Start: 2025-01-18 | End: 2026-01-18

## 2025-01-17 RX ORDER — SENNOSIDES 8.8 MG/5ML
5 LIQUID ORAL 2 TIMES DAILY
Qty: 240 ML | Refills: 0 | Status: SHIPPED | OUTPATIENT
Start: 2025-01-17 | End: 2025-02-11

## 2025-01-17 RX ORDER — SULFAMETHOXAZOLE AND TRIMETHOPRIM 200; 40 MG/5ML; MG/5ML
320 SUSPENSION ORAL EVERY 8 HOURS
Qty: 1680 ML | Refills: 0 | Status: SHIPPED | OUTPATIENT
Start: 2025-01-17 | End: 2025-01-31

## 2025-01-17 RX ORDER — OXYCODONE HCL 5 MG/5 ML
10 SOLUTION, ORAL ORAL EVERY 4 HOURS PRN
Qty: 500 ML | Refills: 0 | Status: SHIPPED | OUTPATIENT
Start: 2025-01-17 | End: 2025-02-04

## 2025-01-17 RX ORDER — ACETAMINOPHEN 160 MG/5ML
650 LIQUID ORAL EVERY 6 HOURS
Qty: 2365 ML | Refills: 0 | Status: SHIPPED | OUTPATIENT
Start: 2025-01-17

## 2025-01-17 RX ORDER — GABAPENTIN 250 MG/5ML
300 SOLUTION ORAL NIGHTLY
Qty: 180 ML | Refills: 11 | Status: SHIPPED | OUTPATIENT
Start: 2025-01-17 | End: 2026-01-17

## 2025-01-17 RX ORDER — OXYCODONE HCL 5 MG/5 ML
5 SOLUTION, ORAL ORAL EVERY 4 HOURS PRN
Qty: 90 ML | Refills: 0 | Status: SHIPPED | OUTPATIENT
Start: 2025-01-17 | End: 2025-01-17 | Stop reason: SDUPTHER

## 2025-01-17 RX ADMIN — VERAPAMIL HYDROCHLORIDE 80 MG: 80 TABLET ORAL at 06:36

## 2025-01-17 RX ADMIN — Medication 2000 UNITS: at 09:56

## 2025-01-17 RX ADMIN — OXYCODONE HYDROCHLORIDE 10 MG: 5 SOLUTION ORAL at 10:00

## 2025-01-17 RX ADMIN — BUPROPION HYDROCHLORIDE 150 MG: 75 TABLET, FILM COATED ORAL at 20:58

## 2025-01-17 RX ADMIN — VERAPAMIL HYDROCHLORIDE 80 MG: 80 TABLET ORAL at 15:46

## 2025-01-17 RX ADMIN — ONDANSETRON 4 MG: 2 INJECTION INTRAMUSCULAR; INTRAVENOUS at 22:26

## 2025-01-17 RX ADMIN — SENNOSIDES 5 ML: 8.8 LIQUID ORAL at 20:58

## 2025-01-17 RX ADMIN — VERAPAMIL HYDROCHLORIDE 80 MG: 80 TABLET ORAL at 22:26

## 2025-01-17 RX ADMIN — ACETAMINOPHEN 650 MG: 160 SOLUTION ORAL at 09:56

## 2025-01-17 RX ADMIN — ACETAMINOPHEN 650 MG: 160 SOLUTION ORAL at 02:29

## 2025-01-17 RX ADMIN — SENNOSIDES 5 ML: 8.8 LIQUID ORAL at 09:56

## 2025-01-17 RX ADMIN — ACETAMINOPHEN 650 MG: 160 SOLUTION ORAL at 15:46

## 2025-01-17 RX ADMIN — ESOMEPRAZOLE MAGNESIUM 40 MG: 40 FOR SUSPENSION ORAL at 06:36

## 2025-01-17 RX ADMIN — BUPROPION HYDROCHLORIDE 150 MG: 75 TABLET, FILM COATED ORAL at 09:57

## 2025-01-17 RX ADMIN — OXYCODONE HYDROCHLORIDE 10 MG: 5 SOLUTION ORAL at 17:23

## 2025-01-17 RX ADMIN — POLYETHYLENE GLYCOL 3350 17 G: 17 POWDER, FOR SOLUTION ORAL at 09:57

## 2025-01-17 RX ADMIN — ATORVASTATIN CALCIUM 40 MG: 40 TABLET, FILM COATED ORAL at 09:56

## 2025-01-17 RX ADMIN — ASPIRIN 81 MG CHEWABLE TABLET 81 MG: 81 TABLET CHEWABLE at 09:56

## 2025-01-17 RX ADMIN — ENOXAPARIN SODIUM 40 MG: 40 INJECTION SUBCUTANEOUS at 09:57

## 2025-01-17 RX ADMIN — GABAPENTIN 300 MG: 250 SOLUTION ORAL at 20:57

## 2025-01-17 RX ADMIN — SULFAMETHOXAZOLE AND TRIMETHOPRIM 320 MG OF TRIMETHOPRIM: 200; 40 SUSPENSION ORAL at 09:57

## 2025-01-17 RX ADMIN — LEVOTHYROXINE SODIUM 175 MCG: 0.1 TABLET ORAL at 06:36

## 2025-01-17 RX ADMIN — ACETAMINOPHEN 650 MG: 160 SOLUTION ORAL at 20:57

## 2025-01-17 RX ADMIN — SPIRONOLACTONE 50 MG: 25 TABLET ORAL at 09:56

## 2025-01-17 RX ADMIN — AMITRIPTYLINE HYDROCHLORIDE 100 MG: 100 TABLET ORAL at 20:58

## 2025-01-17 RX ADMIN — SULFAMETHOXAZOLE AND TRIMETHOPRIM 320 MG OF TRIMETHOPRIM: 200; 40 SUSPENSION ORAL at 17:20

## 2025-01-17 RX ADMIN — SULFAMETHOXAZOLE AND TRIMETHOPRIM 320 MG OF TRIMETHOPRIM: 200; 40 SUSPENSION ORAL at 01:56

## 2025-01-17 ASSESSMENT — PAIN - FUNCTIONAL ASSESSMENT: PAIN_FUNCTIONAL_ASSESSMENT: 0-10

## 2025-01-17 ASSESSMENT — COGNITIVE AND FUNCTIONAL STATUS - GENERAL
DAILY ACTIVITIY SCORE: 24
MOBILITY SCORE: 24

## 2025-01-17 ASSESSMENT — PAIN SCALES - GENERAL
PAINLEVEL_OUTOF10: 7
PAINLEVEL_OUTOF10: 5 - MODERATE PAIN
PAINLEVEL_OUTOF10: 6

## 2025-01-17 NOTE — HH CARE COORDINATION
Home Care received a Referral for Nursing and Registered Dietician. We have processed the referral for a Start of Care on 1.20.2025.     If you have any questions or concerns, please feel free to contact us at [phone]. Follow the prompts, enter your five digit zip code, and you will be directed to your care team on EAST 3.

## 2025-01-17 NOTE — PROGRESS NOTES
Art Therapy Note    Timoteo Victoria was referred by                    Treatment/Interventions                 Education Documentation  No documentation found.

## 2025-01-17 NOTE — PROGRESS NOTES
"Timoteo Victoria is a 69 y.o. male on day 5 of admission presenting with Hypotension, unspecified hypotension type.    Subjective   Interval History: Less bright and smaller erythema surrounding J tube site.  Serratia was susceptible to bactrim.    Review of Systems    Objective   Range of Vitals (last 24 hours)  Heart Rate:  [64-73]   Temp:  [36 °C (96.8 °F)-37.4 °C (99.3 °F)]   Resp:  [18]   BP: (113-143)/(64-75)   SpO2:  [97 %-100 %]   Daily Weight  01/12/25 : 88.9 kg (196 lb)    Body mass index is 28.12 kg/m².    Antibiotics  sulfamethoxazole-trimethoprim - 200-40 mg/5 mL, 200-40 mg/5 mL    Relevant Results  Labs  Results from last 72 hours   Lab Units 01/17/25 0722 01/16/25  0703 01/15/25  0637   WBC AUTO x10*3/uL 13.6* 13.7* 14.1*   HEMOGLOBIN g/dL 9.9* 9.6* 10.5*   HEMATOCRIT % 29.1* 29.3* 30.8*   PLATELETS AUTO x10*3/uL 157 150 176   LYMPHO PCT MAN % 10.4 12.0 14.9   MONO PCT MAN % 2.6 4.3 2.6   EOSINO PCT MAN % 0.9 2.6 0.9     Results from last 72 hours   Lab Units 01/17/25 0722 01/16/25  0703 01/15/25  0637   SODIUM mmol/L 137 136 139   POTASSIUM mmol/L 3.8 3.9 4.3   CHLORIDE mmol/L 106 104 110*   CO2 mmol/L 24 26 20*   BUN mg/dL 12 16 21   CREATININE mg/dL 0.97 1.12 1.02   GLUCOSE mg/dL 72* 150* 97   CALCIUM mg/dL 8.2* 8.0* 8.3*   ANION GAP mmol/L 11 10 13   EGFR mL/min/1.73m*2 85 72 80     Results from last 72 hours   Lab Units 01/17/25 0722 01/16/25  0703 01/15/25  0637   ALK PHOS U/L 65 55 67   BILIRUBIN TOTAL mg/dL 0.2 0.3 0.2   PROTEIN TOTAL g/dL 5.1* 5.0* 5.3*   ALT U/L 25 26 29   AST U/L 18 21 19   ALBUMIN g/dL 2.9* 2.9* 3.2*     Estimated Creatinine Clearance: 81.9 mL/min (by C-G formula based on SCr of 0.97 mg/dL).  No results found for: \"CRP\"      Assessment/Plan  # Stenotrophomonas maltophilia and S. Marcescens cellulitis surrounding J tube site  # Esophageal adenocarcinoma s/p chemothrapy     A 68 yo male with esophageal adenocarcinoma s/p chemothrapy (1 cycle) developed cellulitis " surrounding J tube site, which was not improved after 1 week Keflex. Still known leakage and J tube was exchanged and sutured in place to assist with closing up the site and prevent leakage on 1/15. Blood culture was negative 1/12. CT did not show any fluid collection. Wound culture grew with Stenotrophomonas maltophilia (S; bactrim) and S. Marcescens (Susceptibility pending).      Would be reasonable to treat cellulitis without complication. Can stop doxycycline. Would recommend start liquid bactrim. Will need to confirm the coverage of Serratia (usually susceptible). CT A/P 1/15 showed left lower quadrant end jejunostomy without surrounding inflammatory changes or fluid collection to suggest abscess.    Updates 1/17  Interval History: Less bright and smaller erythema surrounding J tube site.  Serratia was susceptible to bactrim, which can cover Steno as well. Clinically good response to bactrim. Would be reasonable to treat for 2 weeks.     Recommendations  -Continue Bactrim liquid 320mg q8h via J tube for 14 days; end date 1/30/25  -No need to follow up with ID     Discussed with the team and Dr. Sin. Please text me via Epic chat if you have any questions or concerns regarding this patient. ID will sign off.     Ramona Almaraz MD  ID fellow [id number]  Team A   ID pager [phone]

## 2025-01-17 NOTE — PROGRESS NOTES
"Timoteo Victoria is a 69 y.o. male on day 5 of admission presenting with Hypotension, unspecified hypotension type.    Subjective   Timoteo is doing fine this morning. He states his TF went well overnight. We discussed + wound culture and ID recs for liquid bactrim. We are waiting for final ID recs, pt is ready to go home tomorrow.     Denies any HA, fevers/chills, cough, rhinorrhea, sore throat, SOB, CP, palpitations, abdominal pain, n/v/d/c, melena, dysuria, urgency/frequency, hematuria, numbness/tingling, bruising/bleeding or rashes. Denies any sick contacts. ROS otherwise negative.       Objective     Physical Exam  Vitals reviewed.   Constitutional:       Appearance: Normal appearance.   HENT:      Head: Normocephalic and atraumatic.      Nose: Nose normal.      Mouth/Throat:      Mouth: Mucous membranes are moist.      Pharynx: Oropharynx is clear.   Eyes:      Extraocular Movements: Extraocular movements intact.      Pupils: Pupils are equal, round, and reactive to light.   Cardiovascular:      Rate and Rhythm: Normal rate and regular rhythm.      Pulses: Normal pulses.      Heart sounds: Normal heart sounds.   Pulmonary:      Effort: Pulmonary effort is normal.      Breath sounds: Normal breath sounds.   Abdominal:      General: Bowel sounds are normal.      Palpations: Abdomen is soft.      Comments: J tube   Musculoskeletal:         General: Normal range of motion.   Skin:     General: Skin is warm.      Comments: LLQ J tube with surrounding edema and crusting. Thick yellow drainage present. Erythema in previous tape locations   Neurological:      General: No focal deficit present.      Mental Status: He is alert and oriented to person, place, and time. Mental status is at baseline.   Psychiatric:         Mood and Affect: Mood normal.         Behavior: Behavior normal.       Last Recorded Vitals  Blood pressure 118/64, pulse 66, temperature 37.1 °C (98.8 °F), resp. rate 18, height 1.778 m (5' 10\"), weight " 88.9 kg (196 lb), SpO2 97%.  Intake/Output last 3 Shifts:  I/O last 3 completed shifts:  In: 2113.8 (23.8 mL/kg) [I.V.:453.8 (5.1 mL/kg); NG/GT:1660]  Out: 0 (0 mL/kg)   Weight: 88.9 kg      Imaging  XR abdomen 1 view    Result Date: 1/16/2025  Interpreted By:  Isaías Lane,  Radha Lofton STUDY: XR ABDOMEN 1 VIEW;  1/15/2025 6:19 pm   INDICATION: Signs/Symptoms:tube study (60 cc gastrograffin with 30cc water through tube followed by KUB at bedside), checking for placement.     COMPARISON: Abdominal x-ray from 01/13/2025   ACCESSION NUMBER(S): [id number]   ORDERING CLINICIAN: DAYNE TOVAR   FINDINGS: Nonobstructive bowel gas pattern. There has been injection of contrast via a J-tube with contrast located within small bowel without extravasation. Contrast is seen within the urinary bladder consistent with a recent CT scan. Limited evaluation of pneumoperitoneum on supine imaging, however no gross evidence of free air is noted.   Visualized lungs bases are clear.   Osseous structures demonstrate no acute bony changes.       1.  Injection of contrast via J-tube with contrast in expected location of small bowel without extravasation.     I personally reviewed the images/study and I agree with the findings as stated by Rolly Park MD, PGY-2 this study was interpreted at [facility], [place], [place].   MACRO: None   Signed by: Isaías Lane 1/16/2025 12:05 PM Dictation workstation:   [id number]    CT abdomen pelvis w IV contrast    Result Date: 1/15/2025  Interpreted By:  Raimundo De Los Santos, STUDY: CT ABDOMEN PELVIS W IV CONTRAST;  1/15/2025 1:36 pm   INDICATION: Signs/Symptoms:continued J tube pain, c/f abscess.     COMPARISON: CT on 11/22/2024 PET-CT on 12/2024.   ACCESSION NUMBER(S): [id number]   ORDERING CLINICIAN: DAYNE TOVAR   TECHNIQUE: CT of the abdomen and pelvis was performed.  Standard contiguous axial images were obtained at 3 mm slice thickness through  the abdomen and pelvis. Coronal and sagittal reconstructions at 3 mm slice thickness were performed.  75 ML of Omnipaque 350 was administered intravenously without immediate complication.   FINDINGS: Median sternotomy wires are visualized on  imaging..   LOWER CHEST: Small left pleural effusion. Right middle lobe cluster of calcification too small to characterize.   Coronary artery calcifications/stents again visualized.   The heart is normal in size without pericardial effusion. No pleural effusion is present.   The visualized distal esophagus again demonstrates small hiatal as well as asymmetric thickening of the distal esophagus in keeping with recent diagnosis of esophageal cancer. This thickening extends approximately 7.2 cm which is similar to prior PET-CT previously measuring 7.0 cm.   ABDOMEN:   LIVER: The liver is normal in size without evidence of focal liver lesions.   BILE DUCTS: The intrahepatic and extrahepatic ducts are not dilated.   GALLBLADDER: The gallbladder is surgically absent.   PANCREAS: Within normal limits.   SPLEEN: Within normal limits.   ADRENAL GLANDS: Bilateral adrenal glands appear normal.   KIDNEYS AND URETERS: The kidneys are normal in size and enhance symmetrically. Left inferior pole renal cyst (series 2, image 71) measuring up to 3.3 cm. Right peripelvic cyst measuring 1.2 cm and adjacent exophytic 1.4 cm cyst (series 2, image 64). No hydroureteronephrosis or nephroureterolithiasis is identified.   PELVIS:   BLADDER: The urinary bladder appears normal without abnormal wall thickening.   REPRODUCTIVE ORGANS: Enlarged prostate measuring 4.6 cm.   BOWEL: Lower quadrant end jejunostomy is again visualized without significant wall thickening or inflammatory changes. There is no adjacent fluid collection.     The stomach is nondistended similar limiting evaluation, but again demonstrates prominent rugal folds. There is a small hiatal hernia with distal esophageal thickening in  keeping with diagnosis of esophageal cancer. The appendix is not definitely visualized. There is however no pericecal stranding or fluid.   There is prominent formed stool visualized in the rectum as well as moderate stool burden visualized along the colon.   VESSELS: There is no aneurysmal dilatation of the abdominal aorta. The IVC appears normal. Moderate-to-severe calcified atherosclerosis of the abdominal aorta and its branches.   PERITONEUM/RETROPERITONEUM/LYMPH NODES: No ascites or free air, no fluid collection.  No abdominopelvic lymphadenopathy is present.   BONES AND ABDOMINAL WALL: No suspicious osseous lesions are identified.  Postsurgical changes from mesh repair of a ventral abdominal hernia, similar to prior.       1.  Left lower quadrant end jejunostomy without surrounding inflammatory changes or fluid collection to suggest abscess. 2. Increased prominence of left pleural effusion. 3. Indeterminate right middle lobe dystrophic calcification, too small to characterize. Recommend follow-up surveillance chest CT in 3 months. 4. Enlarged prostate, correlate with PSA as clinically indicated. 5. Remaining chronic and nonacute findings are described above.   I personally reviewed the images/study and agree with the findings as stated by Raimundo De Los Santos MD (Resident Physician). This study was interpreted at [facility], [place], [place].   MACRO: None     Dictation workstation:   [id number]        This patient has a central line   Reason for the central line remaining today? Parenteral medication       Assessment/Plan   Assessment & Plan  Hypotension, unspecified hypotension type    Hypothyroidism    Timoteo Victoria is a 69 y.o. male w/ PMHx of recently diagnosed stage III esophageal adenocarcinoma (dx 12/2024, s/p 1 cycle FLOT), CAD s/p 4x CABG (~2005), HTN, HLD, hx of cluster HA, hypothyroidism (on levothyroxine), and tobacco use who presented to the ED with weakness, and  pain and drainage around J tube site. CXR 1/12 without acute process, KUB 1/13 with stool burden otherwise unremarkable. S/p started on vancomycin and cefepime (1/13- 1/14) and admitted for management of possible J tube infection. Thoracic Surgery placed J tube 11/25/24, consulted (1/13) for consideration of J tube upsizing as pt with persistent TF leakage, rec local wound care and barrier cream at J tube site, no further imaging needed, no surgical intervention needed. Wound culture NGTD (1/13). Wound care consulted, recs pending. TSH of 16 (1/13), Endo consulted and rec'd continuing current levothyroxine dose, and repeat Tsh/FT4 in one week. (1/14-current) start PO doxycycline BID for purulent cellulitis per attending. Wound culture + (1/15), ID rec bactrim 320mg q8h. J tube upsized by thoracic sx (1/15), tube study xray showed correct placement. Discharge home with [facility] pending pain control, antibiotic pain, and TF tolerance.     Updates 1/17:   - s/p vanc and cefepime (1/13-1/14), s/p PO doxycycline 100mg BID for purulent cellulitis coverage per attending (1/14-1/16)   - Wound culture + strenotrophomonas maltophilia and serratia marcescens, ID consulted - rec bactrim 320mg q8h via J tube (anticipate 10-14 days pending final ID recs)  - Blood cx NGTD 1/12  - wound care consulted - placed cavilon on, dressed it with some foam, and taped the tube in place.  - home TF restarted 1/16 PM (cycled) tolerated well  - s/p IVF (1/16) x 12 hours     # J tube site infection  # Wound culture + strenotrophomonas maltophilia and serratia marcescens  - J tube (14F) placed by Thoracic Surgery 11/25/24, Dr. Salas   - has been using for Tfs and tolerating well, however with persistent TF leakage (per pt report, smells like TF); now x 1 week with thick yellow drainage from site   - s/p 1 week keflex course (1/6/25- 1/12/25) rx'd by outpatient surgery team for superficial wound infection   - KUB in ED 1/12 with mild gaseous distention  of bowel, dense colonic stool burden   - s/p vancomycin and cefepime (1/13-1/14)   - (1/14-1/16) s/p doxycycline 100mg BID for purulent cellulitis coverage per attending  - blood cx x 2 1/12 NGTD  - utox neg (1/14)  - leukocytosis of 16 (1/12) noted, likely in setting of infection --> 13.7 (1/16)   - wound cx NGTD 1/13, 1/15 + strenotrophomonas maltophilia and serratia marcescens  - ID consulted (1/15) - per Dr. Arroyo, continue doxycycline 100mg BID for now pending ID recs.   - 1/16 ID rec bactrim 320mg q8h via J tube (anticipate 10-14 days), will follow wound culture finalized  - erythema surrounding J tube site + previous tape application sites- pt instructed to avoid tape use at this time given skin irritation   - Thoracic Surgery consulted 1/13 for consideration of upsizing given persistent TF leakage- rec local wound care and barrier cream at J tube site, no further imaging needed, no surgical intervention needed    - wound care consulted, placed cavilon on, dressed it with some foam, and taped the tube in place, orders placed   - reached out to thoracic surg (1/15) for continued leakage and pain at J tube site, thoracic surgery exchanged J tube and bumper and sutured it in place. Rec obtaining tube study: 60cc gastograffin with 30cc water through J tube followed by bedside KUB   - KUB for tube study (1/15) Injection of contrast via J-tube with contrast in expected location of small bowel without extravasation. Okay to restart TF per thoracic surgery  - Free water flushes q6h ordered and plan to restart TF tonight 1/16  - home TF regimen: isosource 1.5 cycled (pt uses 3am-3pm d/t his sleep schedule) @ 105ml/hr + FWF; Liquid diet as tolerated   - CT A/P (1/15) for further eval of J tube pain showing Left lower quadrant end jejunostomy without surrounding inflammatory changes or fluid collection to suggest abscess. Increased prominence of left pleural effusion.   - oxycodone 5mg q6h PRN started 1/13 for pain,  started scheduled 650mg Q6hrs tylenol as patient was hesitant to start opioids for J tube site pain, 300mg gabapentin nightly  - LBM 1/13 (reported very constipated); continue daily miralax and started senna daily (1/14)   - Trial of lactulose 1x for continued constipation (1/16)    # DYANA with hyponatremia - improved  - cr on admit 1.70 (BL ~ 1), s/p 3L IVF in ED for hypotension. Improved to 1.12 (1/13); sCr 1.02 (1/15)  - Na 129 (1/12), improved to 133 (1/13) --> Na 139 (1/15)  - s/p mIVF (1/12- 1/14), TF was started 1/14  - home anti- HTNs held on admit in setting of hypotension and DYANA - plan to resume gradually pending DYANA improvement; verapamil resumed 1/13, spironolactone 1/14    # recently diagnosed stage III esophageal cancer   - Primary Onc Dr. Wilson notified of admission   - 11/21/24  [place] admitted for weight loss, dysphagia; EGD showed esophageal mass with biopsy confirming intramucosal adenocarcinoma   - 11/22/24 CT CAP with contrast with irregular masslike thickening of distal esophagus, no obvious metastases. PET showed thyroid nodule   - 11/25/24 J tube placement with Thoracic Surgery Dr. Salas   - Completed C1FLOT on 1/3; C2 FLOT plan on 1/17   - pancytopenia of malignancy noted      #HTN   #CAD s/p CABG x4 ~ 2005   #HLD   - patient asymptomatic from a CAD standpoint   - Home faviola 50 mg, verapamil and losartan 100mg held on admit for DYANA and hypotension in ED   - verapamil 80mg TID resumed 1/13   - spironolactone restarted 1/14  - plan to resume home losartan as able pending BP improvement   - continue home aspirin and atorva 40mg      #Hypothyroidism    - on levothyroxine 175mcg daily via J tube- continue   - TSH 16 in ED, FT4 1.14 (1/13)  - started on solu cortef 50mg IVP TID in ED for c/f myxedema coma - held 1/13 per endocrine recs  - Endo consulted 1/13 rec'd continuing current levothyroxine dose, and repeat Tsh/FT4 in one week, no need for further steroid dosing     #Hx of cluster HA    - c/w home amitriptyline      #MDD   - c/w home buproprion 150mg BID    PPX: lovenox subcutaneous, encourage SCDs and ambulation as able     DIPSO  - FULL CODE, confirmed on admission  - discharge home with new [facility] pending improvement in J tube site infection and pain  - FUVs Thoracic sx 1/23, Oncology 1/23, Palliative 1/32, PCP 2/11  - NOK: Eulogio (brother) [phone]     I spent 60 minutes in the professional and overall care of this patient.    Assessment and plan as above discussed with attending physician Dr. Dina Camargo PA-C

## 2025-01-17 NOTE — PROGRESS NOTES
"Music Therapy Note      Therapy Session  Referral Type: New referral this admission  Visit Type: New visit  Session Start Time: 1530  Session End Time: 1630  Intervention Delivery: In-person  Family Present for Session: None     Pre-assessment  Unable to Assess Reason: Outcomes not assessed  Mood/Affect: Calm, Cooperative  Verbalized Emotional State: Frustration (reflective)         Treatment/Interventions  Areas of Focus: Mood modification, Emotional support, Self-expression, Normalization, Coping  Music Therapy Interventions: Empathic listening/validating emotions, Assessment, Dena analysis, Live music listening  Interruption: Yes  Interrupted by: Staff  Interruption Duration (min): 3 minutes  Interruption Outcome: Session resumed  Patient Fell Asleep at End of Session: No    Post-assessment  Unable to Assess Reason: Outcomes not evaluated  Mood/Affect: Calm, Participative, Goal focused  Verbalized Emotional State: Relaxed, Relief, Gratitude, Hopefulness  Continue Visiting: Yes  Total Session Time (min): 60 minutes    Narrative  Assessment Detail: Found pt in bed, very pleasant and open, invited MT in and started talking. He loves 60s rock, Kaushal Jose, Beatles etc.  Plan: Introduce services and assess goals/ preferences in music therapy.  Evaluation: Pt talkative, aware of that. Says he has enjoyed meeting and interacting with staff- talking through his thoughts appears to help him cope. Shared that he has had some heart issues in the past, talked about family's history of cancer and some of the hardships he has faced in life. He is also struggling with some of the things he's done in the past and wonders if cancer is a \"punishment\" for bad choices. MT held space for pt to reflect, offering encouragement and attempting to shift his outlook to focus on healing. MT played a Kaushal Jose song to wrap up session, and pt was quite touched. Appreciative of visit.  Follow-up: Will continue to follow.    Education " Documentation  No documentation found.

## 2025-01-17 NOTE — SIGNIFICANT EVENT
Plan of Care Update:    Imaging reviewed after replacement of J tube 1/15 showing no extravasation of contrast, additional imaging from primary team showing no abscess. Based on chart review, appears patient is back on his tube feeds, recommend continuing. Again suspect that the surrounding erythema is from localized skin irritation from enteric contents. Suspect this will improved over time with local wound care and keeping skin dry and clear from any leakage. However, defer any further infectious work up and management to infectious disease, who has been consulted. Thoracic surgery will sign off at this time, please reach out with questions or concerns.     Anna Corrales MD  General Surgery PGY2  Thoracic [id number]

## 2025-01-17 NOTE — CARE PLAN
The patient's goals for the shift include    Problem: Fall/Injury  Goal: Not fall by end of shift  Outcome: Progressing  Goal: Be free from injury by end of the shift  Outcome: Progressing  Goal: Verbalize understanding of personal risk factors for fall in the hospital  Outcome: Progressing  Goal: Verbalize understanding of risk factor reduction measures to prevent injury from fall in the home  Outcome: Progressing  Goal: Use assistive devices by end of the shift  Outcome: Progressing  Goal: Pace activities to prevent fatigue by end of the shift  Outcome: Progressing     Problem: Pain - Adult  Goal: Verbalizes/displays adequate comfort level or baseline comfort level  Outcome: Progressing     Problem: Safety - Adult  Goal: Free from fall injury  Outcome: Progressing     Problem: Discharge Planning  Goal: Discharge to home or other facility with appropriate resources  Outcome: Progressing     Problem: Chronic Conditions and Co-morbidities  Goal: Patient's chronic conditions and co-morbidity symptoms are monitored and maintained or improved  Outcome: Progressing     Problem: Pain  Goal: Takes deep breaths with improved pain control throughout the shift  Outcome: Progressing  Goal: Turns in bed with improved pain control throughout the shift  Outcome: Progressing  Goal: Walks with improved pain control throughout the shift  Outcome: Progressing  Goal: Performs ADL's with improved pain control throughout shift  Outcome: Progressing  Goal: Participates in PT with improved pain control throughout the shift  Outcome: Progressing  Goal: Free from opioid side effects throughout the shift  Outcome: Progressing  Goal: Free from acute confusion related to pain meds throughout the shift  Outcome: Progressing       The clinical goals for the shift include pt will remain HDS through shift

## 2025-01-17 NOTE — CARE PLAN
The patient's goals for the shift include      The clinical goals for the shift include patient will remain HDS throughout shift      Problem: Fall/Injury  Goal: Not fall by end of shift  Outcome: Progressing  Goal: Be free from injury by end of the shift  Outcome: Progressing  Goal: Verbalize understanding of personal risk factors for fall in the hospital  Outcome: Progressing  Goal: Verbalize understanding of risk factor reduction measures to prevent injury from fall in the home  Outcome: Progressing  Goal: Use assistive devices by end of the shift  Outcome: Progressing  Goal: Pace activities to prevent fatigue by end of the shift  Outcome: Progressing     Problem: Pain - Adult  Goal: Verbalizes/displays adequate comfort level or baseline comfort level  Outcome: Progressing     Problem: Safety - Adult  Goal: Free from fall injury  Outcome: Progressing     Problem: Discharge Planning  Goal: Discharge to home or other facility with appropriate resources  Outcome: Progressing     Problem: Chronic Conditions and Co-morbidities  Goal: Patient's chronic conditions and co-morbidity symptoms are monitored and maintained or improved  Outcome: Progressing     Problem: Pain  Goal: Takes deep breaths with improved pain control throughout the shift  Outcome: Progressing  Goal: Turns in bed with improved pain control throughout the shift  Outcome: Progressing  Goal: Walks with improved pain control throughout the shift  Outcome: Progressing  Goal: Performs ADL's with improved pain control throughout shift  Outcome: Progressing  Goal: Participates in PT with improved pain control throughout the shift  Outcome: Progressing  Goal: Free from opioid side effects throughout the shift  Outcome: Progressing  Goal: Free from acute confusion related to pain meds throughout the shift  Outcome: Progressing

## 2025-01-18 ENCOUNTER — PHARMACY VISIT (OUTPATIENT)
Dept: PHARMACY | Facility: CLINIC | Age: 70
End: 2025-01-18
Payer: MEDICARE

## 2025-01-18 VITALS
TEMPERATURE: 98.8 F | OXYGEN SATURATION: 100 % | DIASTOLIC BLOOD PRESSURE: 73 MMHG | BODY MASS INDEX: 28.06 KG/M2 | HEIGHT: 70 IN | RESPIRATION RATE: 18 BRPM | SYSTOLIC BLOOD PRESSURE: 136 MMHG | HEART RATE: 77 BPM | WEIGHT: 196 LBS

## 2025-01-18 LAB
BACTERIA SPEC CULT: ABNORMAL
GLUCOSE BLD MANUAL STRIP-MCNC: 62 MG/DL (ref 74–99)
GRAM STN SPEC: ABNORMAL
GRAM STN SPEC: ABNORMAL
LABORATORY COMMENT REPORT: ABNORMAL

## 2025-01-18 PROCEDURE — 2500000002 HC RX 250 W HCPCS SELF ADMINISTERED DRUGS (ALT 637 FOR MEDICARE OP, ALT 636 FOR OP/ED)

## 2025-01-18 PROCEDURE — 2500000001 HC RX 250 WO HCPCS SELF ADMINISTERED DRUGS (ALT 637 FOR MEDICARE OP)

## 2025-01-18 PROCEDURE — 82947 ASSAY GLUCOSE BLOOD QUANT: CPT

## 2025-01-18 PROCEDURE — 99239 HOSP IP/OBS DSCHRG MGMT >30: CPT | Performed by: PHYSICIAN ASSISTANT

## 2025-01-18 PROCEDURE — 2500000001 HC RX 250 WO HCPCS SELF ADMINISTERED DRUGS (ALT 637 FOR MEDICARE OP): Performed by: NURSE PRACTITIONER

## 2025-01-18 PROCEDURE — 2500000004 HC RX 250 GENERAL PHARMACY W/ HCPCS (ALT 636 FOR OP/ED)

## 2025-01-18 RX ADMIN — SPIRONOLACTONE 50 MG: 25 TABLET ORAL at 08:57

## 2025-01-18 RX ADMIN — ENOXAPARIN SODIUM 40 MG: 40 INJECTION SUBCUTANEOUS at 08:58

## 2025-01-18 RX ADMIN — Medication 2000 UNITS: at 08:57

## 2025-01-18 RX ADMIN — VERAPAMIL HYDROCHLORIDE 80 MG: 80 TABLET ORAL at 08:59

## 2025-01-18 RX ADMIN — OXYCODONE HYDROCHLORIDE 5 MG: 5 SOLUTION ORAL at 14:14

## 2025-01-18 RX ADMIN — LEVOTHYROXINE SODIUM 175 MCG: 0.1 TABLET ORAL at 09:17

## 2025-01-18 RX ADMIN — ATORVASTATIN CALCIUM 40 MG: 40 TABLET, FILM COATED ORAL at 08:57

## 2025-01-18 RX ADMIN — ESOMEPRAZOLE MAGNESIUM 40 MG: 40 FOR SUSPENSION ORAL at 08:59

## 2025-01-18 RX ADMIN — OXYCODONE HYDROCHLORIDE 10 MG: 5 SOLUTION ORAL at 08:57

## 2025-01-18 RX ADMIN — ASPIRIN 81 MG CHEWABLE TABLET 81 MG: 81 TABLET CHEWABLE at 08:57

## 2025-01-18 RX ADMIN — SULFAMETHOXAZOLE AND TRIMETHOPRIM 320 MG OF TRIMETHOPRIM: 200; 40 SUSPENSION ORAL at 01:43

## 2025-01-18 RX ADMIN — BUPROPION HYDROCHLORIDE 150 MG: 75 TABLET, FILM COATED ORAL at 08:59

## 2025-01-18 RX ADMIN — SENNOSIDES 5 ML: 8.8 LIQUID ORAL at 08:58

## 2025-01-18 RX ADMIN — ACETAMINOPHEN 650 MG: 160 SOLUTION ORAL at 10:56

## 2025-01-18 RX ADMIN — SULFAMETHOXAZOLE AND TRIMETHOPRIM 320 MG OF TRIMETHOPRIM: 200; 40 SUSPENSION ORAL at 09:03

## 2025-01-18 ASSESSMENT — PAIN SCALES - GENERAL
PAINLEVEL_OUTOF10: 4
PAINLEVEL_OUTOF10: 5 - MODERATE PAIN

## 2025-01-18 ASSESSMENT — PAIN DESCRIPTION - LOCATION: LOCATION: ABDOMEN

## 2025-01-18 ASSESSMENT — PAIN DESCRIPTION - ORIENTATION: ORIENTATION: LEFT

## 2025-01-18 NOTE — DISCHARGE SUMMARY
Discharge Diagnosis  Hypotension, unspecified hypotension type    Issues Requiring Follow-Up  Labs on 1/20.    Test Results Pending At Discharge  Pending Labs       Order Current Status    Tissue/Wound Culture/Smear Preliminary result        Hospital Course  Timoteo Victoria is a 69 y.o. male w/ PMHx of recently diagnosed stage III esophageal adenocarcinoma (dx 12/2024, s/p 1 cycle FLOT), CAD s/p 4x CABG (~2005), HTN, HLD, hx of cluster HA, hypothyroidism (on levothyroxine), and tobacco use who presented to the ED with weakness, and pain and drainage around J tube site. CXR 1/12 without acute process, KUB 1/13 with stool burden otherwise unremarkable. S/p started on vancomycin and cefepime (1/13- 1/14) and admitted for management of possible J tube infection. Thoracic Surgery placed J tube 11/25/24, consulted (1/13) for consideration of J tube upsizing as pt with persistent TF leakage, rec local wound care and barrier cream at J tube site, no further imaging needed, no surgical intervention needed. J tube upsized by thoracic sx (1/15), tube study xray showed correct placement. Wound culture NGTD (1/13). TSH of 16 (1/13), Endo consulted and rec'd continuing current levothyroxine dose, and repeat Tsh/FT4 in one week, order placed for 1/20. Patient was initially tart PO doxycycline BID for purulent cellulitis, Wound culture + for strenotrophomonas maltophilia and serratia marcescens (1/15), ID was then consulted who rec bactrim 320mg q8h for 14 days with end date 1/30, no ID follow up needed on discharge.  Discharge home today with [facility].   He has follow up with with Thoracic and oncology on 1/23, pal care on 1/31, primary care 2/11.   On the day of discharge, the patient reported feeling well and pain was controlled, tolerating tube feeding. Vitals and labs were stable.   Attending has reviewed all labs and vitals, and discussed and agreed with the discharge plan prior to patient discharge.  Patient discharged in  stable condition. > 30 minutes spent on discharge planning.    Pertinent Physical Exam At Time of Discharge  Physical Exam  Constitutional:       Appearance: Normal appearance.   HENT:      Head: Normocephalic and atraumatic.   Eyes:      Extraocular Movements: Extraocular movements intact.   Cardiovascular:      Rate and Rhythm: Normal rate and regular rhythm.   Pulmonary:      Effort: Pulmonary effort is normal.      Breath sounds: Normal breath sounds.   Abdominal:      General: Abdomen is flat. Bowel sounds are normal.      Palpations: Abdomen is soft.      Comments: J-tube in place.    Musculoskeletal:         General: Normal range of motion.   Skin:     Comments: Skin erythematous but no discharge. Area dry.    Neurological:      General: No focal deficit present.      Mental Status: He is alert and oriented to person, place, and time.   Psychiatric:         Mood and Affect: Mood normal.         Behavior: Behavior normal.       Home Medications     Medication List      START taking these medications     acetaminophen 160 mg/5 mL liquid; Commonly known as: Tylenol; Take 20.3   mL (650 mg) by mouth every 6 hours.   esomeprazole 40 mg packet; Commonly known as: NexIUM; Take 40 mg by   mouth once daily in the morning. Take before meals.   gabapentin 50 mg/mL solution; Commonly known as: Neurontin; Take 6 mL   (300 mg) by mouth once daily at bedtime.   moisturizing mouth solution; Commonly known as: Biotene Dry Mouth; Swish   and spit 15 mL 3 times a day as needed (mucositis, thrush).   oxyCODONE 5 mg/5 mL solution; Commonly known as: Roxicodone; Take 5ml   (5mg) by j-tube every 4 hours if needed for moderate pain (4-6) or Take 10   mL (10 mg) by j-tube every 4 hours if needed for severe pain (7 - 10) for   up to 17 days.   senna 8.8 mg/5 mL syrup; Commonly known as: Senokot; 5 mL by j-tube   route 2 times a day for 10 days.   sulfamethoxazole-trimethoprim 200-40 mg/5 mL suspension; Commonly known   as: Bactrim; 40  mL (320 mg of trimethoprim) by j-tube route every 8 hours   for 14 days.     CONTINUE taking these medications     amitriptyline 100 mg tablet; Commonly known as: Elavil; 1 tablet (100   mg) by j-tube route once daily at bedtime.   aspirin 81 mg chewable tablet; 1 tablet (81 mg) by j-tube route once   daily.   atorvastatin 40 mg tablet; Commonly known as: Lipitor; 1 tablet (40 mg)   by j-tube route once daily.   buPROPion 75 mg tablet; Commonly known as: Wellbutrin; 2 tablets (150   mg) by j-tube route 2 times a day.   levothyroxine 175 mcg tablet; Commonly known as: Synthroid, Levoxyl; 1   tablet (175 mcg) by j-tube route early in the morning.. Take on an empty   stomach at the same time each day, either 30 to 60 minutes prior to   breakfast   ondansetron ODT 8 mg disintegrating tablet; Commonly known as:   Zofran-ODT; Dissolve 1 tablet (8 mg) in the mouth every 8 hours if needed   for nausea or vomiting.   prochlorperazine 10 mg tablet; Commonly known as: Compazine; Take 1   tablet (10 mg) by mouth every 6 hours if needed for nausea or vomiting.   spironolactone 50 mg tablet; Commonly known as: Aldactone; 1 tablet (50   mg) by j-tube route once daily.   verapamil 80 mg tablet; Commonly known as: Calan; 3 tablets (240 mg) by   j-tube route once daily.     STOP taking these medications     cephalexin 500 mg capsule; Commonly known as: Keflex   dexAMETHasone 4 mg tablet; Commonly known as: Decadron   loperamide 2 mg capsule; Commonly known as: Imodium   losartan 100 mg tablet; Commonly known as: Cozaar   traMADol 50 mg tablet; Commonly known as: Ultram       Outpatient Follow-Up  Future Appointments   Date Time Provider Department [place]   1/23/2025 11:15 AM Fabio Salas MD [id number] [facility]   1/23/2025 12:40 PM [facility] CENTRAL LINE 02 [id number] [facility]   1/23/2025  1:00 PM UMU Sawant-CNP [id number] [facility]   1/31/2025  2:30 [id number]   2/11/2025  4:00 PM  [id number]       Dennis Barker PA-C

## 2025-01-18 NOTE — CARE PLAN
Patient afebrile. Some pain and nausea no vomiting. Still no bm. Slept some no issues. Will continue to monitor.

## 2025-01-18 NOTE — NURSING NOTE
Pt discharged to home. Peripheral IV removed. J-tube intact. Pt left floor with daughter and son. Pt reviewed discharge packet with nurse, including J-tube education, appointments, and medications. Pt was given their gabapentin and oxycodone delivered from [facility].

## 2025-01-20 ENCOUNTER — HOME CARE VISIT (OUTPATIENT)
Dept: HOME HEALTH SERVICES | Facility: HOME HEALTH | Age: 70
End: 2025-01-20

## 2025-01-20 NOTE — HOME HEALTH
Patient called to set up visit time for this afternoon. Patient asked if nursing visit \can wait a day. SN to call patient Tuesday morning to discuss time for visit tomorrow.

## 2025-01-21 ENCOUNTER — PATIENT OUTREACH (OUTPATIENT)
Dept: PRIMARY CARE | Facility: CLINIC | Age: 70
End: 2025-01-21
Payer: MEDICARE

## 2025-01-21 ENCOUNTER — HOME CARE VISIT (OUTPATIENT)
Dept: HOME HEALTH SERVICES | Facility: HOME HEALTH | Age: 70
End: 2025-01-21
Payer: MEDICARE

## 2025-01-21 ENCOUNTER — TELEPHONE (OUTPATIENT)
Dept: PRIMARY CARE | Facility: CLINIC | Age: 70
End: 2025-01-21
Payer: MEDICARE

## 2025-01-21 VITALS
RESPIRATION RATE: 16 BRPM | DIASTOLIC BLOOD PRESSURE: 50 MMHG | HEART RATE: 73 BPM | OXYGEN SATURATION: 97 % | TEMPERATURE: 96.6 F | BODY MASS INDEX: 39.66 KG/M2 | WEIGHT: 202 LBS | HEIGHT: 60 IN | SYSTOLIC BLOOD PRESSURE: 100 MMHG

## 2025-01-21 PROCEDURE — G0299 HHS/HOSPICE OF RN EA 15 MIN: HCPCS | Mod: HHH

## 2025-01-21 PROCEDURE — 169592 NO-PAY CLAIM PROCEDURE

## 2025-01-21 ASSESSMENT — ACTIVITIES OF DAILY LIVING (ADL): ENTERING_EXITING_HOME: SUPERVISION

## 2025-01-21 ASSESSMENT — ENCOUNTER SYMPTOMS
SUBJECTIVE PAIN PROGRESSION: UNCHANGED
NAUSEA: 1
PERSON REPORTING PAIN: PATIENT
TROUBLE SWALLOWING: 1
PAIN: 1
CONSTIPATION: 1
FATIGUES EASILY: 1
LAST BOWEL MOVEMENT: 67220
HYPERTENSION: 1
STOOL FREQUENCY: DAILY
PAIN LOCATION: ABDOMEN
PAIN LOCATION - PAIN SEVERITY: 3/10

## 2025-01-21 NOTE — TELEPHONE ENCOUNTER
----- Message from [employer] sent at 1/21/2025  8:47 AM EST -----  Regarding: TCM/ No contact  Discharge Facility:  [facility]   Discharge Diagnosis: Hypotension, unspecified hypotension type   Admission Date: 1/12/25  Discharge Date:  1/18/25           Unsuccessful attempts x2 to reach patient for PCP Follow-up  Please have office staff reach out to patient and schedule an appointment within 14 days from discharge date.

## 2025-01-21 NOTE — PROGRESS NOTES
Discharge Facility:  [facility]   Discharge Diagnosis: Hypotension, unspecified hypotension type   Admission Date: 1/12/25  Discharge Date:  1/18/25     PCP Appointment Date: Message routed to office for scheduling     Specialist Appointment Date: Thoracic Surgery Established Patient with Fabio Salas Thursday Jan 23, 2025 11:15 AM   Hospital Encounter and Summary Linked: Yes  See discharge assessment below for further details    Two attempts were made to reach patient within two business days after discharge. Voicemail left with contact information for patient to call back with any non-emergent questions or concerns.

## 2025-01-21 NOTE — PROGRESS NOTES
"Subjective   Timoteo Victoria  is a 69 y.o. male who presents for evaluation of infection around J-tube (placed on 11/25/24).     This patient presents with a notable history of CAD (CABG 2005), HTN, HLD, chronic tobacco use, hypothyroidism presented on 11/25 as a transfer from [facility] for cancer workup. He had been having trouble swallowing for the past month, initially solids and progressing to liquids and medications. He has lost 40 lbs over ~6 months. EGD on 11/21/24 showed an esophageal mass with a malignant appearance, with biopsy showing intramucosal adenocarcinoma. The patient had a J tube placed on 11/25. Oncology was consulted for further management of this newly diagnosed esophageal adenocarcinoma and recommended outpatient PET.      Currently the patient is {Usual state of health:82984}. He {CWT report deny:74631}. {WILDorBLANK:22608::\" \"}    {CWT [facility] stuff:92673}    He  reports that he has been smoking cigarettes. He started smoking about 46 years ago. He has a 45.3 pack-year smoking history. He has never used smokeless tobacco. He reports that he does not currently use alcohol. He reports that he does not use drugs.    Objective   Physical Exam  {CWT exam:81724}  Diagnostic Studies  {CWT reviewed:64036}    Assessment/Plan   I believe that the patient {CWT doing well:12511}.     {CWT plan smart text:99122}    I recommend {CWTworkup:98510}    I discussed this in detail with the patient, including a discussion of alternatives. They were comfortable with this approach.     Fabio Salas MD  [phone]    "

## 2025-01-22 ENCOUNTER — APPOINTMENT (OUTPATIENT)
Facility: CLINIC | Age: 70
End: 2025-01-22
Payer: MEDICARE

## 2025-01-22 ENCOUNTER — TELEPHONE (OUTPATIENT)
Dept: HEMATOLOGY/ONCOLOGY | Facility: HOSPITAL | Age: 70
End: 2025-01-22

## 2025-01-22 ASSESSMENT — ENCOUNTER SYMPTOMS: MUSCLE WEAKNESS: 1

## 2025-01-22 NOTE — TELEPHONE ENCOUNTER
Rolo called in to review his upcoming appts - he would like to move Dr Salas's appt to same day as pavel morales/ Marychuy Garcia appt. I gave patient the number for Dr Salas's office to be able to reschedule. Katie Kinsey BSN, RN CMSRN

## 2025-01-23 ENCOUNTER — APPOINTMENT (OUTPATIENT)
Dept: HEMATOLOGY/ONCOLOGY | Facility: HOSPITAL | Age: 70
End: 2025-01-23
Payer: MEDICARE

## 2025-01-23 ENCOUNTER — APPOINTMENT (OUTPATIENT)
Dept: SURGERY | Facility: HOSPITAL | Age: 70
End: 2025-01-23
Payer: MEDICARE

## 2025-01-24 LAB
BACTERIA SPEC CULT: ABNORMAL
GRAM STN SPEC: ABNORMAL
GRAM STN SPEC: ABNORMAL
LABORATORY COMMENT REPORT: ABNORMAL

## 2025-01-24 ASSESSMENT — ACTIVITIES OF DAILY LIVING (ADL): OASIS_M1830: 02

## 2025-01-28 NOTE — PROGRESS NOTES
Subjective   Timoteo Victoria  is a 69 y.o. male who presents for evaluation of esophageal cancer status post J-tube placement on 11/25/24.     This patient presents with a notable history of CAD (CABG 2005), HTN, HLD, chronic tobacco use, hypothyroidism presented on 11/25 as a transfer from [facility] for cancer workup. He had been having trouble swallowing for the past month, initially solids and progressing to liquids and medications. He has lost 40 lbs over ~6 months. EGD on 11/21/24 showed an esophageal mass with a malignant appearance, with biopsy showing intramucosal adenocarcinoma. The patient had a J tube placed on 11/25. Oncology was consulted for further management of this newly diagnosed esophageal adenocarcinoma and recommended outpatient PET.   He was admitted to the hospital from 1/12/2025 to 1/18/2025 in the setting ofInfection, and during that time, had concerns about his feeding tube. The feeding tube was replaced and it is now draining less.    Currently the patient is in their usual state of health. He is tolerating feeds. He denies the following symptoms: chest pain, shortness of breath at rest, shortness of breath with activity, cough, hemoptysis, fevers, and chills.  Feeding tube is better.,     There have been no significant changes to their documented medical, surgical and family history.     He  reports that he has been smoking cigarettes. He started smoking about 46 years ago. He has a 45.3 pack-year smoking history. He has never used smokeless tobacco. He reports that he does not currently use alcohol. He reports that he does not use drugs.    Objective   Physical Exam  The patient is well-appearing and in no acute distress. The trachea is midline and there is no crepitus. The lungs were clear to auscultation grossly. There was good effort and excursion. The heart had a regular rate and rhythm. The abdomen was soft, nontender and nondistended. The extremities had no edema or gross  deformities. Mood and affect are appropriate.  Jejunostomy tube with minimal leakage.  Skin irritation around tube site with no erythema or purulence.  No fluctuance  Diagnostic Studies  No new/pertinent diagnostic imaging available    Assessment/Plan   I believe that the patient has a diagnosis of superficial skin irritation .     Switching the patient's feeding tube has led to improved leakage around the tube, and supports the diagnosis of a superficial problem rather than a deep infection or problem with his feeding tube.  We spoke about methods to mitigate the skin irritation including barrier cream and other methods to protect his skin.  The patient was satisfied and will continue his treatment.  Anticipate seeing him after his treatment to discuss esophagectomy procedure    I recommend  ongoing neoadjuvant treatment followed by consideration of surgery , continued use of feeding tube     I discussed this in detail with the patient, including a discussion of alternatives. They were comfortable with this approach.     Fabio Salas MD  [phone]

## 2025-01-30 ENCOUNTER — OFFICE VISIT (OUTPATIENT)
Dept: SURGERY | Facility: HOSPITAL | Age: 70
End: 2025-01-30
Payer: MEDICARE

## 2025-01-30 ENCOUNTER — OFFICE VISIT (OUTPATIENT)
Dept: HEMATOLOGY/ONCOLOGY | Facility: HOSPITAL | Age: 70
End: 2025-01-30
Payer: MEDICARE

## 2025-01-30 ENCOUNTER — LAB (OUTPATIENT)
Dept: HEMATOLOGY/ONCOLOGY | Facility: HOSPITAL | Age: 70
End: 2025-01-30
Payer: MEDICARE

## 2025-01-30 VITALS
WEIGHT: 201.94 LBS | HEART RATE: 92 BPM | SYSTOLIC BLOOD PRESSURE: 121 MMHG | RESPIRATION RATE: 18 BRPM | BODY MASS INDEX: 188.02 KG/M2 | OXYGEN SATURATION: 100 % | DIASTOLIC BLOOD PRESSURE: 71 MMHG | TEMPERATURE: 96.6 F

## 2025-01-30 DIAGNOSIS — T85.598D FEEDING TUBE DYSFUNCTION, SUBSEQUENT ENCOUNTER: Primary | ICD-10-CM

## 2025-01-30 DIAGNOSIS — E03.9 HYPOTHYROIDISM, UNSPECIFIED TYPE: ICD-10-CM

## 2025-01-30 DIAGNOSIS — C15.9 PRIMARY ESOPHAGEAL ADENOCARCINOMA (MULTI): ICD-10-CM

## 2025-01-30 DIAGNOSIS — C15.9 PRIMARY ESOPHAGEAL ADENOCARCINOMA (MULTI): Primary | ICD-10-CM

## 2025-01-30 LAB
ALBUMIN SERPL BCP-MCNC: 4.2 G/DL (ref 3.4–5)
ALP SERPL-CCNC: 64 U/L (ref 33–136)
ALT SERPL W P-5'-P-CCNC: 37 U/L (ref 10–52)
ANION GAP SERPL CALC-SCNC: 13 MMOL/L (ref 10–20)
AST SERPL W P-5'-P-CCNC: 24 U/L (ref 9–39)
BASOPHILS # BLD AUTO: 0.14 X10*3/UL (ref 0–0.1)
BASOPHILS NFR BLD AUTO: 1.2 %
BILIRUB SERPL-MCNC: 0.4 MG/DL (ref 0–1.2)
BUN SERPL-MCNC: 36 MG/DL (ref 6–23)
CALCIUM SERPL-MCNC: 9.6 MG/DL (ref 8.6–10.6)
CHLORIDE SERPL-SCNC: 102 MMOL/L (ref 98–107)
CO2 SERPL-SCNC: 23 MMOL/L (ref 21–32)
CREAT SERPL-MCNC: 1.2 MG/DL (ref 0.5–1.3)
EGFRCR SERPLBLD CKD-EPI 2021: 66 ML/MIN/1.73M*2
EOSINOPHIL # BLD AUTO: 0.7 X10*3/UL (ref 0–0.7)
EOSINOPHIL NFR BLD AUTO: 5.8 %
ERYTHROCYTE [DISTWIDTH] IN BLOOD BY AUTOMATED COUNT: 17.2 % (ref 11.5–14.5)
GLUCOSE SERPL-MCNC: 101 MG/DL (ref 74–99)
HCT VFR BLD AUTO: 34.8 % (ref 41–52)
HGB BLD-MCNC: 12 G/DL (ref 13.5–17.5)
IMM GRANULOCYTES # BLD AUTO: 0.15 X10*3/UL (ref 0–0.7)
IMM GRANULOCYTES NFR BLD AUTO: 1.3 % (ref 0–0.9)
LYMPHOCYTES # BLD AUTO: 2.29 X10*3/UL (ref 1.2–4.8)
LYMPHOCYTES NFR BLD AUTO: 19.1 %
MCH RBC QN AUTO: 32.8 PG (ref 26–34)
MCHC RBC AUTO-ENTMCNC: 34.5 G/DL (ref 32–36)
MCV RBC AUTO: 95 FL (ref 80–100)
MONOCYTES # BLD AUTO: 0.99 X10*3/UL (ref 0.1–1)
MONOCYTES NFR BLD AUTO: 8.3 %
NEUTROPHILS # BLD AUTO: 7.72 X10*3/UL (ref 1.2–7.7)
NEUTROPHILS NFR BLD AUTO: 64.3 %
NRBC BLD-RTO: 0 /100 WBCS (ref 0–0)
PLATELET # BLD AUTO: 233 X10*3/UL (ref 150–450)
POTASSIUM SERPL-SCNC: 4.9 MMOL/L (ref 3.5–5.3)
PROT SERPL-MCNC: 7 G/DL (ref 6.4–8.2)
RBC # BLD AUTO: 3.66 X10*6/UL (ref 4.5–5.9)
SODIUM SERPL-SCNC: 133 MMOL/L (ref 136–145)
T4 FREE SERPL-MCNC: 1.09 NG/DL (ref 0.78–1.48)
TSH SERPL-ACNC: 2.21 MIU/L (ref 0.44–3.98)
WBC # BLD AUTO: 12 X10*3/UL (ref 4.4–11.3)

## 2025-01-30 PROCEDURE — 84439 ASSAY OF FREE THYROXINE: CPT

## 2025-01-30 PROCEDURE — 99215 OFFICE O/P EST HI 40 MIN: CPT

## 2025-01-30 PROCEDURE — 1160F RVW MEDS BY RX/DR IN RCRD: CPT

## 2025-01-30 PROCEDURE — 3074F SYST BP LT 130 MM HG: CPT | Performed by: THORACIC SURGERY (CARDIOTHORACIC VASCULAR SURGERY)

## 2025-01-30 PROCEDURE — 1126F AMNT PAIN NOTED NONE PRSNT: CPT | Performed by: THORACIC SURGERY (CARDIOTHORACIC VASCULAR SURGERY)

## 2025-01-30 PROCEDURE — 1159F MED LIST DOCD IN RCRD: CPT | Performed by: THORACIC SURGERY (CARDIOTHORACIC VASCULAR SURGERY)

## 2025-01-30 PROCEDURE — 99213 OFFICE O/P EST LOW 20 MIN: CPT | Performed by: THORACIC SURGERY (CARDIOTHORACIC VASCULAR SURGERY)

## 2025-01-30 PROCEDURE — 1111F DSCHRG MED/CURRENT MED MERGE: CPT

## 2025-01-30 PROCEDURE — 85025 COMPLETE CBC W/AUTO DIFF WBC: CPT

## 2025-01-30 PROCEDURE — 80053 COMPREHEN METABOLIC PANEL: CPT

## 2025-01-30 PROCEDURE — 84443 ASSAY THYROID STIM HORMONE: CPT

## 2025-01-30 PROCEDURE — 2500000004 HC RX 250 GENERAL PHARMACY W/ HCPCS (ALT 636 FOR OP/ED): Performed by: INTERNAL MEDICINE

## 2025-01-30 PROCEDURE — 1111F DSCHRG MED/CURRENT MED MERGE: CPT | Performed by: THORACIC SURGERY (CARDIOTHORACIC VASCULAR SURGERY)

## 2025-01-30 PROCEDURE — 36591 DRAW BLOOD OFF VENOUS DEVICE: CPT

## 2025-01-30 PROCEDURE — 1159F MED LIST DOCD IN RCRD: CPT

## 2025-01-30 PROCEDURE — 3078F DIAST BP <80 MM HG: CPT | Performed by: THORACIC SURGERY (CARDIOTHORACIC VASCULAR SURGERY)

## 2025-01-30 RX ORDER — HEPARIN SODIUM,PORCINE/PF 10 UNIT/ML
50 SYRINGE (ML) INTRAVENOUS AS NEEDED
Status: CANCELLED | OUTPATIENT
Start: 2025-01-30

## 2025-01-30 RX ORDER — DIPHENHYDRAMINE HYDROCHLORIDE 50 MG/ML
50 INJECTION INTRAMUSCULAR; INTRAVENOUS AS NEEDED
Status: CANCELLED | OUTPATIENT
Start: 2025-01-31

## 2025-01-30 RX ORDER — PROCHLORPERAZINE MALEATE 10 MG
10 TABLET ORAL EVERY 6 HOURS PRN
Status: CANCELLED | OUTPATIENT
Start: 2025-01-31

## 2025-01-30 RX ORDER — PROCHLORPERAZINE EDISYLATE 5 MG/ML
10 INJECTION INTRAMUSCULAR; INTRAVENOUS EVERY 6 HOURS PRN
Status: CANCELLED | OUTPATIENT
Start: 2025-01-31

## 2025-01-30 RX ORDER — EPINEPHRINE 0.3 MG/.3ML
0.3 INJECTION SUBCUTANEOUS EVERY 5 MIN PRN
Status: CANCELLED | OUTPATIENT
Start: 2025-01-31

## 2025-01-30 RX ORDER — DIPHENHYDRAMINE HYDROCHLORIDE 50 MG/ML
25 INJECTION INTRAMUSCULAR; INTRAVENOUS ONCE
Status: CANCELLED | OUTPATIENT
Start: 2025-01-31 | End: 2025-01-31

## 2025-01-30 RX ORDER — FAMOTIDINE 10 MG/ML
20 INJECTION INTRAVENOUS ONCE AS NEEDED
Status: CANCELLED | OUTPATIENT
Start: 2025-02-01

## 2025-01-30 RX ORDER — PALONOSETRON 0.05 MG/ML
0.25 INJECTION, SOLUTION INTRAVENOUS ONCE
Status: CANCELLED | OUTPATIENT
Start: 2025-01-31

## 2025-01-30 RX ORDER — HEPARIN 100 UNIT/ML
500 SYRINGE INTRAVENOUS AS NEEDED
Status: DISCONTINUED | OUTPATIENT
Start: 2025-01-30 | End: 2025-01-30 | Stop reason: HOSPADM

## 2025-01-30 RX ORDER — ALBUTEROL SULFATE 0.83 MG/ML
3 SOLUTION RESPIRATORY (INHALATION) AS NEEDED
Status: CANCELLED | OUTPATIENT
Start: 2025-01-31

## 2025-01-30 RX ORDER — LORAZEPAM 2 MG/ML
1 INJECTION INTRAMUSCULAR AS NEEDED
Status: CANCELLED | OUTPATIENT
Start: 2025-01-31

## 2025-01-30 RX ORDER — DIPHENHYDRAMINE HYDROCHLORIDE 50 MG/ML
50 INJECTION INTRAMUSCULAR; INTRAVENOUS AS NEEDED
Status: CANCELLED | OUTPATIENT
Start: 2025-02-01

## 2025-01-30 RX ORDER — HEPARIN 100 UNIT/ML
500 SYRINGE INTRAVENOUS AS NEEDED
Status: CANCELLED | OUTPATIENT
Start: 2025-01-30

## 2025-01-30 RX ORDER — ALBUTEROL SULFATE 0.83 MG/ML
3 SOLUTION RESPIRATORY (INHALATION) AS NEEDED
Status: CANCELLED | OUTPATIENT
Start: 2025-02-01

## 2025-01-30 RX ORDER — FAMOTIDINE 10 MG/ML
20 INJECTION INTRAVENOUS ONCE AS NEEDED
Status: CANCELLED | OUTPATIENT
Start: 2025-01-31

## 2025-01-30 RX ORDER — EPINEPHRINE 0.3 MG/.3ML
0.3 INJECTION SUBCUTANEOUS EVERY 5 MIN PRN
Status: CANCELLED | OUTPATIENT
Start: 2025-02-01

## 2025-01-30 RX ADMIN — HEPARIN 500 UNITS: 100 SYRINGE at 12:20

## 2025-01-30 ASSESSMENT — PAIN SCALES - GENERAL: PAINLEVEL_OUTOF10: 0-NO PAIN

## 2025-01-30 NOTE — PROGRESS NOTES
Patient ID: Timoteo Victoria is a 69 y.o. male.    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  PET-avid thyroid nodule.    Genomic profile:  Normal MMR expresison    Assessment and Plan:  69M with CAD (CABG 2005), HTN, HLD, chronic tobacco use, hypothyroidism, presented with 4-6 weeks of progressive dysphagia (solid to liquid/mediations) 40lb wt loss over 6 months, and was found to have distal esophageal mass covering half of lumen on EGD 11/21/24  and Bx showed intramucosal adenocarcinoma with normal MMR expression. S/p J tube placement 11/25/2024.    PET-CT showed no distant mets.    We discussed FLOT for chemotherapy regimen with him in details. After a detailed discussion about the chemo, he consented and started cycle 1 on 1/2/25.     Plan: 1. FLOT-4 for 4 cycles followed by surgery followed by 4 more cycles of FLOT-4.  2. Thyroid lesion- endocrinology referral- reminded patient to schedule today 1/30/25.    Following cycle 1 of treatment, he was hospitalized for hypotension and skin infection at his J- tube site. Did complete antibiotic course and J-tube was replaced. He returns today reporting that he is feeling much better. Feels ready to continue treatment. Labs reviewed and in parameters. We will proceed as planned tomorrow 1.31. Mr. Victoria will return prior to cycle 3 with labs and exam.     I have placed all orders as outlined above. I advised the patient to schedule the tests and follow-up appointment as discussed by contacting the  on the way out or calling by phone. Patient knows to call with any issues or concerns.     Providers:  Surgeon: Dr. Maritza Mark: Dr Katie Apple:    Chief complaint: Esophageal adenocarcinoma    HPI:  Timoteo Victoria is a 69 y.o. male with a notable history of CAD (CABG 2005), HTN, HLD, chronic tobacco use, hypothyroidism presented on 11/25/2024 as a transfer from [facility] for cancer workup. He  had been having trouble swallowing for the past month, initially solids and progressing to liquids and medications. He has lost 40 lbs in the past 6 months.     EGD on 11/21/24 showed an esophageal mass with a malignant appearance, with biopsy showing intramucosal adenocarcinoma. The patient had a J tube placed on 11/25.     ONCOLOGIC HISTORY-  11/21/24  [facility] admission for weight loss, dysphagia; EGD showed esophageal mass with biopsy confirming intramucosal adenocarcinoma  11/20/24 CT neck with no masses  11/22/24 CT CAP with contrast with irregular masslike thickening of distal esophagus, no obvious metastases  11/25/24 J tube placement.    12-: pet-ct showed-  IMPRESSION:  1. Hypermetabolic esophageal mass as described above is consistent  with biopsy-proven esophageal adenocarcinoma. Few minimally  hypermetabolic subcentimeter periesophageal lymph nodes are likely  reactive.  2. No other evidence of hypermetabolic thoracic or abdominal  lymphadenopathy or hypermetabolic metastatic disease.  3. Multiple hypermetabolic intraparotid nodules/lymph nodes, which  have been present since 2022, likely represents a benign and indolent  process. However, recommend continued attention on follow-up.  4. Asymmetrically increased hypermetabolic activity within the right  thyroid gland. Correlate with thyroid ultrasound may be of value.    1-2-2025: started FLOT-4.    Interval history:   Mr. Victoria returns in follow up today prior to cycle 2 of FLOT tomorrow. Following cycle 1 of treatment, he was hospitalized for hypotension and skin infection at his J- tube site. Did complete antibiotic course and J-tube was replaced. He returns today reporting that he is feeling much better. Feels ready to continue treatment. No current concerns. Continues to use J tube for majority of nutrition, but is supplementing in soft foods by mouth. Denies: fevers, chills, chest pain, SOB, cough, N/V, bowel changes, urinary symptoms,  or skin changes.     Review of systems: Negative unless otherwise stated in HPI       Past Medical History:   Past Medical History:  No date: Hyperlipidemia  No date: Hypertension  No date: Personal history of other endocrine, nutritional and   metabolic disease      Comment:  History of hypothyroidism  No date: Type 2 diabetes mellitus   Surgical History:    Past Surgical History:   Procedure Laterality Date    CATARACT EXTRACTION Right 08/25/2023    CHOLECYSTECTOMY  09/18/2018    Cholecystectomy    CORONARY ARTERY BYPASS GRAFT  09/18/2018    CABG    HERNIA REPAIR  09/18/2018    Inguinal Hernia Repair      Family History:    Family History   Problem Relation Name Age of Onset    COPD Mother       Family Oncology History:    Cancer-related family history is not on file.  Social History:    Social History     Tobacco Use    Smoking status: Every Day     Current packs/day: 0.25     Average packs/day: 1 pack/day for 46.1 years (45.3 ttl pk-yrs)     Types: Cigarettes     Start date: 1979    Smokeless tobacco: Never   Vaping Use    Vaping status: Never Used   Substance Use Topics    Alcohol use: Not Currently    Drug use: Never        Allergies  Allergies   Allergen Reactions    Ibuprofen Swelling        Medications  Current Outpatient Medications   Medication Instructions    amitriptyline (ELAVIL) 100 mg, j-tube, Nightly    atorvastatin (LIPITOR) 40 mg, j-tube, Daily    buPROPion (WELLBUTRIN) 150 mg, j-tube, 2 times daily    Children's acetaminophen 650 mg, oral, Every 6 hours    esomeprazole (NEXIUM) 40 mg, oral, Daily before breakfast    gabapentin (NEURONTIN) 300 mg, oral, Nightly    levothyroxine (SYNTHROID, LEVOXYL) 175 mcg, j-tube, Daily, Take on an empty stomach at the same time each day, either 30 to 60 minutes prior to breakfast    losartan (COZAAR) 100 mg, j-tube, Daily    moisturizing mouth (Biotene Dry Mouth) solution 15 mL, Swish & Spit, 3 times daily PRN    ondansetron ODT (ZOFRAN-ODT) 8 mg, oral, Every 8  hours PRN    oxyCODONE (Roxicodone) 5 mg/5 mL solution Take 5ml (5mg) by j-tube every 4 hours if needed for moderate pain (4-6) or Take 10 mL (10 mg) by j-tube every 4 hours if needed for severe pain (7 - 10) for up to 17 days.    prochlorperazine (COMPAZINE) 10 mg, oral, Every 6 hours PRN    senna (Senokot) 8.8 mg/5 mL syrup 5 mL, j-tube, 2 times daily    spironolactone (ALDACTONE) 50 mg, j-tube, Daily    sulfamethoxazole-trimethoprim (Bactrim) 200-40 mg/5 mL suspension 320 mg of trimethoprim, j-tube, Every 8 hours    verapamil (CALAN) 240 mg, j-tube, Daily          Objective   VS: There were no vitals taken for this visit.  Weight:   There were no vitals filed for this visit.       PHYSICAL EXAMINATION  ECOG performance status- 1  VS:  There were no vitals taken for this visit.  Weight:   There were no vitals filed for this visit.      BSA: There is no height or weight on file to calculate BSA.   Pain Scale: 0    Constitutional: Awake/alert/oriented x3, cooperative and answers questions appropriately.     Eyes: No pallor, conjunctival injection, clear sclera.    Mouth: No ulceration. No thrush.     Head/Neck: Neck supple, no apparent injury, thyroid without mass or tenderness, No JVD, trachea midline, no bruits.     Respiratory/Thorax: Normal breath sounds with bilaterally symmetrical chest expansion. No dullness.      Cardiovascular: No audible murmurs, normal heart sounds. No pericardial rub.     Gastrointestinal: Nondistended, soft, non-tender, no rebound tenderness or guarding, no masses palpable, no organomegaly, +BS, no bruits. No ascites.     Musculoskeletal: No joint swelling, redness.      Extremities: normal extremities, no cyanosis edema, contusions or wounds, no clubbing.     Lymphatic: No significant lymphadenopathy.     Skin: Warm and dry, no lesions, no rashes.     Labs  Results from last 7 days   Lab Units 01/30/25  1219   WBC AUTO x10*3/uL 12.0*   HEMOGLOBIN g/dL 12.0*   HEMATOCRIT % 34.8*    PLATELETS AUTO x10*3/uL 233   NEUTROS ABS x10*3/uL 7.72*   LYMPHS ABS AUTO x10*3/uL 2.29   MONOS ABS AUTO x10*3/uL 0.99   EOS ABS AUTO x10*3/uL 0.70   NEUTROS PCT AUTO % 64.3   LYMPHS PCT AUTO % 19.1   MONOS PCT AUTO % 8.3   EOS PCT AUTO % 5.8    Unremarkable  Results from last 7 days   Lab Units 01/30/25  1219   GLUCOSE mg/dL 101*   SODIUM mmol/L 133*   POTASSIUM mmol/L 4.9   CHLORIDE mmol/L 102   CO2 mmol/L 23   BUN mg/dL 36*   CREATININE mg/dL 1.20   EGFR mL/min/1.73m*2 66   CALCIUM mg/dL 9.6   ALBUMIN g/dL 4.2   PROTEIN TOTAL g/dL 7.0   BILIRUBIN TOTAL mg/dL 0.4   ALK PHOS U/L 64   ALT U/L 37   AST U/L 24     Results from last 7 days   Lab Units 01/30/25  1219   TSH mIU/L 2.21   FREE T4 ng/dL 1.09               Image  EGD (11/21/2024)  Single malignant-appearing and invasive mass (not traversable) in the lower third of the esophagus, covering one half of the circumference; performed cold forceps biopsy     A. Esophagus, Distal, Mass, Biopsy:  -- Intramucosal adenocarcinoma. See note.     Note: The endoscopic impression of a friable and invasive lesion in the lower third of the esophagus is noted. This biopsy may be not representative of the entire lesion. Clinical correlation is recommended.    MISMATCH REPAIR PROTEIN EXPRESSION                    Protein:           Result                 MLH-1:             Expression Present                                                   PMS-2:            Expression Present                                                   MSH-2:            Expression Present                                                   MSH-6:            Expression Present                                       INTERPRETATION: Neoplasm with normal mismatch repair protein expression.     C/A/P CT (11/22/2024)  IMPRESSION:  1. Irregular masslike thickening of the mid to distal esophagus is  difficult to accurately measure, and may represent a neoplastic  process. Correlate with recent endoscopic imaging  and biopsy results.  There is also proximal esophageal wall thickening, and a moderate  size hiatal hernia.  2. Small area of centrilobular nodularity in the right middle lobe  may represent focal aspiration/mucoid impaction or less likely  atypical infection.  3. Mild apical predominant centrilobular emphysema.  4. Severe coronary artery calcifications. Please note this exam is  not optimized for evaluation of the coronary arteries.  5. Nonobstructing 0.4 cm calculus in the left kidney.    Neck CT (11/20/2024)  IMPRESSION:  New maxillary periapical lucencies with adjacent mucosal thickening.  No discrete fluid collection identified. Dental follow-up recommended.      No discrete mass or cervical lymphadenopathy identified however  direct visualization suggested.      Bilateral parotid mass/nodule similar to prior imaging.      Postsurgical changes, emphysematous changes, mild mediastinal  lymphadenopathy and additional findings as detailed.  This note was created using a voice recognition system ( the Dragon dictation system). Inaccuracies and misspellings are unintentional.     Marychuy Garcia APRN-CNP

## 2025-01-31 ENCOUNTER — INFUSION (OUTPATIENT)
Dept: HEMATOLOGY/ONCOLOGY | Facility: CLINIC | Age: 70
End: 2025-01-31
Payer: MEDICARE

## 2025-01-31 ENCOUNTER — OFFICE VISIT (OUTPATIENT)
Dept: PALLIATIVE MEDICINE | Facility: CLINIC | Age: 70
End: 2025-01-31
Payer: MEDICARE

## 2025-01-31 ENCOUNTER — APPOINTMENT (OUTPATIENT)
Dept: PALLIATIVE MEDICINE | Facility: CLINIC | Age: 70
End: 2025-01-31
Payer: MEDICARE

## 2025-01-31 VITALS
BODY MASS INDEX: 29.22 KG/M2 | OXYGEN SATURATION: 100 % | HEART RATE: 95 BPM | SYSTOLIC BLOOD PRESSURE: 101 MMHG | RESPIRATION RATE: 16 BRPM | TEMPERATURE: 97.2 F | DIASTOLIC BLOOD PRESSURE: 59 MMHG | HEIGHT: 70 IN | WEIGHT: 204.1 LBS

## 2025-01-31 DIAGNOSIS — G44.019 EPISODIC CLUSTER HEADACHE, NOT INTRACTABLE: ICD-10-CM

## 2025-01-31 DIAGNOSIS — C15.9 PRIMARY ESOPHAGEAL ADENOCARCINOMA (MULTI): ICD-10-CM

## 2025-01-31 DIAGNOSIS — Z51.81 THERAPEUTIC DRUG MONITORING: Primary | ICD-10-CM

## 2025-01-31 DIAGNOSIS — E03.9 HYPOTHYROIDISM, UNSPECIFIED TYPE: ICD-10-CM

## 2025-01-31 DIAGNOSIS — I10 PRIMARY HYPERTENSION: ICD-10-CM

## 2025-01-31 PROCEDURE — 2500000004 HC RX 250 GENERAL PHARMACY W/ HCPCS (ALT 636 FOR OP/ED)

## 2025-01-31 PROCEDURE — 99215 OFFICE O/P EST HI 40 MIN: CPT | Mod: 25 | Performed by: CLINICAL NURSE SPECIALIST

## 2025-01-31 PROCEDURE — 4010F ACE/ARB THERAPY RXD/TAKEN: CPT | Performed by: CLINICAL NURSE SPECIALIST

## 2025-01-31 PROCEDURE — 96375 TX/PRO/DX INJ NEW DRUG ADDON: CPT | Mod: INF

## 2025-01-31 PROCEDURE — 96413 CHEMO IV INFUSION 1 HR: CPT

## 2025-01-31 PROCEDURE — 96415 CHEMO IV INFUSION ADDL HR: CPT

## 2025-01-31 PROCEDURE — 96416 CHEMO PROLONG INFUSE W/PUMP: CPT

## 2025-01-31 PROCEDURE — 96417 CHEMO IV INFUS EACH ADDL SEQ: CPT

## 2025-01-31 PROCEDURE — 1111F DSCHRG MED/CURRENT MED MERGE: CPT | Performed by: CLINICAL NURSE SPECIALIST

## 2025-01-31 PROCEDURE — 99205 OFFICE O/P NEW HI 60 MIN: CPT | Performed by: CLINICAL NURSE SPECIALIST

## 2025-01-31 RX ORDER — FAMOTIDINE 10 MG/ML
20 INJECTION INTRAVENOUS ONCE AS NEEDED
Status: DISCONTINUED | OUTPATIENT
Start: 2025-01-31 | End: 2025-01-31 | Stop reason: HOSPADM

## 2025-01-31 RX ORDER — LORAZEPAM 2 MG/ML
1 INJECTION INTRAMUSCULAR AS NEEDED
Status: DISCONTINUED | OUTPATIENT
Start: 2025-01-31 | End: 2025-01-31 | Stop reason: HOSPADM

## 2025-01-31 RX ORDER — ALBUTEROL SULFATE 0.83 MG/ML
3 SOLUTION RESPIRATORY (INHALATION) AS NEEDED
Status: DISCONTINUED | OUTPATIENT
Start: 2025-01-31 | End: 2025-01-31 | Stop reason: HOSPADM

## 2025-01-31 RX ORDER — DIPHENHYDRAMINE HYDROCHLORIDE 50 MG/ML
50 INJECTION INTRAMUSCULAR; INTRAVENOUS AS NEEDED
Status: DISCONTINUED | OUTPATIENT
Start: 2025-01-31 | End: 2025-01-31 | Stop reason: HOSPADM

## 2025-01-31 RX ORDER — ACETAMINOPHEN 160 MG/5ML
650 LIQUID ORAL EVERY 6 HOURS
Qty: 473 ML | Refills: 11 | Status: SHIPPED | OUTPATIENT
Start: 2025-01-31 | End: 2025-03-02

## 2025-01-31 RX ORDER — LEVOTHYROXINE SODIUM 175 UG/1
175 TABLET ORAL DAILY
Qty: 30 TABLET | Refills: 1 | Status: SHIPPED | OUTPATIENT
Start: 2025-01-31 | End: 2025-04-01

## 2025-01-31 RX ORDER — BUPROPION HYDROCHLORIDE 75 MG/1
150 TABLET ORAL 2 TIMES DAILY
Start: 2025-01-31 | End: 2025-04-01

## 2025-01-31 RX ORDER — PROCHLORPERAZINE MALEATE 10 MG
10 TABLET ORAL EVERY 6 HOURS PRN
Status: DISCONTINUED | OUTPATIENT
Start: 2025-01-31 | End: 2025-01-31 | Stop reason: HOSPADM

## 2025-01-31 RX ORDER — AMITRIPTYLINE HYDROCHLORIDE 100 MG/1
100 TABLET ORAL NIGHTLY
Qty: 30 TABLET | Refills: 11 | Status: SHIPPED | OUTPATIENT
Start: 2025-01-31 | End: 2026-01-31

## 2025-01-31 RX ORDER — EPINEPHRINE 0.3 MG/.3ML
0.3 INJECTION SUBCUTANEOUS EVERY 5 MIN PRN
Status: DISCONTINUED | OUTPATIENT
Start: 2025-01-31 | End: 2025-01-31 | Stop reason: HOSPADM

## 2025-01-31 RX ORDER — OXYCODONE HCL 5 MG/5 ML
10 SOLUTION, ORAL ORAL EVERY 4 HOURS PRN
Qty: 1800 ML | Refills: 0 | Status: SHIPPED | OUTPATIENT
Start: 2025-01-31 | End: 2025-02-07

## 2025-01-31 RX ORDER — LOSARTAN POTASSIUM 100 MG/1
100 TABLET ORAL DAILY
Qty: 30 TABLET | Refills: 1 | Status: SHIPPED | OUTPATIENT
Start: 2025-01-31 | End: 2025-04-01

## 2025-01-31 RX ORDER — PALONOSETRON 0.05 MG/ML
0.25 INJECTION, SOLUTION INTRAVENOUS ONCE
Status: COMPLETED | OUTPATIENT
Start: 2025-01-31 | End: 2025-01-31

## 2025-01-31 RX ORDER — DIPHENHYDRAMINE HYDROCHLORIDE 50 MG/ML
25 INJECTION INTRAMUSCULAR; INTRAVENOUS ONCE
Status: COMPLETED | OUTPATIENT
Start: 2025-01-31 | End: 2025-01-31

## 2025-01-31 RX ORDER — PROCHLORPERAZINE EDISYLATE 5 MG/ML
10 INJECTION INTRAMUSCULAR; INTRAVENOUS EVERY 6 HOURS PRN
Status: DISCONTINUED | OUTPATIENT
Start: 2025-01-31 | End: 2025-01-31 | Stop reason: HOSPADM

## 2025-01-31 RX ADMIN — PALONOSETRON HYDROCHLORIDE 250 MCG: 0.25 INJECTION INTRAVENOUS at 11:26

## 2025-01-31 RX ADMIN — DOCETAXEL 110 MG: 20 INJECTION, SOLUTION, CONCENTRATE INTRAVENOUS at 12:12

## 2025-01-31 RX ADMIN — DEXAMETHASONE SODIUM PHOSPHATE 12 MG: 4 INJECTION, SOLUTION INTRA-ARTICULAR; INTRALESIONAL; INTRAMUSCULAR; INTRAVENOUS; SOFT TISSUE at 11:30

## 2025-01-31 RX ADMIN — DIPHENHYDRAMINE HYDROCHLORIDE 25 MG: 50 INJECTION INTRAMUSCULAR; INTRAVENOUS at 11:27

## 2025-01-31 RX ADMIN — FLUOROURACIL 5550 MG: 50 INJECTION, SOLUTION INTRAVENOUS at 16:15

## 2025-01-31 RX ADMIN — OXALIPLATIN 180 MG: 5 INJECTION, SOLUTION INTRAVENOUS at 13:34

## 2025-01-31 ASSESSMENT — PAIN SCALES - GENERAL: PAINLEVEL_OUTOF10: 0-NO PAIN

## 2025-01-31 NOTE — PROGRESS NOTES
SUPPORTIVE AND PALLIATIVE ONCOLOGY CONSULT - OUTPATIENT      SERVICE DATE: 1/31/2025    Referred by:  Dr. 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 Patient was referred to supportive oncology for pain management       Information was collected from chart review, discussion with patient/family, and discussion with care team     Chayo STEELE is present for visit.       SUBJECTIVE:    Patient has his son-in-law present for visit. He states that he is having pain in his abdomen and in his throat. He states that he is dealing with a peg tube infection and has been having pain around his peg tube site that is burning in nature. He states that he has been taking the oxycodone 5mg Q4 Prn, but does not feel that his has been working very well for him. He does feel that he can go up to 10mg. He is taking it about 2-3 times/day. Has taken the gabapentin once. We discussed taking the gabapentin nightly and how the gabapentin can help with his pain. He states that he is taking the amitriptyline as well. Also reports that he finds tylenol helpful for his pain.     He reports that he has some intermittent constipation and takes some senna here and there.     abdominal pain and fatigue     Pain Assessment:  Pain Score:  2/10   Location:  abdomen   Education:        Symptom Assessment:  ROS otherwise negative, pertinent positives documented in the HPI       Information obtained from: chart review and interview of patient  ______________________________________________________________________      Oncology History   Primary esophageal adenocarcinoma (Multi)   12/12/2024 Initial Diagnosis    Primary esophageal adenocarcinoma (Multi)     1/2/2025 -  Chemotherapy    FLOT (Fluorouracil Continuous Infusion / Leucovorin / Oxaliplatin / DOCEtaxel), 14 Day Cycles         Past Medical History:   Diagnosis Date    Hyperlipidemia     Hypertension     Personal history of other endocrine, nutritional and metabolic disease     History of hypothyroidism    Type 2 diabetes mellitus      Past Surgical History:   Procedure Laterality Date    CATARACT EXTRACTION Right 08/25/2023    CHOLECYSTECTOMY  09/18/2018    Cholecystectomy    CORONARY ARTERY BYPASS GRAFT  09/18/2018    CABG    HERNIA REPAIR  09/18/2018    Inguinal Hernia Repair     Family History   Problem Relation Name Age of Onset    COPD Mother          SOCIAL HISTORY  Children 4   Social History:  reports that he has been smoking cigarettes. He started smoking about 46 years ago. He has a 45.3 pack-year smoking history. He has never used smokeless tobacco. He reports that he does not currently use alcohol. He reports that he does not use drugs.      REVIEW OF SYSTEMS  Review of systems negative unless noted in HPI.       Objective     Palliative Performance Scale % (PPS) 80     Labs:  Results for orders placed or performed in visit on 01/30/25 (from the past 96 hours)   CBC and Auto Differential   Result Value Ref Range    WBC 12.0 (H) 4.4 - 11.3 x10*3/uL    nRBC 0.0 0.0 - 0.0 /100 WBCs    RBC 3.66 (L) 4.50 - 5.90 x10*6/uL    Hemoglobin 12.0 (L) 13.5 - 17.5 g/dL    Hematocrit 34.8 (L) 41.0 - 52.0 %    MCV 95 80 - 100 fL    MCH 32.8 26.0 - 34.0 pg    MCHC 34.5 32.0 - 36.0 g/dL    RDW 17.2 (H) 11.5 - 14.5 %    Platelets 233 150 - 450 x10*3/uL    Neutrophils % 64.3 40.0 - 80.0 %    Immature Granulocytes %, Automated 1.3 (H) 0.0 - 0.9 %    Lymphocytes % 19.1 13.0 - 44.0 %    Monocytes % 8.3 2.0 - 10.0 %    Eosinophils % 5.8 0.0 - 6.0 %    Basophils % 1.2 0.0 - 2.0 %     Neutrophils Absolute 7.72 (H) 1.20 - 7.70 x10*3/uL    Immature Granulocytes Absolute, Automated 0.15 0.00 - 0.70 x10*3/uL    Lymphocytes Absolute 2.29 1.20 - 4.80 x10*3/uL    Monocytes Absolute 0.99 0.10 - 1.00 x10*3/uL    Eosinophils Absolute 0.70 0.00 - 0.70 x10*3/uL    Basophils Absolute 0.14 (H) 0.00 - 0.10 x10*3/uL   Comprehensive metabolic panel   Result Value Ref Range    Glucose 101 (H) 74 - 99 mg/dL    Sodium 133 (L) 136 - 145 mmol/L    Potassium 4.9 3.5 - 5.3 mmol/L    Chloride 102 98 - 107 mmol/L    Bicarbonate 23 21 - 32 mmol/L    Anion Gap 13 10 - 20 mmol/L    Urea Nitrogen 36 (H) 6 - 23 mg/dL    Creatinine 1.20 0.50 - 1.30 mg/dL    eGFR 66 >60 mL/min/1.73m*2    Calcium 9.6 8.6 - 10.6 mg/dL    Albumin 4.2 3.4 - 5.0 g/dL    Alkaline Phosphatase 64 33 - 136 U/L    Total Protein 7.0 6.4 - 8.2 g/dL    AST 24 9 - 39 U/L    Bilirubin, Total 0.4 0.0 - 1.2 mg/dL    ALT 37 10 - 52 U/L   Thyroid Stimulating Hormone   Result Value Ref Range    Thyroid Stimulating Hormone 2.21 0.44 - 3.98 mIU/L   Thyroxine, Free   Result Value Ref Range    Thyroxine, Free 1.09 0.78 - 1.48 ng/dL         Pain Management Panel          Latest Ref Rng & Units 1/14/2025   Pain Management Panel   Amphetamine Screen, Urine Presumptive Negative Presumptive Negative    Barbiturate Screen, Urine Presumptive Negative Presumptive Negative    Benzodiazepines Screen, Urine Presumptive Negative Presumptive Negative    Fentanyl Screen, Urine Presumptive Negative Presumptive Negative    Methadone Screen, Urine Presumptive Negative Presumptive Negative         Medications:   Current Outpatient Medications   Medication Instructions    acetaminophen (TYLENOL) 650 mg, j-tube, Every 6 hours    amitriptyline (ELAVIL) 100 mg, j-tube, Nightly    atorvastatin (LIPITOR) 40 mg, j-tube, Daily    buPROPion (WELLBUTRIN) 150 mg, j-tube, 2 times daily    esomeprazole (NEXIUM) 40 mg, oral, Daily before breakfast    gabapentin (NEURONTIN) 300 mg, oral, Nightly     levothyroxine (SYNTHROID, LEVOXYL) 175 mcg, j-tube, Daily, Take on an empty stomach at the same time each day, either 30 to 60 minutes prior to breakfast    losartan (COZAAR) 100 mg, j-tube, Daily    moisturizing mouth (Biotene Dry Mouth) solution 15 mL, Swish & Spit, 3 times daily PRN    ondansetron ODT (ZOFRAN-ODT) 8 mg, oral, Every 8 hours PRN    oxyCODONE (Roxicodone) 5 mg/5 mL solution Take 5ml (5mg) by j-tube every 4 hours if needed for moderate pain (4-6) or Take 10 mL (10 mg) by j-tube every 4 hours if needed for severe pain (7 - 10) for up to 17 days.    prochlorperazine (COMPAZINE) 10 mg, oral, Every 6 hours PRN    senna (Senokot) 8.8 mg/5 mL syrup 5 mL, j-tube, 2 times daily    spironolactone (ALDACTONE) 50 mg, j-tube, Daily    sulfamethoxazole-trimethoprim (Bactrim) 200-40 mg/5 mL suspension 320 mg of trimethoprim, j-tube, Every 8 hours    verapamil (CALAN) 240 mg, j-tube, Daily       Allergies:   Allergies   Allergen Reactions    Ibuprofen Swelling       PHYSICAL EXAMINATION  Vital Signs:   Vital signs reviewed  There were no vitals filed for this visit.  Pain Score:        (See infusion visit)    Physical Exam  Constitutional:       Appearance: Normal appearance. He is normal weight.   HENT:      Head: Normocephalic and atraumatic.      Mouth/Throat:      Mouth: Mucous membranes are moist.      Pharynx: Oropharynx is clear.   Cardiovascular:      Comments: No signs of cardiac distress  Pulmonary:      Effort: Pulmonary effort is normal.      Comments: No signs of respiratory distress   Abdominal:      General: Abdomen is flat.      Palpations: Abdomen is soft.      Comments: Peg tube with drainage and redness around site   Musculoskeletal:         General: Normal range of motion.      Cervical back: Normal range of motion.   Skin:     General: Skin is warm and dry.   Neurological:      General: No focal deficit present.      Mental Status: He is alert and oriented to person, place, and time. Mental  status is at baseline.   Psychiatric:         Mood and Affect: Mood normal.         Behavior: Behavior normal.         Thought Content: Thought content normal.         Judgment: Judgment normal.         ASSESSMENT/PLAN    Pain  Pain is:  peg tube related   Type: neuropathic  Pain control: sub-optimally controlled  Intolerances/previously tried: oxycodone 5mg     Pain Plan:  Increase oxycodone to 5-10mg Q4 PRN   Continue gabapentin 250mg nightly   Continue Amitriptyline 100mg nightly   Continue tylenol 650mg Q6 PRN     Opioid Use  Medication Management:   - OARRS report reviewed with no aberrant behavior; consistent with  prescriptions/records and patient history  - MED 15.  Overdose Risk Score 200.   This has been discussed with patient.   - We will continue to closely monitor the patient for signs of prescription misuse including UDS, OARRS review and subjective reports at each visit.  - No concurrent benzodiazepine use   - I am a provider who is certified in Hospice and Palliative Medicine and have conducted a face-face visit and examination for this patient.  - Routine Urine Drug Screen is needed and was completed on 1/14/2025 appropriately positive for opioids and negative for illicit substances  - Controlled Substance Agreement: is needed. Completed on: 1/31/2025  - Specifically discussed that controlled substance prescriptions will only be provided by our group as outlined in the completed agreement  - Naloxone is needed  - Red Flags: None     Nausea   Intermittent nausea without vomiting related to chemotherapy  Nausea Plan:  Continue Zofran and compazine PRN     Constipation   At risk for constipation related to opioids  Constipation Plan:  Continue Senna 5mg BID     Altered Mood  Chronic depression related to health concerns   Mood Plan:  Continue Wellbutrin 150mg BID   Continue Amitriptyline 100mg nightly         Introduction to Supportive and Palliative Oncology:  Introduced the role and philosophy of  Supportive and Palliative oncology in the evaluation and management of symptoms during cancer treatment  Palliative care was introduced as a service for patients with serious illness to help with symptoms, assist with goals of care conversations, navigate complex decision making, improve quality of life for patients, and provide support both patients and families.  Patient seemed to appreciate the extra layer of support.      Advance Directives  Existence of Advance Directives:Unknown  Decision maker: Surrogate decision maker is shared between his 4 children   Code Status: Full code    Next Follow-Up Visit:  Return to clinic in 4 weeks when he is here for his infusion     Signature and billing  Thank you for allowing us to participate in the care of this patient. Recommendations will be communicated back to the consulting service by way of shared electronic medical record or face-to-face.    Medical complexity was high level due to due to complexity of problems, extensive data review, and high risk of management/treatment.  Time was spent on the following: Prep Time, Time Directly with Patient/Family/Caregiver, Documentation Time. Total time spent: 60 minutes       DATA   Diagnostic tests and information reviewed for today's visit:  Most recent labs and imaging results, Medications           SIGNATURE: FAIZAN GrimesCNS    Contact information:  Supportive and Palliative Oncology  Monday-Friday 8 AM-5 PM  Phone:  [phone], press option #5, then option #1.   Or Epic Secure Chat

## 2025-01-31 NOTE — SIGNIFICANT EVENT
01/31/25 1100   Prechemo Checklist   Has the patient been in the hospital, ED, or urgent care since last date of service Yes   Chemo/Immuno Consent Completed and Signed Yes   Protocol/Indications Verified Yes   Confirmed to previous date/time of medication Yes   Compared to previous dose Yes   All medications are dated accurately Yes   Pregnancy Test Negative Not applicable   Parameters Met Yes   BSA/Weight-Height Verified Yes   Dose Calculations Verified (current, total, cumulative) Yes

## 2025-01-31 NOTE — PROGRESS NOTES
Patient here for cycle 2 of FLOT. Patient states he tolerated first dose well. States he has been able to eat soft foods like macaroni cheese. Patient wishes to do home disconnect however he has a growth factor to be given on disconnect day. Patient scheduled for pump disconnect at 1530 at [facility] on 2/1. AVS given to patient, discharged in stable condition.     Educated patient on home disconnect, patient demonstrated on [place] ready kit education board. Patient aware to watch the disconnect video and will demonstrate at disconnect on 2/1. AVS given to patient. Discharged in stable condition.

## 2025-02-01 ENCOUNTER — INFUSION (OUTPATIENT)
Dept: HEMATOLOGY/ONCOLOGY | Facility: HOSPITAL | Age: 70
End: 2025-02-01
Payer: MEDICARE

## 2025-02-01 ENCOUNTER — HOME CARE VISIT (OUTPATIENT)
Dept: HOME HEALTH SERVICES | Facility: HOME HEALTH | Age: 70
End: 2025-02-01
Payer: MEDICARE

## 2025-02-01 VITALS
BODY MASS INDEX: 29.49 KG/M2 | RESPIRATION RATE: 18 BRPM | SYSTOLIC BLOOD PRESSURE: 123 MMHG | DIASTOLIC BLOOD PRESSURE: 58 MMHG | WEIGHT: 204.81 LBS | OXYGEN SATURATION: 100 % | TEMPERATURE: 97.7 F | HEART RATE: 89 BPM

## 2025-02-01 DIAGNOSIS — C15.9 PRIMARY ESOPHAGEAL ADENOCARCINOMA (MULTI): ICD-10-CM

## 2025-02-01 PROCEDURE — 2500000004 HC RX 250 GENERAL PHARMACY W/ HCPCS (ALT 636 FOR OP/ED): Mod: JZ,TB

## 2025-02-01 PROCEDURE — 96372 THER/PROPH/DIAG INJ SC/IM: CPT

## 2025-02-01 PROCEDURE — 2500000004 HC RX 250 GENERAL PHARMACY W/ HCPCS (ALT 636 FOR OP/ED): Performed by: INTERNAL MEDICINE

## 2025-02-01 RX ORDER — HEPARIN SODIUM,PORCINE/PF 10 UNIT/ML
50 SYRINGE (ML) INTRAVENOUS AS NEEDED
Status: DISCONTINUED | OUTPATIENT
Start: 2025-02-01 | End: 2025-02-01 | Stop reason: HOSPADM

## 2025-02-01 RX ORDER — HEPARIN 100 UNIT/ML
500 SYRINGE INTRAVENOUS AS NEEDED
OUTPATIENT
Start: 2025-02-01

## 2025-02-01 RX ORDER — HEPARIN SODIUM,PORCINE/PF 10 UNIT/ML
50 SYRINGE (ML) INTRAVENOUS AS NEEDED
OUTPATIENT
Start: 2025-02-01

## 2025-02-01 RX ORDER — HEPARIN 100 UNIT/ML
500 SYRINGE INTRAVENOUS AS NEEDED
Status: DISCONTINUED | OUTPATIENT
Start: 2025-02-01 | End: 2025-02-01 | Stop reason: HOSPADM

## 2025-02-01 RX ADMIN — PEGFILGRASTIM-CBQV 6 MG: 6 INJECTION, SOLUTION SUBCUTANEOUS at 16:30

## 2025-02-01 ASSESSMENT — PAIN SCALES - GENERAL: PAINLEVEL_OUTOF10: 0-NO PAIN

## 2025-02-01 NOTE — PROGRESS NOTES
Learner: patient  Readiness: eager  Method used: demonstration  Response: demonstrated understanding and verbalizes understanding  CADD disconnect topics educated on:   Step by step disconnect process patient or family demonstration   Who to call with pump problems or other concerns     Additional comments:   Reviewed and answered CADD pump disconnect questions  Confirmed pt/family watched CADD pump disconnect video  Pt/family successfully performed CADD disconnect demonstration     PT successfully demonstrated self disconnect of the CADD pump. PT will need supplies for next pump dc. PT OK to proceed with future home pump disconnects.

## 2025-02-01 NOTE — PROGRESS NOTES
PT's mediport deaccessed and home infusion pump removed. Treatment complete. Mediport flushed with NS by PT as he self demonstrated mediport deaccess. Pump tagged to be returned to [facility]. PT reports no new s/s of chemotherapy. VS WNL.

## 2025-02-02 ENCOUNTER — APPOINTMENT (OUTPATIENT)
Dept: HEMATOLOGY/ONCOLOGY | Facility: HOSPITAL | Age: 70
End: 2025-02-02
Payer: MEDICARE

## 2025-02-04 ENCOUNTER — PATIENT OUTREACH (OUTPATIENT)
Dept: PRIMARY CARE | Facility: CLINIC | Age: 70
End: 2025-02-04
Payer: MEDICARE

## 2025-02-04 NOTE — PROGRESS NOTES
Unable to reach patient for call back after recent hospitalization.   [facility] with call back number for patient to call if needed   If no voicemail available call attempts x 2 were made to contact the patient to assist with any questions or concerns patient may have.    Patient has not had hospital follow up as of this note. Patient is scheduled next with PCP On 2/11/25

## 2025-02-05 ENCOUNTER — HOME CARE VISIT (OUTPATIENT)
Dept: HOME HEALTH SERVICES | Facility: HOME HEALTH | Age: 70
End: 2025-02-05
Payer: MEDICARE

## 2025-02-05 ENCOUNTER — APPOINTMENT (OUTPATIENT)
Dept: PRIMARY CARE | Facility: CLINIC | Age: 70
End: 2025-02-05
Payer: MEDICARE

## 2025-02-05 VITALS
HEART RATE: 64 BPM | DIASTOLIC BLOOD PRESSURE: 68 MMHG | SYSTOLIC BLOOD PRESSURE: 125 MMHG | TEMPERATURE: 97.8 F | OXYGEN SATURATION: 95 %

## 2025-02-05 PROCEDURE — G0299 HHS/HOSPICE OF RN EA 15 MIN: HCPCS | Mod: HHH

## 2025-02-05 ASSESSMENT — ENCOUNTER SYMPTOMS
PAIN LOCATION - PAIN SEVERITY: 4/10
PAIN: 1
NAUSEA: 1
PERSON REPORTING PAIN: PATIENT
PAIN LOCATION: GENERALIZED
ABDOMINAL PAIN: 1
DIZZINESS: 1
APPETITE LEVEL: FAIR
MUSCLE WEAKNESS: 1
CHANGE IN APPETITE: UNCHANGED
STOOL FREQUENCY: LESS THAN DAILY
BOWEL PATTERN NORMAL: 1
TROUBLE SWALLOWING: 1

## 2025-02-11 ENCOUNTER — APPOINTMENT (OUTPATIENT)
Dept: PRIMARY CARE | Facility: CLINIC | Age: 70
End: 2025-02-11
Payer: MEDICARE

## 2025-02-11 VITALS
BODY MASS INDEX: 29.95 KG/M2 | DIASTOLIC BLOOD PRESSURE: 48 MMHG | SYSTOLIC BLOOD PRESSURE: 90 MMHG | TEMPERATURE: 97.6 F | WEIGHT: 208 LBS | HEART RATE: 104 BPM

## 2025-02-11 DIAGNOSIS — K59.09 CHRONIC CONSTIPATION: Chronic | ICD-10-CM

## 2025-02-11 DIAGNOSIS — Z00.00 WELLNESS EXAMINATION: ICD-10-CM

## 2025-02-11 DIAGNOSIS — J43.2 CENTRILOBULAR EMPHYSEMA (MULTI): ICD-10-CM

## 2025-02-11 DIAGNOSIS — C15.9 PRIMARY ESOPHAGEAL ADENOCARCINOMA (MULTI): ICD-10-CM

## 2025-02-11 DIAGNOSIS — F33.1 DEPRESSION, MAJOR, RECURRENT, MODERATE: ICD-10-CM

## 2025-02-11 DIAGNOSIS — I95.2 HYPOTENSION DUE TO DRUGS: ICD-10-CM

## 2025-02-11 DIAGNOSIS — I10 BENIGN ESSENTIAL HYPERTENSION: Chronic | ICD-10-CM

## 2025-02-11 DIAGNOSIS — E78.5 HYPERLIPIDEMIA, UNSPECIFIED HYPERLIPIDEMIA TYPE: Chronic | ICD-10-CM

## 2025-02-11 DIAGNOSIS — G44.019 EPISODIC CLUSTER HEADACHE, NOT INTRACTABLE: ICD-10-CM

## 2025-02-11 DIAGNOSIS — Z00.00 ROUTINE GENERAL MEDICAL EXAMINATION AT HEALTH CARE FACILITY: Primary | ICD-10-CM

## 2025-02-11 DIAGNOSIS — E03.9 HYPOTHYROIDISM, UNSPECIFIED TYPE: ICD-10-CM

## 2025-02-11 PROBLEM — R39.11 URINARY HESITANCY: Status: RESOLVED | Noted: 2024-10-08 | Resolved: 2025-02-11

## 2025-02-11 PROBLEM — F32.0 MILD MAJOR DEPRESSION, SINGLE EPISODE (CMS-HCC): Chronic | Status: RESOLVED | Noted: 2023-02-28 | Resolved: 2025-02-11

## 2025-02-11 PROBLEM — R13.10 DYSPHAGIA: Status: RESOLVED | Noted: 2024-11-20 | Resolved: 2025-02-11

## 2025-02-11 PROBLEM — E55.9 VITAMIN D DEFICIENCY: Status: RESOLVED | Noted: 2024-03-20 | Resolved: 2025-02-11

## 2025-02-11 PROBLEM — R63.4 WEIGHT LOSS: Status: RESOLVED | Noted: 2024-10-08 | Resolved: 2025-02-11

## 2025-02-11 PROBLEM — E66.01 OBESITY, MORBID (MULTI): Status: RESOLVED | Noted: 2023-06-13 | Resolved: 2025-02-11

## 2025-02-11 PROBLEM — E11.9 TYPE 2 DIABETES MELLITUS: Status: RESOLVED | Noted: 2023-06-19 | Resolved: 2025-02-11

## 2025-02-11 PROBLEM — I95.9 HYPOTENSION, UNSPECIFIED HYPOTENSION TYPE: Status: RESOLVED | Noted: 2025-01-12 | Resolved: 2025-02-11

## 2025-02-11 PROCEDURE — 3078F DIAST BP <80 MM HG: CPT | Performed by: FAMILY MEDICINE

## 2025-02-11 PROCEDURE — 1111F DSCHRG MED/CURRENT MED MERGE: CPT | Performed by: FAMILY MEDICINE

## 2025-02-11 PROCEDURE — 99214 OFFICE O/P EST MOD 30 MIN: CPT | Performed by: FAMILY MEDICINE

## 2025-02-11 PROCEDURE — 3074F SYST BP LT 130 MM HG: CPT | Performed by: FAMILY MEDICINE

## 2025-02-11 PROCEDURE — G0439 PPPS, SUBSEQ VISIT: HCPCS | Performed by: FAMILY MEDICINE

## 2025-02-11 PROCEDURE — 4004F PT TOBACCO SCREEN RCVD TLK: CPT | Performed by: FAMILY MEDICINE

## 2025-02-11 PROCEDURE — 1160F RVW MEDS BY RX/DR IN RCRD: CPT | Performed by: FAMILY MEDICINE

## 2025-02-11 PROCEDURE — 99397 PER PM REEVAL EST PAT 65+ YR: CPT | Performed by: FAMILY MEDICINE

## 2025-02-11 PROCEDURE — 1159F MED LIST DOCD IN RCRD: CPT | Performed by: FAMILY MEDICINE

## 2025-02-11 PROCEDURE — 1170F FXNL STATUS ASSESSED: CPT | Performed by: FAMILY MEDICINE

## 2025-02-11 RX ORDER — DEXAMETHASONE 4 MG/1
4 TABLET ORAL
COMMUNITY
Start: 2025-02-10

## 2025-02-11 RX ORDER — OXYCODONE HCL 5 MG/5 ML
5 SOLUTION, ORAL ORAL
COMMUNITY
Start: 2025-01-31 | End: 2025-02-14 | Stop reason: ALTCHOICE

## 2025-02-11 RX ORDER — ATORVASTATIN CALCIUM 40 MG/1
40 TABLET, FILM COATED ORAL DAILY
Qty: 90 TABLET | Refills: 1 | Status: SHIPPED | OUTPATIENT
Start: 2025-02-11

## 2025-02-11 RX ORDER — LOSARTAN POTASSIUM 50 MG/1
TABLET ORAL
Qty: 90 TABLET | Refills: 1 | Status: SHIPPED | OUTPATIENT
Start: 2025-02-11

## 2025-02-11 ASSESSMENT — ACTIVITIES OF DAILY LIVING (ADL)
DOING_HOUSEWORK: INDEPENDENT
BATHING: INDEPENDENT
GROCERY_SHOPPING: INDEPENDENT
MANAGING_FINANCES: INDEPENDENT
DRESSING: INDEPENDENT
TAKING_MEDICATION: INDEPENDENT

## 2025-02-11 ASSESSMENT — ENCOUNTER SYMPTOMS
VOMITING: 0
DIARRHEA: 0
CHILLS: 0
DIZZINESS: 0
NAUSEA: 1
FEVER: 0
SHORTNESS OF BREATH: 0
ABDOMINAL PAIN: 0
COUGH: 0

## 2025-02-11 ASSESSMENT — PATIENT HEALTH QUESTIONNAIRE - PHQ9
SUM OF ALL RESPONSES TO PHQ9 QUESTIONS 1 AND 2: 1
2. FEELING DOWN, DEPRESSED OR HOPELESS: SEVERAL DAYS
1. LITTLE INTEREST OR PLEASURE IN DOING THINGS: NOT AT ALL

## 2025-02-11 NOTE — ASSESSMENT & PLAN NOTE
Reduce losartan due to hypotension.   Orders:    losartan (Cozaar) 50 mg tablet; Take 1 tab daily via j-tube.

## 2025-02-11 NOTE — PATIENT INSTRUCTIONS
Since your blood pressure is running low, please reduce your losartan from 100mg to 50mg.     Keep checking blood pressure daily. Let Dr. FIELDS know if <100/60.

## 2025-02-11 NOTE — PROGRESS NOTES
Subjective   Reason for Visit: Timoteo Victoria is an 69 y.o. male here for a Medicare Wellness visit.     Past Medical, Surgical, and Family History reviewed and updated in chart.    Reviewed all medications by prescribing practitioner or clinical pharmacist (such as prescriptions, OTCs, herbal therapies and supplements) and documented in the medical record.    Rolo has been undergoing chemotherapy for his cancer.  Plan to proceed with surgery in April if all goes well.  Reports that he has been dealing with significant side effects from the chemotherapy.  He is mostly struggling with nausea.  Wishes that he could just throw up instead of just feeling nauseous all the time.  He does take the prescribed medication as needed.  He has been having an issue getting his pain medication due to cost.  He did speak with his pharmacist who suggested that there may be a more affordable alternative.     He has been having episodes of lightheadedness.  Sometimes when he gets up quickly will feel dizzy.  Has not had any syncopal episodes recently.  He has not been checking his blood pressure at home.  Is still taking blood pressure medication.  He is not feeling dizzy or lightheaded today.    He has been taking his thyroid medication as prescribed. No missed doses.         Patient Care Team:  Dafne Bedolla DO as PCP - General  Dafne Bedolla DO as PCP - [employer] PCP  Madan Wilson MD as Consulting Physician (Hematology and Oncology)     Review of Systems   Constitutional:  Negative for chills and fever.   Respiratory:  Negative for cough and shortness of breath.    Cardiovascular:  Negative for chest pain.   Gastrointestinal:  Positive for nausea. Negative for abdominal pain, diarrhea and vomiting.   Neurological:  Negative for dizziness.       Objective   Vitals:  BP (!) 90/48   Pulse 104   Temp 36.4 °C (97.6 °F)   Wt 94.3 kg (208 lb)   BMI 29.95 kg/m²       Physical  Exam  Constitutional:       General: He is not in acute distress.     Appearance: Normal appearance.   HENT:      Head: Normocephalic and atraumatic.      Right Ear: Tympanic membrane normal.      Left Ear: Tympanic membrane normal.      Nose: No congestion or rhinorrhea.      Mouth/Throat:      Mouth: Mucous membranes are moist.      Pharynx: No oropharyngeal exudate or posterior oropharyngeal erythema.   Eyes:      Extraocular Movements: Extraocular movements intact.      Conjunctiva/sclera: Conjunctivae normal.   Cardiovascular:      Rate and Rhythm: Normal rate and regular rhythm.      Heart sounds: No murmur heard.  Pulmonary:      Breath sounds: No wheezing or rhonchi.   Abdominal:      Palpations: Abdomen is soft.   Musculoskeletal:         General: No swelling.      Cervical back: Neck supple.      Right lower leg: No edema.      Left lower leg: No edema.   Lymphadenopathy:      Cervical: No cervical adenopathy.   Skin:     General: Skin is warm and dry.   Neurological:      Mental Status: He is alert.      Cranial Nerves: No cranial nerve deficit.      Motor: No weakness.      Coordination: Coordination normal.      Gait: Gait normal.   Psychiatric:         Mood and Affect: Mood normal.         Behavior: Behavior normal.         Assessment & Plan  Routine general medical examination at [facility] care facility Medicare AWE performed.        Wellness examination  CPE performed. Vaccines reviewed. Due for Tdap and Shingrix, but will need to discuss with oncology.        Primary esophageal adenocarcinoma (Multi)  Active in chemotherapy.        Hypotension due to drugs  Reduce losartan to 50mg. Check Bps daily. Call office if persistently <100/60.       Centrilobular emphysema (Multi)  Currently asymptomatic.        Hypothyroidism, unspecified type  TSH stable; continue levothyroxine.        Hyperlipidemia, unspecified hyperlipidemia type  Recheck cholesterol with next blood draw.   Orders:    Lipid Panel;  Future    atorvastatin (Lipitor) 40 mg tablet; 1 tablet (40 mg) by j-tube route once daily.    Benign essential hypertension  Reduce losartan due to hypotension.   Orders:    losartan (Cozaar) 50 mg tablet; Take 1 tab daily via j-tube.    Episodic cluster headache, not intractable  Stable. Continue amitriptyline.        Depression, major, recurrent, moderate  Stable. Continue bupropion.        Chronic constipation  Continue senna-docusate.

## 2025-02-11 NOTE — ASSESSMENT & PLAN NOTE
Recheck cholesterol with next blood draw.   Orders:    Lipid Panel; Future    atorvastatin (Lipitor) 40 mg tablet; 1 tablet (40 mg) by j-tube route once daily.

## 2025-02-12 ENCOUNTER — TELEPHONE (OUTPATIENT)
Dept: PRIMARY CARE | Facility: CLINIC | Age: 70
End: 2025-02-12
Payer: MEDICARE

## 2025-02-12 ENCOUNTER — HOME CARE VISIT (OUTPATIENT)
Dept: HOME HEALTH SERVICES | Facility: HOME HEALTH | Age: 70
End: 2025-02-12
Payer: MEDICARE

## 2025-02-12 PROCEDURE — G0299 HHS/HOSPICE OF RN EA 15 MIN: HCPCS | Mod: HHH

## 2025-02-12 ASSESSMENT — ENCOUNTER SYMPTOMS
PAIN: 1
HEMATURIA: 1
HYPOTENSION: 1
NAUSEA: 1
DIARRHEA: 1
PAIN LOCATION: GENERALIZED
MUSCLE WEAKNESS: 1
STOOL FREQUENCY: DAILY
CHANGE IN APPETITE: UNCHANGED
LAST BOWEL MOVEMENT: 67246
APPETITE LEVEL: GOOD
PERSON REPORTING PAIN: PATIENT
PAIN LOCATION - PAIN SEVERITY: 4/10

## 2025-02-12 NOTE — TELEPHONE ENCOUNTER
Thanks for letting me know. I would recommend check BP once per day. Let me know if >140/90.    Also, let him know that I reached out to hematology, and they are going to reach out to  pharmacy about getting cheaper pain medication for him.

## 2025-02-13 ENCOUNTER — OFFICE VISIT (OUTPATIENT)
Dept: HEMATOLOGY/ONCOLOGY | Facility: HOSPITAL | Age: 70
End: 2025-02-13
Payer: MEDICARE

## 2025-02-13 ENCOUNTER — APPOINTMENT (OUTPATIENT)
Dept: HEMATOLOGY/ONCOLOGY | Facility: HOSPITAL | Age: 70
End: 2025-02-13
Payer: MEDICARE

## 2025-02-13 ENCOUNTER — LAB (OUTPATIENT)
Dept: HEMATOLOGY/ONCOLOGY | Facility: HOSPITAL | Age: 70
End: 2025-02-13
Payer: MEDICARE

## 2025-02-13 VITALS
DIASTOLIC BLOOD PRESSURE: 60 MMHG | SYSTOLIC BLOOD PRESSURE: 121 MMHG | TEMPERATURE: 96.6 F | RESPIRATION RATE: 18 BRPM | BODY MASS INDEX: 30.03 KG/M2 | OXYGEN SATURATION: 100 % | HEART RATE: 85 BPM | WEIGHT: 208.56 LBS

## 2025-02-13 DIAGNOSIS — C15.9 PRIMARY ESOPHAGEAL ADENOCARCINOMA (MULTI): ICD-10-CM

## 2025-02-13 DIAGNOSIS — E78.5 HYPERLIPIDEMIA, UNSPECIFIED HYPERLIPIDEMIA TYPE: Chronic | ICD-10-CM

## 2025-02-13 DIAGNOSIS — C15.9 PRIMARY ESOPHAGEAL ADENOCARCINOMA (MULTI): Primary | ICD-10-CM

## 2025-02-13 LAB
ALBUMIN SERPL BCP-MCNC: 3.6 G/DL (ref 3.4–5)
ALP SERPL-CCNC: 77 U/L (ref 33–136)
ALT SERPL W P-5'-P-CCNC: 26 U/L (ref 10–52)
ANION GAP SERPL CALC-SCNC: 12 MMOL/L (ref 10–20)
AST SERPL W P-5'-P-CCNC: 21 U/L (ref 9–39)
BASOPHILS # BLD MANUAL: 0 X10*3/UL (ref 0–0.1)
BASOPHILS NFR BLD MANUAL: 0 %
BILIRUB SERPL-MCNC: 0.2 MG/DL (ref 0–1.2)
BUN SERPL-MCNC: 34 MG/DL (ref 6–23)
CALCIUM SERPL-MCNC: 8.4 MG/DL (ref 8.6–10.3)
CHLORIDE SERPL-SCNC: 101 MMOL/L (ref 98–107)
CHOLEST SERPL-MCNC: 89 MG/DL (ref 0–199)
CHOLESTEROL/HDL RATIO: 2.7
CO2 SERPL-SCNC: 24 MMOL/L (ref 21–32)
CREAT SERPL-MCNC: 1.05 MG/DL (ref 0.5–1.3)
DOHLE BOD BLD QL SMEAR: PRESENT
EGFRCR SERPLBLD CKD-EPI 2021: 77 ML/MIN/1.73M*2
EOSINOPHIL # BLD MANUAL: 0 X10*3/UL (ref 0–0.7)
EOSINOPHIL NFR BLD MANUAL: 0 %
ERYTHROCYTE [DISTWIDTH] IN BLOOD BY AUTOMATED COUNT: 17.3 % (ref 11.5–14.5)
GLUCOSE SERPL-MCNC: 102 MG/DL (ref 74–99)
HCT VFR BLD AUTO: 31.1 % (ref 41–52)
HDLC SERPL-MCNC: 33.4 MG/DL
HGB BLD-MCNC: 10.6 G/DL (ref 13.5–17.5)
IMM GRANULOCYTES # BLD AUTO: 2.87 X10*3/UL (ref 0–0.7)
IMM GRANULOCYTES NFR BLD AUTO: 11.3 % (ref 0–0.9)
LDLC SERPL CALC-MCNC: 35 MG/DL
LYMPHOCYTES # BLD MANUAL: 1.77 X10*3/UL (ref 1.2–4.8)
LYMPHOCYTES NFR BLD MANUAL: 7 %
MCH RBC QN AUTO: 32.9 PG (ref 26–34)
MCHC RBC AUTO-ENTMCNC: 34.1 G/DL (ref 32–36)
MCV RBC AUTO: 97 FL (ref 80–100)
METAMYELOCYTES # BLD MANUAL: 1.52 X10*3/UL
METAMYELOCYTES NFR BLD MANUAL: 6 %
MONOCYTES # BLD MANUAL: 0.76 X10*3/UL (ref 0.1–1)
MONOCYTES NFR BLD MANUAL: 3 %
MYELOCYTES # BLD MANUAL: 1.27 X10*3/UL
MYELOCYTES NFR BLD MANUAL: 5 %
NEUTROPHILS # BLD MANUAL: 19.99 X10*3/UL (ref 1.2–7.7)
NEUTS BAND # BLD MANUAL: 3.04 X10*3/UL (ref 0–0.7)
NEUTS BAND NFR BLD MANUAL: 12 %
NEUTS SEG # BLD MANUAL: 16.95 X10*3/UL (ref 1.2–7)
NEUTS SEG NFR BLD MANUAL: 67 %
NON HDL CHOLESTEROL: 56 MG/DL (ref 0–149)
NRBC BLD-RTO: 0 /100 WBCS (ref 0–0)
PLATELET # BLD AUTO: 147 X10*3/UL (ref 150–450)
POTASSIUM SERPL-SCNC: 5.6 MMOL/L (ref 3.5–5.3)
PROT SERPL-MCNC: 5.9 G/DL (ref 6.4–8.2)
RBC # BLD AUTO: 3.22 X10*6/UL (ref 4.5–5.9)
RBC MORPH BLD: ABNORMAL
SODIUM SERPL-SCNC: 131 MMOL/L (ref 136–145)
TOTAL CELLS COUNTED BLD: 100
TOXIC GRANULES BLD QL SMEAR: PRESENT
TRIGL SERPL-MCNC: 105 MG/DL (ref 0–149)
VLDL: 21 MG/DL (ref 0–40)
WBC # BLD AUTO: 25.3 X10*3/UL (ref 4.4–11.3)

## 2025-02-13 PROCEDURE — 1159F MED LIST DOCD IN RCRD: CPT

## 2025-02-13 PROCEDURE — 99215 OFFICE O/P EST HI 40 MIN: CPT

## 2025-02-13 PROCEDURE — 85007 BL SMEAR W/DIFF WBC COUNT: CPT

## 2025-02-13 PROCEDURE — 1111F DSCHRG MED/CURRENT MED MERGE: CPT

## 2025-02-13 PROCEDURE — 85027 COMPLETE CBC AUTOMATED: CPT

## 2025-02-13 PROCEDURE — 3078F DIAST BP <80 MM HG: CPT

## 2025-02-13 PROCEDURE — 80053 COMPREHEN METABOLIC PANEL: CPT

## 2025-02-13 PROCEDURE — 36591 DRAW BLOOD OFF VENOUS DEVICE: CPT

## 2025-02-13 PROCEDURE — 4004F PT TOBACCO SCREEN RCVD TLK: CPT

## 2025-02-13 PROCEDURE — 2500000004 HC RX 250 GENERAL PHARMACY W/ HCPCS (ALT 636 FOR OP/ED): Performed by: INTERNAL MEDICINE

## 2025-02-13 PROCEDURE — 80061 LIPID PANEL: CPT

## 2025-02-13 PROCEDURE — 1160F RVW MEDS BY RX/DR IN RCRD: CPT

## 2025-02-13 PROCEDURE — 3074F SYST BP LT 130 MM HG: CPT

## 2025-02-13 PROCEDURE — 1125F AMNT PAIN NOTED PAIN PRSNT: CPT

## 2025-02-13 RX ORDER — HEPARIN 100 UNIT/ML
500 SYRINGE INTRAVENOUS AS NEEDED
OUTPATIENT
Start: 2025-02-13

## 2025-02-13 RX ORDER — LIDOCAINE AND PRILOCAINE 25; 25 MG/G; MG/G
CREAM TOPICAL
Qty: 30 G | Refills: 0 | Status: SHIPPED | OUTPATIENT
Start: 2025-02-13 | End: 2025-02-13 | Stop reason: SDUPTHER

## 2025-02-13 RX ORDER — DIPHENHYDRAMINE HYDROCHLORIDE 50 MG/ML
50 INJECTION INTRAMUSCULAR; INTRAVENOUS AS NEEDED
Status: CANCELLED | OUTPATIENT
Start: 2025-02-14

## 2025-02-13 RX ORDER — LORAZEPAM 2 MG/ML
1 INJECTION INTRAMUSCULAR AS NEEDED
Status: CANCELLED | OUTPATIENT
Start: 2025-02-13

## 2025-02-13 RX ORDER — DIPHENHYDRAMINE HYDROCHLORIDE 50 MG/ML
25 INJECTION INTRAMUSCULAR; INTRAVENOUS ONCE
Status: CANCELLED | OUTPATIENT
Start: 2025-02-13 | End: 2025-02-13

## 2025-02-13 RX ORDER — FAMOTIDINE 10 MG/ML
20 INJECTION INTRAVENOUS ONCE AS NEEDED
Status: CANCELLED | OUTPATIENT
Start: 2025-02-14

## 2025-02-13 RX ORDER — EPINEPHRINE 0.3 MG/.3ML
0.3 INJECTION SUBCUTANEOUS EVERY 5 MIN PRN
Status: CANCELLED | OUTPATIENT
Start: 2025-02-13

## 2025-02-13 RX ORDER — PROCHLORPERAZINE MALEATE 10 MG
10 TABLET ORAL EVERY 6 HOURS PRN
Status: CANCELLED | OUTPATIENT
Start: 2025-02-13

## 2025-02-13 RX ORDER — HEPARIN SODIUM,PORCINE/PF 10 UNIT/ML
50 SYRINGE (ML) INTRAVENOUS AS NEEDED
OUTPATIENT
Start: 2025-02-13

## 2025-02-13 RX ORDER — ALBUTEROL SULFATE 0.83 MG/ML
3 SOLUTION RESPIRATORY (INHALATION) AS NEEDED
Status: CANCELLED | OUTPATIENT
Start: 2025-02-14

## 2025-02-13 RX ORDER — FAMOTIDINE 10 MG/ML
20 INJECTION INTRAVENOUS ONCE AS NEEDED
Status: CANCELLED | OUTPATIENT
Start: 2025-02-13

## 2025-02-13 RX ORDER — HEPARIN 100 UNIT/ML
500 SYRINGE INTRAVENOUS AS NEEDED
Status: DISCONTINUED | OUTPATIENT
Start: 2025-02-13 | End: 2025-02-13 | Stop reason: HOSPADM

## 2025-02-13 RX ORDER — LIDOCAINE AND PRILOCAINE 25; 25 MG/G; MG/G
CREAM TOPICAL
Qty: 30 G | Refills: 0 | Status: SHIPPED | OUTPATIENT
Start: 2025-02-13

## 2025-02-13 RX ORDER — PROCHLORPERAZINE EDISYLATE 5 MG/ML
10 INJECTION INTRAMUSCULAR; INTRAVENOUS EVERY 6 HOURS PRN
Status: CANCELLED | OUTPATIENT
Start: 2025-02-13

## 2025-02-13 RX ORDER — ALBUTEROL SULFATE 0.83 MG/ML
3 SOLUTION RESPIRATORY (INHALATION) AS NEEDED
Status: CANCELLED | OUTPATIENT
Start: 2025-02-13

## 2025-02-13 RX ORDER — DIPHENHYDRAMINE HYDROCHLORIDE 50 MG/ML
50 INJECTION INTRAMUSCULAR; INTRAVENOUS AS NEEDED
Status: CANCELLED | OUTPATIENT
Start: 2025-02-13

## 2025-02-13 RX ORDER — EPINEPHRINE 0.3 MG/.3ML
0.3 INJECTION SUBCUTANEOUS EVERY 5 MIN PRN
Status: CANCELLED | OUTPATIENT
Start: 2025-02-14

## 2025-02-13 RX ORDER — PALONOSETRON 0.05 MG/ML
0.25 INJECTION, SOLUTION INTRAVENOUS ONCE
Status: CANCELLED | OUTPATIENT
Start: 2025-02-13

## 2025-02-13 RX ADMIN — HEPARIN 500 UNITS: 100 SYRINGE at 11:35

## 2025-02-13 RX ADMIN — HEPARIN 500 UNITS: 100 SYRINGE at 11:32

## 2025-02-13 ASSESSMENT — PAIN SCALES - GENERAL: PAINLEVEL_OUTOF10: 4

## 2025-02-14 ENCOUNTER — SOCIAL WORK (OUTPATIENT)
Dept: HEMATOLOGY/ONCOLOGY | Facility: CLINIC | Age: 70
End: 2025-02-14
Payer: MEDICARE

## 2025-02-14 ENCOUNTER — OFFICE VISIT (OUTPATIENT)
Dept: PALLIATIVE MEDICINE | Facility: CLINIC | Age: 70
End: 2025-02-14
Payer: MEDICARE

## 2025-02-14 ENCOUNTER — APPOINTMENT (OUTPATIENT)
Dept: HEMATOLOGY/ONCOLOGY | Facility: CLINIC | Age: 70
End: 2025-02-14
Payer: MEDICARE

## 2025-02-14 ENCOUNTER — INFUSION (OUTPATIENT)
Dept: HEMATOLOGY/ONCOLOGY | Facility: CLINIC | Age: 70
End: 2025-02-14
Payer: MEDICARE

## 2025-02-14 VITALS
OXYGEN SATURATION: 98 % | TEMPERATURE: 97.3 F | WEIGHT: 210.9 LBS | RESPIRATION RATE: 16 BRPM | HEIGHT: 70 IN | DIASTOLIC BLOOD PRESSURE: 58 MMHG | HEART RATE: 111 BPM | BODY MASS INDEX: 30.19 KG/M2 | SYSTOLIC BLOOD PRESSURE: 126 MMHG

## 2025-02-14 DIAGNOSIS — R06.6 HICCUPS: ICD-10-CM

## 2025-02-14 DIAGNOSIS — C15.9 PRIMARY ESOPHAGEAL ADENOCARCINOMA (MULTI): ICD-10-CM

## 2025-02-14 DIAGNOSIS — G89.3 NEOPLASM RELATED PAIN: Primary | ICD-10-CM

## 2025-02-14 PROCEDURE — 96375 TX/PRO/DX INJ NEW DRUG ADDON: CPT | Mod: INF

## 2025-02-14 PROCEDURE — 2500000004 HC RX 250 GENERAL PHARMACY W/ HCPCS (ALT 636 FOR OP/ED)

## 2025-02-14 PROCEDURE — 96413 CHEMO IV INFUSION 1 HR: CPT

## 2025-02-14 PROCEDURE — 99215 OFFICE O/P EST HI 40 MIN: CPT | Mod: 25 | Performed by: CLINICAL NURSE SPECIALIST

## 2025-02-14 PROCEDURE — 99215 OFFICE O/P EST HI 40 MIN: CPT | Performed by: CLINICAL NURSE SPECIALIST

## 2025-02-14 PROCEDURE — 96415 CHEMO IV INFUSION ADDL HR: CPT

## 2025-02-14 PROCEDURE — 96416 CHEMO PROLONG INFUSE W/PUMP: CPT

## 2025-02-14 PROCEDURE — 96417 CHEMO IV INFUS EACH ADDL SEQ: CPT

## 2025-02-14 PROCEDURE — 1111F DSCHRG MED/CURRENT MED MERGE: CPT | Performed by: CLINICAL NURSE SPECIALIST

## 2025-02-14 RX ORDER — LORAZEPAM 2 MG/ML
1 INJECTION INTRAMUSCULAR AS NEEDED
Status: DISCONTINUED | OUTPATIENT
Start: 2025-02-14 | End: 2025-02-14 | Stop reason: HOSPADM

## 2025-02-14 RX ORDER — PALONOSETRON 0.05 MG/ML
0.25 INJECTION, SOLUTION INTRAVENOUS ONCE
Status: COMPLETED | OUTPATIENT
Start: 2025-02-14 | End: 2025-02-14

## 2025-02-14 RX ORDER — PROCHLORPERAZINE MALEATE 10 MG
10 TABLET ORAL EVERY 6 HOURS PRN
Status: DISCONTINUED | OUTPATIENT
Start: 2025-02-14 | End: 2025-02-14 | Stop reason: HOSPADM

## 2025-02-14 RX ORDER — MORPHINE SULFATE 20 MG/ML
5-10 SOLUTION ORAL EVERY 4 HOURS PRN
Qty: 90 ML | Refills: 0 | Status: SHIPPED | OUTPATIENT
Start: 2025-02-14 | End: 2025-04-15

## 2025-02-14 RX ORDER — DIPHENHYDRAMINE HYDROCHLORIDE 50 MG/ML
25 INJECTION INTRAMUSCULAR; INTRAVENOUS ONCE
Status: COMPLETED | OUTPATIENT
Start: 2025-02-14 | End: 2025-02-14

## 2025-02-14 RX ORDER — PROCHLORPERAZINE EDISYLATE 5 MG/ML
10 INJECTION INTRAMUSCULAR; INTRAVENOUS EVERY 6 HOURS PRN
Status: DISCONTINUED | OUTPATIENT
Start: 2025-02-14 | End: 2025-02-14 | Stop reason: HOSPADM

## 2025-02-14 RX ORDER — BACLOFEN 5 MG/1
5 TABLET ORAL 3 TIMES DAILY
Qty: 90 TABLET | Refills: 3 | Status: SHIPPED | OUTPATIENT
Start: 2025-02-14

## 2025-02-14 RX ADMIN — DIPHENHYDRAMINE HYDROCHLORIDE 25 MG: 50 INJECTION INTRAMUSCULAR; INTRAVENOUS at 10:34

## 2025-02-14 RX ADMIN — DEXAMETHASONE SODIUM PHOSPHATE 12 MG: 4 INJECTION INTRA-ARTICULAR; INTRALESIONAL; INTRAMUSCULAR; INTRAVENOUS; SOFT TISSUE at 10:46

## 2025-02-14 RX ADMIN — SODIUM CHLORIDE 4450 MG: 9 INJECTION INTRAMUSCULAR; INTRAVENOUS; SUBCUTANEOUS at 14:47

## 2025-02-14 RX ADMIN — PALONOSETRON HYDROCHLORIDE 250 MCG: 0.25 INJECTION INTRAVENOUS at 10:34

## 2025-02-14 RX ADMIN — DOCETAXEL 90 MG: 20 INJECTION, SOLUTION INTRAVENOUS at 11:24

## 2025-02-14 RX ADMIN — OXALIPLATIN 145 MG: 5 INJECTION, SOLUTION INTRAVENOUS at 12:35

## 2025-02-14 ASSESSMENT — PAIN SCALES - GENERAL: PAINLEVEL_OUTOF10: 0-NO PAIN

## 2025-02-14 NOTE — PROGRESS NOTES
(SW) met with patient, daughter and son-in-law today to assess needs and offer support.  Patient was A&Ox3 with appropriate and congruent mood and affect.  [facility] SW met with patient during his appointment.  SW provided a supportive contact.  SW asked patient how he was doing.  Patient stated overall he was doing pretty good.  Patient stated that after a few days after treatment he doesn't want to do much.  Patient stated that he did have a question.  He has an appointment with [facility] on 2/28 for supportive oncology.  Bereket prescribed his oxycodone but he did not pick it up due to the cost being $152.00.  CHANTALE will discuss with Bereket as she is here today.  Bereket is able to see him today.  Patient is appreciative.  SW is available if needed.  Patient stated that he has one more treatment and it is scheduled at the [facility].  SW wished patient the best.      Rafael Cardenas MSW, LSW

## 2025-02-14 NOTE — PROGRESS NOTES
Patient here for cycle 3 of FLOT. Patient states he has had worsening PN, doses decreased. CLOVIS Garcia APRN notified of K 5.6, no new orders. Patient home disconnect 2/15/25 at 1455.  AVS given to patient. Discharged in stable condition.

## 2025-02-14 NOTE — PROGRESS NOTES
SUPPORTIVE AND PALLIATIVE ONCOLOGY CONSULT - OUTPATIENT      SERVICE DATE: 2/14/2025    Referred by:  Dr. 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 Patient was referred to supportive oncology for pain management       Information was collected from chart review, discussion with patient/family, and discussion with care team     Chayo STEELE is present for visit.       SUBJECTIVE:    He is present with his Son in law and daughter for his visit. He states that that he continues to have pain around his peg tube and in his abdomen. He states that he tried to fill his oxycodone and it was going to be too expensive. We discussed switching him over to liquid morphine and he is agreeable with this.      He states that he is having some neuropathy of his hands and is also having some hiccups. He states that he has not been regularly taking his gabapentin. We discussed starting to take this regularly. We also discussed starting on some baclofen for his hiccups and he is agreeable with this.     abdominal pain and fatigue     Pain Assessment:  Pain Score:  4/10   Location:  abdomen   Education:        Symptom Assessment:  ROS otherwise negative, pertinent positives documented in the HPI       Information obtained from: chart review and interview of patient  ______________________________________________________________________     Oncology History   Primary esophageal adenocarcinoma (Multi)   12/12/2024 Initial Diagnosis    Primary esophageal adenocarcinoma (Multi)     1/2/2025 -   Chemotherapy    FLOT (Fluorouracil Continuous Infusion / Leucovorin / Oxaliplatin / DOCEtaxel), 14 Day Cycles         Past Medical History:   Diagnosis Date    Hyperlipidemia     Hypertension     Personal history of other endocrine, nutritional and metabolic disease     History of hypothyroidism    Type 2 diabetes mellitus      Past Surgical History:   Procedure Laterality Date    CATARACT EXTRACTION Right 08/25/2023    CHOLECYSTECTOMY  09/18/2018    Cholecystectomy    CORONARY ARTERY BYPASS GRAFT  09/18/2018    CABG    HERNIA REPAIR  09/18/2018    Inguinal Hernia Repair     Family History   Problem Relation Name Age of Onset    COPD Mother          SOCIAL HISTORY  Children 4   Social History:  reports that he has been smoking cigarettes. He started smoking about 46 years ago. He has a 45.3 pack-year smoking history. He has never used smokeless tobacco. He reports that he does not currently use alcohol. He reports that he does not use drugs.      REVIEW OF SYSTEMS  Review of systems negative unless noted in HPI.       Objective     Palliative Performance Scale % (PPS) 80     Labs:  Results for orders placed or performed in visit on 02/13/25 (from the past 96 hours)   Lipid Panel   Result Value Ref Range    Cholesterol 89 0 - 199 mg/dL    HDL-Cholesterol 33.4 mg/dL    Cholesterol/HDL Ratio 2.7     LDL Calculated 35 <=99 mg/dL    VLDL 21 0 - 40 mg/dL    Triglycerides 105 0 - 149 mg/dL    Non HDL Cholesterol 56 0 - 149 mg/dL   CBC and Auto Differential   Result Value Ref Range    WBC 25.3 (H) 4.4 - 11.3 x10*3/uL    nRBC 0.0 0.0 - 0.0 /100 WBCs    RBC 3.22 (L) 4.50 - 5.90 x10*6/uL    Hemoglobin 10.6 (L) 13.5 - 17.5 g/dL    Hematocrit 31.1 (L) 41.0 - 52.0 %    MCV 97 80 - 100 fL    MCH 32.9 26.0 - 34.0 pg    MCHC 34.1 32.0 - 36.0 g/dL    RDW 17.3 (H) 11.5 - 14.5 %    Platelets 147 (L) 150 - 450 x10*3/uL    Immature Granulocytes %, Automated 11.3 (H) 0.0 - 0.9 %    Immature Granulocytes Absolute, Automated 2.87 (H) 0.00 -  0.70 x10*3/uL   Comprehensive Metabolic Panel   Result Value Ref Range    Glucose 102 (H) 74 - 99 mg/dL    Sodium 131 (L) 136 - 145 mmol/L    Potassium 5.6 (H) 3.5 - 5.3 mmol/L    Chloride 101 98 - 107 mmol/L    Bicarbonate 24 21 - 32 mmol/L    Anion Gap 12 10 - 20 mmol/L    Urea Nitrogen 34 (H) 6 - 23 mg/dL    Creatinine 1.05 0.50 - 1.30 mg/dL    eGFR 77 >60 mL/min/1.73m*2    Calcium 8.4 (L) 8.6 - 10.3 mg/dL    Albumin 3.6 3.4 - 5.0 g/dL    Alkaline Phosphatase 77 33 - 136 U/L    Total Protein 5.9 (L) 6.4 - 8.2 g/dL    AST 21 9 - 39 U/L    Bilirubin, Total 0.2 0.0 - 1.2 mg/dL    ALT 26 10 - 52 U/L   Manual Differential   Result Value Ref Range    Neutrophils %, Manual 67.0 40.0 - 80.0 %    Bands %, Manual 12.0 0.0 - 5.0 %    Lymphocytes %, Manual 7.0 13.0 - 44.0 %    Monocytes %, Manual 3.0 2.0 - 10.0 %    Eosinophils %, Manual 0.0 0.0 - 6.0 %    Basophils %, Manual 0.0 0.0 - 2.0 %    Metamyelocytes %, Manual 6.0 0.0 - 0.0 %    Myelocytes %, Manual 5.0 0.0 - 0.0 %    Seg Neutrophils Absolute, Manual 16.95 (H) 1.20 - 7.00 x10*3/uL    Bands Absolute, Manual 3.04 (H) 0.00 - 0.70 x10*3/uL    Lymphocytes Absolute, Manual 1.77 1.20 - 4.80 x10*3/uL    Monocytes Absolute, Manual 0.76 0.10 - 1.00 x10*3/uL    Eosinophils Absolute, Manual 0.00 0.00 - 0.70 x10*3/uL    Basophils Absolute, Manual 0.00 0.00 - 0.10 x10*3/uL    Metamyelocytes Absolute, Manual 1.52 0.00 - 0.00 x10*3/uL    Myelocytes Absolute, Manual 1.27 0.00 - 0.00 x10*3/uL    Total Cells Counted 100     Neutrophils Absolute, Manual 19.99 (H) 1.20 - 7.70 x10*3/uL    RBC Morphology See Below     Dohle Bodies Present     Toxic Granulation Present          Pain Management Panel          Latest Ref Rng & Units 1/14/2025   Pain Management Panel   Amphetamine Screen, Urine Presumptive Negative Presumptive Negative    Barbiturate Screen, Urine Presumptive Negative Presumptive Negative    Benzodiazepines Screen, Urine Presumptive Negative Presumptive Negative    Fentanyl  Screen, Urine Presumptive Negative Presumptive Negative    Methadone Screen, Urine Presumptive Negative Presumptive Negative         Medications:   Current Outpatient Medications   Medication Instructions    acetaminophen (TYLENOL) 650 mg, j-tube, Every 6 hours    amitriptyline (ELAVIL) 100 mg, j-tube, Nightly    atorvastatin (LIPITOR) 40 mg, j-tube, Daily    baclofen (LIORESAL) 5 mg, oral, 3 times daily    buPROPion (WELLBUTRIN) 150 mg, j-tube, 2 times daily    dexAMETHasone (DECADRON) 4 mg    esomeprazole (NEXIUM) 40 mg, oral, Daily before breakfast    gabapentin (NEURONTIN) 300 mg, oral, Nightly    levothyroxine (SYNTHROID, LEVOXYL) 175 mcg, j-tube, Daily, Take on an empty stomach at the same time each day, either 30 to 60 minutes prior to breakfast    lidocaine-prilocaine (Emla) 2.5-2.5 % cream Apply topically to affected area 30-45 minutes before Mediport access.    losartan (Cozaar) 50 mg tablet Take 1 tab daily via j-tube.    moisturizing mouth (Biotene Dry Mouth) solution 15 mL, Swish & Spit, 3 times daily PRN    morphine 5-10 mg, oral, Every 4 hours PRN    ondansetron ODT (ZOFRAN-ODT) 8 mg, oral, Every 8 hours PRN    prochlorperazine (COMPAZINE) 10 mg, oral, Every 6 hours PRN    spironolactone (ALDACTONE) 50 mg, j-tube, Daily    verapamil (CALAN) 240 mg, j-tube, Daily       Allergies:   Allergies   Allergen Reactions    Ibuprofen Swelling       PHYSICAL EXAMINATION  Vital Signs:   Vital signs reviewed  There were no vitals filed for this visit.  Pain Score:        (See infusion visit)    Physical Exam  Constitutional:       Appearance: Normal appearance. He is normal weight.   HENT:      Head: Normocephalic and atraumatic.      Mouth/Throat:      Mouth: Mucous membranes are moist.      Pharynx: Oropharynx is clear.   Cardiovascular:      Comments: No signs of cardiac distress  Pulmonary:      Effort: Pulmonary effort is normal.      Comments: No signs of respiratory distress   Abdominal:      General:  Abdomen is flat.      Palpations: Abdomen is soft.      Comments: Peg tube with drainage and redness around site   Musculoskeletal:         General: Normal range of motion.      Cervical back: Normal range of motion.   Skin:     General: Skin is warm and dry.   Neurological:      General: No focal deficit present.      Mental Status: He is alert and oriented to person, place, and time. Mental status is at baseline.   Psychiatric:         Mood and Affect: Mood normal.         Behavior: Behavior normal.         Thought Content: Thought content normal.         Judgment: Judgment normal.         ASSESSMENT/PLAN    Pain  Pain is:  peg tube related   Type: neuropathic  Pain control: sub-optimally controlled  Intolerances/previously tried: oxycodone 5mg     Pain Plan:  Stop liquid oxycodone to 5-10mg Q4 PRN due to the cost, could not afford the copay of $153  Start Roxanol 5-10mg Q3 Prn   Restart gabapentin 250mg nightly   Continue Amitriptyline 100mg nightly   Continue tylenol 650mg Q6 PRN     Opioid Use  Medication Management:   - OARRS report reviewed with no aberrant behavior; consistent with  prescriptions/records and patient history  - MED 15.  Overdose Risk Score 200.   This has been discussed with patient.   - We will continue to closely monitor the patient for signs of prescription misuse including UDS, OARRS review and subjective reports at each visit.  - No concurrent benzodiazepine use   - I am a provider who is certified in Hospice and Palliative Medicine and have conducted a face-face visit and examination for this patient.  - Routine Urine Drug Screen is needed and was completed on 1/14/2025 appropriately positive for opioids and negative for illicit substances  - Controlled Substance Agreement: is needed. Completed on: 1/31/2025  - Specifically discussed that controlled substance prescriptions will only be provided by our group as outlined in the completed agreement  - Naloxone is needed  - Red Flags:  None     Nausea   Intermittent nausea without vomiting related to chemotherapy  Nausea Plan:  Continue Zofran and compazine PRN     Constipation   At risk for constipation related to opioids  Constipation Plan:  Continue Senna 5mg BID     Hiccups   Gabapentin as above   Start Baclofen 5mg TID PRN     Altered Mood  Chronic depression related to health concerns   Mood Plan:  Continue Wellbutrin 150mg BID   Continue Amitriptyline 100mg nightly         Introduction to Supportive and Palliative Oncology:  Introduced the role and philosophy of Supportive and Palliative oncology in the evaluation and management of symptoms during cancer treatment  Palliative care was introduced as a service for patients with serious illness to help with symptoms, assist with goals of care conversations, navigate complex decision making, improve quality of life for patients, and provide support both patients and families.  Patient seemed to appreciate the extra layer of support.      Advance Directives  Existence of Advance Directives:Unknown  Decision maker: Surrogate decision maker is shared between his 4 children   Code Status: Full code    Next Follow-Up Visit:  Return to clinic after 1-2 weeks following his surgery    Signature and billing  Thank you for allowing us to participate in the care of this patient. Recommendations will be communicated back to the consulting service by way of shared electronic medical record or face-to-face.    Medical complexity was high level due to due to complexity of problems, extensive data review, and high risk of management/treatment.  Time was spent on the following: Prep Time, Time Directly with Patient/Family/Caregiver, Documentation Time. Total time spent: 60 minutes       DATA   Diagnostic tests and information reviewed for today's visit:  Most recent labs and imaging results, Medications           SIGNATURE: [facility]    Contact information:  Supportive and Palliative  Oncology  Monday-Friday 8 AM-5 PM  Phone:  [phone], press option #5, then option #1.   Or Epic Secure Chat

## 2025-02-14 NOTE — SIGNIFICANT EVENT

## 2025-02-14 NOTE — PROGRESS NOTES
Patient ID: Timoteo Victoria is a 69 y.o. male.    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  PET-avid thyroid nodule.    Genomic profile:  Normal MMR expresison    Assessment and Plan:  69M with CAD (CABG 2005), HTN, HLD, chronic tobacco use, hypothyroidism, presented with 4-6 weeks of progressive dysphagia (solid to liquid/mediations) 40lb wt loss over 6 months, and was found to have distal esophageal mass covering half of lumen on EGD 11/21/24  and Bx showed intramucosal adenocarcinoma with normal MMR expression. S/p J tube placement 11/25/2024.    PET-CT showed no distant mets.    We discussed FLOT for chemotherapy regimen with him in details. After a detailed discussion about the chemo, he consented and started cycle 1 on 1/2/25.     Plan: 1. FLOT-4 for 4 cycles followed by surgery followed by 4 more cycles of FLOT-4.  2. Thyroid lesion- endocrinology referral- reminded patient to schedule today.    Following cycle 1 of treatment, he was hospitalized for hypotension and skin infection at his J- tube site. Did complete antibiotic course and J-tube was replaced. He has been doing well since.     He returns today prior to cycle 3 of FLOT tomorrow. Labs reviewed and in parameters. We will proceed as planned tomorrow with the following modifications: 1) dose reduction for increased neuropathy in hands. 2) Will omit neulasta injection as WBC counts are well above normal parameters. 3) Patient was educated last visit and showed demonstration on self pump disconnect. He will now do this at home.     Mr. Victoria will return prior to cycle 4 with labs and exam.     I have placed all orders as outlined above. I advised the patient to schedule the tests and follow-up appointment as discussed by contacting the  on the way out or calling by phone. Patient knows to call with any issues or concerns.     Providers:  Surgeon: Dr. Maritza Mariac:   Katie  [facility]:    Chief complaint: Esophageal adenocarcinoma    HPI:  Timoteo Victoria is a 69 y.o. male with a notable history of CAD (CABG 2005), HTN, HLD, chronic tobacco use, hypothyroidism presented on 11/25/2024 as a transfer from [facility] for cancer workup. He had been having trouble swallowing for the past month, initially solids and progressing to liquids and medications. He has lost 40 lbs in the past 6 months.     EGD on 11/21/24 showed an esophageal mass with a malignant appearance, with biopsy showing intramucosal adenocarcinoma. The patient had a J tube placed on 11/25.     ONCOLOGIC HISTORY-  11/21/24  [place] admission for weight loss, dysphagia; EGD showed esophageal mass with biopsy confirming intramucosal adenocarcinoma  11/20/24 CT neck with no masses  11/22/24 CT CAP with contrast with irregular masslike thickening of distal esophagus, no obvious metastases  11/25/24 J tube placement.    12-: pet-ct showed-  IMPRESSION:  1. Hypermetabolic esophageal mass as described above is consistent  with biopsy-proven esophageal adenocarcinoma. Few minimally  hypermetabolic subcentimeter periesophageal lymph nodes are likely  reactive.  2. No other evidence of hypermetabolic thoracic or abdominal  lymphadenopathy or hypermetabolic metastatic disease.  3. Multiple hypermetabolic intraparotid nodules/lymph nodes, which  have been present since 2022, likely represents a benign and indolent  process. However, recommend continued attention on follow-up.  4. Asymmetrically increased hypermetabolic activity within the right  thyroid gland. Correlate with thyroid ultrasound may be of value.    1-2-2025: started FLOT-4.    Interval history:   Mr. Victoria returns in follow up today prior to cycle 3 of FLOT tomorrow. Overall he is tolerating treatment fairly well. Does report fatigue is stable and appetite is fair. Neuropathy in his hands is becoming more frequent however.     Denies: fevers,  chills, chest pain, SOB, cough, N/V, bowel changes, urinary symptoms, or skin changes.     Review of systems: Negative unless otherwise stated in HPI       Past Medical History:   Past Medical History:  No date: Hyperlipidemia  No date: Hypertension  No date: Personal history of other endocrine, nutritional and   metabolic disease      Comment:  History of hypothyroidism  No date: Type 2 diabetes mellitus   Surgical History:    Past Surgical History:   Procedure Laterality Date    CATARACT EXTRACTION Right 08/25/2023    CHOLECYSTECTOMY  09/18/2018    Cholecystectomy    CORONARY ARTERY BYPASS GRAFT  09/18/2018    CABG    HERNIA REPAIR  09/18/2018    Inguinal Hernia Repair      Family History:    Family History   Problem Relation Name Age of Onset    COPD Mother       Family Oncology History:    Cancer-related family history is not on file.  Social History:    Social History     Tobacco Use    Smoking status: Every Day     Current packs/day: 0.25     Average packs/day: 1 pack/day for 46.1 years (45.3 ttl pk-yrs)     Types: Cigarettes     Start date: 1979    Smokeless tobacco: Never   Vaping Use    Vaping status: Never Used   Substance Use Topics    Alcohol use: Not Currently    Drug use: Never        Allergies  Allergies   Allergen Reactions    Ibuprofen Swelling        Medications  Current Outpatient Medications   Medication Instructions    acetaminophen (TYLENOL) 650 mg, j-tube, Every 6 hours    amitriptyline (ELAVIL) 100 mg, j-tube, Nightly    atorvastatin (LIPITOR) 40 mg, j-tube, Daily    buPROPion (WELLBUTRIN) 150 mg, j-tube, 2 times daily    dexAMETHasone (DECADRON) 4 mg    esomeprazole (NEXIUM) 40 mg, oral, Daily before breakfast    gabapentin (NEURONTIN) 300 mg, oral, Nightly    levothyroxine (SYNTHROID, LEVOXYL) 175 mcg, j-tube, Daily, Take on an empty stomach at the same time each day, either 30 to 60 minutes prior to breakfast    lidocaine-prilocaine (Emla) 2.5-2.5 % cream Apply topically to affected area  30-45 minutes before Mediport access.    losartan (Cozaar) 50 mg tablet Take 1 tab daily via j-tube.    moisturizing mouth (Biotene Dry Mouth) solution 15 mL, Swish & Spit, 3 times daily PRN    ondansetron ODT (ZOFRAN-ODT) 8 mg, oral, Every 8 hours PRN    oxyCODONE (ROXICODONE) 5 mg    prochlorperazine (COMPAZINE) 10 mg, oral, Every 6 hours PRN    spironolactone (ALDACTONE) 50 mg, j-tube, Daily    verapamil (CALAN) 240 mg, j-tube, Daily          Objective   VS: /60 (BP Location: Left arm, Patient Position: Sitting, BP Cuff Size: Adult)   Pulse 85   Temp 35.9 °C (96.6 °F) (Temporal)   Resp 18   Wt 94.6 kg (208 lb 8.9 oz)   SpO2 100%   BMI 30.03 kg/m²     PHYSICAL EXAMINATION  ECOG performance status- 1    Constitutional: Awake/alert/oriented x3, cooperative and answers questions appropriately.     Eyes: No pallor, conjunctival injection, clear sclera.    Mouth: No ulceration. No thrush.     Head/Neck: Neck supple, no apparent injury, thyroid without mass or tenderness, No JVD, trachea midline, no bruits.     Respiratory/Thorax: Normal breath sounds with bilaterally symmetrical chest expansion. No dullness.      Cardiovascular: No audible murmurs, normal heart sounds. No pericardial rub.     Gastrointestinal: Nondistended, soft, non-tender, no rebound tenderness or guarding, no masses palpable, no organomegaly, +BS, no bruits. No ascites.     Musculoskeletal: No joint swelling, redness.      Extremities: normal extremities, no cyanosis edema, contusions or wounds, no clubbing.     Lymphatic: No significant lymphadenopathy.     Skin: Warm and dry, no lesions, no rashes.     Labs  Results from last 7 days   Lab Units 02/13/25  1135   WBC AUTO x10*3/uL 25.3*   HEMOGLOBIN g/dL 10.6*   HEMATOCRIT % 31.1*   PLATELETS AUTO x10*3/uL 147*   EOS ABS MAN x10*3/uL 0.00   BANDS ABS MAN x10*3/uL 3.04*   LYMPHO ABS MAN x10*3/uL 1.77   MONO ABS MAN x10*3/uL 0.76   LYMPHO PCT MAN % 7.0   MONO PCT MAN % 3.0   EOSINO PCT MAN  % 0.0    Unremarkable  Results from last 7 days   Lab Units 02/13/25  1135   GLUCOSE mg/dL 102*   SODIUM mmol/L 131*   POTASSIUM mmol/L 5.6*   CHLORIDE mmol/L 101   CO2 mmol/L 24   BUN mg/dL 34*   CREATININE mg/dL 1.05   EGFR mL/min/1.73m*2 77   CALCIUM mg/dL 8.4*   ALBUMIN g/dL 3.6   PROTEIN TOTAL g/dL 5.9*   BILIRUBIN TOTAL mg/dL 0.2   ALK PHOS U/L 77   ALT U/L 26   AST U/L 21     Image  EGD (11/21/2024)  Single malignant-appearing and invasive mass (not traversable) in the lower third of the esophagus, covering one half of the circumference; performed cold forceps biopsy     A. Esophagus, Distal, Mass, Biopsy:  -- Intramucosal adenocarcinoma. See note.     Note: The endoscopic impression of a friable and invasive lesion in the lower third of the esophagus is noted. This biopsy may be not representative of the entire lesion. Clinical correlation is recommended.    MISMATCH REPAIR PROTEIN EXPRESSION                    Protein:           Result                 MLH-1:             Expression Present                                                   PMS-2:            Expression Present                                                   MSH-2:            Expression Present                                                   MSH-6:            Expression Present                                       INTERPRETATION: Neoplasm with normal mismatch repair protein expression.     C/A/P CT (11/22/2024)  IMPRESSION:  1. Irregular masslike thickening of the mid to distal esophagus is  difficult to accurately measure, and may represent a neoplastic  process. Correlate with recent endoscopic imaging and biopsy results.  There is also proximal esophageal wall thickening, and a moderate  size hiatal hernia.  2. Small area of centrilobular nodularity in the right middle lobe  may represent focal aspiration/mucoid impaction or less likely  atypical infection.  3. Mild apical predominant centrilobular emphysema.  4. Severe coronary artery  calcifications. Please note this exam is  not optimized for evaluation of the coronary arteries.  5. Nonobstructing 0.4 cm calculus in the left kidney.    Neck CT (11/20/2024)  IMPRESSION:  New maxillary periapical lucencies with adjacent mucosal thickening.  No discrete fluid collection identified. Dental follow-up recommended.      No discrete mass or cervical lymphadenopathy identified however  direct visualization suggested.      Bilateral parotid mass/nodule similar to prior imaging.      Postsurgical changes, emphysematous changes, mild mediastinal  lymphadenopathy and additional findings as detailed.  This note was created using a voice recognition system ( the Dragon dictation system). Inaccuracies and misspellings are unintentional.     Marychuy Garcia APRN-CNP

## 2025-02-16 ENCOUNTER — APPOINTMENT (OUTPATIENT)
Dept: HEMATOLOGY/ONCOLOGY | Facility: HOSPITAL | Age: 70
End: 2025-02-16
Payer: MEDICARE

## 2025-02-18 ENCOUNTER — HOME CARE VISIT (OUTPATIENT)
Dept: HOME HEALTH SERVICES | Facility: HOME HEALTH | Age: 70
End: 2025-02-18
Payer: MEDICARE

## 2025-02-18 PROCEDURE — G0299 HHS/HOSPICE OF RN EA 15 MIN: HCPCS | Mod: HHH

## 2025-02-18 ASSESSMENT — ENCOUNTER SYMPTOMS
LOWEST PAIN SEVERITY IN PAST 24 HOURS: 0/10
BOWEL PATTERN NORMAL: 1
PAIN LOCATION: ABDOMEN
PERSON REPORTING PAIN: PATIENT
PAIN LOCATION - PAIN QUALITY: ACHING
APPETITE LEVEL: GOOD
HIGHEST PAIN SEVERITY IN PAST 24 HOURS: 5/10
PAIN SEVERITY GOAL: 0/10
PAIN: 1
ABDOMINAL PAIN: 1
SUBJECTIVE PAIN PROGRESSION: UNCHANGED
STOOL FREQUENCY: LESS THAN DAILY
PAIN LOCATION - RELIEVING FACTORS: MEDICATION
PAIN LOCATION - PAIN SEVERITY: 5/10
PAIN LOCATION - PAIN FREQUENCY: INTERMITTENT
CHANGE IN APPETITE: UNCHANGED
LAST BOWEL MOVEMENT: 67254
OCCASIONAL FEELINGS OF UNSTEADINESS: 0
MUSCLE WEAKNESS: 1

## 2025-02-19 ENCOUNTER — HOME CARE VISIT (OUTPATIENT)
Dept: HOME HEALTH SERVICES | Facility: HOME HEALTH | Age: 70
End: 2025-02-19
Payer: MEDICARE

## 2025-02-19 PROCEDURE — G0299 HHS/HOSPICE OF RN EA 15 MIN: HCPCS | Mod: HHH

## 2025-02-25 ENCOUNTER — HOSPITAL ENCOUNTER (OUTPATIENT)
Facility: HOSPITAL | Age: 70
Setting detail: OBSERVATION
Discharge: HOME | End: 2025-02-26
Attending: STUDENT IN AN ORGANIZED HEALTH CARE EDUCATION/TRAINING PROGRAM | Admitting: INTERNAL MEDICINE
Payer: MEDICARE

## 2025-02-25 ENCOUNTER — HOME CARE VISIT (OUTPATIENT)
Dept: HOME HEALTH SERVICES | Facility: HOME HEALTH | Age: 70
End: 2025-02-25
Payer: MEDICARE

## 2025-02-25 DIAGNOSIS — N17.9 AKI (ACUTE KIDNEY INJURY) (CMS-HCC): ICD-10-CM

## 2025-02-25 DIAGNOSIS — T85.528A JEJUNOSTOMY TUBE FELL OUT: Primary | ICD-10-CM

## 2025-02-25 DIAGNOSIS — E87.1 HYPONATREMIA: ICD-10-CM

## 2025-02-25 LAB
ANION GAP SERPL CALC-SCNC: 15 MMOL/L (ref 10–20)
BASOPHILS # BLD AUTO: 0.02 X10*3/UL (ref 0–0.1)
BASOPHILS NFR BLD AUTO: 0.5 %
BUN SERPL-MCNC: 49 MG/DL (ref 6–23)
CALCIUM SERPL-MCNC: 8 MG/DL (ref 8.6–10.3)
CHLORIDE SERPL-SCNC: 96 MMOL/L (ref 98–107)
CO2 SERPL-SCNC: 20 MMOL/L (ref 21–32)
CREAT SERPL-MCNC: 1.78 MG/DL (ref 0.5–1.3)
EGFRCR SERPLBLD CKD-EPI 2021: 41 ML/MIN/1.73M*2
EOSINOPHIL # BLD AUTO: 0.1 X10*3/UL (ref 0–0.7)
EOSINOPHIL NFR BLD AUTO: 2.6 %
ERYTHROCYTE [DISTWIDTH] IN BLOOD BY AUTOMATED COUNT: 16.3 % (ref 11.5–14.5)
GLUCOSE SERPL-MCNC: 111 MG/DL (ref 74–99)
HCT VFR BLD AUTO: 26.2 % (ref 41–52)
HGB BLD-MCNC: 9.4 G/DL (ref 13.5–17.5)
IMM GRANULOCYTES # BLD AUTO: 0.08 X10*3/UL (ref 0–0.7)
IMM GRANULOCYTES NFR BLD AUTO: 2.1 % (ref 0–0.9)
LYMPHOCYTES # BLD AUTO: 1.32 X10*3/UL (ref 1.2–4.8)
LYMPHOCYTES NFR BLD AUTO: 34.7 %
MCH RBC QN AUTO: 33.3 PG (ref 26–34)
MCHC RBC AUTO-ENTMCNC: 35.9 G/DL (ref 32–36)
MCV RBC AUTO: 93 FL (ref 80–100)
MONOCYTES # BLD AUTO: 0.91 X10*3/UL (ref 0.1–1)
MONOCYTES NFR BLD AUTO: 23.9 %
NEUTROPHILS # BLD AUTO: 1.37 X10*3/UL (ref 1.2–7.7)
NEUTROPHILS NFR BLD AUTO: 36.2 %
NRBC BLD-RTO: 0 /100 WBCS (ref 0–0)
PLATELET # BLD AUTO: 174 X10*3/UL (ref 150–450)
POTASSIUM SERPL-SCNC: 5.5 MMOL/L (ref 3.5–5.3)
RBC # BLD AUTO: 2.82 X10*6/UL (ref 4.5–5.9)
SODIUM SERPL-SCNC: 125 MMOL/L (ref 136–145)
WBC # BLD AUTO: 3.8 X10*3/UL (ref 4.4–11.3)

## 2025-02-25 PROCEDURE — 99222 1ST HOSP IP/OBS MODERATE 55: CPT | Performed by: NURSE PRACTITIONER

## 2025-02-25 PROCEDURE — 99285 EMERGENCY DEPT VISIT HI MDM: CPT | Mod: 25 | Performed by: STUDENT IN AN ORGANIZED HEALTH CARE EDUCATION/TRAINING PROGRAM

## 2025-02-25 PROCEDURE — 2500000002 HC RX 250 W HCPCS SELF ADMINISTERED DRUGS (ALT 637 FOR MEDICARE OP, ALT 636 FOR OP/ED): Mod: MUE | Performed by: NURSE PRACTITIONER

## 2025-02-25 PROCEDURE — 85025 COMPLETE CBC W/AUTO DIFF WBC: CPT | Performed by: STUDENT IN AN ORGANIZED HEALTH CARE EDUCATION/TRAINING PROGRAM

## 2025-02-25 PROCEDURE — G0378 HOSPITAL OBSERVATION PER HR: HCPCS

## 2025-02-25 PROCEDURE — 82374 ASSAY BLOOD CARBON DIOXIDE: CPT | Performed by: STUDENT IN AN ORGANIZED HEALTH CARE EDUCATION/TRAINING PROGRAM

## 2025-02-25 PROCEDURE — 2500000001 HC RX 250 WO HCPCS SELF ADMINISTERED DRUGS (ALT 637 FOR MEDICARE OP): Performed by: NURSE PRACTITIONER

## 2025-02-25 PROCEDURE — 2500000004 HC RX 250 GENERAL PHARMACY W/ HCPCS (ALT 636 FOR OP/ED): Performed by: STUDENT IN AN ORGANIZED HEALTH CARE EDUCATION/TRAINING PROGRAM

## 2025-02-25 PROCEDURE — 96372 THER/PROPH/DIAG INJ SC/IM: CPT | Mod: 59 | Performed by: NURSE PRACTITIONER

## 2025-02-25 PROCEDURE — 2500000004 HC RX 250 GENERAL PHARMACY W/ HCPCS (ALT 636 FOR OP/ED): Performed by: NURSE PRACTITIONER

## 2025-02-25 PROCEDURE — 96361 HYDRATE IV INFUSION ADD-ON: CPT | Mod: 59

## 2025-02-25 RX ORDER — ATORVASTATIN CALCIUM 40 MG/1
40 TABLET, FILM COATED ORAL NIGHTLY
Status: DISCONTINUED | OUTPATIENT
Start: 2025-02-25 | End: 2025-02-26 | Stop reason: HOSPADM

## 2025-02-25 RX ORDER — BUPROPION HYDROCHLORIDE 150 MG/1
150 TABLET, EXTENDED RELEASE ORAL 2 TIMES DAILY
Status: DISCONTINUED | OUTPATIENT
Start: 2025-02-25 | End: 2025-02-26 | Stop reason: HOSPADM

## 2025-02-25 RX ORDER — AMITRIPTYLINE HYDROCHLORIDE 25 MG/1
100 TABLET, FILM COATED ORAL NIGHTLY
Status: DISCONTINUED | OUTPATIENT
Start: 2025-02-25 | End: 2025-02-26 | Stop reason: HOSPADM

## 2025-02-25 RX ORDER — SPIRONOLACTONE 25 MG/1
50 TABLET ORAL
Status: DISCONTINUED | OUTPATIENT
Start: 2025-02-25 | End: 2025-02-26 | Stop reason: HOSPADM

## 2025-02-25 RX ORDER — HEPARIN SODIUM 5000 [USP'U]/ML
5000 INJECTION, SOLUTION INTRAVENOUS; SUBCUTANEOUS EVERY 8 HOURS SCHEDULED
Status: DISCONTINUED | OUTPATIENT
Start: 2025-02-25 | End: 2025-02-26 | Stop reason: HOSPADM

## 2025-02-25 RX ADMIN — BUPROPION HYDROCHLORIDE 150 MG: 150 TABLET, FILM COATED, EXTENDED RELEASE ORAL at 13:22

## 2025-02-25 RX ADMIN — HEPARIN SODIUM 5000 UNITS: 5000 INJECTION, SOLUTION INTRAVENOUS; SUBCUTANEOUS at 13:22

## 2025-02-25 RX ADMIN — AMITRIPTYLINE HYDROCHLORIDE 100 MG: 25 TABLET, FILM COATED ORAL at 21:53

## 2025-02-25 RX ADMIN — BUPROPION HYDROCHLORIDE 150 MG: 150 TABLET, FILM COATED, EXTENDED RELEASE ORAL at 21:53

## 2025-02-25 RX ADMIN — SPIRONOLACTONE 50 MG: 25 TABLET, FILM COATED ORAL at 13:22

## 2025-02-25 RX ADMIN — ATORVASTATIN CALCIUM 40 MG: 40 TABLET, FILM COATED ORAL at 21:53

## 2025-02-25 RX ADMIN — LEVOTHYROXINE SODIUM 175 MCG: 0.12 TABLET ORAL at 13:22

## 2025-02-25 RX ADMIN — SODIUM CHLORIDE 500 ML: 9 INJECTION, SOLUTION INTRAVENOUS at 06:23

## 2025-02-25 RX ADMIN — HEPARIN SODIUM 5000 UNITS: 5000 INJECTION, SOLUTION INTRAVENOUS; SUBCUTANEOUS at 21:53

## 2025-02-25 SDOH — SOCIAL STABILITY: SOCIAL INSECURITY: DO YOU FEEL ANYONE HAS EXPLOITED OR TAKEN ADVANTAGE OF YOU FINANCIALLY OR OF YOUR PERSONAL PROPERTY?: NO

## 2025-02-25 SDOH — SOCIAL STABILITY: SOCIAL INSECURITY: ABUSE: ADULT

## 2025-02-25 SDOH — SOCIAL STABILITY: SOCIAL INSECURITY: ARE THERE ANY APPARENT SIGNS OF INJURIES/BEHAVIORS THAT COULD BE RELATED TO ABUSE/NEGLECT?: NO

## 2025-02-25 SDOH — SOCIAL STABILITY: SOCIAL INSECURITY: HAS ANYONE EVER THREATENED TO HURT YOUR FAMILY OR YOUR PETS?: NO

## 2025-02-25 SDOH — SOCIAL STABILITY: SOCIAL INSECURITY: HAVE YOU HAD THOUGHTS OF HARMING ANYONE ELSE?: NO

## 2025-02-25 SDOH — SOCIAL STABILITY: SOCIAL INSECURITY: HAVE YOU HAD ANY THOUGHTS OF HARMING ANYONE ELSE?: NO

## 2025-02-25 SDOH — SOCIAL STABILITY: SOCIAL INSECURITY: ARE YOU OR HAVE YOU BEEN THREATENED OR ABUSED PHYSICALLY, EMOTIONALLY, OR SEXUALLY BY ANYONE?: NO

## 2025-02-25 SDOH — SOCIAL STABILITY: SOCIAL INSECURITY: WERE YOU ABLE TO COMPLETE ALL THE BEHAVIORAL HEALTH SCREENINGS?: YES

## 2025-02-25 SDOH — SOCIAL STABILITY: SOCIAL INSECURITY: DOES ANYONE TRY TO KEEP YOU FROM HAVING/CONTACTING OTHER FRIENDS OR DOING THINGS OUTSIDE YOUR HOME?: NO

## 2025-02-25 SDOH — SOCIAL STABILITY: SOCIAL INSECURITY: DO YOU FEEL UNSAFE GOING BACK TO THE PLACE WHERE YOU ARE LIVING?: NO

## 2025-02-25 ASSESSMENT — COGNITIVE AND FUNCTIONAL STATUS - GENERAL
PATIENT BASELINE BEDBOUND: NO
MOBILITY SCORE: 24
DAILY ACTIVITIY SCORE: 24
MOBILITY SCORE: 24
DAILY ACTIVITIY SCORE: 24

## 2025-02-25 ASSESSMENT — ACTIVITIES OF DAILY LIVING (ADL)
BATHING: INDEPENDENT
LACK_OF_TRANSPORTATION: NO
HEARING - RIGHT EAR: FUNCTIONAL
TOILETING: INDEPENDENT
JUDGMENT_ADEQUATE_SAFELY_COMPLETE_DAILY_ACTIVITIES: YES
DRESSING YOURSELF: INDEPENDENT
WALKS IN HOME: INDEPENDENT
ADEQUATE_TO_COMPLETE_ADL: YES
GROOMING: INDEPENDENT
PATIENT'S MEMORY ADEQUATE TO SAFELY COMPLETE DAILY ACTIVITIES?: YES
FEEDING YOURSELF: INDEPENDENT
HEARING - LEFT EAR: FUNCTIONAL

## 2025-02-25 ASSESSMENT — LIFESTYLE VARIABLES
PRESCIPTION_ABUSE_PAST_12_MONTHS: NO
HAVE PEOPLE ANNOYED YOU BY CRITICIZING YOUR DRINKING: NO
TOTAL SCORE: 0
AUDIT-C TOTAL SCORE: 0
EVER HAD A DRINK FIRST THING IN THE MORNING TO STEADY YOUR NERVES TO GET RID OF A HANGOVER: NO
HOW OFTEN DO YOU HAVE A DRINK CONTAINING ALCOHOL: NEVER
SUBSTANCE_ABUSE_PAST_12_MONTHS: NO
HAVE YOU EVER FELT YOU SHOULD CUT DOWN ON YOUR DRINKING: NO
HOW OFTEN DO YOU HAVE 6 OR MORE DRINKS ON ONE OCCASION: NEVER
SKIP TO QUESTIONS 9-10: 1
EVER FELT BAD OR GUILTY ABOUT YOUR DRINKING: NO
AUDIT-C TOTAL SCORE: 0
HOW MANY STANDARD DRINKS CONTAINING ALCOHOL DO YOU HAVE ON A TYPICAL DAY: PATIENT DOES NOT DRINK

## 2025-02-25 ASSESSMENT — COLUMBIA-SUICIDE SEVERITY RATING SCALE - C-SSRS
2. HAVE YOU ACTUALLY HAD ANY THOUGHTS OF KILLING YOURSELF?: NO
1. IN THE PAST MONTH, HAVE YOU WISHED YOU WERE DEAD OR WISHED YOU COULD GO TO SLEEP AND NOT WAKE UP?: NO
6. HAVE YOU EVER DONE ANYTHING, STARTED TO DO ANYTHING, OR PREPARED TO DO ANYTHING TO END YOUR LIFE?: NO

## 2025-02-25 ASSESSMENT — PATIENT HEALTH QUESTIONNAIRE - PHQ9
SUM OF ALL RESPONSES TO PHQ9 QUESTIONS 1 & 2: 1
1. LITTLE INTEREST OR PLEASURE IN DOING THINGS: SEVERAL DAYS
2. FEELING DOWN, DEPRESSED OR HOPELESS: NOT AT ALL

## 2025-02-25 ASSESSMENT — PAIN SCALES - GENERAL
PAINLEVEL_OUTOF10: 0 - NO PAIN

## 2025-02-25 ASSESSMENT — PAIN - FUNCTIONAL ASSESSMENT
PAIN_FUNCTIONAL_ASSESSMENT: 0-10
PAIN_FUNCTIONAL_ASSESSMENT: 0-10

## 2025-02-25 NOTE — PROGRESS NOTES
I have accept care of this patient in signout.    In summary:  This patient was already admitted but IR contacted me and asked to place a small Sy to keep the tract open since they would not be able to do IR intervention until tomorrow.  I was able to place a 6 English Sy catheter using sterile technique.  Patient has no pain.  I did inform [facility] to are aware.      Labs Reviewed   CBC WITH AUTO DIFFERENTIAL - Abnormal       Result Value    WBC 3.8 (*)     nRBC 0.0      RBC 2.82 (*)     Hemoglobin 9.4 (*)     Hematocrit 26.2 (*)     MCV 93      MCH 33.3      MCHC 35.9      RDW 16.3 (*)     Platelets 174      Neutrophils % 36.2      Immature Granulocytes %, Automated 2.1 (*)     Lymphocytes % 34.7      Monocytes % 23.9      Eosinophils % 2.6      Basophils % 0.5      Neutrophils Absolute 1.37      Immature Granulocytes Absolute, Automated 0.08      Lymphocytes Absolute 1.32      Monocytes Absolute 0.91      Eosinophils Absolute 0.10      Basophils Absolute 0.02     BASIC METABOLIC PANEL - Abnormal    Glucose 111 (*)     Sodium 125 (*)     Potassium 5.5 (*)     Chloride 96 (*)     Bicarbonate 20 (*)     Anion Gap 15      Urea Nitrogen 49 (*)     Creatinine 1.78 (*)     eGFR 41 (*)     Calcium 8.0 (*)      IR GI J tube placement    (Results Pending)

## 2025-02-25 NOTE — CARE PLAN
The patient's goals for the shift include      The clinical goals for the shift include  patient safety.

## 2025-02-25 NOTE — ED PROVIDER NOTES
HPI   Chief Complaint   Patient presents with   • Dislodged Tube       69-year-old male with past medical history of chronic J-tube due to esophageal cancer, hypertension, hyperlipidemia, diabetes presents to ED with J-tube dislodgment.  He says he was try to cut the tape off the end of his J-tube and excellently pulled too hard causing the hole to come out.  Having just prior arrival.  Tube was placed back in November.  Denies any abdominal pain vomiting or diarrhea.  He says he has been getting lightheaded when he stands up recently.  No fever cough or cold symptoms.  No leg swelling.              Patient History   Past Medical History:   Diagnosis Date   • Hyperlipidemia    • Hypertension    • Personal history of other endocrine, nutritional and metabolic disease     History of hypothyroidism   • Type 2 diabetes mellitus      Past Surgical History:   Procedure Laterality Date   • CATARACT EXTRACTION Right 08/25/2023   • CHOLECYSTECTOMY  09/18/2018    Cholecystectomy   • CORONARY ARTERY BYPASS GRAFT  09/18/2018    CABG   • HERNIA REPAIR  09/18/2018    Inguinal Hernia Repair     Family History   Problem Relation Name Age of Onset   • COPD Mother       Social History     Tobacco Use   • Smoking status: Every Day     Current packs/day: 0.25     Average packs/day: 1 pack/day for 46.2 years (45.3 ttl pk-yrs)     Types: Cigarettes     Start date: 1979   • Smokeless tobacco: Never   Vaping Use   • Vaping status: Never Used   Substance Use Topics   • Alcohol use: Not Currently   • Drug use: Never       Physical Exam   ED Triage Vitals [02/25/25 0537]   Temperature Pulse Respirations BP   37.5 °C (99.5 °F) -- 19 112/53      SpO2 Temp src Heart Rate Source Patient Position   -- -- -- --      BP Location FiO2 (%)     -- --       Physical Exam  Vitals and nursing note reviewed.   Constitutional:       General: He is not in acute distress.     Appearance: He is well-developed.   HENT:      Head: Normocephalic and atraumatic.    Eyes:      Conjunctiva/sclera: Conjunctivae normal.   Cardiovascular:      Rate and Rhythm: Normal rate and regular rhythm.      Heart sounds: No murmur heard.  Pulmonary:      Effort: Pulmonary effort is normal. No respiratory distress.      Breath sounds: Normal breath sounds.   Abdominal:      Palpations: Abdomen is soft.      Tenderness: There is no abdominal tenderness.      Comments: In a small hole to the left lower abdomen where the G-tube site was.  No surrounding erythema or purulence or superimposed signs of infection.   Musculoskeletal:         General: No swelling.      Cervical back: Neck supple.   Skin:     General: Skin is warm and dry.      Capillary Refill: Capillary refill takes less than 2 seconds.   Neurological:      Mental Status: He is alert.   Psychiatric:         Mood and Affect: Mood normal.           ED Course & MDM   Diagnoses as of 02/25/25 0728   Jejunostomy tube fell out   DYANA (acute kidney injury) (CMS-[facility])   Hyponatremia                 No data recorded     Misa Coma Scale Score: 15 (02/25/25 0540 : Georgia Pham RN)                           Medical Decision Making  HISTORIAN:  Patient    CHART REVIEW:  No pertinent finding    PT SUMMARY:  69-year-old male presents to ED with concerns for J-tube dislodgment.  Vital signs stable.    DDX:  Dehydration, electrolyte abnormality, DYANA    PLAN:  Obtain CBC and BMP.  Will give patient a liter of IV fluids.    DISPO/RE-EVAL:  Patient's labs show altogether signs of dehydration with DYANA, hyponatremia.  Patient was given IV fluids.  This point time patient be signed out to oncoming provider pending admission for J-tube replacement and DYANA.\        Procedure  Procedures     Deng Shaffer DO  02/25/25 0721

## 2025-02-25 NOTE — ED TRIAGE NOTES
Pt. Arrived to to the ED via EMS from home for c/o dislodged G-tube. Pt. States that his tube has been loose and he pushed it back in and taped it down. Pt. Then was going to start his feeding tonight and it became dislodged. Pt. Is currently receiving treatment for his esophageal cancer

## 2025-02-25 NOTE — H&P
[facility] MEDICINE HISTORY AND PHYSICAL    History Of Present Illness     Timoteo Victoria is a 69 y.o. male with PMHx of chronic J-tube due to esophageal cancer (he follows with palliative care) and HTN who   presented to ED with J-tube dislodgment. He tried to change th dressing earlier this morning and accidentally pulled too hard, causing the tube to come out.   ED workup was significant for blood sodium 125, potassium 5.5, creatinine 1.78, Hgb 9.4. VSS. The pt received 500 NS bolus and IR was consulted for replacement of the J-tube.   Remainder of ROS reviewed and negative except as indicated in HPI.     Objective     Past Medical History  He has a past medical history of CAD (coronary artery disease), Esophageal carcinoma (Multi), Hyperlipidemia, Hypertension, and Hypothyroidism.    Surgical History  He has a past surgical history that includes Cholecystectomy; Hernia repair; Coronary artery bypass graft (2005); and Cataract extraction (Right, 08/25/2023).    Social History     Tobacco Use    Smoking status: Every Day     Current packs/day: 0.25     Average packs/day: 1 pack/day for 46.2 years (45.3 ttl pk-yrs)     Types: Cigarettes     Start date: 1979    Smokeless tobacco: Never   Vaping Use    Vaping status: Never Used   Substance Use Topics    Alcohol use: Not Currently    Drug use: Never       Family History  Family History   Problem Relation Name Age of Onset    COPD Mother         Allergies  Ibuprofen    Vitals:    02/25/25 0547   BP:    Pulse: 72   Resp:    Temp:    SpO2: 97%       Vitals:    02/25/25 0537   Weight: 92.1 kg (203 lb)       Scheduled medications  heparin (porcine), 5,000 Units, subcutaneous, q8h OZIEL      Continuous medications     PRN medications      Results for orders placed or performed during the hospital encounter of 02/25/25 (from the past 24 hours)   CBC and Auto Differential   Result Value Ref Range    WBC 3.8 (L) 4.4 - 11.3 x10*3/uL    nRBC 0.0 0.0 - 0.0 /100 WBCs     RBC 2.82 (L) 4.50 - 5.90 x10*6/uL    Hemoglobin 9.4 (L) 13.5 - 17.5 g/dL    Hematocrit 26.2 (L) 41.0 - 52.0 %    MCV 93 80 - 100 fL    MCH 33.3 26.0 - 34.0 pg    MCHC 35.9 32.0 - 36.0 g/dL    RDW 16.3 (H) 11.5 - 14.5 %    Platelets 174 150 - 450 x10*3/uL    Neutrophils % 36.2 40.0 - 80.0 %    Immature Granulocytes %, Automated 2.1 (H) 0.0 - 0.9 %    Lymphocytes % 34.7 13.0 - 44.0 %    Monocytes % 23.9 2.0 - 10.0 %    Eosinophils % 2.6 0.0 - 6.0 %    Basophils % 0.5 0.0 - 2.0 %    Neutrophils Absolute 1.37 1.20 - 7.70 x10*3/uL    Immature Granulocytes Absolute, Automated 0.08 0.00 - 0.70 x10*3/uL    Lymphocytes Absolute 1.32 1.20 - 4.80 x10*3/uL    Monocytes Absolute 0.91 0.10 - 1.00 x10*3/uL    Eosinophils Absolute 0.10 0.00 - 0.70 x10*3/uL    Basophils Absolute 0.02 0.00 - 0.10 x10*3/uL   Basic metabolic panel   Result Value Ref Range    Glucose 111 (H) 74 - 99 mg/dL    Sodium 125 (L) 136 - 145 mmol/L    Potassium 5.5 (H) 3.5 - 5.3 mmol/L    Chloride 96 (L) 98 - 107 mmol/L    Bicarbonate 20 (L) 21 - 32 mmol/L    Anion Gap 15 10 - 20 mmol/L    Urea Nitrogen 49 (H) 6 - 23 mg/dL    Creatinine 1.78 (H) 0.50 - 1.30 mg/dL    eGFR 41 (L) >60 mL/min/1.73m*2    Calcium 8.0 (L) 8.6 - 10.3 mg/dL         I personally reviewed all pertinent labwork, imaging and vital signs, as well as medications, nursing, therapy, discharge planning and consult notes.     Constitutional: Well developed, awake, alert, calm, oriented x4, no acute distress, cooperative   Eyes: EOMI, clear sclerae   ENMT: mucous membranes moist, no lesions seen   Head/Neck: Neck supple, no apparent injury, head atraumatic   Respiratory/Thorax: CTAB, good chest expansion, respirations even and unlabored   Cardiovascular: Regular rate and rhythm, no murmurs/rubs/gallops, normal S1 and S 2   Gastrointestinal: Abdomen nondistended, soft, nontender, +BS, no bruits, J-tube site dressed   Musculoskeletal: ROM intact, no joint swelling, normal  strength   Extremities: no  cyanosis, contusions or clubbing, or edema   Neurological: no focal deficit, pt alert and oriented x4   Psychological: Appropriate affect and behavior, pleasant   Skin: Warm and dry, no lesions, no rashes       Assessment/Plan     Dislodgement of J-tube   IR unable to replace J-tube until tomorrow and recommending to place a small Sy catheter in the J-tube tract in the meantime  If the tract closes and wire can't be passed through, the pt would have to go back to surgery for a new tube to be placed     Hyperkalemia and DYANA  Suspect 2/2 dehydration as pt eats orally but uses J-tube for supplemental nutrition    Hx esophageal cancer   Pt follows with palliative care    HTN   BP controlled here but per pt and family member it has been low to 80s systolic  Will hold home meds for now    DVT ppx: SubQ heparin    Discharge disposition  Home when stable        Marika Peoples, CNP  [facility]

## 2025-02-26 ENCOUNTER — APPOINTMENT (OUTPATIENT)
Dept: CARDIOLOGY | Facility: HOSPITAL | Age: 70
End: 2025-02-26
Payer: MEDICARE

## 2025-02-26 VITALS
WEIGHT: 203 LBS | OXYGEN SATURATION: 98 % | HEART RATE: 96 BPM | RESPIRATION RATE: 16 BRPM | DIASTOLIC BLOOD PRESSURE: 72 MMHG | BODY MASS INDEX: 29.06 KG/M2 | SYSTOLIC BLOOD PRESSURE: 136 MMHG | TEMPERATURE: 96.9 F | HEIGHT: 70 IN

## 2025-02-26 PROBLEM — T85.528A JEJUNOSTOMY TUBE FELL OUT: Status: RESOLVED | Noted: 2025-02-25 | Resolved: 2025-02-26

## 2025-02-26 LAB
ANION GAP SERPL CALC-SCNC: 9 MMOL/L (ref 10–20)
BUN SERPL-MCNC: 34 MG/DL (ref 6–23)
CALCIUM SERPL-MCNC: 8.2 MG/DL (ref 8.6–10.3)
CHLORIDE SERPL-SCNC: 98 MMOL/L (ref 98–107)
CHLORIDE UR-SCNC: 102 MMOL/L
CHLORIDE/CREATININE (MMOL/G) IN URINE: 135 MMOL/G CREAT (ref 23–275)
CO2 SERPL-SCNC: 23 MMOL/L (ref 21–32)
CREAT SERPL-MCNC: 1.3 MG/DL (ref 0.5–1.3)
CREAT UR-MCNC: 75.5 MG/DL (ref 20–370)
CREAT UR-MCNC: 76.2 MG/DL (ref 20–370)
EGFRCR SERPLBLD CKD-EPI 2021: 60 ML/MIN/1.73M*2
ERYTHROCYTE [DISTWIDTH] IN BLOOD BY AUTOMATED COUNT: 16.6 % (ref 11.5–14.5)
GLUCOSE SERPL-MCNC: 85 MG/DL (ref 74–99)
HCT VFR BLD AUTO: 29.3 % (ref 41–52)
HGB BLD-MCNC: 10.2 G/DL (ref 13.5–17.5)
MCH RBC QN AUTO: 32.8 PG (ref 26–34)
MCHC RBC AUTO-ENTMCNC: 34.8 G/DL (ref 32–36)
MCV RBC AUTO: 94 FL (ref 80–100)
NRBC BLD-RTO: 0 /100 WBCS (ref 0–0)
OSMOLALITY SERPL: 271 MOSM/KG (ref 280–300)
PLATELET # BLD AUTO: 176 X10*3/UL (ref 150–450)
POTASSIUM SERPL-SCNC: 5.5 MMOL/L (ref 3.5–5.3)
POTASSIUM UR-SCNC: 58 MMOL/L
POTASSIUM/CREAT UR-RTO: 77 MMOL/G CREAT
RBC # BLD AUTO: 3.11 X10*6/UL (ref 4.5–5.9)
SODIUM SERPL-SCNC: 124 MMOL/L (ref 136–145)
SODIUM SERPL-SCNC: 126 MMOL/L (ref 136–145)
SODIUM UR-SCNC: 91 MMOL/L
SODIUM UR-SCNC: 91 MMOL/L
SODIUM/CREAT UR-RTO: 119 MMOL/G CREAT
SODIUM/CREAT UR-RTO: 121 MMOL/G CREAT
WBC # BLD AUTO: 3.5 X10*3/UL (ref 4.4–11.3)

## 2025-02-26 PROCEDURE — 96374 THER/PROPH/DIAG INJ IV PUSH: CPT | Mod: 59

## 2025-02-26 PROCEDURE — 2720000007 HC OR 272 NO HCPCS

## 2025-02-26 PROCEDURE — 2500000004 HC RX 250 GENERAL PHARMACY W/ HCPCS (ALT 636 FOR OP/ED): Performed by: INTERNAL MEDICINE

## 2025-02-26 PROCEDURE — 99239 HOSP IP/OBS DSCHRG MGMT >30: CPT | Performed by: INTERNAL MEDICINE

## 2025-02-26 PROCEDURE — 96372 THER/PROPH/DIAG INJ SC/IM: CPT | Mod: 59 | Performed by: NURSE PRACTITIONER

## 2025-02-26 PROCEDURE — 84300 ASSAY OF URINE SODIUM: CPT | Performed by: PHYSICIAN ASSISTANT

## 2025-02-26 PROCEDURE — 49452 REPLACE G-J TUBE PERC: CPT

## 2025-02-26 PROCEDURE — 2500000001 HC RX 250 WO HCPCS SELF ADMINISTERED DRUGS (ALT 637 FOR MEDICARE OP): Performed by: INTERNAL MEDICINE

## 2025-02-26 PROCEDURE — 2500000001 HC RX 250 WO HCPCS SELF ADMINISTERED DRUGS (ALT 637 FOR MEDICARE OP): Performed by: NURSE PRACTITIONER

## 2025-02-26 PROCEDURE — 96372 THER/PROPH/DIAG INJ SC/IM: CPT | Mod: 59 | Performed by: INTERNAL MEDICINE

## 2025-02-26 PROCEDURE — 82570 ASSAY OF URINE CREATININE: CPT | Performed by: INTERNAL MEDICINE

## 2025-02-26 PROCEDURE — 2500000002 HC RX 250 W HCPCS SELF ADMINISTERED DRUGS (ALT 637 FOR MEDICARE OP, ALT 636 FOR OP/ED): Mod: MUE | Performed by: INTERNAL MEDICINE

## 2025-02-26 PROCEDURE — 2780000003 HC OR 278 NO HCPCS

## 2025-02-26 PROCEDURE — 2500000004 HC RX 250 GENERAL PHARMACY W/ HCPCS (ALT 636 FOR OP/ED): Performed by: NURSE PRACTITIONER

## 2025-02-26 PROCEDURE — 2500000002 HC RX 250 W HCPCS SELF ADMINISTERED DRUGS (ALT 637 FOR MEDICARE OP, ALT 636 FOR OP/ED): Mod: MUE | Performed by: NURSE PRACTITIONER

## 2025-02-26 PROCEDURE — 83930 ASSAY OF BLOOD OSMOLALITY: CPT | Mod: PORLAB | Performed by: INTERNAL MEDICINE

## 2025-02-26 PROCEDURE — 2500000004 HC RX 250 GENERAL PHARMACY W/ HCPCS (ALT 636 FOR OP/ED): Performed by: REGISTERED NURSE

## 2025-02-26 PROCEDURE — C1769 GUIDE WIRE: HCPCS

## 2025-02-26 PROCEDURE — 84295 ASSAY OF SERUM SODIUM: CPT | Mod: 59 | Performed by: INTERNAL MEDICINE

## 2025-02-26 PROCEDURE — 80051 ELECTROLYTE PANEL: CPT | Performed by: NURSE PRACTITIONER

## 2025-02-26 PROCEDURE — G0378 HOSPITAL OBSERVATION PER HR: HCPCS

## 2025-02-26 PROCEDURE — 85027 COMPLETE CBC AUTOMATED: CPT | Performed by: NURSE PRACTITIONER

## 2025-02-26 RX ORDER — BACLOFEN 10 MG/1
5 TABLET ORAL 3 TIMES DAILY
Status: DISCONTINUED | OUTPATIENT
Start: 2025-02-26 | End: 2025-02-26 | Stop reason: HOSPADM

## 2025-02-26 RX ORDER — GABAPENTIN 250 MG/5ML
300 SOLUTION ORAL NIGHTLY
Status: DISCONTINUED | OUTPATIENT
Start: 2025-02-26 | End: 2025-02-26 | Stop reason: HOSPADM

## 2025-02-26 RX ORDER — NAPROXEN SODIUM 220 MG/1
81 TABLET, FILM COATED ORAL DAILY
COMMUNITY

## 2025-02-26 RX ORDER — ALUMINUM HYDROXIDE, MAGNESIUM HYDROXIDE, AND SIMETHICONE 1200; 120; 1200 MG/30ML; MG/30ML; MG/30ML
20 SUSPENSION ORAL 4 TIMES DAILY PRN
Status: DISCONTINUED | OUTPATIENT
Start: 2025-02-26 | End: 2025-02-26 | Stop reason: HOSPADM

## 2025-02-26 RX ORDER — ONDANSETRON HYDROCHLORIDE 2 MG/ML
4 INJECTION, SOLUTION INTRAVENOUS EVERY 8 HOURS PRN
Status: DISCONTINUED | OUTPATIENT
Start: 2025-02-26 | End: 2025-02-26 | Stop reason: HOSPADM

## 2025-02-26 RX ORDER — NAPROXEN SODIUM 220 MG/1
81 TABLET, FILM COATED ORAL DAILY
Status: DISCONTINUED | OUTPATIENT
Start: 2025-02-26 | End: 2025-02-26 | Stop reason: HOSPADM

## 2025-02-26 RX ADMIN — SODIUM ZIRCONIUM CYCLOSILICATE 10 G: 10 POWDER, FOR SUSPENSION ORAL at 11:13

## 2025-02-26 RX ADMIN — SPIRONOLACTONE 50 MG: 25 TABLET, FILM COATED ORAL at 10:04

## 2025-02-26 RX ADMIN — HEPARIN SODIUM 5000 UNITS: 5000 INJECTION, SOLUTION INTRAVENOUS; SUBCUTANEOUS at 06:00

## 2025-02-26 RX ADMIN — LEVOTHYROXINE SODIUM 175 MCG: 0.12 TABLET ORAL at 06:00

## 2025-02-26 RX ADMIN — BUPROPION HYDROCHLORIDE 150 MG: 150 TABLET, FILM COATED, EXTENDED RELEASE ORAL at 10:04

## 2025-02-26 RX ADMIN — BACLOFEN 5 MG: 10 TABLET ORAL at 15:37

## 2025-02-26 RX ADMIN — ASPIRIN 81 MG CHEWABLE TABLET 81 MG: 81 TABLET CHEWABLE at 15:37

## 2025-02-26 RX ADMIN — SODIUM CHLORIDE, POTASSIUM CHLORIDE, SODIUM LACTATE AND CALCIUM CHLORIDE 500 ML: 600; 310; 30; 20 INJECTION, SOLUTION INTRAVENOUS at 10:44

## 2025-02-26 RX ADMIN — ONDANSETRON 4 MG: 2 INJECTION INTRAMUSCULAR; INTRAVENOUS at 03:11

## 2025-02-26 RX ADMIN — HEPARIN SODIUM 5000 UNITS: 5000 INJECTION, SOLUTION INTRAVENOUS; SUBCUTANEOUS at 15:38

## 2025-02-26 RX ADMIN — SODIUM ZIRCONIUM CYCLOSILICATE 10 G: 10 POWDER, FOR SUSPENSION ORAL at 15:38

## 2025-02-26 SDOH — ECONOMIC STABILITY: FOOD INSECURITY: WITHIN THE PAST 12 MONTHS, YOU WORRIED THAT YOUR FOOD WOULD RUN OUT BEFORE YOU GOT THE MONEY TO BUY MORE.: NEVER TRUE

## 2025-02-26 SDOH — ECONOMIC STABILITY: INCOME INSECURITY: IN THE PAST 12 MONTHS HAS THE ELECTRIC, GAS, OIL, OR WATER COMPANY THREATENED TO SHUT OFF SERVICES IN YOUR HOME?: NO

## 2025-02-26 SDOH — SOCIAL STABILITY: SOCIAL INSECURITY: WITHIN THE LAST YEAR, HAVE YOU BEEN AFRAID OF YOUR PARTNER OR EX-PARTNER?: NO

## 2025-02-26 SDOH — ECONOMIC STABILITY: HOUSING INSECURITY: IN THE LAST 12 MONTHS, WAS THERE A TIME WHEN YOU WERE NOT ABLE TO PAY THE MORTGAGE OR RENT ON TIME?: NO

## 2025-02-26 SDOH — ECONOMIC STABILITY: TRANSPORTATION INSECURITY: IN THE PAST 12 MONTHS, HAS LACK OF TRANSPORTATION KEPT YOU FROM MEDICAL APPOINTMENTS OR FROM GETTING MEDICATIONS?: NO

## 2025-02-26 SDOH — ECONOMIC STABILITY: FOOD INSECURITY: HOW HARD IS IT FOR YOU TO PAY FOR THE VERY BASICS LIKE FOOD, HOUSING, MEDICAL CARE, AND HEATING?: NOT VERY HARD

## 2025-02-26 SDOH — ECONOMIC STABILITY: HOUSING INSECURITY: AT ANY TIME IN THE PAST 12 MONTHS, WERE YOU HOMELESS OR LIVING IN A SHELTER (INCLUDING NOW)?: NO

## 2025-02-26 SDOH — HEALTH STABILITY: MENTAL HEALTH: HOW OFTEN DO YOU HAVE SIX OR MORE DRINKS ON ONE OCCASION?: NEVER

## 2025-02-26 SDOH — SOCIAL STABILITY: SOCIAL INSECURITY: WITHIN THE LAST YEAR, HAVE YOU BEEN HUMILIATED OR EMOTIONALLY ABUSED IN OTHER WAYS BY YOUR PARTNER OR EX-PARTNER?: NO

## 2025-02-26 SDOH — HEALTH STABILITY: MENTAL HEALTH: HOW MANY DRINKS CONTAINING ALCOHOL DO YOU HAVE ON A TYPICAL DAY WHEN YOU ARE DRINKING?: PATIENT DOES NOT DRINK

## 2025-02-26 SDOH — ECONOMIC STABILITY: HOUSING INSECURITY: IN THE PAST 12 MONTHS, HOW MANY TIMES HAVE YOU MOVED WHERE YOU WERE LIVING?: 0

## 2025-02-26 SDOH — HEALTH STABILITY: MENTAL HEALTH: HOW OFTEN DO YOU HAVE A DRINK CONTAINING ALCOHOL?: NEVER

## 2025-02-26 SDOH — ECONOMIC STABILITY: FOOD INSECURITY: WITHIN THE PAST 12 MONTHS, THE FOOD YOU BOUGHT JUST DIDN'T LAST AND YOU DIDN'T HAVE MONEY TO GET MORE.: NEVER TRUE

## 2025-02-26 ASSESSMENT — ACTIVITIES OF DAILY LIVING (ADL)
LACK_OF_TRANSPORTATION: NO
LACK_OF_TRANSPORTATION: NO

## 2025-02-26 ASSESSMENT — PAIN SCALES - GENERAL
PAINLEVEL_OUTOF10: 0 - NO PAIN

## 2025-02-26 ASSESSMENT — PAIN - FUNCTIONAL ASSESSMENT
PAIN_FUNCTIONAL_ASSESSMENT: 0-10

## 2025-02-26 ASSESSMENT — LIFESTYLE VARIABLES
AUDIT-C TOTAL SCORE: 0
SKIP TO QUESTIONS 9-10: 1

## 2025-02-26 NOTE — POST-PROCEDURE NOTE
Interventional Radiology Brief Postprocedure Note    Attending: Timoteo Michel MD      Assistant: none    Diagnosis: j tube pulled out    Description of procedure: j tube replacement     Anesthesia:  None    Complications: None    Estimated Blood Loss: none        See detailed result report with images in PACS.    The patient tolerated the procedure well without incident or complication.

## 2025-02-26 NOTE — CARE PLAN
The patient's goals for the shift include      The clinical goals for the shift include pt free from fall and injury throughout shift

## 2025-02-26 NOTE — CONSULTS
Nephrology Consult Note                                                                                                                                         Inpatient consult to Nephrology  Consult performed by: Fabio Maier PA-C  Consult ordered by: Raleigh Myles MD PhD                                                                                                             HPI  Patient is a 69 y.o. male with PMHx of chronic J-tube due to esophageal cancer (he follows with palliative care) and HTN who   presented to ED with J-tube dislodgment. He tried to change th dressing earlier this morning and accidentally pulled too hard, causing the tube to come out.   ED workup was significant for blood sodium 125, potassium 5.5, creatinine 1.78, Hgb 9.4. VSS. The pt received 500 NS bolus and IR was consulted for replacement of the J-tub. Nephrology consulted in view of DYANA. He had a temporary Sy inserted until IR could place new J-tube which was just done today and he is now back in his room eating lunch. His baseline Cr is 0.9 and was noted to be 1.78 on presentation. It has improved to 1.3 today. He had a similar DYANA episode in January when the J tube became infected and he developed brief dehydration then as well. He is up in bed eating and in NAD. Na also noted to be low 125 and now 124 since admit. He denies current shortness of breath, chest pain, nausea or vomiting, diarrhea, shortness of breath, edema, or LUTS.    Risk factors for DYANA  Hypotension yes  Contrast no  At risk medications yes    Past Medical History:   Diagnosis Date    CAD (coronary artery disease)     Esophageal carcinoma (Multi)     Hyperlipidemia     Hypertension     Hypothyroidism       Social History     Socioeconomic History    Marital status:      Spouse name: Not on file    Number of  children: Not on file    Years of education: Not on file    Highest education level: Not on file   Occupational History    Not on file   Tobacco Use    Smoking status: Every Day     Current packs/day: 0.25     Average packs/day: 1 pack/day for 46.2 years (45.3 ttl pk-yrs)     Types: Cigarettes     Start date: 1979    Smokeless tobacco: Never   Vaping Use    Vaping status: Never Used   Substance and Sexual Activity    Alcohol use: Not Currently    Drug use: Never    Sexual activity: Not on file   Other Topics Concern    Not on file   Social History Narrative    Not on file     Social Drivers of Health     Financial Resource Strain: Low Risk  (2/26/2025)    Overall Financial Resource Strain (CARDIA)     Difficulty of Paying Living Expenses: Not very hard   Food Insecurity: No Food Insecurity (2/26/2025)    Hunger Vital Sign     Worried About Running Out of Food in the Last Year: Never true     Ran Out of Food in the Last Year: Never true   Transportation Needs: No Transportation Needs (2/26/2025)    PRAPARE - Transportation     Lack of Transportation (Medical): No     Lack of Transportation (Non-Medical): No   Physical Activity: Not on file   Stress: Not on file   Social Connections: Feeling Socially Integrated (1/21/2025)    OASIS : Social Isolation     Frequency of experiencing loneliness or isolation: Never   Intimate Partner Violence: Not At Risk (2/26/2025)    Humiliation, Afraid, Rape, and Kick questionnaire     Fear of Current or Ex-Partner: No     Emotionally Abused: No     Physically Abused: No     Sexually Abused: No   Housing Stability: Low Risk  (2/26/2025)    Housing Stability Vital Sign     Unable to Pay for Housing in the Last Year: No     Number of Times Moved in the Last Year: 0     Homeless in the Last Year: No      Family History   Problem Relation Name Age of Onset    COPD Mother        No current facility-administered medications on file prior to encounter.     Current Outpatient Medications  on File Prior to Encounter   Medication Sig Dispense Refill    acetaminophen (Tylenol) 160 mg/5 mL liquid 20.3 mL (650 mg) by j-tube route every 6 hours. (Patient not taking: Reported on 2/26/2025) 473 mL 11    amitriptyline (Elavil) 100 mg tablet 1 tablet (100 mg) by j-tube route once daily at bedtime. 30 tablet 11    aspirin 81 mg chewable tablet 1 tablet (81 mg) by j-tube route once daily.      atorvastatin (Lipitor) 40 mg tablet 1 tablet (40 mg) by j-tube route once daily. 90 tablet 1    baclofen (Lioresal) 5 mg tablet Take 1 tablet (5 mg) by mouth 3 times a day. 90 tablet 3    buPROPion (Wellbutrin) 75 mg tablet 2 tablets (150 mg) by j-tube route 2 times a day.      dexAMETHasone (Decadron) 4 mg tablet TAKE 2 TABLETS BY MOUTH TWICE DAILY THE DAY BEFORE TREATMENT, 2 TABS ONCE THE EVENING OF TREATMENT AND 2 TABS TWICE THE DAY AFTER TREATMENT      esomeprazole (NexIUM) 40 mg packet Take 40 mg by mouth once daily in the morning. Take before meals. (Patient not taking: Reported on 2/26/2025) 30 packet 11    gabapentin (Neurontin) 50 mg/mL solution Take 6 mL (300 mg) by mouth once daily at bedtime. (Patient taking differently: 6 mL (300 mg) by j-tube route once daily at bedtime.) 180 mL 11    levothyroxine (Synthroid, Levoxyl) 175 mcg tablet 1 tablet (175 mcg) by j-tube route early in the morning.. Take on an empty stomach at the same time each day, either 30 to 60 minutes prior to breakfast 30 tablet 1    lidocaine-prilocaine (Emla) 2.5-2.5 % cream Apply topically to affected area 30-45 minutes before Mediport access. 30 g 0    losartan (Cozaar) 50 mg tablet Take 1 tab daily via j-tube. 90 tablet 1    morphine 20 mg/mL concentrated oral solution Take 0.25-0.5 mL (5-10 mg) by mouth every 4 hours if needed for severe pain (7 - 10). (Patient not taking: Reported on 2/26/2025) 90 mL 0    ondansetron ODT (Zofran-ODT) 8 mg disintegrating tablet Dissolve 1 tablet (8 mg) in the mouth every 8 hours if needed for nausea or  "vomiting. 30 tablet 5    prochlorperazine (Compazine) 10 mg tablet Take 1 tablet (10 mg) by mouth every 6 hours if needed for nausea or vomiting. 30 tablet 5    spironolactone (Aldactone) 50 mg tablet 1 tablet (50 mg) by j-tube route once daily. 30 tablet 1    verapamil (Calan) 80 mg tablet 3 tablets (240 mg) by j-tube route once daily. 90 tablet 1      Scheduled medications  amitriptyline, 100 mg, oral, Nightly  atorvastatin, 40 mg, oral, Nightly  buPROPion SR, 150 mg, oral, BID  heparin (porcine), 5,000 Units, subcutaneous, q8h OZIEL  levothyroxine, 175 mcg, oral, Daily  sodium zirconium cyclosilicate, 10 g, oral, TID  spironolactone, 50 mg, oral, q24h OZIEL      Continuous medications     PRN medications  PRN medications: alum-mag hydroxide-simeth, ondansetron     Review of systems as per HPI otherwise 10 point review systems negative    /69   Pulse 89   Temp 36.2 °C (97.2 °F) (Temporal)   Resp 12   Ht 1.778 m (5' 10\")   Wt 92.1 kg (203 lb)   SpO2 99%   BMI 29.13 kg/m²     Input / Output:  24 HR:   Intake/Output Summary (Last 24 hours) at 2/26/2025 1403  Last data filed at 2/26/2025 1245  Gross per 24 hour   Intake 944 ml   Output --   Net 944 ml       Physical Exam   Alert and oriented x 4, NAD  EOMI  OP clear  Neck: supple, No JVD  CV: RRR without m/r/g  Lungs: CTA bilaterally  Abd: soft NT/ND +BS  Ext: no lower extremity edema   : no moore  Neuro: grossly intact  Skin: no rashes    Results from last 7 days   Lab Units 02/26/25  0531 02/25/25  0621   SODIUM mmol/L 124* 125*   POTASSIUM mmol/L 5.5* 5.5*   CHLORIDE mmol/L 98 96*   CO2 mmol/L 23 20*   BUN mg/dL 34* 49*   CREATININE mg/dL 1.30 1.78*   GLUCOSE mg/dL 85 111*   CALCIUM mg/dL 8.2* 8.0*        Results from last 7 days   Lab Units 02/26/25  0531   SODIUM mmol/L 124*   POTASSIUM mmol/L 5.5*   CHLORIDE mmol/L 98   CO2 mmol/L 23   BUN mg/dL 34*   CREATININE mg/dL 1.30   CALCIUM mg/dL 8.2*   GLUCOSE mg/dL 85           Results from last 7 days "   Lab Units 02/26/25  0531 02/25/25  0621   WBC AUTO x10*3/uL 3.5* 3.8*   HEMOGLOBIN g/dL 10.2* 9.4*   HEMATOCRIT % 29.3* 26.2*   PLATELETS AUTO x10*3/uL 176 174        No orders to display        Assessment:   Patient is 69 y.o. male who is admitted to hospital for J-tube dislodgement, dehydration, DYANA. Nephrology consulted in view of DYANA.    1: DYANA- secondary to dehydration/hypotension after J-tube dislodgement - improved with IVF's and holding losartan     2: Hyponatremia- hypovolemic- should improve with better solute intake and following hydration    3: Hyperkalemia- secondary to DYANA and Aldactone. Getting lokelma but aldactone not held- will hold now. K still 5.5    4: Hx Esophageal Cancer- palliative care following-plans for surgery following completion of chemotherapy     5: Hypotension- BP better after hydration and holding losartan and aldactone     Recommendations:   Hold aldactone in addition to losartan  Urine indices  Agree with fluid bolus and repeat labs in a.m.  If creatinine and sodium continue to improve he can be discharged with outpatient follow-up  Will follow     Please message me through [facility] chat with any questions or concerns.     Fabio Maier PA-C  2/26/2025  2:03 PM         [facility]    [address], [address]   [place] [address]  Office: [phone]

## 2025-02-26 NOTE — DISCHARGE SUMMARY
DISCHARGE SUMMARY     Discharge Diagnosis  Jejunostomy tube fell out    This discharge took greater than 35 minutes.    Test Results Pending At Discharge  Pending Labs       Order Current Status    Osmolality In process            Hospital Course   Timoteo Victoria is a 69 y.o. male with PMHx of chronic J-tube due to esophageal cancer (he follows with palliative care) and HTN who   presented to ED with J-tube dislodgment. He tried to change th dressing earlier this morning and accidentally pulled too hard, causing the tube to come out.   ED workup was significant for blood sodium 125, potassium 5.5, creatinine 1.78, Hgb 9.4. VSS. The pt received 500 NS bolus and IR was consulted for replacement of the J-tube.   Remainder of ROS reviewed and negative except as indicated in HPI.     Of note: While I was hoping to keep patient overnight to repeat BMP in the AM, he was concerned about missing his chemo appt tomorrow. I repeat his sodium this afternoon and it was improving to I agreed to DC this evening.     Dislodgement of J-tube   Replaced by IR      Hypovolemic Hyponatremia   Improving with volume and should continue to improve with his resumption of his TF     Hyperkalemia and DYANA  Scr improved with volume. K was 5.5 but he received Lokelma before recheck      Hx esophageal cancer   Pt follows with palliative care     HTN   Home meds    Pertinent Physical Exam At Time of Discharge  Constitutional: Well developed, awake, alert, calm, oriented x4, no acute distress, cooperative   Eyes: EOMI, clear sclerae   ENMT: mucous membranes moist, no lesions seen   Head/Neck: Neck supple, no apparent injury, head atraumatic   Respiratory/Thorax: CTAB, good chest expansion, respirations even and unlabored   Cardiovascular: Regular rate and rhythm, no murmurs/rubs/gallops, normal S1 and S 2   Gastrointestinal: Abdomen nondistended, soft, nontender, +BS, no bruits, J-tube site dressed   Musculoskeletal: ROM intact, no joint  swelling, normal  strength   Extremities: no cyanosis, contusions or clubbing, or edema   Neurological: no focal deficit, pt alert and oriented x4   Psychological: Appropriate affect and behavior, pleasant   Skin: Warm and dry, no lesions, no rashes       Home Medications     Medication List      CONTINUE taking these medications     amitriptyline 100 mg tablet; Commonly known as: Elavil; 1 tablet (100   mg) by j-tube route once daily at bedtime.   aspirin 81 mg chewable tablet   atorvastatin 40 mg tablet; Commonly known as: Lipitor; 1 tablet (40 mg)   by j-tube route once daily.   baclofen 5 mg tablet; Commonly known as: Lioresal; Take 1 tablet (5 mg)   by mouth 3 times a day.   buPROPion 75 mg tablet; Commonly known as: Wellbutrin; 2 tablets (150   mg) by j-tube route 2 times a day.   dexAMETHasone 4 mg tablet; Commonly known as: Decadron   gabapentin 50 mg/mL solution; Commonly known as: Neurontin; Take 6 mL   (300 mg) by mouth once daily at bedtime.   levothyroxine 175 mcg tablet; Commonly known as: Synthroid, Levoxyl; 1   tablet (175 mcg) by j-tube route early in the morning.. Take on an empty   stomach at the same time each day, either 30 to 60 minutes prior to   breakfast   lidocaine-prilocaine 2.5-2.5 % cream; Commonly known as: Emla; Apply   topically to affected area 30-45 minutes before Mediport access.   losartan 50 mg tablet; Commonly known as: Cozaar; Take 1 tab daily via   j-tube.   ondansetron ODT 8 mg disintegrating tablet; Commonly known as:   Zofran-ODT; Dissolve 1 tablet (8 mg) in the mouth every 8 hours if needed   for nausea or vomiting.   prochlorperazine 10 mg tablet; Commonly known as: Compazine; Take 1   tablet (10 mg) by mouth every 6 hours if needed for nausea or vomiting.   spironolactone 50 mg tablet; Commonly known as: Aldactone; 1 tablet (50   mg) by j-tube route once daily.   verapamil 80 mg tablet; Commonly known as: Calan; 3 tablets (240 mg) by   j-tube route once daily.     STOP  taking these medications     acetaminophen 160 mg/5 mL liquid; Commonly known as: Tylenol   esomeprazole 40 mg packet; Commonly known as: NexIUM   morphine 20 mg/mL concentrated oral solution       Outpatient Follow-Up  No follow-ups on file.     Raleigh Myles MD PhD  2/26/2025  4:51 PM

## 2025-02-26 NOTE — CARE PLAN
Problem: Pain - Adult  Goal: Verbalizes/displays adequate comfort level or baseline comfort level  Outcome: Progressing     Problem: Safety - Adult  Goal: Free from fall injury  Outcome: Progressing     Problem: Discharge Planning  Goal: Discharge to home or other facility with appropriate resources  Outcome: Progressing     Problem: Chronic Conditions and Co-morbidities  Goal: Patient's chronic conditions and co-morbidity symptoms are monitored and maintained or improved  Outcome: Progressing     Problem: Nutrition  Goal: Nutrient intake appropriate for maintaining nutritional needs  Outcome: Progressing     Problem: Fall/Injury  Goal: Not fall by end of shift  Outcome: Progressing  Goal: Be free from injury by end of the shift  Outcome: Progressing  Goal: Verbalize understanding of personal risk factors for fall in the hospital  Outcome: Progressing  Goal: Verbalize understanding of risk factor reduction measures to prevent injury from fall in the home  Outcome: Progressing  Goal: Use assistive devices by end of the shift  Outcome: Progressing  Goal: Pace activities to prevent fatigue by end of the shift  Outcome: Progressing     The clinical goals for the shift include pt free from fall and injury throughout shift

## 2025-02-26 NOTE — PROGRESS NOTES
[facility] ([facility]) assisting remotely. SW spoke to Pt via phone. SW explained the role of care transitions and completed initial assessment. Pt indicated some concerns regarding covering medical bills and expressed interest in applying for food stamps or other cash assistance. SW educated Pt regarding process for applying for Medicaid and other assistance programs and provided the information for where and how Pt can begin that application. Pt indicated understanding and stated Pt felt confident Pt would be able to follow up and complete that application process independently. Pt is independent with meeting Pt's own health and safety needs at home and is independent with all ADLs/IADLs at home. Pt stated Pt typically drives self and Pt's son-in-law has been assisting with transportation and accompanying Pt to medical appointments for cancer treatments. Per IDT rounds Pt may be discharged this evening or Pt will be discharged tomorrow. Pt in agreement w/ that plan for discharge and in agreement w/ plan to discharge to home with no need for additional supports or services.      02/26/25 1332   Discharge Planning   Living Arrangements Alone   Support Systems Family members;Children   Assistance Needed No   Type of Residence Private residence   Number of Stairs to Enter Residence 1   Number of Stairs Within Residence 0   Do you have animals or pets at home? No   Who is requesting discharge planning? Provider   Home or Post Acute Services None   Expected Discharge Disposition Home  (Weekly nursing)   Does the patient need discharge transport arranged? No   Financial Resource Strain   How hard is it for you to pay for the very basics like food, housing, medical care, and heating? Not very   Housing Stability   In the last 12 months, was there a time when you were not able to pay the mortgage or rent on time? N   In the past 12 months, how many times have you moved where you were living? 0   At any time in the past 12 months, were  you homeless or living in a shelter (including now)? N   Transportation Needs   In the past 12 months, has lack of transportation kept you from medical appointments or from getting medications? no   In the past 12 months, has lack of transportation kept you from meetings, work, or from getting things needed for daily living? No   Stroke Family Assessment   Stroke Family Assessment Needed No   Intensity of Service   Intensity of Service 0-30 min

## 2025-02-26 NOTE — PROGRESS NOTES
Timoteo Victoria is a 69 y.o. male admitted for Jejunostomy tube fell out. Pharmacy reviewed the patient's [id number] medications and allergies for accuracy.    The list below reflects the PTA list prior to pharmacy medication history. A summary a changes to the PTA medication list has been listed below. Please review each medication in order reconciliation for additional clarification and justification.    Source of information: t2p    Medications added:  Aspirin chewable 81mg- 1 every day     Medications modified:    Medications to be removed:  Tylenol liquid   Nexium 40mg packet   Morphine solution     Medications of concern:      Prior to Admission Medications   Prescriptions Last Dose Informant Patient Reported? Taking?   acetaminophen (Tylenol) 160 mg/5 mL liquid   No No   Si.3 mL (650 mg) by j-tube route every 6 hours.   amitriptyline (Elavil) 100 mg tablet   No No   Si tablet (100 mg) by j-tube route once daily at bedtime.   atorvastatin (Lipitor) 40 mg tablet   No No   Si tablet (40 mg) by j-tube route once daily.   baclofen (Lioresal) 5 mg tablet   No No   Sig: Take 1 tablet (5 mg) by mouth 3 times a day.   buPROPion (Wellbutrin) 75 mg tablet   No No   Si tablets (150 mg) by j-tube route 2 times a day.   dexAMETHasone (Decadron) 4 mg tablet   Yes No   Sig: Take 1 tablet (4 mg) by mouth.   esomeprazole (NexIUM) 40 mg packet   No No   Sig: Take 40 mg by mouth once daily in the morning. Take before meals.   gabapentin (Neurontin) 50 mg/mL solution   No No   Sig: Take 6 mL (300 mg) by mouth once daily at bedtime.   Patient taking differently: 6 mL (300 mg) by j-tube route once daily at bedtime.   levothyroxine (Synthroid, Levoxyl) 175 mcg tablet   No No   Si tablet (175 mcg) by j-tube route early in the morning.. Take on an empty stomach at the same time each day, either 30 to 60 minutes prior to breakfast   lidocaine-prilocaine (Emla) 2.5-2.5 % cream   No No   Sig: Apply  topically to affected area 30-45 minutes before Mediport access.   losartan (Cozaar) 50 mg tablet   No No   Sig: Take 1 tab daily via j-tube.   morphine 20 mg/mL concentrated oral solution   No No   Sig: Take 0.25-0.5 mL (5-10 mg) by mouth every 4 hours if needed for severe pain (7 - 10).   ondansetron ODT (Zofran-ODT) 8 mg disintegrating tablet   No No   Sig: Dissolve 1 tablet (8 mg) in the mouth every 8 hours if needed for nausea or vomiting.   prochlorperazine (Compazine) 10 mg tablet   No No   Sig: Take 1 tablet (10 mg) by mouth every 6 hours if needed for nausea or vomiting.   spironolactone (Aldactone) 50 mg tablet  Self No No   Si tablet (50 mg) by j-tube route once daily.   verapamil (Calan) 80 mg tablet   No No   Sig: 3 tablets (240 mg) by j-tube route once daily.      Facility-Administered Medications: None       ISABELLA BROCK

## 2025-02-27 ENCOUNTER — APPOINTMENT (OUTPATIENT)
Dept: HEMATOLOGY/ONCOLOGY | Facility: HOSPITAL | Age: 70
End: 2025-02-27
Payer: MEDICARE

## 2025-02-27 ENCOUNTER — OFFICE VISIT (OUTPATIENT)
Dept: HEMATOLOGY/ONCOLOGY | Facility: HOSPITAL | Age: 70
End: 2025-02-27
Payer: MEDICARE

## 2025-02-27 ENCOUNTER — PATIENT OUTREACH (OUTPATIENT)
Dept: PRIMARY CARE | Facility: CLINIC | Age: 70
End: 2025-02-27

## 2025-02-27 ENCOUNTER — LAB (OUTPATIENT)
Dept: HEMATOLOGY/ONCOLOGY | Facility: HOSPITAL | Age: 70
End: 2025-02-27
Payer: MEDICARE

## 2025-02-27 ENCOUNTER — TELEPHONE (OUTPATIENT)
Dept: PRIMARY CARE | Facility: CLINIC | Age: 70
End: 2025-02-27

## 2025-02-27 ENCOUNTER — NUTRITION (OUTPATIENT)
Dept: HEMATOLOGY/ONCOLOGY | Facility: HOSPITAL | Age: 70
End: 2025-02-27

## 2025-02-27 ENCOUNTER — INFUSION (OUTPATIENT)
Dept: HEMATOLOGY/ONCOLOGY | Facility: HOSPITAL | Age: 70
End: 2025-02-27
Payer: MEDICARE

## 2025-02-27 VITALS
HEART RATE: 99 BPM | BODY MASS INDEX: 29.67 KG/M2 | OXYGEN SATURATION: 100 % | WEIGHT: 206.79 LBS | TEMPERATURE: 96.8 F | RESPIRATION RATE: 20 BRPM | SYSTOLIC BLOOD PRESSURE: 147 MMHG | DIASTOLIC BLOOD PRESSURE: 78 MMHG

## 2025-02-27 DIAGNOSIS — C15.9 PRIMARY ESOPHAGEAL ADENOCARCINOMA (MULTI): ICD-10-CM

## 2025-02-27 DIAGNOSIS — C15.9 PRIMARY ESOPHAGEAL ADENOCARCINOMA (MULTI): Primary | ICD-10-CM

## 2025-02-27 LAB
ALBUMIN SERPL BCP-MCNC: 3.5 G/DL (ref 3.4–5)
ALP SERPL-CCNC: 49 U/L (ref 33–136)
ALT SERPL W P-5'-P-CCNC: 19 U/L (ref 10–52)
ANION GAP SERPL CALC-SCNC: 21 MMOL/L (ref 10–20)
AST SERPL W P-5'-P-CCNC: 18 U/L (ref 9–39)
BASOPHILS # BLD MANUAL: 0 X10*3/UL (ref 0–0.1)
BASOPHILS NFR BLD MANUAL: 0 %
BILIRUB SERPL-MCNC: 0.4 MG/DL (ref 0–1.2)
BUN SERPL-MCNC: 30 MG/DL (ref 6–23)
CALCIUM SERPL-MCNC: 8.7 MG/DL (ref 8.6–10.3)
CHLORIDE SERPL-SCNC: 92 MMOL/L (ref 98–107)
CO2 SERPL-SCNC: 20 MMOL/L (ref 21–32)
CREAT SERPL-MCNC: 1.22 MG/DL (ref 0.5–1.3)
DACRYOCYTES BLD QL SMEAR: NORMAL
EGFRCR SERPLBLD CKD-EPI 2021: 65 ML/MIN/1.73M*2
EOSINOPHIL # BLD MANUAL: 0 X10*3/UL (ref 0–0.7)
EOSINOPHIL NFR BLD MANUAL: 0 %
ERYTHROCYTE [DISTWIDTH] IN BLOOD BY AUTOMATED COUNT: 16 % (ref 11.5–14.5)
GIANT PLATELETS BLD QL SMEAR: NORMAL
GLUCOSE SERPL-MCNC: 117 MG/DL (ref 74–99)
HCT VFR BLD AUTO: 27.5 % (ref 41–52)
HGB BLD-MCNC: 9.6 G/DL (ref 13.5–17.5)
IMM GRANULOCYTES # BLD AUTO: 0.3 X10*3/UL (ref 0–0.7)
IMM GRANULOCYTES NFR BLD AUTO: 5.7 % (ref 0–0.9)
LYMPHOCYTES # BLD MANUAL: 1.3 X10*3/UL (ref 1.2–4.8)
LYMPHOCYTES NFR BLD MANUAL: 25 %
MCH RBC QN AUTO: 32.5 PG (ref 26–34)
MCHC RBC AUTO-ENTMCNC: 34.9 G/DL (ref 32–36)
MCV RBC AUTO: 93 FL (ref 80–100)
METAMYELOCYTES # BLD MANUAL: 0.1 X10*3/UL
METAMYELOCYTES NFR BLD MANUAL: 2 %
MONOCYTES # BLD MANUAL: 0.52 X10*3/UL (ref 0.1–1)
MONOCYTES NFR BLD MANUAL: 10 %
MYELOCYTES # BLD MANUAL: 0.1 X10*3/UL
MYELOCYTES NFR BLD MANUAL: 2 %
NEUTROPHILS # BLD MANUAL: 3.18 X10*3/UL (ref 1.2–7.7)
NEUTS BAND # BLD MANUAL: 0.42 X10*3/UL (ref 0–0.7)
NEUTS BAND NFR BLD MANUAL: 8 %
NEUTS SEG # BLD MANUAL: 2.76 X10*3/UL (ref 1.2–7)
NEUTS SEG NFR BLD MANUAL: 53 %
NRBC BLD-RTO: 0 /100 WBCS (ref 0–0)
PLATELET # BLD AUTO: 189 X10*3/UL (ref 150–450)
PLATELET CLUMP BLD QL SMEAR: PRESENT
POLYCHROMASIA BLD QL SMEAR: NORMAL
POTASSIUM SERPL-SCNC: 5.8 MMOL/L (ref 3.5–5.3)
PROT SERPL-MCNC: 5.7 G/DL (ref 6.4–8.2)
RBC # BLD AUTO: 2.95 X10*6/UL (ref 4.5–5.9)
RBC MORPH BLD: NORMAL
SODIUM SERPL-SCNC: 127 MMOL/L (ref 136–145)
TOTAL CELLS COUNTED BLD: 100
WBC # BLD AUTO: 5.2 X10*3/UL (ref 4.4–11.3)

## 2025-02-27 PROCEDURE — 36591 DRAW BLOOD OFF VENOUS DEVICE: CPT

## 2025-02-27 PROCEDURE — 3078F DIAST BP <80 MM HG: CPT

## 2025-02-27 PROCEDURE — 96415 CHEMO IV INFUSION ADDL HR: CPT

## 2025-02-27 PROCEDURE — 1126F AMNT PAIN NOTED NONE PRSNT: CPT

## 2025-02-27 PROCEDURE — 2500000004 HC RX 250 GENERAL PHARMACY W/ HCPCS (ALT 636 FOR OP/ED)

## 2025-02-27 PROCEDURE — 4004F PT TOBACCO SCREEN RCVD TLK: CPT

## 2025-02-27 PROCEDURE — 3077F SYST BP >= 140 MM HG: CPT

## 2025-02-27 PROCEDURE — 99215 OFFICE O/P EST HI 40 MIN: CPT

## 2025-02-27 PROCEDURE — 96361 HYDRATE IV INFUSION ADD-ON: CPT | Mod: INF

## 2025-02-27 PROCEDURE — 96417 CHEMO IV INFUS EACH ADDL SEQ: CPT

## 2025-02-27 PROCEDURE — 96413 CHEMO IV INFUSION 1 HR: CPT

## 2025-02-27 PROCEDURE — 1159F MED LIST DOCD IN RCRD: CPT

## 2025-02-27 PROCEDURE — 80053 COMPREHEN METABOLIC PANEL: CPT

## 2025-02-27 PROCEDURE — 85027 COMPLETE CBC AUTOMATED: CPT

## 2025-02-27 PROCEDURE — 96416 CHEMO PROLONG INFUSE W/PUMP: CPT

## 2025-02-27 PROCEDURE — 85007 BL SMEAR W/DIFF WBC COUNT: CPT

## 2025-02-27 PROCEDURE — 1160F RVW MEDS BY RX/DR IN RCRD: CPT

## 2025-02-27 PROCEDURE — 96375 TX/PRO/DX INJ NEW DRUG ADDON: CPT | Mod: INF

## 2025-02-27 RX ORDER — HEPARIN 100 UNIT/ML
500 SYRINGE INTRAVENOUS AS NEEDED
Status: CANCELLED | OUTPATIENT
Start: 2025-02-27

## 2025-02-27 RX ORDER — PROCHLORPERAZINE EDISYLATE 5 MG/ML
10 INJECTION INTRAMUSCULAR; INTRAVENOUS EVERY 6 HOURS PRN
Status: DISCONTINUED | OUTPATIENT
Start: 2025-02-27 | End: 2025-02-27 | Stop reason: HOSPADM

## 2025-02-27 RX ORDER — PROCHLORPERAZINE EDISYLATE 5 MG/ML
10 INJECTION INTRAMUSCULAR; INTRAVENOUS EVERY 6 HOURS PRN
Status: CANCELLED | OUTPATIENT
Start: 2025-02-27

## 2025-02-27 RX ORDER — ALBUTEROL SULFATE 0.83 MG/ML
3 SOLUTION RESPIRATORY (INHALATION) AS NEEDED
Status: DISCONTINUED | OUTPATIENT
Start: 2025-02-27 | End: 2025-02-27 | Stop reason: HOSPADM

## 2025-02-27 RX ORDER — EPINEPHRINE 0.3 MG/.3ML
0.3 INJECTION SUBCUTANEOUS EVERY 5 MIN PRN
Status: DISCONTINUED | OUTPATIENT
Start: 2025-02-27 | End: 2025-02-27 | Stop reason: HOSPADM

## 2025-02-27 RX ORDER — ALBUTEROL SULFATE 0.83 MG/ML
3 SOLUTION RESPIRATORY (INHALATION) AS NEEDED
Status: CANCELLED | OUTPATIENT
Start: 2025-02-28

## 2025-02-27 RX ORDER — FAMOTIDINE 10 MG/ML
20 INJECTION, SOLUTION INTRAVENOUS ONCE AS NEEDED
Status: CANCELLED | OUTPATIENT
Start: 2025-02-28

## 2025-02-27 RX ORDER — PROCHLORPERAZINE MALEATE 10 MG
10 TABLET ORAL EVERY 6 HOURS PRN
Status: CANCELLED | OUTPATIENT
Start: 2025-02-27

## 2025-02-27 RX ORDER — EPINEPHRINE 0.3 MG/.3ML
0.3 INJECTION SUBCUTANEOUS EVERY 5 MIN PRN
Status: CANCELLED | OUTPATIENT
Start: 2025-02-28

## 2025-02-27 RX ORDER — EPINEPHRINE 0.3 MG/.3ML
0.3 INJECTION SUBCUTANEOUS EVERY 5 MIN PRN
Status: CANCELLED | OUTPATIENT
Start: 2025-02-27

## 2025-02-27 RX ORDER — HEPARIN SODIUM,PORCINE/PF 10 UNIT/ML
50 SYRINGE (ML) INTRAVENOUS AS NEEDED
Status: CANCELLED | OUTPATIENT
Start: 2025-02-27

## 2025-02-27 RX ORDER — FAMOTIDINE 10 MG/ML
20 INJECTION, SOLUTION INTRAVENOUS ONCE AS NEEDED
Status: DISCONTINUED | OUTPATIENT
Start: 2025-02-27 | End: 2025-02-27 | Stop reason: HOSPADM

## 2025-02-27 RX ORDER — ALBUTEROL SULFATE 0.83 MG/ML
3 SOLUTION RESPIRATORY (INHALATION) AS NEEDED
Status: CANCELLED | OUTPATIENT
Start: 2025-02-27

## 2025-02-27 RX ORDER — PROCHLORPERAZINE MALEATE 10 MG
10 TABLET ORAL EVERY 6 HOURS PRN
Status: DISCONTINUED | OUTPATIENT
Start: 2025-02-27 | End: 2025-02-27 | Stop reason: HOSPADM

## 2025-02-27 RX ORDER — DIPHENHYDRAMINE HYDROCHLORIDE 50 MG/ML
25 INJECTION INTRAMUSCULAR; INTRAVENOUS ONCE
Status: CANCELLED | OUTPATIENT
Start: 2025-02-27 | End: 2025-02-27

## 2025-02-27 RX ORDER — LORAZEPAM 2 MG/ML
1 INJECTION INTRAMUSCULAR AS NEEDED
Status: CANCELLED | OUTPATIENT
Start: 2025-02-27

## 2025-02-27 RX ORDER — DIPHENHYDRAMINE HYDROCHLORIDE 50 MG/ML
50 INJECTION INTRAMUSCULAR; INTRAVENOUS AS NEEDED
Status: CANCELLED | OUTPATIENT
Start: 2025-02-27

## 2025-02-27 RX ORDER — DIPHENHYDRAMINE HYDROCHLORIDE 50 MG/ML
50 INJECTION INTRAMUSCULAR; INTRAVENOUS AS NEEDED
Status: CANCELLED | OUTPATIENT
Start: 2025-02-28

## 2025-02-27 RX ORDER — DIPHENHYDRAMINE HYDROCHLORIDE 50 MG/ML
25 INJECTION INTRAMUSCULAR; INTRAVENOUS ONCE
Status: COMPLETED | OUTPATIENT
Start: 2025-02-27 | End: 2025-02-27

## 2025-02-27 RX ORDER — HEPARIN 100 UNIT/ML
500 SYRINGE INTRAVENOUS AS NEEDED
OUTPATIENT
Start: 2025-02-27

## 2025-02-27 RX ORDER — HEPARIN 100 UNIT/ML
500 SYRINGE INTRAVENOUS AS NEEDED
Status: DISCONTINUED | OUTPATIENT
Start: 2025-02-27 | End: 2025-02-27 | Stop reason: HOSPADM

## 2025-02-27 RX ORDER — PALONOSETRON 0.05 MG/ML
0.25 INJECTION, SOLUTION INTRAVENOUS ONCE
Status: CANCELLED | OUTPATIENT
Start: 2025-02-27

## 2025-02-27 RX ORDER — DIPHENHYDRAMINE HYDROCHLORIDE 50 MG/ML
50 INJECTION INTRAMUSCULAR; INTRAVENOUS AS NEEDED
Status: DISCONTINUED | OUTPATIENT
Start: 2025-02-27 | End: 2025-02-27 | Stop reason: HOSPADM

## 2025-02-27 RX ORDER — FAMOTIDINE 10 MG/ML
20 INJECTION, SOLUTION INTRAVENOUS ONCE AS NEEDED
Status: CANCELLED | OUTPATIENT
Start: 2025-02-27

## 2025-02-27 RX ORDER — LORAZEPAM 2 MG/ML
1 INJECTION INTRAMUSCULAR AS NEEDED
Status: DISCONTINUED | OUTPATIENT
Start: 2025-02-27 | End: 2025-02-27 | Stop reason: HOSPADM

## 2025-02-27 RX ORDER — PALONOSETRON 0.05 MG/ML
0.25 INJECTION, SOLUTION INTRAVENOUS ONCE
Status: COMPLETED | OUTPATIENT
Start: 2025-02-27 | End: 2025-02-27

## 2025-02-27 RX ORDER — HEPARIN SODIUM,PORCINE/PF 10 UNIT/ML
50 SYRINGE (ML) INTRAVENOUS AS NEEDED
OUTPATIENT
Start: 2025-02-27

## 2025-02-27 RX ADMIN — DOCETAXEL 90 MG: 20 INJECTION, SOLUTION, CONCENTRATE INTRAVENOUS at 12:27

## 2025-02-27 RX ADMIN — OXALIPLATIN 145 MG: 5 INJECTION, SOLUTION INTRAVENOUS at 13:34

## 2025-02-27 RX ADMIN — PALONOSETRON HYDROCHLORIDE 250 MCG: 0.25 INJECTION INTRAVENOUS at 12:00

## 2025-02-27 RX ADMIN — DIPHENHYDRAMINE HYDROCHLORIDE 25 MG: 50 INJECTION INTRAMUSCULAR; INTRAVENOUS at 12:00

## 2025-02-27 RX ADMIN — SODIUM CHLORIDE 1000 ML: 9 INJECTION, SOLUTION INTRAVENOUS at 11:37

## 2025-02-27 RX ADMIN — DEXAMETHASONE SODIUM PHOSPHATE 12 MG: 10 INJECTION, SOLUTION INTRAMUSCULAR; INTRAVENOUS at 12:03

## 2025-02-27 RX ADMIN — FLUOROURACIL 4450 MG: 50 INJECTION, SOLUTION INTRAVENOUS at 16:00

## 2025-02-27 ASSESSMENT — PAIN SCALES - GENERAL: PAINLEVEL_OUTOF10: 0-NO PAIN

## 2025-02-27 NOTE — PROGRESS NOTES
NUTRITION Follow-Up NOTE    Nutrition Assessment     Reason for Visit:  Timoteo Victoria is a 69 y.o. male who presents for nutrition follow up and education.      FLOT- began  1/2/2025 today is cycle 4  Surgery pending next  Then will complete FLOT    Pt with esophageal Cancer     Has a j-tube 11-  Went to ED 2-25 to 2-26- tube was dislodged was replaced by IR    Pt was sleeping very soundly and neither RN or I could awaken him  Son was present and spoke to him      Lab Results   Component Value Date/Time    GLUCOSE 117 (H) 02/27/2025 0913     (L) 02/27/2025 0913    K 5.8 (H) 02/27/2025 0913    CL 92 (L) 02/27/2025 0913    CO2 20 (L) 02/27/2025 0913    ANIONGAP 21 (H) 02/27/2025 0913    BUN 30 (H) 02/27/2025 0913    CREATININE 1.22 02/27/2025 0913    EGFR 65 02/27/2025 0913    CALCIUM 8.7 02/27/2025 0913    ALBUMIN 3.5 02/27/2025 0913    ALKPHOS 49 02/27/2025 0913    PROT 5.7 (L) 02/27/2025 0913    AST 18 02/27/2025 0913    BILITOT 0.4 02/27/2025 0913    ALT 19 02/27/2025 0913    MG 1.81 01/17/2025 0722    PHOS 3.5 01/12/2025 1825     Lab Results   Component Value Date/Time    VITD25 66 03/12/2024 1533          Anthropometrics:     Steady weight with feeding tube  HT:  177.8 cm- 5'10  IBW:  75.5 kg  124.2 % IBW    BMI: 29.7    Wt Readings from Last 10 Encounters:   02/27/25 93.8 kg (206 lb 12.7 oz)   02/25/25 92.1 kg (203 lb)   02/14/25 95.7 kg (210 lb 14.4 oz)   02/13/25 94.6 kg (208 lb 8.9 oz)   02/11/25 94.3 kg (208 lb)   02/01/25 92.9 kg (204 lb 12.9 oz)   01/31/25 92.6 kg (204 lb 1.6 oz)   01/30/25 91.6 kg (201 lb 15.1 oz)   01/21/25 91.6 kg (202 lb)   01/12/25 88.9 kg (196 lb)      UBW:  250#    Meds noted           Food And Nutrient Intake:      Was unable to get a full picture of po intake and TF intake  Son does report that pt is taking solid food  He takes a long time to eat and chew well  I am unsure of the frequency of eating   I am unsure if there is regurgitation- but it does not appear  to be the ase  I am told recently he had 2 pieces of pizza    Other food items I am told he has eaten recently:    Cheesy bread from [employer]  Oranges   Toast and fried potatoes  Potato soup  Water and gatorade    Son says he is ready to be done with TF and eat  He is having craving for foods   Sone does report he has mentioned some taste alterations     TF:  Does while sleeping  Son is unsure of rate  Reports some times when his dad wakes up the pump is off and thee is a good deal of tube feeding left in the bag - Unsure if there is a pump issues  Unsure of rate- last time I saw him rate was at 105 but goal was to increase to 120  He has not received a tube feeding in the last couple of days due to tube issues and then being in the ED under observation        TF:   Isosource 1.5  5 cartons per day  105  to 120 ml per hour  11 hours   Has a pole   Isosource 1.5 5 x/day provides: 1875 kCal, 85 g PRO, 74 g fat, 220 g CHO, 19 g fiber, and 955 mL free water      Did look at new tube   Has legacy ending on tube and this aligns with supplies at home   Tube looks good     Am told lately he has been feeling   Gets dizzy and weak                                                      Nutrition Focused Physical Exam Findings:  defer: pt was unarousable in infusion - sleeping very soundly from time in ED    Subcutaneous Fat Loss  Orbital Fat Pads: Well nourished (slightly bulging fat pads)  Buccal Fat Pads: Well nourished (full, rounded cheeks)  Ribs: Well nourished (chest is full, ribs do not show, slight to no protrusion of the iliac crest)    Muscle Wasting  Temporalis: Well nourished (well-defined muscle)  Pectoralis (Clavicular Region): Well nourished (clavicle not visible)    Edema  Edema: none         Energy Needs     Dosing weight: 75.5 to 93.5 kg  Calories per day: 2265 to 2800 determined by 30 kcal/kg  Protein (g) per day: 90 to 112 determined by 1.2 g/kg  Estimated fluid needs: 2265 to 2800 determined by 1 kcal/mL        Nutrition Diagnosis        Nutrition Diagnosis  Patient has Nutrition Diagnosis: Yes  Diagnosis Status (1): New  Nutrition Diagnosis 1: Altered GI function  Related to (1): ability for food to pass through esophagus  As Evidenced by (1): pt with esohageal CA, undergoing treatment and pending surgery    Nutrition Interventions/Recommendations   Nutrition Prescription   Oral and enteral nutrtion   feel that EN could be altered but unsure how to do this without good diet recall. Will continue with 4-5 cartons of Isosource 1.5, will take CHARLES, will add a tsp of salt per day- will add to food or flushes as able     Food and Nutrition Delivery       Nutrition Education  Nutrition Education  Nutrition Education Content: Content related nutrition education  Goals: need to increase sodium intake  Goals: ompoprve serum sodium level to ateleast 130    Coordination of Care  Coordination of Nutrition Care by a Nutrition Professional  Goals: [facility], Med onc, and thoracic surgical team    There are no Patient Instructions on file for this visit.    Nutrition Monitoring and Evaluation   Biochemical Data, Medical Tests and Procedures  Monitoring and Evaluation Plan: Electrolyte/renal panel  Electrolyte and Renal Panel: Sodium  Criteria: increase serum level    DME is [facility]

## 2025-02-27 NOTE — PROGRESS NOTES
Pt present today for C4D1.  Pt saw provider prior to treatment.  See provider's note for full assessment.  FLOT regimen infused with no issues.  Pt given home disconnect kit and voices understanding to disconnect from pump tomorrow at around 1600.  Pt discharged to home in stable condition.

## 2025-02-27 NOTE — PROGRESS NOTES
Discharge Facility: [facility]   Discharge Diagnosis: Jejunostomy tube fell out   Admission Date: 2/25/25  Discharge Date: 2/26/25    PCP Appointment Date: 3/5/25    Specialist Appointment Date:   Hospital Encounter and Summary Linked: ED to Hosp-Admission (Discharged) with Raleigh Myles MD PhD; Deng Shaffer DO (02/25/2025)   Discharge Summary by Raleigh Myles MD PhD (02/26/2025 16:51)   See discharge assessment below for further details    Two attempts were made to reach patient within two business days after discharge. Voicemail left with contact information for patient to call back with any non-emergent questions or concerns.

## 2025-02-27 NOTE — TELEPHONE ENCOUNTER
----- Message from Dafne eBdolla sent at 2/27/2025 11:55 AM EST -----  Regarding: FW: TCM/ Francisca frias  See if he can do next Monday or Wednesday at 12:30  ----- Message -----  From: Dawna Arana  Sent: 2/27/2025  10:22 AM EST  To: Dafne Bedolla DO  Subject: FW: TCM/ Francisca frias                                 Are you wanting to follow up with patient or okay for patient to follow up with oncology?  ----- Message -----  From: Tawny Saeed LPN  Sent: 2/27/2025  10:10 AM EST  [id number]  Subject: TCM/ No appt                                     Discharge Facility: [facility]   Discharge Diagnosis: Jejunostomy tube fell out   Admission Date: 2/25/25  Discharge Date: 2/26/25    Can you please contact pt to schedule hosp  follow up within 14 days of discharge.     Thank you

## 2025-02-27 NOTE — PROGRESS NOTES
Patient ID: Timoteo Victoria is a 69 y.o. male.    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  PET-avid thyroid nodule.    Genomic profile:  Normal MMR expresison    Assessment and Plan:  69M with CAD (CABG 2005), HTN, HLD, chronic tobacco use, hypothyroidism, presented with 4-6 weeks of progressive dysphagia (solid to liquid/mediations) 40lb wt loss over 6 months, and was found to have distal esophageal mass covering half of lumen on EGD 11/21/24  and Bx showed intramucosal adenocarcinoma with normal MMR expression. S/p J tube placement 11/25/2024.    PET-CT showed no distant mets.    We discussed FLOT for chemotherapy regimen with him in details. After a detailed discussion about the chemo, he consented and started cycle 1 on 1/2/25.     Plan: 1. FLOT-4 for 4 cycles followed by surgery followed by 4 more cycles of FLOT-4.  2. Thyroid lesion- endocrinology referral- reminded patient to schedule again today 2/27/25.    Following cycle 1 of treatment, he was hospitalized for hypotension and skin infection at his J- tube site. Did complete antibiotic course and J-tube was replaced.     Following cycle 3, his J-tube again became dislodged and he was sent to the ER for replacement. In work up, electrolyte abnormalities to sodium, potassium were noted. He was admitted under observation for further management.     Today he is due for cycle 4. He does report feeling well. He does have ongoing fatigue and appetite fluctuations. He is trying to eat more food orally however. He denies any other concerns today. Given fatigue ongoing and neuropathy, we will dose reduce treatment for better tolerance. We did add hydration in to plan today as he reports he is not drinking enough.     I also reviewed labs today and asked patient to repeat blood work (CMP) in the next 3-4 days. We discussed avoiding potassium rich foods and also discussed increasing sodium in diet. We will  carefully monitor levels closely. He is asymptomatic in regards to hyponatremia and hyperkalemia today.  Reviewed signs and symptoms of these as well.     Mr. Victoria will return in 2 weeks to meet with Dr. Wilson and Dr. Salas following completion of CT scan and discuss plan moving forward.     I have placed all orders as outlined above. I advised the patient to schedule the tests and follow-up appointment as discussed by contacting the  on the way out or calling by phone. Patient knows to call with any issues or concerns.     Providers:  Surgeon: Dr. Salas  [facility]: Dr Wilson  [facility]    Chief complaint: Esophageal adenocarcinoma    HPI:  Timoteo Victoria is a 69 y.o. male with a notable history of CAD (CABG 2005), HTN, HLD, chronic tobacco use, hypothyroidism presented on 11/25/2024 as a transfer from [facility] for cancer workup. He had been having trouble swallowing for the past month, initially solids and progressing to liquids and medications. He has lost 40 lbs in the past 6 months.     EGD on 11/21/24 showed an esophageal mass with a malignant appearance, with biopsy showing intramucosal adenocarcinoma. The patient had a J tube placed on 11/25.     ONCOLOGIC HISTORY-  11/21/24  [facility] admission for weight loss, dysphagia; EGD showed esophageal mass with biopsy confirming intramucosal adenocarcinoma  11/20/24 CT neck with no masses  11/22/24 CT CAP with contrast with irregular masslike thickening of distal esophagus, no obvious metastases  11/25/24 J tube placement.    12-: pet-ct showed-  IMPRESSION:  1. Hypermetabolic esophageal mass as described above is consistent  with biopsy-proven esophageal adenocarcinoma. Few minimally  hypermetabolic subcentimeter periesophageal lymph nodes are likely  reactive.  2. No other evidence of hypermetabolic thoracic or abdominal  lymphadenopathy or hypermetabolic metastatic disease.  3. Multiple hypermetabolic intraparotid nodules/lymph nodes,  which  have been present since 2022, likely represents a benign and indolent  process. However, recommend continued attention on follow-up.  4. Asymmetrically increased hypermetabolic activity within the right  thyroid gland. Correlate with thyroid ultrasound may be of value.    1-2-2025: started FLOT-4.    Interval history:   Mr. Victoria returns in follow up today prior to cycle 4 of FLOT today. Overall he is tolerating treatment fairly well. Does report fatigue is slightly worse and still has neuropathy in his hands. He was also admitted under observation as J-tube became dislodged and needed replaced.    Currently denies: fevers, chills, chest pain, SOB, cough, N/V, bowel changes, urinary symptoms, or skin changes.     Review of systems: Negative unless otherwise stated in HPI       Past Medical History:   Past Medical History:  No date: CAD (coronary artery disease)  No date: Esophageal carcinoma (Multi)  No date: Hyperlipidemia  No date: Hypertension  No date: Hypothyroidism   Surgical History:    Past Surgical History:   Procedure Laterality Date    CATARACT EXTRACTION Right 08/25/2023    CHOLECYSTECTOMY      CORONARY ARTERY BYPASS GRAFT  2005    4-vessel    HERNIA REPAIR        Family History:    Family History   Problem Relation Name Age of Onset    COPD Mother       Family Oncology History:    Cancer-related family history is not on file.  Social History:    Social History     Tobacco Use    Smoking status: Every Day     Current packs/day: 0.25     Average packs/day: 1 pack/day for 46.2 years (45.3 ttl pk-yrs)     Types: Cigarettes     Start date: 1979     Passive exposure: Current    Smokeless tobacco: Never    Tobacco comments:     Pt is down to 2 cigarettes.   Vaping Use    Vaping status: Never Used   Substance Use Topics    Alcohol use: Not Currently    Drug use: Never        Allergies  Allergies   Allergen Reactions    Ibuprofen Swelling        Medications  Current Outpatient Medications   Medication  Instructions    amitriptyline (ELAVIL) 100 mg, j-tube, Nightly    aspirin 81 mg, Daily    atorvastatin (LIPITOR) 40 mg, j-tube, Daily    baclofen (LIORESAL) 5 mg, oral, 3 times daily    buPROPion (WELLBUTRIN) 150 mg, j-tube, 2 times daily    dexAMETHasone (Decadron) 4 mg tablet TAKE 2 TABLETS BY MOUTH TWICE DAILY THE DAY BEFORE TREATMENT, 2 TABS ONCE THE EVENING OF TREATMENT AND 2 TABS TWICE THE DAY AFTER TREATMENT    gabapentin (NEURONTIN) 300 mg, oral, Nightly    levothyroxine (SYNTHROID, LEVOXYL) 175 mcg, j-tube, Daily, Take on an empty stomach at the same time each day, either 30 to 60 minutes prior to breakfast    lidocaine-prilocaine (Emla) 2.5-2.5 % cream Apply topically to affected area 30-45 minutes before Mediport access.    losartan (Cozaar) 50 mg tablet Take 1 tab daily via j-tube.    ondansetron ODT (ZOFRAN-ODT) 8 mg, oral, Every 8 hours PRN    prochlorperazine (COMPAZINE) 10 mg, oral, Every 6 hours PRN    spironolactone (ALDACTONE) 50 mg, j-tube, Daily    verapamil (CALAN) 240 mg, j-tube, Daily          Objective   VS: /78 (BP Location: Left arm, Patient Position: Sitting, BP Cuff Size: Adult)   Pulse 99   Temp 36 °C (96.8 °F) (Temporal)   Resp 20   Wt 93.8 kg (206 lb 12.7 oz)   SpO2 100%   BMI 29.67 kg/m²     PHYSICAL EXAMINATION  ECOG performance status- 1    Constitutional: Awake/alert/oriented x3, cooperative and answers questions appropriately.     Eyes: No pallor, conjunctival injection, clear sclera.    Mouth: No ulceration. No thrush.     Head/Neck: Neck supple, no apparent injury, thyroid without mass or tenderness, No JVD, trachea midline, no bruits.     Respiratory/Thorax: Normal breath sounds with bilaterally symmetrical chest expansion. No dullness.      Cardiovascular: No audible murmurs, normal heart sounds. No pericardial rub.     Gastrointestinal: Nondistended, soft, non-tender, no rebound tenderness or guarding, no masses palpable, no organomegaly, +BS, no bruits. No  ascites.     Musculoskeletal: No joint swelling, redness.      Extremities: normal extremities, no cyanosis edema, contusions or wounds, no clubbing.     Lymphatic: No significant lymphadenopathy.     Skin: Warm and dry, no lesions, no rashes.     Labs  Results from last 7 days   Lab Units 02/27/25  0913 02/26/25  0531 02/25/25  0621   WBC AUTO x10*3/uL 5.2 3.5* 3.8*   HEMOGLOBIN g/dL 9.6* 10.2* 9.4*   HEMATOCRIT % 27.5* 29.3* 26.2*   PLATELETS AUTO x10*3/uL 189 176 174   NEUTROS ABS x10*3/uL  --   --  1.37   LYMPHS ABS AUTO x10*3/uL  --   --  1.32   MONOS ABS AUTO x10*3/uL  --   --  0.91   EOS ABS MAN x10*3/uL 0.00  --   --    EOS ABS AUTO x10*3/uL  --   --  0.10   BANDS ABS MAN x10*3/uL 0.42  --   --    LYMPHO ABS MAN x10*3/uL 1.30  --   --    MONO ABS MAN x10*3/uL 0.52  --   --    NEUTROS PCT AUTO %  --   --  36.2   LYMPHO PCT MAN % 25.0  --   --    LYMPHS PCT AUTO %  --   --  34.7   MONO PCT MAN % 10.0  --   --    MONOS PCT AUTO %  --   --  23.9   EOSINO PCT MAN % 0.0  --   --    EOS PCT AUTO %  --   --  2.6     Results from last 7 days   Lab Units 02/27/25  0913 02/26/25  1550 02/26/25  0531 02/25/25  0621   GLUCOSE mg/dL 117*  --  85 111*   SODIUM mmol/L 127* 126* 124* 125*   POTASSIUM mmol/L 5.8*  --  5.5* 5.5*   CHLORIDE mmol/L 92*  --  98 96*   CO2 mmol/L 20*  --  23 20*   BUN mg/dL 30*  --  34* 49*   CREATININE mg/dL 1.22  --  1.30 1.78*   EGFR mL/min/1.73m*2 65  --  60* 41*   CALCIUM mg/dL 8.7  --  8.2* 8.0*   ALBUMIN g/dL 3.5  --   --   --    PROTEIN TOTAL g/dL 5.7*  --   --   --    BILIRUBIN TOTAL mg/dL 0.4  --   --   --    ALK PHOS U/L 49  --   --   --    ALT U/L 19  --   --   --    AST U/L 18  --   --   --      Image  EGD (11/21/2024)  Single malignant-appearing and invasive mass (not traversable) in the lower third of the esophagus, covering one half of the circumference; performed cold forceps biopsy     A. Esophagus, Distal, Mass, Biopsy:  -- Intramucosal adenocarcinoma. See note.     Note: The  endoscopic impression of a friable and invasive lesion in the lower third of the esophagus is noted. This biopsy may be not representative of the entire lesion. Clinical correlation is recommended.    MISMATCH REPAIR PROTEIN EXPRESSION                    Protein:           Result                 MLH-1:             Expression Present                                                   PMS-2:            Expression Present                                                   MSH-2:            Expression Present                                                   MSH-6:            Expression Present                                       INTERPRETATION: Neoplasm with normal mismatch repair protein expression.     C/A/P CT (11/22/2024)  IMPRESSION:  1. Irregular masslike thickening of the mid to distal esophagus is  difficult to accurately measure, and may represent a neoplastic  process. Correlate with recent endoscopic imaging and biopsy results.  There is also proximal esophageal wall thickening, and a moderate  size hiatal hernia.  2. Small area of centrilobular nodularity in the right middle lobe  may represent focal aspiration/mucoid impaction or less likely  atypical infection.  3. Mild apical predominant centrilobular emphysema.  4. Severe coronary artery calcifications. Please note this exam is  not optimized for evaluation of the coronary arteries.  5. Nonobstructing 0.4 cm calculus in the left kidney.    Neck CT (11/20/2024)  IMPRESSION:  New maxillary periapical lucencies with adjacent mucosal thickening.  No discrete fluid collection identified. Dental follow-up recommended.      No discrete mass or cervical lymphadenopathy identified however  direct visualization suggested.      Bilateral parotid mass/nodule similar to prior imaging.      Postsurgical changes, emphysematous changes, mild mediastinal  lymphadenopathy and additional findings as detailed.  This note was created using a voice recognition system ( the  Dragon dictation system). Inaccuracies and misspellings are unintentional.     Marychuy Garcia APRN-CNP

## 2025-02-27 NOTE — SIGNIFICANT EVENT

## 2025-02-27 NOTE — PROGRESS NOTES
Pt here today with son. Pt was discharged from hospital yesterday for new J tube. Pt states feeling well today. He was prescribed TRIMEHETHOPRIM with last J tube and is wondering if he still needs to take it. Pt was kept in patient due to hypotension and is wondering about taking BP medication.  NP notified. Medications, pharmacy preference and allergies were reviewed with patient and updated  Patient verbalized understanding and agreement regarding discussed information via verbal feedback.  Education Documentation  Healthy Lifestyle, taught by Natalia Lentz RN at 2/27/2025 10:01 AM.  Learner: Patient  Readiness: Acceptance  Method: Explanation  Response: Verbalizes Understanding    Monitoring Weight, taught by Natalia Lentz RN at 2/27/2025 10:01 AM.  Learner: Patient  Readiness: Acceptance  Method: Explanation  Response: Verbalizes Understanding    Tips for Daily Living, taught by Natalia Lentz RN at 2/27/2025 10:01 AM.  Learner: Patient  Readiness: Acceptance  Method: Explanation  Response: Verbalizes Understanding    Nutrition/Diet, taught by Natalia Lentz RN at 2/27/2025 10:01 AM.  Learner: Patient  Readiness: Acceptance  Method: Explanation  Response: Verbalizes Understanding    Nutrition Related Education, taught by Natalia Lentz RN at 2/27/2025 10:01 AM.  Learner: Patient  Readiness: Acceptance  Method: Explanation  Response: Verbalizes Understanding    Shortness of Breath, taught by Natalia Lentz RN at 2/27/2025 10:01 AM.  Learner: Patient  Readiness: Acceptance  Method: Explanation  Response: Verbalizes Understanding    Changes in Appetite, taught by Natalia Lentz RN at 2/27/2025 10:01 AM.  Learner: Patient  Readiness: Acceptance  Method: Explanation  Response: Verbalizes Understanding    Memory Problems, taught by Natalia Lentz RN at 2/27/2025 10:01 AM.  Learner: Patient  Readiness: Acceptance  Method: Explanation  Response:  Verbalizes Understanding    Skin and Nail Changes, taught by Natalia Lentz RN at 2/27/2025 10:01 AM.  Learner: Patient  Readiness: Acceptance  Method: Explanation  Response: Verbalizes Understanding    Changes in Urination, taught by Natalia Lentz RN at 2/27/2025 10:01 AM.  Learner: Patient  Readiness: Acceptance  Method: Explanation  Response: Verbalizes Understanding    Bleeding Precautions, taught by Natalia Lentz RN at 2/27/2025 10:01 AM.  Learner: Patient  Readiness: Acceptance  Method: Explanation  Response: Verbalizes Understanding    Edema Management, taught by Natalia Lentz RN at 2/27/2025 10:01 AM.  Learner: Patient  Readiness: Acceptance  Method: Explanation  Response: Verbalizes Understanding    Care of Neuropathy, taught by Natalia Lentz RN at 2/27/2025 10:01 AM.  Learner: Patient  Readiness: Acceptance  Method: Explanation  Response: Verbalizes Understanding    Infection Control, taught by Natalia Lentz RN at 2/27/2025 10:01 AM.  Learner: Patient  Readiness: Acceptance  Method: Explanation  Response: Verbalizes Understanding    Alopecia, taught by Natalia Lentz RN at 2/27/2025 10:01 AM.  Learner: Patient  Readiness: Acceptance  Method: Explanation  Response: Verbalizes Understanding    Diarrhea, taught by Natalia Lentz RN at 2/27/2025 10:01 AM.  Learner: Patient  Readiness: Acceptance  Method: Explanation  Response: Verbalizes Understanding    Constipation, taught by Natalia Lentz RN at 2/27/2025 10:01 AM.  Learner: Patient  Readiness: Acceptance  Method: Explanation  Response: Verbalizes Understanding    Mouth Sores, taught by Natalia Lentz RN at 2/27/2025 10:01 AM.  Learner: Patient  Readiness: Acceptance  Method: Explanation  Response: Verbalizes Understanding    Nausea Management, taught by Natalia Lentz RN at 2/27/2025 10:01 AM.  Learner: Patient  Readiness: Acceptance  Method:  Explanation  Response: Verbalizes Understanding    Fatigue, taught by Natalia Lentz, RN at 2/27/2025 10:01 AM.  Learner: Patient  Readiness: Acceptance  Method: Explanation  Response: Verbalizes Understanding    Education Comments  No comments found.

## 2025-02-28 ENCOUNTER — APPOINTMENT (OUTPATIENT)
Dept: PALLIATIVE MEDICINE | Facility: CLINIC | Age: 70
End: 2025-02-28
Payer: MEDICARE

## 2025-02-28 ENCOUNTER — PREP FOR PROCEDURE (OUTPATIENT)
Dept: CARDIOTHORACIC SURGERY | Facility: HOSPITAL | Age: 70
End: 2025-02-28
Payer: MEDICARE

## 2025-02-28 DIAGNOSIS — C15.5 MALIGNANT NEOPLASM OF LOWER THIRD OF ESOPHAGUS (MULTI): ICD-10-CM

## 2025-02-28 DIAGNOSIS — C15.9 ESOPHAGEAL CANCER (MULTI): Primary | ICD-10-CM

## 2025-02-28 DIAGNOSIS — Z51.81 THERAPEUTIC DRUG MONITORING: ICD-10-CM

## 2025-02-28 RX ORDER — HEPARIN SODIUM 5000 [USP'U]/ML
5000 INJECTION, SOLUTION INTRAVENOUS; SUBCUTANEOUS ONCE
OUTPATIENT
Start: 2025-02-28 | End: 2025-02-28

## 2025-02-28 RX ORDER — ACETAMINOPHEN 325 MG/1
650 TABLET ORAL ONCE
OUTPATIENT
Start: 2025-02-28 | End: 2025-02-28

## 2025-02-28 RX ORDER — GABAPENTIN 300 MG/1
300 CAPSULE ORAL ONCE
OUTPATIENT
Start: 2025-02-28 | End: 2025-02-28

## 2025-02-28 RX ORDER — CEFAZOLIN SODIUM 2 G/50ML
2 SOLUTION INTRAVENOUS ONCE
OUTPATIENT
Start: 2025-02-28 | End: 2025-02-28

## 2025-03-01 ENCOUNTER — APPOINTMENT (OUTPATIENT)
Dept: HEMATOLOGY/ONCOLOGY | Facility: HOSPITAL | Age: 70
End: 2025-03-01
Payer: MEDICARE

## 2025-03-04 ENCOUNTER — HOME CARE VISIT (OUTPATIENT)
Dept: HOME HEALTH SERVICES | Facility: HOME HEALTH | Age: 70
End: 2025-03-04
Payer: MEDICARE

## 2025-03-04 PROCEDURE — G0299 HHS/HOSPICE OF RN EA 15 MIN: HCPCS | Mod: HHH

## 2025-03-05 ENCOUNTER — APPOINTMENT (OUTPATIENT)
Dept: PRIMARY CARE | Facility: CLINIC | Age: 70
End: 2025-03-05
Payer: MEDICARE

## 2025-03-05 ENCOUNTER — PREP FOR PROCEDURE (OUTPATIENT)
Dept: CARDIOTHORACIC SURGERY | Facility: HOSPITAL | Age: 70
End: 2025-03-05

## 2025-03-05 VITALS
WEIGHT: 205.2 LBS | SYSTOLIC BLOOD PRESSURE: 116 MMHG | DIASTOLIC BLOOD PRESSURE: 62 MMHG | TEMPERATURE: 97.7 F | HEART RATE: 108 BPM | BODY MASS INDEX: 29.44 KG/M2

## 2025-03-05 DIAGNOSIS — S61.212A LACERATION OF RIGHT MIDDLE FINGER WITHOUT FOREIGN BODY WITHOUT DAMAGE TO NAIL, INITIAL ENCOUNTER: ICD-10-CM

## 2025-03-05 DIAGNOSIS — G62.0 CHEMOTHERAPY-INDUCED NEUROPATHY (MULTI): ICD-10-CM

## 2025-03-05 DIAGNOSIS — E87.1 HYPONATREMIA: ICD-10-CM

## 2025-03-05 DIAGNOSIS — C15.9 PRIMARY ESOPHAGEAL ADENOCARCINOMA (MULTI): Primary | ICD-10-CM

## 2025-03-05 DIAGNOSIS — E87.5 HYPERKALEMIA: ICD-10-CM

## 2025-03-05 DIAGNOSIS — T45.1X5A CHEMOTHERAPY-INDUCED NEUROPATHY (MULTI): ICD-10-CM

## 2025-03-05 PROCEDURE — 3078F DIAST BP <80 MM HG: CPT | Performed by: FAMILY MEDICINE

## 2025-03-05 PROCEDURE — 1160F RVW MEDS BY RX/DR IN RCRD: CPT | Performed by: FAMILY MEDICINE

## 2025-03-05 PROCEDURE — 4004F PT TOBACCO SCREEN RCVD TLK: CPT | Performed by: FAMILY MEDICINE

## 2025-03-05 PROCEDURE — 3074F SYST BP LT 130 MM HG: CPT | Performed by: FAMILY MEDICINE

## 2025-03-05 PROCEDURE — 99495 TRANSJ CARE MGMT MOD F2F 14D: CPT | Performed by: FAMILY MEDICINE

## 2025-03-05 PROCEDURE — 1159F MED LIST DOCD IN RCRD: CPT | Performed by: FAMILY MEDICINE

## 2025-03-05 RX ORDER — GABAPENTIN 250 MG/5ML
300 SOLUTION ORAL 2 TIMES DAILY
Qty: 360 ML | Refills: 0 | Status: SHIPPED | OUTPATIENT
Start: 2025-03-05 | End: 2025-04-04

## 2025-03-05 ASSESSMENT — ENCOUNTER SYMPTOMS
CHILLS: 0
COUGH: 0
FEVER: 0
HEADACHES: 0
DIZZINESS: 0

## 2025-03-05 NOTE — PROGRESS NOTES
Subjective   Patient ID: Timoteo Victoria is a 69 y.o. male who presents for Hospital Follow-up (Hospital follow up [facility] 2/25 Jejunostomy tube fell out).    Rolo was recently admitted due to an issue with his J-tube. While hospitalized, he was found to have acute electrolyte abnormalities. Patient left hospital early due to concern about missing a chemo treatment. He has not yet had follow-up bloodwork completed.     He has been having significant pain in his feet since his last chemo treatment. Was prescribed gabapentin but is waiting for it to come in mail order. He prefers not to take morphine if possible.     He also has been getting sore son his hands since chemo. One on his middle finger burst open.          Review of Systems   Constitutional:  Negative for chills and fever.   HENT:  Negative for congestion.    Respiratory:  Negative for cough.    Neurological:  Negative for dizziness and headaches.       Objective   /62   Pulse 108   Temp 36.5 °C (97.7 °F)   Wt 93.1 kg (205 lb 3.2 oz)   BMI 29.44 kg/m²     Physical Exam  Constitutional:       General: He is not in acute distress.     Appearance: Normal appearance.   HENT:      Head: Normocephalic.      Mouth/Throat:      Mouth: Mucous membranes are moist.   Eyes:      Extraocular Movements: Extraocular movements intact.      Conjunctiva/sclera: Conjunctivae normal.   Cardiovascular:      Rate and Rhythm: Normal rate and regular rhythm.      Heart sounds: No murmur heard.  Pulmonary:      Breath sounds: No wheezing or rhonchi.   Musculoskeletal:      Cervical back: Neck supple.   Skin:     General: Skin is warm and dry.      Comments: 0.5 cm laceration at right distal middle finger   Neurological:      Mental Status: He is alert.   Psychiatric:         Mood and Affect: Mood normal.         Behavior: Behavior normal.         Assessment/Plan   Diagnoses and all orders for this visit:  Primary esophageal adenocarcinoma (Multi)  Comments:  Following  with oncology.  Chemotherapy-induced neuropathy (Multi)  Comments:  Increase gabapentin to twice per day. Short-rx sent to local pharmacy since still waiting on mail order.  Orders:  -     gabapentin (Neurontin) 50 mg/mL solution; Take 6 mL (300 mg) by mouth 2 times a day.  Hyponatremia  Comments:  Reminded patient to do repeat labs to reassess.  Hyperkalemia  Comments:  Reminded patient to do repeat labs to reassess.  Laceration of right middle finger without foreign body without damage to nail, initial encounter  Comments:  Wound cleaned and dressed. Recommend keep covered with vaseline and bandaid.

## 2025-03-06 ENCOUNTER — TELEPHONE (OUTPATIENT)
Dept: PRIMARY CARE | Facility: CLINIC | Age: 70
End: 2025-03-06
Payer: MEDICARE

## 2025-03-06 NOTE — H&P (VIEW-ONLY)
"Subjective   Timoteo Victoria  is a 69 y.o. male who presents for evaluation of esophageal cancer status post J-tube placement on 11/25/24.     This patient presents with a notable history of CAD (CABG 2005), HTN, HLD, chronic tobacco use, hypothyroidism presented on 11/25 as a transfer from [facility] for cancer workup. He had been having trouble swallowing for the past month, initially solids and progressing to liquids and medications. He has lost 40 lbs over ~6 months. EGD on 11/21/24 showed an esophageal mass with a malignant appearance, with biopsy showing intramucosal adenocarcinoma. The patient had a J tube placed on 11/25. Oncology was consulted for further management of this newly diagnosed esophageal adenocarcinoma and recommended outpatient PET.   He was admitted to the hospital from 1/12/2025 to 1/18/2025 in the setting ofInfection, and during that time, had concerns about his feeding tube. The feeding tube was replaced and it is now draining less.    Currently the patient is concerned about blistrers and sores on his hand s and feel, which are painful. No other pain. Breathing is \"good\".  He is eating and he is takingn feeds intermittently. His weight is genreally stable.  He denies the following symptoms: chest pain, shortness of breath at rest, shortness of breath with activity, cough, hemoptysis, fevers, chills, weight loss, abdominal pain, and changes to bowel function.      There have been no significant changes to their documented medical, surgical and family history.     He  reports that he has been smoking cigarettes. He started smoking about 46 years ago. He has a 45.3 pack-year smoking history. He has been exposed to tobacco smoke. He has never used smokeless tobacco. He reports that he does not currently use alcohol. He reports that he does not use drugs.    Objective   Physical Exam  The patient is well-appearing and in no acute distress. The trachea is midline and there is no crepitus. The " "lungs were clear to auscultation grossly. There was good effort and excursion. The heart had a regular rate and rhythm. The abdomen was soft, nontender and nondistended. The extremities had no edema or gross deformities. Mood and affect are appropriate.  Diagnostic Studies  === 03/11/25 ===    CT CHEST ABDOMEN PELVIS W IV CONTRAST    - Impression -  CHEST:  1.  History of esophageal carcinoma. Restaging following treatment  2. Diffuse thickening of the esophagus with no obstruction.  3. Ground-glass opacities which could represent metastatic nodules,  pneumonitis related to chemotherapy or pneumonitis related to  infection.  4. New cavitary spiculated nodule right lung apex which could  represent a metastatic focus.    ABDOMEN AND PELVIS:  1.  No metastatic disease to the abdomen and pelvis.  2. Feeding tube in the left side of the abdomen. This is likely  insufflated in the lumen of a loop of small bowel with tenting of  this loop but it would be important know if this functions. This has  been pulled back compared to 01/15/2025.  3. Constipation.  4. Status post cholecystectomy.  5. Bilateral benign simple renal cysts.  6. Status post ventral upper abdominal wall hernia repair.    MACRO:  Critical Finding:  See findings. Notification was initiated on  3/12/2025 at 8:31 am by  Taylor Chopra.  (**-OCF-**) Instructions:    Signed by: Taylor Chopra 3/12/2025 8:31 AM  Dictation workstation:   [id number]    Assessment/Plan   I believe that the patient has abnormal findings and requires additional workup.     This patient has received neoadjuvant treatment for locally advanced esophageal cancer.  His post treatment staging CT is concerning for metastatic disease.  I believe it is reasonable to offer the patient a PET/CT for further workup.  If his PET/CT suggest metastatic disease and his pulmonary nodule, I would recommend a CT IR based biopsy to \"prove\" this.  In the setting of metastatic cancer, I would not " recommend surgery, but ongoing systemic therapy.  If his PET/CT is reassuring, I believe would be reasonable to offer the patient surgical resection.  Tentatively, surgery is planned for 3/31/2025.    I recommend PET CT, consideration of surgical resection.    I discussed this in detail with the patient, including a discussion of alternatives. They were comfortable with this approach.     Fabio Salas MD  [phone]

## 2025-03-06 NOTE — PROGRESS NOTES
"Subjective   Timoteo Victoria  is a 69 y.o. male who presents for evaluation of esophageal cancer status post J-tube placement on 11/25/24.     This patient presents with a notable history of CAD (CABG 2005), HTN, HLD, chronic tobacco use, hypothyroidism presented on 11/25 as a transfer from [facility] for cancer workup. He had been having trouble swallowing for the past month, initially solids and progressing to liquids and medications. He has lost 40 lbs over ~6 months. EGD on 11/21/24 showed an esophageal mass with a malignant appearance, with biopsy showing intramucosal adenocarcinoma. The patient had a J tube placed on 11/25. Oncology was consulted for further management of this newly diagnosed esophageal adenocarcinoma and recommended outpatient PET.   He was admitted to the hospital from 1/12/2025 to 1/18/2025 in the setting ofInfection, and during that time, had concerns about his feeding tube. The feeding tube was replaced and it is now draining less.    Currently the patient is concerned about blistrers and sores on his hand s and feel, which are painful. No other pain. Breathing is \"good\".  He is eating and he is takingn feeds intermittently. His weight is genreally stable.  He denies the following symptoms: chest pain, shortness of breath at rest, shortness of breath with activity, cough, hemoptysis, fevers, chills, weight loss, abdominal pain, and changes to bowel function.      There have been no significant changes to their documented medical, surgical and family history.     He  reports that he has been smoking cigarettes. He started smoking about 46 years ago. He has a 45.3 pack-year smoking history. He has been exposed to tobacco smoke. He has never used smokeless tobacco. He reports that he does not currently use alcohol. He reports that he does not use drugs.    Objective   Physical Exam  The patient is well-appearing and in no acute distress. The trachea is midline and there is no crepitus. The " "lungs were clear to auscultation grossly. There was good effort and excursion. The heart had a regular rate and rhythm. The abdomen was soft, nontender and nondistended. The extremities had no edema or gross deformities. Mood and affect are appropriate.  Diagnostic Studies  === 03/11/25 ===    CT CHEST ABDOMEN PELVIS W IV CONTRAST    - Impression -  CHEST:  1.  History of esophageal carcinoma. Restaging following treatment  2. Diffuse thickening of the esophagus with no obstruction.  3. Ground-glass opacities which could represent metastatic nodules,  pneumonitis related to chemotherapy or pneumonitis related to  infection.  4. New cavitary spiculated nodule right lung apex which could  represent a metastatic focus.    ABDOMEN AND PELVIS:  1.  No metastatic disease to the abdomen and pelvis.  2. Feeding tube in the left side of the abdomen. This is likely  insufflated in the lumen of a loop of small bowel with tenting of  this loop but it would be important know if this functions. This has  been pulled back compared to 01/15/2025.  3. Constipation.  4. Status post cholecystectomy.  5. Bilateral benign simple renal cysts.  6. Status post ventral upper abdominal wall hernia repair.    MACRO:  Critical Finding:  See findings. Notification was initiated on  3/12/2025 at 8:31 am by  Taylor Chopra.  (**-OCF-**) Instructions:    Signed by: Taylor Chopra 3/12/2025 8:31 AM  Dictation workstation:   [id number]    Assessment/Plan   I believe that the patient has abnormal findings and requires additional workup.     This patient has received neoadjuvant treatment for locally advanced esophageal cancer.  His post treatment staging CT is concerning for metastatic disease.  I believe it is reasonable to offer the patient a PET/CT for further workup.  If his PET/CT suggest metastatic disease and his pulmonary nodule, I would recommend a CT IR based biopsy to \"prove\" this.  In the setting of metastatic cancer, I would not " recommend surgery, but ongoing systemic therapy.  If his PET/CT is reassuring, I believe would be reasonable to offer the patient surgical resection.  Tentatively, surgery is planned for 3/31/2025.    I recommend PET CT, consideration of surgical resection.    I discussed this in detail with the patient, including a discussion of alternatives. They were comfortable with this approach.     Fabio Salas MD  [phone]

## 2025-03-06 NOTE — TELEPHONE ENCOUNTER
Pt called because his daughter picked up his medication and it was:    Tamsulosin    But he doesn't remember talking about it with Dr. FIELDS.  Was this prescribed by her?   Please advise.

## 2025-03-07 NOTE — TELEPHONE ENCOUNTER
Called and spoke with pt and informed him of provider message. Per pt no current symptoms and is not taking medication. Pt remembered last night what this was for. Thanks. JW

## 2025-03-07 NOTE — TELEPHONE ENCOUNTER
It looks like this medication was given while he was in the hospital. According to the records, it was to be discontinued when he was discharged from the hospital. This medication is used for urinary issues- difficulty emptying bladder, dribbling with urination, etc. Let me know if he is experiencing any of these symptoms.

## 2025-03-11 ENCOUNTER — PATIENT OUTREACH (OUTPATIENT)
Dept: PRIMARY CARE | Facility: CLINIC | Age: 70
End: 2025-03-11
Payer: MEDICARE

## 2025-03-11 ENCOUNTER — APPOINTMENT (OUTPATIENT)
Dept: HEMATOLOGY/ONCOLOGY | Facility: HOSPITAL | Age: 70
End: 2025-03-11
Payer: MEDICARE

## 2025-03-11 ENCOUNTER — HOSPITAL ENCOUNTER (OUTPATIENT)
Dept: RADIOLOGY | Facility: HOSPITAL | Age: 70
Discharge: HOME | End: 2025-03-11
Payer: MEDICARE

## 2025-03-11 ENCOUNTER — TELEPHONE (OUTPATIENT)
Dept: PRIMARY CARE | Facility: CLINIC | Age: 70
End: 2025-03-11
Payer: MEDICARE

## 2025-03-11 DIAGNOSIS — T45.1X5A CHEMOTHERAPY-INDUCED NEUROPATHY (MULTI): ICD-10-CM

## 2025-03-11 DIAGNOSIS — G62.0 CHEMOTHERAPY-INDUCED NEUROPATHY (MULTI): ICD-10-CM

## 2025-03-11 DIAGNOSIS — C15.9 PRIMARY ESOPHAGEAL ADENOCARCINOMA (MULTI): ICD-10-CM

## 2025-03-11 PROCEDURE — 2550000001 HC RX 255 CONTRASTS

## 2025-03-11 PROCEDURE — 74177 CT ABD & PELVIS W/CONTRAST: CPT

## 2025-03-11 RX ORDER — GABAPENTIN 250 MG/5ML
300 SOLUTION ORAL 2 TIMES DAILY
Qty: 360 ML | Refills: 0 | Status: SHIPPED | OUTPATIENT
Start: 2025-03-11 | End: 2025-04-10

## 2025-03-11 RX ADMIN — IOHEXOL 75 ML: 350 INJECTION, SOLUTION INTRAVENOUS at 16:54

## 2025-03-11 NOTE — TELEPHONE ENCOUNTER
Patient left message on cancellation line his medicine was cancelled for gabapentin (Neurontin) 50 mg/mL solution Please send in again thanks

## 2025-03-11 NOTE — PROGRESS NOTES
Unable to reach patient for call back after recent hospitalization.   M with call back number for patient to call if needed   If no voicemail available call attempts x 2 were made to contact the patient to assist with any questions or concerns patient may have.    Office Visit with Dafne Bedolla DO (03/05/2025)

## 2025-03-12 ENCOUNTER — HOME CARE VISIT (OUTPATIENT)
Dept: HOME HEALTH SERVICES | Facility: HOME HEALTH | Age: 70
End: 2025-03-12
Payer: MEDICARE

## 2025-03-12 VITALS
TEMPERATURE: 97.7 F | DIASTOLIC BLOOD PRESSURE: 60 MMHG | SYSTOLIC BLOOD PRESSURE: 110 MMHG | OXYGEN SATURATION: 98 % | RESPIRATION RATE: 18 BRPM | HEART RATE: 90 BPM

## 2025-03-12 PROCEDURE — G0299 HHS/HOSPICE OF RN EA 15 MIN: HCPCS | Mod: HHH

## 2025-03-12 ASSESSMENT — ENCOUNTER SYMPTOMS
PAIN LOCATION - EXACERBATING FACTORS: PULLING
PAIN LOCATION - RELIEVING FACTORS: TYLENOL
PAIN LOCATION - PAIN FREQUENCY: INFREQUENT
PAIN LOCATION: ABDOMEN
LOWEST PAIN SEVERITY IN PAST 24 HOURS: 0/10
SUBJECTIVE PAIN PROGRESSION: UNCHANGED
PAIN LOCATION - PAIN SEVERITY: 1/10
HIGHEST PAIN SEVERITY IN PAST 24 HOURS: 1/10
PAIN: 1
PAIN LOCATION - PAIN QUALITY: ACHING
PAIN SEVERITY GOAL: 0/10

## 2025-03-12 ASSESSMENT — ACTIVITIES OF DAILY LIVING (ADL)
HOME_HEALTH_OASIS: 01
OASIS_M1830: 00

## 2025-03-13 ENCOUNTER — LAB (OUTPATIENT)
Dept: HEMATOLOGY/ONCOLOGY | Facility: HOSPITAL | Age: 70
End: 2025-03-13
Payer: MEDICARE

## 2025-03-13 ENCOUNTER — ONCOLOGY MEDICATION OUTREACH (OUTPATIENT)
Dept: HEMATOLOGY/ONCOLOGY | Facility: HOSPITAL | Age: 70
End: 2025-03-13

## 2025-03-13 ENCOUNTER — OFFICE VISIT (OUTPATIENT)
Dept: HEMATOLOGY/ONCOLOGY | Facility: HOSPITAL | Age: 70
End: 2025-03-13
Payer: MEDICARE

## 2025-03-13 ENCOUNTER — OFFICE VISIT (OUTPATIENT)
Dept: SURGERY | Facility: HOSPITAL | Age: 70
End: 2025-03-13
Payer: MEDICARE

## 2025-03-13 VITALS
HEART RATE: 90 BPM | DIASTOLIC BLOOD PRESSURE: 48 MMHG | RESPIRATION RATE: 18 BRPM | BODY MASS INDEX: 28.75 KG/M2 | SYSTOLIC BLOOD PRESSURE: 105 MMHG | TEMPERATURE: 97.2 F | WEIGHT: 200.4 LBS | OXYGEN SATURATION: 100 %

## 2025-03-13 VITALS
HEART RATE: 89 BPM | DIASTOLIC BLOOD PRESSURE: 48 MMHG | SYSTOLIC BLOOD PRESSURE: 95 MMHG | RESPIRATION RATE: 16 BRPM | OXYGEN SATURATION: 98 % | TEMPERATURE: 95.5 F

## 2025-03-13 DIAGNOSIS — C15.9 PRIMARY ESOPHAGEAL ADENOCARCINOMA (MULTI): ICD-10-CM

## 2025-03-13 DIAGNOSIS — C15.5 CANCER OF DISTAL THIRD OF ESOPHAGUS (MULTI): Primary | ICD-10-CM

## 2025-03-13 DIAGNOSIS — C15.9 PRIMARY ESOPHAGEAL ADENOCARCINOMA (MULTI): Primary | ICD-10-CM

## 2025-03-13 DIAGNOSIS — R91.8 MULTIPLE LUNG NODULES ON CT: ICD-10-CM

## 2025-03-13 DIAGNOSIS — R91.1 LUNG NODULE: ICD-10-CM

## 2025-03-13 LAB
ALBUMIN SERPL BCP-MCNC: 3 G/DL (ref 3.4–5)
ALP SERPL-CCNC: 43 U/L (ref 33–136)
ALT SERPL W P-5'-P-CCNC: 16 U/L (ref 10–52)
ANION GAP SERPL CALC-SCNC: 13 MMOL/L (ref 10–20)
AST SERPL W P-5'-P-CCNC: 15 U/L (ref 9–39)
BASOPHILS # BLD MANUAL: 0 X10*3/UL (ref 0–0.1)
BASOPHILS NFR BLD MANUAL: 0 %
BILIRUB SERPL-MCNC: 0.4 MG/DL (ref 0–1.2)
BUN SERPL-MCNC: 24 MG/DL (ref 6–23)
CALCIUM SERPL-MCNC: 8.5 MG/DL (ref 8.6–10.3)
CHLORIDE SERPL-SCNC: 100 MMOL/L (ref 98–107)
CO2 SERPL-SCNC: 24 MMOL/L (ref 21–32)
CREAT SERPL-MCNC: 1.19 MG/DL (ref 0.5–1.3)
DACRYOCYTES BLD QL SMEAR: ABNORMAL
EGFRCR SERPLBLD CKD-EPI 2021: 67 ML/MIN/1.73M*2
EOSINOPHIL # BLD MANUAL: 0 X10*3/UL (ref 0–0.7)
EOSINOPHIL NFR BLD MANUAL: 0 %
ERYTHROCYTE [DISTWIDTH] IN BLOOD BY AUTOMATED COUNT: 15.6 % (ref 11.5–14.5)
GIANT PLATELETS BLD QL SMEAR: ABNORMAL
GLUCOSE SERPL-MCNC: 104 MG/DL (ref 74–99)
HCT VFR BLD AUTO: 26 % (ref 41–52)
HGB BLD-MCNC: 8.6 G/DL (ref 13.5–17.5)
IMM GRANULOCYTES # BLD AUTO: 1.21 X10*3/UL (ref 0–0.7)
IMM GRANULOCYTES NFR BLD AUTO: 13 % (ref 0–0.9)
LYMPHOCYTES # BLD MANUAL: 2.7 X10*3/UL (ref 1.2–4.8)
LYMPHOCYTES NFR BLD MANUAL: 29 %
MCH RBC QN AUTO: 31.5 PG (ref 26–34)
MCHC RBC AUTO-ENTMCNC: 33.1 G/DL (ref 32–36)
MCV RBC AUTO: 95 FL (ref 80–100)
METAMYELOCYTES # BLD MANUAL: 0.09 X10*3/UL
METAMYELOCYTES NFR BLD MANUAL: 1 %
MONOCYTES # BLD MANUAL: 1.21 X10*3/UL (ref 0.1–1)
MONOCYTES NFR BLD MANUAL: 13 %
MYELOCYTES # BLD MANUAL: 0.56 X10*3/UL
MYELOCYTES NFR BLD MANUAL: 6 %
NEUTROPHILS # BLD MANUAL: 4.75 X10*3/UL (ref 1.2–7.7)
NEUTS BAND # BLD MANUAL: 1.12 X10*3/UL (ref 0–0.7)
NEUTS BAND NFR BLD MANUAL: 12 %
NEUTS SEG # BLD MANUAL: 3.63 X10*3/UL (ref 1.2–7)
NEUTS SEG NFR BLD MANUAL: 39 %
NRBC BLD-RTO: 0.2 /100 WBCS (ref 0–0)
PLATELET # BLD AUTO: 208 X10*3/UL (ref 150–450)
PLATELET CLUMP BLD QL SMEAR: PRESENT
POLYCHROMASIA BLD QL SMEAR: ABNORMAL
POTASSIUM SERPL-SCNC: 4.1 MMOL/L (ref 3.5–5.3)
PROT SERPL-MCNC: 5.5 G/DL (ref 6.4–8.2)
RBC # BLD AUTO: 2.73 X10*6/UL (ref 4.5–5.9)
RBC MORPH BLD: ABNORMAL
SODIUM SERPL-SCNC: 133 MMOL/L (ref 136–145)
TOTAL CELLS COUNTED BLD: 100
WBC # BLD AUTO: 9.3 X10*3/UL (ref 4.4–11.3)

## 2025-03-13 PROCEDURE — 99215 OFFICE O/P EST HI 40 MIN: CPT | Mod: 57 | Performed by: THORACIC SURGERY (CARDIOTHORACIC VASCULAR SURGERY)

## 2025-03-13 PROCEDURE — 4004F PT TOBACCO SCREEN RCVD TLK: CPT | Performed by: INTERNAL MEDICINE

## 2025-03-13 PROCEDURE — 85027 COMPLETE CBC AUTOMATED: CPT

## 2025-03-13 PROCEDURE — 85007 BL SMEAR W/DIFF WBC COUNT: CPT

## 2025-03-13 PROCEDURE — 3078F DIAST BP <80 MM HG: CPT | Performed by: INTERNAL MEDICINE

## 2025-03-13 PROCEDURE — 99215 OFFICE O/P EST HI 40 MIN: CPT | Performed by: THORACIC SURGERY (CARDIOTHORACIC VASCULAR SURGERY)

## 2025-03-13 PROCEDURE — 3078F DIAST BP <80 MM HG: CPT | Performed by: THORACIC SURGERY (CARDIOTHORACIC VASCULAR SURGERY)

## 2025-03-13 PROCEDURE — 36591 DRAW BLOOD OFF VENOUS DEVICE: CPT

## 2025-03-13 PROCEDURE — 2500000004 HC RX 250 GENERAL PHARMACY W/ HCPCS (ALT 636 FOR OP/ED): Performed by: INTERNAL MEDICINE

## 2025-03-13 PROCEDURE — 99214 OFFICE O/P EST MOD 30 MIN: CPT | Performed by: INTERNAL MEDICINE

## 2025-03-13 PROCEDURE — 1126F AMNT PAIN NOTED NONE PRSNT: CPT | Performed by: THORACIC SURGERY (CARDIOTHORACIC VASCULAR SURGERY)

## 2025-03-13 PROCEDURE — 3074F SYST BP LT 130 MM HG: CPT | Performed by: INTERNAL MEDICINE

## 2025-03-13 PROCEDURE — 1125F AMNT PAIN NOTED PAIN PRSNT: CPT | Performed by: INTERNAL MEDICINE

## 2025-03-13 PROCEDURE — 3074F SYST BP LT 130 MM HG: CPT | Performed by: THORACIC SURGERY (CARDIOTHORACIC VASCULAR SURGERY)

## 2025-03-13 PROCEDURE — 80053 COMPREHEN METABOLIC PANEL: CPT

## 2025-03-13 RX ORDER — HEPARIN SODIUM,PORCINE/PF 10 UNIT/ML
50 SYRINGE (ML) INTRAVENOUS AS NEEDED
OUTPATIENT
Start: 2025-03-13

## 2025-03-13 RX ORDER — HEPARIN 100 UNIT/ML
500 SYRINGE INTRAVENOUS AS NEEDED
Status: DISCONTINUED | OUTPATIENT
Start: 2025-03-13 | End: 2025-03-13 | Stop reason: HOSPADM

## 2025-03-13 RX ORDER — CLOBETASOL PROPIONATE 0.5 MG/G
CREAM TOPICAL
Qty: 60 G | Refills: 0 | Status: SHIPPED | OUTPATIENT
Start: 2025-03-13

## 2025-03-13 RX ORDER — HEPARIN 100 UNIT/ML
500 SYRINGE INTRAVENOUS AS NEEDED
OUTPATIENT
Start: 2025-03-13

## 2025-03-13 RX ORDER — IBUPROFEN 200 MG
1 TABLET ORAL EVERY 24 HOURS
Qty: 30 PATCH | Refills: 1 | Status: SHIPPED | OUTPATIENT
Start: 2025-03-13 | End: 2025-05-12

## 2025-03-13 RX ADMIN — HEPARIN 500 UNITS: 100 SYRINGE at 14:26

## 2025-03-13 ASSESSMENT — PAIN SCALES - GENERAL
PAINLEVEL_OUTOF10: 0-NO PAIN
PAINLEVEL_OUTOF10: 4

## 2025-03-13 NOTE — PROGRESS NOTES
Patient ID: Timoteo Victoria is a 69 y.o. male.    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  PET-avid thyroid nodule.    Genomic profile:  Normal MMR expresison    Assessment and Plan:  69M with CAD (CABG 2005), HTN, HLD, chronic tobacco use, hypothyroidism, presented with 4-6 weeks of progressive dysphagia (solid to liquid/mediations) 40lb wt loss over 6 months, and was found to have distal esophageal mass covering half of lumen on EGD 11/21/24  and Bx showed intramucosal adenocarcinoma with normal MMR expression. S/p J tube placement 11/25/2024.    PET-CT showed no distant mets.    We discussed FLOT for chemotherapy regimen with him in details. After a detailed discussion about the chemo, he consented and started cycle 1 on 1/2/25. Completed cycle 4 on 2-.    He is here for an evaluation after cycle 4 of FLOT 4.  He has significant hand-foot syndrome for which we have prescribed steroid cream.  His recent CT scan dated March 11, 2025 has some indeterminate lung lesions which could be infection versus metastatic tumor.    Plan: PET/CT scan and possibly biopsy of the lung lesion if PET CT scan is suggestive of metastatic disease.    I will call him back next week Monday (March 17, 2025) and review the PET/CT scan results.    I have placed all orders as outlined above. I advised the patient to schedule the tests and follow-up appointment as discussed by contacting the  on the way out or calling by phone. Patient knows to call with any issues or concerns.     Providers:  Surgeon: Dr. Maritza Mark: Dr Katie Apple:    Chief complaint: Esophageal adenocarcinoma    HPI:  Timoteo Victoria is a 69 y.o. male with a notable history of CAD (CABG 2005), HTN, HLD, chronic tobacco use, hypothyroidism presented on 11/25/2024 as a transfer from [facility] for cancer workup. He had been having trouble swallowing for the past month, initially solids  and progressing to liquids and medications. He has lost 40 lbs in the past 6 months.     EGD on 11/21/24 showed an esophageal mass with a malignant appearance, with biopsy showing intramucosal adenocarcinoma. The patient had a J tube placed on 11/25.     ONCOLOGIC HISTORY-  11/21/24  [facility] admission for weight loss, dysphagia; EGD showed esophageal mass with biopsy confirming intramucosal adenocarcinoma  11/20/24 CT neck with no masses  11/22/24 CT CAP with contrast with irregular masslike thickening of distal esophagus, no obvious metastases  11/25/24 J tube placement.    12-: pet-ct showed-  IMPRESSION:  1. Hypermetabolic esophageal mass as described above is consistent  with biopsy-proven esophageal adenocarcinoma. Few minimally  hypermetabolic subcentimeter periesophageal lymph nodes are likely  reactive.  2. No other evidence of hypermetabolic thoracic or abdominal  lymphadenopathy or hypermetabolic metastatic disease.  3. Multiple hypermetabolic intraparotid nodules/lymph nodes, which  have been present since 2022, likely represents a benign and indolent  process. However, recommend continued attention on follow-up.  4. Asymmetrically increased hypermetabolic activity within the right  thyroid gland. Correlate with thyroid ultrasound may be of value.    1-2-2025: started FLOT-4.  Completed cycle 4 on February 27, 2025.    March 11, 2025: CT scan has indeterminate lung lesions.    Interval history:   Mr. Victoria returns in follow up today after cycle 4 of FLOT today.  He has quite a bit of pain and peeling in his hands and feet suggestive of hand-foot syndrome.  He is quite fatigued.    Currently denies: fevers, chills, chest pain, SOB, cough, N/V, bowel changes, urinary symptoms.    Past Medical History:   Past Medical History:  No date: CAD (coronary artery disease)  No date: Esophageal carcinoma (Multi)  No date: Hyperlipidemia  No date: Hypertension  No date: Hypothyroidism   Surgical History:     Past Surgical History:   Procedure Laterality Date    CATARACT EXTRACTION Right 08/25/2023    CHOLECYSTECTOMY      CORONARY ARTERY BYPASS GRAFT  2005    4-vessel    HERNIA REPAIR        Family History:    Family History   Problem Relation Name Age of Onset    COPD Mother       Family Oncology History:    Cancer-related family history is not on file.  Social History:    Social History     Tobacco Use    Smoking status: Every Day     Current packs/day: 0.25     Average packs/day: 1 pack/day for 46.2 years (45.3 ttl pk-yrs)     Types: Cigarettes     Start date: 1979     Passive exposure: Current    Smokeless tobacco: Never    Tobacco comments:     Pt is down to 2 cigarettes.   Vaping Use    Vaping status: Never Used   Substance Use Topics    Alcohol use: Not Currently    Drug use: Never        Allergies  Allergies   Allergen Reactions    Ibuprofen Swelling        Medications  Current Outpatient Medications   Medication Instructions    amitriptyline (ELAVIL) 100 mg, j-tube, Nightly    aspirin 81 mg, Daily    atorvastatin (LIPITOR) 40 mg, j-tube, Daily    baclofen (LIORESAL) 5 mg, oral, 3 times daily    buPROPion (WELLBUTRIN) 150 mg, j-tube, 2 times daily    dexAMETHasone (Decadron) 4 mg tablet TAKE 2 TABLETS BY MOUTH TWICE DAILY THE DAY BEFORE TREATMENT, 2 TABS ONCE THE EVENING OF TREATMENT AND 2 TABS TWICE THE DAY AFTER TREATMENT    gabapentin (NEURONTIN) 300 mg, oral, 2 times daily    levothyroxine (SYNTHROID, LEVOXYL) 175 mcg, j-tube, Daily, Take on an empty stomach at the same time each day, either 30 to 60 minutes prior to breakfast    lidocaine-prilocaine (Emla) 2.5-2.5 % cream Apply topically to affected area 30-45 minutes before Mediport access.    losartan (Cozaar) 50 mg tablet Take 1 tab daily via j-tube.    ondansetron ODT (ZOFRAN-ODT) 8 mg, oral, Every 8 hours PRN    prochlorperazine (COMPAZINE) 10 mg, oral, Every 6 hours PRN    spironolactone (ALDACTONE) 50 mg, j-tube, Daily    verapamil (CALAN) 240 mg,  j-tube, Daily          Objective   VS: BP (!) 105/48 (BP Location: Left arm, Patient Position: Sitting, BP Cuff Size: Adult)   Pulse 90   Temp 36.2 °C (97.2 °F) (Temporal)   Resp 18   Wt 90.9 kg (200 lb 6.4 oz)   SpO2 100%   BMI 28.75 kg/m²     PHYSICAL EXAMINATION  ECOG performance status- 1    Constitutional: Awake/alert/oriented x3, cooperative and answers questions appropriately.     Eyes: No pallor, conjunctival injection, clear sclera.    Mouth: No ulceration. No thrush.     Head/Neck: Neck supple, no apparent injury, thyroid without mass or tenderness, No JVD, trachea midline, no bruits.     Respiratory/Thorax: Normal breath sounds with bilaterally symmetrical chest expansion. No dullness.      Cardiovascular: No audible murmurs, normal heart sounds. No pericardial rub.     Gastrointestinal: Nondistended, soft, non-tender, no rebound tenderness or guarding, no masses palpable, no organomegaly, +BS, no bruits. No ascites.     Musculoskeletal: No joint swelling, redness.      Extremities: normal extremities, no cyanosis edema, contusions or wounds, no clubbing.     Lymphatic: No significant lymphadenopathy.     Skin: Hand-foot syndrome.     Labs  Results from last 7 days   Lab Units 03/13/25  1425   WBC AUTO x10*3/uL 9.3   HEMOGLOBIN g/dL 8.6*   HEMATOCRIT % 26.0*   PLATELETS AUTO x10*3/uL 208   EOS ABS MAN x10*3/uL 0.00   BANDS ABS MAN x10*3/uL 1.12*   LYMPHO ABS MAN x10*3/uL 2.70   MONO ABS MAN x10*3/uL 1.21*   LYMPHO PCT MAN % 29.0   MONO PCT MAN % 13.0   EOSINO PCT MAN % 0.0     Results from last 7 days   Lab Units 03/13/25  1425   GLUCOSE mg/dL 104*   SODIUM mmol/L 133*   POTASSIUM mmol/L 4.1   CHLORIDE mmol/L 100   CO2 mmol/L 24   BUN mg/dL 24*   CREATININE mg/dL 1.19   EGFR mL/min/1.73m*2 67   CALCIUM mg/dL 8.5*   ALBUMIN g/dL 3.0*   PROTEIN TOTAL g/dL 5.5*   BILIRUBIN TOTAL mg/dL 0.4   ALK PHOS U/L 43   ALT U/L 16   AST U/L 15     Image  EGD (11/21/2024)  Single malignant-appearing and invasive  mass (not traversable) in the lower third of the esophagus, covering one half of the circumference; performed cold forceps biopsy     A. Esophagus, Distal, Mass, Biopsy:  -- Intramucosal adenocarcinoma. See note.     Note: The endoscopic impression of a friable and invasive lesion in the lower third of the esophagus is noted. This biopsy may be not representative of the entire lesion. Clinical correlation is recommended.    MISMATCH REPAIR PROTEIN EXPRESSION                    Protein:           Result                 MLH-1:             Expression Present                                                   PMS-2:            Expression Present                                                   MSH-2:            Expression Present                                                   MSH-6:            Expression Present                                       INTERPRETATION: Neoplasm with normal mismatch repair protein expression.     C/A/P CT (11/22/2024)  IMPRESSION:  1. Irregular masslike thickening of the mid to distal esophagus is  difficult to accurately measure, and may represent a neoplastic  process. Correlate with recent endoscopic imaging and biopsy results.  There is also proximal esophageal wall thickening, and a moderate  size hiatal hernia.  2. Small area of centrilobular nodularity in the right middle lobe  may represent focal aspiration/mucoid impaction or less likely  atypical infection.  3. Mild apical predominant centrilobular emphysema.  4. Severe coronary artery calcifications. Please note this exam is  not optimized for evaluation of the coronary arteries.  5. Nonobstructing 0.4 cm calculus in the left kidney.    Neck CT (11/20/2024)  IMPRESSION:  New maxillary periapical lucencies with adjacent mucosal thickening.  No discrete fluid collection identified. Dental follow-up recommended.      No discrete mass or cervical lymphadenopathy identified however  direct visualization suggested.      Bilateral  parotid mass/nodule similar to prior imaging.      Postsurgical changes, emphysematous changes, mild mediastinal  lymphadenopathy and additional findings as detailed.  This note was created using a voice recognition system ( the Dragon dictation system). Inaccuracies and misspellings are unintentional.       Madan Wilson MD.

## 2025-03-13 NOTE — PROGRESS NOTES
ONCOLOGY CLINICAL PHARMACY NOTE     Subjective  Timoteo Victoria is a 69 y.o. male with Esophageal Adenocarcinoma, here for supportive care / symptom management.        Treatment history  Treatment Details   Treatment goal [No plan goal]   Plan Name Venous Access Orders   Status Active   Start Date 1/2/2025   End Date Until discontinued   Provider Madan Wilson MD   Chemotherapy [No matching medication found in this treatment plan]     Treatment Details   Treatment goal Curative   Plan Name FLOT (Fluorouracil Continuous Infusion / Leucovorin / Oxaliplatin / DOCEtaxel), 14 Day Cycles   Status Active   Start Date 1/2/2025   End Date 4/25/2025 (Planned)   Provider Madan Wilson MD   Chemotherapy fluorouracil (Adrucil) 5,550 mg in sodium chloride 0.9% 240 mL IV via Home Infusion, 2,600 mg/m2 = 5,550 mg, intravenous, Once, 4 of 8 cycles  Dose modification: 2,080 mg/m2 (original dose 2,600 mg/m2, Cycle 3)  Administration: 5,550 mg (1/2/2025), 5,550 mg (1/31/2025), 4,450 mg (2/14/2025), 4,450 mg (2/27/2025)    methylPREDNISolone sod succinate (SOLU-Medrol) 40 mg/mL injection 40 mg, 40 mg, intravenous, As needed, 4 of 8 cycles    palonosetron (Aloxi) injection 250 mcg, 250 mcg, intravenous, Once, 4 of 8 cycles  Administration: 250 mcg (1/2/2025), 250 mcg (1/31/2025), 250 mcg (2/14/2025), 250 mcg (2/27/2025)    DOCEtaxeL (Taxotere) 110 mg in dextrose 5% 272.5 mL IV, 50 mg/m2 = 110 mg, intravenous, Once, 4 of 8 cycles  Dose modification: 40 mg/m2 (original dose 50 mg/m2, Cycle 3)  Administration: 110 mg (1/2/2025), 110 mg (1/31/2025), 90 mg (2/14/2025), 90 mg (2/27/2025)    OXALIplatin (Eloxatin) 180 mg in dextrose 5% 583 mL IV, 85 mg/m2 = 180 mg, intravenous, Once, 4 of 8 cycles  Dose modification: 68 mg/m2 (original dose 85 mg/m2, Cycle 3)  Administration: 180 mg (1/2/2025), 180 mg (1/31/2025), 145 mg (2/14/2025), 145 mg (2/27/2025)    pegfilgrastim-cbqv (Udenyca) injection 6 mg, 6 mg, subcutaneous, Once, 2 of 6  cycles  Administration: 6 mg (1/3/2025), 6 mg (2/1/2025)    LORazepam (Ativan) injection 1 mg, 1 mg, intravenous, As needed, 4 of 8 cycles          Objective  There were no vitals taken for this visit.  Lab Results   Component Value Date    WBC 9.3 03/13/2025    HGB 8.6 (L) 03/13/2025    HCT 26.0 (L) 03/13/2025    MCV 95 03/13/2025     03/13/2025      Lab Results   Component Value Date    GLUCOSE 104 (H) 03/13/2025    CALCIUM 8.5 (L) 03/13/2025     (L) 03/13/2025    K 4.1 03/13/2025    CO2 24 03/13/2025     03/13/2025    BUN 24 (H) 03/13/2025    CREATININE 1.19 03/13/2025     Lab Results   Component Value Date    ALT 16 03/13/2025    AST 15 03/13/2025    ALKPHOS 43 03/13/2025    BILITOT 0.4 03/13/2025       Allergies and Medications   Allergies   Allergen Reactions    Ibuprofen Swelling       Current Outpatient Medications:     amitriptyline (Elavil) 100 mg tablet, 1 tablet (100 mg) by j-tube route once daily at bedtime., Disp: 30 tablet, Rfl: 11    aspirin 81 mg chewable tablet, 1 tablet (81 mg) by j-tube route once daily., Disp: , Rfl:     atorvastatin (Lipitor) 40 mg tablet, 1 tablet (40 mg) by j-tube route once daily., Disp: 90 tablet, Rfl: 1    baclofen (Lioresal) 5 mg tablet, Take 1 tablet (5 mg) by mouth 3 times a day., Disp: 90 tablet, Rfl: 3    buPROPion (Wellbutrin) 75 mg tablet, 2 tablets (150 mg) by j-tube route 2 times a day., Disp: , Rfl:     dexAMETHasone (Decadron) 4 mg tablet, TAKE 2 TABLETS BY MOUTH TWICE DAILY THE DAY BEFORE TREATMENT, 2 TABS ONCE THE EVENING OF TREATMENT AND 2 TABS TWICE THE DAY AFTER TREATMENT, Disp: , Rfl:     gabapentin (Neurontin) 50 mg/mL solution, Take 6 mL (300 mg) by mouth 2 times a day., Disp: 360 mL, Rfl: 0    levothyroxine (Synthroid, Levoxyl) 175 mcg tablet, 1 tablet (175 mcg) by j-tube route early in the morning.. Take on an empty stomach at the same time each day, either 30 to 60 minutes prior to breakfast, Disp: 30 tablet, Rfl: 1     lidocaine-prilocaine (Emla) 2.5-2.5 % cream, Apply topically to affected area 30-45 minutes before Mediport access., Disp: 30 g, Rfl: 0    losartan (Cozaar) 50 mg tablet, Take 1 tab daily via j-tube., Disp: 90 tablet, Rfl: 1    ondansetron ODT (Zofran-ODT) 8 mg disintegrating tablet, Dissolve 1 tablet (8 mg) in the mouth every 8 hours if needed for nausea or vomiting., Disp: 30 tablet, Rfl: 5    prochlorperazine (Compazine) 10 mg tablet, Take 1 tablet (10 mg) by mouth every 6 hours if needed for nausea or vomiting., Disp: 30 tablet, Rfl: 5    spironolactone (Aldactone) 50 mg tablet, 1 tablet (50 mg) by j-tube route once daily., Disp: 30 tablet, Rfl: 1    verapamil (Calan) 80 mg tablet, 3 tablets (240 mg) by j-tube route once daily., Disp: 90 tablet, Rfl: 1  No current facility-administered medications for this visit.    Assessment and Plan  Timoteo Victoria is a 69 y.o. male with Esophageal Adenocarcinoma. Patient presents with hand foot syndrome secondary to chemotherapy. Patient has tried moisturizing their feet but states it has not helped. Hands are red and cracked and patient states they have painful blisters on their feet.     Reviewed with patient/family that they are experiencing hand foot syndrome. Plan to start topical high potency steroid (clobetasol). Pt instructed to apply twice daily for up to 14 days. Instructed to call office if symptoms have not improved with topical steroid use.      Flavio Pérez, DiegoD

## 2025-03-14 ENCOUNTER — HOSPITAL ENCOUNTER (OUTPATIENT)
Dept: RADIOLOGY | Facility: CLINIC | Age: 70
Discharge: HOME | End: 2025-03-14
Payer: MEDICARE

## 2025-03-14 DIAGNOSIS — C15.9 PRIMARY ESOPHAGEAL ADENOCARCINOMA (MULTI): ICD-10-CM

## 2025-03-14 DIAGNOSIS — R91.8 MULTIPLE LUNG NODULES ON CT: ICD-10-CM

## 2025-03-14 PROBLEM — C15.5 MALIGNANT NEOPLASM OF LOWER THIRD OF ESOPHAGUS (MULTI): Status: ACTIVE | Noted: 2025-02-28

## 2025-03-14 PROCEDURE — 3430000001 HC RX 343 DIAGNOSTIC RADIOPHARMACEUTICALS: Performed by: THORACIC SURGERY (CARDIOTHORACIC VASCULAR SURGERY)

## 2025-03-14 PROCEDURE — 78815 PET IMAGE W/CT SKULL-THIGH: CPT | Mod: PS

## 2025-03-14 PROCEDURE — A9552 F18 FDG: HCPCS | Performed by: THORACIC SURGERY (CARDIOTHORACIC VASCULAR SURGERY)

## 2025-03-14 RX ORDER — FLUDEOXYGLUCOSE F 18 200 MCI/ML
14.5 INJECTION, SOLUTION INTRAVENOUS
Status: COMPLETED | OUTPATIENT
Start: 2025-03-14 | End: 2025-03-14

## 2025-03-14 RX ADMIN — FLUDEOXYGLUCOSE F 18 14.5 MILLICURIE: 200 INJECTION, SOLUTION INTRAVENOUS at 09:42

## 2025-03-16 NOTE — PROGRESS NOTES
Patient ID: Timoteo Victoria is a 69 y.o. male.  This is a phone appointment.  Consent obtained.  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  PET-avid thyroid nodule.    Genomic profile:  Normal MMR expresison    Assessment and Plan:  69M with CAD (CABG 2005), HTN, HLD, chronic tobacco use, hypothyroidism, presented with 4-6 weeks of progressive dysphagia (solid to liquid/mediations) 40lb wt loss over 6 months, and was found to have distal esophageal mass covering half of lumen on EGD 11/21/24  and Bx showed intramucosal adenocarcinoma with normal MMR expression. S/p J tube placement 11/25/2024.    PET-CT showed no distant mets.    We discussed FLOT for chemotherapy regimen with him in details. After a detailed discussion about the chemo, he consented and started cycle 1 on 1/2/25. Completed cycle 4 on 2-.    His recent CT scan dated March 11, 2025 has some indeterminate lung lesions which could be infection versus metastatic tumor.  He had a PET/CT which did not show any PET avid lesions suggestive of metastatic disease.  I communicated with Dr. Salas who agrees and he will proceed with surgery.    Plan: Surgery.    Follow-up: 6 to 8 weeks after the surgery.    I have placed all orders as outlined above. I advised the patient to schedule the tests and follow-up appointment as discussed by contacting the  on the way out or calling by phone. Patient knows to call with any issues or concerns.     Providers:  Surgeon: Dr. Salas  [facility] Katie Apple:    Chief complaint: Esophageal adenocarcinoma    HPI:  Timoteo Victoria is a 69 y.o. male with a notable history of CAD (CABG 2005), HTN, HLD, chronic tobacco use, hypothyroidism presented on 11/25/2024 as a transfer from [facility] for cancer workup. He had been having trouble swallowing for the past month, initially solids and progressing to liquids and medications. He has lost 40 lbs in  the past 6 months.     EGD on 11/21/24 showed an esophageal mass with a malignant appearance, with biopsy showing intramucosal adenocarcinoma. The patient had a J tube placed on 11/25.     ONCOLOGIC HISTORY-  11/21/24  [place] admission for weight loss, dysphagia; EGD showed esophageal mass with biopsy confirming intramucosal adenocarcinoma  11/20/24 CT neck with no masses  11/22/24 CT CAP with contrast with irregular masslike thickening of distal esophagus, no obvious metastases  11/25/24 J tube placement.    12-: pet-ct showed-  IMPRESSION:  1. Hypermetabolic esophageal mass as described above is consistent  with biopsy-proven esophageal adenocarcinoma. Few minimally  hypermetabolic subcentimeter periesophageal lymph nodes are likely  reactive.  2. No other evidence of hypermetabolic thoracic or abdominal  lymphadenopathy or hypermetabolic metastatic disease.  3. Multiple hypermetabolic intraparotid nodules/lymph nodes, which  have been present since 2022, likely represents a benign and indolent  process. However, recommend continued attention on follow-up.  4. Asymmetrically increased hypermetabolic activity within the right  thyroid gland. Correlate with thyroid ultrasound may be of value.    1-2-2025: started FLOT-4.  Completed cycle 4 on February 27, 2025.    March 11, 2025: CT scan has indeterminate lung lesions (7 mm).    March 14, 2025: PET-CT did not show any definitive metastatic lesion.    Interval history:   His hand-foot syndrome related symptoms are getting better.  Has some fatigue.  Currently denies: fevers, chills, chest pain, SOB, cough, N/V, bowel changes, urinary symptoms.    Past Medical History:   Past Medical History:  No date: CAD (coronary artery disease)  No date: Esophageal carcinoma (Multi)  No date: Hyperlipidemia  No date: Hypertension  No date: Hypothyroidism   Surgical History:    Past Surgical History:   Procedure Laterality Date    CATARACT EXTRACTION Right 08/25/2023     CHOLECYSTECTOMY      CORONARY ARTERY BYPASS GRAFT  2005    4-vessel    HERNIA REPAIR        Family History:    Family History   Problem Relation Name Age of Onset    COPD Mother       Family Oncology History:    Cancer-related family history is not on file.  Social History:    Social History     Tobacco Use    Smoking status: Every Day     Current packs/day: 0.25     Average packs/day: 1 pack/day for 46.2 years (45.3 ttl pk-yrs)     Types: Cigarettes     Start date: 1979     Passive exposure: Current    Smokeless tobacco: Never    Tobacco comments:     Pt is down to 2 cigarettes.   Vaping Use    Vaping status: Never Used   Substance Use Topics    Alcohol use: Not Currently    Drug use: Never        Allergies  Allergies   Allergen Reactions    Ibuprofen Swelling        Medications  Current Outpatient Medications   Medication Instructions    amitriptyline (ELAVIL) 100 mg, j-tube, Nightly    aspirin 81 mg, Daily    atorvastatin (LIPITOR) 40 mg, j-tube, Daily    baclofen (LIORESAL) 5 mg, oral, 3 times daily    buPROPion (WELLBUTRIN) 150 mg, j-tube, 2 times daily    clobetasol (Temovate) 0.05 % cream Apply small amount topically to hands and feet two times daily. Do not use for more than 14 days.    dexAMETHasone (Decadron) 4 mg tablet TAKE 2 TABLETS BY MOUTH TWICE DAILY THE DAY BEFORE TREATMENT, 2 TABS ONCE THE EVENING OF TREATMENT AND 2 TABS TWICE THE DAY AFTER TREATMENT    gabapentin (NEURONTIN) 300 mg, oral, 2 times daily    levothyroxine (SYNTHROID, LEVOXYL) 175 mcg, j-tube, Daily, Take on an empty stomach at the same time each day, either 30 to 60 minutes prior to breakfast    lidocaine-prilocaine (Emla) 2.5-2.5 % cream Apply topically to affected area 30-45 minutes before Mediport access.    losartan (Cozaar) 50 mg tablet Take 1 tab daily via j-tube.    nicotine (Nicoderm CQ) 21 mg/24 hr patch 1 patch, transdermal, Every 24 hours    ondansetron ODT (ZOFRAN-ODT) 8 mg, oral, Every 8 hours PRN    prochlorperazine  (COMPAZINE) 10 mg, oral, Every 6 hours PRN    spironolactone (ALDACTONE) 50 mg, j-tube, Daily    verapamil (CALAN) 240 mg, j-tube, Daily          Objective   VS: There were no vitals taken for this visit.    PHYSICAL EXAMINATION  Labs  Results from last 7 days   Lab Units 03/13/25  1425   WBC AUTO x10*3/uL 9.3   HEMOGLOBIN g/dL 8.6*   HEMATOCRIT % 26.0*   PLATELETS AUTO x10*3/uL 208   EOS ABS MAN x10*3/uL 0.00   BANDS ABS MAN x10*3/uL 1.12*   LYMPHO ABS MAN x10*3/uL 2.70   MONO ABS MAN x10*3/uL 1.21*   LYMPHO PCT MAN % 29.0   MONO PCT MAN % 13.0   EOSINO PCT MAN % 0.0     Results from last 7 days   Lab Units 03/13/25  1425   GLUCOSE mg/dL 104*   SODIUM mmol/L 133*   POTASSIUM mmol/L 4.1   CHLORIDE mmol/L 100   CO2 mmol/L 24   BUN mg/dL 24*   CREATININE mg/dL 1.19   EGFR mL/min/1.73m*2 67   CALCIUM mg/dL 8.5*   ALBUMIN g/dL 3.0*   PROTEIN TOTAL g/dL 5.5*   BILIRUBIN TOTAL mg/dL 0.4   ALK PHOS U/L 43   ALT U/L 16   AST U/L 15     Image  EGD (11/21/2024)  Single malignant-appearing and invasive mass (not traversable) in the lower third of the esophagus, covering one half of the circumference; performed cold forceps biopsy     A. Esophagus, Distal, Mass, Biopsy:  -- Intramucosal adenocarcinoma. See note.     Note: The endoscopic impression of a friable and invasive lesion in the lower third of the esophagus is noted. This biopsy may be not representative of the entire lesion. Clinical correlation is recommended.    MISMATCH REPAIR PROTEIN EXPRESSION                    Protein:           Result                 MLH-1:             Expression Present                                                   PMS-2:            Expression Present                                                   MSH-2:            Expression Present                                                   MSH-6:            Expression Present                                       INTERPRETATION: Neoplasm with normal mismatch repair protein expression.      C/A/P CT (11/22/2024)  IMPRESSION:  1. Irregular masslike thickening of the mid to distal esophagus is  difficult to accurately measure, and may represent a neoplastic  process. Correlate with recent endoscopic imaging and biopsy results.  There is also proximal esophageal wall thickening, and a moderate  size hiatal hernia.  2. Small area of centrilobular nodularity in the right middle lobe  may represent focal aspiration/mucoid impaction or less likely  atypical infection.  3. Mild apical predominant centrilobular emphysema.  4. Severe coronary artery calcifications. Please note this exam is  not optimized for evaluation of the coronary arteries.  5. Nonobstructing 0.4 cm calculus in the left kidney.    Neck CT (11/20/2024)  IMPRESSION:  New maxillary periapical lucencies with adjacent mucosal thickening.  No discrete fluid collection identified. Dental follow-up recommended.      No discrete mass or cervical lymphadenopathy identified however  direct visualization suggested.      Bilateral parotid mass/nodule similar to prior imaging.      Postsurgical changes, emphysematous changes, mild mediastinal  lymphadenopathy and additional findings as detailed.  This note was created using a voice recognition system ( the Dragon dictation system). Inaccuracies and misspellings are unintentional.     Total time spent 20 minutes.  Madan Wilson MD.

## 2025-03-17 ENCOUNTER — TELEMEDICINE (OUTPATIENT)
Dept: HEMATOLOGY/ONCOLOGY | Facility: CLINIC | Age: 70
End: 2025-03-17
Payer: MEDICARE

## 2025-03-17 DIAGNOSIS — C15.9 PRIMARY ESOPHAGEAL ADENOCARCINOMA (MULTI): Primary | ICD-10-CM

## 2025-03-17 PROCEDURE — 99213 OFFICE O/P EST LOW 20 MIN: CPT | Performed by: INTERNAL MEDICINE

## 2025-03-17 PROCEDURE — 1126F AMNT PAIN NOTED NONE PRSNT: CPT | Performed by: INTERNAL MEDICINE

## 2025-03-17 ASSESSMENT — PAIN SCALES - GENERAL: PAINLEVEL_OUTOF10: 0-NO PAIN

## 2025-03-27 ENCOUNTER — ANESTHESIA EVENT (OUTPATIENT)
Dept: OPERATING ROOM | Facility: HOSPITAL | Age: 70
End: 2025-03-27
Payer: MEDICARE

## 2025-03-27 ENCOUNTER — HOSPITAL ENCOUNTER (OUTPATIENT)
Dept: CARDIOLOGY | Facility: HOSPITAL | Age: 70
Discharge: HOME | End: 2025-03-27
Payer: MEDICARE

## 2025-03-27 ENCOUNTER — NURSE TRIAGE (OUTPATIENT)
Dept: HEMATOLOGY/ONCOLOGY | Facility: HOSPITAL | Age: 70
End: 2025-03-27

## 2025-03-27 ENCOUNTER — PRE-ADMISSION TESTING (OUTPATIENT)
Dept: PREADMISSION TESTING | Facility: HOSPITAL | Age: 70
End: 2025-03-27
Payer: MEDICARE

## 2025-03-27 VITALS
HEIGHT: 70 IN | BODY MASS INDEX: 29.92 KG/M2 | WEIGHT: 209 LBS | OXYGEN SATURATION: 100 % | TEMPERATURE: 98.4 F | HEART RATE: 88 BPM | DIASTOLIC BLOOD PRESSURE: 79 MMHG | SYSTOLIC BLOOD PRESSURE: 150 MMHG

## 2025-03-27 DIAGNOSIS — Z01.818 PRE-OPERATIVE CLEARANCE: ICD-10-CM

## 2025-03-27 DIAGNOSIS — Z01.818 PRE-OPERATIVE CLEARANCE: Primary | ICD-10-CM

## 2025-03-27 DIAGNOSIS — C15.5 MALIGNANT NEOPLASM OF LOWER THIRD OF ESOPHAGUS (MULTI): ICD-10-CM

## 2025-03-27 LAB
ABO GROUP (TYPE) IN BLOOD: NORMAL
ALBUMIN SERPL BCP-MCNC: 3.3 G/DL (ref 3.4–5)
ANION GAP SERPL CALC-SCNC: 14 MMOL/L (ref 10–20)
ANTIBODY SCREEN: NORMAL
AORTIC VALVE MEAN GRADIENT: 6 MMHG
AORTIC VALVE PEAK VELOCITY: 1.83 M/S
AV PEAK GRADIENT: 13 MMHG
AVA (PEAK VEL): 3.45 CM2
AVA (VTI): 3.67 CM2
BUN SERPL-MCNC: 22 MG/DL (ref 6–23)
CALCIUM SERPL-MCNC: 8.6 MG/DL (ref 8.6–10.6)
CHLORIDE SERPL-SCNC: 103 MMOL/L (ref 98–107)
CO2 SERPL-SCNC: 25 MMOL/L (ref 21–32)
CREAT SERPL-MCNC: 0.78 MG/DL (ref 0.5–1.3)
EGFRCR SERPLBLD CKD-EPI 2021: >90 ML/MIN/1.73M*2
EJECTION FRACTION APICAL 4 CHAMBER: 49.5
EJECTION FRACTION: 58 %
ERYTHROCYTE [DISTWIDTH] IN BLOOD BY AUTOMATED COUNT: 17.1 % (ref 11.5–14.5)
GLUCOSE SERPL-MCNC: 90 MG/DL (ref 74–99)
HCT VFR BLD AUTO: 31 % (ref 41–52)
HGB BLD-MCNC: 9.6 G/DL (ref 13.5–17.5)
LEFT ATRIUM VOLUME AREA LENGTH INDEX BSA: 28.5 ML/M2
LEFT VENTRICLE INTERNAL DIMENSION DIASTOLE: 4.9 CM (ref 3.5–6)
LEFT VENTRICULAR OUTFLOW TRACT DIAMETER: 2.3 CM
MCH RBC QN AUTO: 32.4 PG (ref 26–34)
MCHC RBC AUTO-ENTMCNC: 31 G/DL (ref 32–36)
MCV RBC AUTO: 105 FL (ref 80–100)
MITRAL VALVE E/A RATIO: 0.91
NRBC BLD-RTO: 0 /100 WBCS (ref 0–0)
PHOSPHATE SERPL-MCNC: 4.5 MG/DL (ref 2.5–4.9)
PLATELET # BLD AUTO: 246 X10*3/UL (ref 150–450)
POTASSIUM SERPL-SCNC: 4.5 MMOL/L (ref 3.5–5.3)
RBC # BLD AUTO: 2.96 X10*6/UL (ref 4.5–5.9)
RH FACTOR (ANTIGEN D): NORMAL
RIGHT VENTRICLE FREE WALL PEAK S': 9.57 CM/S
RIGHT VENTRICLE PEAK SYSTOLIC PRESSURE: 39.2 MMHG
SODIUM SERPL-SCNC: 137 MMOL/L (ref 136–145)
TRICUSPID ANNULAR PLANE SYSTOLIC EXCURSION: 1.8 CM
WBC # BLD AUTO: 6.5 X10*3/UL (ref 4.4–11.3)

## 2025-03-27 PROCEDURE — 86900 BLOOD TYPING SEROLOGIC ABO: CPT

## 2025-03-27 PROCEDURE — 99205 OFFICE O/P NEW HI 60 MIN: CPT | Performed by: ANESTHESIOLOGY

## 2025-03-27 PROCEDURE — 85027 COMPLETE CBC AUTOMATED: CPT

## 2025-03-27 PROCEDURE — 93306 TTE W/DOPPLER COMPLETE: CPT | Performed by: INTERNAL MEDICINE

## 2025-03-27 PROCEDURE — 93306 TTE W/DOPPLER COMPLETE: CPT

## 2025-03-27 PROCEDURE — 80069 RENAL FUNCTION PANEL: CPT

## 2025-03-27 PROCEDURE — 87081 CULTURE SCREEN ONLY: CPT

## 2025-03-27 PROCEDURE — 99406 BEHAV CHNG SMOKING 3-10 MIN: CPT | Performed by: ANESTHESIOLOGY

## 2025-03-27 RX ORDER — CHLORHEXIDINE GLUCONATE ORAL RINSE 1.2 MG/ML
15 SOLUTION DENTAL DAILY
Qty: 30 ML | Refills: 0 | Status: SHIPPED | OUTPATIENT
Start: 2025-03-27 | End: 2025-04-06 | Stop reason: HOSPADM

## 2025-03-27 RX ORDER — CHLORHEXIDINE GLUCONATE 40 MG/ML
1 SOLUTION TOPICAL DAILY
Qty: 25 ML | Refills: 0 | Status: SHIPPED | OUTPATIENT
Start: 2025-03-27 | End: 2025-04-06 | Stop reason: HOSPADM

## 2025-03-27 ASSESSMENT — ENCOUNTER SYMPTOMS
MYALGIAS: 0
TROUBLE SWALLOWING: 1
WHEEZING: 0
VISUAL CHANGE: 0
WEAKNESS: 0
DIFFICULTY URINATING: 0
RHINORRHEA: 0
BRUISES/BLEEDS EASILY: 0
EXCESSIVE BLEEDING: 0
CHILLS: 0
NAUSEA: 0
ARTHRALGIAS: 0
NECK PAIN: 0
DYSURIA: 0
COUGH: 0
NECK SWELLING: 0
NUMBNESS: 0
EYE DISCHARGE: 0
VOMITING: 0
SINUS CONGESTION: 0
FEVER: 0
PALPITATIONS: 0

## 2025-03-27 ASSESSMENT — DUKE ACTIVITY SCORE INDEX (DASI)
CAN YOU DO LIGHT WORK AROUND THE HOUSE LIKE DUSTING OR WASHING DISHES: YES
CAN YOU DO YARD WORK LIKE RAKING LEAVES, WEEDING OR PUSHING A MOWER: YES
CAN YOU WALK A BLOCK OR TWO ON LEVEL GROUND: YES
TOTAL_SCORE: 34.7
CAN YOU RUN A SHORT DISTANCE: NO
CAN YOU TAKE CARE OF YOURSELF (EAT, DRESS, BATHE, OR USE TOILET): YES
CAN YOU CLIMB A FLIGHT OF STAIRS OR WALK UP A HILL: YES
CAN YOU DO HEAVY WORK AROUND THE HOUSE LIKE SCRUBBING FLOORS OR LIFTING AND MOVING HEAVY FURNITURE: NO
CAN YOU PARTICIPATE IN MODERATE RECREATIONAL ACTIVITIES LIKE GOLF, BOWLING, DANCING, DOUBLES TENNIS OR THROWING A BASEBALL OR FOOTBALL: YES
CAN YOU WALK INDOORS, SUCH AS AROUND YOUR HOUSE: YES
CAN YOU PARTICIPATE IN STRENOUS SPORTS LIKE SWIMMING, SINGLES TENNIS, FOOTBALL, BASKETBALL, OR SKIING: NO
DASI METS SCORE: 7
CAN YOU HAVE SEXUAL RELATIONS: YES
CAN YOU DO MODERATE WORK AROUND THE HOUSE LIKE VACUUMING, SWEEPING FLOORS OR CARRYING GROCERIES: YES

## 2025-03-27 NOTE — PREPROCEDURE INSTRUCTIONS
Thank you for visiting [facility] ([facility] today for your pre-procedure evaluation, you were seen by Dr. Jose Herrera DO (please insert your business card information)    This summary includes instructions and information to aid you during your perioperative period.  Please read carefully. If you have any questions about your visit today, please call the number listed above.  If you become ill or have any changes to your health before your surgery, please contact your primary care provider and alert your surgeon.    Preparing for your Surgery       Exercises  Preoperative Deep Breathing Exercises  Why it is important to do deep breathing exercises before my surgery?  Deep breathing exercises strengthen your breathing muscles.  This helps you to recover after your surgery and decreases the chance of breathing complications.  How are the deep breathing exercises done?  Sit straight with your back supported.  Breathe in deeply and slowly through your nose. Your lower rib cage should expand and your abdomen may move forward.  Hold that breath for 3 to 5 seconds.  Breathe out through pursed lips, slowly and completely.  Rest and repeat 10 times every hour while awake.  Rest longer if you become dizzy or lightheaded.       Incentive Spirometer   You were provided with an incentive spirometer in [facility], please follow the below instructions.    What is an incentive spirometer?  An incentive spirometer is a device used before and after surgery to “exercise” your lungs.  It helps you to take deeper breaths to expand your lungs.  Below is an example of a basic incentive spirometer.  The device you receive may differ slightly but they all function the same.    Why do I need to use an incentive spirometer?  Using your incentive spirometer prepares your lungs for surgery and helps prevent lung problems after surgery.  How do I use my incentive spirometer?  When you're using your incentive spirometer,  make sure to breathe through your mouth. If you breathe through your nose, the incentive spirometer won't work properly. You can hold your nose if you have trouble.  If you feel dizzy at any time, stop and rest. Try again at a later time.  Follow the steps below:  Set up your incentive spirometer, expand the flexible tubing and connect to the outlet.  Sit upright in a chair or bed. Hold the incentive spirometer at eye level.   Put the mouthpiece in your mouth and close your lips tightly around it. Slowly breathe out (exhale) completely.  Breathe in (inhale) slowly through your mouth as deeply as you can. As you take a breath, you will see the piston rise inside the large column. While the piston rises, the indicator should move upwards. It should stay in between the 2 arrows (see Figure).  Try to get the piston as high as you can, while keeping the indicator between the arrows.   If the indicator doesn't stay between the arrows, you're breathing either too fast or too slow.  When you get it as high as you can, hold your breath for 10 seconds, or as long as possible. While you're holding your breath, the piston will slowly fall to the base of the spirometer.  Once the piston reaches the bottom of the spirometer, breathe out slowly through your mouth. Rest for a few seconds.  Repeat 10 times. Try to get the piston to the same level with each breath.  Repeat every hour while awake  You can carefully clean the outside of the mouthpiece with an alcohol wipe or soap and water.      Preoperative Brain Exercises    What are brain exercises?  A brain exercise is any activity that engages your thinking (cognitive) skills.    What types of activities are considered brain exercises?  Jigsaw puzzles, crossword puzzles, word jumble, memory games, word search, and many more.  Many can be found free online or on your phone via a mobile angle.    Why should I do brain exercises before my surgery?  More recent research has shown brain  exercise before surgery can lower the risk of postoperative delirium (confusion) which can be especially important for older adults.  Patients who did brain exercises for 5 to 10 hours the days before surgery, cut their risk of postoperative delirium in half up to 1 week after surgery.    Sit-to-Stand Exercise    What is the sit-to-stand exercise?  The sit-to-stand exercise strengthens the muscles of your lower body and muscles in the center of your body (core muscles for stability) helping to maintain and improve your strength and mobility.  How do I do the sit-to-stand exercise?  The goal is to do this exercise without using your arms or hands.  If this is too difficult, use your arms and hands or a chair with armrests to help slowly push yourself to the standing position and lower yourself back to the sitting position. As the movement becomes easier use your arms and hands less.    Steps to the sit-to-stand exercise  Sit up tall in a sturdy chair, knees bent, feet flat on the floor shoulder-width apart.  Shift your hips/pelvis forward in the chair to correctly position yourself for the next movement.  Lean forward at your hips.  Stand up straight putting equal weight on both feet.  Check to be sure you are properly aligned with the chair, in a slow controlled movement sit back down.  Repeat this exercise 10-15 times.  If needed you can do it fewer times until your strength improves.  Rest for 1 minute.  Do another 10-15 sit-to-stand exercises.  Try to do this in the morning and evening.        Instructions    Preoperative Fasting Guidelines    Why must I stop eating and drinking near surgery time?  With sedation, food or liquid in your stomach can enter your lungs causing serious complications  Food can increase nausea and vomiting  When do I need to stop eating and drinking before my surgery?      Do not eat any food after midnight the night before your surgery/procedure. You may have up to 13.5 ounces of clear  liquid until TWO hours before your instructed arrival time to the hospital.  This includes water, black tea/coffee, (no milk or cream) apple juice, and electrolyte drinks (Gatorade). You may chew gum until TWO hours before your surgery/procedure            Simple things you can do to help prevent blood clots     Blood clots are blockages that can form in the body's veins. When a blood clot forms in your deep veins, it may be called a deep vein thrombosis, or DVT for short. Blood clots can happen in any part of the body where blood flows, but they are most common in the arms and legs. If a piece of a blood clot breaks free and travels to the lungs, it is called a pulmonary embolus (PE). A PE can be a very serious problem.         Being in the hospital or having surgery can raise your chances of getting a blood clot because you may not be well enough to move around as much as you normally do.         Ways you can help prevent blood clots in the hospital       Wearing SCDs  SCDs stands for Sequential Compression Devices.   SCDs are special sleeves that wrap around your legs. They attach to a pump that fills them with air to gently squeeze your legs every few minutes.  This helps return the blood in your legs to your heart.   SCDs should only be taken off when walking or bathing. SCDs may not be comfortable, but they can help save your life.              Pump SCD leg sleeves  Wearing compression stockings - if your doctor orders them. These special snug-fitting stockings gently squeeze your legs to help blood flow.       Walking. Walking helps move the blood in your legs.   If your doctor says it is ok, try walking the halls at least   5 times a day. Ask us to help you get up, so you don't fall.      Taking any blood-thinning medicines your doctor orders.              Ways you can help prevent blood clots at home         Wearing compression stockings - if your doctor orders them.   Walking - to help move the blood in  your legs.    Taking any blood-thinning medicines your doctor orders.      Signs of a blood clot or PE    Tell your doctor or nurse right away if you have any of the problems listed below.         If you are at home, seek medical care right away. Call 911 for chest pain or problems breathing.            Signs of a blood clot (DVT) - such as pain, swelling, redness, or warmth in your arm or legs.  Signs of a pulmonary embolism (PE) - such as chest pain or feeling short of breath      Tobacco and Alcohol;  Do not drink alcohol or smoke within 24 hours of surgery.  It is best to quit smoking for as long as possible before any surgery or procedure.    Quitting Smoking; Recognizing Dangerous Situations:  1-Alcohol use during the first month after quitting, 2-Being around smoke or someone who smokes 3-Times situation routinely smoked  4-Triggers-car, breaks, coffee, when awakening, social events  Coping Skills: 1-Learning new ways to manage stress 2-Exercising 3-Relaxation breathing   4-Change routines 5-Distraction techniques    Websites:  Smoke-Free - offers free text messages and an angle to help you quit. Info includes eating and mood issues that may come with quitting. There is a Live Helpline to talk to an expert. Go to smokefree.gov; Become an Ex-Smoker - the free EX Plan is based on scientific research and useful advice from ex-smokers. It isn't just about quitting smoking.  It's about re-learning life without cigarettes using a 3-step program.  Go to becomeanex.org   Centers for Disease Control offer many suggestions for helping you quit. Includes a Quit Guide and real-life stories. There are sections for specific groups such as LGBT, , different ethnic groups, and pregnant women.  Go to cdc.gov/tobacco/campaign/tips    Other Resources:  [place] Tobacco Quit Line - call 1-800-QUIT-NOW or 4-116-142-8057.  [facility] 2-1-1 - to find local programs and resources. Call 211 or go to 211.org.  [facility]  "Tobacco Cessation Program - call 005-004-5824.  American Lung Association offers classes for quitting smoking. Some places may charge a fee. For a list of classes, go to lung.org or call 4-728-[facility].     Some things to think about:; The health benefits of quitting smoking can help most of the major parts of your body. There is no safe amount of cigarette smoke. Quitting smoking can add years to your life. When you quit, you'll also protect your loved ones from dangerous secondhand smoke. Make a plan, join a support group, and talk to your physician to assist in quitting smoking.                                                                                                                   Other Instructions  Why did I have my nose, under my arms, and groin swabbed? The purpose of the swab is to identify Staphylococcus aureus inside your nose or on your skin.  The swab was sent to the laboratory for culture.  A positive swab/culture for Staphylococcus aureus is called colonization or carriage.   What is Staphylococcus aureus? Staphylococcus aureus, also known as \"staph\", is a germ found on the skin or in the nose of healthy people.  Sometimes Staphylococcus aureus can get into the body and cause an infection.  This can be minor (such as pimples, boils, or other skin problems).  It might also be serious (such as a blood infection, pneumonia, or a surgical site infection). What is Staphylococcus aureus colonization or carriage? Colonization or carriage means that a person has the germ but is not sick from it.  These bacteria can be spread on the hands or when breathing or sneezing. How is Staphylococcus aureus spread? It is most often spread by close contact with a person or item that carries it. What happens if my culture is positive for Staphylococcus aureus? Your doctor/medical team will use this information to guide any antibiotic treatment which may be necessary.  Regardless of the culture results, we will " clean the inside of your nose with a betadine swab just before you have your surgery. Will I get an infection if I have Staphylococcus aureus in my nose or on my skin? Anyone can get an infection with Staphylococcus aureus.  However, the best way to reduce your risk of infection is to follow the instructions provided to you for the use of your CHG soap and dental rinse.  , Body Wash; What is a home preoperative antibacterial shower? This shower is a way of cleaning the skin with a germ-killing solution before surgery.  The solution contains chlorhexidine, commonly known as CHG.  CHG is a skin cleanser with germ-killing ability.  Let your doctor know if you are allergic to chlorhexidine. Why do I need to take a preoperative antibacterial shower? Skin is not sterile.  It is best to try to make your skin as free of germs as possible before surgery.  Proper cleansing with a germ-killing soap before surgery can lower the number of germs on your skin.  This helps to reduce the risk of infection at the surgical site.  Following the instructions listed below will help you prepare your skin for surgery.   How do I use the solution? Steps:  Begin using your CHG soap 5 days before your scheduled surgery on ___________.   First, wash and rinse your hair using the CHG soap. Keep CHG soap away from ear canals and eyes.  Rinse completely, do not condition.  Hair extensions should be removed. , Oral/Dental Rinse: What is oral/dental rinse?  It is a mouthwash. It is a way of cleaning the mouth with a germ-killing solution before your surgery.  The solution contains chlorhexidine, commonly known as CHG. It is used inside the mouth to kill a bacteria known as Staphylococcus aureus.  Let your doctor know if you are allergic to Chlorhexidine. Why do I need to use CHG oral/dental rinse? The CHG oral/dental rinse helps to kill a bacteria in your mouth known as Staphylococcus aureus.  This reduces the risk of infection at the surgical  site.  Using your CHG oral/dental rinse STEPS: Use your CHG oral/dental rinse after you brush your teeth the night before (at bedtime) and the morning of your surgery.  Follow all directions on your prescription label.  Use the cap on the container to measure 15 ml.  Swish (gargle if you can) the mouthwash in your mouth for at least 30 seconds, (do not swallow) and spit out.  After you use your CHG rinse, do not rinse your mouth with water, drink or eat.  Please refer to the prescription label for the appropriate time to resume oral intake What side effects might I have using the CHG oral/dental rinse? CHG rinse will stick to plaque on the teeth.  Brush and floss just before use.  Teeth brushing will help avoid staining of plaque during use.             The Week before Surgery        Seven days before Surgery  Check your [facility] medication instructions  Do the exercises provided to you by [facility]   Arrange for a responsible, adult licensed  to take you home after surgery and stay with you for 24 hours.  You will not be permitted to drive yourself home if you have received any anesthetic/sedation  Six days before surgery  Check your [facility] medication instructions  Do the exercises provided to you by [facility]   Start using Chlorhexidene (CHG) body wash if prescribed  Five days before surgery  Check your [facility] medication instructions  Do the exercises provided to you by [facility]   Continue to use CHG body wash if prescribed  Three days before surgery  Check your [facility] medication instructions  Do the exercises provided to you by [facility]   Continue to use CHG body wash if prescribed  Two days before surgery  Check your [facility] medication instructions  Do the exercises provided to you by [facility]   Continue to use CHG body wash if prescribed    The Day before Surgery       Check your [facility] medication and all other [facility] instructions including when to stop eating and drinking  You will be called with your arrival time for surgery in the late afternoon.  If  you do not receive a call please reach out to your surgeon's office.  Do not smoke or drink 24 hours before surgery  Prepare items to bring with you to the hospital  Shower with your chlorhexidine wash if prescribed  Brush your teeth and use your chlorhexidine dental rinse if prescribed    The Day of Surgery       Check your [facility] medication instructions  Ensure you follow the instructions for when to stop eating and drinking  Shower, if prescribed use CHG.  Do not apply any lotions, creams, moisturizers, perfume or deodorant  Brush your teeth and use your CHG dental rinse if prescribed  Wear loose comfortable clothing  Avoid make-up  Remove  jewelry and piercings, consider professional piercing removal with a plastic spacer if needed  Bring photo ID and Insurance card  Bring an accurate medication list that includes medication dose, frequency and allergies  Bring a copy of your advanced directives (will, health care power of )  Bring any devices and controllers as well as medical devices you have been provided with for surgery (CPAP, slings, braces, etc.)  Dentures, eyeglasses, and contacts will be removed before surgery, please bring cases for contacts or glasses

## 2025-03-27 NOTE — CPM/PAT H&P
CPM/PAT Evaluation       Name: Timoteo Victoria (Timoteo Victoria)  /Age: 1955/69 y.o.     In-Person       Chief Complaint: 3 Field esophagectomy with Dr. 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 S/p J tube placement 2024.  Presents to [facility] today for perioperative risk stratification and optimization.          Past Medical History:   Diagnosis Date    CAD (coronary artery disease)     Esophageal carcinoma (Multi)     Hyperlipidemia     Hypertension     Hypothyroidism        Past Surgical History:   Procedure Laterality Date    CATARACT EXTRACTION Right 2023    CHOLECYSTECTOMY      CORONARY ARTERY BYPASS GRAFT      4-vessel    HERNIA REPAIR         Patient  has no history on file for sexual activity.    Family History   Problem Relation Name Age of Onset    COPD Mother         Allergies   Allergen Reactions    Ibuprofen Swelling       Prior to Admission medications    Medication Sig Start Date End Date Taking? Authorizing Provider   amitriptyline (Elavil) 100 mg tablet 1 tablet (100 mg) by j-tube route once daily at bedtime. 25  [facility] Grimes-CNS   aspirin 81 mg chewable tablet 1 tablet (81 mg) by j-tube route once daily.    Historical Provider, MD   atorvastatin (Lipitor) 40 mg tablet 1 tablet (40 mg) by j-tube route once daily. 25   Dafne Bedolla,    baclofen (Lioresal) 5 mg tablet Take 1 tablet (5 mg) by mouth 3 times a day. 25   Bereket Head APRN-CNS   buPROPion (Wellbutrin) 75 mg tablet 2 tablets (150 mg) by j-tube route 2 times a day. 25  Bereket Head APRN-CNS   clobetasol (Temovate) 0.05 % cream Apply small  amount topically to hands and feet two times daily. Do not use for more than 14 days. 3/13/25   Madan Wilson MD   dexAMETHasone (Decadron) 4 mg tablet TAKE 2 TABLETS BY MOUTH TWICE DAILY THE DAY BEFORE TREATMENT, 2 TABS ONCE THE EVENING OF TREATMENT AND 2 TABS TWICE THE DAY AFTER TREATMENT 2/10/25   Historical Provider, MD   gabapentin (Neurontin) 50 mg/mL solution Take 6 mL (300 mg) by mouth 2 times a day. 3/11/25 4/10/25  Dafne Bedolla DO   levothyroxine (Synthroid, Levoxyl) 175 mcg tablet 1 tablet (175 mcg) by j-tube route early in the morning.. Take on an empty stomach at the same time each day, either 30 to 60 minutes prior to breakfast 1/31/25 4/1/25  [facility] Grimes-CNS   lidocaine-prilocaine (Emla) 2.5-2.5 % cream Apply topically to affected area 30-45 minutes before Mediport access. 2/13/25   Marychuy Garcia APRN-CNP   losartan (Cozaar) 50 mg tablet Take 1 tab daily via j-tube. 2/11/25   Dafne Bedolla DO   nicotine (Nicoderm CQ) 21 mg/24 hr patch Place 1 patch over 24 hours on the skin once every 24 hours. 3/13/25 5/12/25  Madan Wilson MD   ondansetron ODT (Zofran-ODT) 8 mg disintegrating tablet Dissolve 1 tablet (8 mg) in the mouth every 8 hours if needed for nausea or vomiting. 12/12/24   Madan Wilson MD   prochlorperazine (Compazine) 10 mg tablet Take 1 tablet (10 mg) by mouth every 6 hours if needed for nausea or vomiting. 12/12/24   Madan Wilson MD   spironolactone (Aldactone) 50 mg tablet 1 tablet (50 mg) by j-tube route once daily. 11/30/24 2/11/25  Hector Shi MD   verapamil (Calan) 80 mg tablet 3 tablets (240 mg) by j-tube route once daily. 1/13/25 3/14/25  Tamara E Ghandour, APRN-CNP        PAT ROS:   Constitutional:    no fever   no chills  Neuro/Psych:    no numbness   no weakness  Eyes:    no discharge   no vision loss  Ears:    no ear pain   no hearing loss  Nose:    no nasal discharge   no sinus congestion  Mouth:    no dental issues   no  "mouth pain  Throat:    no throat pain   dysphagia  Neck:    no neck pain   neck swelling  Cardio:    no chest pain   no palpitations  Respiratory:    no cough   no wheezing  Endocrine:    no cold intolerance   no heat intolerance  GI:    no nausea   no vomiting  :    no difficulty urinating   no dysuria  Musculoskeletal:    no arthralgias   no myalgias  Hematologic:    does not bruise/bleed easily   no excessive bleeding  Skin:   no rash      Physical Exam  Constitutional:       Appearance: Normal appearance.   HENT:      Head: Normocephalic and atraumatic.      Nose: Nose normal.      Mouth/Throat:      Mouth: Mucous membranes are moist.   Eyes:      Pupils: Pupils are equal, round, and reactive to light.   Cardiovascular:      Rate and Rhythm: Normal rate and regular rhythm.   Pulmonary:      Effort: Pulmonary effort is normal.      Breath sounds: Normal breath sounds.   Abdominal:      General: Abdomen is flat.      Palpations: Abdomen is soft.   Musculoskeletal:         General: Swelling present.      Cervical back: Normal range of motion and neck supple.      Comments: +2 pitting edema in lower extremity    Skin:     General: Skin is dry.   Neurological:      General: No focal deficit present.      Mental Status: He is alert and oriented to person, place, and time.   Psychiatric:         Mood and Affect: Mood normal.         Behavior: Behavior normal.          PAT AIRWAY:   Airway:     Mallampati::  II    TM distance::  >3 FB    Neck ROM::  Full   Poor upper dentition.       Testing/Diagnostic:     Patient Specialist/PCP:     Visit Vitals  /79   Pulse 88   Temp 36.9 °C (98.4 °F)   Ht 1.778 m (5' 10\")   Wt 94.8 kg (209 lb)   SpO2 100%   BMI 29.99 kg/m²   Smoking Status Every Day   BSA 2.16 m²       DASI Risk Score      Flowsheet Row Pre-Admission Testing from 3/27/2025 in [facility]   Can you take care of yourself (eat, dress, bathe, or use toilet)?  2.75 filed at 03/27/2025 0832   Can " you walk indoors, such as around your house? 1.75 filed at 03/27/2025 0832   Can you walk a block or two on level ground?  2.75 filed at 03/27/2025 0832   Can you climb a flight of stairs or walk up a hill? 5.5 filed at 03/27/2025 0832   Can you run a short distance? 0 filed at 03/27/2025 0832   Can you do light work around the house like dusting or washing dishes? 2.7 filed at 03/27/2025 0832   Can you do moderate work around the house like vacuuming, sweeping floors or carrying groceries? 3.5 filed at 03/27/2025 0832   Can you do heavy work around the house like scrubbing floors or lifting and moving heavy furniture?  0 filed at 03/27/2025 0832   Can you do yard work like raking leaves, weeding or pushing a mower? 4.5 filed at 03/27/2025 0832   Can you have sexual relations? 5.25 filed at 03/27/2025 0832   Can you participate in moderate recreational activities like golf, bowling, dancing, doubles tennis or throwing a baseball or football? 6 filed at 03/27/2025 0832   Can you participate in strenous sports like swimming, singles tennis, football, basketball, or skiing? 0 filed at 03/27/2025 0832   DASI SCORE 34.7 filed at 03/27/2025 0832   METS Score (Will be calculated only when all the questions are answered) 7 filed at 03/27/2025 0832          Caprini DVT Assessment      Flowsheet Row Pre-Admission Testing from 3/27/2025 in [facility] ED to Hosp-Admission (Discharged) from 2/25/2025 in [facility] Observation with Raleigh Myles MD PhD and Deng Shaffer, DO   DVT Score (IF A SCORE IS NOT CALCULATING, MUST SELECT A BMI TO COMPLETE) 10 filed at 03/27/2025 0909 3 filed at 02/25/2025 0843   Medical Factors Present cancer, chemotherapy, or previous malignancy filed at 03/27/2025 0909 --   Surgical Factors Major surgery planned, lasting over 3 hours filed at 03/27/2025 0909 --   BMI (BMI MUST BE CHOSEN) 30 or less filed at 03/27/2025 0909 30 or less filed at 02/25/2025 0843           Modified Frailty Index    No data to display       [id number] Stroke Risk Points  Current as of just now        N/A 0 to 9 Points:      Last Change: N/A          The [id number] risk score (Lip STACY, et al. 2009. © 2010 [employer]) quantifies the risk of stroke for a patient with atrial fibrillation. For patients without atrial fibrillation or under the age of 18 this score appears as N/A. Higher score values generally indicate higher risk of stroke.        This score is not applicable to this patient. Components are not calculated.          Revised Cardiac Risk Index      Flowsheet Row Pre-Admission Testing from 3/27/2025 in [facility]   High-Risk Surgery (Intraperitoneal, Intrathoracic,Suprainguinal vascular) 1 filed at 03/27/2025 0906   History of ischemic heart disease (History of MI, History of positive exercuse test, Current chest paint considered due to myocardial ischemia, Use of nitrate therapy, ECG with pathological Q Waves) 1 filed at 03/27/2025 0906   History of congestive heart failure (pulmonary edemia, bilateral rales or S3 gallop, Paroxysmal nocturnal dyspnea, CXR showing pulmonary vascular redistribution) 0 filed at 03/27/2025 0906   History of cerebrovascular disease (Prior TIA or stroke) 0 filed at 03/27/2025 0906   Pre-operative insulin treatment 0 filed at 03/27/2025 0906   Pre-operative creatinine>2 mg/dl 0 filed at 03/27/2025 0906   Revised Cardiac Risk Calculator 2 filed at 03/27/2025 0906          Apfel Simplified Score    No data to display       Risk Analysis Index Results This Encounter    No data found in the last 10 encounters.       Stop Bang Score      Flowsheet Row Pre-Admission Testing from 3/27/2025 in [facility] Admission (Discharged) from 11/22/2024 in [facility] [place] [facility] with Hector Shi MD   Do you snore loudly? 0 filed at 03/27/2025 0833 0 filed at 11/25/2024 1124   Do you often feel  tired or fatigued after your sleep? 1 filed at 03/27/2025 0833 0 filed at 11/25/2024 1124   Has anyone ever observed you stop breathing in your sleep? 0 filed at 03/27/2025 0833 0 filed at 11/25/2024 1124   Do you have or are you being treated for high blood pressure? 1 filed at 03/27/2025 0833 1 filed at 11/25/2024 1124   Recent BMI (Calculated) 30 filed at 03/27/2025 0833 29.7 filed at 11/25/2024 1124   Is BMI greater than 35 kg/m2? 0=No filed at 03/27/2025 0833 0=No filed at 11/25/2024 1124   Age older than 50 years old? 1=Yes filed at 03/27/2025 0833 1=Yes filed at 11/25/2024 1124   Is your neck circumference greater than 17 inches (Male) or 16 inches (Female)? 0 filed at 03/27/2025 0833 --   Gender - Male 1=Yes filed at 03/27/2025 0833 1=Yes filed at 11/25/2024 1124   STOP-BANG Total Score 4 filed at 03/27/2025 0833 --          Prodigy: High Risk  Total Score: 16              Prodigy Age Score      Prodigy Gender Score          ARISCAT Score for Postoperative Pulmonary Complications      Flowsheet Row Pre-Admission Testing from 3/27/2025 in [facility]   Age Calculated Score 3 filed at 03/27/2025 0909   Preoperative SpO2 0 filed at 03/27/2025 0909   Respiratory infection in the last month Either upper or lower (i.e., URI, bronchitis, pneumonia), with fever and antibiotic treatment 0 filed at 03/27/2025 0909   Preoperative anemia (Hgb less than 10 g/dl) 11 filed at 03/27/2025 0909   Surgical incision  24 filed at 03/27/2025 0909   Duration of surgery  23 filed at 03/27/2025 0909   Emergency Procedure  0 filed at 03/27/2025 0909   ARISCAT Total Score  61 filed at 03/27/2025 0909          Paiz Perioperative Risk for Myocardial Infarction or Cardiac Arrest (BUTCH)      Flowsheet Row Pre-Admission Testing from 3/27/2025 in [facility]   Calculated Age Score 1.36 filed at 03/27/2025 0906   Functional Status  0 filed at 03/27/2025 0906   ASA Class  -1.92 filed at 03/27/2025 0906    Creatinine 0 filed at 03/27/2025 0906            Assessment and Plan:       No results found for this or any previous visit (from the past 24 hours).     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 S/p J tube placement 11/25/2024.  Presents to [facility] today for perioperative risk stratification and optimization.    Neuro:  No neurologic diagnosis, however, the patient is at increased risk for perioperative delirium secondary to  age, poor nutrition, type and duration of surgery, Patient instructed on and provided cognitive exercises  Patient is at increased risk for perioperative CVA secondary to  cardiac disease, HTN, increased age, operative time > 2.5 hours    HEENT:  No HEENT diagnosis or significant findings on chart review or clinical presentation and evaluation. No further preoperative testing/intervention indicated at this time.    Cardiovascular:  CAD (CABG 2005), HTN, HLD    Pt on physical exam had +2 pitting edema in LE that he stated was new in the last couple of weeks. Pt is scheduled to have a TTE performed today at 11am. Will follow up with results     TTE 3/27/2025  CONCLUSIONS:   1. The left ventricular systolic function is normal, with a visually estimated ejection fraction of 55-60%.   2. Abnormal left venticular wall motion.   3. Spectral Doppler shows a Grade II (pseudonormal pattern) of left ventricular diastolic filling with an elevated left atrial pressure.   4. Abnormal septal motion consistent with post-operative status and abnormal septal motion, consistent with intraventricular conduction delay.   5. There is basal inferior and inferolateral, and inferoseptal hypokinesia.   6.  There is low normal right ventricular systolic function.   7. Mildly enlarged right ventricle.   8. Mildly elevated right ventricular systolic pressure.    METS: 7  RCRI: 2 points, 10.1% risk for postoperative MACE   BUTCH: 1.36% risk for 30 day postoperative MACE  EKG - 1/12/2025: NSR with RBB, inverted t-waves from old infarct     Pulmonary:  No pulmonary diagnosis, however patient is at increased risk of perioperative complications secondary to  age > 60, Tobacco abuse, major surgery, duration of surgery > 2 hours, types of anesthetic  Stop Bang score is 4 placing patient at intermediate risk for RAPHAEL  ARISCAT: >45 points, 42.1% risk of in-hospital postoperative pulmonary complication    Smoking cessation discussed with patient, patient also provided cessation education/hotline handout, [facility] toilet education discussed, patient also provided deep breathing exercises and incentive spirometry educational handout    Renal:   No renal diagnosis, however patient is at increase risk for perioperative renal complications secondary to  Age equal to or greater than 56, HTN, intraperitoneal surgery, use of an ace, arb, or NSAID      Endocrine:  No endocrine diagnosis or significant findings on chart review or clinical presentation and evaluation. No further testing or intervention is indicated at this time.    Hematologic:  No hematologic diagnosis, however patient is at an increased risk for DVT  Caprini Score 10, patient at High risk for perioperative DVT.  Patient provided with VTE education/handout.    Gastrointestinal:   S/p J tube placement 11/25/2024, nocturnal trickle feeds. Pt states that he is drinking 5 [employer] nutritional drinks a day.   Eat-10 score 0      Infectious disease:   No infectious diagnosis or significant findings on chart review or clinical presentation and evaluation.   Prescription provided for CHG body wash and dental rinse. CHG use instructions reviewed and provided to patient.  Jama  screen collected    Musculoskeletal:   No diagnosis or significant findings on chart review or clinical presentation and evaluation.     Anesthesia/Airway:  No anesthesia complications      Medication instructions and NPO guidelines reviewed with the patient.  All questions or concerns discussed and addressed.      Patient seen and staffed with Dr. Melgar

## 2025-03-27 NOTE — TELEPHONE ENCOUNTER
Additional Information  • Commented on: What else do you want to tell me about this problem?     Timoteo calls today requesting to speak to Marychuy Garcia CNP or Dr. Wilson. He continues to have BLE and inquires if this is due to chemo and if there is anything he needs to do. Calves/ankles/feet are swollen equally and he is able to get shoes on his feet. Denies redness, warmth or pain. Denies SOB, chest pain, fever/chills or any other worrisome symptoms.    Last FLOT chemotherapy on 2/27.    Pt is scheduled for esophagectomy on Monday 3/31 with Dr. Salas.    Preferred pharmacy is [facility] in [place].    Advised that he try to keep legs elevated while sitting as much as he can and see if that helps. Compression stockings may be helpful as well. Pt verbalized understanding with teach back.     Message sent to team to see if they advise further interventions.    Protocols used: Swelling/Deep Venous Thrombosis (DVT)

## 2025-03-28 DIAGNOSIS — Z51.81 THERAPEUTIC DRUG MONITORING: ICD-10-CM

## 2025-03-28 LAB — STAPHYLOCOCCUS SPEC CULT: NORMAL

## 2025-03-28 NOTE — PROGRESS NOTES
Pharmacy Medication History Review    Timoteo Victoria is a 69 y.o. male who is planned to be admitted for Malignant neoplasm of lower third of esophagus (Multi). Pharmacy called the patient prior to their scheduled procedure and reviewed the patient's [id number] medications for accuracy.    Medications ADDED:  none  Medications CHANGED:  Verapamil 80mg TO verapamil ER 240mg  Medications REMOVED:   Gabapentin 50mg/ml  Nicotine 21mg/24h    Please review updated prior to admission medication list and comments regarding how patient may be taking medications differently by going to Admission tab --> Admission Orders --> Admit Orders / Review prior to admission medications.     Preferred pharmacy, last doses of medications, and allergies to be confirmed with patient by nursing the day of procedure.     Sources used to complete the med history include:  Organizer  Pharmacy dispense history  Patient Interview Good historian  Chart Review  Care Everywhere     Below are additional concerns with the patient's PTA list.  Patient states they are taking #1 tablet of verapamil ER 240mg once daily. L.F. 11/02/24 #90/90d. States they are finishing up this dose/bottle before starting verapamil 80mg #3QD  Patient states they ran out of gabapentin 50mg/ml. So they are not taking at this time. L.F. 03/12/25 360ml/30d and [employer] verified  Patient states they have not started nicotine 21mg/24h patches yet    Citlalli Marie [facility]  Please reach out via Secure Chat for questions

## 2025-03-31 ENCOUNTER — APPOINTMENT (OUTPATIENT)
Dept: RADIOLOGY | Facility: HOSPITAL | Age: 70
DRG: 327 | End: 2025-03-31
Payer: MEDICARE

## 2025-03-31 ENCOUNTER — ANESTHESIA (OUTPATIENT)
Dept: OPERATING ROOM | Facility: HOSPITAL | Age: 70
End: 2025-03-31
Payer: MEDICARE

## 2025-03-31 ENCOUNTER — HOSPITAL ENCOUNTER (INPATIENT)
Facility: HOSPITAL | Age: 70
LOS: 6 days | Discharge: HOME | End: 2025-04-06
Attending: THORACIC SURGERY (CARDIOTHORACIC VASCULAR SURGERY) | Admitting: THORACIC SURGERY (CARDIOTHORACIC VASCULAR SURGERY)
Payer: MEDICARE

## 2025-03-31 DIAGNOSIS — K22.89 ESOPHAGEAL MASS: ICD-10-CM

## 2025-03-31 DIAGNOSIS — R06.6 HICCUPS: ICD-10-CM

## 2025-03-31 DIAGNOSIS — G89.18 ACUTE POSTOPERATIVE PAIN: ICD-10-CM

## 2025-03-31 DIAGNOSIS — R13.19 ESOPHAGEAL DYSPHAGIA: ICD-10-CM

## 2025-03-31 DIAGNOSIS — R10.9 ACUTE POSTOPERATIVE ABDOMINAL PAIN: ICD-10-CM

## 2025-03-31 DIAGNOSIS — C15.5 MALIGNANT NEOPLASM OF LOWER THIRD OF ESOPHAGUS (MULTI): Primary | ICD-10-CM

## 2025-03-31 DIAGNOSIS — G89.18 ACUTE POSTOPERATIVE ABDOMINAL PAIN: ICD-10-CM

## 2025-03-31 LAB
ABO GROUP (TYPE) IN BLOOD: NORMAL
ALBUMIN SERPL BCP-MCNC: 3.4 G/DL (ref 3.4–5)
ANION GAP BLDA CALCULATED.4IONS-SCNC: 12 MMO/L (ref 10–25)
ANION GAP BLDA CALCULATED.4IONS-SCNC: 12 MMO/L (ref 10–25)
ANION GAP BLDA CALCULATED.4IONS-SCNC: 9 MMO/L (ref 10–25)
ANION GAP BLDA CALCULATED.4IONS-SCNC: 9 MMO/L (ref 10–25)
ANION GAP SERPL CALC-SCNC: 12 MMOL/L (ref 10–20)
ANTIBODY SCREEN: NORMAL
APTT PPP: 32 SECONDS (ref 26–36)
BASE EXCESS BLDA CALC-SCNC: -2.9 MMOL/L (ref -2–3)
BASE EXCESS BLDA CALC-SCNC: -4.5 MMOL/L (ref -2–3)
BASE EXCESS BLDA CALC-SCNC: -5.1 MMOL/L (ref -2–3)
BASE EXCESS BLDA CALC-SCNC: -5.9 MMOL/L (ref -2–3)
BODY TEMPERATURE: 37 DEGREES CELSIUS
BUN SERPL-MCNC: 23 MG/DL (ref 6–23)
CA-I BLD-SCNC: 1.19 MMOL/L (ref 1.1–1.33)
CA-I BLDA-SCNC: 1.2 MMOL/L (ref 1.1–1.33)
CA-I BLDA-SCNC: 1.21 MMOL/L (ref 1.1–1.33)
CA-I BLDA-SCNC: 1.23 MMOL/L (ref 1.1–1.33)
CA-I BLDA-SCNC: 1.25 MMOL/L (ref 1.1–1.33)
CALCIUM SERPL-MCNC: 8.4 MG/DL (ref 8.6–10.6)
CHLORIDE BLDA-SCNC: 103 MMOL/L (ref 98–107)
CHLORIDE BLDA-SCNC: 105 MMOL/L (ref 98–107)
CHLORIDE SERPL-SCNC: 104 MMOL/L (ref 98–107)
CO2 SERPL-SCNC: 22 MMOL/L (ref 21–32)
CREAT SERPL-MCNC: 0.71 MG/DL (ref 0.5–1.3)
EGFRCR SERPLBLD CKD-EPI 2021: >90 ML/MIN/1.73M*2
ERYTHROCYTE [DISTWIDTH] IN BLOOD BY AUTOMATED COUNT: 16.1 % (ref 11.5–14.5)
GLUCOSE BLD MANUAL STRIP-MCNC: 119 MG/DL (ref 74–99)
GLUCOSE BLD MANUAL STRIP-MCNC: 134 MG/DL (ref 74–99)
GLUCOSE BLD MANUAL STRIP-MCNC: 164 MG/DL (ref 74–99)
GLUCOSE BLDA-MCNC: 140 MG/DL (ref 74–99)
GLUCOSE BLDA-MCNC: 157 MG/DL (ref 74–99)
GLUCOSE BLDA-MCNC: 159 MG/DL (ref 74–99)
GLUCOSE BLDA-MCNC: 161 MG/DL (ref 74–99)
GLUCOSE SERPL-MCNC: 157 MG/DL (ref 74–99)
HCO3 BLDA-SCNC: 20.6 MMOL/L (ref 22–26)
HCO3 BLDA-SCNC: 21.1 MMOL/L (ref 22–26)
HCO3 BLDA-SCNC: 21.8 MMOL/L (ref 22–26)
HCO3 BLDA-SCNC: 21.9 MMOL/L (ref 22–26)
HCT VFR BLD AUTO: 24.7 % (ref 41–52)
HCT VFR BLD EST: 25 % (ref 41–52)
HCT VFR BLD EST: 27 % (ref 41–52)
HCT VFR BLD EST: 28 % (ref 41–52)
HCT VFR BLD EST: 32 % (ref 41–52)
HGB BLD-MCNC: 7.9 G/DL (ref 13.5–17.5)
HGB BLDA-MCNC: 10.8 G/DL (ref 13.5–17.5)
HGB BLDA-MCNC: 8.4 G/DL (ref 13.5–17.5)
HGB BLDA-MCNC: 9.1 G/DL (ref 13.5–17.5)
HGB BLDA-MCNC: 9.2 G/DL (ref 13.5–17.5)
INHALED O2 CONCENTRATION: 28 %
INHALED O2 CONCENTRATION: 35 %
INHALED O2 CONCENTRATION: 40 %
INHALED O2 CONCENTRATION: 90 %
INR PPP: 1.1 (ref 0.9–1.1)
LACTATE BLDA-SCNC: 1 MMOL/L (ref 0.4–2)
LACTATE BLDA-SCNC: 1 MMOL/L (ref 0.4–2)
LACTATE BLDA-SCNC: 1.6 MMOL/L (ref 0.4–2)
LACTATE BLDA-SCNC: 1.6 MMOL/L (ref 0.4–2)
MAGNESIUM SERPL-MCNC: 2.05 MG/DL (ref 1.6–2.4)
MCH RBC QN AUTO: 32.1 PG (ref 26–34)
MCHC RBC AUTO-ENTMCNC: 32 G/DL (ref 32–36)
MCV RBC AUTO: 100 FL (ref 80–100)
NRBC BLD-RTO: 0 /100 WBCS (ref 0–0)
OXYHGB MFR BLDA: 94.2 % (ref 94–98)
OXYHGB MFR BLDA: 95.6 % (ref 94–98)
OXYHGB MFR BLDA: 96.1 % (ref 94–98)
OXYHGB MFR BLDA: 96.7 % (ref 94–98)
PCO2 BLDA: 37 MM HG (ref 38–42)
PCO2 BLDA: 40 MM HG (ref 38–42)
PCO2 BLDA: 40 MM HG (ref 38–42)
PCO2 BLDA: 52 MM HG (ref 38–42)
PH BLDA: 7.23 PH (ref 7.38–7.42)
PH BLDA: 7.32 PH (ref 7.38–7.42)
PH BLDA: 7.33 PH (ref 7.38–7.42)
PH BLDA: 7.38 PH (ref 7.38–7.42)
PHOSPHATE SERPL-MCNC: 3.6 MG/DL (ref 2.5–4.9)
PLATELET # BLD AUTO: 148 X10*3/UL (ref 150–450)
PO2 BLDA: 138 MM HG (ref 85–95)
PO2 BLDA: 192 MM HG (ref 85–95)
PO2 BLDA: 91 MM HG (ref 85–95)
PO2 BLDA: 94 MM HG (ref 85–95)
POTASSIUM BLDA-SCNC: 4.2 MMOL/L (ref 3.5–5.3)
POTASSIUM BLDA-SCNC: 4.5 MMOL/L (ref 3.5–5.3)
POTASSIUM BLDA-SCNC: 4.6 MMOL/L (ref 3.5–5.3)
POTASSIUM BLDA-SCNC: 4.6 MMOL/L (ref 3.5–5.3)
POTASSIUM SERPL-SCNC: 4.4 MMOL/L (ref 3.5–5.3)
PROTHROMBIN TIME: 12.1 SECONDS (ref 9.8–12.4)
RBC # BLD AUTO: 2.46 X10*6/UL (ref 4.5–5.9)
RH FACTOR (ANTIGEN D): NORMAL
SAO2 % BLDA: 96 % (ref 94–100)
SAO2 % BLDA: 98 % (ref 94–100)
SAO2 % BLDA: 98 % (ref 94–100)
SAO2 % BLDA: 99 % (ref 94–100)
SODIUM BLDA-SCNC: 131 MMOL/L (ref 136–145)
SODIUM BLDA-SCNC: 132 MMOL/L (ref 136–145)
SODIUM BLDA-SCNC: 132 MMOL/L (ref 136–145)
SODIUM BLDA-SCNC: 133 MMOL/L (ref 136–145)
SODIUM SERPL-SCNC: 134 MMOL/L (ref 136–145)
WBC # BLD AUTO: 9.4 X10*3/UL (ref 4.4–11.3)

## 2025-03-31 PROCEDURE — 2500000004 HC RX 250 GENERAL PHARMACY W/ HCPCS (ALT 636 FOR OP/ED): Performed by: STUDENT IN AN ORGANIZED HEALTH CARE EDUCATION/TRAINING PROGRAM

## 2025-03-31 PROCEDURE — 2020000001 HC ICU ROOM DAILY

## 2025-03-31 PROCEDURE — 0DB34ZZ EXCISION OF LOWER ESOPHAGUS, PERCUTANEOUS ENDOSCOPIC APPROACH: ICD-10-PCS | Performed by: THORACIC SURGERY (CARDIOTHORACIC VASCULAR SURGERY)

## 2025-03-31 PROCEDURE — 2500000004 HC RX 250 GENERAL PHARMACY W/ HCPCS (ALT 636 FOR OP/ED)

## 2025-03-31 PROCEDURE — 83735 ASSAY OF MAGNESIUM: CPT

## 2025-03-31 PROCEDURE — P9045 ALBUMIN (HUMAN), 5%, 250 ML: HCPCS | Mod: JZ,TB | Performed by: STUDENT IN AN ORGANIZED HEALTH CARE EDUCATION/TRAINING PROGRAM

## 2025-03-31 PROCEDURE — 3600000009 HC OR TIME - EACH INCREMENTAL 1 MINUTE - PROCEDURE LEVEL FOUR: Performed by: THORACIC SURGERY (CARDIOTHORACIC VASCULAR SURGERY)

## 2025-03-31 PROCEDURE — 84132 ASSAY OF SERUM POTASSIUM: CPT | Performed by: STUDENT IN AN ORGANIZED HEALTH CARE EDUCATION/TRAINING PROGRAM

## 2025-03-31 PROCEDURE — 37799 UNLISTED PX VASCULAR SURGERY: CPT

## 2025-03-31 PROCEDURE — 0BJ08ZZ INSPECTION OF TRACHEOBRONCHIAL TREE, VIA NATURAL OR ARTIFICIAL OPENING ENDOSCOPIC: ICD-10-PCS | Performed by: THORACIC SURGERY (CARDIOTHORACIC VASCULAR SURGERY)

## 2025-03-31 PROCEDURE — 2500000004 HC RX 250 GENERAL PHARMACY W/ HCPCS (ALT 636 FOR OP/ED): Performed by: THORACIC SURGERY (CARDIOTHORACIC VASCULAR SURGERY)

## 2025-03-31 PROCEDURE — 2500000005 HC RX 250 GENERAL PHARMACY W/O HCPCS: Performed by: THORACIC SURGERY (CARDIOTHORACIC VASCULAR SURGERY)

## 2025-03-31 PROCEDURE — 88313 SPECIAL STAINS GROUP 2: CPT | Mod: TC,SUR | Performed by: THORACIC SURGERY (CARDIOTHORACIC VASCULAR SURGERY)

## 2025-03-31 PROCEDURE — 0DN60ZZ RELEASE STOMACH, OPEN APPROACH: ICD-10-PCS | Performed by: THORACIC SURGERY (CARDIOTHORACIC VASCULAR SURGERY)

## 2025-03-31 PROCEDURE — 0DB40ZZ EXCISION OF ESOPHAGOGASTRIC JUNCTION, OPEN APPROACH: ICD-10-PCS | Performed by: THORACIC SURGERY (CARDIOTHORACIC VASCULAR SURGERY)

## 2025-03-31 PROCEDURE — 07B74ZX EXCISION OF THORAX LYMPHATIC, PERCUTANEOUS ENDOSCOPIC APPROACH, DIAGNOSTIC: ICD-10-PCS | Performed by: THORACIC SURGERY (CARDIOTHORACIC VASCULAR SURGERY)

## 2025-03-31 PROCEDURE — 71045 X-RAY EXAM CHEST 1 VIEW: CPT

## 2025-03-31 PROCEDURE — 88309 TISSUE EXAM BY PATHOLOGIST: CPT | Performed by: STUDENT IN AN ORGANIZED HEALTH CARE EDUCATION/TRAINING PROGRAM

## 2025-03-31 PROCEDURE — 85610 PROTHROMBIN TIME: CPT

## 2025-03-31 PROCEDURE — 2500000002 HC RX 250 W HCPCS SELF ADMINISTERED DRUGS (ALT 637 FOR MEDICARE OP, ALT 636 FOR OP/ED)

## 2025-03-31 PROCEDURE — 3700000002 HC GENERAL ANESTHESIA TIME - EACH INCREMENTAL 1 MINUTE: Performed by: THORACIC SURGERY (CARDIOTHORACIC VASCULAR SURGERY)

## 2025-03-31 PROCEDURE — 82330 ASSAY OF CALCIUM: CPT

## 2025-03-31 PROCEDURE — 88342 IMHCHEM/IMCYTCHM 1ST ANTB: CPT | Performed by: STUDENT IN AN ORGANIZED HEALTH CARE EDUCATION/TRAINING PROGRAM

## 2025-03-31 PROCEDURE — 84132 ASSAY OF SERUM POTASSIUM: CPT

## 2025-03-31 PROCEDURE — 0D870ZZ DIVISION OF STOMACH, PYLORUS, OPEN APPROACH: ICD-10-PCS | Performed by: THORACIC SURGERY (CARDIOTHORACIC VASCULAR SURGERY)

## 2025-03-31 PROCEDURE — 86900 BLOOD TYPING SEROLOGIC ABO: CPT

## 2025-03-31 PROCEDURE — 71045 X-RAY EXAM CHEST 1 VIEW: CPT | Performed by: RADIOLOGY

## 2025-03-31 PROCEDURE — 82947 ASSAY GLUCOSE BLOOD QUANT: CPT

## 2025-03-31 PROCEDURE — 2720000007 HC OR 272 NO HCPCS: Performed by: THORACIC SURGERY (CARDIOTHORACIC VASCULAR SURGERY)

## 2025-03-31 PROCEDURE — 99291 CRITICAL CARE FIRST HOUR: CPT

## 2025-03-31 PROCEDURE — 85027 COMPLETE CBC AUTOMATED: CPT

## 2025-03-31 PROCEDURE — 88305 TISSUE EXAM BY PATHOLOGIST: CPT | Performed by: STUDENT IN AN ORGANIZED HEALTH CARE EDUCATION/TRAINING PROGRAM

## 2025-03-31 PROCEDURE — 36620 INSERTION CATHETER ARTERY: CPT | Performed by: STUDENT IN AN ORGANIZED HEALTH CARE EDUCATION/TRAINING PROGRAM

## 2025-03-31 PROCEDURE — 88313 SPECIAL STAINS GROUP 2: CPT | Performed by: STUDENT IN AN ORGANIZED HEALTH CARE EDUCATION/TRAINING PROGRAM

## 2025-03-31 PROCEDURE — 43288 ESPHG THRSC MOBLJ: CPT | Performed by: THORACIC SURGERY (CARDIOTHORACIC VASCULAR SURGERY)

## 2025-03-31 PROCEDURE — A43112 PR REMOVAL ESOPHAGUS,W THORACOTOMY: Performed by: ANESTHESIOLOGY

## 2025-03-31 PROCEDURE — 3700000001 HC GENERAL ANESTHESIA TIME - INITIAL BASE CHARGE: Performed by: THORACIC SURGERY (CARDIOTHORACIC VASCULAR SURGERY)

## 2025-03-31 PROCEDURE — 3600000004 HC OR TIME - INITIAL BASE CHARGE - PROCEDURE LEVEL FOUR: Performed by: THORACIC SURGERY (CARDIOTHORACIC VASCULAR SURGERY)

## 2025-03-31 RX ORDER — HYDROMORPHONE HYDROCHLORIDE 1 MG/ML
INJECTION, SOLUTION INTRAMUSCULAR; INTRAVENOUS; SUBCUTANEOUS AS NEEDED
Status: DISCONTINUED | OUTPATIENT
Start: 2025-03-31 | End: 2025-03-31

## 2025-03-31 RX ORDER — MAGNESIUM SULFATE HEPTAHYDRATE 40 MG/ML
2 INJECTION, SOLUTION INTRAVENOUS EVERY 6 HOURS PRN
Status: DISCONTINUED | OUTPATIENT
Start: 2025-03-31 | End: 2025-04-01

## 2025-03-31 RX ORDER — PROPOFOL 10 MG/ML
INJECTION, EMULSION INTRAVENOUS AS NEEDED
Status: DISCONTINUED | OUTPATIENT
Start: 2025-03-31 | End: 2025-03-31

## 2025-03-31 RX ORDER — NALOXONE HYDROCHLORIDE 0.4 MG/ML
0.2 INJECTION, SOLUTION INTRAMUSCULAR; INTRAVENOUS; SUBCUTANEOUS AS NEEDED
Status: DISCONTINUED | OUTPATIENT
Start: 2025-03-31 | End: 2025-04-01

## 2025-03-31 RX ORDER — METHADONE IN SOD CHLOR,ISO-OSM 10 MG/ML
SYRINGE (ML) INTRAVENOUS AS NEEDED
Status: DISCONTINUED | OUTPATIENT
Start: 2025-03-31 | End: 2025-03-31

## 2025-03-31 RX ORDER — CALCIUM GLUCONATE 20 MG/ML
2 INJECTION, SOLUTION INTRAVENOUS EVERY 6 HOURS PRN
Status: DISCONTINUED | OUTPATIENT
Start: 2025-03-31 | End: 2025-04-01

## 2025-03-31 RX ORDER — CEFAZOLIN 1 G/1
INJECTION, POWDER, FOR SOLUTION INTRAVENOUS AS NEEDED
Status: DISCONTINUED | OUTPATIENT
Start: 2025-03-31 | End: 2025-03-31

## 2025-03-31 RX ORDER — HEPARIN SODIUM 5000 [USP'U]/ML
5000 INJECTION, SOLUTION INTRAVENOUS; SUBCUTANEOUS EVERY 8 HOURS
Status: DISCONTINUED | OUTPATIENT
Start: 2025-03-31 | End: 2025-04-01

## 2025-03-31 RX ORDER — DEXTROSE 50 % IN WATER (D50W) INTRAVENOUS SYRINGE
12.5
Status: DISCONTINUED | OUTPATIENT
Start: 2025-03-31 | End: 2025-04-06 | Stop reason: HOSPADM

## 2025-03-31 RX ORDER — HYDROMORPHONE HCL/0.9% NACL/PF 15 MG/30ML
PATIENT CONTROLLED ANALGESIA SYRINGE INTRAVENOUS CONTINUOUS
Status: DISCONTINUED | OUTPATIENT
Start: 2025-03-31 | End: 2025-04-03

## 2025-03-31 RX ORDER — ROCURONIUM BROMIDE 10 MG/ML
INJECTION, SOLUTION INTRAVENOUS AS NEEDED
Status: DISCONTINUED | OUTPATIENT
Start: 2025-03-31 | End: 2025-03-31

## 2025-03-31 RX ORDER — HYDROMORPHONE HCL/0.9% NACL/PF 15 MG/30ML
PATIENT CONTROLLED ANALGESIA SYRINGE INTRAVENOUS CONTINUOUS
Status: DISCONTINUED | OUTPATIENT
Start: 2025-03-31 | End: 2025-03-31

## 2025-03-31 RX ORDER — LIDOCAINE HYDROCHLORIDE 20 MG/ML
INJECTION, SOLUTION INFILTRATION; PERINEURAL AS NEEDED
Status: DISCONTINUED | OUTPATIENT
Start: 2025-03-31 | End: 2025-03-31

## 2025-03-31 RX ORDER — BUPIVACAINE HCL/EPINEPHRINE 0.25-.0005
VIAL (ML) INJECTION AS NEEDED
Status: DISCONTINUED | OUTPATIENT
Start: 2025-03-31 | End: 2025-03-31 | Stop reason: HOSPADM

## 2025-03-31 RX ORDER — DEXMEDETOMIDINE HYDROCHLORIDE 4 UG/ML
INJECTION, SOLUTION INTRAVENOUS CONTINUOUS PRN
Status: DISCONTINUED | OUTPATIENT
Start: 2025-03-31 | End: 2025-03-31

## 2025-03-31 RX ORDER — DEXTROSE 50 % IN WATER (D50W) INTRAVENOUS SYRINGE
25
Status: DISCONTINUED | OUTPATIENT
Start: 2025-03-31 | End: 2025-04-06 | Stop reason: HOSPADM

## 2025-03-31 RX ORDER — MAGNESIUM SULFATE HEPTAHYDRATE 40 MG/ML
4 INJECTION, SOLUTION INTRAVENOUS EVERY 6 HOURS PRN
Status: DISCONTINUED | OUTPATIENT
Start: 2025-03-31 | End: 2025-04-01

## 2025-03-31 RX ORDER — CALCIUM GLUCONATE 20 MG/ML
1 INJECTION, SOLUTION INTRAVENOUS EVERY 6 HOURS PRN
Status: DISCONTINUED | OUTPATIENT
Start: 2025-03-31 | End: 2025-04-01

## 2025-03-31 RX ORDER — NALOXONE HYDROCHLORIDE 0.4 MG/ML
0.2 INJECTION, SOLUTION INTRAMUSCULAR; INTRAVENOUS; SUBCUTANEOUS AS NEEDED
Status: DISCONTINUED | OUTPATIENT
Start: 2025-03-31 | End: 2025-03-31

## 2025-03-31 RX ORDER — ALBUMIN HUMAN 50 G/1000ML
SOLUTION INTRAVENOUS AS NEEDED
Status: DISCONTINUED | OUTPATIENT
Start: 2025-03-31 | End: 2025-03-31

## 2025-03-31 RX ORDER — ONDANSETRON HYDROCHLORIDE 2 MG/ML
INJECTION, SOLUTION INTRAVENOUS AS NEEDED
Status: DISCONTINUED | OUTPATIENT
Start: 2025-03-31 | End: 2025-03-31

## 2025-03-31 RX ORDER — INSULIN LISPRO 100 [IU]/ML
0-5 INJECTION, SOLUTION INTRAVENOUS; SUBCUTANEOUS EVERY 4 HOURS
Status: DISCONTINUED | OUTPATIENT
Start: 2025-03-31 | End: 2025-04-03

## 2025-03-31 RX ORDER — MAGNESIUM SULFATE HEPTAHYDRATE 500 MG/ML
INJECTION, SOLUTION INTRAMUSCULAR; INTRAVENOUS AS NEEDED
Status: DISCONTINUED | OUTPATIENT
Start: 2025-03-31 | End: 2025-03-31

## 2025-03-31 RX ORDER — HYDROMORPHONE HYDROCHLORIDE 0.2 MG/ML
0.2 INJECTION INTRAMUSCULAR; INTRAVENOUS; SUBCUTANEOUS EVERY 4 HOURS PRN
Status: DISCONTINUED | OUTPATIENT
Start: 2025-03-31 | End: 2025-03-31

## 2025-03-31 RX ORDER — PANTOPRAZOLE SODIUM 40 MG/10ML
40 INJECTION, POWDER, LYOPHILIZED, FOR SOLUTION INTRAVENOUS
Status: DISCONTINUED | OUTPATIENT
Start: 2025-04-01 | End: 2025-04-06 | Stop reason: HOSPADM

## 2025-03-31 RX ORDER — HYDROMORPHONE HYDROCHLORIDE 0.2 MG/ML
0.2 INJECTION INTRAMUSCULAR; INTRAVENOUS; SUBCUTANEOUS
Status: DISCONTINUED | OUTPATIENT
Start: 2025-03-31 | End: 2025-03-31

## 2025-03-31 RX ORDER — CEFAZOLIN SODIUM 2 G/100ML
2 INJECTION, SOLUTION INTRAVENOUS EVERY 8 HOURS
Status: COMPLETED | OUTPATIENT
Start: 2025-03-31 | End: 2025-04-01

## 2025-03-31 RX ORDER — SODIUM CHLORIDE, SODIUM LACTATE, POTASSIUM CHLORIDE, CALCIUM CHLORIDE 600; 310; 30; 20 MG/100ML; MG/100ML; MG/100ML; MG/100ML
75 INJECTION, SOLUTION INTRAVENOUS CONTINUOUS
Status: DISCONTINUED | OUTPATIENT
Start: 2025-03-31 | End: 2025-04-01

## 2025-03-31 RX ORDER — METOPROLOL TARTRATE 1 MG/ML
5 INJECTION, SOLUTION INTRAVENOUS EVERY 6 HOURS
Status: DISCONTINUED | OUTPATIENT
Start: 2025-03-31 | End: 2025-04-06 | Stop reason: HOSPADM

## 2025-03-31 RX ORDER — FENTANYL CITRATE 50 UG/ML
INJECTION, SOLUTION INTRAMUSCULAR; INTRAVENOUS AS NEEDED
Status: DISCONTINUED | OUTPATIENT
Start: 2025-03-31 | End: 2025-03-31

## 2025-03-31 RX ORDER — PHENYLEPHRINE HCL IN 0.9% NACL 0.4MG/10ML
SYRINGE (ML) INTRAVENOUS AS NEEDED
Status: DISCONTINUED | OUTPATIENT
Start: 2025-03-31 | End: 2025-03-31

## 2025-03-31 RX ORDER — ACETAMINOPHEN 10 MG/ML
INJECTION, SOLUTION INTRAVENOUS AS NEEDED
Status: DISCONTINUED | OUTPATIENT
Start: 2025-03-31 | End: 2025-03-31

## 2025-03-31 RX ORDER — SODIUM CHLORIDE 0.9 G/100ML
INJECTION, SOLUTION IRRIGATION AS NEEDED
Status: DISCONTINUED | OUTPATIENT
Start: 2025-03-31 | End: 2025-03-31 | Stop reason: HOSPADM

## 2025-03-31 RX ADMIN — MAGNESIUM SULFATE HEPTAHYDRATE 0.5 G: 500 INJECTION, SOLUTION INTRAMUSCULAR; INTRAVENOUS at 11:17

## 2025-03-31 RX ADMIN — Medication 80 MCG: at 12:52

## 2025-03-31 RX ADMIN — ROCURONIUM BROMIDE 70 MG: 10 INJECTION INTRAVENOUS at 10:28

## 2025-03-31 RX ADMIN — METOPROLOL TARTRATE 5 MG: 1 INJECTION, SOLUTION INTRAVENOUS at 17:26

## 2025-03-31 RX ADMIN — Medication 120 MCG: at 12:57

## 2025-03-31 RX ADMIN — ALBUMIN HUMAN 250 ML: 0.05 INJECTION, SOLUTION INTRAVENOUS at 12:59

## 2025-03-31 RX ADMIN — CEFAZOLIN 2 G: 1 INJECTION, POWDER, FOR SOLUTION INTRAMUSCULAR; INTRAVENOUS at 14:25

## 2025-03-31 RX ADMIN — HYDROMORPHONE HYDROCHLORIDE 0.2 MG: 1 INJECTION, SOLUTION INTRAMUSCULAR; INTRAVENOUS; SUBCUTANEOUS at 16:06

## 2025-03-31 RX ADMIN — ROCURONIUM BROMIDE 10 MG: 10 INJECTION INTRAVENOUS at 13:15

## 2025-03-31 RX ADMIN — Medication 5 MG: at 12:09

## 2025-03-31 RX ADMIN — HYDROMORPHONE HYDROCHLORIDE 0.2 MG: 1 INJECTION, SOLUTION INTRAMUSCULAR; INTRAVENOUS; SUBCUTANEOUS at 16:05

## 2025-03-31 RX ADMIN — LIDOCAINE HYDROCHLORIDE 60 MG: 20 INJECTION, SOLUTION INFILTRATION; PERINEURAL at 11:16

## 2025-03-31 RX ADMIN — FENTANYL CITRATE 50 MCG: 50 INJECTION, SOLUTION INTRAMUSCULAR; INTRAVENOUS at 10:27

## 2025-03-31 RX ADMIN — CEFAZOLIN 2 G: 1 INJECTION, POWDER, FOR SOLUTION INTRAMUSCULAR; INTRAVENOUS at 10:35

## 2025-03-31 RX ADMIN — Medication 120 MCG: at 13:08

## 2025-03-31 RX ADMIN — ALBUMIN HUMAN 250 ML: 0.05 INJECTION, SOLUTION INTRAVENOUS at 15:42

## 2025-03-31 RX ADMIN — ROCURONIUM BROMIDE 20 MG: 10 INJECTION INTRAVENOUS at 12:04

## 2025-03-31 RX ADMIN — DEXAMETHASONE SODIUM PHOSPHATE 4 MG: 4 INJECTION INTRA-ARTICULAR; INTRALESIONAL; INTRAMUSCULAR; INTRAVENOUS; SOFT TISSUE at 10:35

## 2025-03-31 RX ADMIN — CEFAZOLIN SODIUM 2 G: 2 INJECTION, SOLUTION INTRAVENOUS at 22:49

## 2025-03-31 RX ADMIN — SUGAMMADEX 200 MG: 100 INJECTION, SOLUTION INTRAVENOUS at 16:24

## 2025-03-31 RX ADMIN — PROPOFOL 30 MG: 10 INJECTION, EMULSION INTRAVENOUS at 10:48

## 2025-03-31 RX ADMIN — Medication: at 17:22

## 2025-03-31 RX ADMIN — HEPARIN SODIUM 5000 UNITS: 5000 INJECTION, SOLUTION INTRAVENOUS; SUBCUTANEOUS at 17:38

## 2025-03-31 RX ADMIN — LIDOCAINE HYDROCHLORIDE 40 MG: 20 INJECTION, SOLUTION INFILTRATION; PERINEURAL at 10:27

## 2025-03-31 RX ADMIN — ROCURONIUM BROMIDE 10 MG: 10 INJECTION INTRAVENOUS at 14:24

## 2025-03-31 RX ADMIN — PROPOFOL 20 MG: 10 INJECTION, EMULSION INTRAVENOUS at 15:53

## 2025-03-31 RX ADMIN — DEXMEDETOMIDINE HYDROCHLORIDE 0.4 MCG/KG/HR: 4 INJECTION, SOLUTION INTRAVENOUS at 10:52

## 2025-03-31 RX ADMIN — ALBUMIN HUMAN 250 ML: 0.05 INJECTION, SOLUTION INTRAVENOUS at 14:28

## 2025-03-31 RX ADMIN — METOPROLOL TARTRATE 5 MG: 1 INJECTION, SOLUTION INTRAVENOUS at 22:54

## 2025-03-31 RX ADMIN — SODIUM CHLORIDE, SODIUM LACTATE, POTASSIUM CHLORIDE, AND CALCIUM CHLORIDE 40 ML/HR: .6; .31; .03; .02 INJECTION, SOLUTION INTRAVENOUS at 17:14

## 2025-03-31 RX ADMIN — MAGNESIUM SULFATE HEPTAHYDRATE 0.5 G: 500 INJECTION, SOLUTION INTRAMUSCULAR; INTRAVENOUS at 11:19

## 2025-03-31 RX ADMIN — Medication 5 MG: at 12:20

## 2025-03-31 RX ADMIN — SODIUM CHLORIDE, SODIUM LACTATE, POTASSIUM CHLORIDE, AND CALCIUM CHLORIDE: 600; 310; 30; 20 INJECTION, SOLUTION INTRAVENOUS at 10:29

## 2025-03-31 RX ADMIN — ROCURONIUM BROMIDE 20 MG: 10 INJECTION INTRAVENOUS at 13:42

## 2025-03-31 RX ADMIN — PROPOFOL 170 MG: 10 INJECTION, EMULSION INTRAVENOUS at 10:28

## 2025-03-31 RX ADMIN — ROCURONIUM BROMIDE 10 MG: 10 INJECTION INTRAVENOUS at 14:49

## 2025-03-31 RX ADMIN — ONDANSETRON 4 MG: 2 INJECTION, SOLUTION INTRAMUSCULAR; INTRAVENOUS at 15:51

## 2025-03-31 RX ADMIN — ROCURONIUM BROMIDE 30 MG: 10 INJECTION INTRAVENOUS at 11:08

## 2025-03-31 RX ADMIN — FENTANYL CITRATE 50 MCG: 50 INJECTION, SOLUTION INTRAMUSCULAR; INTRAVENOUS at 11:04

## 2025-03-31 RX ADMIN — ACETAMINOPHEN 1000 MG: 10 INJECTION, SOLUTION INTRAVENOUS at 11:24

## 2025-03-31 RX ADMIN — ROCURONIUM BROMIDE 10 MG: 10 INJECTION INTRAVENOUS at 15:17

## 2025-03-31 RX ADMIN — INSULIN LISPRO 1 UNITS: 100 INJECTION, SOLUTION INTRAVENOUS; SUBCUTANEOUS at 17:26

## 2025-03-31 SDOH — HEALTH STABILITY: PHYSICAL HEALTH: ON AVERAGE, HOW MANY MINUTES DO YOU ENGAGE IN EXERCISE AT THIS LEVEL?: 0 MIN

## 2025-03-31 SDOH — SOCIAL STABILITY: SOCIAL INSECURITY: ABUSE: ADULT

## 2025-03-31 SDOH — ECONOMIC STABILITY: HOUSING INSECURITY: IN THE LAST 12 MONTHS, WAS THERE A TIME WHEN YOU WERE NOT ABLE TO PAY THE MORTGAGE OR RENT ON TIME?: NO

## 2025-03-31 SDOH — ECONOMIC STABILITY: INCOME INSECURITY: IN THE PAST 12 MONTHS HAS THE ELECTRIC, GAS, OIL, OR WATER COMPANY THREATENED TO SHUT OFF SERVICES IN YOUR HOME?: NO

## 2025-03-31 SDOH — SOCIAL STABILITY: SOCIAL INSECURITY: WITHIN THE LAST YEAR, HAVE YOU BEEN HUMILIATED OR EMOTIONALLY ABUSED IN OTHER WAYS BY YOUR PARTNER OR EX-PARTNER?: NO

## 2025-03-31 SDOH — SOCIAL STABILITY: SOCIAL INSECURITY: WITHIN THE LAST YEAR, HAVE YOU BEEN AFRAID OF YOUR PARTNER OR EX-PARTNER?: NO

## 2025-03-31 SDOH — ECONOMIC STABILITY: FOOD INSECURITY: HOW HARD IS IT FOR YOU TO PAY FOR THE VERY BASICS LIKE FOOD, HOUSING, MEDICAL CARE, AND HEATING?: NOT HARD AT ALL

## 2025-03-31 SDOH — SOCIAL STABILITY: SOCIAL NETWORK: HOW OFTEN DO YOU GET TOGETHER WITH FRIENDS OR RELATIVES?: ONCE A WEEK

## 2025-03-31 SDOH — ECONOMIC STABILITY: FOOD INSECURITY: WITHIN THE PAST 12 MONTHS, YOU WORRIED THAT YOUR FOOD WOULD RUN OUT BEFORE YOU GOT THE MONEY TO BUY MORE.: NEVER TRUE

## 2025-03-31 SDOH — ECONOMIC STABILITY: FOOD INSECURITY: WITHIN THE PAST 12 MONTHS, THE FOOD YOU BOUGHT JUST DIDN'T LAST AND YOU DIDN'T HAVE MONEY TO GET MORE.: NEVER TRUE

## 2025-03-31 SDOH — ECONOMIC STABILITY: HOUSING INSECURITY: IN THE PAST 12 MONTHS, HOW MANY TIMES HAVE YOU MOVED WHERE YOU WERE LIVING?: 0

## 2025-03-31 SDOH — ECONOMIC STABILITY: HOUSING INSECURITY: AT ANY TIME IN THE PAST 12 MONTHS, WERE YOU HOMELESS OR LIVING IN A SHELTER (INCLUDING NOW)?: NO

## 2025-03-31 SDOH — HEALTH STABILITY: MENTAL HEALTH
DO YOU FEEL STRESS - TENSE, RESTLESS, NERVOUS, OR ANXIOUS, OR UNABLE TO SLEEP AT NIGHT BECAUSE YOUR MIND IS TROUBLED ALL THE TIME - THESE DAYS?: NOT AT ALL

## 2025-03-31 SDOH — HEALTH STABILITY: PHYSICAL HEALTH: ON AVERAGE, HOW MANY DAYS PER WEEK DO YOU ENGAGE IN MODERATE TO STRENUOUS EXERCISE (LIKE A BRISK WALK)?: 0 DAYS

## 2025-03-31 SDOH — SOCIAL STABILITY: SOCIAL INSECURITY
ASK PARENT OR GUARDIAN: ARE THERE TIMES WHEN YOU, YOUR CHILD(REN), OR ANY MEMBER OF YOUR HOUSEHOLD FEEL UNSAFE, HARMED, OR THREATENED AROUND PERSONS WITH WHOM YOU KNOW OR LIVE?: NO

## 2025-03-31 SDOH — SOCIAL STABILITY: SOCIAL INSECURITY: HAS ANYONE EVER THREATENED TO HURT YOUR FAMILY OR YOUR PETS?: NO

## 2025-03-31 SDOH — ECONOMIC STABILITY: TRANSPORTATION INSECURITY: IN THE PAST 12 MONTHS, HAS LACK OF TRANSPORTATION KEPT YOU FROM MEDICAL APPOINTMENTS OR FROM GETTING MEDICATIONS?: NO

## 2025-03-31 SDOH — SOCIAL STABILITY: SOCIAL INSECURITY: WERE YOU ABLE TO COMPLETE ALL THE BEHAVIORAL HEALTH SCREENINGS?: YES

## 2025-03-31 SDOH — SOCIAL STABILITY: SOCIAL NETWORK
IN A TYPICAL WEEK, HOW MANY TIMES DO YOU TALK ON THE PHONE WITH FAMILY, FRIENDS, OR NEIGHBORS?: MORE THAN THREE TIMES A WEEK

## 2025-03-31 SDOH — SOCIAL STABILITY: SOCIAL INSECURITY: ARE YOU MARRIED, WIDOWED, DIVORCED, SEPARATED, NEVER MARRIED, OR LIVING WITH A PARTNER?: DIVORCED

## 2025-03-31 SDOH — SOCIAL STABILITY: SOCIAL INSECURITY: HAVE YOU HAD ANY THOUGHTS OF HARMING ANYONE ELSE?: NO

## 2025-03-31 SDOH — SOCIAL STABILITY: SOCIAL INSECURITY: DOES ANYONE TRY TO KEEP YOU FROM HAVING/CONTACTING OTHER FRIENDS OR DOING THINGS OUTSIDE YOUR HOME?: NO

## 2025-03-31 SDOH — SOCIAL STABILITY: SOCIAL NETWORK
DO YOU BELONG TO ANY CLUBS OR ORGANIZATIONS SUCH AS CHURCH GROUPS, UNIONS, FRATERNAL OR ATHLETIC GROUPS, OR SCHOOL GROUPS?: YES

## 2025-03-31 SDOH — SOCIAL STABILITY: SOCIAL INSECURITY: ARE YOU OR HAVE YOU BEEN THREATENED OR ABUSED PHYSICALLY, EMOTIONALLY, OR SEXUALLY BY ANYONE?: NO

## 2025-03-31 SDOH — HEALTH STABILITY: MENTAL HEALTH: CURRENT SMOKER: 1

## 2025-03-31 SDOH — HEALTH STABILITY: PHYSICAL HEALTH
HOW OFTEN DO YOU NEED TO HAVE SOMEONE HELP YOU WHEN YOU READ INSTRUCTIONS, PAMPHLETS, OR OTHER WRITTEN MATERIAL FROM YOUR DOCTOR OR PHARMACY?: NEVER

## 2025-03-31 SDOH — SOCIAL STABILITY: SOCIAL INSECURITY: DO YOU FEEL ANYONE HAS EXPLOITED OR TAKEN ADVANTAGE OF YOU FINANCIALLY OR OF YOUR PERSONAL PROPERTY?: NO

## 2025-03-31 SDOH — SOCIAL STABILITY: SOCIAL INSECURITY: ARE THERE ANY APPARENT SIGNS OF INJURIES/BEHAVIORS THAT COULD BE RELATED TO ABUSE/NEGLECT?: NO

## 2025-03-31 SDOH — SOCIAL STABILITY: SOCIAL NETWORK: HOW OFTEN DO YOU ATTEND CHURCH OR RELIGIOUS SERVICES?: 1 TO 4 TIMES PER YEAR

## 2025-03-31 SDOH — SOCIAL STABILITY: SOCIAL NETWORK: HOW OFTEN DO YOU ATTEND MEETINGS OF THE CLUBS OR ORGANIZATIONS YOU BELONG TO?: NEVER

## 2025-03-31 SDOH — SOCIAL STABILITY: SOCIAL INSECURITY: DO YOU FEEL UNSAFE GOING BACK TO THE PLACE WHERE YOU ARE LIVING?: NO

## 2025-03-31 SDOH — SOCIAL STABILITY: SOCIAL INSECURITY: HAVE YOU HAD THOUGHTS OF HARMING ANYONE ELSE?: NO

## 2025-03-31 ASSESSMENT — PATIENT HEALTH QUESTIONNAIRE - PHQ9
2. FEELING DOWN, DEPRESSED OR HOPELESS: NOT AT ALL
SUM OF ALL RESPONSES TO PHQ9 QUESTIONS 1 & 2: 0
1. LITTLE INTEREST OR PLEASURE IN DOING THINGS: NOT AT ALL

## 2025-03-31 ASSESSMENT — ACTIVITIES OF DAILY LIVING (ADL)
FEEDING YOURSELF: INDEPENDENT
WALKS IN HOME: INDEPENDENT
LACK_OF_TRANSPORTATION: NO
LACK_OF_TRANSPORTATION: PATIENT DECLINED
LACK_OF_TRANSPORTATION: NO
GROOMING: INDEPENDENT
HEARING - RIGHT EAR: FUNCTIONAL
BATHING: INDEPENDENT
ADEQUATE_TO_COMPLETE_ADL: YES
TOILETING: INDEPENDENT
LACK_OF_TRANSPORTATION: NO
PATIENT'S MEMORY ADEQUATE TO SAFELY COMPLETE DAILY ACTIVITIES?: YES
JUDGMENT_ADEQUATE_SAFELY_COMPLETE_DAILY_ACTIVITIES: YES
HEARING - LEFT EAR: FUNCTIONAL
LACK_OF_TRANSPORTATION: NO
DRESSING YOURSELF: INDEPENDENT

## 2025-03-31 ASSESSMENT — COGNITIVE AND FUNCTIONAL STATUS - GENERAL
PATIENT BASELINE BEDBOUND: NO
MOBILITY SCORE: 24
DAILY ACTIVITIY SCORE: 24

## 2025-03-31 ASSESSMENT — PAIN SCALES - GENERAL
PAIN_LEVEL: 3
PAINLEVEL_OUTOF10: 0 - NO PAIN
PAINLEVEL_OUTOF10: 0 - NO PAIN
PAIN_LEVEL: 0
PAINLEVEL_OUTOF10: 0 - NO PAIN
PAINLEVEL_OUTOF10: 5 - MODERATE PAIN
PAINLEVEL_OUTOF10: 0 - NO PAIN

## 2025-03-31 ASSESSMENT — LIFESTYLE VARIABLES
HOW MANY STANDARD DRINKS CONTAINING ALCOHOL DO YOU HAVE ON A TYPICAL DAY: PATIENT DOES NOT DRINK
SUBSTANCE_ABUSE_PAST_12_MONTHS: NO
AUDIT-C TOTAL SCORE: 0
SKIP TO QUESTIONS 9-10: 1
AUDIT-C TOTAL SCORE: 0
HOW OFTEN DO YOU HAVE A DRINK CONTAINING ALCOHOL: NEVER
PRESCIPTION_ABUSE_PAST_12_MONTHS: NO
HOW OFTEN DO YOU HAVE 6 OR MORE DRINKS ON ONE OCCASION: NEVER

## 2025-03-31 ASSESSMENT — PAIN - FUNCTIONAL ASSESSMENT
PAIN_FUNCTIONAL_ASSESSMENT: 0-10

## 2025-03-31 NOTE — H&P
History Of Present Illness  Timoteo Victoria is a 69 y.o. male with PMH CAD (CABG x4 2005), HTN, HLD, hypothyroidism, tobacco use disorder, cluster headaches, esophageal adenocarcinoma (s/p J tube placement on 11/25/24 and FLOT chemotherapy x4 cycles completed 2/27/24). He now presents s/p esophagectomy with Dr. Salas on 3/31/25.    OR course: Uncomplicated OR course. Did not require any pressors.    EBL: 75  UOP: 365  Crystalloid: 2700  Colloid: 750  Products: none  Lines: L radial a-line, 3 PIVs  Intubation: grade IIa view     Past Medical History  Past Medical History:   Diagnosis Date    CAD (coronary artery disease)     Esophageal carcinoma (Multi)     Hyperlipidemia     Hypertension     Hypothyroidism        Surgical History  Past Surgical History:   Procedure Laterality Date    CATARACT EXTRACTION Right 08/25/2023    CHOLECYSTECTOMY      CORONARY ARTERY BYPASS GRAFT  2005    4-vessel    HERNIA REPAIR          Social History  He reports that he has been smoking cigarettes. He started smoking about 46 years ago. He has a 45.3 pack-year smoking history. He has been exposed to tobacco smoke. He has never used smokeless tobacco. He reports that he does not currently use alcohol. He reports that he does not use drugs.    Family History  Family History   Problem Relation Name Age of Onset    COPD Mother          Allergies  Ibuprofen    Review of Systems   Reason unable to perform ROS: Drowsy post-op.        Physical Exam  Constitutional:       General: He is not in acute distress.  HENT:      Head: Normocephalic and atraumatic.      Nose:      Comments: NG tube     Mouth/Throat:      Mouth: Mucous membranes are dry.   Eyes:      Pupils: Pupils are equal, round, and reactive to light.   Neck:      Comments: Neck CHAD drain  Cardiovascular:      Rate and Rhythm: Normal rate and regular rhythm.      Pulses: Normal pulses.      Heart sounds: Normal heart sounds.      Comments: R chest mediport  Pulmonary:      Effort:  "Pulmonary effort is normal.      Breath sounds: Normal breath sounds.      Comments: 2L NC    R chest tube  Abdominal:      General: Abdomen is flat.      Palpations: Abdomen is soft.      Comments: J tube in place   Musculoskeletal:      Right lower leg: No edema.      Left lower leg: No edema.   Skin:     General: Skin is warm and dry.   Neurological:      General: No focal deficit present.      Mental Status: He is oriented to person, place, and time.          Last Recorded Vitals  Blood pressure 154/69, pulse 91, temperature 36.1 °C (97 °F), temperature source Temporal, resp. rate 16, height 1.778 m (5' 10\"), weight 93.4 kg (205 lb 14.6 oz), SpO2 100%.    Relevant Results  Scheduled medications  ceFAZolin, 2 g, intravenous, q8h  insulin lispro, 0-5 Units, subcutaneous, q4h  metoprolol, 5 mg, intravenous, q6h  [START ON 4/1/2025] pantoprazole, 40 mg, intravenous, Daily before breakfast      Continuous medications  HYDROmorphone,       PRN medications  PRN medications: calcium gluconate, calcium gluconate, dextrose, dextrose, glucagon, glucagon, HYDROmorphone, HYDROmorphone, magnesium sulfate, magnesium sulfate, naloxone     Results for orders placed or performed during the hospital encounter of 03/31/25 (from the past 24 hours)   Blood Gas Arterial Full Panel   Result Value Ref Range    POCT pH, Arterial 7.23 (LL) 7.38 - 7.42 pH    POCT pCO2, Arterial 52 (H) 38 - 42 mm Hg    POCT pO2, Arterial 91 85 - 95 mm Hg    POCT SO2, Arterial 96 94 - 100 %    POCT Oxy Hemoglobin, Arterial 94.2 94.0 - 98.0 %    POCT Hematocrit Calculated, Arterial 32.0 (L) 41.0 - 52.0 %    POCT Sodium, Arterial 132 (L) 136 - 145 mmol/L    POCT Potassium, Arterial 4.2 3.5 - 5.3 mmol/L    POCT Chloride, Arterial 105 98 - 107 mmol/L    POCT Ionized Calcium, Arterial 1.21 1.10 - 1.33 mmol/L    POCT Glucose, Arterial 157 (H) 74 - 99 mg/dL    POCT Lactate, Arterial 1.0 0.4 - 2.0 mmol/L    POCT Base Excess, Arterial -5.9 (L) -2.0 - 3.0 mmol/L    " POCT HCO3 Calculated, Arterial 21.8 (L) 22.0 - 26.0 mmol/L    POCT Hemoglobin, Arterial 10.8 (L) 13.5 - 17.5 g/dL    POCT Anion Gap, Arterial 9 (L) 10 - 25 mmo/L    Patient Temperature 37.0 degrees Celsius    FiO2 90 %   Blood Gas Arterial Full Panel   Result Value Ref Range    POCT pH, Arterial 7.32 (L) 7.38 - 7.42 pH    POCT pCO2, Arterial 40 38 - 42 mm Hg    POCT pO2, Arterial 192 (H) 85 - 95 mm Hg    POCT SO2, Arterial 99 94 - 100 %    POCT Oxy Hemoglobin, Arterial 96.7 94.0 - 98.0 %    POCT Hematocrit Calculated, Arterial 27.0 (L) 41.0 - 52.0 %    POCT Sodium, Arterial 133 (L) 136 - 145 mmol/L    POCT Potassium, Arterial 4.6 3.5 - 5.3 mmol/L    POCT Chloride, Arterial 105 98 - 107 mmol/L    POCT Ionized Calcium, Arterial 1.25 1.10 - 1.33 mmol/L    POCT Glucose, Arterial 140 (H) 74 - 99 mg/dL    POCT Lactate, Arterial 1.0 0.4 - 2.0 mmol/L    POCT Base Excess, Arterial -5.1 (L) -2.0 - 3.0 mmol/L    POCT HCO3 Calculated, Arterial 20.6 (L) 22.0 - 26.0 mmol/L    POCT Hemoglobin, Arterial 9.1 (L) 13.5 - 17.5 g/dL    POCT Anion Gap, Arterial 12 10 - 25 mmo/L    Patient Temperature 37.0 degrees Celsius    FiO2 40 %   Blood Gas Arterial Full Panel   Result Value Ref Range    POCT pH, Arterial 7.33 (L) 7.38 - 7.42 pH    POCT pCO2, Arterial 40 38 - 42 mm Hg    POCT pO2, Arterial 138 (H) 85 - 95 mm Hg    POCT SO2, Arterial 98 94 - 100 %    POCT Oxy Hemoglobin, Arterial 96.1 94.0 - 98.0 %    POCT Hematocrit Calculated, Arterial 28.0 (L) 41.0 - 52.0 %    POCT Sodium, Arterial 131 (L) 136 - 145 mmol/L    POCT Potassium, Arterial 4.5 3.5 - 5.3 mmol/L    POCT Chloride, Arterial 105 98 - 107 mmol/L    POCT Ionized Calcium, Arterial 1.20 1.10 - 1.33 mmol/L    POCT Glucose, Arterial 161 (H) 74 - 99 mg/dL    POCT Lactate, Arterial 1.6 0.4 - 2.0 mmol/L    POCT Base Excess, Arterial -4.5 (L) -2.0 - 3.0 mmol/L    POCT HCO3 Calculated, Arterial 21.1 (L) 22.0 - 26.0 mmol/L    POCT Hemoglobin, Arterial 9.2 (L) 13.5 - 17.5 g/dL    POCT  Anion Gap, Arterial 9 (L) 10 - 25 mmo/L    Patient Temperature 37.0 degrees Celsius    FiO2 35 %         Assessment/Plan   Assessment & Plan  Malignant neoplasm of lower third of esophagus (Multi)      Assessment:  Timoteo Victoria is a 69 y.o. male with a history of esophageal cancer presenting to SICU from the OR s/p esophagectomy and J tube replacement. OR course was uncomplicated. Arrived to SICU extubated and on no pressors.    Plan:  NEURO: History of cluster headaches, chronic pain around J tube, depression. Follows with palliative outpatient for pain and symptom management. Acute post-op pain. Drowsy s/p OR procedure.  - ongoing neuro/pain assessments  - Pain control with hydromorphone PCA  - PT/OT consult  - Home meds: Wellbutrin 150mg BID, amitriptyline 100mg nightly, baclofen 5mg TID. Has taken oxycodone and morphine at home recently.    CV: History of CAD (CABG x4 2005), HTN, HLD. Baseline /79 at PAT. Baseline echo (3/27/25) with EF 55-60%, RV low normal function. Arrived to SICU on no pressors.  - continuous EKG/ABP monitoring  - goal map range 65-90  - avoid pressors, use volume for hypotension  - volume resuscitate as clinically indicated  - start Metoprolol 5mg IV q6h for arrhythmia prophylaxis when appropriate   - Home meds: ASA 81mg daily, atorvastatin 40mg daily, losartan 50mg, spironolactone 50mg daily, verapamil ER 240mg,     PULM: History of tobacco use (45 pack-year history). Arrived to SICU extubated on 10L SFM.  - Wean FiO2 as tolerated.    - Q1h incentive spirometry while awake  - F/U post op CXR  - Avoid positive pressure ventilation and NT suctioning as able  - Chest tube to wall suction. Ongoing monitoring of output quantity and character    GI: History of esophageal adenocarcinoma (s/p J tube placement on 11/25/24 and FLOT chemotherapy x4 cycles completed 2/27/24), now s/p esophagectomy and J tube replacement.  - Maintain strict NPO  - PPI for GI prophylaxis  - Maintain HOB up at  least 30 degrees at all times secondary to high risk of aspiration  - Send daily amylase levels from CHAD drain  - NG to LIWS  - Do not replace or reposition NG tube if it becomes dislodged, call thoracic surgery    : No history of renal disease. Baseline creatinine 0.8 to 1.1.  - Check renal function panel post op and daily.   - Maintenance IVF, LR @100 ml/hr  - Maintain U/O >0.5-1ml/kg/hr  - Replete electrolytes to goal K>4, Mg>2, Phos>2.5, ionized Ca>1.10    HEME: Acute blood loss anemia. OR EBL 75mL.  - Check CBC and coags post op and daily  - SC Heparin and SCDs for DVT prophylaxis  - Ongoing monitoring for s/s bleeding    ENDO: History of hypothyroidism  - Q4h BG   - SSI Lispro per ICU protocol  - Home meds: levothyroxine 175mcg daily    ID: Afebrile, await post op WBC.  - trend temp q4, wbc daily  - sd-op cefazolin x24hrs  - ongoing monitoring for s/s infection    Lines:  - L radial arterial line  - neck CHAD drain  - R chest tube  - Sy  - J tube  - PIVs x3    Dispo: Admit to ICU. Patient seen and discussed with ICU attending Dr. Pandya and fellow Dr. Weiss.    SICU phone [phone]      Deng Beverly MD  PGY-1

## 2025-03-31 NOTE — ANESTHESIA PREPROCEDURE EVALUATION
Patient: Timoteo Victoria    Procedure Information       Date/Time: 03/31/25 0950    Procedure: Three Field Esophagectomy (Right)    Location: [facility] OR 20 / Virtual [facility] OR    Surgeons: Fabio Salas MD            Relevant Problems   Cardiac   (+) Atherosclerosis of coronary artery   (+) Benign essential hypertension   (+) Hyperlipidemia      Pulmonary   (+) Centrilobular emphysema (Multi)      Neuro   (+) Depression, major, recurrent, moderate      GI   (+) Esophageal dysphagia   (+) Malignant neoplasm of lower third of esophagus (Multi)   (+) Primary esophageal adenocarcinoma (Multi)      Liver   (+) Malignant neoplasm of lower third of esophagus (Multi)   (+) Primary esophageal adenocarcinoma (Multi)      Endocrine   (+) Hypothyroidism       Clinical information reviewed:   Tobacco  Allergies  Meds   Med Hx  Surg Hx   Fam Hx  Soc Hx        NPO Detail:  NPO/Void Status  Carbohydrate Drink Given Prior to Surgery? : N  Date of Last Liquid: 03/30/25  Time of Last Liquid: 2300  Date of Last Solid: 03/30/25  Time of Last Solid: 2300  Last Intake Type: Clear fluids  Time of Last Void: 0901         Physical Exam    Airway  Mallampati: I     Cardiovascular - normal exam  Rhythm: regular  Rate: normal     Dental - normal exam  Comments: Poor dentition, multiple broken teeth   Pulmonary - normal exam  Breath sounds clear to auscultation     Abdominal - normal exam         Anesthesia Plan    History of general anesthesia?: yes  History of complications of general anesthesia?: no    ASA 3     general     The patient is a current smoker.  Patient was previously instructed to abstain from smoking on day of procedure.  Patient did not smoke on day of procedure.    intravenous and inhalational induction   Postoperative administration of opioids is intended.  Trial extubation is planned.  Anesthetic plan and risks discussed with patient.  Use of blood products discussed with patient who consented to blood  products.    Plan discussed with resident.

## 2025-03-31 NOTE — ANESTHESIA PROCEDURE NOTES
Airway  Date/Time: 3/31/2025 10:30 AM  Urgency: elective      Staffing  Performed: resident   Authorized by: Rubén Licona MD    Performed by: Maurice Zaman MD  Patient location during procedure: OR    Indications and Patient Condition  Indications for airway management: anesthesia  Spontaneous Ventilation: absent  Sedation level: deep  Preoxygenated: yes  Patient position: sniffing  Mask difficulty assessment: 0 - not attempted (RSI)  Planned trial extubation    Final Airway Details  Final airway type: endotracheal airway      Successful airway: ETT - double lumen left  Cuffed: yes   Successful intubation technique: video laryngoscopy  Facilitating devices/methods: intubating stylet  Endotracheal tube insertion site: oral  Blade type: Patel.  Blade size: #4  ETT DL size (fr): 39  Cormack-Lehane Classification: grade IIa - partial view of glottis  Placement verified by: bronchoscopy and capnometry   Number of attempts at approach: 1

## 2025-03-31 NOTE — OP NOTE
Three Field Esophagectomy (R) Operative Note  Patient: Timoteo Victoria  : 1955  MRN: [id number]    Preoperative Diagnosis: Malignant neoplasm of lower third of esophagus (Multi) [C15.5]  Postoperative Diagnosis: Malignant neoplasm of lower third of esophagus (Multi) [C15.5]    Procedure: Procedure(s) (LRB):  Three Field Esophagectomy (Right)    Surgeon: Surgeon(s):  MD Pineda Casiano MD    Other Staff:   Surgeons and Role:     * Pineda David MD - Assisting   Circulator: Liane Wilson RN; Micki Falk RN  Relief Circulator: Brigida Panda RN  Relief Scrub: Brigida Panda RN  Scrub Person: Linda Seo    Anesthesia Type: General    Indications: Timoteo Victoria is an 69 y.o. male who presents for Malignant neoplasm of lower third of esophagus (Multi) [C15.5]. I recommended surgical resection after neoadjuvant treatment    The patient was seen in the preoperative area. The risks, benefits, complications, treatment options, non-operative alternatives, expected recovery and outcomes were discussed with the patient. The possibilities of reaction to medication, pulmonary aspiration, injury to surrounding structures, bleeding, recurrent infection, the need for additional procedures, failure to diagnose a condition, and creating a complication requiring transfusion or operation were discussed with the patient. The patient concurred with the proposed plan, giving informed consent.  The site of surgery was properly noted/marked if necessary per policy.     Procedure Details:   The patient was placed on the operating table. A safety huddle was performed. General endotracheal anesthesia was initiated. Bronchoscopy was performed using the endotracheal tube which showed no evidence of endobronchial tumors or other lesions.  A double-lumen endotracheal tube was positioned and secured.    The patient was positioned in lateral decubitus position. The patient was prepped using  chlorhexidine.      We started by making four thoracoscopic port incisions in the right chest.  There was no evidence of pleural metastatic disease upon exploration.  We divided the inferior pulmonary ligament and incised to the mediastinal pleura overlying the pericardium.  We continued our dissection identifying and dissecting the esophagus away from the mediastinal structures.  We carefully dissected the esophagus and subcarinal lymph nodes away from the airway.  I then incised the mediastinal pleura posteriorly between the esophagus and the spine.  I was able to encircle the esophagus and placed a Penrose drain for retraction..  This allowed me to elevate the esophagus away from the left mainstem bronchus and start to divide perforators from the aorta.  I then continued my dissection working superiorly until I reached the azygos vein.  We dissected vagus nerve free of the esophagus, divide the azygos vein with an endoscopic stapler, and then continued dissection to the apex of the chest.  The dissection in this area was difficult as there were surprising adhesions and scarring in the area of the proximal esophagus.  Ultimately at the apex of the chest, the Penrose was left in situ and a second Penrose was placed for the dissection of the lower chest.  We again carefully dissected the esophagus with the mediastinal structures.  We had completely tubularized the esophagus and then left the second Penrose drain inferiorly.  I placed a 28 Gabonese chest tube.  Hemostasis was appropriate so we reexpanded the lung removed the instruments and closed the wounds in layers.    The patient was turned supine.  He was prepped and draped for laparotomy.  We performed an upper midline laparotomy.  We explored the abdomen there was no evidence of metastatic disease.  There were adhesions from the patient's previous procedures, which were difficult and time-consuming to take down.  We used the upper-hand retractor and exposed the  hiatus after freeing the left lateral segment of liver.  We dissected the gastrohepatic ligament and identified the right crura.  We identified the Penrose drain in the chest and then used this to retract the GE junction into the abdomen.  We continued our dissection of the hiatus until the GE junction was mobilized into the abdomen.  We then performed our dissection of the greater curve by entering the lesser sac and carefully dissecting the right gastroepiploic artery.  At the level of the short gastrics, we then moved our dissection closer to the fundus until the fundus was completely freed.  We dissected and divided the left gastric artery with endoscopic staplers, which completely freed the stomach and the abdomen from the GE junction to the pylorus.  We palpated the pylorus and it felt quite tight.  I performed a pyloromyotomy.  We then turned our attention to the neck.     I made a left neck incision along the sternocleidomastoid muscle this was developed through the platysma anterior to the sternocleidomastoid.  I identified the omohyoid muscle and divided a small portion of it.  Using blunt dissection, we were able to dissected into the chest and retrieved the Penrose drain left at the apex of the chest.  We used this to deliver the esophagus into the neck.  We divided the esophagus and attached a long stitch to the distal end which was withdrawn into the abdomen.  This stitch was divided and the abdominal end was tagged for later use.  We tubularized the stomach using serial firings of black load staplers.  We evaluated the abdomen for hemostasis and felt it was appropriate.  The specimen was handed off.  The tubularized stomach was placed in an ultrasound bag and the previously placed stitch was used to withdraw this to the neck.  We performed a side-to-side functional end-to-end anastomosis using a linear staple technique with a backwall of a 60 mm endoscopic stapler and then closing the remaining front  wall with a TA stapler.  The esophagus was returned to its physiologic position.    I then performed endoscopy and there was no evidence of leak.  An NG tube was placed under direct vision and secured.    A CHAD drain was left adjacent to the anastomosis.  The neck wound was closed.  The abdominal wound was closed.  The soft tissue was closed with absorbable suture.    This procedure was 40% more difficult than a standard procedure based on the difficult dissection of the esophagus in the chest and the difficult dissection of the abdomen taking down adhesions from the previous procedure         Estimated blood loss:  75  Complications: None  Disposition: ICU  Condition: stable    Attending Attestation: I was present and scrubbed for the entire procedure.    Specimens:   ID Type Source Tests Collected by Time   1 : LEVEL 7 LYMPH NODE Tissue LYMPH NODE BIOPSY SURGICAL PATHOLOGY EXAM Fabio Salas MD 3/31/2025 1220   2 : esophagus and cardia of stomach Tissue ESOPHAGUS AND STOMACH, GASTROESOPHAGECTOMY SURGICAL PATHOLOGY EXAM Fabio Salas MD 3/31/2025 1525   3 : additional gastric margin Tissue ESOPHAGOGASTRIC JUNCTION RESECTION SURGICAL PATHOLOGY EXAM Fabio Salas MD 3/31/2025 1606       Fabio Salas  Phone Number: [phone]

## 2025-03-31 NOTE — CARE PLAN
Problem: Pain - Adult  Goal: Verbalizes/displays adequate comfort level or baseline comfort level  Outcome: Progressing     Problem: Safety - Adult  Goal: Free from fall injury  Outcome: Progressing     Problem: Discharge Planning  Goal: Discharge to home or other facility with appropriate resources  Outcome: Progressing     Problem: Chronic Conditions and Co-morbidities  Goal: Patient's chronic conditions and co-morbidity symptoms are monitored and maintained or improved  Outcome: Progressing     Problem: Nutrition  Goal: Nutrient intake appropriate for maintaining nutritional needs  Outcome: Progressing     Problem: Skin  Goal: Decreased wound size/increased tissue granulation at next dressing change  Outcome: Progressing  Flowsheets (Taken 3/31/2025 1805)  Decreased wound size/increased tissue granulation at next dressing change: Promote sleep for wound healing  Goal: Participates in plan/prevention/treatment measures  Outcome: Progressing  Flowsheets (Taken 3/31/2025 1805)  Participates in plan/prevention/treatment measures: Elevate heels  Goal: Prevent/manage excess moisture  Outcome: Progressing  Flowsheets (Taken 3/31/2025 1805)  Prevent/manage excess moisture:   Cleanse incontinence/protect with barrier cream   Monitor for/manage infection if present  Goal: Prevent/minimize sheer/friction injuries  Outcome: Progressing  Flowsheets (Taken 3/31/2025 1805)  Prevent/minimize sheer/friction injuries:   Use pull sheet   HOB 30 degrees or less   Turn/reposition every 2 hours/use positioning/transfer devices  Goal: Promote/optimize nutrition  Outcome: Progressing  Flowsheets (Taken 3/31/2025 1805)  Promote/optimize nutrition: Discuss with provider if NPO > 2 days  Goal: Promote skin healing  Outcome: Progressing  Flowsheets (Taken 3/31/2025 1805)  Promote skin healing:   Rotate device position/do not position patient on device   Turn/reposition every 2 hours/use positioning/transfer devices

## 2025-03-31 NOTE — ANESTHESIA POSTPROCEDURE EVALUATION
Patient: Timoteo Victoria    Procedure Summary       Date: 03/31/25 Room / Location: [facility] OR 20 / [facility]    Anesthesia Start: 1015 Anesthesia Stop: 1644    Procedure: Three Field Esophagectomy (Right) Diagnosis:       Malignant neoplasm of lower third of esophagus (Multi)      (Malignant neoplasm of lower third of esophagus (Multi) [C15.5])    Surgeons: Fabio Salas MD Responsible Provider: Rubén Licona MD    Anesthesia Type: general ASA Status: 3            Anesthesia Type: general    Vitals Value Taken Time   /53 03/31/25 1645   Temp 36 03/31/25 1645   Pulse 85 03/31/25 1644   Resp 13 03/31/25 1644   SpO2 100 % 03/31/25 1644   Vitals shown include unfiled device data.    Anesthesia Post Evaluation    Patient location during evaluation: ICU  Patient participation: complete - patient participated  Level of consciousness: responsive to light touch  Pain score: 3  Pain management: adequate  Airway patency: patent  Cardiovascular status: acceptable  Respiratory status: acceptable  Hydration status: acceptable  Postoperative Nausea and Vomiting: none      No notable events documented.

## 2025-03-31 NOTE — ANESTHESIA POSTPROCEDURE EVALUATION
Patient: Timoteo Victoria    Procedure Summary       Date: 03/31/25 Room / Location: [facility] OR 20 / [facility]    Anesthesia Start: 1015 Anesthesia Stop: 1644    Procedure: Three Field Esophagectomy (Right) Diagnosis:       Malignant neoplasm of lower third of esophagus (Multi)      (Malignant neoplasm of lower third of esophagus (Multi) [C15.5])    Surgeons: Fabio Salas MD Responsible Provider: Rubén Licona MD    Anesthesia Type: general ASA Status: 3            Anesthesia Type: general    Vitals Value Taken Time   /47 03/31/25 1645   Temp 36.1 03/31/25 1645   Pulse 88 03/31/25 1644   Resp 15 03/31/25 1644   SpO2 100 % 03/31/25 1644   Vitals shown include unfiled device data.    Anesthesia Post Evaluation    Patient location during evaluation: ICU  Patient participation: complete - patient participated  Level of consciousness: awake  Pain score: 0  Pain management: adequate  Multimodal analgesia pain management approach  Airway patency: patent  Cardiovascular status: acceptable and hemodynamically stable  Respiratory status: acceptable and face mask  Hydration status: acceptable  Postoperative Nausea and Vomiting: none        No notable events documented.

## 2025-03-31 NOTE — ANESTHESIA PROCEDURE NOTES
Arterial Line:    Date/Time: 3/31/2025 10:30 AM    Staffing  Performed: resident   Authorized by: Rubén Licona MD    Performed by: Maurice Zaman MD    An arterial line was placed. Procedure performed using ultrasound guidance.in the OR for the following indication(s): continuous blood pressure monitoring and blood sampling needed.    A 20 gauge (size) (length), Angiocath (type) catheter was placed into the Left radial artery, secured by Tegaderm,   Seldinger technique used.  Events:  patient tolerated procedure well with no complications.

## 2025-03-31 NOTE — TELEPHONE ENCOUNTER
Per Dr. 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 I am adding Dr. Salas who will see him Monday (3/31/2025) before surgery. If DVT is a suspicion, a doppler US of legs may be necessary to rule out.

## 2025-04-01 ENCOUNTER — APPOINTMENT (OUTPATIENT)
Dept: RADIOLOGY | Facility: HOSPITAL | Age: 70
DRG: 327 | End: 2025-04-01
Payer: MEDICARE

## 2025-04-01 LAB
ALBUMIN SERPL BCP-MCNC: 3.2 G/DL (ref 3.4–5)
AMYLASE FLD-CCNC: 25 U/L
ANION GAP BLDA CALCULATED.4IONS-SCNC: 9 MMO/L (ref 10–25)
ANION GAP SERPL CALC-SCNC: 11 MMOL/L (ref 10–20)
APTT PPP: 32 SECONDS (ref 26–36)
BASE EXCESS BLDA CALC-SCNC: -0.2 MMOL/L (ref -2–3)
BODY TEMPERATURE: 37 DEGREES CELSIUS
BUN SERPL-MCNC: 20 MG/DL (ref 6–23)
CA-I BLD-SCNC: 1.18 MMOL/L (ref 1.1–1.33)
CA-I BLDA-SCNC: 1.25 MMOL/L (ref 1.1–1.33)
CALCIUM SERPL-MCNC: 8.4 MG/DL (ref 8.6–10.6)
CHLORIDE BLDA-SCNC: 102 MMOL/L (ref 98–107)
CHLORIDE SERPL-SCNC: 103 MMOL/L (ref 98–107)
CO2 SERPL-SCNC: 23 MMOL/L (ref 21–32)
CREAT SERPL-MCNC: 0.61 MG/DL (ref 0.5–1.3)
EGFRCR SERPLBLD CKD-EPI 2021: >90 ML/MIN/1.73M*2
ERYTHROCYTE [DISTWIDTH] IN BLOOD BY AUTOMATED COUNT: 15.9 % (ref 11.5–14.5)
GLUCOSE BLD MANUAL STRIP-MCNC: 102 MG/DL (ref 74–99)
GLUCOSE BLD MANUAL STRIP-MCNC: 107 MG/DL (ref 74–99)
GLUCOSE BLD MANUAL STRIP-MCNC: 107 MG/DL (ref 74–99)
GLUCOSE BLD MANUAL STRIP-MCNC: 77 MG/DL (ref 74–99)
GLUCOSE BLD MANUAL STRIP-MCNC: 79 MG/DL (ref 74–99)
GLUCOSE BLDA-MCNC: 98 MG/DL (ref 74–99)
GLUCOSE SERPL-MCNC: 94 MG/DL (ref 74–99)
HCO3 BLDA-SCNC: 23.8 MMOL/L (ref 22–26)
HCT VFR BLD AUTO: 25 % (ref 41–52)
HCT VFR BLD EST: 26 % (ref 41–52)
HGB BLD-MCNC: 7.9 G/DL (ref 13.5–17.5)
HGB BLDA-MCNC: 8.7 G/DL (ref 13.5–17.5)
INHALED O2 CONCENTRATION: 0 %
INR PPP: 1.2 (ref 0.9–1.1)
LACTATE BLDA-SCNC: 0.8 MMOL/L (ref 0.4–2)
MAGNESIUM SERPL-MCNC: 1.95 MG/DL (ref 1.6–2.4)
MCH RBC QN AUTO: 31.7 PG (ref 26–34)
MCHC RBC AUTO-ENTMCNC: 31.6 G/DL (ref 32–36)
MCV RBC AUTO: 100 FL (ref 80–100)
NRBC BLD-RTO: 0 /100 WBCS (ref 0–0)
OXYHGB MFR BLDA: 93.4 % (ref 94–98)
PCO2 BLDA: 35 MM HG (ref 38–42)
PH BLDA: 7.44 PH (ref 7.38–7.42)
PHOSPHATE SERPL-MCNC: 3.7 MG/DL (ref 2.5–4.9)
PLATELET # BLD AUTO: 161 X10*3/UL (ref 150–450)
PO2 BLDA: 75 MM HG (ref 85–95)
POTASSIUM BLDA-SCNC: 4.6 MMOL/L (ref 3.5–5.3)
POTASSIUM SERPL-SCNC: 4.3 MMOL/L (ref 3.5–5.3)
PROTHROMBIN TIME: 13 SECONDS (ref 9.8–12.4)
RBC # BLD AUTO: 2.49 X10*6/UL (ref 4.5–5.9)
SAO2 % BLDA: 97 % (ref 94–100)
SODIUM BLDA-SCNC: 130 MMOL/L (ref 136–145)
SODIUM SERPL-SCNC: 133 MMOL/L (ref 136–145)
WBC # BLD AUTO: 9.6 X10*3/UL (ref 4.4–11.3)

## 2025-04-01 PROCEDURE — 82330 ASSAY OF CALCIUM: CPT

## 2025-04-01 PROCEDURE — 2500000004 HC RX 250 GENERAL PHARMACY W/ HCPCS (ALT 636 FOR OP/ED): Performed by: PHYSICIAN ASSISTANT

## 2025-04-01 PROCEDURE — 71045 X-RAY EXAM CHEST 1 VIEW: CPT | Performed by: RADIOLOGY

## 2025-04-01 PROCEDURE — 83735 ASSAY OF MAGNESIUM: CPT

## 2025-04-01 PROCEDURE — 2500000005 HC RX 250 GENERAL PHARMACY W/O HCPCS

## 2025-04-01 PROCEDURE — 85027 COMPLETE CBC AUTOMATED: CPT

## 2025-04-01 PROCEDURE — 2500000004 HC RX 250 GENERAL PHARMACY W/ HCPCS (ALT 636 FOR OP/ED): Performed by: NURSE PRACTITIONER

## 2025-04-01 PROCEDURE — 99233 SBSQ HOSP IP/OBS HIGH 50: CPT | Performed by: PHYSICIAN ASSISTANT

## 2025-04-01 PROCEDURE — 1200000002 HC GENERAL ROOM WITH TELEMETRY DAILY

## 2025-04-01 PROCEDURE — 97161 PT EVAL LOW COMPLEX 20 MIN: CPT | Mod: GP

## 2025-04-01 PROCEDURE — 99232 SBSQ HOSP IP/OBS MODERATE 35: CPT | Performed by: PHYSICIAN ASSISTANT

## 2025-04-01 PROCEDURE — 2500000004 HC RX 250 GENERAL PHARMACY W/ HCPCS (ALT 636 FOR OP/ED)

## 2025-04-01 PROCEDURE — 71045 X-RAY EXAM CHEST 1 VIEW: CPT

## 2025-04-01 PROCEDURE — 85730 THROMBOPLASTIN TIME PARTIAL: CPT

## 2025-04-01 PROCEDURE — 84132 ASSAY OF SERUM POTASSIUM: CPT

## 2025-04-01 PROCEDURE — 0D2DXUZ CHANGE FEEDING DEVICE IN LOWER INTESTINAL TRACT, EXTERNAL APPROACH: ICD-10-PCS | Performed by: RADIOLOGY

## 2025-04-01 PROCEDURE — 82947 ASSAY GLUCOSE BLOOD QUANT: CPT

## 2025-04-01 PROCEDURE — 82150 ASSAY OF AMYLASE: CPT

## 2025-04-01 PROCEDURE — 37799 UNLISTED PX VASCULAR SURGERY: CPT

## 2025-04-01 RX ORDER — LIDOCAINE 560 MG/1
1 PATCH PERCUTANEOUS; TOPICAL; TRANSDERMAL EVERY 24 HOURS
Status: DISCONTINUED | OUTPATIENT
Start: 2025-04-01 | End: 2025-04-06 | Stop reason: HOSPADM

## 2025-04-01 RX ORDER — ALBUTEROL SULFATE 0.83 MG/ML
2.5 SOLUTION RESPIRATORY (INHALATION)
Status: DISCONTINUED | OUTPATIENT
Start: 2025-04-01 | End: 2025-04-01

## 2025-04-01 RX ORDER — ALBUTEROL SULFATE 90 UG/1
2 INHALANT RESPIRATORY (INHALATION)
Status: DISCONTINUED | OUTPATIENT
Start: 2025-04-01 | End: 2025-04-01

## 2025-04-01 RX ORDER — ACETAMINOPHEN 10 MG/ML
1000 INJECTION, SOLUTION INTRAVENOUS EVERY 6 HOURS
Status: DISCONTINUED | OUTPATIENT
Start: 2025-04-01 | End: 2025-04-01

## 2025-04-01 RX ORDER — LIDOCAINE HYDROCHLORIDE 10 MG/ML
10 INJECTION, SOLUTION EPIDURAL; INFILTRATION; INTRACAUDAL; PERINEURAL ONCE
Status: DISCONTINUED | OUTPATIENT
Start: 2025-04-01 | End: 2025-04-06 | Stop reason: HOSPADM

## 2025-04-01 RX ORDER — ALBUTEROL SULFATE 0.83 MG/ML
2.5 SOLUTION RESPIRATORY (INHALATION) EVERY 6 HOURS PRN
Status: DISCONTINUED | OUTPATIENT
Start: 2025-04-01 | End: 2025-04-06 | Stop reason: HOSPADM

## 2025-04-01 RX ORDER — ACETAMINOPHEN 10 MG/ML
1000 INJECTION, SOLUTION INTRAVENOUS EVERY 6 HOURS
Status: COMPLETED | OUTPATIENT
Start: 2025-04-01 | End: 2025-04-02

## 2025-04-01 RX ORDER — HEPARIN SODIUM 5000 [USP'U]/ML
5000 INJECTION, SOLUTION INTRAVENOUS; SUBCUTANEOUS EVERY 8 HOURS
Status: DISCONTINUED | OUTPATIENT
Start: 2025-04-01 | End: 2025-04-06 | Stop reason: HOSPADM

## 2025-04-01 RX ORDER — LEVOTHYROXINE SODIUM ANHYDROUS 100 UG/5ML
90 INJECTION, POWDER, LYOPHILIZED, FOR SOLUTION INTRAVENOUS
Status: DISCONTINUED | OUTPATIENT
Start: 2025-04-01 | End: 2025-04-06 | Stop reason: HOSPADM

## 2025-04-01 RX ORDER — NALOXONE HYDROCHLORIDE 0.4 MG/ML
0.2 INJECTION, SOLUTION INTRAMUSCULAR; INTRAVENOUS; SUBCUTANEOUS EVERY 5 MIN PRN
Status: DISCONTINUED | OUTPATIENT
Start: 2025-04-01 | End: 2025-04-06 | Stop reason: HOSPADM

## 2025-04-01 RX ORDER — DEXTROSE MONOHYDRATE AND SODIUM CHLORIDE 5; .45 G/100ML; G/100ML
75 INJECTION, SOLUTION INTRAVENOUS CONTINUOUS
Status: DISCONTINUED | OUTPATIENT
Start: 2025-04-01 | End: 2025-04-01

## 2025-04-01 RX ORDER — DEXTROSE MONOHYDRATE AND SODIUM CHLORIDE 5; .45 G/100ML; G/100ML
75 INJECTION, SOLUTION INTRAVENOUS CONTINUOUS
Status: ACTIVE | OUTPATIENT
Start: 2025-04-01 | End: 2025-04-02

## 2025-04-01 RX ADMIN — LEVOTHYROXINE SODIUM ANHYDROUS 90 MCG: 100 INJECTION, POWDER, LYOPHILIZED, FOR SOLUTION INTRAVENOUS at 15:13

## 2025-04-01 RX ADMIN — ACETAMINOPHEN 1000 MG: 10 INJECTION INTRAVENOUS at 20:30

## 2025-04-01 RX ADMIN — ACETAMINOPHEN 1000 MG: 10 INJECTION INTRAVENOUS at 09:31

## 2025-04-01 RX ADMIN — CEFAZOLIN SODIUM 2 G: 2 INJECTION, SOLUTION INTRAVENOUS at 05:51

## 2025-04-01 RX ADMIN — PANTOPRAZOLE SODIUM 40 MG: 40 INJECTION, POWDER, FOR SOLUTION INTRAVENOUS at 06:02

## 2025-04-01 RX ADMIN — ACETAMINOPHEN 1000 MG: 10 INJECTION INTRAVENOUS at 15:13

## 2025-04-01 RX ADMIN — HEPARIN SODIUM 5000 UNITS: 5000 INJECTION, SOLUTION INTRAVENOUS; SUBCUTANEOUS at 01:55

## 2025-04-01 RX ADMIN — MAGNESIUM SULFATE HEPTAHYDRATE 2 G: 40 INJECTION, SOLUTION INTRAVENOUS at 05:51

## 2025-04-01 RX ADMIN — LIDOCAINE 4% 1 PATCH: 40 PATCH TOPICAL at 09:32

## 2025-04-01 RX ADMIN — SODIUM CHLORIDE, SODIUM LACTATE, POTASSIUM CHLORIDE, AND CALCIUM CHLORIDE 100 ML/HR: .6; .31; .03; .02 INJECTION, SOLUTION INTRAVENOUS at 03:49

## 2025-04-01 RX ADMIN — HEPARIN SODIUM 5000 UNITS: 5000 INJECTION, SOLUTION INTRAVENOUS; SUBCUTANEOUS at 18:08

## 2025-04-01 RX ADMIN — METOPROLOL TARTRATE 5 MG: 1 INJECTION, SOLUTION INTRAVENOUS at 12:39

## 2025-04-01 RX ADMIN — HEPARIN SODIUM 5000 UNITS: 5000 INJECTION, SOLUTION INTRAVENOUS; SUBCUTANEOUS at 09:32

## 2025-04-01 RX ADMIN — DEXTROSE AND SODIUM CHLORIDE 75 ML/HR: 5; 450 INJECTION, SOLUTION INTRAVENOUS at 18:09

## 2025-04-01 RX ADMIN — METOPROLOL TARTRATE 5 MG: 1 INJECTION, SOLUTION INTRAVENOUS at 05:51

## 2025-04-01 ASSESSMENT — COGNITIVE AND FUNCTIONAL STATUS - GENERAL
HELP NEEDED FOR BATHING: A LITTLE
MOVING FROM LYING ON BACK TO SITTING ON SIDE OF FLAT BED WITH BEDRAILS: A LITTLE
DRESSING REGULAR UPPER BODY CLOTHING: A LITTLE
MOVING TO AND FROM BED TO CHAIR: A LITTLE
MOVING FROM LYING ON BACK TO SITTING ON SIDE OF FLAT BED WITH BEDRAILS: A LITTLE
MOVING TO AND FROM BED TO CHAIR: A LITTLE
DAILY ACTIVITIY SCORE: 18
TURNING FROM BACK TO SIDE WHILE IN FLAT BAD: A LITTLE
STANDING UP FROM CHAIR USING ARMS: A LITTLE
PERSONAL GROOMING: A LITTLE
WALKING IN HOSPITAL ROOM: A LITTLE
MOBILITY SCORE: 17
STANDING UP FROM CHAIR USING ARMS: A LITTLE
TOILETING: A LITTLE
TURNING FROM BACK TO SIDE WHILE IN FLAT BAD: A LITTLE
EATING MEALS: A LITTLE
CLIMB 3 TO 5 STEPS WITH RAILING: A LOT
DRESSING REGULAR LOWER BODY CLOTHING: A LITTLE
MOBILITY SCORE: 18
WALKING IN HOSPITAL ROOM: A LITTLE
CLIMB 3 TO 5 STEPS WITH RAILING: A LITTLE

## 2025-04-01 ASSESSMENT — PAIN - FUNCTIONAL ASSESSMENT
PAIN_FUNCTIONAL_ASSESSMENT: 0-10

## 2025-04-01 ASSESSMENT — PAIN SCALES - GENERAL
PAINLEVEL_OUTOF10: 0 - NO PAIN
PAINLEVEL_OUTOF10: 0 - NO PAIN
PAINLEVEL_OUTOF10: 5 - MODERATE PAIN
PAINLEVEL_OUTOF10: 8
PAINLEVEL_OUTOF10: 0 - NO PAIN

## 2025-04-01 ASSESSMENT — ACTIVITIES OF DAILY LIVING (ADL): ADL_ASSISTANCE: INDEPENDENT

## 2025-04-01 NOTE — CONSULTS
Wound Care Consult     Visit Date: 4/1/2025      Patient Name: Timoteo Victoria         MRN: [id number]           YOB: 1955     Reason for Consult: Coccyx wound     Wound History: Timoteo Victoria is a 69 y.o. male with a history of esophageal cancer who is Now 1 Day Post-Op status post an esophagectomy (HPI per Justin Amezcua PA-C on 4/1/2025).     Pertinent Labs:   Albumin   Date Value Ref Range Status   04/01/2025 3.2 (L) 3.4 - 5.0 g/dL Final     ALBUMIN (MG/L) IN URINE   Date Value Ref Range Status   04/26/2021 <7.0 Not Established mg/L Final     Albumin, Urine Random   Date Value Ref Range Status   10/08/2024 7.7 Not established mg/L Final       Wound Assessment:  Wound 04/01/25 Pressure Injury Sacrum (Active)   Wound Image   04/01/25 1600   Site Assessment Non-blanchable erythema;Yellow 04/01/25 1600   Hope-Wound Assessment [place] 04/01/25 1600   Non-staged Wound Description Partial thickness 04/01/25 1600   Pressure Injury Stage 2 04/01/25 1600   Wound Length (cm) 7 cm 04/01/25 0325   Wound Width (cm) 4 cm 04/01/25 0325   Wound Surface Area (cm^2) 28 cm^2 04/01/25 0325   Wound Depth (cm) 0 cm 04/01/25 0325   Wound Volume (cm^3) 0 cm^3 04/01/25 0325   Margins Attached edges 04/01/25 1600   Drainage Description None 04/01/25 1600   Drainage Amount None 04/01/25 1600   Dressing Foam;Moisture barrier 04/01/25 1600   Dressing Changed Changed 04/01/25 1600   Dressing Status Clean;Dry 04/01/25 1025           Wound Team Summary Assessment:   Pt with nonblanchable erythema to coccyx area. Per patient report, has been dealing with skin injury since being admitted, states it closed up but has opened since being admitted. Very small open area to lower coccyx, white yellow open area likely related to moisture. Due to location on bony prominence and lack of blanching, skin injury classified as Stage 2 pressure injury. Provided clear barrier cream to patient and educated on when to use.     Wound Team Plan:  Called Maday ROMAN who states lorazepam seems to have little to no effect on pt as far as behaviors, restlessness, anxious, gets pushy with residents and goes overboard. Current order is lorazepam 0.5mg bid PRN. Maday aware  out of office until Monday. Will return call to nurse on Monday with any new orders/changes.   Nursing to apply clear barrier cream daily and protect with foam dressing. If area opens up, please switch to Triad paste and reconsult for reevaluation.      OSCAR INGRAM RN  4/1/2025  4:53 PM

## 2025-04-01 NOTE — PROGRESS NOTES
"Thoracic Surgery Progress Note  4/1/2025    Timoteo Victoria is a 69 y.o. male with a history of esophageal cancer who is Now 1 Day Post-Op status post an esophagectomy.    Overnight issues: admitted to the ICU from the OR; transferred to the floor this morning    Physical Exam:  General: He is a pleasant male currently in no distress.  Visit Vitals  /55 (BP Location: Right arm, Patient Position: Lying)   Pulse 76   Temp 36.4 °C (97.5 °F) (Temporal)   Resp 18   Ht 1.778 m (5' 10\")   Wt 96.4 kg (212 lb 8.4 oz)   SpO2 94%   BMI 30.49 kg/m²   Smoking Status Every Day   BSA 2.18 m²     Body mass index is 30.49 kg/m².   HEENT: Normocephalic and atraumatic.   NECK: Supple. Trach midline. No JVD.   CHEST: Breathing comfortably on room air; Lungs CTA L, diminished in R base;  Chest tube with 350ml drainage since the OR  HEART: Regular rate and rhythm. NSR with rate in 70s per tele review.   ABDOMEN: Soft, ND, tender with palpation, j-tube present.   NEUROLOGIC: Alert and oriented. Grossly intact.   EXTREMITIES: Moves all extremities equally.  No lower extremity edema. No calf tenderness.     Diagnostics:     Intake/Output Summary (Last 24 hours) at 4/1/2025 1405  Last data filed at 4/1/2025 1200  Gross per 24 hour   Intake 3695.5 ml   Output 2150 ml   Net 1545.5 ml     Results from last 7 days   Lab Units 04/01/25 0315 03/31/25  1710 03/27/25  0901   WBC AUTO x10*3/uL 9.6 9.4 6.5   HEMOGLOBIN g/dL 7.9* 7.9* 9.6*   HEMATOCRIT % 25.0* 24.7* 31.0*   PLATELETS AUTO x10*3/uL 161 148* 246     Results from last 7 days   Lab Units 04/01/25 0315 03/31/25  1710 03/27/25  0908   SODIUM mmol/L 133* 134* 137   POTASSIUM mmol/L 4.3 4.4 4.5   CHLORIDE mmol/L 103 104 103   CO2 mmol/L 23 22 25   BUN mg/dL 20 23 22   CREATININE mg/dL 0.61 0.71 0.78   GLUCOSE mg/dL 94 157* 90   CALCIUM mg/dL 8.4* 8.4* 8.6     Results from last 7 days   Lab Units 04/01/25  0315 03/31/25  1710 03/27/25  0908   SODIUM mmol/L 133* 134* 137   POTASSIUM " mmol/L 4.3 4.4 4.5   CHLORIDE mmol/L 103 104 103   CO2 mmol/L 23 22 25   BUN mg/dL 20 23 22   CREATININE mg/dL 0.61 0.71 0.78   GLUCOSE mg/dL 94 157* 90   CALCIUM mg/dL 8.4* 8.4* 8.6     Scheduled medications  acetaminophen, 1,000 mg, intravenous, q6h  albuterol, 2.5 mg, nebulization, q6h  heparin (porcine), 5,000 Units, subcutaneous, q8h  insulin lispro, 0-5 Units, subcutaneous, q4h  levothyroxine, 90 mcg, intravenous, q24h OZIEL  lidocaine, 1 patch, transdermal, q24h  metoprolol, 5 mg, intravenous, q6h  pantoprazole, 40 mg, intravenous, Daily before breakfast      Continuous medications  dextrose 5%-0.45 % sodium chloride, 75 mL/hr  HYDROmorphone,       PRN medications  PRN medications: dextrose, dextrose, glucagon, glucagon, naloxone    Imaging:   AM CXR reviewed. Expected post op changes and chest tube placement. No clinically significant pneumothorax. CXR remains stable when compared with previous exam.     Assessment:  Timoteo Victoria is a 69 y.o. male with a history of esophageal cancer who is Now 1 Day Post-Op status post an esophagectomy.      Plan:  Neurology: post operative pain; h/o chronic pain, cluster headaches  -continue PCA pump  -Bowel regimen available for constipation secondary to pain medication   -Out of bed to the chair throughout the day  -Encourage ambulation as tolerated  -Home meds: Wellbutrin 150mg BID, amitriptyline 100mg nightly, baclofen 5mg TID. Has taken oxycodone and morphine at home recently.     Cardiovascular: h/o CABG, HTN, HLD  -Continue telemetry  -cont Q4h VS  -Continue metoprolol 5mg IV Q6h for post operative arrhythmia prophylaxis   -Replace electrolytes as needed for K >4, Mg >2  -Home meds: ASA 81mg daily, atorvastatin 40mg daily, losartan 50mg, spironolactone 50mg daily, verapamil ER 240mg,     Pulmonology: post op atelectasis, chest tube management, tobacco use  -Encourage incentive spirometer use every hour  -Continue pulmonary hygiene  -Maintain chest tube to  waterseal  -Obtain daily CXR  -Monitor and record chest tube drainage every shift  -discussed tobacco cessation    Gastrointestinal: h/o esophageal cancer, s/p esophagectomy; j-tube management; CHAD drain management  -strict NPO  -decrease IVFs to 75ml/h  -maintain head of bed at 30 degrees  -Zofran available for nausea  -scheduled colace, PRN bowel regimen  -maintain CHAD to bulb sxn  -send amylase daily  -cont NG to LIWS    Genitourinary:   -Remove moore  -Await spontaneous void trial  -Continue to monitor daily electrolytes with routine BMPs   -Adequate urine output    Infectious Disease: afebrile; no leukocytosis  -Continue to trend daily temperatures and WBC count to monitor for signs of post-operative infection   -Monitor surgical incisions for signs of infection   -Perioperative antibiotics completed     Hematology:   -Monitor for signs of acute blood loss  -Trend CBCs     Endocrine: h/o hypothyroid  -resume synthroid (IV while NPO)    DVT Prophylaxis:   -Continue subcutaneous heparin and SCDs, early ambulation    Disposition:  -Plan for discharge to home once stable from a surgical standpoint.  -Continue to assess for home-going needs     Patient seen and examined by this provider.     YRIS HolleyC  Thoracic Surgery, #[id number]

## 2025-04-01 NOTE — CONSULTS
"Nutrition Initial Assessment:   Nutrition Assessment    Reason for Assessment: Enteral assessment/recommendation (TF)    Patient is a 69 y.o. male presenting s/p esophagectomy with Dr. Salas 3/31    PMH CAD (CABG x4 2005), HTN, HLD, hypothyroidism, tobacco use disorder, cluster headaches, esophageal adenocarcinoma (s/p J tube placement on 11/25/24 and FLOT chemotherapy x4 cycles completed 2/27/24).       Nutrition History:  Food and Nutrient History: Pt reports he was up to five cartons of Isosource 1.5 over night  -- pump set to @120mls/hr (=1250mls, 1875 kcals, 85gm protein/d)  Notes that they had to replace his J-tube intra-op as it was clogged. Unable to do nocturnal feeds from Thurs thru Saturday due to clogging. Able to tolerate two pieces of pizza over weekend. Still has NG to LIWS this afternoon. Notes team told him no plans for TF's tonight.       Anthropometrics:  Height: 177.8 cm (5' 10\")   Weight: 96.4 kg (212 lb 8.4 oz)   BMI (Calculated): 30.49  IBW/kg (Dietitian Calculated): 75.5 kg  Percent of IBW: 128 %       Weight History:   Date/Time Weight   04/01/25 0300 96.4 kg (212 lb 8.4 oz)   03/31/25 1651 94.4 kg (208 lb 1.8 oz)   03/31/25 0859 93.4 kg (205 lb 14.6 oz)      03/27/25 94.8 kg (209 lb)   03/13/25 90.9 kg (200 lb 6.4 oz)   03/05/25 93.1 kg (205 lb 3.2 oz)   02/01/25 92.9 kg (204 lb 12.9 oz)   01/02/25 93.5 kg (206 lb 2.1 oz)   12/12/24 92.8 kg (204 lb 9.4 oz)   10/08/24 101 kg (222 lb)   03/20/24 115 kg (254 lb)      Usual BW ~250#    Weight Change %:  Weight History / % Weight Change: weight stablized ~200# +/- but previously with ~20% loss in less than on year  Significant Weight Loss: No    Nutrition Focused Physical Exam Findings:  Subcutaneous Fat Loss:   Orbital Fat Pads: Well nourished (slightly bulging fat pads)  Buccal Fat Pads: Well nourished (full, rounded cheeks)  Triceps: Well nourished (ample fat tissue)  Muscle Wasting:  Temporalis: Well nourished (well-defined " muscle)  Pectoralis (Clavicular Region): Well nourished (clavicle not visible)  Deltoid/Trapezius: Well nourished (rounded appearance at arm, shoulder, neck)  Interosseous: Well nourished (muscle bulges)  Quadriceps: Well nourished (well developed, well rounded)  Gastrocnemius: Well nourished (well developed bulbous muscle)  Edema:  Edema: none  Physical Findings:  Skin: Positive (surgical sites, J-tube)    Nutrition Significant Labs:  CBC Trend:   Results from last 7 days   Lab Units 04/01/25  0315 03/31/25  1710 03/27/25  0901   WBC AUTO x10*3/uL 9.6 9.4 6.5   RBC AUTO x10*6/uL 2.49* 2.46* 2.96*   HEMOGLOBIN g/dL 7.9* 7.9* 9.6*   HEMATOCRIT % 25.0* 24.7* 31.0*   MCV fL 100 100 105*   PLATELETS AUTO x10*3/uL 161 148* 246    , A1C:  Lab Results   Component Value Date    HGBA1C 5.3 09/17/2024   , BG POCT trend:   Results from last 7 days   Lab Units 04/01/25  1159 04/01/25  0848 04/01/25  0341 03/31/25  2330 03/31/25  1939   POCT GLUCOSE mg/dL 77 107* 102* 119* 134*    , Liver Function Trend:    , Renal Lab Trend:   Results from last 7 days   Lab Units 04/01/25 0315 03/31/25  1710 03/27/25  0908 03/27/25  0908   POTASSIUM mmol/L 4.3 4.4  --  4.5   PHOSPHORUS mg/dL 3.7 3.6  --  4.5   SODIUM mmol/L 133* 134*  --  137   MAGNESIUM mg/dL 1.95 2.05   < >  --    EGFR mL/min/1.73m*2 >90 >90  --  >90   BUN mg/dL 20 23  --  22   CREATININE mg/dL 0.61 0.71  --  0.78    < > = values in this interval not displayed.        Nutrition Specific Medications:  levothyroxine, 90 mcg, intravenous, q24h OZIEL  pantoprazole, 40 mg, intravenous, Daily before breakfast    Continuous medications  dextrose 5%-0.45 % sodium chloride, 75 mL/hr  HYDROmorphone,         I/O:   Last BM Date:  (awaiting postop BM); Stool Appearance: Unable to assess (04/01/25 0800)    Dietary Orders (From admission, onward)       Start     Ordered    04/01/25 1032  NPO Diet; Effective now  Diet effective now        Comments: Strict    04/01/25 1031    03/31/25 0787   May Not Participate in Room Service  ( ROOM SERVICE MAY NOT PARTICIPATE)  Once        Question:  .  Answer:  Yes    03/31/25 3660                     Estimated Needs:   Total Energy Estimated Needs in 24 hours (kCal): 2300 kCal (+)  Method for Estimating Needs: REE/MSJ = 1715 using 93.4 kgs  Total Protein Estimated Needs in 24 Hours (g): 100 g  Method for Estimating 24 Hour Protein Needs: ~1.3 gm/kg ideal BW  Total Fluid Estimated Needs in 24 Hours (mL):  (per team)           Nutrition Diagnosis   Malnutrition Diagnosis  Patient has Malnutrition Diagnosis: No    Nutrition Diagnosis  Patient has Nutrition Diagnosis: Yes  Diagnosis Status (1): New  Nutrition Diagnosis 1: Altered GI function  Related to (1): esophageal cancer  As Evidenced by (1): s/p esophagectomy, has J-tube       Nutrition Interventions/Recommendations   Nutrition prescription for enteral nutrition    Nutrition Recommendations:  1) Equivalent to home TF regimen = Isosource 1.5 @ 105 mls/hr x 12 hours or @ 120mls/hr x ~10.5 hours for ~1250mls total volume, 1875 kcals & 85gm protein.       2) Diet per surgery -- reconsult for full diet edu PRN    Nutrition Interventions/Goals:   Enteral Intake: Management of delivery rate of enteral nutrition  Goal: pt will continue to tolerate nocturnal feeds      Education Documentation  No documentation found.            Nutrition Monitoring and Evaluation   Food/Nutrient Related History Monitoring  Monitoring and Evaluation Plan: Enteral and parenteral nutrition intake determination  Enteral and Parenteral Nutrition Intake Determination: Enteral nutrition intake - Tolerate TF at goal rate, Enteral nutrition intake - To meet > 75% estimated energy needs         Biochemical Data, Medical Tests and Procedures  Monitoring and Evaluation Plan: Electrolyte/renal panel, Glucose/endocrine profile  Electrolyte and Renal Panel: Electrolytes within normal limits  Glucose/Endocrine Profile: Glucose within normal limits  ( mg/dL)         Goal Status: New goal(s) identified    Time Spent (min): 45 minutes

## 2025-04-01 NOTE — PROGRESS NOTES
"Timoteo Victoria is a 69 y.o. male on day 1 of admission presenting with Malignant neoplasm of lower third of esophagus (Multi).    Subjective   No acute events overnight    Objective   Constitutional:       General: He is not in acute distress.  HENT:      Head: Normocephalic and atraumatic.      Nose:      Comments: NG tube to LIWS  Neck:      Comments: Neck CHAD drain  Cardiovascular:      Rate and Rhythm: Normal rate and regular rhythm.      Pulses: Normal pulses.      Heart sounds: Normal heart sounds.      Comments: R chest mediport  Pulmonary:      Effort: Pulmonary effort is normal.      Breath sounds: Normal breath sounds.      Comments: On RA  Abdominal:      General: Abdomen is flat.      Palpations: Abdomen is soft.      Comments: J tube in place   Musculoskeletal:      Right lower leg: No edema.      Left lower leg: No edema.   Skin:     General: Skin is warm and dry.   Neurological:      General: No focal deficit present.      Mental Status: He is oriented to person, place, and time.        Last Recorded Vitals  Blood pressure 111/61, pulse 76, temperature 36.1 °C (97 °F), temperature source Temporal, resp. rate 18, height 1.778 m (5' 10\"), weight 96.4 kg (212 lb 8.4 oz), SpO2 95%.  Intake/Output last 3 Shifts:  I/O last 3 completed shifts:  In: 4124.7 (42.8 mL/kg) [I.V.:1224.7 (12.7 mL/kg); IV Piggyback:2900]  Out: 1840 (19.1 mL/kg) [Urine:1260 (0.4 mL/kg/hr); Emesis/NG output:100; Drains:110; Chest Tube:370]  Weight: 96.4 kg     Relevant Results  Scheduled medications  [Transfer Hold] acetaminophen, 1,000 mg, intravenous, q6h  albuterol, 2.5 mg, nebulization, q6h  albuterol, 2 puff, inhalation, q6h  heparin (porcine), 5,000 Units, subcutaneous, q8h  [Transfer Hold] insulin lispro, 0-5 Units, subcutaneous, q4h  [Transfer Hold] lidocaine, 1 patch, transdermal, q24h  [Transfer Hold] metoprolol, 5 mg, intravenous, q6h  [Transfer Hold] pantoprazole, 40 mg, intravenous, Daily before " breakfast      Continuous medications  HYDROmorphone,   lactated Ringer's, 75 mL/hr, Last Rate: 75 mL/hr (04/01/25 0800)      PRN medications  PRN medications: [Transfer Hold] calcium gluconate, [Transfer Hold] calcium gluconate, [Transfer Hold] dextrose, [Transfer Hold] dextrose, [Transfer Hold] glucagon, [Transfer Hold] glucagon, [Transfer Hold] magnesium sulfate, [Transfer Hold] magnesium sulfate, naloxone, [Transfer Hold] naloxone          Results from last 7 days   Lab Units 04/01/25 0315 03/31/25 1710 03/27/25  0901   WBC AUTO x10*3/uL 9.6 9.4 6.5   HEMOGLOBIN g/dL 7.9* 7.9* 9.6*   PLATELETS AUTO x10*3/uL 161 148* 246      Results from last 7 days   Lab Units 04/01/25 0315 03/31/25 1710 03/27/25  0908 03/27/25  0908   SODIUM mmol/L 133* 134*  --  137   POTASSIUM mmol/L 4.3 4.4  --  4.5   CHLORIDE mmol/L 103 104  --  103   CO2 mmol/L 23 22  --  25   BUN mg/dL 20 23  --  22   CREATININE mg/dL 0.61 0.71  --  0.78   GLUCOSE mg/dL 94 157*  --  90   MAGNESIUM mg/dL 1.95 2.05   < >  --    PHOSPHORUS mg/dL 3.7 3.6  --  4.5    < > = values in this interval not displayed.      Results from last 7 days   Lab Units 04/01/25 0315 03/31/25 1710   INR  1.2* 1.1   PROTIME seconds 13.0* 12.1   APTT seconds 32 32        Assessment/Plan   Assessment & Plan  Malignant neoplasm of lower third of esophagus (Multi)      Timoteo Victoria is a 69 y.o. male with a history of esophageal cancer presenting to SICU from the OR s/p esophagectomy and J tube replacement. OR course was uncomplicated. Arrived to SICU extubated and on no pressors.     Plan:  NEURO: History of cluster headaches, chronic pain around J tube, depression. Follows with palliative outpatient for pain and symptom management. Acute post-op pain.   - ongoing neuro/pain assessments  - Pain control with hydromorphone PCA  -Add IV tylenol   - PT/OT consult  - Home meds: Wellbutrin 150mg BID, amitriptyline 100mg nightly, baclofen 5mg TID. Has taken oxycodone and  morphine at home recently.     CV: History of CAD (CABG x4 2005), HTN, HLD. Baseline /79 at PAT. Baseline echo (3/27/25) with EF 55-60%, RV low normal function. Arrived to SICU on no pressors.  - continuous EKG/ABP monitoring  - goal map range 65-90  - avoid pressors, use volume for hypotension  - volume resuscitate as clinically indicated  - Continue Metoprolol 5mg IV q6h for arrhythmia prophylaxis when appropriate   - Home meds: ASA 81mg daily, atorvastatin 40mg daily, losartan 50mg, spironolactone 50mg daily, verapamil ER 240mg,      PULM: History of tobacco use (45 pack-year history). Currently on RA  - Q1h incentive spirometry while awake  - F/U post op CXR  - Avoid positive pressure ventilation and NT suctioning as able  - Chest tube placed to waterseal this morning. Ongoing monitoring of output quantity and character     GI: History of esophageal adenocarcinoma (s/p J tube placement on 11/25/24 and FLOT chemotherapy x4 cycles completed 2/27/24), now s/p esophagectomy and J tube replacement 3/31  - Maintain strict NPO  - PPI for GI prophylaxis  - Maintain HOB up at least 30 degrees at all times secondary to high risk of aspiration  - Send daily amylase levels from CHAD drain  - NG to LIWS  - Do not replace or reposition NG tube if it becomes dislodged, call thoracic surgery     : No history of renal disease. Baseline creatinine 0.8 to 1.1.  - Check renal function panel daily and PRN  - Decrease maintenance IVF to 75 ml/hr  - Maintain U/O >0.5-1ml/kg/hr  - Replete electrolytes to goal K>4, Mg>2, Phos>2.5, ionized Ca>1.10     HEME: Acute blood loss anemia. OR EBL 75mL.  - Check CBC and coags post op and daily  - SC Heparin and SCDs for DVT prophylaxis  - Ongoing monitoring for s/s bleeding     ENDO: History of hypothyroidism  - Q4h BG   - SSI Lispro per ICU protocol  - Home meds: levothyroxine 175mcg daily     ID: Afebrile, No leukocytosis  - trend temp q4, wbc daily  - Finished sd-op cefazolin   -  ongoing monitoring for s/s infection     Lines:  - L radial arterial line---> remove today   - neck CHAD drain  - R chest tube  - Sy  - J tube  - PIVs x3     Dispo:  Stable to transfer. Discussed with ICU attending Dr. Mumtaz Sena, PAMat[facility]  SICU phone [phone]

## 2025-04-01 NOTE — PROGRESS NOTES
Physical Therapy    Physical Therapy Evaluation    Patient Name: Timoteo Victoria  MRN: [id number]  Department: [place] [facility] [id number]  Room: [address] [place] [address]  Today's Date: 4/1/2025   Time Calculation  Start Time: 1434  Stop Time: 1456  Time Calculation (min): 22 min    Assessment/Plan   PT Assessment  PT Assessment Results: Decreased strength, Decreased endurance, Impaired balance, Decreased mobility, Pain  Rehab Prognosis: Good  Barriers to Discharge Home: No anticipated barriers  Evaluation/Treatment Tolerance: Patient tolerated treatment well  Medical Staff Made Aware: Yes  Strengths: Ability to acquire knowledge, Attitude of self, Access to adaptive/assistive products, Housing layout, Premorbid level of function, Support of extended family/friends  Barriers to Participation: Comorbidities  End of Session Communication: Bedside nurse  Assessment Comment: Pt. is a 69 yom that presents with impairments including increased pain at site of chest tube, decreased functional strength, decreased activity tolerance/endurance, mildly impaired balance, and increased difficulty with functional mobility compared to baseline level of function. Pt. will benefit from skilled PT intervention while inpatient to address the above deficits and maximize return to PLOF. Anticipate pt will benefit from low intensity PT upon discharge.  End of Session Patient Position: Up in chair, Alarm off, caregiver present (RN at bedside)    IP OR SWING BED PT PLAN  Inpatient or Swing Bed: Inpatient  PT Plan  Treatment/Interventions: Bed mobility, Transfer training, Gait training, Stair training, Balance training, Strengthening, Endurance training, Therapeutic exercise, Therapeutic activity, Home exercise program  PT Plan: Ongoing PT  PT Frequency: 3 times per week  PT Discharge Recommendations: Low intensity level of continued care  Equipment Recommended upon Discharge:  (Pt has FWW)  PT Recommended Transfer Status: Assist x1, Assistive device  PT - OK to Discharge:  "Yes    Subjective   General Visit Information:  General  Reason for Referral: s/p esophagectomy and J tube replacement on 3/31  Past Medical History Relevant to Rehab: CAD (CABG x4 2005), HTN, HLD, History of cluster headaches, chronic pain around J tube  Family/Caregiver Present: No  Prior to Session Communication: Bedside nurse  Patient Position Received: Bed, 3 rail up, Alarm off, not on at start of session  Preferred Learning Style: auditory, verbal  General Comment: Pt received supine in bed, pleasant and agreeable to participate in session (CHAD drain, NGT, CT, PCA, tele)    Home Living:  Home Living  Type of Home: House  Lives With: Alone  Home Adaptive Equipment: Walker rolling or standard, Cane  Home Layout: One level  Home Access: Level entry  Bathroom Shower/Tub: Tub/shower unit  Bathroom Equipment: Grab bars in shower  Home Living Comments: Reports daughter, son in law, and ex wife live close by and will be assisting upon discharge    Prior Level of Function:  Prior Function Per Pt/Caregiver Report  Level of [place]: Independent with ADLs and functional transfers, Independent with homemaking with ambulation  Receives Help From: Family  ADL Assistance: Independent  Homemaking Assistance: Independent  Ambulatory Assistance: Independent (community ambulator, no AD, reports \"a couple\" of falls within last 6 months though did not elaborate)  Vocational: Retired  Leisure: Watching TV  Prior Function Comments: +drives    Precautions:  Precautions  Hearing/Visual Limitations: WFL  Medical Precautions: Fall precautions, Chest tube  Precautions Comment: HOB >30 deg, MAP 65-90      Date/Time Vitals Session Patient Position Pulse Resp SpO2 BP MAP (mmHg)    04/01/25 1458 During PT  --  82  --  --  --  --            Objective   Pain:  Pain Assessment  Pain Assessment: 0-10  0-10 (Numeric) Pain Score: 8  Pain Type: Surgical pain  Pain Location:  (at site of chest tube)  Pain Orientation: " Right    Cognition:  Cognition  Overall Cognitive Status: Within Functional Limits  Orientation Level: Oriented X4    General Assessments:  Activity Tolerance  Endurance: Tolerates 10 - 20 min exercise with multiple rests  Early Mobility/Exercise Safety Screen: Proceed with mobilization - No exclusion criteria met    Sensation  Light Touch: No apparent deficits  Sensation Comment: Denied N/T    Postural Control  Postural Control: Within Functional Limits    Static Sitting Balance  Static Sitting-Balance Support: Feet supported, Bilateral upper extremity supported  Static Sitting-Level of Assistance: Close supervision  Dynamic Sitting Balance  Dynamic Sitting-Balance Support: Feet supported  Dynamic Sitting-Level of Assistance: Close supervision    Static Standing Balance  Static Standing-Balance Support: No upper extremity supported  Static Standing-Level of Assistance: Contact guard  Dynamic Standing Balance  Dynamic Standing-Balance Support: Bilateral upper extremity supported  Dynamic Standing-Level of Assistance: Contact guard    Functional Assessments:  Bed Mobility  Bed Mobility: Yes  Bed Mobility 1  Bed Mobility 1: Supine to sitting  Level of Assistance 1: Close supervision  Bed Mobility Comments 1: HOB elevated    Transfers  Transfer: Yes  Transfer 1  Transfer From 1: Sit to, Stand to  Transfer to 1: Stand, Sit  Technique 1: Sit to stand, Stand to sit  Transfer Device 1:  (none)  Transfer Level of Assistance 1: Contact guard  Trials/Comments 1: wide EMMY, slow to rise  Transfers 2  Transfer From 2: Sit to, Stand to  Transfer to 2: Stand, Sit  Technique 2: Sit to stand, Stand to sit  Transfer Device 2: Walker  Transfer Level of Assistance 2: Contact guard, Minimal verbal cues  Trials/Comments 2: Cues for proper UE placement and technique    Ambulation/Gait Training  Ambulation/Gait Training Performed: Yes  Ambulation/Gait Training 1  Surface 1: Level tile  Device 1: Rolling walker  Assistance 1: Contact guard,  Minimal verbal cues  Quality of Gait 1: Decreased step length, Forward flexed posture, Wide base of support (slow jason)  Comments/Distance (ft) 1: 12 ft (Declined further progression of mobility, wanting to sit in bedside chair for respiratory treatment)    Stairs  Stairs: No    Extremity/Trunk Assessments:  RLE   RLE : Within Functional Limits  LLE   LLE : Within Functional Limits    Outcome Measures:  [facility] Basic Mobility  Turning from your back to your side while in a flat bed without using bedrails: A little  Moving from lying on your back to sitting on the side of a flat bed without using bedrails: A little  Moving to and from bed to chair (including a wheelchair): A little  Standing up from a chair using your arms (e.g. wheelchair or bedside chair): A little  To walk in hospital room: A little  Climbing 3-5 steps with railing: A lot  Basic Mobility - Total Score: 17    Encounter Problems       Encounter Problems (Active)       Balance       Patient to demonstrate Linsey static and dynamic standing balance without LOB with change of directions, no hesitancy, appropriate EMMY and sequencing to complete functional task.        Start:  04/01/25    Expected End:  04/15/25            Pt will score >/= 24/28 on Tinetti balance assessment to indicate low falls risk.         Start:  04/01/25    Expected End:  04/15/25               Mobility       STG - Patient will ambulate >/= 250 ft Linsey with LRAD and no acute LOB       Start:  04/01/25    Expected End:  04/15/25            Patient will tolerate >/=30 minutes continuous activity with stable vital signs, RPE </=13/20, RPD </=3/10        Start:  04/01/25    Expected End:  04/15/25            Patient to ascend/descend >/= 1 stair to demo ability to safely traverse steps/curbs in community       Start:  04/01/25    Expected End:  04/15/25               PT Transfers       STG - Patient will perform bed mobility Linsey       Start:  04/01/25    Expected End:  04/15/25             STG - Patient will transfer sit to and from stand Linsey with LRAD        Start:  04/01/25    Expected End:  04/15/25               Pain - Adult              Education Documentation  Precautions, taught by Fanny Espinal, PT at 4/1/2025  3:29 PM.  Learner: Patient  Readiness: Acceptance  Method: Explanation, Demonstration  Response: Verbalizes Understanding, Demonstrated Understanding  Comment: transfer techniques, splinted cough, activity pacing, AD management    Body Mechanics, taught by Fanny Espinal, PT at 4/1/2025  3:29 PM.  Learner: Patient  Readiness: Acceptance  Method: Explanation, Demonstration  Response: Verbalizes Understanding, Demonstrated Understanding  Comment: transfer techniques, splinted cough, activity pacing, AD management    Mobility Training, taught by Fanny Espinal, PT at 4/1/2025  3:29 PM.  Learner: Patient  Readiness: Acceptance  Method: Explanation, Demonstration  Response: Verbalizes Understanding, Demonstrated Understanding  Comment: transfer techniques, splinted cough, activity pacing, AD management    Education Comments  No comments found.          04/01/25 at 3:31 PM - Fanny Espinal, PT

## 2025-04-02 ENCOUNTER — APPOINTMENT (OUTPATIENT)
Dept: RADIOLOGY | Facility: HOSPITAL | Age: 70
End: 2025-04-02
Payer: MEDICARE

## 2025-04-02 ENCOUNTER — APPOINTMENT (OUTPATIENT)
Dept: RADIOLOGY | Facility: HOSPITAL | Age: 70
DRG: 327 | End: 2025-04-02
Payer: MEDICARE

## 2025-04-02 LAB
AMYLASE FLD-CCNC: 76 U/L
ANION GAP SERPL CALC-SCNC: 9 MMOL/L (ref 10–20)
BUN SERPL-MCNC: 18 MG/DL (ref 6–23)
CALCIUM SERPL-MCNC: 7.7 MG/DL (ref 8.6–10.6)
CHLORIDE SERPL-SCNC: 102 MMOL/L (ref 98–107)
CO2 SERPL-SCNC: 25 MMOL/L (ref 21–32)
CREAT SERPL-MCNC: 0.67 MG/DL (ref 0.5–1.3)
EGFRCR SERPLBLD CKD-EPI 2021: >90 ML/MIN/1.73M*2
ERYTHROCYTE [DISTWIDTH] IN BLOOD BY AUTOMATED COUNT: 15.5 % (ref 11.5–14.5)
GLUCOSE BLD MANUAL STRIP-MCNC: 121 MG/DL (ref 74–99)
GLUCOSE BLD MANUAL STRIP-MCNC: 54 MG/DL (ref 74–99)
GLUCOSE BLD MANUAL STRIP-MCNC: 70 MG/DL (ref 74–99)
GLUCOSE BLD MANUAL STRIP-MCNC: 71 MG/DL (ref 74–99)
GLUCOSE BLD MANUAL STRIP-MCNC: 72 MG/DL (ref 74–99)
GLUCOSE BLD MANUAL STRIP-MCNC: 86 MG/DL (ref 74–99)
GLUCOSE BLD MANUAL STRIP-MCNC: 89 MG/DL (ref 74–99)
GLUCOSE BLD MANUAL STRIP-MCNC: 90 MG/DL (ref 74–99)
GLUCOSE SERPL-MCNC: 99 MG/DL (ref 74–99)
HCT VFR BLD AUTO: 23.5 % (ref 41–52)
HGB BLD-MCNC: 7.3 G/DL (ref 13.5–17.5)
MAGNESIUM SERPL-MCNC: 1.96 MG/DL (ref 1.6–2.4)
MCH RBC QN AUTO: 32.9 PG (ref 26–34)
MCHC RBC AUTO-ENTMCNC: 31.1 G/DL (ref 32–36)
MCV RBC AUTO: 106 FL (ref 80–100)
NRBC BLD-RTO: 0 /100 WBCS (ref 0–0)
PLATELET # BLD AUTO: 125 X10*3/UL (ref 150–450)
POTASSIUM SERPL-SCNC: 3.7 MMOL/L (ref 3.5–5.3)
RBC # BLD AUTO: 2.22 X10*6/UL (ref 4.5–5.9)
SODIUM SERPL-SCNC: 132 MMOL/L (ref 136–145)
WBC # BLD AUTO: 8.3 X10*3/UL (ref 4.4–11.3)

## 2025-04-02 PROCEDURE — 49451 REPLACE DUOD/JEJ TUBE PERC: CPT | Performed by: RADIOLOGY

## 2025-04-02 PROCEDURE — 2500000004 HC RX 250 GENERAL PHARMACY W/ HCPCS (ALT 636 FOR OP/ED): Performed by: NURSE PRACTITIONER

## 2025-04-02 PROCEDURE — 99232 SBSQ HOSP IP/OBS MODERATE 35: CPT | Performed by: NURSE PRACTITIONER

## 2025-04-02 PROCEDURE — 7100000010 HC PHASE TWO TIME - EACH INCREMENTAL 1 MINUTE

## 2025-04-02 PROCEDURE — 7100000009 HC PHASE TWO TIME - INITIAL BASE CHARGE

## 2025-04-02 PROCEDURE — 80048 BASIC METABOLIC PNL TOTAL CA: CPT | Performed by: NURSE PRACTITIONER

## 2025-04-02 PROCEDURE — 49441 PLACE DUOD/JEJ TUBE PERC: CPT | Performed by: RADIOLOGY

## 2025-04-02 PROCEDURE — 71045 X-RAY EXAM CHEST 1 VIEW: CPT | Performed by: RADIOLOGY

## 2025-04-02 PROCEDURE — 85027 COMPLETE CBC AUTOMATED: CPT | Performed by: NURSE PRACTITIONER

## 2025-04-02 PROCEDURE — C1769 GUIDE WIRE: HCPCS

## 2025-04-02 PROCEDURE — 1200000002 HC GENERAL ROOM WITH TELEMETRY DAILY

## 2025-04-02 PROCEDURE — 2500000005 HC RX 250 GENERAL PHARMACY W/O HCPCS: Performed by: NURSE PRACTITIONER

## 2025-04-02 PROCEDURE — 82947 ASSAY GLUCOSE BLOOD QUANT: CPT

## 2025-04-02 PROCEDURE — 2500000004 HC RX 250 GENERAL PHARMACY W/ HCPCS (ALT 636 FOR OP/ED): Performed by: RADIOLOGY

## 2025-04-02 PROCEDURE — 99152 MOD SED SAME PHYS/QHP 5/>YRS: CPT

## 2025-04-02 PROCEDURE — 2550000001 HC RX 255 CONTRASTS: Performed by: THORACIC SURGERY (CARDIOTHORACIC VASCULAR SURGERY)

## 2025-04-02 PROCEDURE — C1887 CATHETER, GUIDING: HCPCS

## 2025-04-02 PROCEDURE — 83735 ASSAY OF MAGNESIUM: CPT | Performed by: NURSE PRACTITIONER

## 2025-04-02 PROCEDURE — 82150 ASSAY OF AMYLASE: CPT | Performed by: NURSE PRACTITIONER

## 2025-04-02 PROCEDURE — 71045 X-RAY EXAM CHEST 1 VIEW: CPT

## 2025-04-02 PROCEDURE — 99152 MOD SED SAME PHYS/QHP 5/>YRS: CPT | Performed by: RADIOLOGY

## 2025-04-02 PROCEDURE — 2720000007 HC OR 272 NO HCPCS

## 2025-04-02 PROCEDURE — 97165 OT EVAL LOW COMPLEX 30 MIN: CPT | Mod: GO

## 2025-04-02 RX ORDER — FENTANYL CITRATE 50 UG/ML
INJECTION, SOLUTION INTRAMUSCULAR; INTRAVENOUS
Status: COMPLETED | OUTPATIENT
Start: 2025-04-02 | End: 2025-04-02

## 2025-04-02 RX ORDER — POTASSIUM CHLORIDE 14.9 MG/ML
20 INJECTION INTRAVENOUS ONCE
Status: COMPLETED | OUTPATIENT
Start: 2025-04-02 | End: 2025-04-02

## 2025-04-02 RX ORDER — MAGNESIUM SULFATE HEPTAHYDRATE 40 MG/ML
2 INJECTION, SOLUTION INTRAVENOUS ONCE
Status: COMPLETED | OUTPATIENT
Start: 2025-04-02 | End: 2025-04-02

## 2025-04-02 RX ORDER — MIDAZOLAM HYDROCHLORIDE 1 MG/ML
INJECTION INTRAMUSCULAR; INTRAVENOUS
Status: COMPLETED | OUTPATIENT
Start: 2025-04-02 | End: 2025-04-02

## 2025-04-02 RX ORDER — DEXTROSE MONOHYDRATE AND SODIUM CHLORIDE 5; .45 G/100ML; G/100ML
75 INJECTION, SOLUTION INTRAVENOUS CONTINUOUS
Status: DISCONTINUED | OUTPATIENT
Start: 2025-04-02 | End: 2025-04-03

## 2025-04-02 RX ADMIN — DEXTROSE MONOHYDRATE 25 G: 25 INJECTION, SOLUTION INTRAVENOUS at 20:11

## 2025-04-02 RX ADMIN — LIDOCAINE 4% 1 PATCH: 40 PATCH TOPICAL at 08:11

## 2025-04-02 RX ADMIN — ACETAMINOPHEN 1000 MG: 10 INJECTION INTRAVENOUS at 10:01

## 2025-04-02 RX ADMIN — MAGNESIUM SULFATE HEPTAHYDRATE 2 G: 40 INJECTION, SOLUTION INTRAVENOUS at 10:02

## 2025-04-02 RX ADMIN — METOPROLOL TARTRATE 5 MG: 1 INJECTION, SOLUTION INTRAVENOUS at 17:47

## 2025-04-02 RX ADMIN — HEPARIN SODIUM 5000 UNITS: 5000 INJECTION, SOLUTION INTRAVENOUS; SUBCUTANEOUS at 10:01

## 2025-04-02 RX ADMIN — DEXTROSE AND SODIUM CHLORIDE 75 ML/HR: 5; 450 INJECTION, SOLUTION INTRAVENOUS at 20:20

## 2025-04-02 RX ADMIN — PANTOPRAZOLE SODIUM 40 MG: 40 INJECTION, POWDER, FOR SOLUTION INTRAVENOUS at 08:10

## 2025-04-02 RX ADMIN — Medication: at 21:40

## 2025-04-02 RX ADMIN — HEPARIN SODIUM 5000 UNITS: 5000 INJECTION, SOLUTION INTRAVENOUS; SUBCUTANEOUS at 03:45

## 2025-04-02 RX ADMIN — DEXTROSE AND SODIUM CHLORIDE 75 ML/HR: 5; 450 INJECTION, SOLUTION INTRAVENOUS at 06:45

## 2025-04-02 RX ADMIN — LEVOTHYROXINE SODIUM ANHYDROUS 90 MCG: 100 INJECTION, POWDER, LYOPHILIZED, FOR SOLUTION INTRAVENOUS at 08:10

## 2025-04-02 RX ADMIN — IOHEXOL 50 ML: 350 INJECTION, SOLUTION INTRAVENOUS at 11:58

## 2025-04-02 RX ADMIN — FENTANYL CITRATE 50 MCG: 50 INJECTION, SOLUTION INTRAMUSCULAR; INTRAVENOUS at 11:40

## 2025-04-02 RX ADMIN — MIDAZOLAM HYDROCHLORIDE 1 MG: 2 INJECTION, SOLUTION INTRAMUSCULAR; INTRAVENOUS at 11:40

## 2025-04-02 RX ADMIN — DEXTROSE MONOHYDRATE 12.5 G: 25 INJECTION, SOLUTION INTRAVENOUS at 00:26

## 2025-04-02 RX ADMIN — HEPARIN SODIUM 5000 UNITS: 5000 INJECTION, SOLUTION INTRAVENOUS; SUBCUTANEOUS at 17:47

## 2025-04-02 RX ADMIN — ACETAMINOPHEN 1000 MG: 10 INJECTION INTRAVENOUS at 03:45

## 2025-04-02 RX ADMIN — METOPROLOL TARTRATE 5 MG: 1 INJECTION, SOLUTION INTRAVENOUS at 04:21

## 2025-04-02 RX ADMIN — POTASSIUM CHLORIDE 20 MEQ: 14.9 INJECTION, SOLUTION INTRAVENOUS at 13:03

## 2025-04-02 ASSESSMENT — PAIN SCALES - GENERAL
PAINLEVEL_OUTOF10: 7
PAINLEVEL_OUTOF10: 0 - NO PAIN
PAINLEVEL_OUTOF10: 4
PAINLEVEL_OUTOF10: 6
PAINLEVEL_OUTOF10: 0 - NO PAIN

## 2025-04-02 ASSESSMENT — COGNITIVE AND FUNCTIONAL STATUS - GENERAL
MOBILITY SCORE: 20
WALKING IN HOSPITAL ROOM: A LITTLE
CLIMB 3 TO 5 STEPS WITH RAILING: A LITTLE
HELP NEEDED FOR BATHING: A LITTLE
TOILETING: A LITTLE
PERSONAL GROOMING: A LITTLE
TOILETING: A LITTLE
DRESSING REGULAR LOWER BODY CLOTHING: A LITTLE
HELP NEEDED FOR BATHING: A LITTLE
DAILY ACTIVITIY SCORE: 18
DAILY ACTIVITIY SCORE: 19
DRESSING REGULAR LOWER BODY CLOTHING: A LITTLE
STANDING UP FROM CHAIR USING ARMS: A LITTLE
PERSONAL GROOMING: A LITTLE
EATING MEALS: A LITTLE
DRESSING REGULAR UPPER BODY CLOTHING: A LITTLE
MOVING TO AND FROM BED TO CHAIR: A LITTLE
DRESSING REGULAR UPPER BODY CLOTHING: A LITTLE

## 2025-04-02 ASSESSMENT — ACTIVITIES OF DAILY LIVING (ADL)
ADL_ASSISTANCE: INDEPENDENT
LACK_OF_TRANSPORTATION: NO
BATHING_ASSISTANCE: MINIMAL

## 2025-04-02 ASSESSMENT — PAIN - FUNCTIONAL ASSESSMENT
PAIN_FUNCTIONAL_ASSESSMENT: 0-10
PAIN_FUNCTIONAL_ASSESSMENT: 0-10

## 2025-04-02 NOTE — POST-PROCEDURE NOTE
Interventional Radiology Brief Postprocedure Note    Attending: Fabio Boyd MD    Assistant: Armani Mayo MD    Diagnosis: J tube placement    Description of procedure: Under fluoroscopy, a wire/catheter was used to probe the existing tract and a 14 Sudanese jejunostomy tube was placed. Tube is ready for use.    Anesthesia:  Local with moderate sedation    Complications: None    Estimated Blood Loss: minimal    See detailed result report with images in PACS.    The patient tolerated the procedure well without incident or complication and is in stable condition.

## 2025-04-02 NOTE — PROGRESS NOTES
Occupational Therapy    Evaluation    Patient Name: Timoteo Victoria  MRN: [id number]  Today's Date: 4/2/2025  Room: [address] [place] [address]  Time Calculation  Start Time: 1221  Stop Time: 1243  Time Calculation (min): 22 min    Assessment  IP OT Assessment  OT Assessment: Pt presents w/ decreased activity tolerance, balance, strength, and ROM resulting in decreased activity tolerance and increased need for assist w/ I/ADLs indicating the need for continued skilled OT services at LOW intensity to allow for a safe and funcitonal d/c.  Prognosis: Good  Barriers to Discharge Home: Caregiver assistance, Physical needs  Caregiver Assistance: Patient lives alone and/or does not have reliable caregiver assistance  Physical Needs: 24hr mobility assistance needed, Intermittent ADL assistance needed  Evaluation/Treatment Tolerance: Patient tolerated treatment well  End of Session Communication: Bedside nurse  End of Session Patient Position: Up in chair, Alarm off, not on at start of session  Plan:  Inpatient Plan  Treatment Interventions: ADL retraining, Functional transfer training, Endurance training, Patient/family training, Equipment evaluation/education, Compensatory technique education  OT Frequency: 3 times per week  OT Discharge Recommendations: Low intensity level of continued care  Equipment Recommended upon Discharge:  (Shower chair if pt does not have at home)  OT - OK to Discharge: Yes  OT Assessment  OT Assessment Results: Decreased ADL status, Decreased endurance, Decreased functional mobility, Decreased IADLs  Prognosis: Good  Evaluation/Treatment Tolerance: Patient tolerated treatment well  Strengths: Ability to acquire knowledge, Attitude of self, Coping skills, Housing layout, Premorbid level of function  Barriers to Participation: Comorbidities    Subjective   Current Problem:  1. Malignant neoplasm of lower third of esophagus (Multi)  Surgical Pathology Exam    Surgical Pathology Exam        General:  Reason for  Referral: This 68 y/o M w/ esophageal CA presents s/p esophagectomy w/ Dr. Salas 3/31.  Past Medical History Relevant to Rehab: CAD (CABG 2005), HTN, HLD, chronic tobacco use, hypothyroidism, Esophageal CA (dx 11/2024) s/p J tube placed 11/25/24 and FLOT chemotherapy x4 cycles completed 2/27/25  Prior to Session Communication: Bedside nurse  Patient Position Received: Bed, 3 rail up, Alarm off, not on at start of session  Family/Caregiver Present: No  General Comment: Pt seated up in chair on approach. Pleasant and agreeable to OT assessment.   Precautions:  Medical Precautions: Fall precautions, Chest tube  Precautions Comment: HOB >30 deg, MAP 65-90  Vital Signs:   Date/Time Vitals Session Patient Position Pulse Resp SpO2 BP MAP (mmHg)    04/02/25 1429 --  --  67  18  93 %  105/57  73           Pain:  Pain Assessment  Pain Assessment: 0-10  0-10 (Numeric) Pain Score: 7  Pain Type: Surgical pain, Acute pain  Pain Location: Incision  Lines/Tubes/Drains:  Implantable Port 12/18/24 Right Chest Single lumen port (Active)   Number of days: 105       Chest Tube 1 Right Pleural 28 Fr (Active)   Number of days: 2       Closed/Suction Drain 1 Left Neck Bulb 7 Fr. (Active)   Number of days: 2       Gastrostomy/Enterostomy Jejunostomy 1 14 Fr. LLQ (Active)   Number of days: 0         Objective   Cognition:  Overall Cognitive Status: Within Functional Limits  Orientation Level: Oriented X4  Insight: Within function limits  Impulsive: Within functional limits  Home Living:  Type of Home: House  Lives With: Alone  Home Adaptive Equipment: Walker rolling or standard, Cane  Home Layout: One level  Home Access: Stairs to enter without rails  Entrance Stairs-Rails: None  Entrance Stairs-Number of Steps: 1  Bathroom Shower/Tub: Tub/shower unit  Bathroom Equipment: Grab bars in shower (Pt thinks he has a shower chair)  Home Living Comments: Reports dtr, son in law, and ex wife are able to assist   Prior Function:  Level of  [place]: Independent with ADLs and functional transfers, Independent with homemaking with ambulation  Receives Help From: Family  ADL Assistance: Independent  Homemaking Assistance: Independent  Ambulatory Assistance: Independent (w/o AD)  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)  Leisure: Riding his motorcycle  Prior Function Comments: (+) Drives  ADL:  Eating Assistance: Unable to assess (Anticipate IND when cleared for PO intake)  Eating Deficit: NPO  Grooming Assistance: Stand by  Bathing Assistance: Minimal  UE Dressing Assistance: Stand by  LE Dressing Assistance: Minimal  Toileting Assistance with Device: Stand by  Activity Tolerance:  Endurance: Tolerates 10 - 20 min exercise with multiple rests  Bed Mobility/Transfers:    Transfer 1  Technique 1: Sit to stand  Transfer Device 1:  (No AD)  Transfer Level of Assistance 1: Contact guard  Vision: Vision - Basic Assessment  Current Vision: No visual deficits  Sensation:  Sensation Comment: No apparent deficits  Coordination:  Movements are Fluid and Coordinated: Yes   Hand Function:  Hand Function  Gross Grasp: Functional  Coordination: Functional  Extremities: RUE   RUE : Within Functional Limits, LUE   LUE: Within Functional Limits  Outcome Measures: [facility] Daily Activity  Putting on and taking off regular lower body clothing: A little  Bathing (including washing, rinsing, drying): A little  Putting on and taking off regular upper body clothing: A little  Toileting, which includes using toilet, bedpan or urinal: A little  Taking care of personal grooming such as brushing teeth: A little  Eating Meals: None (Anticipated when cleared for PO intake)  Daily Activity - Total Score: 19         ,     OT Adult Other Outcome Measures  4AT: 0    Education Documentation  Body Mechanics, taught by Neeta Higuera OT at 4/2/2025  3:58 PM.  Learner: Patient  Readiness: Acceptance  Method: Explanation  Response: Verbalizes Understanding    Precautions, taught  by Neeta Higuera OT at 4/2/2025  3:58 PM.  Learner: Patient  Readiness: Acceptance  Method: Explanation  Response: Verbalizes Understanding    ADL Training, taught by Neeta Higuera OT at 4/2/2025  3:58 PM.  Learner: Patient  Readiness: Acceptance  Method: Explanation  Response: Verbalizes Understanding    Education Comments  No comments found.        Goals:     Encounter Problems       Encounter Problems (Active)       ADLs       Patient will perform UB and LB bathing with stand by assist level of assistance and shower chair.       Start:  04/02/25    Expected End:  04/16/25            Patient with complete upper body dressing with set-up level of assistance donning and doffing all UE clothes with PRN adaptive equipment while edge of bed        Start:  04/02/25    Expected End:  04/16/25            Patient with complete lower body dressing with stand by assist level of assistance donning and doffing all LE clothes  with PRN adaptive equipment while edge of bed        Start:  04/02/25    Expected End:  04/16/25            Patient will complete toileting including hygiene clothing management/hygiene with modified independent level of assistance and raised toilet seat.       Start:  04/02/25    Expected End:  04/16/25               MOBILITY       Patient will perform Functional mobility max Household distances/Community Distances with modified independent level of assistance and least restrictive device in order to improve safety and functional mobility.       Start:  04/02/25    Expected End:  04/16/25               TRANSFERS       Patient will perform bed mobility modified independent level of assistance in order to improve safety and independence with mobility       Start:  04/02/25    Expected End:  04/16/25            Patient will complete functional transfer to chair/toilet with least restrictive device with modified independent level of assistance.       Start:  04/02/25    Expected End:  04/16/25                    04/02/25 at 3:59 PM   MIKKI ADAME, OT   Rehab Office: [phone]

## 2025-04-02 NOTE — PRE-PROCEDURE NOTE
Interventional Radiology Preprocedure Note    Indication for procedure: The encounter diagnosis was Malignant neoplasm of lower third of esophagus (Multi). J-tube placement    Relevant review of systems: NA    Relevant Labs:   Lab Results   Component Value Date    CREATININE 0.67 04/02/2025    EGFR >90 04/02/2025    INR 1.2 (H) 04/01/2025    PROTIME 13.0 (H) 04/01/2025       Planned Sedation/Anesthesia: Moderate    Airway assessment: normal    Directed physical examination:    Alert, jejunostomy site noted without drainage    Mallampati: III (soft and hard palate and base of uvula visible)    ASA Score: ASA 3 - Patient with moderate systemic disease with functional limitations    Benefits, risks and alternatives of procedure and planned sedation have been discussed with the patient and/or their representative. All questions answered and they agree to proceed.

## 2025-04-02 NOTE — PROGRESS NOTES
04/02/25 1000   Discharge Planning   Living Arrangements Alone   Support Systems Spouse/significant other;Children   Assistance Needed independent prior to hospital   Type of Residence Private residence   Home or Post Acute Services None   Expected Discharge Disposition Home   Does the patient need discharge transport arranged? No   Financial Resource Strain   How hard is it for you to pay for the very basics like food, housing, medical care, and heating? Not hard   Housing Stability   In the last 12 months, was there a time when you were not able to pay the mortgage or rent on time? N   At any time in the past 12 months, were you homeless or living in a shelter (including now)? N   Transportation Needs   In the past 12 months, has lack of transportation kept you from medical appointments or from getting medications? no   In the past 12 months, has lack of transportation kept you from meetings, work, or from getting things needed for daily living? No     TCC admission assessment completed. Explained role of TCC - verbalized understanding.     Demographics/Insurance: Confirmed in chart.   Living Environment: Lives at home alone independently.  Primary Support Person: SonBilly, [phone]  PCP: Dr. Dafne Bedolla. Last seen 3/5/25  Recent Falls: States he had a few falls in the last 6 months.  Assistive Devices/DME: Denies use, but has a FWW at home  Home Oxygen: Denies   Dialysis: Denies   Home Care Agency: Denies   Transportation at Discharge: Will not need transport at discharge.   Pharmacy: [facility] in [place]  Concerns about discharge: None at this time. Discussed PT/OT recommendation for home care. In agreement with [facility].     Chayo Simon RN, TCC

## 2025-04-02 NOTE — PROGRESS NOTES
"Thoracic Surgery Progress Note  4/2/2025    Timoteo Victoria is a 69 y.o. male with a history of esophageal cancer who is Now 2 Days Post-Op status post an esophagectomy.    Overnight issues: overnight, J tube was dislodged and unable to be replaced.     Physical Exam:  General: He is a pleasant male currently in no distress, seen sitting up in bed.  Visit Vitals  BP (!) 90/44   Pulse 76   Temp 36.4 °C (97.5 °F)   Resp 18   Ht 1.778 m (5' 10\")   Wt 92.9 kg (204 lb 12.8 oz)   SpO2 95%   BMI 29.39 kg/m²   Smoking Status Every Day   BSA 2.14 m²     Body mass index is 29.39 kg/m².   HEENT: Normocephalic and atraumatic.   NECK: Supple. Trach midline. No JVD.  Cervical dressing removed, incision st betty. Well approx, no erythema or drg.  Cervical CHAD to bulb suction, sero sang drg.   CHEST: Breathing comfortably on room air;  Chest tube with 500cc sero sang drg. No airleak.  HEART: Regular rate and rhythm. NSR with rate in 70s per tele review.   ABDOMEN: Soft, ND, tender with palpation, previous J tube site with dressing.   NEUROLOGIC: Alert and oriented. Grossly intact.   EXTREMITIES: Moves all extremities equally.  No lower extremity edema. No calf tenderness.     Diagnostics:     Intake/Output Summary (Last 24 hours) at 4/2/2025 1044  Last data filed at 4/2/2025 1018  Gross per 24 hour   Intake 1112.5 ml   Output 1445 ml   Net -332.5 ml     Results from last 7 days   Lab Units 04/02/25  0648 04/01/25 0315 03/31/25  1710   WBC AUTO x10*3/uL 8.3 9.6 9.4   HEMOGLOBIN g/dL 7.3* 7.9* 7.9*   HEMATOCRIT % 23.5* 25.0* 24.7*   PLATELETS AUTO x10*3/uL 125* 161 148*     Results from last 7 days   Lab Units 04/02/25  0648 04/01/25 0315 03/31/25  1710   SODIUM mmol/L 132* 133* 134*   POTASSIUM mmol/L 3.7 4.3 4.4   CHLORIDE mmol/L 102 103 104   CO2 mmol/L 25 23 22   BUN mg/dL 18 20 23   CREATININE mg/dL 0.67 0.61 0.71   GLUCOSE mg/dL 99 94 157*   CALCIUM mg/dL 7.7* 8.4* 8.4*     Results from last 7 days   Lab Units 04/02/25  0648 " 04/01/25  0315 03/31/25  1710   SODIUM mmol/L 132* 133* 134*   POTASSIUM mmol/L 3.7 4.3 4.4   CHLORIDE mmol/L 102 103 104   CO2 mmol/L 25 23 22   BUN mg/dL 18 20 23   CREATININE mg/dL 0.67 0.61 0.71   GLUCOSE mg/dL 99 94 157*   CALCIUM mg/dL 7.7* 8.4* 8.4*     Scheduled medications  heparin (porcine), 5,000 Units, subcutaneous, q8h  insulin lispro, 0-5 Units, subcutaneous, q4h  levothyroxine, 90 mcg, intravenous, q24h OZIEL  lidocaine, 1 patch, transdermal, q24h  lidocaine PF, 10 mL, infiltration, Once  magnesium sulfate, 2 g, intravenous, Once  metoprolol, 5 mg, intravenous, q6h  pantoprazole, 40 mg, intravenous, Daily before breakfast  potassium chloride, 20 mEq, intravenous, Once      Continuous medications  dextrose 5%-0.45 % sodium chloride, 75 mL/hr, Last Rate: 75 mL/hr (04/02/25 0645)  HYDROmorphone,       PRN medications  PRN medications: albuterol, dextrose, dextrose, glucagon, glucagon, naloxone    Imaging:   AM CXR reviewed. Expected post op changes and chest tube placement. Non dilated gastric conduit. No clinically significant pneumothorax. CXR remains stable when compared with previous exam. No formal report.     Assessment:  Timoteo Victoria is a 69 y.o. male with a history of esophageal cancer who is status post an esophagectomy.    4/1/2025: admitted to the ICU from the OR; transferred to the floor this morning  4/2/2025: J tube dislodged overnight.     Plan:  Neurology: post operative pain; h/o chronic pain, cluster headaches  -continue PCA pump  - lidocaine patches   -Bowel regimen available for constipation secondary to pain medication   -Out of bed to the chair throughout the day  -Encourage ambulation as tolerated  -Home meds: Wellbutrin 150mg BID, amitriptyline 100mg nightly, baclofen 5mg TID. Has taken oxycodone and morphine at home recently.     Cardiovascular: h/o CABG, HTN, HLD  -Continue telemetry  -cont Q4h VS  -Continue metoprolol 5mg IV Q6h for post operative arrhythmia prophylaxis    -Replace electrolytes as needed for K >4, Mg >2  -Home meds: ASA 81mg daily, atorvastatin 40mg daily, losartan 50mg, spironolactone 50mg daily, verapamil ER 240mg,     Pulmonology: post op atelectasis, chest tube management, tobacco use  -Encourage incentive spirometer use every hour  -Continue pulmonary hygiene  -Maintain chest tube to waterseal  -Obtain daily CXR  -Monitor and record chest tube drainage every shift  -discussed tobacco cessation    Gastrointestinal: h/o esophageal cancer, s/p esophagectomy; j-tube management; CHAD drain management  -strict NPO  -continue IVFs at 75ml/h until enteral access and feeds established   -maintain head of bed at 30 degrees  -Zofran available for nausea  -scheduled colace, PRN bowel regimen  -maintain CHAD to bulb sxn  -send amylase daily, 76 today from 25 yesterday   -cont NG to LIWS--eval for removal this PM   - IR consult for J tube replacement     Genitourinary:   -voiding  -Continue to monitor daily electrolytes with routine BMPs   -Adequate urine output    Infectious Disease: afebrile; no leukocytosis  -Continue to trend daily temperatures and WBC count to monitor for signs of post-operative infection   -Monitor surgical incisions for signs of infection   -Perioperative antibiotics completed     Hematology:   -Monitor for signs of acute blood loss  -Trend CBCs     Endocrine: h/o hypothyroid  -resume synthroid (IV while NPO)    DVT Prophylaxis:   -Continue subcutaneous heparin and SCDs, early ambulation    Disposition:  -Plan for discharge to home after re-eval day #5-6  -Continue to assess for home-going needs     Patient seen and examined by this provider, discussed with Dr. Salas.     Ayla Parks APRN-CNP  Thoracic Surgery, #[id number]

## 2025-04-03 ENCOUNTER — APPOINTMENT (OUTPATIENT)
Dept: RADIOLOGY | Facility: HOSPITAL | Age: 70
DRG: 327 | End: 2025-04-03
Payer: MEDICARE

## 2025-04-03 LAB
AMYLASE FLD-CCNC: 24 U/L
ANION GAP SERPL CALC-SCNC: 12 MMOL/L (ref 10–20)
BUN SERPL-MCNC: 15 MG/DL (ref 6–23)
CALCIUM SERPL-MCNC: 8.1 MG/DL (ref 8.6–10.6)
CHLORIDE SERPL-SCNC: 103 MMOL/L (ref 98–107)
CO2 SERPL-SCNC: 22 MMOL/L (ref 21–32)
CREAT SERPL-MCNC: 0.63 MG/DL (ref 0.5–1.3)
EGFRCR SERPLBLD CKD-EPI 2021: >90 ML/MIN/1.73M*2
ERYTHROCYTE [DISTWIDTH] IN BLOOD BY AUTOMATED COUNT: 15 % (ref 11.5–14.5)
GLUCOSE BLD MANUAL STRIP-MCNC: 107 MG/DL (ref 74–99)
GLUCOSE BLD MANUAL STRIP-MCNC: 111 MG/DL (ref 74–99)
GLUCOSE BLD MANUAL STRIP-MCNC: 82 MG/DL (ref 74–99)
GLUCOSE BLD MANUAL STRIP-MCNC: 82 MG/DL (ref 74–99)
GLUCOSE BLD MANUAL STRIP-MCNC: 84 MG/DL (ref 74–99)
GLUCOSE BLD MANUAL STRIP-MCNC: 93 MG/DL (ref 74–99)
GLUCOSE SERPL-MCNC: 95 MG/DL (ref 74–99)
HCT VFR BLD AUTO: 25.1 % (ref 41–52)
HGB BLD-MCNC: 7.8 G/DL (ref 13.5–17.5)
MAGNESIUM SERPL-MCNC: 2.05 MG/DL (ref 1.6–2.4)
MCH RBC QN AUTO: 32.1 PG (ref 26–34)
MCHC RBC AUTO-ENTMCNC: 31.1 G/DL (ref 32–36)
MCV RBC AUTO: 103 FL (ref 80–100)
NRBC BLD-RTO: 0 /100 WBCS (ref 0–0)
PLATELET # BLD AUTO: 135 X10*3/UL (ref 150–450)
POTASSIUM SERPL-SCNC: 3.5 MMOL/L (ref 3.5–5.3)
RBC # BLD AUTO: 2.43 X10*6/UL (ref 4.5–5.9)
SODIUM SERPL-SCNC: 133 MMOL/L (ref 136–145)
WBC # BLD AUTO: 8.6 X10*3/UL (ref 4.4–11.3)

## 2025-04-03 PROCEDURE — 71045 X-RAY EXAM CHEST 1 VIEW: CPT

## 2025-04-03 PROCEDURE — 71045 X-RAY EXAM CHEST 1 VIEW: CPT | Performed by: RADIOLOGY

## 2025-04-03 PROCEDURE — 85027 COMPLETE CBC AUTOMATED: CPT | Performed by: NURSE PRACTITIONER

## 2025-04-03 PROCEDURE — 82947 ASSAY GLUCOSE BLOOD QUANT: CPT

## 2025-04-03 PROCEDURE — 3E0H76Z INTRODUCTION OF NUTRITIONAL SUBSTANCE INTO LOWER GI, VIA NATURAL OR ARTIFICIAL OPENING: ICD-10-PCS | Performed by: NURSE PRACTITIONER

## 2025-04-03 PROCEDURE — 2500000004 HC RX 250 GENERAL PHARMACY W/ HCPCS (ALT 636 FOR OP/ED): Performed by: NURSE PRACTITIONER

## 2025-04-03 PROCEDURE — 2500000001 HC RX 250 WO HCPCS SELF ADMINISTERED DRUGS (ALT 637 FOR MEDICARE OP): Performed by: NURSE PRACTITIONER

## 2025-04-03 PROCEDURE — 83735 ASSAY OF MAGNESIUM: CPT | Performed by: NURSE PRACTITIONER

## 2025-04-03 PROCEDURE — 2500000005 HC RX 250 GENERAL PHARMACY W/O HCPCS: Performed by: NURSE PRACTITIONER

## 2025-04-03 PROCEDURE — 82150 ASSAY OF AMYLASE: CPT | Performed by: NURSE PRACTITIONER

## 2025-04-03 PROCEDURE — 1200000002 HC GENERAL ROOM WITH TELEMETRY DAILY

## 2025-04-03 PROCEDURE — 99232 SBSQ HOSP IP/OBS MODERATE 35: CPT | Performed by: NURSE PRACTITIONER

## 2025-04-03 PROCEDURE — 80048 BASIC METABOLIC PNL TOTAL CA: CPT | Performed by: NURSE PRACTITIONER

## 2025-04-03 RX ORDER — POTASSIUM CHLORIDE 1.5 G/1.58G
40 POWDER, FOR SOLUTION ORAL ONCE
Status: COMPLETED | OUTPATIENT
Start: 2025-04-03 | End: 2025-04-03

## 2025-04-03 RX ORDER — INSULIN LISPRO 100 [IU]/ML
0-5 INJECTION, SOLUTION INTRAVENOUS; SUBCUTANEOUS EVERY 6 HOURS
Status: DISCONTINUED | OUTPATIENT
Start: 2025-04-04 | End: 2025-04-06 | Stop reason: HOSPADM

## 2025-04-03 RX ORDER — OXYCODONE HCL 5 MG/5 ML
10 SOLUTION, ORAL ORAL EVERY 4 HOURS PRN
Status: DISCONTINUED | OUTPATIENT
Start: 2025-04-03 | End: 2025-04-06 | Stop reason: HOSPADM

## 2025-04-03 RX ORDER — ACETAMINOPHEN 160 MG/5ML
650 SOLUTION ORAL EVERY 6 HOURS
Status: DISCONTINUED | OUTPATIENT
Start: 2025-04-03 | End: 2025-04-06 | Stop reason: HOSPADM

## 2025-04-03 RX ORDER — OXYCODONE HCL 5 MG/5 ML
5 SOLUTION, ORAL ORAL EVERY 4 HOURS PRN
Status: DISCONTINUED | OUTPATIENT
Start: 2025-04-03 | End: 2025-04-06 | Stop reason: HOSPADM

## 2025-04-03 RX ADMIN — DEXTROSE AND SODIUM CHLORIDE 75 ML/HR: 5; 450 INJECTION, SOLUTION INTRAVENOUS at 08:08

## 2025-04-03 RX ADMIN — HEPARIN SODIUM 5000 UNITS: 5000 INJECTION, SOLUTION INTRAVENOUS; SUBCUTANEOUS at 18:18

## 2025-04-03 RX ADMIN — LEVOTHYROXINE SODIUM ANHYDROUS 90 MCG: 100 INJECTION, POWDER, LYOPHILIZED, FOR SOLUTION INTRAVENOUS at 08:07

## 2025-04-03 RX ADMIN — OXYCODONE HYDROCHLORIDE 10 MG: 5 SOLUTION ORAL at 21:04

## 2025-04-03 RX ADMIN — PANTOPRAZOLE SODIUM 40 MG: 40 INJECTION, POWDER, FOR SOLUTION INTRAVENOUS at 08:07

## 2025-04-03 RX ADMIN — ACETAMINOPHEN 650 MG: 160 SOLUTION ORAL at 10:16

## 2025-04-03 RX ADMIN — OXYCODONE HYDROCHLORIDE 10 MG: 5 SOLUTION ORAL at 10:15

## 2025-04-03 RX ADMIN — ACETAMINOPHEN 650 MG: 160 SOLUTION ORAL at 21:04

## 2025-04-03 RX ADMIN — HEPARIN SODIUM 5000 UNITS: 5000 INJECTION, SOLUTION INTRAVENOUS; SUBCUTANEOUS at 00:52

## 2025-04-03 RX ADMIN — METOPROLOL TARTRATE 5 MG: 1 INJECTION, SOLUTION INTRAVENOUS at 18:17

## 2025-04-03 RX ADMIN — HEPARIN SODIUM 5000 UNITS: 5000 INJECTION, SOLUTION INTRAVENOUS; SUBCUTANEOUS at 12:11

## 2025-04-03 RX ADMIN — LIDOCAINE 4% 1 PATCH: 40 PATCH TOPICAL at 08:08

## 2025-04-03 RX ADMIN — POTASSIUM CHLORIDE 40 MEQ: 1.5 POWDER, FOR SOLUTION ORAL at 14:13

## 2025-04-03 RX ADMIN — ACETAMINOPHEN 650 MG: 160 SOLUTION ORAL at 16:24

## 2025-04-03 RX ADMIN — METOPROLOL TARTRATE 5 MG: 1 INJECTION, SOLUTION INTRAVENOUS at 05:45

## 2025-04-03 RX ADMIN — METOPROLOL TARTRATE 5 MG: 1 INJECTION, SOLUTION INTRAVENOUS at 00:27

## 2025-04-03 ASSESSMENT — PAIN - FUNCTIONAL ASSESSMENT
PAIN_FUNCTIONAL_ASSESSMENT: 0-10

## 2025-04-03 ASSESSMENT — COGNITIVE AND FUNCTIONAL STATUS - GENERAL
CLIMB 3 TO 5 STEPS WITH RAILING: A LITTLE
DAILY ACTIVITIY SCORE: 21
DRESSING REGULAR UPPER BODY CLOTHING: A LITTLE
DRESSING REGULAR LOWER BODY CLOTHING: A LITTLE
MOBILITY SCORE: 23
HELP NEEDED FOR BATHING: A LITTLE

## 2025-04-03 ASSESSMENT — PAIN SCALES - GENERAL
PAINLEVEL_OUTOF10: 5 - MODERATE PAIN
PAINLEVEL_OUTOF10: 7
PAINLEVEL_OUTOF10: 4
PAINLEVEL_OUTOF10: 10 - WORST POSSIBLE PAIN

## 2025-04-03 NOTE — PROGRESS NOTES
"Thoracic Surgery Progress Note  4/3/2025    Timoteo Victoria is a 69 y.o. male with a history of esophageal cancer who is Now 3 Days Post-Op status post an esophagectomy.    Overnight issues: J tube replaced by IR 4/2. ROSEANNE.  CT removed on Am rounds. Amylase 24    Physical Exam:  General: He is a pleasant male currently in no distress, seen sitting up in bed.  Visit Vitals  /60 (BP Location: Left arm, Patient Position: Lying)   Pulse 69   Temp 36.8 °C (98.2 °F) (Temporal)   Resp 18   Ht 1.778 m (5' 10\")   Wt 92.5 kg (203 lb 14.4 oz)   SpO2 97%   BMI 29.26 kg/m²   Smoking Status Every Day   BSA 2.14 m²     Body mass index is 29.26 kg/m².   HEENT: Normocephalic and atraumatic.   NECK: Supple. Trach midline. No JVD.  Cervical incision st betty. Well approx, no erythema or drg.  Cervical CHAD to bulb suction, sero sang drg.   CHEST: Breathing comfortably on room air;  Chest tube with sero sang drg. No airleak. Removed on AM rounds.   HEART: Regular rate and rhythm. NSR with rate in 70s per tele review.   ABDOMEN: Soft, ND, tender with palpation, J tube capped.   NEUROLOGIC: Alert and oriented. Grossly intact.   EXTREMITIES: Moves all extremities equally.  No lower extremity edema. No calf tenderness.     Diagnostics:     Intake/Output Summary (Last 24 hours) at 4/3/2025 1043  Last data filed at 4/3/2025 1029  Gross per 24 hour   Intake 1930 ml   Output 770 ml   Net 1160 ml     Results from last 7 days   Lab Units 04/03/25  0726 04/02/25  0648 04/01/25  0315   WBC AUTO x10*3/uL 8.6 8.3 9.6   HEMOGLOBIN g/dL 7.8* 7.3* 7.9*   HEMATOCRIT % 25.1* 23.5* 25.0*   PLATELETS AUTO x10*3/uL 135* 125* 161     Results from last 7 days   Lab Units 04/03/25  0726 04/02/25  0648 04/01/25  0315   SODIUM mmol/L 133* 132* 133*   POTASSIUM mmol/L 3.5 3.7 4.3   CHLORIDE mmol/L 103 102 103   CO2 mmol/L 22 25 23   BUN mg/dL 15 18 20   CREATININE mg/dL 0.63 0.67 0.61   GLUCOSE mg/dL 95 99 94   CALCIUM mg/dL 8.1* 7.7* 8.4*     Results from " last 7 days   Lab Units 04/03/25  0726 04/02/25  0648 04/01/25  0315   SODIUM mmol/L 133* 132* 133*   POTASSIUM mmol/L 3.5 3.7 4.3   CHLORIDE mmol/L 103 102 103   CO2 mmol/L 22 25 23   BUN mg/dL 15 18 20   CREATININE mg/dL 0.63 0.67 0.61   GLUCOSE mg/dL 95 99 94   CALCIUM mg/dL 8.1* 7.7* 8.4*     Scheduled medications  acetaminophen, 650 mg, j-tube, q6h  heparin (porcine), 5,000 Units, subcutaneous, q8h  insulin lispro, 0-5 Units, subcutaneous, q4h  levothyroxine, 90 mcg, intravenous, q24h OZIEL  lidocaine, 1 patch, transdermal, q24h  lidocaine PF, 10 mL, infiltration, Once  metoprolol, 5 mg, intravenous, q6h  pantoprazole, 40 mg, intravenous, Daily before breakfast      Continuous medications       PRN medications  PRN medications: albuterol, dextrose, dextrose, glucagon, glucagon, naloxone, oxyCODONE, oxyCODONE    Imaging:   AM CXR reviewed. Expected post op changes and chest tube placement. Non dilated gastric conduit. No clinically significant pneumothorax. CXR remains stable when compared with previous exam. No formal report.     Assessment:  Timoteo Victoria is a 69 y.o. male with a history of esophageal cancer who is status post an esophagectomy with Dr. Salas on 3/31/2025. Satisfactory post op course.     4/1/2025: admitted to the ICU from the OR; transferred to the floor this morning  4/2/2025: J tube dislodged overnight. Replaced by IR.   4/3/2025: CT removed.  Amylase 24.  TF initiated via J.     Plan:  Neurology: post operative pain; h/o chronic pain, cluster headaches  -discontinue PCA pump--tylenol and oxycodone via J tube.   - lidocaine patches   -Bowel regimen available for constipation secondary to pain medication   -Out of bed to the chair throughout the day  -Encourage ambulation as tolerated  -Home meds: Wellbutrin 150mg BID, amitriptyline 100mg nightly, baclofen 5mg TID. Has taken oxycodone and morphine at home recently.     Cardiovascular: h/o CABG, HTN, HLD  -Continue telemetry  -cont Q4h  VS  -Continue metoprolol 5mg IV Q6h for post operative arrhythmia prophylaxis   -Replace electrolytes as needed for K >4, Mg >2  -Home meds: ASA 81mg daily, atorvastatin 40mg daily, losartan 50mg, spironolactone 50mg daily, verapamil ER 240mg,     Pulmonology: post op atelectasis, chest tube management, tobacco use  -Encourage incentive spirometer use every hour  -Continue pulmonary hygiene  -discontinued CT on AM rounds.   -Obtain daily CXR  -Monitor and record chest tube drainage every shift  -discussed tobacco cessation    Gastrointestinal: h/o esophageal cancer, s/p esophagectomy; j-tube management; CHAD drain management  -strict NPO  -discontinue IVF.  Initiated isosource TF.   -maintain head of bed at 30 degrees  -Zofran available for nausea  -scheduled colace, PRN bowel regimen  -maintain CHAD to bulb sxn  -send amylase daily, 24 today from 73 yesterday   -NG discontinue 4/2  - IR J tube replacement 4/2     Genitourinary:   -voiding  -Continue to monitor daily electrolytes with routine BMPs   -Adequate urine output    Infectious Disease: afebrile; no leukocytosis  -Continue to trend daily temperatures and WBC count to monitor for signs of post-operative infection   -Monitor surgical incisions for signs of infection   -Perioperative antibiotics completed     Hematology:   -Monitor for signs of acute blood loss  -Trend CBCs     Endocrine: h/o hypothyroid  -resume synthroid (IV while NPO)    DVT Prophylaxis:   -Continue subcutaneous heparin and SCDs, early ambulation    Disposition:  -Plan for discharge to home after re-eval day #5-6  -Continue to assess for home-going needs     Patient seen and examined by this provider, seen/discussed with Dr. Salas.     Ayla Parks APRN-CNP  Thoracic Surgery, #[id number]

## 2025-04-04 ENCOUNTER — APPOINTMENT (OUTPATIENT)
Dept: RADIOLOGY | Facility: HOSPITAL | Age: 70
DRG: 327 | End: 2025-04-04
Payer: MEDICARE

## 2025-04-04 LAB
AMYLASE FLD-CCNC: 23 U/L
ANION GAP SERPL CALC-SCNC: 11 MMOL/L (ref 10–20)
BUN SERPL-MCNC: 13 MG/DL (ref 6–23)
CALCIUM SERPL-MCNC: 8 MG/DL (ref 8.6–10.6)
CHLORIDE SERPL-SCNC: 106 MMOL/L (ref 98–107)
CO2 SERPL-SCNC: 25 MMOL/L (ref 21–32)
CREAT SERPL-MCNC: 0.61 MG/DL (ref 0.5–1.3)
EGFRCR SERPLBLD CKD-EPI 2021: >90 ML/MIN/1.73M*2
ERYTHROCYTE [DISTWIDTH] IN BLOOD BY AUTOMATED COUNT: 14.7 % (ref 11.5–14.5)
GLUCOSE BLD MANUAL STRIP-MCNC: 104 MG/DL (ref 74–99)
GLUCOSE BLD MANUAL STRIP-MCNC: 130 MG/DL (ref 74–99)
GLUCOSE BLD MANUAL STRIP-MCNC: 88 MG/DL (ref 74–99)
GLUCOSE BLD MANUAL STRIP-MCNC: 94 MG/DL (ref 74–99)
GLUCOSE SERPL-MCNC: 88 MG/DL (ref 74–99)
HCT VFR BLD AUTO: 23.8 % (ref 41–52)
HGB BLD-MCNC: 7.3 G/DL (ref 13.5–17.5)
MAGNESIUM SERPL-MCNC: 2.05 MG/DL (ref 1.6–2.4)
MCH RBC QN AUTO: 32.4 PG (ref 26–34)
MCHC RBC AUTO-ENTMCNC: 30.7 G/DL (ref 32–36)
MCV RBC AUTO: 106 FL (ref 80–100)
NRBC BLD-RTO: 0 /100 WBCS (ref 0–0)
PLATELET # BLD AUTO: 135 X10*3/UL (ref 150–450)
POTASSIUM SERPL-SCNC: 3.5 MMOL/L (ref 3.5–5.3)
RBC # BLD AUTO: 2.25 X10*6/UL (ref 4.5–5.9)
SODIUM SERPL-SCNC: 138 MMOL/L (ref 136–145)
WBC # BLD AUTO: 5.7 X10*3/UL (ref 4.4–11.3)

## 2025-04-04 PROCEDURE — 97110 THERAPEUTIC EXERCISES: CPT | Mod: GP,CQ

## 2025-04-04 PROCEDURE — 2500000001 HC RX 250 WO HCPCS SELF ADMINISTERED DRUGS (ALT 637 FOR MEDICARE OP): Performed by: NURSE PRACTITIONER

## 2025-04-04 PROCEDURE — 80048 BASIC METABOLIC PNL TOTAL CA: CPT | Performed by: NURSE PRACTITIONER

## 2025-04-04 PROCEDURE — 83735 ASSAY OF MAGNESIUM: CPT | Performed by: NURSE PRACTITIONER

## 2025-04-04 PROCEDURE — 97116 GAIT TRAINING THERAPY: CPT | Mod: GP,CQ

## 2025-04-04 PROCEDURE — 2500000004 HC RX 250 GENERAL PHARMACY W/ HCPCS (ALT 636 FOR OP/ED): Performed by: NURSE PRACTITIONER

## 2025-04-04 PROCEDURE — 85027 COMPLETE CBC AUTOMATED: CPT | Performed by: NURSE PRACTITIONER

## 2025-04-04 PROCEDURE — 99232 SBSQ HOSP IP/OBS MODERATE 35: CPT | Performed by: NURSE PRACTITIONER

## 2025-04-04 PROCEDURE — 71045 X-RAY EXAM CHEST 1 VIEW: CPT

## 2025-04-04 PROCEDURE — 1200000002 HC GENERAL ROOM WITH TELEMETRY DAILY

## 2025-04-04 PROCEDURE — 71045 X-RAY EXAM CHEST 1 VIEW: CPT | Performed by: RADIOLOGY

## 2025-04-04 PROCEDURE — 2500000005 HC RX 250 GENERAL PHARMACY W/O HCPCS: Performed by: NURSE PRACTITIONER

## 2025-04-04 PROCEDURE — 82150 ASSAY OF AMYLASE: CPT | Performed by: NURSE PRACTITIONER

## 2025-04-04 PROCEDURE — 82947 ASSAY GLUCOSE BLOOD QUANT: CPT

## 2025-04-04 RX ORDER — BISACODYL 10 MG/1
10 SUPPOSITORY RECTAL DAILY PRN
Status: DISCONTINUED | OUTPATIENT
Start: 2025-04-04 | End: 2025-04-06 | Stop reason: HOSPADM

## 2025-04-04 RX ORDER — POTASSIUM CHLORIDE 1.5 G/1.58G
40 POWDER, FOR SOLUTION ORAL ONCE
Status: COMPLETED | OUTPATIENT
Start: 2025-04-04 | End: 2025-04-04

## 2025-04-04 RX ADMIN — HEPARIN SODIUM 5000 UNITS: 5000 INJECTION, SOLUTION INTRAVENOUS; SUBCUTANEOUS at 02:48

## 2025-04-04 RX ADMIN — POTASSIUM CHLORIDE 40 MEQ: 1.5 POWDER, FOR SOLUTION ORAL at 10:26

## 2025-04-04 RX ADMIN — HEPARIN SODIUM 5000 UNITS: 5000 INJECTION, SOLUTION INTRAVENOUS; SUBCUTANEOUS at 18:30

## 2025-04-04 RX ADMIN — LIDOCAINE 4% 1 PATCH: 40 PATCH TOPICAL at 08:44

## 2025-04-04 RX ADMIN — ACETAMINOPHEN 650 MG: 160 SOLUTION ORAL at 02:48

## 2025-04-04 RX ADMIN — FOLIC ACID 1 MG: 5 INJECTION, SOLUTION INTRAMUSCULAR; INTRAVENOUS; SUBCUTANEOUS at 12:13

## 2025-04-04 RX ADMIN — METOPROLOL TARTRATE 5 MG: 1 INJECTION, SOLUTION INTRAVENOUS at 00:04

## 2025-04-04 RX ADMIN — OXYCODONE HYDROCHLORIDE 10 MG: 5 SOLUTION ORAL at 01:06

## 2025-04-04 RX ADMIN — ACETAMINOPHEN 650 MG: 160 SOLUTION ORAL at 20:48

## 2025-04-04 RX ADMIN — OXYCODONE HYDROCHLORIDE 10 MG: 5 SOLUTION ORAL at 16:43

## 2025-04-04 RX ADMIN — OXYCODONE HYDROCHLORIDE 10 MG: 5 SOLUTION ORAL at 10:26

## 2025-04-04 RX ADMIN — ACETAMINOPHEN 650 MG: 160 SOLUTION ORAL at 10:26

## 2025-04-04 RX ADMIN — OXYCODONE HYDROCHLORIDE 10 MG: 5 SOLUTION ORAL at 20:47

## 2025-04-04 RX ADMIN — HEPARIN SODIUM 5000 UNITS: 5000 INJECTION, SOLUTION INTRAVENOUS; SUBCUTANEOUS at 10:26

## 2025-04-04 RX ADMIN — LEVOTHYROXINE SODIUM ANHYDROUS 90 MCG: 100 INJECTION, POWDER, LYOPHILIZED, FOR SOLUTION INTRAVENOUS at 08:44

## 2025-04-04 RX ADMIN — METOPROLOL TARTRATE 5 MG: 1 INJECTION, SOLUTION INTRAVENOUS at 18:30

## 2025-04-04 RX ADMIN — METOPROLOL TARTRATE 5 MG: 1 INJECTION, SOLUTION INTRAVENOUS at 12:15

## 2025-04-04 RX ADMIN — PANTOPRAZOLE SODIUM 40 MG: 40 INJECTION, POWDER, FOR SOLUTION INTRAVENOUS at 06:00

## 2025-04-04 RX ADMIN — METOPROLOL TARTRATE 5 MG: 1 INJECTION, SOLUTION INTRAVENOUS at 06:00

## 2025-04-04 RX ADMIN — IRON SUCROSE 300 MG: 20 INJECTION, SOLUTION INTRAVENOUS at 12:13

## 2025-04-04 RX ADMIN — ACETAMINOPHEN 650 MG: 160 SOLUTION ORAL at 16:43

## 2025-04-04 ASSESSMENT — COGNITIVE AND FUNCTIONAL STATUS - GENERAL
MOVING FROM LYING ON BACK TO SITTING ON SIDE OF FLAT BED WITH BEDRAILS: A LITTLE
WALKING IN HOSPITAL ROOM: A LITTLE
PERSONAL GROOMING: A LITTLE
CLIMB 3 TO 5 STEPS WITH RAILING: A LITTLE
MOBILITY SCORE: 18
MOVING TO AND FROM BED TO CHAIR: A LITTLE
MOVING TO AND FROM BED TO CHAIR: A LITTLE
DAILY ACTIVITIY SCORE: 18
EATING MEALS: A LITTLE
STANDING UP FROM CHAIR USING ARMS: A LITTLE
MOVING TO AND FROM BED TO CHAIR: A LITTLE
DRESSING REGULAR LOWER BODY CLOTHING: A LITTLE
DRESSING REGULAR UPPER BODY CLOTHING: A LITTLE
STANDING UP FROM CHAIR USING ARMS: A LITTLE
DRESSING REGULAR LOWER BODY CLOTHING: A LITTLE
PERSONAL GROOMING: A LITTLE
TURNING FROM BACK TO SIDE WHILE IN FLAT BAD: A LITTLE
HELP NEEDED FOR BATHING: A LITTLE
CLIMB 3 TO 5 STEPS WITH RAILING: A LITTLE
TURNING FROM BACK TO SIDE WHILE IN FLAT BAD: A LITTLE
TOILETING: A LITTLE
CLIMB 3 TO 5 STEPS WITH RAILING: A LITTLE
WALKING IN HOSPITAL ROOM: A LITTLE
HELP NEEDED FOR BATHING: A LITTLE
MOVING FROM LYING ON BACK TO SITTING ON SIDE OF FLAT BED WITH BEDRAILS: A LITTLE
DAILY ACTIVITIY SCORE: 18
MOBILITY SCORE: 18
MOBILITY SCORE: 18
TOILETING: A LITTLE
DRESSING REGULAR UPPER BODY CLOTHING: A LITTLE
EATING MEALS: A LITTLE
MOVING FROM LYING ON BACK TO SITTING ON SIDE OF FLAT BED WITH BEDRAILS: A LITTLE
STANDING UP FROM CHAIR USING ARMS: A LITTLE
TURNING FROM BACK TO SIDE WHILE IN FLAT BAD: A LITTLE
WALKING IN HOSPITAL ROOM: A LITTLE

## 2025-04-04 ASSESSMENT — PAIN SCALES - GENERAL
PAINLEVEL_OUTOF10: 8
PAINLEVEL_OUTOF10: 4
PAINLEVEL_OUTOF10: 8
PAINLEVEL_OUTOF10: 7
PAINLEVEL_OUTOF10: 8

## 2025-04-04 ASSESSMENT — PAIN - FUNCTIONAL ASSESSMENT
PAIN_FUNCTIONAL_ASSESSMENT: 0-10

## 2025-04-04 NOTE — CARE PLAN
Problem: Pain - Adult  Goal: Verbalizes/displays adequate comfort level or baseline comfort level  Outcome: Progressing     Problem: Skin  Goal: Decreased wound size/increased tissue granulation at next dressing change  Outcome: Progressing  Goal: Participates in plan/prevention/treatment measures  Outcome: Progressing  Goal: Prevent/manage excess moisture  Outcome: Progressing  Goal: Prevent/minimize sheer/friction injuries  Outcome: Progressing  Goal: Promote/optimize nutrition  Outcome: Progressing  Goal: Promote skin healing  Outcome: Progressing     Problem: Diabetes  Goal: Achieve decreasing blood glucose levels by end of shift  Outcome: Progressing  Goal: Increase stability of blood glucose readings by end of shift  Outcome: Progressing  Goal: Decrease in ketones present in urine by end of shift  Outcome: Progressing  Goal: Maintain electrolyte levels within acceptable range throughout shift  Outcome: Progressing  Goal: Maintain glucose levels >70mg/dl to <250mg/dl throughout shift  Outcome: Progressing  Goal: No changes in neurological exam by end of shift  Outcome: Progressing  Goal: Learn about and adhere to nutrition recommendations by end of shift  Outcome: Progressing  Goal: Vital signs within normal range for age by end of shift  Outcome: Progressing  Goal: Increase self care and/or family involovement by end of shift  Outcome: Progressing  Goal: Receive DSME education by end of shift  Outcome: Progressing       The clinical goals for the shift include Patient will remain hemodynamically stable throughout the shift

## 2025-04-04 NOTE — CARE PLAN
The patient's goals for the shift include      The clinical goals for the shift include Pt will remain HDS thoughout this shift      Problem: Pain - Adult  Goal: Verbalizes/displays adequate comfort level or baseline comfort level  Outcome: Progressing     Problem: Safety - Adult  Goal: Free from fall injury  Outcome: Progressing     Problem: Discharge Planning  Goal: Discharge to home or other facility with appropriate resources  Outcome: Progressing     Problem: Chronic Conditions and Co-morbidities  Goal: Patient's chronic conditions and co-morbidity symptoms are monitored and maintained or improved  Outcome: Progressing     Problem: Nutrition  Goal: Nutrient intake appropriate for maintaining nutritional needs  Outcome: Progressing     Problem: Skin  Goal: Decreased wound size/increased tissue granulation at next dressing change  Outcome: Progressing  Goal: Participates in plan/prevention/treatment measures  Outcome: Progressing  Goal: Prevent/manage excess moisture  Outcome: Progressing  Goal: Prevent/minimize sheer/friction injuries  Outcome: Progressing  Goal: Promote/optimize nutrition  Outcome: Progressing  Goal: Promote skin healing  Outcome: Progressing     Problem: Diabetes  Goal: Achieve decreasing blood glucose levels by end of shift  Outcome: Progressing  Goal: Increase stability of blood glucose readings by end of shift  Outcome: Progressing  Goal: Decrease in ketones present in urine by end of shift  Outcome: Progressing  Goal: Maintain electrolyte levels within acceptable range throughout shift  Outcome: Progressing  Goal: Maintain glucose levels >70mg/dl to <250mg/dl throughout shift  Outcome: Progressing  Goal: No changes in neurological exam by end of shift  Outcome: Progressing  Goal: Learn about and adhere to nutrition recommendations by end of shift  Outcome: Progressing  Goal: Vital signs within normal range for age by end of shift  Outcome: Progressing  Goal: Increase self care and/or  family involovement by end of shift  Outcome: Progressing  Goal: Receive DSME education by end of shift  Outcome: Progressing     Problem: Pain  Goal: Takes deep breaths with improved pain control throughout the shift  Outcome: Progressing  Goal: Turns in bed with improved pain control throughout the shift  Outcome: Progressing  Goal: Walks with improved pain control throughout the shift  Outcome: Progressing  Goal: Performs ADL's with improved pain control throughout shift  Outcome: Progressing  Goal: Participates in PT with improved pain control throughout the shift  Outcome: Progressing  Goal: Free from opioid side effects throughout the shift  Outcome: Progressing  Goal: Free from acute confusion related to pain meds throughout the shift  Outcome: Progressing     Problem: Fall/Injury  Goal: Not fall by end of shift  Outcome: Progressing  Goal: Be free from injury by end of the shift  Outcome: Progressing  Goal: Verbalize understanding of personal risk factors for fall in the hospital  Outcome: Progressing  Goal: Verbalize understanding of risk factor reduction measures to prevent injury from fall in the home  Outcome: Progressing  Goal: Use assistive devices by end of the shift  Outcome: Progressing  Goal: Pace activities to prevent fatigue by end of the shift  Outcome: Progressing

## 2025-04-04 NOTE — PROGRESS NOTES
04/04/25 1009   Discharge Planning   Living Arrangements Alone   Support Systems Family members   Type of Residence Private residence   Who is requesting discharge planning? Provider   Home or Post Acute Services In home services   Expected Discharge Disposition Home H  ([facility])   Does the patient need discharge transport arranged? No     Patient s/p an Esophagectomy with J Tube placement.  Discharge pending surgical management post 5-6 days after surgery.

## 2025-04-04 NOTE — PROGRESS NOTES
Physical Therapy    Physical Therapy Treatment    Patient Name: Timoteo Victoria  MRN: [id number]  Department: [place] [id number]  Room: [address] [place] [address]  Today's Date: 4/4/2025  Time Calculation  Start Time: 1544  Stop Time: 1608  Time Calculation (min): 24 min         Assessment/Plan   PT Assessment  PT Assessment Results: Decreased strength, Decreased mobility  End of Session Communication: Bedside nurse  Assessment Comment: Pt progressed ambulation to no AD this date. Pt ambulates with lateral sway but with no acute LOB. Pt able to ascend/descend 1 stair with SBA/CGA. Pt scored 25/28 indicating low fall risk.  End of Session Patient Position: Up in chair, Alarm off, not on at start of session     PT Plan  Treatment/Interventions: Bed mobility, Transfer training, Gait training, Stair training, Balance training, Strengthening, Endurance training, Therapeutic exercise, Therapeutic activity, Home exercise program  PT Plan: Ongoing PT  PT Frequency: 3 times per week  PT Discharge Recommendations: Low intensity level of continued care  Equipment Recommended upon Discharge:  (Pt has FWW)  PT Recommended Transfer Status: Assist x1, Assistive device  PT - OK to Discharge: Yes      General Visit Information:   PT  Visit  PT Received On: 04/04/25  General  Prior to Session Communication: Bedside nurse  Patient Position Received: Up in chair, Alarm off, not on at start of session  General Comment: Pt seated in chair upon arrival. Pt pleasant and agreeable to therapy.    Subjective   Precautions:  Precautions  Hearing/Visual Limitations: WFL  Medical Precautions: Fall precautions  Precautions Comment: HOB >30 deg, MAP 65-90            Objective   Pain:     Cognition:  Cognition  Overall Cognitive Status: Within Functional Limits    Activity Tolerance:  Activity Tolerance  Endurance: Tolerates 10 - 20 min exercise with multiple rests  Treatments:  Therapeutic Exercise  Therapeutic Exercise Performed: Yes  Therapeutic Exercise Activity 1:  Seated B LE AROM LAQ with (therapist providing resistance), hip adduction x10  Therapeutic Exercise Activity 2: Standing B LE AROM hip abduction, marches, heel raises, DF x10    Balance/Neuromuscular Re-Education  Balance/Neuromuscular Re-Education Activity Performed: Yes  Balance/Neuromuscular Re-Education Activity 1: Pt completed the Tinetti balance test scoring 25/28    Ambulation/Gait Training  Ambulation/Gait Training Performed: Yes  Ambulation/Gait Training 1  Surface 1: Level tile  Device 1: No device  Assistance 1: Close supervision  Quality of Gait 1:  (lateral sway)  Comments/Distance (ft) 1: 200' x2 with seated rest between trials.  Transfers  Transfer: Yes  Transfer 1  Transfer From 1: Sit to, Stand to  Transfer to 1: Stand, Sit  Technique 1: Sit to stand, Stand to sit  Transfer Device 1:  (no AD)  Transfer Level of Assistance 1: Close supervision    Stairs  Stairs: Yes  Stairs  Rails 1: None (Comment) (none to ascend/pt trial none on descent but grabbed the wall for support)  Curb Step 1: No  Device 1: No device  Assistance 1: Close supervision, Contact guard  Comment/Number of Steps 1: 1 SBA to ascend/ CGA for descent, pt attempted no HR but grabbed at the wall impulsively for stabilization.    Outcome Measures:  [facility] Basic Mobility  Turning from your back to your side while in a flat bed without using bedrails: A little  Moving from lying on your back to sitting on the side of a flat bed without using bedrails: A little  Moving to and from bed to chair (including a wheelchair): A little  Standing up from a chair using your arms (e.g. wheelchair or bedside chair): A little  To walk in hospital room: A little  Climbing 3-5 steps with railing: A little  Basic Mobility - Total Score: 18    Tinetti  Sitting Balance: Steady, safe  Arises: Able without using arms  Attempts to Arise: Able to arise, one attempt  Immediate Standing Balance (First 5 Seconds): Steady without walker or other support  Standing  Balance: Narrow stance without support  Nudged: Steady without walker or other support  Eyes Closed: Steady  Turned 360 Degrees: Steadiness: Steady  Turned 360 Degrees: Continuity of Steps: Continuous  Sitting Down: Safe, smooth motion  Balance Score: 16  Initiation of Gait: No hesitancy  Step Height: R Swing Foot: Right foot complete clears floor  Step Length: R Swing Foot: Passes left stance foot  Step Height: L Swing Foot: Left foot complete clears floor  Step Length: L Swing Foot: Passes right stance foot  Step Symmetry: Right and left step appear equal  Step Continuity: Steps appear continuous  Path: Mild/moderate deviation or uses walking aid  Trunk: Marked sway or uses walking aid  Walking Time: Heels almost touching while walking  Gait Score: 9  Total Score: 25    Education Documentation  Mobility Training, taught by Olya Red PTA at 4/4/2025  4:33 PM.  Learner: Patient  Readiness: Acceptance  Method: Explanation  Response: Verbalizes Understanding  Comment: safe stair negotiation    Home Exercise Program, taught by Olya Red PTA at 4/4/2025  4:33 PM.  Learner: Patient  Readiness: Acceptance  Method: Explanation  Response: Verbalizes Understanding  Comment: seated and standing exercises    Education Comments  No comments found.        OP EDUCATION:       Encounter Problems       Encounter Problems (Active)       Balance       Patient to demonstrate Linsey static and dynamic standing balance without LOB with change of directions, no hesitancy, appropriate EMMY and sequencing to complete functional task.  (Progressing)       Start:  04/01/25    Expected End:  04/15/25            Pt will score >/= 24/28 on Tinetti balance assessment to indicate low falls risk.   (Met)       Start:  04/01/25    Expected End:  04/15/25    Resolved:  04/04/25            Mobility       STG - Patient will ambulate >/= 250 ft Linsey with LRAD and no acute LOB (Progressing)       Start:  04/01/25    Expected End:  04/15/25             Patient will tolerate >/=30 minutes continuous activity with stable vital signs, RPE </=13/20, RPD </=3/10  (Progressing)       Start:  04/01/25    Expected End:  04/15/25            Patient to ascend/descend >/= 1 stair to demo ability to safely traverse steps/curbs in community (Met)       Start:  04/01/25    Expected End:  04/15/25    Resolved:  04/04/25            PT Transfers       STG - Patient will perform bed mobility Linsey (Progressing)       Start:  04/01/25    Expected End:  04/15/25            STG - Patient will transfer sit to and from stand Linsey with LRAD  (Progressing)       Start:  04/01/25    Expected End:  04/15/25               Pain - Adult

## 2025-04-04 NOTE — PROGRESS NOTES
"Thoracic Surgery Progress Note  4/4/2025    Timoteo Victoria is a 69 y.o. male with a history of esophageal cancer who is Now 4 Days Post-Op status post an esophagectomy.    Overnight issues: tolerating J tube feeds. Amylase 23. Patient offers no complaints. + flatus, no BM.     Physical Exam:  General: He is a pleasant male currently in no distress, seen sitting up in bed.  Visit Vitals  /58 (BP Location: Right arm, Patient Position: Lying) Comment: RN aware   Pulse 73   Temp 36.5 °C (97.7 °F) (Temporal)   Resp 18   Ht 1.778 m (5' 10\")   Wt 91.6 kg (202 lb)   SpO2 97%   BMI 28.98 kg/m²   Smoking Status Every Day   BSA 2.13 m²     Body mass index is 28.98 kg/m².   HEENT: Normocephalic and atraumatic.   NECK: Supple. Trach midline. No JVD.  Cervical incision st betty. Well approx, no erythema or drg.  Cervical CHAD to bulb suction, sero sang drg.   CHEST: Breathing comfortably on room air;  previous CT site with occlusive dressing.   HEART: Regular rate and rhythm. NSR with rate in 70s per tele review.   ABDOMEN: Soft, ND, tender with palpation, J tube capped.   NEUROLOGIC: Alert and oriented. Grossly intact.   EXTREMITIES: Moves all extremities equally.  No lower extremity edema. No calf tenderness.     Diagnostics:     Intake/Output Summary (Last 24 hours) at 4/4/2025 1121  Last data filed at 4/4/2025 1037  Gross per 24 hour   Intake 1320 ml   Output 395 ml   Net 925 ml     Results from last 7 days   Lab Units 04/04/25  0653 04/03/25  0726 04/02/25  0648   WBC AUTO x10*3/uL 5.7 8.6 8.3   HEMOGLOBIN g/dL 7.3* 7.8* 7.3*   HEMATOCRIT % 23.8* 25.1* 23.5*   PLATELETS AUTO x10*3/uL 135* 135* 125*     Results from last 7 days   Lab Units 04/04/25  0653 04/03/25  0726 04/02/25  0648   SODIUM mmol/L 138 133* 132*   POTASSIUM mmol/L 3.5 3.5 3.7   CHLORIDE mmol/L 106 103 102   CO2 mmol/L 25 22 25   BUN mg/dL 13 15 18   CREATININE mg/dL 0.61 0.63 0.67   GLUCOSE mg/dL 88 95 99   CALCIUM mg/dL 8.0* 8.1* 7.7*     Results from " last 7 days   Lab Units 04/04/25  0653 04/03/25  0726 04/02/25  0648   SODIUM mmol/L 138 133* 132*   POTASSIUM mmol/L 3.5 3.5 3.7   CHLORIDE mmol/L 106 103 102   CO2 mmol/L 25 22 25   BUN mg/dL 13 15 18   CREATININE mg/dL 0.61 0.63 0.67   GLUCOSE mg/dL 88 95 99   CALCIUM mg/dL 8.0* 8.1* 7.7*     Scheduled medications  acetaminophen, 650 mg, j-tube, q6h  folic acid, 1 mg, intravenous, Daily  heparin (porcine), 5,000 Units, subcutaneous, q8h  insulin lispro, 0-5 Units, subcutaneous, q6h  iron sucrose, 300 mg, intravenous, Daily  levothyroxine, 90 mcg, intravenous, q24h OZIEL  lidocaine, 1 patch, transdermal, q24h  lidocaine PF, 10 mL, infiltration, Once  metoprolol, 5 mg, intravenous, q6h  pantoprazole, 40 mg, intravenous, Daily before breakfast      Continuous medications       PRN medications  PRN medications: albuterol, bisacodyl, dextrose, dextrose, glucagon, glucagon, naloxone, oxyCODONE, oxyCODONE    Imaging:   AM CXR reviewed. Expected post op changes and cervical CHAD placement. Non dilated gastric conduit. No clinically significant pneumothorax. CXR remains stable when compared with previous exam. No formal report.     Assessment:  Timoteo Victoria is a 69 y.o. male with a history of esophageal cancer who is status post an esophagectomy with Dr. Salas on 3/31/2025. Satisfactory post op course.     4/1/2025: admitted to the ICU from the OR; transferred to the floor this morning  4/2/2025: Amylase 76. J tube dislodged overnight. Replaced by IR.   4/3/2025: Amylase 24. CT removed.   TF initiated via J.   4/4/2025: Amylase 23. Tolerating advancement of J tube feeds. Ice chips initiated.     Plan:  Neurology: post operative pain; h/o chronic pain, cluster headaches  -tylenol and oxycodone via J tube.   - lidocaine patches   -Bowel regimen available for constipation secondary to pain medication   -Out of bed to the chair throughout the day  -Encourage ambulation as tolerated  -Home meds: Wellbutrin 150mg BID,  amitriptyline 100mg nightly, baclofen 5mg TID. Has taken oxycodone and morphine at home recently.     Cardiovascular: h/o CABG, HTN, HLD  -Continue telemetry  -cont Q4h VS  -Continue metoprolol 5mg IV Q6h for post operative arrhythmia prophylaxis   -Replace electrolytes as needed for K >4, Mg >2  -Home meds: ASA 81mg daily, atorvastatin 40mg daily, losartan 50mg, spironolactone 50mg daily, verapamil ER 240mg,     Pulmonology: post op atelectasis, chest tube management, tobacco use  -Encourage incentive spirometer use every hour  -Continue pulmonary hygiene  -discontinued CT on AM rounds 4/3.   -Obtain daily CXR  -discussed tobacco cessation    Gastrointestinal: h/o esophageal cancer, s/p esophagectomy; j-tube management; CHAD drain management  -ok for ice chips  -Isosource TF to goal via J tube   -maintain head of bed at 30 degrees  -Zofran available for nausea  -RN bowel regimen  -maintain CHAD to bulb sxn  -send amylase daily, 23 today from 24 yesterday   -NG discontinue 4/2  - IR J tube replacement 4/2     Genitourinary:   -voiding  -Continue to monitor daily electrolytes with routine BMPs   -Adequate urine output    Infectious Disease: afebrile; no leukocytosis  -Continue to trend daily temperatures and WBC count to monitor for signs of post-operative infection   -Monitor surgical incisions for signs of infection   -Perioperative antibiotics completed     Hematology:  anemia  - add folic acid IV and IV venofer  -Monitor for signs of acute blood loss  -Trend CBCs     Endocrine: h/o hypothyroid  -resume synthroid (IV while NPO)    DVT Prophylaxis:   -Continue subcutaneous heparin and SCDs, early ambulation    Disposition:  -Tentative plan for CLD tomorrow, FLD on Sunday & likely PM discharge Sunday if amylase remains low after initiation of PO  -Continue to assess for home-going needs     Patient seen and examined by this provider, seen/discussed with Dr. Salas.     Ayla Parks APRN-CNP  Thoracic Surgery, #[id number]

## 2025-04-05 ENCOUNTER — APPOINTMENT (OUTPATIENT)
Dept: RADIOLOGY | Facility: HOSPITAL | Age: 70
DRG: 327 | End: 2025-04-05
Payer: MEDICARE

## 2025-04-05 LAB
AMYLASE FLD-CCNC: 25 U/L
ANION GAP SERPL CALC-SCNC: 11 MMOL/L (ref 10–20)
BUN SERPL-MCNC: 12 MG/DL (ref 6–23)
CALCIUM SERPL-MCNC: 8.2 MG/DL (ref 8.6–10.6)
CHLORIDE SERPL-SCNC: 106 MMOL/L (ref 98–107)
CO2 SERPL-SCNC: 23 MMOL/L (ref 21–32)
CREAT SERPL-MCNC: 0.58 MG/DL (ref 0.5–1.3)
EGFRCR SERPLBLD CKD-EPI 2021: >90 ML/MIN/1.73M*2
ERYTHROCYTE [DISTWIDTH] IN BLOOD BY AUTOMATED COUNT: 14.6 % (ref 11.5–14.5)
GLUCOSE BLD MANUAL STRIP-MCNC: 115 MG/DL (ref 74–99)
GLUCOSE BLD MANUAL STRIP-MCNC: 126 MG/DL (ref 74–99)
GLUCOSE BLD MANUAL STRIP-MCNC: 193 MG/DL (ref 74–99)
GLUCOSE BLD MANUAL STRIP-MCNC: 99 MG/DL (ref 74–99)
GLUCOSE SERPL-MCNC: 119 MG/DL (ref 74–99)
HCT VFR BLD AUTO: 24.7 % (ref 41–52)
HGB BLD-MCNC: 7.7 G/DL (ref 13.5–17.5)
MAGNESIUM SERPL-MCNC: 1.88 MG/DL (ref 1.6–2.4)
MCH RBC QN AUTO: 32.4 PG (ref 26–34)
MCHC RBC AUTO-ENTMCNC: 31.2 G/DL (ref 32–36)
MCV RBC AUTO: 104 FL (ref 80–100)
NRBC BLD-RTO: 0 /100 WBCS (ref 0–0)
PLATELET # BLD AUTO: 135 X10*3/UL (ref 150–450)
POTASSIUM SERPL-SCNC: 3.7 MMOL/L (ref 3.5–5.3)
RBC # BLD AUTO: 2.38 X10*6/UL (ref 4.5–5.9)
SODIUM SERPL-SCNC: 136 MMOL/L (ref 136–145)
WBC # BLD AUTO: 6.1 X10*3/UL (ref 4.4–11.3)

## 2025-04-05 PROCEDURE — 82150 ASSAY OF AMYLASE: CPT | Performed by: NURSE PRACTITIONER

## 2025-04-05 PROCEDURE — 80048 BASIC METABOLIC PNL TOTAL CA: CPT | Performed by: NURSE PRACTITIONER

## 2025-04-05 PROCEDURE — 2500000004 HC RX 250 GENERAL PHARMACY W/ HCPCS (ALT 636 FOR OP/ED): Performed by: NURSE PRACTITIONER

## 2025-04-05 PROCEDURE — 71045 X-RAY EXAM CHEST 1 VIEW: CPT | Performed by: STUDENT IN AN ORGANIZED HEALTH CARE EDUCATION/TRAINING PROGRAM

## 2025-04-05 PROCEDURE — 2500000001 HC RX 250 WO HCPCS SELF ADMINISTERED DRUGS (ALT 637 FOR MEDICARE OP): Performed by: NURSE PRACTITIONER

## 2025-04-05 PROCEDURE — 83735 ASSAY OF MAGNESIUM: CPT | Performed by: NURSE PRACTITIONER

## 2025-04-05 PROCEDURE — 2500000004 HC RX 250 GENERAL PHARMACY W/ HCPCS (ALT 636 FOR OP/ED)

## 2025-04-05 PROCEDURE — 1200000002 HC GENERAL ROOM WITH TELEMETRY DAILY

## 2025-04-05 PROCEDURE — 71045 X-RAY EXAM CHEST 1 VIEW: CPT

## 2025-04-05 PROCEDURE — 82947 ASSAY GLUCOSE BLOOD QUANT: CPT

## 2025-04-05 PROCEDURE — 2500000002 HC RX 250 W HCPCS SELF ADMINISTERED DRUGS (ALT 637 FOR MEDICARE OP, ALT 636 FOR OP/ED): Performed by: NURSE PRACTITIONER

## 2025-04-05 PROCEDURE — 85027 COMPLETE CBC AUTOMATED: CPT | Performed by: NURSE PRACTITIONER

## 2025-04-05 PROCEDURE — 2500000005 HC RX 250 GENERAL PHARMACY W/O HCPCS: Performed by: NURSE PRACTITIONER

## 2025-04-05 RX ORDER — ONDANSETRON HYDROCHLORIDE 2 MG/ML
4 INJECTION, SOLUTION INTRAVENOUS EVERY 6 HOURS PRN
Status: DISCONTINUED | OUTPATIENT
Start: 2025-04-05 | End: 2025-04-06 | Stop reason: HOSPADM

## 2025-04-05 RX ADMIN — ACETAMINOPHEN 650 MG: 160 SOLUTION ORAL at 03:35

## 2025-04-05 RX ADMIN — PANTOPRAZOLE SODIUM 40 MG: 40 INJECTION, POWDER, FOR SOLUTION INTRAVENOUS at 06:04

## 2025-04-05 RX ADMIN — ONDANSETRON 4 MG: 2 INJECTION INTRAMUSCULAR; INTRAVENOUS at 20:42

## 2025-04-05 RX ADMIN — METOPROLOL TARTRATE 5 MG: 1 INJECTION, SOLUTION INTRAVENOUS at 18:54

## 2025-04-05 RX ADMIN — LIDOCAINE 4% 1 PATCH: 40 PATCH TOPICAL at 09:33

## 2025-04-05 RX ADMIN — OXYCODONE HYDROCHLORIDE 10 MG: 5 SOLUTION ORAL at 09:33

## 2025-04-05 RX ADMIN — METOPROLOL TARTRATE 5 MG: 1 INJECTION, SOLUTION INTRAVENOUS at 06:04

## 2025-04-05 RX ADMIN — HEPARIN SODIUM 5000 UNITS: 5000 INJECTION, SOLUTION INTRAVENOUS; SUBCUTANEOUS at 13:14

## 2025-04-05 RX ADMIN — IRON SUCROSE 300 MG: 20 INJECTION, SOLUTION INTRAVENOUS at 06:43

## 2025-04-05 RX ADMIN — METOPROLOL TARTRATE 5 MG: 1 INJECTION, SOLUTION INTRAVENOUS at 13:14

## 2025-04-05 RX ADMIN — LEVOTHYROXINE SODIUM ANHYDROUS 90 MCG: 100 INJECTION, POWDER, LYOPHILIZED, FOR SOLUTION INTRAVENOUS at 08:04

## 2025-04-05 RX ADMIN — METOPROLOL TARTRATE 5 MG: 1 INJECTION, SOLUTION INTRAVENOUS at 00:22

## 2025-04-05 RX ADMIN — HEPARIN SODIUM 5000 UNITS: 5000 INJECTION, SOLUTION INTRAVENOUS; SUBCUTANEOUS at 03:35

## 2025-04-05 RX ADMIN — INSULIN LISPRO 1 UNITS: 100 INJECTION, SOLUTION INTRAVENOUS; SUBCUTANEOUS at 13:14

## 2025-04-05 RX ADMIN — ACETAMINOPHEN 650 MG: 160 SOLUTION ORAL at 20:28

## 2025-04-05 RX ADMIN — FOLIC ACID 1 MG: 5 INJECTION, SOLUTION INTRAMUSCULAR; INTRAVENOUS; SUBCUTANEOUS at 09:33

## 2025-04-05 RX ADMIN — HEPARIN SODIUM 5000 UNITS: 5000 INJECTION, SOLUTION INTRAVENOUS; SUBCUTANEOUS at 20:26

## 2025-04-05 RX ADMIN — ACETAMINOPHEN 650 MG: 160 SOLUTION ORAL at 09:33

## 2025-04-05 ASSESSMENT — COGNITIVE AND FUNCTIONAL STATUS - GENERAL
MOBILITY SCORE: 18
STANDING UP FROM CHAIR USING ARMS: A LITTLE
DAILY ACTIVITIY SCORE: 18
MOVING FROM LYING ON BACK TO SITTING ON SIDE OF FLAT BED WITH BEDRAILS: A LITTLE
TOILETING: A LITTLE
TURNING FROM BACK TO SIDE WHILE IN FLAT BAD: A LITTLE
WALKING IN HOSPITAL ROOM: A LITTLE
DRESSING REGULAR UPPER BODY CLOTHING: A LITTLE
HELP NEEDED FOR BATHING: A LITTLE
MOVING TO AND FROM BED TO CHAIR: A LITTLE
DRESSING REGULAR LOWER BODY CLOTHING: A LITTLE
MOVING FROM LYING ON BACK TO SITTING ON SIDE OF FLAT BED WITH BEDRAILS: A LITTLE
MOVING TO AND FROM BED TO CHAIR: A LITTLE
TURNING FROM BACK TO SIDE WHILE IN FLAT BAD: A LITTLE
CLIMB 3 TO 5 STEPS WITH RAILING: A LITTLE
MOBILITY SCORE: 18
EATING MEALS: A LITTLE
CLIMB 3 TO 5 STEPS WITH RAILING: A LITTLE
STANDING UP FROM CHAIR USING ARMS: A LITTLE
PERSONAL GROOMING: A LITTLE
WALKING IN HOSPITAL ROOM: A LITTLE

## 2025-04-05 ASSESSMENT — PAIN SCALES - GENERAL
PAINLEVEL_OUTOF10: 4
PAINLEVEL_OUTOF10: 4

## 2025-04-05 ASSESSMENT — PAIN DESCRIPTION - LOCATION: LOCATION: ABDOMEN

## 2025-04-05 NOTE — CARE PLAN
Problem: Skin  Goal: Decreased wound size/increased tissue granulation at next dressing change  Outcome: Progressing  Goal: Participates in plan/prevention/treatment measures  Outcome: Progressing  Goal: Prevent/manage excess moisture  Outcome: Progressing  Goal: Prevent/minimize sheer/friction injuries  Outcome: Progressing  Goal: Promote/optimize nutrition  Outcome: Progressing  Goal: Promote skin healing  Outcome: Progressing     Problem: Diabetes  Goal: Achieve decreasing blood glucose levels by end of shift  Outcome: Progressing  Goal: Increase stability of blood glucose readings by end of shift  Outcome: Progressing  Goal: Decrease in ketones present in urine by end of shift  Outcome: Progressing  Goal: Maintain electrolyte levels within acceptable range throughout shift  Outcome: Progressing  Goal: Maintain glucose levels >70mg/dl to <250mg/dl throughout shift  Outcome: Progressing  Goal: No changes in neurological exam by end of shift  Outcome: Progressing  Goal: Learn about and adhere to nutrition recommendations by end of shift  Outcome: Progressing  Goal: Vital signs within normal range for age by end of shift  Outcome: Progressing  Goal: Increase self care and/or family involovement by end of shift  Outcome: Progressing  Goal: Receive DSME education by end of shift  Outcome: Progressing     Problem: Pain  Goal: Takes deep breaths with improved pain control throughout the shift  Outcome: Progressing  Goal: Turns in bed with improved pain control throughout the shift  Outcome: Progressing  Goal: Walks with improved pain control throughout the shift  Outcome: Progressing  Goal: Performs ADL's with improved pain control throughout shift  Outcome: Progressing  Goal: Participates in PT with improved pain control throughout the shift  Outcome: Progressing  Goal: Free from opioid side effects throughout the shift  Outcome: Progressing  Goal: Free from acute confusion related to pain meds throughout the  shift  Outcome: Progressing       The clinical goals for the shift include patient will remain hemodynamically stable throughout the shift

## 2025-04-05 NOTE — PROGRESS NOTES
"Thoracic Surgery Progress Note  4/5/2025    Timoteo Victoria is a 69 y.o. male with a history of esophageal cancer who is Now 5 Days Post-Op status post an esophagectomy.    Overnight issues: tolerating J tube feeds. Amylase 25.  CHAD 20 serosanguineous      Physical Exam:  General: He is a pleasant male currently in no distress, seen sitting up in bed.  Visit Vitals  /67 (BP Location: Right arm, Patient Position: Lying)   Pulse 71   Temp 36.9 °C (98.4 °F) (Temporal)   Resp 18   Ht 1.778 m (5' 10\")   Wt 91.2 kg (201 lb)   SpO2 96%   BMI 28.84 kg/m²   Smoking Status Every Day   BSA 2.12 m²     Body mass index is 28.84 kg/m².   HEENT: Normocephalic and atraumatic.   NECK: Supple. Trach midline. No JVD.  Cervical incision st betty. Well approx, no erythema or drg.  Cervical CHAD to bulb suction, sero sang drg.   CHEST: Breathing comfortably on room air;  previous CT site with occlusive dressing.   HEART: Regular rate and rhythm. NSR with rate in 70s per tele review.   ABDOMEN: Soft, ND, tender with palpation, J tube capped.   NEUROLOGIC: Alert and oriented. Grossly intact.   EXTREMITIES: Moves all extremities equally.  No lower extremity edema. No calf tenderness.     Diagnostics:     Intake/Output Summary (Last 24 hours) at 4/5/2025 1121  Last data filed at 4/5/2025 0948  Gross per 24 hour   Intake 975.4 ml   Output 160 ml   Net 815.4 ml     Results from last 7 days   Lab Units 04/05/25  0750 04/04/25  0653 04/03/25  0726   WBC AUTO x10*3/uL 6.1 5.7 8.6   HEMOGLOBIN g/dL 7.7* 7.3* 7.8*   HEMATOCRIT % 24.7* 23.8* 25.1*   PLATELETS AUTO x10*3/uL 135* 135* 135*     Results from last 7 days   Lab Units 04/05/25  0750 04/04/25  0653 04/03/25  0726   SODIUM mmol/L 136 138 133*   POTASSIUM mmol/L 3.7 3.5 3.5   CHLORIDE mmol/L 106 106 103   CO2 mmol/L 23 25 22   BUN mg/dL 12 13 15   CREATININE mg/dL 0.58 0.61 0.63   GLUCOSE mg/dL 119* 88 95   CALCIUM mg/dL 8.2* 8.0* 8.1*     Results from last 7 days   Lab Units " 04/05/25  0750 04/04/25  0653 04/03/25  0726   SODIUM mmol/L 136 138 133*   POTASSIUM mmol/L 3.7 3.5 3.5   CHLORIDE mmol/L 106 106 103   CO2 mmol/L 23 25 22   BUN mg/dL 12 13 15   CREATININE mg/dL 0.58 0.61 0.63   GLUCOSE mg/dL 119* 88 95   CALCIUM mg/dL 8.2* 8.0* 8.1*     Scheduled medications  acetaminophen, 650 mg, j-tube, q6h  folic acid, 1 mg, intravenous, Daily  heparin (porcine), 5,000 Units, subcutaneous, q8h  insulin lispro, 0-5 Units, subcutaneous, q6h  iron sucrose, 300 mg, intravenous, Daily  levothyroxine, 90 mcg, intravenous, q24h OZIEL  lidocaine, 1 patch, transdermal, q24h  lidocaine PF, 10 mL, infiltration, Once  metoprolol, 5 mg, intravenous, q6h  pantoprazole, 40 mg, intravenous, Daily before breakfast      Continuous medications       PRN medications  PRN medications: albuterol, bisacodyl, dextrose, dextrose, glucagon, glucagon, naloxone, oxyCODONE, oxyCODONE    Imaging:   AM CXR reviewed. Expected post op changes and cervical CHAD placement. Non dilated gastric conduit. No clinically significant pneumothorax. CXR remains stable when compared with previous exam. No formal report.     Assessment:  Timoteo Victoria is a 69 y.o. male with a history of esophageal cancer who is status post an esophagectomy with Dr. Salas on 3/31/2025. Satisfactory post op course.     4/1/2025: admitted to the ICU from the OR; transferred to the floor this morning  4/2/2025: Amylase 76. J tube dislodged overnight. Replaced by IR.   4/3/2025: Amylase 24. CT removed.   TF initiated via J.   4/4/2025: Amylase 23. Tolerating advancement of J tube feeds. Ice chips initiated.     Plan:  Neurology: post operative pain; h/o chronic pain, cluster headaches  -tylenol and oxycodone via J tube.   - lidocaine patches   -Bowel regimen available for constipation secondary to pain medication   -Out of bed to the chair throughout the day  -Encourage ambulation as tolerated  -Home meds: Wellbutrin 150mg BID, amitriptyline 100mg nightly,  baclofen 5mg TID. Has taken oxycodone and morphine at home recently.     Cardiovascular: h/o CABG, HTN, HLD  -Continue telemetry  -cont Q4h VS  -Continue metoprolol 5mg IV Q6h for post operative arrhythmia prophylaxis   -Replace electrolytes as needed for K >4, Mg >2  -Home meds: ASA 81mg daily, atorvastatin 40mg daily, losartan 50mg, spironolactone 50mg daily, verapamil ER 240mg,     Pulmonology: post op atelectasis, chest tube management, tobacco use  -Encourage incentive spirometer use every hour  -Continue pulmonary hygiene  -discontinued CT on AM rounds 4/3.   -Obtain daily CXR  -discussed tobacco cessation    Gastrointestinal: h/o esophageal cancer, s/p esophagectomy; j-tube management; CHAD drain management  -Clear liquid diet 4/5  -Isosource TF to goal via J tube   -maintain head of bed at 30 degrees  -Zofran available for nausea  -RN bowel regimen  -maintain CHAD to bulb sxn  -send amylase daily, 25 today from 23    -NG discontinue 4/2  - IR J tube replacement 4/2     Genitourinary:   -voiding  -Continue to monitor daily electrolytes with routine BMPs   -Adequate urine output    Infectious Disease: afebrile; no leukocytosis  -Continue to trend daily temperatures and WBC count to monitor for signs of post-operative infection   -Monitor surgical incisions for signs of infection   -Perioperative antibiotics completed     Hematology:  anemia  - add folic acid IV and IV venofer  -Monitor for signs of acute blood loss  -Trend CBCs     Endocrine: h/o hypothyroid  -resume synthroid (IV while NPO)    DVT Prophylaxis:   -Continue subcutaneous heparin and SCDs, early ambulation    Disposition:  -FLD on Sunday & likely PM discharge Sunday if amylase remains low after initiation of PO  -Continue to assess for home-going needs     Patient seen and examined with Dr. Rodriguez.     Patient discussed with Dr. Salas.     Trevor Simon MD  Thoracic Surgery, #[id number]

## 2025-04-05 NOTE — CARE PLAN
The patient's goals for the shift include rest    The clinical goals for the shift include rest    Problem: Discharge Planning  Goal: Discharge to home or other facility with appropriate resources  Outcome: Progressing     Problem: Chronic Conditions and Co-morbidities  Goal: Patient's chronic conditions and co-morbidity symptoms are monitored and maintained or improved  Outcome: Progressing     Problem: Nutrition  Goal: Nutrient intake appropriate for maintaining nutritional needs  Outcome: Progressing     Problem: Skin  Goal: Decreased wound size/increased tissue granulation at next dressing change  Outcome: Progressing  Goal: Participates in plan/prevention/treatment measures  Outcome: Progressing  Goal: Prevent/manage excess moisture  Outcome: Progressing  Goal: Prevent/minimize sheer/friction injuries  Outcome: Progressing  Goal: Promote/optimize nutrition  Outcome: Progressing  Goal: Promote skin healing  Outcome: Progressing     Problem: Diabetes  Goal: Achieve decreasing blood glucose levels by end of shift  Outcome: Progressing  Goal: Increase stability of blood glucose readings by end of shift  Outcome: Progressing  Goal: Decrease in ketones present in urine by end of shift  Outcome: Progressing  Goal: Maintain electrolyte levels within acceptable range throughout shift  Outcome: Progressing  Goal: Maintain glucose levels >70mg/dl to <250mg/dl throughout shift  Outcome: Progressing  Goal: No changes in neurological exam by end of shift  Outcome: Progressing  Goal: Learn about and adhere to nutrition recommendations by end of shift  Outcome: Progressing  Goal: Vital signs within normal range for age by end of shift  Outcome: Progressing  Goal: Increase self care and/or family involovement by end of shift  Outcome: Progressing  Goal: Receive DSME education by end of shift  Outcome: Progressing     Problem: Pain  Goal: Takes deep breaths with improved pain control throughout the shift  Outcome:  Progressing  Goal: Turns in bed with improved pain control throughout the shift  Outcome: Progressing  Goal: Walks with improved pain control throughout the shift  Outcome: Progressing  Goal: Performs ADL's with improved pain control throughout shift  Outcome: Progressing  Goal: Participates in PT with improved pain control throughout the shift  Outcome: Progressing  Goal: Free from opioid side effects throughout the shift  Outcome: Progressing  Goal: Free from acute confusion related to pain meds throughout the shift  Outcome: Progressing     Problem: Fall/Injury  Goal: Not fall by end of shift  Outcome: Progressing  Goal: Be free from injury by end of the shift  Outcome: Progressing  Goal: Verbalize understanding of personal risk factors for fall in the hospital  Outcome: Progressing  Goal: Verbalize understanding of risk factor reduction measures to prevent injury from fall in the home  Outcome: Progressing  Goal: Use assistive devices by end of the shift  Outcome: Progressing  Goal: Pace activities to prevent fatigue by end of the shift  Outcome: Progressing

## 2025-04-06 ENCOUNTER — APPOINTMENT (OUTPATIENT)
Dept: RADIOLOGY | Facility: HOSPITAL | Age: 70
DRG: 327 | End: 2025-04-06
Payer: MEDICARE

## 2025-04-06 VITALS
TEMPERATURE: 97.9 F | HEART RATE: 74 BPM | BODY MASS INDEX: 28.43 KG/M2 | RESPIRATION RATE: 18 BRPM | DIASTOLIC BLOOD PRESSURE: 71 MMHG | HEIGHT: 70 IN | OXYGEN SATURATION: 99 % | WEIGHT: 198.6 LBS | SYSTOLIC BLOOD PRESSURE: 128 MMHG

## 2025-04-06 LAB
AMYLASE FLD-CCNC: 31 U/L
ANION GAP SERPL CALC-SCNC: 13 MMOL/L (ref 10–20)
BUN SERPL-MCNC: 11 MG/DL (ref 6–23)
CALCIUM SERPL-MCNC: 8.3 MG/DL (ref 8.6–10.6)
CHLORIDE SERPL-SCNC: 106 MMOL/L (ref 98–107)
CO2 SERPL-SCNC: 21 MMOL/L (ref 21–32)
CREAT SERPL-MCNC: 0.61 MG/DL (ref 0.5–1.3)
EGFRCR SERPLBLD CKD-EPI 2021: >90 ML/MIN/1.73M*2
ERYTHROCYTE [DISTWIDTH] IN BLOOD BY AUTOMATED COUNT: 14.7 % (ref 11.5–14.5)
GLUCOSE BLD MANUAL STRIP-MCNC: 111 MG/DL (ref 74–99)
GLUCOSE BLD MANUAL STRIP-MCNC: 119 MG/DL (ref 74–99)
GLUCOSE SERPL-MCNC: 108 MG/DL (ref 74–99)
HCT VFR BLD AUTO: 26.1 % (ref 41–52)
HGB BLD-MCNC: 8.1 G/DL (ref 13.5–17.5)
MAGNESIUM SERPL-MCNC: 1.88 MG/DL (ref 1.6–2.4)
MCH RBC QN AUTO: 31.8 PG (ref 26–34)
MCHC RBC AUTO-ENTMCNC: 31 G/DL (ref 32–36)
MCV RBC AUTO: 102 FL (ref 80–100)
NRBC BLD-RTO: 0 /100 WBCS (ref 0–0)
PLATELET # BLD AUTO: 162 X10*3/UL (ref 150–450)
POTASSIUM SERPL-SCNC: 3.8 MMOL/L (ref 3.5–5.3)
RBC # BLD AUTO: 2.55 X10*6/UL (ref 4.5–5.9)
SODIUM SERPL-SCNC: 136 MMOL/L (ref 136–145)
WBC # BLD AUTO: 7.4 X10*3/UL (ref 4.4–11.3)

## 2025-04-06 PROCEDURE — 85027 COMPLETE CBC AUTOMATED: CPT | Performed by: NURSE PRACTITIONER

## 2025-04-06 PROCEDURE — 71045 X-RAY EXAM CHEST 1 VIEW: CPT

## 2025-04-06 PROCEDURE — 2500000004 HC RX 250 GENERAL PHARMACY W/ HCPCS (ALT 636 FOR OP/ED)

## 2025-04-06 PROCEDURE — 2500000004 HC RX 250 GENERAL PHARMACY W/ HCPCS (ALT 636 FOR OP/ED): Performed by: NURSE PRACTITIONER

## 2025-04-06 PROCEDURE — 82150 ASSAY OF AMYLASE: CPT | Performed by: NURSE PRACTITIONER

## 2025-04-06 PROCEDURE — 83735 ASSAY OF MAGNESIUM: CPT | Performed by: NURSE PRACTITIONER

## 2025-04-06 PROCEDURE — 80048 BASIC METABOLIC PNL TOTAL CA: CPT | Performed by: NURSE PRACTITIONER

## 2025-04-06 PROCEDURE — 2500000001 HC RX 250 WO HCPCS SELF ADMINISTERED DRUGS (ALT 637 FOR MEDICARE OP): Performed by: NURSE PRACTITIONER

## 2025-04-06 PROCEDURE — 2500000001 HC RX 250 WO HCPCS SELF ADMINISTERED DRUGS (ALT 637 FOR MEDICARE OP)

## 2025-04-06 PROCEDURE — 71045 X-RAY EXAM CHEST 1 VIEW: CPT | Performed by: STUDENT IN AN ORGANIZED HEALTH CARE EDUCATION/TRAINING PROGRAM

## 2025-04-06 PROCEDURE — 82947 ASSAY GLUCOSE BLOOD QUANT: CPT

## 2025-04-06 RX ORDER — MAGNESIUM SULFATE HEPTAHYDRATE 40 MG/ML
2 INJECTION, SOLUTION INTRAVENOUS ONCE
Status: COMPLETED | OUTPATIENT
Start: 2025-04-06 | End: 2025-04-06

## 2025-04-06 RX ORDER — MORPHINE SULFATE ORAL SOLUTION 10 MG/5ML
5 SOLUTION ORAL EVERY 6 HOURS PRN
Qty: 60 ML | Refills: 0 | Status: SHIPPED | OUTPATIENT
Start: 2025-04-06 | End: 2025-07-02 | Stop reason: HOSPADM

## 2025-04-06 RX ORDER — POTASSIUM CHLORIDE 1.5 G/1.58G
20 POWDER, FOR SOLUTION ORAL ONCE
Status: COMPLETED | OUTPATIENT
Start: 2025-04-06 | End: 2025-04-06

## 2025-04-06 RX ORDER — ACETAMINOPHEN 160 MG/5ML
650 SOLUTION ORAL EVERY 6 HOURS PRN
COMMUNITY
Start: 2025-04-06

## 2025-04-06 RX ORDER — OXYCODONE HCL 5 MG/5 ML
5 SOLUTION, ORAL ORAL EVERY 6 HOURS PRN
Qty: 100 ML | Refills: 0 | Status: SHIPPED | OUTPATIENT
Start: 2025-04-06 | End: 2025-04-06 | Stop reason: HOSPADM

## 2025-04-06 RX ORDER — MORPHINE SULFATE ORAL SOLUTION 10 MG/5ML
5 SOLUTION ORAL EVERY 6 HOURS PRN
Qty: 50 ML | Refills: 0 | Status: SHIPPED | OUTPATIENT
Start: 2025-04-06 | End: 2025-04-06

## 2025-04-06 RX ADMIN — METOPROLOL TARTRATE 5 MG: 1 INJECTION, SOLUTION INTRAVENOUS at 00:08

## 2025-04-06 RX ADMIN — FOLIC ACID 1 MG: 5 INJECTION, SOLUTION INTRAMUSCULAR; INTRAVENOUS; SUBCUTANEOUS at 09:12

## 2025-04-06 RX ADMIN — LEVOTHYROXINE SODIUM ANHYDROUS 90 MCG: 100 INJECTION, POWDER, LYOPHILIZED, FOR SOLUTION INTRAVENOUS at 09:23

## 2025-04-06 RX ADMIN — PANTOPRAZOLE SODIUM 40 MG: 40 INJECTION, POWDER, FOR SOLUTION INTRAVENOUS at 06:22

## 2025-04-06 RX ADMIN — MAGNESIUM SULFATE HEPTAHYDRATE 2 G: 40 INJECTION, SOLUTION INTRAVENOUS at 12:57

## 2025-04-06 RX ADMIN — ACETAMINOPHEN 650 MG: 160 SOLUTION ORAL at 02:29

## 2025-04-06 RX ADMIN — ACETAMINOPHEN 650 MG: 160 SOLUTION ORAL at 09:12

## 2025-04-06 RX ADMIN — ACETAMINOPHEN 650 MG: 160 SOLUTION ORAL at 16:50

## 2025-04-06 RX ADMIN — HEPARIN SODIUM 5000 UNITS: 5000 INJECTION, SOLUTION INTRAVENOUS; SUBCUTANEOUS at 05:35

## 2025-04-06 RX ADMIN — METOPROLOL TARTRATE 5 MG: 1 INJECTION, SOLUTION INTRAVENOUS at 05:35

## 2025-04-06 RX ADMIN — METOPROLOL TARTRATE 5 MG: 1 INJECTION, SOLUTION INTRAVENOUS at 12:57

## 2025-04-06 RX ADMIN — POTASSIUM CHLORIDE 20 MEQ: 1.5 POWDER, FOR SOLUTION ORAL at 12:57

## 2025-04-06 RX ADMIN — IRON SUCROSE 300 MG: 20 INJECTION, SOLUTION INTRAVENOUS at 05:35

## 2025-04-06 ASSESSMENT — PAIN - FUNCTIONAL ASSESSMENT: PAIN_FUNCTIONAL_ASSESSMENT: 0-10

## 2025-04-06 ASSESSMENT — COGNITIVE AND FUNCTIONAL STATUS - GENERAL
HELP NEEDED FOR BATHING: A LITTLE
TOILETING: A LITTLE
TURNING FROM BACK TO SIDE WHILE IN FLAT BAD: A LITTLE
DAILY ACTIVITIY SCORE: 18
DRESSING REGULAR LOWER BODY CLOTHING: A LITTLE
CLIMB 3 TO 5 STEPS WITH RAILING: A LITTLE
DRESSING REGULAR UPPER BODY CLOTHING: A LITTLE
MOVING FROM LYING ON BACK TO SITTING ON SIDE OF FLAT BED WITH BEDRAILS: A LITTLE
WALKING IN HOSPITAL ROOM: A LITTLE
MOBILITY SCORE: 18
PERSONAL GROOMING: A LITTLE
EATING MEALS: A LITTLE
MOVING TO AND FROM BED TO CHAIR: A LITTLE
STANDING UP FROM CHAIR USING ARMS: A LITTLE

## 2025-04-06 ASSESSMENT — PAIN SCALES - GENERAL
PAINLEVEL_OUTOF10: 6
PAINLEVEL_OUTOF10: 0 - NO PAIN

## 2025-04-06 ASSESSMENT — PAIN DESCRIPTION - LOCATION: LOCATION: ABDOMEN

## 2025-04-06 NOTE — DISCHARGE INSTRUCTIONS
"Diet: You can eat full liquids by mouth. Remain on full liquids by mouth until you see Dr. Salas in clinic. You can also continue tube feeds - before surgery, your Tube Feed Isosource 1.5 @ 105 mls/hr x 12 hours or @ 120mls/hr x ~10.5 hours for ~1250mls total volume. You can decrease this to roughly 75 mLs/hr X 12 hours or 85 mL/hr for 10.5 hours if you are also eating full liquids by mouth. If you feel bloated or nauseous, you can run your tube feeds continuously at 40 mL/hr for 24 hours.     Incision: OK to shower but no bathing/swimming.  Let soap and water run over incisions but do not scrub.     Chest tube site: You may have drainage from chest tube site.  It is common to see clear, yellow or light pink fluid from the site the first couple of days at home.  This should slowly resolve over a couple days.  It is rare to find thick, white/yellow fluid with a foul odor, redness and extreme tenderness around the chest tube site.  This would be more concerning so please call the office.    Pain medication: Take narcotic prescription pain medication (morphine) as needed for pain.  Make sure to take laxatives when taking these to ensure regular bowel movements.  For milder pain, can take over-the-counter tylenol and/or ibuprofen (alternate dosing is ideal).    During daytime hours, please call your surgeon's office with any new symptoms, questions, or concerning issues following surgery, or if during off hours, please call [facility] reception and ask for \"Thoracic Surgery on-\" [phone]     Dr. Salas's office number is as listed if you have questions or concerns:  [phone]   "

## 2025-04-06 NOTE — CARE PLAN
Problem: Discharge Planning  Goal: Discharge to home or other facility with appropriate resources  Outcome: Progressing     Problem: Chronic Conditions and Co-morbidities  Goal: Patient's chronic conditions and co-morbidity symptoms are monitored and maintained or improved  Outcome: Progressing     Problem: Nutrition  Goal: Nutrient intake appropriate for maintaining nutritional needs  Outcome: Progressing     Problem: Skin  Goal: Decreased wound size/increased tissue granulation at next dressing change  Outcome: Progressing  Goal: Participates in plan/prevention/treatment measures  Outcome: Progressing  Goal: Prevent/manage excess moisture  Outcome: Progressing  Goal: Prevent/minimize sheer/friction injuries  Outcome: Progressing  Goal: Promote/optimize nutrition  Outcome: Progressing  Goal: Promote skin healing  Outcome: Progressing     Problem: Diabetes  Goal: Achieve decreasing blood glucose levels by end of shift  Outcome: Progressing  Goal: Increase stability of blood glucose readings by end of shift  Outcome: Progressing  Goal: Decrease in ketones present in urine by end of shift  Outcome: Progressing  Goal: Maintain electrolyte levels within acceptable range throughout shift  Outcome: Progressing  Goal: Maintain glucose levels >70mg/dl to <250mg/dl throughout shift  Outcome: Progressing  Goal: No changes in neurological exam by end of shift  Outcome: Progressing  Goal: Learn about and adhere to nutrition recommendations by end of shift  Outcome: Progressing  Goal: Vital signs within normal range for age by end of shift  Outcome: Progressing  Goal: Increase self care and/or family involovement by end of shift  Outcome: Progressing  Goal: Receive DSME education by end of shift  Outcome: Progressing     Problem: Pain  Goal: Takes deep breaths with improved pain control throughout the shift  Outcome: Progressing  Goal: Turns in bed with improved pain control throughout the shift  Outcome: Progressing  Goal:  Walks with improved pain control throughout the shift  Outcome: Progressing  Goal: Performs ADL's with improved pain control throughout shift  Outcome: Progressing  Goal: Participates in PT with improved pain control throughout the shift  Outcome: Progressing  Goal: Free from opioid side effects throughout the shift  Outcome: Progressing  Goal: Free from acute confusion related to pain meds throughout the shift  Outcome: Progressing     Problem: Fall/Injury  Goal: Not fall by end of shift  Outcome: Progressing  Goal: Be free from injury by end of the shift  Outcome: Progressing  Goal: Verbalize understanding of personal risk factors for fall in the hospital  Outcome: Progressing  Goal: Verbalize understanding of risk factor reduction measures to prevent injury from fall in the home  Outcome: Progressing  Goal: Use assistive devices by end of the shift  Outcome: Progressing  Goal: Pace activities to prevent fatigue by end of the shift  Outcome: Progressing

## 2025-04-06 NOTE — DISCHARGE SUMMARY
Discharge Diagnosis  Malignant neoplasm of lower third of esophagus (Multi)    Issues Requiring Follow-Up  Postoperative follow up with thoracic surgery     Test Results Pending At Discharge  Pending Labs       Order Current Status    Surgical Pathology Exam In process            Hospital Course  Timoteo Victoria is a 69 y.o. male with a history of esophageal cancer who is status post an esophagectomy with Dr. Salas on 3/31/2025. Satisfactory post op course.      4/1/2025: left ICU, admitted to [facility]  4/2/2025: Amylase 76. J tube dislodged overnight. Replaced by IR.   4/3/2025: Amylase 24. CT removed.  TF initiated via J.   4/4/2025: Amylase 23. Tolerating advancement of J tube feeds. Ice chips initiated.   4/5/2025: Amylase 25. Tolerated further TF advancement and CLD.  4/6/2025: Amylase 31. CHAD drain removed. Patient started on FLD, tolerated well. Discharged home on full liquid diet, PRN liquid tylenol/morphine, and tube feeds via J tube.     Pertinent Physical Exam At Time of Discharge  Physical Exam  GEN: sitting up in bed, NAD  NECK: incision open to air, closed with staples, CHAD with scant SS output, removed bedside   RESP: breathing comfortably on room air   CV: well perfused, regular rate   ABD: soft, nontender  : no moore   NEURO: no focal deficits appreciated   PSYCH: appropriate       Home Medications     Medication List      START taking these medications     acetaminophen 650 mg/20.3 mL solution oral liquid; Commonly known as:   Tylenol; 20.3 mL (650 mg) by j-tube route every 6 hours if needed for   pain.   morphine 10 mg/5 mL solution; 2.5 mL (5 mg) by j-tube route every 6   hours if needed for severe pain (7 - 10).     CHANGE how you take these medications     verapamil 80 mg tablet; Commonly known as: Calan; 3 tablets (240 mg) by   j-tube route once daily.; What changed: additional instructions     CONTINUE taking these medications     amitriptyline 100 mg tablet; Commonly known as: Elavil; 1 tablet  (100   mg) by j-tube route once daily at bedtime.   aspirin 81 mg chewable tablet   atorvastatin 40 mg tablet; Commonly known as: Lipitor; 1 tablet (40 mg)   by j-tube route once daily.   buPROPion 75 mg tablet; Commonly known as: Wellbutrin; 2 tablets (150   mg) by j-tube route 2 times a day.   clobetasol 0.05 % cream; Commonly known as: Temovate; Apply small amount   topically to hands and feet two times daily. Do not use for more than 14   days.   levothyroxine 175 mcg tablet; Commonly known as: Synthroid, Levoxyl; 1   tablet (175 mcg) by j-tube route early in the morning.. Take on an empty   stomach at the same time each day, either 30 to 60 minutes prior to   breakfast   lidocaine-prilocaine 2.5-2.5 % cream; Commonly known as: Emla; Apply   topically to affected area 30-45 minutes before Mediport access.   losartan 50 mg tablet; Commonly known as: Cozaar; Take 1 tab daily via   j-tube.   ondansetron ODT 8 mg disintegrating tablet; Commonly known as:   Zofran-ODT; Dissolve 1 tablet (8 mg) in the mouth every 8 hours if needed   for nausea or vomiting.   prochlorperazine 10 mg tablet; Commonly known as: Compazine; Take 1   tablet (10 mg) by mouth every 6 hours if needed for nausea or vomiting.   spironolactone 50 mg tablet; Commonly known as: Aldactone; 1 tablet (50   mg) by j-tube route once daily.     STOP taking these medications     baclofen 5 mg tablet; Commonly known as: Lioresal   chlorhexidine 0.12 % solution; Commonly known as: Peridex   chlorhexidine 4 % external liquid; Commonly known as: Hibiclens   dexAMETHasone 4 mg tablet; Commonly known as: Decadron   gabapentin 50 mg/mL solution; Commonly known as: Neurontin   nicotine 21 mg/24 hr patch; Commonly known as: Nicoderm CQ       Outpatient Follow-Up  Future Appointments   Date Time Provider Department Center   4/24/2025 10:45 AM Fabio Salas MD [id number] [facility]   5/8/2025  3:20 PM Madan Wilson MD [id number] [facility]   6/17/2025  4:30 PM  Dafne Bedolla DO [id number] [facility]   8/12/2025  4:30 PM Dafne Bedolla DO [id number] [facility]       Liane Biggs MD

## 2025-04-14 LAB
LAB AP ASR DISCLAIMER: NORMAL
LAB AP BLOCK FOR ADDITIONAL STUDIES: NORMAL
LABORATORY COMMENT REPORT: NORMAL
PATH REPORT.FINAL DX SPEC: NORMAL
PATH REPORT.GROSS SPEC: NORMAL
PATH REPORT.RELEVANT HX SPEC: NORMAL
PATH REPORT.TOTAL CANCER: NORMAL
PATHOLOGY SYNOPTIC REPORT: NORMAL

## 2025-04-14 NOTE — PROGRESS NOTES
"Subjective   Timoteo Victoria  is a 69 y.o. male who presents for evaluation of esophageal cancer status post three field esophagectomy on 3/31/25.     This patient presents with a notable history of CAD (CABG 2005), HTN, HLD, chronic tobacco use, hypothyroidism presented on 11/25 as a transfer from [facility] for cancer workup. He had been having trouble swallowing for the past month, initially solids and progressing to liquids and medications. He has lost 40 lbs over ~6 months. EGD on 11/21/24 showed an esophageal mass with a malignant appearance, with biopsy showing intramucosal adenocarcinoma. The patient had a J tube placed on 11/25. Oncology was consulted for further management of this newly diagnosed esophageal adenocarcinoma and recommended outpatient PET. He was admitted to the hospital from 1/12/2025 to 1/18/2025 in the setting ofInfection, and during that time, had concerns about his feeding tube. The feeding tube was replaced.  He received neoadjuvant treatment.  His post neoadjuvant PET/CT did not suggest metastatic disease, and I recommended surgical resection.  He was taken the operating room for a 3-hole esophagectomy on 3/31/2025.  His postop course was unremarkable and he was discharged home on oral diet.    Currently the patient is presenting for post-operative evaluation. He is feeling \"good\". Not much pain. Breathing is \"good\". At home he is eating pudding and apple sauce without difficulty. He denies the following symptoms: chest pain, shortness of breath at rest, shortness of breath with activity, cough, hemoptysis, fevers, chills, weight loss, abdominal pain, changes to bowel function, and wound complications.      There have been no significant changes to their documented medical, surgical and family history.     He  reports that he has been smoking cigarettes. He started smoking about 46 years ago. He has a 45.3 pack-year smoking history. He has been exposed to tobacco smoke. He has never " used smokeless tobacco. He reports that he does not currently use alcohol. He reports that he does not use drugs.    Objective   Physical Exam  The patient is well-appearing and in no acute distress. The trachea is midline and there is no crepitus. The lungs were clear to auscultation, there was no dullness to percussion, no fremitus or egophony. There was good effort and excursion. The heart had a regular rate and rhythm. The abdomen was soft, nontender and nondistended. The extremities had no edema. Surgical incisions are healing well.  Diagnostic Studies  I reviewed a post-operative CXR which shows no significant pleural effusion or pneumothorax  I have reviewed a pathology result :    FINAL DIAGNOSIS   A. Lymph Node, Level 7, Excision:   -- Three lymph nodes, negative for carcinoma (0/3).     B. Esophagus and Stomach, Esophagogastrectomy:   -- Residual invasive adenocarcinoma, poorly differentiated, 3.6 cm, with treatment effect present (residual cancer showing evident tumor regression, but more than single cells or rare small groups of cancer cells (partial response, score 2).               -- The tumor invades adventitia and extends to less than 1 mm from the inked cauterized adventitia soft tissue / radial margin.               -- Lymphovascular including extramural large venous invasion identified.               -- Perineural invasion identified.               -- Metastatic carcinoma involving ten of seventeen lymph nodes (10/17).               -- Other surgical resection (proximal and distal) margins, negative for carcinoma (see final distal margin in Part C).  -- See note and synoptic report.     C. Additional Gastric Margin, Excision:   -- Portion of stomach with focal chronic inflammation, negative for carcinoma.  -- One lymph node, negative for carcinoma (0/1).     Note: Cytokeratin AE1/AE3 immunostain (Block B60) highlights neoplastic cells in lymphovascular space. Movat special stain (Block B38)  confirms extramural large venous invasion.     DNA mismatch repair protein immunohistochemical stains were performed on prior biopsy ([id number]).      Selective slides (B5, B8, B38, B45, B46, and B60) have been additionally reviewed by Dr. Vernell Ponce who concurs with the above diagnosis.   Electronically signed by Wilder Kowalski MD PhD on 4/14/2025 at 1721        By the signature on this report, the individual or group listed as making the Final Interpretation/Diagnosis certifies that they have reviewed this case.    Case Summary Report   ESOPHAGUS   8th Edition - Protocol posted: 6/22/2022ESOPHAGUS - All Specimens  SPECIMEN   Procedure  Esophagogastrectomy   TUMOR   Tumor Site  Distal esophagus (low thoracic esophagus)   Relationship of Tumor to Esophagogastric Junction  Tumor midpoint lies in the distal esophagus AND tumor involves the esophagogastric junction   Distance of Tumor Center from Esophagogastric Junction  1.8 cm   Histologic Type  Adenocarcinoma   Histologic Grade  G3, poorly differentiated, undifferentiated   Tumor Size  Greatest Dimension (Centimeters): 3.6 cm   Tumor Extent  Invades adventitia   Treatment Effect  Present, with residual cancer showing evident tumor regression, but more than single cells or rare small groups of cancer cells (partial response, score 2)   Lymphovascular Invasion  Present   Perineural Invasion  Present   MARGINS   Margin Status for Invasive Carcinoma  The tumor extends to less than 1 mm from the inked cauterized adventitia soft tissue / radial margin   Margin Status for Dysplasia and Intestinal Metaplasia  All margins negative for dysplasia   Margin Comment  Other surgical resection (proximal and distal) margins, negative for carcinoma   REGIONAL LYMPH NODES   Regional Lymph Node Status  Tumor present in regional lymph node(s)   Number of Lymph Nodes with Tumor  10   Number of Lymph Nodes Examined  21   PATHOLOGIC STAGE CLASSIFICATION (pTNM, AJCC 8th Edition)   Reporting  of pT, pN, and (when applicable) pM categories is based on information available to the pathologist at the time the report is issued. As per the AJCC (Chapter 1, 8th Ed.) it is the managing physician’s responsibility to establish the final pathologic stage based upon all pertinent information, including but potentially not limited to this pathology report.   TNM Descriptors  y (post-treatment)   pT Category  pT3   pN Category  pN3   Comment(s)  DNA mismatch repair protein immunohistochemical stains were performed on prior biopsy ([id number])   .          Assessment/Plan   I believe that the patient is recovering appropriately from their recent surgery.     The patient is recently status post surgical intervention.  I believe they are recovering appropriately.  Their clinical and/or radiographic presentation suggest no evidence of postoperative complications.  The patient is currently tolerating a diet appropriately, and I recommend he advance his diet over the next 4 weeks.  If he is able to tolerate regular diet and does not need to use his feeding tube, I will remove it in the office in 4 weeks    In regards to the patient's esophageal cancer, I believe his cancer was completely excised.  The margin is quite close at less than 1 mm, but I do believe this is a true negative margin.  The patient has had a suboptimal response to neoadjuvant treatment with persistent jose metastasis.  I believe it is reasonable to offer him additional treatment with immunotherapy, and I recommend he return to medical oncology for evaluation.    I recommend  advance diet, referral back to medical oncology, return to clinic 4 weeks    I discussed this in detail with the patient, including a discussion of alternatives. They were comfortable with this approach.     Fabio Salas MD  [phone]

## 2025-04-17 DIAGNOSIS — I10 BENIGN ESSENTIAL HYPERTENSION: Chronic | ICD-10-CM

## 2025-04-17 RX ORDER — SPIRONOLACTONE 50 MG/1
50 TABLET, FILM COATED ORAL DAILY
Qty: 90 TABLET | Refills: 1 | Status: SHIPPED | OUTPATIENT
Start: 2025-04-17

## 2025-04-17 NOTE — TELEPHONE ENCOUNTER
Pt called rx line @ 4:20pm requesting RF on Spironolactone. Pt out    Next OV 6/17/25  Pt compliant  Pt out  Please advise. Thanks. JW

## 2025-04-21 DIAGNOSIS — E03.9 HYPOTHYROIDISM, UNSPECIFIED TYPE: ICD-10-CM

## 2025-04-21 DIAGNOSIS — C15.9 PRIMARY ESOPHAGEAL ADENOCARCINOMA (MULTI): ICD-10-CM

## 2025-04-21 RX ORDER — BUPROPION HYDROCHLORIDE 75 MG/1
150 TABLET ORAL 2 TIMES DAILY
Qty: 360 TABLET | Refills: 1 | Status: SHIPPED | OUTPATIENT
Start: 2025-04-21

## 2025-04-21 RX ORDER — LEVOTHYROXINE SODIUM 175 UG/1
175 TABLET ORAL DAILY
Qty: 90 TABLET | Refills: 1 | Status: SHIPPED | OUTPATIENT
Start: 2025-04-21

## 2025-04-24 ENCOUNTER — OFFICE VISIT (OUTPATIENT)
Dept: SURGERY | Facility: HOSPITAL | Age: 70
End: 2025-04-24
Payer: MEDICARE

## 2025-04-24 ENCOUNTER — TELEPHONE (OUTPATIENT)
Dept: CARDIOTHORACIC SURGERY | Facility: HOSPITAL | Age: 70
End: 2025-04-24

## 2025-04-24 ENCOUNTER — HOSPITAL ENCOUNTER (OUTPATIENT)
Dept: RADIOLOGY | Facility: HOSPITAL | Age: 70
Discharge: HOME | End: 2025-04-24
Payer: MEDICARE

## 2025-04-24 VITALS
BODY MASS INDEX: 28.04 KG/M2 | DIASTOLIC BLOOD PRESSURE: 64 MMHG | RESPIRATION RATE: 18 BRPM | HEART RATE: 85 BPM | TEMPERATURE: 96.8 F | SYSTOLIC BLOOD PRESSURE: 138 MMHG | OXYGEN SATURATION: 100 % | WEIGHT: 195.4 LBS

## 2025-04-24 DIAGNOSIS — C15.9 MALIGNANT NEOPLASM OF ESOPHAGUS, UNSPECIFIED LOCATION (MULTI): ICD-10-CM

## 2025-04-24 PROCEDURE — 3075F SYST BP GE 130 - 139MM HG: CPT | Performed by: THORACIC SURGERY (CARDIOTHORACIC VASCULAR SURGERY)

## 2025-04-24 PROCEDURE — 1157F ADVNC CARE PLAN IN RCRD: CPT | Performed by: THORACIC SURGERY (CARDIOTHORACIC VASCULAR SURGERY)

## 2025-04-24 PROCEDURE — 3078F DIAST BP <80 MM HG: CPT | Performed by: THORACIC SURGERY (CARDIOTHORACIC VASCULAR SURGERY)

## 2025-04-24 PROCEDURE — 1126F AMNT PAIN NOTED NONE PRSNT: CPT | Performed by: THORACIC SURGERY (CARDIOTHORACIC VASCULAR SURGERY)

## 2025-04-24 PROCEDURE — 1159F MED LIST DOCD IN RCRD: CPT | Performed by: THORACIC SURGERY (CARDIOTHORACIC VASCULAR SURGERY)

## 2025-04-24 PROCEDURE — 99211 OFF/OP EST MAY X REQ PHY/QHP: CPT | Performed by: THORACIC SURGERY (CARDIOTHORACIC VASCULAR SURGERY)

## 2025-04-24 PROCEDURE — 71046 X-RAY EXAM CHEST 2 VIEWS: CPT

## 2025-04-24 PROCEDURE — 1111F DSCHRG MED/CURRENT MED MERGE: CPT | Performed by: THORACIC SURGERY (CARDIOTHORACIC VASCULAR SURGERY)

## 2025-04-24 ASSESSMENT — PAIN SCALES - GENERAL: PAINLEVEL_OUTOF10: 0-NO PAIN

## 2025-04-24 NOTE — PATIENT INSTRUCTIONS
Today:  Advance to soft solid foods.  (Anything you can cut with the side of a fork. ) Stay away from crusty breads and steak for now. Continue to eat frequent small meals. Be sure to drink plenty of fluids. Over the course of two weeks begin to eat more solid foods..     After 2 weeks you can stop the tube feeds as long as you are eating enough calories to maintain your weight.     Dr. Salas will see you in 4 weeks with Chest XRAY 30 minutes prior to remove your feeding tube.    Call the office with any difficulty swallowing or feelings of food getting stuck. [phone]

## 2025-04-24 NOTE — TELEPHONE ENCOUNTER
Pt called  and asked which method should he continue to take his pills, using his feeding tube or try to swallow them? Dr. Salas recommends he try swallowing them.  I left him a message to try swallowing , maybe starting with the easier ones and working his way up.  My chart message sent also.

## 2025-04-25 DIAGNOSIS — Z51.81 THERAPEUTIC DRUG MONITORING: ICD-10-CM

## 2025-05-08 ENCOUNTER — OFFICE VISIT (OUTPATIENT)
Dept: HEMATOLOGY/ONCOLOGY | Facility: HOSPITAL | Age: 70
End: 2025-05-08
Payer: MEDICARE

## 2025-05-08 ENCOUNTER — LAB (OUTPATIENT)
Dept: HEMATOLOGY/ONCOLOGY | Facility: HOSPITAL | Age: 70
End: 2025-05-08
Payer: MEDICARE

## 2025-05-08 VITALS
TEMPERATURE: 97 F | DIASTOLIC BLOOD PRESSURE: 64 MMHG | SYSTOLIC BLOOD PRESSURE: 137 MMHG | OXYGEN SATURATION: 100 % | RESPIRATION RATE: 18 BRPM | BODY MASS INDEX: 27.81 KG/M2 | WEIGHT: 193.78 LBS | HEART RATE: 90 BPM

## 2025-05-08 DIAGNOSIS — C15.9 PRIMARY ESOPHAGEAL ADENOCARCINOMA (MULTI): ICD-10-CM

## 2025-05-08 DIAGNOSIS — C15.9 PRIMARY ESOPHAGEAL ADENOCARCINOMA (MULTI): Primary | ICD-10-CM

## 2025-05-08 LAB
ALBUMIN SERPL BCP-MCNC: 4.2 G/DL (ref 3.4–5)
ALP SERPL-CCNC: 71 U/L (ref 33–136)
ALT SERPL W P-5'-P-CCNC: 19 U/L (ref 10–52)
ANION GAP SERPL CALC-SCNC: 14 MMOL/L (ref 10–20)
AST SERPL W P-5'-P-CCNC: 13 U/L (ref 9–39)
BASOPHILS # BLD AUTO: 0.04 X10*3/UL (ref 0–0.1)
BASOPHILS NFR BLD AUTO: 0.5 %
BILIRUB SERPL-MCNC: 0.4 MG/DL (ref 0–1.2)
BUN SERPL-MCNC: 27 MG/DL (ref 6–23)
CALCIUM SERPL-MCNC: 9.4 MG/DL (ref 8.6–10.3)
CHLORIDE SERPL-SCNC: 92 MMOL/L (ref 98–107)
CO2 SERPL-SCNC: 22 MMOL/L (ref 21–32)
CREAT SERPL-MCNC: 0.68 MG/DL (ref 0.5–1.3)
EGFRCR SERPLBLD CKD-EPI 2021: >90 ML/MIN/1.73M*2
EOSINOPHIL # BLD AUTO: 0.15 X10*3/UL (ref 0–0.7)
EOSINOPHIL NFR BLD AUTO: 1.8 %
ERYTHROCYTE [DISTWIDTH] IN BLOOD BY AUTOMATED COUNT: 13 % (ref 11.5–14.5)
GLUCOSE SERPL-MCNC: 107 MG/DL (ref 74–99)
HCT VFR BLD AUTO: 34.6 % (ref 41–52)
HGB BLD-MCNC: 11.7 G/DL (ref 13.5–17.5)
IMM GRANULOCYTES # BLD AUTO: 0.05 X10*3/UL (ref 0–0.7)
IMM GRANULOCYTES NFR BLD AUTO: 0.6 % (ref 0–0.9)
LYMPHOCYTES # BLD AUTO: 1.4 X10*3/UL (ref 1.2–4.8)
LYMPHOCYTES NFR BLD AUTO: 16.6 %
MCH RBC QN AUTO: 32.2 PG (ref 26–34)
MCHC RBC AUTO-ENTMCNC: 33.8 G/DL (ref 32–36)
MCV RBC AUTO: 95 FL (ref 80–100)
MONOCYTES # BLD AUTO: 0.79 X10*3/UL (ref 0.1–1)
MONOCYTES NFR BLD AUTO: 9.4 %
NEUTROPHILS # BLD AUTO: 5.98 X10*3/UL (ref 1.2–7.7)
NEUTROPHILS NFR BLD AUTO: 71.1 %
NRBC BLD-RTO: 0 /100 WBCS (ref 0–0)
PLATELET # BLD AUTO: 202 X10*3/UL (ref 150–450)
POTASSIUM SERPL-SCNC: 4.4 MMOL/L (ref 3.5–5.3)
PROT SERPL-MCNC: 7.3 G/DL (ref 6.4–8.2)
RBC # BLD AUTO: 3.63 X10*6/UL (ref 4.5–5.9)
SODIUM SERPL-SCNC: 124 MMOL/L (ref 136–145)
WBC # BLD AUTO: 8.4 X10*3/UL (ref 4.4–11.3)

## 2025-05-08 PROCEDURE — 1126F AMNT PAIN NOTED NONE PRSNT: CPT | Performed by: INTERNAL MEDICINE

## 2025-05-08 PROCEDURE — 36591 DRAW BLOOD OFF VENOUS DEVICE: CPT

## 2025-05-08 PROCEDURE — 3078F DIAST BP <80 MM HG: CPT | Performed by: INTERNAL MEDICINE

## 2025-05-08 PROCEDURE — 2500000004 HC RX 250 GENERAL PHARMACY W/ HCPCS (ALT 636 FOR OP/ED): Mod: JZ | Performed by: INTERNAL MEDICINE

## 2025-05-08 PROCEDURE — 99215 OFFICE O/P EST HI 40 MIN: CPT | Performed by: INTERNAL MEDICINE

## 2025-05-08 PROCEDURE — 85025 COMPLETE CBC W/AUTO DIFF WBC: CPT

## 2025-05-08 PROCEDURE — 3075F SYST BP GE 130 - 139MM HG: CPT | Performed by: INTERNAL MEDICINE

## 2025-05-08 PROCEDURE — 80053 COMPREHEN METABOLIC PANEL: CPT

## 2025-05-08 RX ORDER — HEPARIN 100 UNIT/ML
500 SYRINGE INTRAVENOUS AS NEEDED
OUTPATIENT
Start: 2025-05-08

## 2025-05-08 RX ORDER — HEPARIN SODIUM,PORCINE/PF 10 UNIT/ML
50 SYRINGE (ML) INTRAVENOUS AS NEEDED
OUTPATIENT
Start: 2025-05-08

## 2025-05-08 RX ORDER — HEPARIN 100 UNIT/ML
500 SYRINGE INTRAVENOUS AS NEEDED
Status: DISCONTINUED | OUTPATIENT
Start: 2025-05-08 | End: 2025-05-08 | Stop reason: HOSPADM

## 2025-05-08 RX ADMIN — HEPARIN 500 UNITS: 100 SYRINGE at 14:36

## 2025-05-08 ASSESSMENT — PAIN SCALES - GENERAL: PAINLEVEL_OUTOF10: 0-NO PAIN

## 2025-05-08 NOTE — PROGRESS NOTES
Patient ID: Timoteo Victoria is a 69 y.o. male.    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  Underwent esophagectomy on 3/31/2025  after 4 cycles of neoadjuvant FLOT.  Pathology showed residual tumor (pT3 pN3), close radial margin (less than 1 mm).  PET-avid parotid and thyroid nodule.    Genomic profile:  Normal MMR expresison    Assessment and Plan:  69M with CAD (CABG 2005), HTN, HLD, chronic tobacco use, hypothyroidism, presented with 4-6 weeks of progressive dysphagia (solid to liquid/mediations) 40lb wt loss over 6 months, and was found to have distal esophageal mass covering half of lumen on EGD 11/21/24  and Bx showed intramucosal adenocarcinoma with normal MMR expression. S/p J tube placement 11/25/2024.    PET-CT showed no distant mets.    We discussed FLOT for chemotherapy regimen with him in details. After a detailed discussion about the chemo, he consented and started cycle 1 on 1/2/25. Completed cycle 4 on 2-.  His post neoadjuvant therapy PET/CT which did not show any PET avid lesions suggestive of metastatic disease.  He underwent esophagectomy on March 31, 2025.  The pathology showed pT3 pN3 disease with close radial margin (less than 1 mm).      He is gradually recovering from the surgery.  His overall oral intake is improving.  I talked to him about starting adjuvant chemotherapy with FLOT x 4 cycles.  He is not well enough yet to receive chemotherapy.  I have requested an appointment on 5/29/2025 to reevaluate him for chemotherapy.    His radial margin is closed.  I have referred him to radiation oncology to discuss adjuvant radiation therapy.    Plan: Postoperative FLOT x 4 cycles and postoperative radiation therapy because of the close radial margin.    His prior PET/CT scans showed FDG avid parotid and thyroid nodules.  I have referred him to ENT for further workup.    Follow-up: 5/29/2025    I have placed all orders as  outlined above. I advised the patient to schedule the tests and follow-up appointment as discussed by contacting the  on the way out or calling by phone. Patient knows to call with any issues or concerns.     Providers:  Surgeon: Dr. Maritza Mariac: Dr Katie Apple:    Chief complaint: Esophageal adenocarcinoma    HPI:  Timoteo Victoria is a 69 y.o. male with a notable history of CAD (CABG 2005), HTN, HLD, chronic tobacco use, hypothyroidism presented on 11/25/2024 as a transfer from [facility] for cancer workup. He had been having trouble swallowing for the past month, initially solids and progressing to liquids and medications. He has lost 40 lbs in the past 6 months.     EGD on 11/21/24 showed an esophageal mass with a malignant appearance, with biopsy showing intramucosal adenocarcinoma. The patient had a J tube placed on 11/25.     ONCOLOGIC HISTORY-  11/21/24 [facility] admission for weight loss, dysphagia; EGD showed esophageal mass with biopsy confirming intramucosal adenocarcinoma  11/20/24 CT neck with no masses  11/22/24 CT CAP with contrast with irregular masslike thickening of distal esophagus, no obvious metastases  11/25/24 J tube placement.    12-: pet-ct showed-  IMPRESSION:  1. Hypermetabolic esophageal mass as described above is consistent  with biopsy-proven esophageal adenocarcinoma. Few minimally  hypermetabolic subcentimeter periesophageal lymph nodes are likely  reactive.  2. No other evidence of hypermetabolic thoracic or abdominal  lymphadenopathy or hypermetabolic metastatic disease.  3. Multiple hypermetabolic intraparotid nodules/lymph nodes, which  have been present since 2022, likely represents a benign and indolent  process. However, recommend continued attention on follow-up.  4. Asymmetrically increased hypermetabolic activity within the right  thyroid gland. Correlate with thyroid ultrasound may be of value.    1-2-2025: started FLOT-4.  Completed cycle 4 on  February 27, 2025.    March 11, 2025: CT scan has indeterminate lung lesions (7 mm).    March 14, 2025: PET-CT did not show any definitive metastatic lesion.    3-: Underwent esophagectomy.  Pathology showed the following-      Component    FINAL DIAGNOSIS   A. Lymph Node, Level 7, Excision:   -- Three lymph nodes, negative for carcinoma (0/3).     B. Esophagus and Stomach, Esophagogastrectomy:   -- Residual invasive adenocarcinoma, poorly differentiated, 3.6 cm, with treatment effect present (residual cancer showing evident tumor regression, but more than single cells or rare small groups of cancer cells (partial response, score 2).               -- The tumor invades adventitia and extends to less than 1 mm from the inked cauterized adventitia soft tissue / radial margin.               -- Lymphovascular including extramural large venous invasion identified.               -- Perineural invasion identified.               -- Metastatic carcinoma involving ten of seventeen lymph nodes (10/17).               -- Other surgical resection (proximal and distal) margins, negative for carcinoma (see final distal margin in Part C).  -- See note and synoptic report.     C. Additional Gastric Margin, Excision:   -- Portion of stomach with focal chronic inflammation, negative for carcinoma.  -- One lymph node, negative for carcinoma (0/1).     Note: Cytokeratin AE1/AE3 immunostain (Block B60) highlights neoplastic cells in lymphovascular space. Movat special stain (Block B38) confirms extramural large venous invasion.     DNA mismatch repair protein immunohistochemical stains were performed on prior biopsy ([id number]).      Selective slides (B5, B8, B38, B45, B46, and B60) have been additionally reviewed by Dr. Vernell Ponce who concurs with the above diagnosis.   Electronically signed by Wilder Kowalski MD PhD on 4/14/2025 at 1721        By the signature on this report, the individual or group listed as making the Final  Interpretation/Diagnosis certifies that they have reviewed this case.    Case Summary Report   ESOPHAGUS   8th Edition - Protocol posted: 6/22/2022ESOPHAGUS - All Specimens  SPECIMEN   Procedure  Esophagogastrectomy   TUMOR   Tumor Site  Distal esophagus (low thoracic esophagus)   Relationship of Tumor to Esophagogastric Junction  Tumor midpoint lies in the distal esophagus AND tumor involves the esophagogastric junction   Distance of Tumor Center from Esophagogastric Junction  1.8 cm   Histologic Type  Adenocarcinoma   Histologic Grade  G3, poorly differentiated, undifferentiated   Tumor Size  Greatest Dimension (Centimeters): 3.6 cm   Tumor Extent  Invades adventitia   Treatment Effect  Present, with residual cancer showing evident tumor regression, but more than single cells or rare small groups of cancer cells (partial response, score 2)   Lymphovascular Invasion  Present   Perineural Invasion  Present   MARGINS   Margin Status for Invasive Carcinoma  The tumor extends to less than 1 mm from the inked cauterized adventitia soft tissue / radial margin   Margin Status for Dysplasia and Intestinal Metaplasia  All margins negative for dysplasia   Margin Comment  Other surgical resection (proximal and distal) margins, negative for carcinoma   REGIONAL LYMPH NODES   Regional Lymph Node Status  Tumor present in regional lymph node(s)   Number of Lymph Nodes with Tumor  10   Number of Lymph Nodes Examined  21   PATHOLOGIC STAGE CLASSIFICATION (pTNM, AJCC 8th Edition)   Reporting of pT, pN, and (when applicable) pM categories is based on information available to the pathologist at the time the report is issued. As per the AJCC (Chapter 1, 8th Ed.) it is the managing physician’s responsibility to establish the final pathologic stage based upon all pertinent information, including but potentially not limited to this pathology report.   TNM Descriptors  y (post-treatment)   pT Category  pT3   pN Category  pN3   Comment(s)   DNA mismatch repair protein immunohistochemical stains were performed on prior biopsy ([id number])             Interval history:   His swallowing is gradually improving.  Denies any significant pain.  He is up and about without any help.  Has some fatigue. Currently denies: fevers, chills, chest pain, SOB, cough, N/V, bowel changes, urinary symptoms.    Past Medical History:   Past Medical History:  No date: CAD (coronary artery disease)  No date: Esophageal carcinoma  No date: Hyperlipidemia  No date: Hypertension  No date: Hypothyroidism   Surgical History:    Past Surgical History:   Procedure Laterality Date    CATARACT EXTRACTION Right 08/25/2023    CHOLECYSTECTOMY      CORONARY ARTERY BYPASS GRAFT  2005    4-vessel    ESOPHAGUS SURGERY  03/31/2025    Three field esophagectomy    HERNIA REPAIR        Family History:    Family History   Problem Relation Name Age of Onset    COPD Mother       Family Oncology History:    Cancer-related family history is not on file.  Social History:    Social History     Tobacco Use    Smoking status: Every Day     Current packs/day: 0.25     Average packs/day: 1 pack/day for 46.3 years (45.3 ttl pk-yrs)     Types: Cigarettes     Start date: 1979     Passive exposure: Current    Smokeless tobacco: Never    Tobacco comments:     Pt is down to 2 cigarettes.   Vaping Use    Vaping status: Never Used   Substance Use Topics    Alcohol use: Not Currently    Drug use: Never        Allergies  Allergies   Allergen Reactions    Ibuprofen Swelling        Medications  Current Outpatient Medications   Medication Instructions    acetaminophen (TYLENOL) 650 mg, j-tube, Every 6 hours PRN    amitriptyline (ELAVIL) 100 mg, j-tube, Nightly    aspirin 81 mg, Daily    atorvastatin (LIPITOR) 40 mg, j-tube, Daily    buPROPion (WELLBUTRIN) 150 mg, j-tube, 2 times daily    clobetasol (Temovate) 0.05 % cream Apply small amount topically to hands and feet two times daily. Do not use for more than 14 days.     levothyroxine (SYNTHROID, LEVOXYL) 175 mcg, j-tube, Daily, Take on an empty stomach at the same time each day, either 30 to 60 minutes prior to breakfast    lidocaine-prilocaine (Emla) 2.5-2.5 % cream Apply topically to affected area 30-45 minutes before Mediport access.    losartan (Cozaar) 50 mg tablet Take 1 tab daily via j-tube.    morphine 5 mg, j-tube, Every 6 hours PRN    ondansetron ODT (ZOFRAN-ODT) 8 mg, oral, Every 8 hours PRN    prochlorperazine (COMPAZINE) 10 mg, oral, Every 6 hours PRN    spironolactone (ALDACTONE) 50 mg, j-tube, Daily    verapamil (CALAN) 240 mg, j-tube, Daily          Objective   VS: /64 (BP Location: Left arm, Patient Position: Sitting, BP Cuff Size: Adult)   Pulse 90   Temp 36.1 °C (97 °F) (Temporal)   Resp 18   Wt 87.9 kg (193 lb 12.6 oz)   SpO2 100%   BMI 27.81 kg/m²     PHYSICAL EXAMINATION  ECOG PS- 1  Alert, ambulant, and answers questions appropriately.  Eyes- No pallor or icterus.  Neck- no swelling, lymph node enlargement or thyroid gland enlargement.  Chest- Bilateral good air entry. No crackles/ronchi.  Heart- no audible abnormal heart sounds.  Abdomen- Soft, non-tender. No mass or ascites.  Extremities- no swelling or pitting edema.    Labs  Results from last 7 days   Lab Units 05/08/25  1435   WBC AUTO x10*3/uL 8.4   HEMOGLOBIN g/dL 11.7*   HEMATOCRIT % 34.6*   PLATELETS AUTO x10*3/uL 202   NEUTROS ABS x10*3/uL 5.98   LYMPHS ABS AUTO x10*3/uL 1.40   MONOS ABS AUTO x10*3/uL 0.79   EOS ABS AUTO x10*3/uL 0.15   NEUTROS PCT AUTO % 71.1   LYMPHS PCT AUTO % 16.6   MONOS PCT AUTO % 9.4   EOS PCT AUTO % 1.8     Results from last 7 days   Lab Units 05/08/25  1435   GLUCOSE mg/dL 107*   SODIUM mmol/L 124*   POTASSIUM mmol/L 4.4   CHLORIDE mmol/L 92*   CO2 mmol/L 22   BUN mg/dL 27*   CREATININE mg/dL 0.68   EGFR mL/min/1.73m*2 >90   CALCIUM mg/dL 9.4   ALBUMIN g/dL 4.2   PROTEIN TOTAL g/dL 7.3   BILIRUBIN TOTAL mg/dL 0.4   ALK PHOS U/L 71   ALT U/L 19   AST U/L 13      Image  EGD (11/21/2024)  Single malignant-appearing and invasive mass (not traversable) in the lower third of the esophagus, covering one half of the circumference; performed cold forceps biopsy     A. Esophagus, Distal, Mass, Biopsy:  -- Intramucosal adenocarcinoma. See note.     Note: The endoscopic impression of a friable and invasive lesion in the lower third of the esophagus is noted. This biopsy may be not representative of the entire lesion. Clinical correlation is recommended.    MISMATCH REPAIR PROTEIN EXPRESSION                    Protein:           Result                 MLH-1:             Expression Present                                                   PMS-2:            Expression Present                                                   MSH-2:            Expression Present                                                   MSH-6:            Expression Present                                       INTERPRETATION: Neoplasm with normal mismatch repair protein expression.     C/A/P CT (11/22/2024)  IMPRESSION:  1. Irregular masslike thickening of the mid to distal esophagus is  difficult to accurately measure, and may represent a neoplastic  process. Correlate with recent endoscopic imaging and biopsy results.  There is also proximal esophageal wall thickening, and a moderate  size hiatal hernia.  2. Small area of centrilobular nodularity in the right middle lobe  may represent focal aspiration/mucoid impaction or less likely  atypical infection.  3. Mild apical predominant centrilobular emphysema.  4. Severe coronary artery calcifications. Please note this exam is  not optimized for evaluation of the coronary arteries.  5. Nonobstructing 0.4 cm calculus in the left kidney.    Neck CT (11/20/2024)  IMPRESSION:  New maxillary periapical lucencies with adjacent mucosal thickening.  No discrete fluid collection identified. Dental follow-up recommended.      No discrete mass or cervical lymphadenopathy  identified however  direct visualization suggested.      Bilateral parotid mass/nodule similar to prior imaging.      Postsurgical changes, emphysematous changes, mild mediastinal  lymphadenopathy and additional findings as detailed.  This note was created using a voice recognition system ( the Dragon dictation system). Inaccuracies and misspellings are unintentional.     Madan Wilson MD.

## 2025-05-12 NOTE — PROGRESS NOTES
"Subjective   Timoteo Victoria  is a 69 y.o. male who presents for evaluation of esophageal cancer status post three field esophagectomy on 3/31/25.      This patient presents with a notable history of CAD (CABG 2005), HTN, HLD, chronic tobacco use, hypothyroidism presented on 11/25 as a transfer from [facility] for cancer workup. He had been having trouble swallowing for the past month, initially solids and progressing to liquids and medications. He has lost 40 lbs over ~6 months. EGD on 11/21/24 showed an esophageal mass with a malignant appearance, with biopsy showing intramucosal adenocarcinoma. The patient had a J tube placed on 11/25. Oncology was consulted for further management of this newly diagnosed esophageal adenocarcinoma and recommended outpatient PET. He was admitted to the hospital from 1/12/2025 to 1/18/2025 in the setting ofInfection, and during that time, had concerns about his feeding tube. The feeding tube was replaced.  He received neoadjuvant treatment.  His post neoadjuvant PET/CT did not suggest metastatic disease, and I recommended surgical resection.  He was taken the operating room for a 3-hole esophagectomy on 3/31/2025.  His postop course was unremarkable and he was discharged home on oral diet.    Currently the patient is presenting for routine follow-up.  The patient was admitted to the hospital after a fall on 5/17/2025.  This was in the setting of weight loss and feeling weak. He does note that \"Dry\" food can sometimes get stuck.  He denies the following symptoms: chest pain, shortness of breath at rest, shortness of breath with activity, cough, hemoptysis, fevers, chills, and weight loss.      There have been no significant changes to their documented medical, surgical and family history.     He  reports that he has been smoking cigarettes. He started smoking about 46 years ago. He has a 45.3 pack-year smoking history. He has been exposed to tobacco smoke. He has never used " smokeless tobacco. He reports that he does not currently use alcohol. He reports that he does not use drugs.    Objective   Physical Exam  The patient is well-appearing and in no acute distress. The trachea is midline and there is no crepitus. The lungs were clear to auscultation, there was no dullness to percussion, no fremitus or egophony. There was good effort and excursion. The heart had a regular rate and rhythm. The abdomen was soft, nontender and nondistended. The extremities had no edema. Surgical incisions are healing well.   Diagnostic Studies  I reviewed the test results described in the HPI - CT scan reviewed    Assessment/Plan   I believe that the patient needs ongoing nutritional support so.     This patient's recent hospital administration and weight loss since his last visit with me suggest he has inadequate oral intake.  In this setting, I advised him to continue to use his feeding tube for nutritional support until he was able to take more by mouth.      I recommend clinic visit in 4 weeks    I discussed this in detail with the patient, including a discussion of alternatives. They were comfortable with this approach.     Fabio Salas MD  [phone]

## 2025-05-13 ENCOUNTER — APPOINTMENT (OUTPATIENT)
Dept: HEMATOLOGY/ONCOLOGY | Facility: CLINIC | Age: 70
End: 2025-05-13
Payer: MEDICARE

## 2025-05-17 ENCOUNTER — APPOINTMENT (OUTPATIENT)
Dept: RADIOLOGY | Facility: HOSPITAL | Age: 70
DRG: 964 | End: 2025-05-17
Payer: MEDICARE

## 2025-05-17 ENCOUNTER — HOSPITAL ENCOUNTER (INPATIENT)
Facility: HOSPITAL | Age: 70
LOS: 1 days | Discharge: HOME | DRG: 964 | End: 2025-05-19
Attending: STUDENT IN AN ORGANIZED HEALTH CARE EDUCATION/TRAINING PROGRAM | Admitting: STUDENT IN AN ORGANIZED HEALTH CARE EDUCATION/TRAINING PROGRAM
Payer: MEDICARE

## 2025-05-17 ENCOUNTER — APPOINTMENT (OUTPATIENT)
Dept: CARDIOLOGY | Facility: HOSPITAL | Age: 70
DRG: 964 | End: 2025-05-17
Payer: MEDICARE

## 2025-05-17 DIAGNOSIS — S00.83XA FACIAL CONTUSION, INITIAL ENCOUNTER: ICD-10-CM

## 2025-05-17 DIAGNOSIS — E03.9 HYPOTHYROIDISM, UNSPECIFIED TYPE: ICD-10-CM

## 2025-05-17 DIAGNOSIS — S36.039A LACERATION OF SPLEEN, INITIAL ENCOUNTER: ICD-10-CM

## 2025-05-17 DIAGNOSIS — E87.1 HYPONATREMIA: ICD-10-CM

## 2025-05-17 DIAGNOSIS — S32.010A COMPRESSION FRACTURE OF L1 VERTEBRA, INITIAL ENCOUNTER (MULTI): ICD-10-CM

## 2025-05-17 DIAGNOSIS — I10 BENIGN ESSENTIAL HYPERTENSION: Chronic | ICD-10-CM
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
## 2025-05-17 DIAGNOSIS — F33.1 DEPRESSION, MAJOR, RECURRENT, MODERATE: ICD-10-CM

## 2025-05-17 DIAGNOSIS — S51.012A SKIN TEAR OF LEFT ELBOW WITHOUT COMPLICATION, INITIAL ENCOUNTER: ICD-10-CM

## 2025-05-17 LAB
ALBUMIN SERPL BCP-MCNC: 4.2 G/DL (ref 3.4–5)
ALP SERPL-CCNC: 96 U/L (ref 33–136)
ALT SERPL W P-5'-P-CCNC: 16 U/L (ref 10–52)
ANION GAP SERPL CALC-SCNC: 13 MMOL/L (ref 10–20)
APPEARANCE UR: CLEAR
AST SERPL W P-5'-P-CCNC: 13 U/L (ref 9–39)
BASOPHILS # BLD AUTO: 0.04 X10*3/UL (ref 0–0.1)
BASOPHILS NFR BLD AUTO: 0.5 %
BILIRUB SERPL-MCNC: 0.5 MG/DL (ref 0–1.2)
BILIRUB UR STRIP.AUTO-MCNC: NEGATIVE MG/DL
BUN SERPL-MCNC: 12 MG/DL (ref 6–23)
CALCIUM SERPL-MCNC: 9.7 MG/DL (ref 8.6–10.3)
CARDIAC TROPONIN I PNL SERPL HS: 4 NG/L (ref 0–20)
CARDIAC TROPONIN I PNL SERPL HS: 6 NG/L (ref 0–20)
CHLORIDE SERPL-SCNC: 97 MMOL/L (ref 98–107)
CO2 SERPL-SCNC: 20 MMOL/L (ref 21–32)
COLOR UR: ABNORMAL
CREAT SERPL-MCNC: 0.89 MG/DL (ref 0.5–1.3)
CREAT UR-MCNC: 45.3 MG/DL (ref 20–370)
EGFRCR SERPLBLD CKD-EPI 2021: >90 ML/MIN/1.73M*2
EOSINOPHIL # BLD AUTO: 0.11 X10*3/UL (ref 0–0.7)
EOSINOPHIL NFR BLD AUTO: 1.5 %
ERYTHROCYTE [DISTWIDTH] IN BLOOD BY AUTOMATED COUNT: 12.5 % (ref 11.5–14.5)
GLUCOSE SERPL-MCNC: 106 MG/DL (ref 74–99)
GLUCOSE UR STRIP.AUTO-MCNC: NORMAL MG/DL
HCT VFR BLD AUTO: 37.4 % (ref 41–52)
HGB BLD-MCNC: 12.9 G/DL (ref 13.5–17.5)
IMM GRANULOCYTES # BLD AUTO: 0.04 X10*3/UL (ref 0–0.7)
IMM GRANULOCYTES NFR BLD AUTO: 0.5 % (ref 0–0.9)
KETONES UR STRIP.AUTO-MCNC: NEGATIVE MG/DL
LACTATE SERPL-SCNC: 1 MMOL/L (ref 0.4–2)
LEUKOCYTE ESTERASE UR QL STRIP.AUTO: NEGATIVE
LYMPHOCYTES # BLD AUTO: 1.26 X10*3/UL (ref 1.2–4.8)
LYMPHOCYTES NFR BLD AUTO: 16.8 %
MAGNESIUM SERPL-MCNC: 1.75 MG/DL (ref 1.6–2.4)
MCH RBC QN AUTO: 31.5 PG (ref 26–34)
MCHC RBC AUTO-ENTMCNC: 34.5 G/DL (ref 32–36)
MCV RBC AUTO: 91 FL (ref 80–100)
MONOCYTES # BLD AUTO: 0.53 X10*3/UL (ref 0.1–1)
MONOCYTES NFR BLD AUTO: 7 %
NEUTROPHILS # BLD AUTO: 5.54 X10*3/UL (ref 1.2–7.7)
NEUTROPHILS NFR BLD AUTO: 73.7 %
NITRITE UR QL STRIP.AUTO: NEGATIVE
NRBC BLD-RTO: 0 /100 WBCS (ref 0–0)
PH UR STRIP.AUTO: 6.5 [PH]
PLATELET # BLD AUTO: 199 X10*3/UL (ref 150–450)
POTASSIUM SERPL-SCNC: 4 MMOL/L (ref 3.5–5.3)
PROT SERPL-MCNC: 7.1 G/DL (ref 6.4–8.2)
PROT UR STRIP.AUTO-MCNC: NEGATIVE MG/DL
RBC # BLD AUTO: 4.1 X10*6/UL (ref 4.5–5.9)
RBC # UR STRIP.AUTO: NEGATIVE MG/DL
SODIUM SERPL-SCNC: 126 MMOL/L (ref 136–145)
SODIUM UR-SCNC: 24 MMOL/L
SODIUM/CREAT UR-RTO: 53 MMOL/G CREAT
SP GR UR STRIP.AUTO: 1.04
UROBILINOGEN UR STRIP.AUTO-MCNC: NORMAL MG/DL
WBC # BLD AUTO: 7.5 X10*3/UL (ref 4.4–11.3)

## 2025-05-17 PROCEDURE — 71275 CT ANGIOGRAPHY CHEST: CPT | Performed by: STUDENT IN AN ORGANIZED HEALTH CARE EDUCATION/TRAINING PROGRAM

## 2025-05-17 PROCEDURE — 2500000004 HC RX 250 GENERAL PHARMACY W/ HCPCS (ALT 636 FOR OP/ED): Mod: JZ | Performed by: NURSE PRACTITIONER

## 2025-05-17 PROCEDURE — 72125 CT NECK SPINE W/O DYE: CPT

## 2025-05-17 PROCEDURE — 74177 CT ABD & PELVIS W/CONTRAST: CPT

## 2025-05-17 PROCEDURE — 73080 X-RAY EXAM OF ELBOW: CPT | Mod: LT

## 2025-05-17 PROCEDURE — 81003 URINALYSIS AUTO W/O SCOPE: CPT | Performed by: NURSE PRACTITIONER

## 2025-05-17 PROCEDURE — 84484 ASSAY OF TROPONIN QUANT: CPT | Performed by: NURSE PRACTITIONER

## 2025-05-17 PROCEDURE — 70450 CT HEAD/BRAIN W/O DYE: CPT | Performed by: STUDENT IN AN ORGANIZED HEALTH CARE EDUCATION/TRAINING PROGRAM

## 2025-05-17 PROCEDURE — 83930 ASSAY OF BLOOD OSMOLALITY: CPT | Mod: PORLAB

## 2025-05-17 PROCEDURE — 72125 CT NECK SPINE W/O DYE: CPT | Performed by: STUDENT IN AN ORGANIZED HEALTH CARE EDUCATION/TRAINING PROGRAM

## 2025-05-17 PROCEDURE — 83735 ASSAY OF MAGNESIUM: CPT | Performed by: NURSE PRACTITIONER

## 2025-05-17 PROCEDURE — 99285 EMERGENCY DEPT VISIT HI MDM: CPT | Mod: 25 | Performed by: STUDENT IN AN ORGANIZED HEALTH CARE EDUCATION/TRAINING PROGRAM

## 2025-05-17 PROCEDURE — 83605 ASSAY OF LACTIC ACID: CPT | Performed by: NURSE PRACTITIONER

## 2025-05-17 PROCEDURE — 70450 CT HEAD/BRAIN W/O DYE: CPT

## 2025-05-17 PROCEDURE — 93005 ELECTROCARDIOGRAM TRACING: CPT

## 2025-05-17 PROCEDURE — 84300 ASSAY OF URINE SODIUM: CPT

## 2025-05-17 PROCEDURE — 85025 COMPLETE CBC W/AUTO DIFF WBC: CPT | Performed by: NURSE PRACTITIONER

## 2025-05-17 PROCEDURE — 99223 1ST HOSP IP/OBS HIGH 75: CPT

## 2025-05-17 PROCEDURE — 74177 CT ABD & PELVIS W/CONTRAST: CPT | Performed by: STUDENT IN AN ORGANIZED HEALTH CARE EDUCATION/TRAINING PROGRAM

## 2025-05-17 PROCEDURE — 96376 TX/PRO/DX INJ SAME DRUG ADON: CPT

## 2025-05-17 PROCEDURE — 73080 X-RAY EXAM OF ELBOW: CPT | Mod: LEFT SIDE | Performed by: RADIOLOGY

## 2025-05-17 PROCEDURE — 96361 HYDRATE IV INFUSION ADD-ON: CPT

## 2025-05-17 PROCEDURE — 72131 CT LUMBAR SPINE W/O DYE: CPT | Performed by: STUDENT IN AN ORGANIZED HEALTH CARE EDUCATION/TRAINING PROGRAM

## 2025-05-17 PROCEDURE — 2500000005 HC RX 250 GENERAL PHARMACY W/O HCPCS: Performed by: NURSE PRACTITIONER

## 2025-05-17 PROCEDURE — 96374 THER/PROPH/DIAG INJ IV PUSH: CPT

## 2025-05-17 PROCEDURE — 73030 X-RAY EXAM OF SHOULDER: CPT | Mod: LEFT SIDE | Performed by: RADIOLOGY

## 2025-05-17 PROCEDURE — 72129 CT CHEST SPINE W/DYE: CPT | Performed by: STUDENT IN AN ORGANIZED HEALTH CARE EDUCATION/TRAINING PROGRAM

## 2025-05-17 PROCEDURE — 70486 CT MAXILLOFACIAL W/O DYE: CPT

## 2025-05-17 PROCEDURE — 80053 COMPREHEN METABOLIC PANEL: CPT | Performed by: NURSE PRACTITIONER

## 2025-05-17 PROCEDURE — 96375 TX/PRO/DX INJ NEW DRUG ADDON: CPT

## 2025-05-17 PROCEDURE — 76377 3D RENDER W/INTRP POSTPROCES: CPT | Mod: CCI

## 2025-05-17 PROCEDURE — 71275 CT ANGIOGRAPHY CHEST: CPT

## 2025-05-17 PROCEDURE — 83935 ASSAY OF URINE OSMOLALITY: CPT | Mod: PORLAB

## 2025-05-17 PROCEDURE — 70486 CT MAXILLOFACIAL W/O DYE: CPT | Performed by: STUDENT IN AN ORGANIZED HEALTH CARE EDUCATION/TRAINING PROGRAM

## 2025-05-17 PROCEDURE — 73030 X-RAY EXAM OF SHOULDER: CPT | Mod: LT

## 2025-05-17 PROCEDURE — 2550000001 HC RX 255 CONTRASTS: Mod: JZ | Performed by: STUDENT IN AN ORGANIZED HEALTH CARE EDUCATION/TRAINING PROGRAM

## 2025-05-17 RX ORDER — SPIRONOLACTONE 25 MG/1
50 TABLET ORAL DAILY
Status: DISCONTINUED | OUTPATIENT
Start: 2025-05-18 | End: 2025-05-19 | Stop reason: HOSPADM

## 2025-05-17 RX ORDER — NAPROXEN SODIUM 220 MG/1
81 TABLET, FILM COATED ORAL DAILY
Status: DISCONTINUED | OUTPATIENT
Start: 2025-05-18 | End: 2025-05-19 | Stop reason: HOSPADM

## 2025-05-17 RX ORDER — ATORVASTATIN CALCIUM 40 MG/1
40 TABLET, FILM COATED ORAL NIGHTLY
Status: DISCONTINUED | OUTPATIENT
Start: 2025-05-17 | End: 2025-05-19 | Stop reason: HOSPADM

## 2025-05-17 RX ORDER — ENOXAPARIN SODIUM 100 MG/ML
40 INJECTION SUBCUTANEOUS EVERY 24 HOURS
Status: DISCONTINUED | OUTPATIENT
Start: 2025-05-18 | End: 2025-05-19 | Stop reason: HOSPADM

## 2025-05-17 RX ORDER — AMITRIPTYLINE HYDROCHLORIDE 25 MG/1
100 TABLET, FILM COATED ORAL NIGHTLY
Status: DISCONTINUED | OUTPATIENT
Start: 2025-05-17 | End: 2025-05-19 | Stop reason: HOSPADM

## 2025-05-17 RX ORDER — LOSARTAN POTASSIUM 50 MG/1
50 TABLET ORAL DAILY
Status: DISCONTINUED | OUTPATIENT
Start: 2025-05-18 | End: 2025-05-19 | Stop reason: HOSPADM

## 2025-05-17 RX ORDER — ACETAMINOPHEN 325 MG/1
650 TABLET ORAL EVERY 6 HOURS
Status: DISCONTINUED | OUTPATIENT
Start: 2025-05-18 | End: 2025-05-19 | Stop reason: HOSPADM

## 2025-05-17 RX ORDER — POLYETHYLENE GLYCOL 3350 17 G/17G
17 POWDER, FOR SOLUTION ORAL DAILY PRN
Status: DISCONTINUED | OUTPATIENT
Start: 2025-05-17 | End: 2025-05-19 | Stop reason: HOSPADM

## 2025-05-17 RX ORDER — VERAPAMIL HYDROCHLORIDE 80 MG/1
240 TABLET ORAL DAILY
Status: CANCELLED | OUTPATIENT
Start: 2025-05-18

## 2025-05-17 RX ORDER — BUPROPION HYDROCHLORIDE 100 MG/1
150 TABLET ORAL 2 TIMES DAILY
Status: DISCONTINUED | OUTPATIENT
Start: 2025-05-17 | End: 2025-05-19 | Stop reason: HOSPADM

## 2025-05-17 RX ORDER — ACETAMINOPHEN 325 MG/1
650 TABLET ORAL EVERY 4 HOURS PRN
Status: DISCONTINUED | OUTPATIENT
Start: 2025-05-17 | End: 2025-05-19 | Stop reason: HOSPADM

## 2025-05-17 RX ORDER — GUAIFENESIN 600 MG/1
600 TABLET, EXTENDED RELEASE ORAL EVERY 12 HOURS PRN
Status: DISCONTINUED | OUTPATIENT
Start: 2025-05-17 | End: 2025-05-19 | Stop reason: HOSPADM

## 2025-05-17 RX ORDER — BACITRACIN ZINC 500 UNIT/G
OINTMENT IN PACKET (EA) TOPICAL ONCE
Status: COMPLETED | OUTPATIENT
Start: 2025-05-17 | End: 2025-05-17

## 2025-05-17 RX ORDER — ONDANSETRON HYDROCHLORIDE 2 MG/ML
4 INJECTION, SOLUTION INTRAVENOUS ONCE
Status: COMPLETED | OUTPATIENT
Start: 2025-05-17 | End: 2025-05-17

## 2025-05-17 RX ORDER — OXYCODONE HYDROCHLORIDE 10 MG/1
10 TABLET ORAL EVERY 4 HOURS PRN
Status: DISCONTINUED | OUTPATIENT
Start: 2025-05-17 | End: 2025-05-19 | Stop reason: HOSPADM

## 2025-05-17 RX ORDER — SODIUM CHLORIDE 9 MG/ML
100 INJECTION, SOLUTION INTRAVENOUS CONTINUOUS
Status: DISCONTINUED | OUTPATIENT
Start: 2025-05-17 | End: 2025-05-17

## 2025-05-17 RX ORDER — TALC
3 POWDER (GRAM) TOPICAL NIGHTLY PRN
Status: DISCONTINUED | OUTPATIENT
Start: 2025-05-17 | End: 2025-05-19 | Stop reason: HOSPADM

## 2025-05-17 RX ORDER — ONDANSETRON HYDROCHLORIDE 2 MG/ML
4 INJECTION, SOLUTION INTRAVENOUS EVERY 6 HOURS PRN
Status: DISCONTINUED | OUTPATIENT
Start: 2025-05-17 | End: 2025-05-19 | Stop reason: HOSPADM

## 2025-05-17 RX ORDER — OXYCODONE HYDROCHLORIDE 5 MG/1
5 TABLET ORAL EVERY 4 HOURS PRN
Status: DISCONTINUED | OUTPATIENT
Start: 2025-05-17 | End: 2025-05-19 | Stop reason: HOSPADM

## 2025-05-17 RX ORDER — FENTANYL CITRATE 50 UG/ML
50 INJECTION, SOLUTION INTRAMUSCULAR; INTRAVENOUS EVERY 2 HOUR PRN
Status: DISPENSED | OUTPATIENT
Start: 2025-05-17 | End: 2025-05-17

## 2025-05-17 RX ADMIN — FENTANYL CITRATE 50 MCG: 50 INJECTION INTRAMUSCULAR; INTRAVENOUS at 17:29

## 2025-05-17 RX ADMIN — ONDANSETRON 4 MG: 2 INJECTION, SOLUTION INTRAMUSCULAR; INTRAVENOUS at 17:29

## 2025-05-17 RX ADMIN — BACITRACIN 1 APPLICATION: 500 OINTMENT TOPICAL at 17:30

## 2025-05-17 RX ADMIN — FENTANYL CITRATE 50 MCG: 50 INJECTION INTRAMUSCULAR; INTRAVENOUS at 20:11

## 2025-05-17 RX ADMIN — IOHEXOL 75 ML: 350 INJECTION, SOLUTION INTRAVENOUS at 18:59

## 2025-05-17 RX ADMIN — SODIUM CHLORIDE 1000 ML: 0.9 INJECTION, SOLUTION INTRAVENOUS at 17:29

## 2025-05-17 ASSESSMENT — ENCOUNTER SYMPTOMS
HEMATURIA: 0
CONSTIPATION: 0
SHORTNESS OF BREATH: 1
WEAKNESS: 0
COUGH: 0
DIARRHEA: 0
WOUND: 0
APPETITE CHANGE: 1
FEVER: 0
ABDOMINAL PAIN: 0
TREMORS: 0
BACK PAIN: 0
HEADACHES: 0
LIGHT-HEADEDNESS: 1
WHEEZING: 0
CHILLS: 0
NAUSEA: 0
VOMITING: 0
PALPITATIONS: 0
CHEST TIGHTNESS: 0
FLANK PAIN: 0
JOINT SWELLING: 0
ACTIVITY CHANGE: 1
FATIGUE: 0

## 2025-05-17 ASSESSMENT — PAIN DESCRIPTION - LOCATION: LOCATION: RIB CAGE

## 2025-05-17 ASSESSMENT — PAIN SCALES - GENERAL
PAINLEVEL_OUTOF10: 10 - WORST POSSIBLE PAIN
PAINLEVEL_OUTOF10: 8

## 2025-05-17 ASSESSMENT — LIFESTYLE VARIABLES
TOTAL SCORE: 0
EVER HAD A DRINK FIRST THING IN THE MORNING TO STEADY YOUR NERVES TO GET RID OF A HANGOVER: NO
EVER FELT BAD OR GUILTY ABOUT YOUR DRINKING: NO
HAVE PEOPLE ANNOYED YOU BY CRITICIZING YOUR DRINKING: NO
HAVE YOU EVER FELT YOU SHOULD CUT DOWN ON YOUR DRINKING: NO

## 2025-05-17 ASSESSMENT — VISUAL ACUITY: OU: 1

## 2025-05-17 ASSESSMENT — PAIN - FUNCTIONAL ASSESSMENT
PAIN_FUNCTIONAL_ASSESSMENT: 0-10
PAIN_FUNCTIONAL_ASSESSMENT: 0-10

## 2025-05-17 NOTE — ED PROVIDER NOTES
HPI   Chief Complaint   Patient presents with    Fall     Pt has increased falls. Pt fell last night and hit his head. Pt states he did not pass out. Pt is not on blood thinners. Pt has a hx of cancer.     Rib Injury     Left sided rib pain. Pain on a deep breath    Arm Injury     Pt has abrasions to left arm       Patient presents the emergency department for evaluation of increased falls and status post fall last night.  Patient had walked down to the mailbox last night and upon getting in the house, his legs just gave out before he could get all the way to the chair to sit down.  He states he did not pass out.  He did strike his head and face.  He denies being on any anticoagulants.  He also struck his left arm and torso in which he has concerns for a broken rib or two.  He believes his tetanus vaccine is up-to-date.  He did incur some skin tears to his left elbow in which he put a dressing to the area and the bleeding did stop.  He does have some painful movements of the left arm due to his injury.  He also notices pain in the left upper abdomen and lower ribs.  He has not noticed an increase in shortness of breath but it does hurt to take a deep breath since his injury but not beforehand.  He believes his weakness may be secondary to the 13 pound weight loss since his follow-up with Dr. Salas on 4-24-25.  On that date, they decided to stop using the G-tube and start oral diet.  Patient has been steadily losing weight since that day.  He does have a follow-up appointment this coming Thursday to remove the G-tube however he thinks that might be a bad idea.  Patient overall feels weak, falling and has concerned in regards to his weight loss.      History provided by:  Patient and relative   used: No                          [place] Coma Scale Score: 15                  Patient History   Medical History[1]  Surgical History[2]  Family History[3]  Social History[4]    Physical Exam   Visit  "Vitals  /80   Pulse 72   Temp 37.2 °C (98.9 °F)   Resp 16   Ht 1.778 m (5' 10\")   Wt 81.6 kg (180 lb)   SpO2 100%   BMI 25.83 kg/m²   Smoking Status Every Day   BSA 2.01 m²      Physical Exam  Vitals reviewed.   Constitutional:       General: He is not in acute distress.     Appearance: He is not toxic-appearing.      Comments: Patient has obvious outward sign of trauma to the left elbow and to the face.   HENT:      Head: Normocephalic. Abrasion and contusion present.        Right Ear: Hearing and external ear normal.      Left Ear: Hearing and external ear normal.      Nose: Nose normal. No nasal deformity or signs of injury.      Mouth/Throat:      Lips: Pink.      Mouth: Mucous membranes are moist.      Pharynx: Oropharynx is clear.   Eyes:      General: Lids are normal. Vision grossly intact.      Pupils: Pupils are equal, round, and reactive to light.   Cardiovascular:      Rate and Rhythm: Normal rate and regular rhythm.      Pulses:           Radial pulses are 2+ on the left side.   Pulmonary:      Effort: Pulmonary effort is normal.      Breath sounds: Normal breath sounds.   Chest:      Chest wall: Tenderness present. No crepitus.       Abdominal:      Palpations: Abdomen is soft. There is no splenomegaly or pulsatile mass.      Tenderness: There is abdominal tenderness in the epigastric area and left upper quadrant. There is guarding. There is no right CVA tenderness or left CVA tenderness.       Musculoskeletal:      Cervical back: Normal range of motion and neck supple. No spinous process tenderness.      Comments: Patient has no tenderness to the cervical, thoracic or lumbar spine.  He does have tenderness to the left shoulder and elbow.  There are skin tears to the radial head of the left elbow.  No tenderness to the mid forearm and distally through the left upper extremity.  Strong radial pulse.  No bony tenderness to the right upper extremity, pelvis or bilateral lower extremities.   Skin:     " General: Skin is warm and dry.      Capillary Refill: Capillary refill takes less than 2 seconds.      Findings: Abrasion (left zygoma), ecchymosis (inferior left eye) and wound (skin tear left elbow) present.   Neurological:      Mental Status: He is alert and oriented to person, place, and time.      Sensory: Sensation is intact.      Motor: Motor function is intact.      Coordination: Coordination is intact.   Psychiatric:         Behavior: Behavior is cooperative.         CT abdomen pelvis w IV contrast   Final Result   CT ABDOMEN AND PELVIS:   1. A new 3.5 x 2.5 linear hypodensity is present in the medial spleen   in the interim since prior exam in March of 2025, suspicious for a   new splenic laceration, although there is no significant adjacent   free fluid/hemorrhage.   2. Slight fat stranding is present in the presacral space, with a   new/acute minimally displaced fracture of the S4 vertebral body.   3. Surgical changes of esophagectomy with gastric pull-through, with   the left anterior percutaneous jejunostomy tube in place.        CT LUMBAR SPINE:   1. New/acute compression fracture along the superior and inferior   endplate of L1 with upwards of 30-40% height loss, but without   significant bony retropulsion.   2. No other acute trauma is identified in the lumbar spine.   3. Multilevel degenerative changes of the lumbar spine with least   moderate stenosis suspected in the spinal canal at L3-L4 and L4-L5   due to disc osteophyte complex and ligamentum flavum thickening and   moderate to severe neural foraminal stenosis suspected at several   levels.        MACRO:   None.        Signed by: Jeffrey Lares 5/17/2025 8:39 PM   Dictation workstation:   [id number]      CT head wo IV contrast   Final Result   CT HEAD:   1. No evidence of hemorrhage, skull fracture, or other acute   intracranial trauma/abnormality.   2. Low volume of the attenuation changes present in the   periventricular and  subcortical white matter of bilateral cerebral   hemispheres is nonspecific, and favored to represent chronic sequela   of microvascular disease.        CT FACIAL BONES:   1. Mild left periorbital soft tissue swelling and edema, likely   posttraumatic, without evidence of acute orbital, facial bone or   nasal bone fracture.   2. Numerous missing teeth, with large dental cavities and periapical   lucencies present in the remaining teeth, with several areas of   cortical dehiscence/erosion is present in the outer cortex of the   maxilla, although no significant inflammatory changes are present in   the adjacent soft tissues. Correlate with dental exam.        CT C-SPINE:   1. No evidence of acute trauma to the cervical spine.   2. Spondylotic changes of the cervical spine as described above.   3. Mildly hyperdense, 2 cm mass in the left parotid gland is   unchanged in appearance to prior exam in July of 2022.        MACRO:   None        Signed by: Jeffrey Lares 5/17/2025 7:59 PM   Dictation workstation:   [id number]      CT angio chest for pulmonary embolism   Final Result   Addendum (preliminary) 1 of 1   Interpreted By:  Jeffrey Lares,    ADDENDUM:   Subtle new buckling is present within the inner table of the 6th and   7th left anterior rib at the costochondral junction (series 6, image   199 and 232), new since prior exam on 03/11/2025, likely representing   acute nondisplaced rib fractures.        Signed by: Jeffrey Lares 5/17/2025 8:21 PM        -------- ORIGINAL REPORT --------   Dictation workstation:   [id number]      Final   CTA CHEST:   1. No evidence of pulmonary embolism or acute trauma to the thorax.   No displaced rib fractures are identified.   2. Changes of esophagectomy with gastric pull-through, new since   prior exam in March of 2025. The gastric conduit in the mediastinum   appears slightly edematous and is fluid-filled, although there is no   evidence of adjacent fluid  collections, pneumomediastinum or reactive   soft tissue changes.   3. Upper lobe predominant paraseptal and centrilobular emphysema with   some mild atelectasis in the lung bases without evidence of new   consolidation, pleural effusion or other acute cardiopulmonary   abnormality.        CT THORACIC SPINE:   1. No evidence of acute trauma to the thoracic spine.   2. Multilevel insufficiency endplate changes with numerous Schmorl's   nodes are similar in appearance to prior exam in March of 2025.        MACRO:   None        Signed by: Jeffrey Lares 5/17/2025 8:12 PM   Dictation workstation:   [id number]      CT thoracic spine retrospective reconstruction protocol   Final Result   Addendum (preliminary) 1 of 1   Interpreted By:  Jeffrey Lares,    ADDENDUM:   Subtle new buckling is present within the inner table of the 6th and   7th left anterior rib at the costochondral junction (series 6, image   199 and 232), new since prior exam on 03/11/2025, likely representing   acute nondisplaced rib fractures.        Signed by: Jeffrey Lares 5/17/2025 8:21 PM        -------- ORIGINAL REPORT --------   Dictation workstation:   [id number]      Final   CTA CHEST:   1. No evidence of pulmonary embolism or acute trauma to the thorax.   No displaced rib fractures are identified.   2. Changes of esophagectomy with gastric pull-through, new since   prior exam in March of 2025. The gastric conduit in the mediastinum   appears slightly edematous and is fluid-filled, although there is no   evidence of adjacent fluid collections, pneumomediastinum or reactive   soft tissue changes.   3. Upper lobe predominant paraseptal and centrilobular emphysema with   some mild atelectasis in the lung bases without evidence of new   consolidation, pleural effusion or other acute cardiopulmonary   abnormality.        CT THORACIC SPINE:   1. No evidence of acute trauma to the thoracic spine.   2. Multilevel insufficiency  endplate changes with numerous Schmorl's   nodes are similar in appearance to prior exam in March of 2025.        MACRO:   None        Signed by: Jeffrey Lares 5/17/2025 8:12 PM   Dictation workstation:   [id number]      CT lumbar spine retrospective reconstruction protocol   Final Result   CT ABDOMEN AND PELVIS:   1. A new 3.5 x 2.5 linear hypodensity is present in the medial spleen   in the interim since prior exam in March of 2025, suspicious for a   new splenic laceration, although there is no significant adjacent   free fluid/hemorrhage.   2. Slight fat stranding is present in the presacral space, with a   new/acute minimally displaced fracture of the S4 vertebral body.   3. Surgical changes of esophagectomy with gastric pull-through, with   the left anterior percutaneous jejunostomy tube in place.        CT LUMBAR SPINE:   1. New/acute compression fracture along the superior and inferior   endplate of L1 with upwards of 30-40% height loss, but without   significant bony retropulsion.   2. No other acute trauma is identified in the lumbar spine.   3. Multilevel degenerative changes of the lumbar spine with least   moderate stenosis suspected in the spinal canal at L3-L4 and L4-L5   due to disc osteophyte complex and ligamentum flavum thickening and   moderate to severe neural foraminal stenosis suspected at several   levels.        MACRO:   None.        Signed by: Jeffrey Lares 5/17/2025 8:39 PM   Dictation workstation:   [id number]      CT 3D reconstruction   Final Result   CT HEAD:   1. No evidence of hemorrhage, skull fracture, or other acute   intracranial trauma/abnormality.   2. Low volume of the attenuation changes present in the   periventricular and subcortical white matter of bilateral cerebral   hemispheres is nonspecific, and favored to represent chronic sequela   of microvascular disease.        CT FACIAL BONES:   1. Mild left periorbital soft tissue swelling and edema, likely    posttraumatic, without evidence of acute orbital, facial bone or   nasal bone fracture.   2. Numerous missing teeth, with large dental cavities and periapical   lucencies present in the remaining teeth, with several areas of   cortical dehiscence/erosion is present in the outer cortex of the   maxilla, although no significant inflammatory changes are present in   the adjacent soft tissues. Correlate with dental exam.        CT C-SPINE:   1. No evidence of acute trauma to the cervical spine.   2. Spondylotic changes of the cervical spine as described above.   3. Mildly hyperdense, 2 cm mass in the left parotid gland is   unchanged in appearance to prior exam in July of 2022.        MACRO:   None        Signed by: Jeffrey Lares 5/17/2025 7:59 PM   Dictation workstation:   [id number]      CT maxillofacial bones wo IV contrast   Final Result   CT HEAD:   1. No evidence of hemorrhage, skull fracture, or other acute   intracranial trauma/abnormality.   2. Low volume of the attenuation changes present in the   periventricular and subcortical white matter of bilateral cerebral   hemispheres is nonspecific, and favored to represent chronic sequela   of microvascular disease.        CT FACIAL BONES:   1. Mild left periorbital soft tissue swelling and edema, likely   posttraumatic, without evidence of acute orbital, facial bone or   nasal bone fracture.   2. Numerous missing teeth, with large dental cavities and periapical   lucencies present in the remaining teeth, with several areas of   cortical dehiscence/erosion is present in the outer cortex of the   maxilla, although no significant inflammatory changes are present in   the adjacent soft tissues. Correlate with dental exam.        CT C-SPINE:   1. No evidence of acute trauma to the cervical spine.   2. Spondylotic changes of the cervical spine as described above.   3. Mildly hyperdense, 2 cm mass in the left parotid gland is   unchanged in appearance to prior  exam in July of 2022.        MACRO:   None        Signed by: Jeffrey Lares 5/17/2025 7:59 PM   Dictation workstation:   [id number]      CT cervical spine wo IV contrast   Final Result   CT HEAD:   1. No evidence of hemorrhage, skull fracture, or other acute   intracranial trauma/abnormality.   2. Low volume of the attenuation changes present in the   periventricular and subcortical white matter of bilateral cerebral   hemispheres is nonspecific, and favored to represent chronic sequela   of microvascular disease.        CT FACIAL BONES:   1. Mild left periorbital soft tissue swelling and edema, likely   posttraumatic, without evidence of acute orbital, facial bone or   nasal bone fracture.   2. Numerous missing teeth, with large dental cavities and periapical   lucencies present in the remaining teeth, with several areas of   cortical dehiscence/erosion is present in the outer cortex of the   maxilla, although no significant inflammatory changes are present in   the adjacent soft tissues. Correlate with dental exam.        CT C-SPINE:   1. No evidence of acute trauma to the cervical spine.   2. Spondylotic changes of the cervical spine as described above.   3. Mildly hyperdense, 2 cm mass in the left parotid gland is   unchanged in appearance to prior exam in July of 2022.        MACRO:   None        Signed by: Jeffrey Lares 5/17/2025 7:59 PM   Dictation workstation:   [id number]      XR shoulder left 2+ views   Final Result   Degenerative changes without osseous injury evident.        MACRO:   None        Signed by: Tye Banks 5/17/2025 5:51 PM   Dictation workstation:   [id number]      XR elbow left 3+ views   Final Result   No osseous injury is evident.        MACRO:   None        Signed by: Tye Banks 5/17/2025 5:50 PM   Dictation workstation:   [id number]          Labs Reviewed   CBC WITH AUTO DIFFERENTIAL - Abnormal       Result Value    WBC 7.5      nRBC 0.0      RBC 4.10 (*)      Hemoglobin 12.9 (*)     Hematocrit 37.4 (*)     MCV 91      MCH 31.5      MCHC 34.5      RDW 12.5      Platelets 199      Neutrophils % 73.7      Immature Granulocytes %, Automated 0.5      Lymphocytes % 16.8      Monocytes % 7.0      Eosinophils % 1.5      Basophils % 0.5      Neutrophils Absolute 5.54      Immature Granulocytes Absolute, Automated 0.04      Lymphocytes Absolute 1.26      Monocytes Absolute 0.53      Eosinophils Absolute 0.11      Basophils Absolute 0.04     COMPREHENSIVE METABOLIC PANEL - Abnormal    Glucose 106 (*)     Sodium 126 (*)     Potassium 4.0      Chloride 97 (*)     Bicarbonate 20 (*)     Anion Gap 13      Urea Nitrogen 12      Creatinine 0.89      eGFR >90      Calcium 9.7      Albumin 4.2      Alkaline Phosphatase 96      Total Protein 7.1      AST 13      Bilirubin, Total 0.5      ALT 16     URINALYSIS WITH REFLEX CULTURE AND MICROSCOPIC - Abnormal    Color, Urine Light-Yellow      Appearance, Urine Clear      Specific Gravity, Urine 1.038 (*)     pH, Urine 6.5      Protein, Urine NEGATIVE      Glucose, Urine Normal      Blood, Urine NEGATIVE      Ketones, Urine NEGATIVE      Bilirubin, Urine NEGATIVE      Urobilinogen, Urine Normal      Nitrite, Urine NEGATIVE      Leukocyte Esterase, Urine NEGATIVE     MAGNESIUM - Normal    Magnesium 1.75     LACTATE - Normal    Lactate 1.0      Narrative:     Venipuncture immediately after or during the administration of Metamizole may lead to falsely low results. Testing should be performed immediately prior to Metamizole dosing.   SERIAL TROPONIN-INITIAL - Normal    Troponin I, High Sensitivity 6      Narrative:     Less than 99th percentile of normal range cutoff-  Female and children under 18 years old <14 ng/L; Male <21 ng/L: Negative  Repeat testing should be performed if clinically indicated.     Female and children under 18 years old 14-50 ng/L; Male 21-50 ng/L:  Consistent with possible cardiac damage and possible increased clinical    risk. Serial measurements may help to assess extent of myocardial damage.     >50 ng/L: Consistent with cardiac damage, increased clinical risk and  myocardial infarction. Serial measurements may help assess extent of   myocardial damage.      NOTE: Children less than 1 year old may have higher baseline troponin   levels and results should be interpreted in conjunction with the overall   clinical context.     NOTE: Troponin I testing is performed using a different   testing methodology at [facility] than at other   [facility]. Direct result comparisons should only   be made within the same method.   SERIAL TROPONIN, 1 HOUR - Normal    Troponin I, High Sensitivity 4      Narrative:     Less than 99th percentile of normal range cutoff-  Female and children under 18 years old <14 ng/L; Male <21 ng/L: Negative  Repeat testing should be performed if clinically indicated.     Female and children under 18 years old 14-50 ng/L; Male 21-50 ng/L:  Consistent with possible cardiac damage and possible increased clinical   risk. Serial measurements may help to assess extent of myocardial damage.     >50 ng/L: Consistent with cardiac damage, increased clinical risk and  myocardial infarction. Serial measurements may help assess extent of   myocardial damage.      NOTE: Children less than 1 year old may have higher baseline troponin   levels and results should be interpreted in conjunction with the overall   clinical context.     NOTE: Troponin I testing is performed using a different   testing methodology at [facility] than at other   [facility]. Direct result comparisons should only   be made within the same method.   TROPONIN SERIES- (INITIAL, 1 HR)    Narrative:     The following orders were created for panel order Troponin I Series, High Sensitivity (0, 1 HR).  Procedure                               Abnormality         Status                     ---------                                -----------         ------                     Troponin I, High Sensiti...[[id number]]  Normal              Final result               Troponin, High Sensitivi...[[id number]]  Normal              Final result                 Please view results for these tests on the individual orders.   URINALYSIS WITH REFLEX CULTURE AND MICROSCOPIC    Narrative:     The following orders were created for panel order Urinalysis with Reflex Culture and Microscopic.  Procedure                               Abnormality         Status                     ---------                               -----------         ------                     Urinalysis with Reflex C...[[id number]]  Abnormal            Final result               Extra Urine Gray Tube[[id number]]                            In process                   Please view results for these tests on the individual orders.   EXTRA URINE GRAY TUBE       Encounter Date: 01/12/25   ECG 12 lead   Result Value    Ventricular Rate 84    Atrial Rate 77    QRS Duration 130    QT Interval 440    QTC Calculation(Bazett) 519    R Axis -23    T Axis 90    QRS Count 13    Q Onset 194    P Onset 116    P Offset 183    T Offset 414    QTC Fredericia 492    Narrative    Wide QRS rhythm with occasional and consecutive Premature ventricular complexes  Right bundle branch block  Possible Lateral infarct , age undetermined  Abnormal ECG  When compared with ECG of 12-JAN-2025 18:36,  Wide QRS rhythm has replaced Sinus rhythm      See ED provider note for full interpretation and clinical correlation  Confirmed by Ximena Ray ([id number]) on 1/13/2025 2:35:10 AM       ED Course & MDM     Medical Decision Making  Patient presents the emergency department for evaluation of generalized weakness.  He does have a history of esophagectomy 7 weeks ago.  He just transition to oral feedings from G-tube and has had a 13 pound weight loss.  Patient attributes this as a cause of his weakness.  He suffered a fall yesterday.   Differential diagnosis of facial contusion, intercranial hemorrhage, rib fractures, PE, intra-abdominal hemorrhage, electrolyte dyscrasia, DYANA to mention a few.  Plan is for CT imaging of the head, face, spine, abdomen and pelvis and will CTA to ensure no PE while assessing for rib fractures.  He has no midline vertebral tenderness therefore will recon his thoracic and lumbar spine.  Orthostatic vital signs and EKG will be obtained as well.  Will provide a liter of IV fluid and fentanyl for pain.  Patient to be evaluated by ER attending        ED Course as of 05/17/25 2145   Sat May 17, 2025   1737 HEMOGLOBIN(!): 12.9  Uptrending [NA]   1801 EKG as interpreted by myself demonstrate sinus rhythm with a ventricular rate of 74, right bundle branch block present, normal NC interval with prolonged QRS and borderline prolonged QTc interval, no evidence of an acute STEMI. [NS]   1836 SODIUM(!): 126  136 one month ago [NA]   1937 18:28  Vital Signs  Heart Rate: 77BP: 105/61MAP (mmHg): 71Patient Position: Standing  18:27  Vital Signs  Heart Rate: 75BP: 131/69MAP (mmHg): 95Patient Position: Sitting  18:25  Vital Signs  Heart Rate: 74BP: 130/70MAP (mmHg): 97Patient Position: Lying [NA]   1953 Patient updated on lab results and partial diagnostics.  Awaiting CTs for admission.  Patient reinstructed on urine specimen.  He states he has required admission for hyponatremia in the past as low as 117. [NA]   2130 Dr. Alatorre spoke with Dr. Henry on-call for trauma services and discussed the patient's case given he has a documented splenic laceration as well as a new compression deformity at L1.  Patient accepted by trauma with medicine on consultation.  IMS consult completed as well by attending. Patient made aware of happy and he preferred to stay at [place] for his care.  [NA]      ED Course User Index  [NA] Melissa Trevino, APRN-CNP  [NS] Star Alatorre MD         Diagnoses as of 05/17/25 2145   Fall, initial  encounter   Hyponatremia   Compression fracture of L1 vertebra, initial encounter (Multi)   Laceration of spleen, initial encounter   Facial contusion, initial encounter   Skin tear of left elbow without complication, initial encounter          Your medication list        ASK your doctor about these medications        Instructions Last Dose Given Next Dose Due   acetaminophen 650 mg/20.3 mL solution oral liquid  Commonly known as: Tylenol      20.3 mL (650 mg) by j-tube route every 6 hours if needed for pain.       amitriptyline 100 mg tablet  Commonly known as: Elavil      1 tablet (100 mg) by j-tube route once daily at bedtime.       aspirin 81 mg chewable tablet           atorvastatin 40 mg tablet  Commonly known as: Lipitor      1 tablet (40 mg) by j-tube route once daily.       buPROPion 75 mg tablet  Commonly known as: Wellbutrin      2 tablets (150 mg) by j-tube route 2 times a day.       clobetasol 0.05 % cream  Commonly known as: Temovate      Apply small amount topically to hands and feet two times daily. Do not use for more than 14 days.       levothyroxine 175 mcg tablet  Commonly known as: Synthroid, Levoxyl      1 tablet (175 mcg) by j-tube route early in the morning.. Take on an empty stomach at the same time each day, either 30 to 60 minutes prior to breakfast       lidocaine-prilocaine 2.5-2.5 % cream  Commonly known as: Emla      Apply topically to affected area 30-45 minutes before Mediport access.       losartan 50 mg tablet  Commonly known as: Cozaar      Take 1 tab daily via j-tube.       morphine 10 mg/5 mL solution      2.5 mL (5 mg) by j-tube route every 6 hours if needed for severe pain (7 - 10).       ondansetron ODT 8 mg disintegrating tablet  Commonly known as: Zofran-ODT      Dissolve 1 tablet (8 mg) in the mouth every 8 hours if needed for nausea or vomiting.       prochlorperazine 10 mg tablet  Commonly known as: Compazine      Take 1 tablet (10 mg) by mouth every 6 hours if needed  for nausea or vomiting.       spironolactone 50 mg tablet  Commonly known as: Aldactone      1 tablet (50 mg) by j-tube route once daily.       verapamil 80 mg tablet  Commonly known as: Calan      3 tablets (240 mg) by j-tube route once daily.                Procedure  None     *This report was transcribed using voice recognition software.  Every effort was made to ensure accuracy; however, inadvertent computerized transcription errors may be present.*  SALO Padilla  05/17/25           [1]   Past Medical History:  Diagnosis Date    CAD (coronary artery disease)     Esophageal carcinoma     Hyperlipidemia     Hypertension     Hypothyroidism    [2]   Past Surgical History:  Procedure Laterality Date    CATARACT EXTRACTION Right 08/25/2023    CHOLECYSTECTOMY      CORONARY ARTERY BYPASS GRAFT  2005    4-vessel    ESOPHAGUS SURGERY  03/31/2025    Three field esophagectomy    HERNIA REPAIR     [3]   Family History  Problem Relation Name Age of Onset    COPD Mother     [4]   Social History  Tobacco Use    Smoking status: Every Day     Current packs/day: 0.25     Average packs/day: 1 pack/day for 46.4 years (45.3 ttl pk-yrs)     Types: Cigarettes     Start date: 1979     Passive exposure: Current    Smokeless tobacco: Never    Tobacco comments:     Pt is down to 2 cigarettes.   Vaping Use    Vaping status: Never Used   Substance Use Topics    Alcohol use: Not Currently    Drug use: Never        [facility] Padilla-CNP  05/17/25 [id number]

## 2025-05-18 ENCOUNTER — APPOINTMENT (OUTPATIENT)
Dept: CARDIOLOGY | Facility: HOSPITAL | Age: 70
DRG: 964 | End: 2025-05-18
Payer: MEDICARE

## 2025-05-18 ENCOUNTER — APPOINTMENT (OUTPATIENT)
Dept: RADIOLOGY | Facility: HOSPITAL | Age: 70
DRG: 964 | End: 2025-05-18
Payer: MEDICARE

## 2025-05-18 PROBLEM — S32.010A: Status: ACTIVE | Noted: 2025-05-18

## 2025-05-18 PROBLEM — S00.83XA FACIAL BRUISING, INITIAL ENCOUNTER: Status: ACTIVE | Noted: 2025-05-18

## 2025-05-18 PROBLEM — S36.039A SPLENIC LACERATION: Status: ACTIVE | Noted: 2025-05-18

## 2025-05-18 PROBLEM — S40.812A ABRASION OF LEFT ARM: Status: ACTIVE | Noted: 2025-05-18

## 2025-05-18 LAB
ALBUMIN SERPL BCP-MCNC: 4 G/DL (ref 3.4–5)
ALP SERPL-CCNC: 88 U/L (ref 33–136)
ALT SERPL W P-5'-P-CCNC: 15 U/L (ref 10–52)
ANION GAP SERPL CALC-SCNC: 12 MMOL/L (ref 10–20)
APTT PPP: 32 SECONDS (ref 26–36)
AST SERPL W P-5'-P-CCNC: 12 U/L (ref 9–39)
BILIRUB SERPL-MCNC: 0.4 MG/DL (ref 0–1.2)
BUN SERPL-MCNC: 10 MG/DL (ref 6–23)
CALCIUM SERPL-MCNC: 9.4 MG/DL (ref 8.6–10.3)
CHLORIDE SERPL-SCNC: 98 MMOL/L (ref 98–107)
CO2 SERPL-SCNC: 23 MMOL/L (ref 21–32)
CREAT SERPL-MCNC: 0.79 MG/DL (ref 0.5–1.3)
EGFRCR SERPLBLD CKD-EPI 2021: >90 ML/MIN/1.73M*2
ERYTHROCYTE [DISTWIDTH] IN BLOOD BY AUTOMATED COUNT: 12.7 % (ref 11.5–14.5)
GLUCOSE SERPL-MCNC: 88 MG/DL (ref 74–99)
HCT VFR BLD AUTO: 30.2 % (ref 41–52)
HCT VFR BLD AUTO: 33 % (ref 41–52)
HCT VFR BLD AUTO: 35.1 % (ref 41–52)
HCT VFR BLD AUTO: 35.1 % (ref 41–52)
HGB BLD-MCNC: 10.4 G/DL (ref 13.5–17.5)
HGB BLD-MCNC: 11.4 G/DL (ref 13.5–17.5)
HGB BLD-MCNC: 12 G/DL (ref 13.5–17.5)
HGB BLD-MCNC: 12 G/DL (ref 13.5–17.5)
HOLD SPECIMEN: NORMAL
INR PPP: 1.1 (ref 0.9–1.1)
LACTATE SERPL-SCNC: 1.3 MMOL/L (ref 0.4–2)
MAGNESIUM SERPL-MCNC: 1.77 MG/DL (ref 1.6–2.4)
MCH RBC QN AUTO: 31.5 PG (ref 26–34)
MCHC RBC AUTO-ENTMCNC: 34.2 G/DL (ref 32–36)
MCV RBC AUTO: 92 FL (ref 80–100)
NRBC BLD-RTO: 0 /100 WBCS (ref 0–0)
OSMOLALITY SERPL: 269 MOSM/KG (ref 280–300)
OSMOLALITY UR: 262 MOSM/KG (ref 200–1200)
PLATELET # BLD AUTO: 177 X10*3/UL (ref 150–450)
POTASSIUM SERPL-SCNC: 3.7 MMOL/L (ref 3.5–5.3)
PROT SERPL-MCNC: 6.6 G/DL (ref 6.4–8.2)
PROTHROMBIN TIME: 12.2 SECONDS (ref 9.8–12.4)
RBC # BLD AUTO: 3.81 X10*6/UL (ref 4.5–5.9)
SODIUM SERPL-SCNC: 129 MMOL/L (ref 136–145)
WBC # BLD AUTO: 6 X10*3/UL (ref 4.4–11.3)

## 2025-05-18 PROCEDURE — 83605 ASSAY OF LACTIC ACID: CPT

## 2025-05-18 PROCEDURE — 2500000005 HC RX 250 GENERAL PHARMACY W/O HCPCS: Performed by: SURGERY

## 2025-05-18 PROCEDURE — 71045 X-RAY EXAM CHEST 1 VIEW: CPT

## 2025-05-18 PROCEDURE — 2500000001 HC RX 250 WO HCPCS SELF ADMINISTERED DRUGS (ALT 637 FOR MEDICARE OP)

## 2025-05-18 PROCEDURE — 85027 COMPLETE CBC AUTOMATED: CPT

## 2025-05-18 PROCEDURE — 2500000002 HC RX 250 W HCPCS SELF ADMINISTERED DRUGS (ALT 637 FOR MEDICARE OP, ALT 636 FOR OP/ED): Performed by: INTERNAL MEDICINE

## 2025-05-18 PROCEDURE — 1200000002 HC GENERAL ROOM WITH TELEMETRY DAILY

## 2025-05-18 PROCEDURE — 71045 X-RAY EXAM CHEST 1 VIEW: CPT | Performed by: RADIOLOGY

## 2025-05-18 PROCEDURE — 99233 SBSQ HOSP IP/OBS HIGH 50: CPT | Performed by: INTERNAL MEDICINE

## 2025-05-18 PROCEDURE — 99222 1ST HOSP IP/OBS MODERATE 55: CPT | Performed by: SURGERY

## 2025-05-18 PROCEDURE — 2500000001 HC RX 250 WO HCPCS SELF ADMINISTERED DRUGS (ALT 637 FOR MEDICARE OP): Performed by: SURGERY

## 2025-05-18 PROCEDURE — 2500000002 HC RX 250 W HCPCS SELF ADMINISTERED DRUGS (ALT 637 FOR MEDICARE OP, ALT 636 FOR OP/ED)

## 2025-05-18 PROCEDURE — 2500000001 HC RX 250 WO HCPCS SELF ADMINISTERED DRUGS (ALT 637 FOR MEDICARE OP): Performed by: INTERNAL MEDICINE

## 2025-05-18 PROCEDURE — 85014 HEMATOCRIT: CPT | Performed by: SURGERY

## 2025-05-18 PROCEDURE — 80053 COMPREHEN METABOLIC PANEL: CPT

## 2025-05-18 PROCEDURE — 93010 ELECTROCARDIOGRAM REPORT: CPT | Performed by: INTERNAL MEDICINE

## 2025-05-18 PROCEDURE — 85730 THROMBOPLASTIN TIME PARTIAL: CPT

## 2025-05-18 PROCEDURE — 85018 HEMOGLOBIN: CPT | Performed by: SURGERY

## 2025-05-18 PROCEDURE — 93005 ELECTROCARDIOGRAM TRACING: CPT

## 2025-05-18 PROCEDURE — 83735 ASSAY OF MAGNESIUM: CPT

## 2025-05-18 RX ORDER — TAMSULOSIN HYDROCHLORIDE 0.4 MG/1
0.4 CAPSULE ORAL NIGHTLY
Status: DISCONTINUED | OUTPATIENT
Start: 2025-05-18 | End: 2025-05-19 | Stop reason: HOSPADM

## 2025-05-18 RX ORDER — VERAPAMIL HCL 240 MG
240 TABLET, EXTENDED RELEASE ORAL NIGHTLY
COMMUNITY
Start: 2025-05-14

## 2025-05-18 RX ORDER — LIDOCAINE 560 MG/1
1 PATCH PERCUTANEOUS; TOPICAL; TRANSDERMAL DAILY
Status: DISCONTINUED | OUTPATIENT
Start: 2025-05-18 | End: 2025-05-19 | Stop reason: HOSPADM

## 2025-05-18 RX ORDER — BACLOFEN 5 MG/1
1 TABLET ORAL
COMMUNITY
Start: 2025-04-23

## 2025-05-18 RX ORDER — BACLOFEN 10 MG/1
5 TABLET ORAL 3 TIMES DAILY
Status: DISCONTINUED | OUTPATIENT
Start: 2025-05-18 | End: 2025-05-19 | Stop reason: HOSPADM

## 2025-05-18 RX ORDER — TAMSULOSIN HYDROCHLORIDE 0.4 MG/1
0.4 CAPSULE ORAL NIGHTLY
COMMUNITY
Start: 2025-02-27

## 2025-05-18 RX ORDER — BACITRACIN ZINC 500 UNIT/G
OINTMENT (GRAM) TOPICAL DAILY
Status: DISCONTINUED | OUTPATIENT
Start: 2025-05-18 | End: 2025-05-19 | Stop reason: HOSPADM

## 2025-05-18 RX ADMIN — ACETAMINOPHEN 650 MG: 325 TABLET ORAL at 06:21

## 2025-05-18 RX ADMIN — AMITRIPTYLINE HYDROCHLORIDE 100 MG: 25 TABLET, FILM COATED ORAL at 20:40

## 2025-05-18 RX ADMIN — BACLOFEN 5 MG: 10 TABLET ORAL at 14:42

## 2025-05-18 RX ADMIN — BACLOFEN 5 MG: 10 TABLET ORAL at 20:40

## 2025-05-18 RX ADMIN — ASPIRIN 81 MG CHEWABLE TABLET 81 MG: 81 TABLET CHEWABLE at 08:47

## 2025-05-18 RX ADMIN — SPIRONOLACTONE 50 MG: 25 TABLET ORAL at 08:47

## 2025-05-18 RX ADMIN — ACETAMINOPHEN 650 MG: 325 TABLET ORAL at 01:33

## 2025-05-18 RX ADMIN — ACETAMINOPHEN 650 MG: 325 TABLET ORAL at 17:27

## 2025-05-18 RX ADMIN — AMITRIPTYLINE HYDROCHLORIDE 100 MG: 25 TABLET, FILM COATED ORAL at 01:33

## 2025-05-18 RX ADMIN — LIDOCAINE 4% 1 PATCH: 40 PATCH TOPICAL at 12:32

## 2025-05-18 RX ADMIN — ATORVASTATIN CALCIUM 40 MG: 40 TABLET, FILM COATED ORAL at 20:40

## 2025-05-18 RX ADMIN — ATORVASTATIN CALCIUM 40 MG: 40 TABLET, FILM COATED ORAL at 01:33

## 2025-05-18 RX ADMIN — BACITRACIN ZINC: 500 OINTMENT TOPICAL at 13:25

## 2025-05-18 RX ADMIN — BUPROPION HYDROCHLORIDE 150 MG: 100 TABLET, FILM COATED ORAL at 08:46

## 2025-05-18 RX ADMIN — LOSARTAN POTASSIUM 50 MG: 50 TABLET, FILM COATED ORAL at 08:47

## 2025-05-18 RX ADMIN — LEVOTHYROXINE SODIUM 175 MCG: 0.12 TABLET ORAL at 06:21

## 2025-05-18 RX ADMIN — BUPROPION HYDROCHLORIDE 150 MG: 100 TABLET, FILM COATED ORAL at 01:33

## 2025-05-18 RX ADMIN — BUPROPION HYDROCHLORIDE 150 MG: 100 TABLET, FILM COATED ORAL at 20:40

## 2025-05-18 RX ADMIN — TAMSULOSIN HYDROCHLORIDE 0.4 MG: 0.4 CAPSULE ORAL at 20:40

## 2025-05-18 RX ADMIN — ACETAMINOPHEN 650 MG: 325 TABLET ORAL at 12:32

## 2025-05-18 SDOH — SOCIAL STABILITY: SOCIAL INSECURITY: DOES ANYONE TRY TO KEEP YOU FROM HAVING/CONTACTING OTHER FRIENDS OR DOING THINGS OUTSIDE YOUR HOME?: NO

## 2025-05-18 SDOH — ECONOMIC STABILITY: FOOD INSECURITY: HOW HARD IS IT FOR YOU TO PAY FOR THE VERY BASICS LIKE FOOD, HOUSING, MEDICAL CARE, AND HEATING?: NOT HARD AT ALL

## 2025-05-18 SDOH — ECONOMIC STABILITY: HOUSING INSECURITY: AT ANY TIME IN THE PAST 12 MONTHS, WERE YOU HOMELESS OR LIVING IN A SHELTER (INCLUDING NOW)?: NO

## 2025-05-18 SDOH — ECONOMIC STABILITY: HOUSING INSECURITY: IN THE PAST 12 MONTHS, HOW MANY TIMES HAVE YOU MOVED WHERE YOU WERE LIVING?: 0

## 2025-05-18 SDOH — SOCIAL STABILITY: SOCIAL INSECURITY: WITHIN THE LAST YEAR, HAVE YOU BEEN HUMILIATED OR EMOTIONALLY ABUSED IN OTHER WAYS BY YOUR PARTNER OR EX-PARTNER?: NO

## 2025-05-18 SDOH — ECONOMIC STABILITY: TRANSPORTATION INSECURITY: IN THE PAST 12 MONTHS, HAS LACK OF TRANSPORTATION KEPT YOU FROM MEDICAL APPOINTMENTS OR FROM GETTING MEDICATIONS?: NO

## 2025-05-18 SDOH — SOCIAL STABILITY: SOCIAL INSECURITY: ABUSE: ADULT

## 2025-05-18 SDOH — SOCIAL STABILITY: SOCIAL INSECURITY: WERE YOU ABLE TO COMPLETE ALL THE BEHAVIORAL HEALTH SCREENINGS?: YES

## 2025-05-18 SDOH — ECONOMIC STABILITY: INCOME INSECURITY: IN THE PAST 12 MONTHS HAS THE ELECTRIC, GAS, OIL, OR WATER COMPANY THREATENED TO SHUT OFF SERVICES IN YOUR HOME?: NO

## 2025-05-18 SDOH — ECONOMIC STABILITY: HOUSING INSECURITY: IN THE LAST 12 MONTHS, WAS THERE A TIME WHEN YOU WERE NOT ABLE TO PAY THE MORTGAGE OR RENT ON TIME?: NO

## 2025-05-18 SDOH — ECONOMIC STABILITY: FOOD INSECURITY: WITHIN THE PAST 12 MONTHS, YOU WORRIED THAT YOUR FOOD WOULD RUN OUT BEFORE YOU GOT THE MONEY TO BUY MORE.: NEVER TRUE

## 2025-05-18 SDOH — SOCIAL STABILITY: SOCIAL INSECURITY: ARE YOU OR HAVE YOU BEEN THREATENED OR ABUSED PHYSICALLY, EMOTIONALLY, OR SEXUALLY BY ANYONE?: NO

## 2025-05-18 SDOH — SOCIAL STABILITY: SOCIAL INSECURITY: WITHIN THE LAST YEAR, HAVE YOU BEEN AFRAID OF YOUR PARTNER OR EX-PARTNER?: NO

## 2025-05-18 SDOH — ECONOMIC STABILITY: FOOD INSECURITY: WITHIN THE PAST 12 MONTHS, THE FOOD YOU BOUGHT JUST DIDN'T LAST AND YOU DIDN'T HAVE MONEY TO GET MORE.: NEVER TRUE

## 2025-05-18 SDOH — SOCIAL STABILITY: SOCIAL INSECURITY: DO YOU FEEL UNSAFE GOING BACK TO THE PLACE WHERE YOU ARE LIVING?: NO

## 2025-05-18 SDOH — SOCIAL STABILITY: SOCIAL INSECURITY: ARE THERE ANY APPARENT SIGNS OF INJURIES/BEHAVIORS THAT COULD BE RELATED TO ABUSE/NEGLECT?: NO

## 2025-05-18 SDOH — SOCIAL STABILITY: SOCIAL INSECURITY: HAVE YOU HAD THOUGHTS OF HARMING ANYONE ELSE?: YES

## 2025-05-18 SDOH — SOCIAL STABILITY: SOCIAL INSECURITY: HAS ANYONE EVER THREATENED TO HURT YOUR FAMILY OR YOUR PETS?: NO

## 2025-05-18 SDOH — SOCIAL STABILITY: SOCIAL INSECURITY: DO YOU FEEL ANYONE HAS EXPLOITED OR TAKEN ADVANTAGE OF YOU FINANCIALLY OR OF YOUR PERSONAL PROPERTY?: NO

## 2025-05-18 SDOH — SOCIAL STABILITY: SOCIAL INSECURITY: HAVE YOU HAD ANY THOUGHTS OF HARMING ANYONE ELSE?: NO

## 2025-05-18 ASSESSMENT — COGNITIVE AND FUNCTIONAL STATUS - GENERAL
CLIMB 3 TO 5 STEPS WITH RAILING: A LITTLE
MOBILITY SCORE: 23
DAILY ACTIVITIY SCORE: 24
DAILY ACTIVITIY SCORE: 24
MOBILITY SCORE: 24
DAILY ACTIVITIY SCORE: 24
MOBILITY SCORE: 23
CLIMB 3 TO 5 STEPS WITH RAILING: A LITTLE
PATIENT BASELINE BEDBOUND: NO

## 2025-05-18 ASSESSMENT — PATIENT HEALTH QUESTIONNAIRE - PHQ9
SUM OF ALL RESPONSES TO PHQ9 QUESTIONS 1 & 2: 0
1. LITTLE INTEREST OR PLEASURE IN DOING THINGS: NOT AT ALL
2. FEELING DOWN, DEPRESSED OR HOPELESS: NOT AT ALL

## 2025-05-18 ASSESSMENT — ACTIVITIES OF DAILY LIVING (ADL)
LACK_OF_TRANSPORTATION: NO
BATHING: INDEPENDENT
PATIENT'S MEMORY ADEQUATE TO SAFELY COMPLETE DAILY ACTIVITIES?: YES
HEARING - LEFT EAR: FUNCTIONAL
DRESSING YOURSELF: INDEPENDENT
HEARING - RIGHT EAR: FUNCTIONAL
GROOMING: INDEPENDENT
TOILETING: INDEPENDENT
LACK_OF_TRANSPORTATION: NO
ADEQUATE_TO_COMPLETE_ADL: YES
FEEDING YOURSELF: INDEPENDENT
WALKS IN HOME: INDEPENDENT
JUDGMENT_ADEQUATE_SAFELY_COMPLETE_DAILY_ACTIVITIES: YES

## 2025-05-18 ASSESSMENT — ENCOUNTER SYMPTOMS
HEMATURIA: 0
FATIGUE: 1
VOMITING: 0
DIARRHEA: 0
DYSURIA: 0
BRUISES/BLEEDS EASILY: 1
HEADACHES: 0
ARTHRALGIAS: 1
EYE PAIN: 0
CONSTIPATION: 0
EYE REDNESS: 0
NAUSEA: 0
CHILLS: 0
SPEECH DIFFICULTY: 0
COUGH: 0
AGITATION: 0
SHORTNESS OF BREATH: 1
POLYPHAGIA: 0
FLANK PAIN: 0
WOUND: 1
CONFUSION: 0
FEVER: 0
MYALGIAS: 1
WEAKNESS: 1
FREQUENCY: 0
ABDOMINAL PAIN: 0

## 2025-05-18 ASSESSMENT — LIFESTYLE VARIABLES
HOW OFTEN DO YOU HAVE A DRINK CONTAINING ALCOHOL: NEVER
AUDIT-C TOTAL SCORE: 0
AUDIT-C TOTAL SCORE: 0
SKIP TO QUESTIONS 9-10: 1
HOW OFTEN DO YOU HAVE 6 OR MORE DRINKS ON ONE OCCASION: NEVER
HOW MANY STANDARD DRINKS CONTAINING ALCOHOL DO YOU HAVE ON A TYPICAL DAY: PATIENT DOES NOT DRINK

## 2025-05-18 ASSESSMENT — PAIN - FUNCTIONAL ASSESSMENT
PAIN_FUNCTIONAL_ASSESSMENT: 0-10
PAIN_FUNCTIONAL_ASSESSMENT: 0-10

## 2025-05-18 ASSESSMENT — PAIN SCALES - GENERAL
PAINLEVEL_OUTOF10: 5 - MODERATE PAIN
PAINLEVEL_OUTOF10: 4
PAINLEVEL_OUTOF10: 0 - NO PAIN

## 2025-05-18 NOTE — PROGRESS NOTES
Timoteo Victoria is a 69 y.o. male on day 0 of admission presenting with Fall, initial encounter.      Subjective   Timoteo Victoria is a 69 y.o. male with PMHx s/f CAD (CABG 2005), HTN, HLD, chronic tobacco use, hypothyroidism, esophageal cancer status post an esophagectomy with Dr. Salas on 3/31/2025 (on J-tube) presenting with increased falls and status post fall last night.  He states he walked down to the mailbox last night and went back in the house his leg just gave out before he could get his way to the chair to sit down.  He fell on his left side and strike his left temple.  Denies LOC or being on any anticoagulants.  Today he has increasing pain in his left side like he might have broken a rib or two. Has short of breath, have been breathing swallow secondary to pain. He has some pain with movements to his left arm due to the injury.  He has been tolerating oral diet for about a month and does not use J-tube anymore. He has an appointment next Thursday to remove G-tube. His daughter states he has been slowly increasing his diet to more than a meal a day but due to his esophageal surgery, he has been eating slow and more frequently. Has lightheadedness with standing up.     ED Course:   Vitals on presentation: T 37.2 °C (98.9 °F)  HR 93  /72  RR 16  O2 99 % None (Room air)  Labs:   CBC with WBC 7.5, Hgb 12.9, Plts 199.   CMP with glucose 106, Na 126, K 4.0, BUN 12, sCr 0.89, alk phos 96, ALT 16, AST 13, bilirubin 0.5. Magnesium 1.75.   BNP 35. Trop 4.   Lactate 1.0  EKG: sinus rhythm at 74 bpm, RBBB, no acute STEMI. , qtc 494  Imaging - agree with radiology interpretation(s):   X-ray elbow left-no osseous injury   XR shoulder left-degenerative changes without osseous injury  CT head, cervical spine, maxillofacial bones, reconstruction-no acute changes.  Numerous missing teeth with large dental cavities.  CTA for PE, CT thoracic spine-no acute PE or displaced fractures.  Post surgical  changes of esophagectomy with gastric pull-through.  Upper lobe predominant paraseptal and centrilobular emphysema with some mild atelectasis in the lung bases without evidence of new consolidation.  No acute trauma to thoracic spine.  CT abdomen pelvis, lumbar spine-new 3.5 x 2.5 linear hypodensity is present in the medial spleen in the interim since prior exam in March of 2025, suspicious for a new splenic laceration.  New/acute minimally displaced fracture of the S4 vertebral body.  New/acute compression fracture along the superior and inferior endplate of L1.  Interventions: Bacitracin ointment, Zofran 4 mg, normal saline 1 L, admission for further management  5/18: no acute events overnight. Patient remained left flank pain. CT of abd revealed possible spleen laceration. Surgery note appreciated. No active bleeding was observed. Monitor H/H and supportive care at this time.       Objective     Last Recorded Vitals  /68 (BP Location: Right arm, Patient Position: Lying)   Pulse 73   Temp 36.3 °C (97.4 °F) (Temporal)   Resp 16   Wt 82.8 kg (182 lb 9.6 oz)   SpO2 98%   Intake/Output last 3 Shifts:    Intake/Output Summary (Last 24 hours) at 5/18/2025 1250  Last data filed at 5/18/2025 1127  Gross per 24 hour   Intake 1110 ml   Output --   Net 1110 ml       Admission Weight  Weight: 81.6 kg (180 lb) (05/17/25 1641)    Daily Weight  05/18/25 : 82.8 kg (182 lb 9.6 oz)    Image Results  XR chest 1 view  Narrative: Interpreted By:  Janine Jacobson,   STUDY:  XR CHEST 1 VIEW;  5/18/2025 12:26 am      INDICATION:  Signs/Symptoms:sp esophagectomy.      COMPARISON:  Chest x-ray 04/24/2025      ACCESSION NUMBER(S):  [id number]      ORDERING CLINICIAN:  TERE DOUGLAS      FINDINGS:  Midline sternotomy wires and mediastinal clips noted. Right-sided  MediPort terminates in the SVC.      CARDIOMEDIASTINAL SILHOUETTE:  Cardiomediastinal silhouette is stable in size and configuration.      LUNGS:  No consolidation,  pleural effusion or pneumothorax.      ABDOMEN:  Surgical clips noted in the right upper quadrant.      BONES:  Multilevel degenerative changes of the spine. Right shoulder  osteoarthrosis.      Impression: No acute cardiopulmonary process.      MACRO:  None      Signed by: Janine Jacobson 5/18/2025 12:41 AM  Dictation workstation:   [id number]      Physical Exam  Constitutional:       General: He is not in acute distress.     Appearance: Normal appearance.   HENT:      Head: Normocephalic.      Mouth/Throat:      Mouth: Mucous membranes are moist.      Pharynx: Oropharynx is clear.   Eyes:      Pupils: Pupils are equal, round, and reactive to light.   Cardiovascular:      Rate and Rhythm: Normal rate and regular rhythm.      Heart sounds: Normal heart sounds. No murmur heard.     No gallop.   Pulmonary:      Effort: Pulmonary effort is normal.      Breath sounds: Normal breath sounds.   Abdominal:      General: Bowel sounds are normal. There is no distension.      Palpations: Abdomen is soft.      Tenderness: There is abdominal tenderness.      Comments: Tender to palpate in left flank area.   Musculoskeletal:         General: No swelling. Normal range of motion.      Cervical back: Neck supple. No rigidity.   Skin:     General: Skin is warm and dry.   Neurological:      General: No focal deficit present.      Mental Status: He is alert.      Cranial Nerves: No cranial nerve deficit.      Sensory: No sensory deficit.   Psychiatric:         Mood and Affect: Mood normal.         Behavior: Behavior normal.         Relevant Results      Results for orders placed or performed during the hospital encounter of 05/17/25 (from the past 96 hours)   Magnesium   Result Value Ref Range    Magnesium 1.75 1.60 - 2.40 mg/dL   Lactate   Result Value Ref Range    Lactate 1.0 0.4 - 2.0 mmol/L   CBC and Auto Differential   Result Value Ref Range    WBC 7.5 4.4 - 11.3 x10*3/uL    nRBC 0.0 0.0 - 0.0 /100 WBCs    RBC 4.10 (L) 4.50 - 5.90  x10*6/uL    Hemoglobin 12.9 (L) 13.5 - 17.5 g/dL    Hematocrit 37.4 (L) 41.0 - 52.0 %    MCV 91 80 - 100 fL    MCH 31.5 26.0 - 34.0 pg    MCHC 34.5 32.0 - 36.0 g/dL    RDW 12.5 11.5 - 14.5 %    Platelets 199 150 - 450 x10*3/uL    Neutrophils % 73.7 40.0 - 80.0 %    Immature Granulocytes %, Automated 0.5 0.0 - 0.9 %    Lymphocytes % 16.8 13.0 - 44.0 %    Monocytes % 7.0 2.0 - 10.0 %    Eosinophils % 1.5 0.0 - 6.0 %    Basophils % 0.5 0.0 - 2.0 %    Neutrophils Absolute 5.54 1.20 - 7.70 x10*3/uL    Immature Granulocytes Absolute, Automated 0.04 0.00 - 0.70 x10*3/uL    Lymphocytes Absolute 1.26 1.20 - 4.80 x10*3/uL    Monocytes Absolute 0.53 0.10 - 1.00 x10*3/uL    Eosinophils Absolute 0.11 0.00 - 0.70 x10*3/uL    Basophils Absolute 0.04 0.00 - 0.10 x10*3/uL   Comprehensive Metabolic Panel   Result Value Ref Range    Glucose 106 (H) 74 - 99 mg/dL    Sodium 126 (L) 136 - 145 mmol/L    Potassium 4.0 3.5 - 5.3 mmol/L    Chloride 97 (L) 98 - 107 mmol/L    Bicarbonate 20 (L) 21 - 32 mmol/L    Anion Gap 13 10 - 20 mmol/L    Urea Nitrogen 12 6 - 23 mg/dL    Creatinine 0.89 0.50 - 1.30 mg/dL    eGFR >90 >60 mL/min/1.73m*2    Calcium 9.7 8.6 - 10.3 mg/dL    Albumin 4.2 3.4 - 5.0 g/dL    Alkaline Phosphatase 96 33 - 136 U/L    Total Protein 7.1 6.4 - 8.2 g/dL    AST 13 9 - 39 U/L    Bilirubin, Total 0.5 0.0 - 1.2 mg/dL    ALT 16 10 - 52 U/L   Troponin I, High Sensitivity, Initial   Result Value Ref Range    Troponin I, High Sensitivity 6 0 - 20 ng/L   Troponin, High Sensitivity, 1 Hour   Result Value Ref Range    Troponin I, High Sensitivity 4 0 - 20 ng/L   Osmolality   Result Value Ref Range    Osmolality, Serum 269 (L) 280 - 300 mOsm/kg   Urinalysis with Reflex Culture and Microscopic   Result Value Ref Range    Color, Urine Light-Yellow Light-Yellow, Yellow, Dark-Yellow    Appearance, Urine Clear Clear    Specific Gravity, Urine 1.038 (N) 1.005 - 1.035    pH, Urine 6.5 5.0, 5.5, 6.0, 6.5, 7.0, 7.5, 8.0    Protein, Urine  NEGATIVE NEGATIVE, 10 (TRACE), 20 (TRACE) mg/dL    Glucose, Urine Normal Normal mg/dL    Blood, Urine NEGATIVE NEGATIVE mg/dL    Ketones, Urine NEGATIVE NEGATIVE mg/dL    Bilirubin, Urine NEGATIVE NEGATIVE mg/dL    Urobilinogen, Urine Normal Normal mg/dL    Nitrite, Urine NEGATIVE NEGATIVE    Leukocyte Esterase, Urine NEGATIVE NEGATIVE   Extra Urine Gray Tube   Result Value Ref Range    Extra Tube Hold for add-ons.    Osmolality, Urine   Result Value Ref Range    Osmolality, Urine Random 262 200 - 1,200 mOsm/kg   Sodium, Urine Random   Result Value Ref Range    Sodium, Urine Random 24 mmol/L    Creatinine, Urine Random 45.3 20.0 - 370.0 mg/dL    Sodium/Creatinine Ratio 53 Not established. mmol/g Creat   Hemoglobin and hematocrit, blood   Result Value Ref Range    Hemoglobin 12.0 (L) 13.5 - 17.5 g/dL    Hematocrit 35.1 (L) 41.0 - 52.0 %   Comprehensive metabolic panel   Result Value Ref Range    Glucose 88 74 - 99 mg/dL    Sodium 129 (L) 136 - 145 mmol/L    Potassium 3.7 3.5 - 5.3 mmol/L    Chloride 98 98 - 107 mmol/L    Bicarbonate 23 21 - 32 mmol/L    Anion Gap 12 10 - 20 mmol/L    Urea Nitrogen 10 6 - 23 mg/dL    Creatinine 0.79 0.50 - 1.30 mg/dL    eGFR >90 >60 mL/min/1.73m*2    Calcium 9.4 8.6 - 10.3 mg/dL    Albumin 4.0 3.4 - 5.0 g/dL    Alkaline Phosphatase 88 33 - 136 U/L    Total Protein 6.6 6.4 - 8.2 g/dL    AST 12 9 - 39 U/L    Bilirubin, Total 0.4 0.0 - 1.2 mg/dL    ALT 15 10 - 52 U/L   Coagulation Screen   Result Value Ref Range    Protime 12.2 9.8 - 12.4 seconds    INR 1.1 0.9 - 1.1    aPTT 32 26 - 36 seconds   CBC   Result Value Ref Range    WBC 6.0 4.4 - 11.3 x10*3/uL    nRBC 0.0 0.0 - 0.0 /100 WBCs    RBC 3.81 (L) 4.50 - 5.90 x10*6/uL    Hemoglobin 12.0 (L) 13.5 - 17.5 g/dL    Hematocrit 35.1 (L) 41.0 - 52.0 %    MCV 92 80 - 100 fL    MCH 31.5 26.0 - 34.0 pg    MCHC 34.2 32.0 - 36.0 g/dL    RDW 12.7 11.5 - 14.5 %    Platelets 177 150 - 450 x10*3/uL   Magnesium   Result Value Ref Range    Magnesium  1.77 1.60 - 2.40 mg/dL   Lactate   Result Value Ref Range    Lactate 1.3 0.4 - 2.0 mmol/L   Hemoglobin and hematocrit, blood   Result Value Ref Range    Hemoglobin 11.4 (L) 13.5 - 17.5 g/dL    Hematocrit 33.0 (L) 41.0 - 52.0 %     XR chest 1 view  Result Date: 5/18/2025  Interpreted By:  Janine Jacobson, STUDY: XR CHEST 1 VIEW;  5/18/2025 12:26 am   INDICATION: Signs/Symptoms:sp esophagectomy.   COMPARISON: Chest x-ray 04/24/2025   ACCESSION NUMBER(S): [id number]   ORDERING CLINICIAN: TERE DOUGLAS   FINDINGS: Midline sternotomy wires and mediastinal clips noted. Right-sided MediPort terminates in the SVC.   CARDIOMEDIASTINAL SILHOUETTE: Cardiomediastinal silhouette is stable in size and configuration.   LUNGS: No consolidation, pleural effusion or pneumothorax.   ABDOMEN: Surgical clips noted in the right upper quadrant.   BONES: Multilevel degenerative changes of the spine. Right shoulder osteoarthrosis.       No acute cardiopulmonary process.   MACRO: None   Signed by: Janine Jacobson 5/18/2025 12:41 AM Dictation workstation:   [id number]    CT abdomen pelvis w IV contrast  Result Date: 5/17/2025  Interpreted By:  Jeffrey Lares, STUDY: CT ABDOMEN PELVIS W IV CONTRAST; CT LUMBAR SPINE RETROSPECTIVE RECONSTRUCTION PROTOCOL;  5/17/2025 7:27 pm; 5/17/2025 7:26 pm   INDICATION: Signs/Symptoms:Generalized weakness and legs gave out yesterday while walking causing him to strike his left torso during fall, esophagectomy 3-31-35, left upper quadrant abdominal pain and left torso pain; Signs/Symptoms:recons.     COMPARISON: CT of the chest, abdomen and pelvis dated 03/11/2025.   ACCESSION NUMBER(S): [id number]; [id number]   ORDERING CLINICIAN: BRI COSTA   TECHNIQUE: Contiguous axial images of the abdomen and pelvis were obtained after the intravenous administration of 75 mL of Omnipaque 350 iodinated contrast. Coronal and sagittal reformatted images were reconstructed from the  axial data.   Multiplanar reformatted images of the lumbar spine were reconstructed from source data of concurrent CT abdomen and pelvis acquisition.   FINDINGS: CT ABDOMEN AND PELVIS:   LOWER CHEST: Please refer to separately dictated same day CTA chest for further characterization of thoracic findings.     ABDOMEN/PELVIS:   ABDOMINAL WALL: Cutaneous tissues of the abdomen and pelvis do not demonstrate any acute abnormality. Percutaneous jejunostomy tube is present in the left anterior abdominal wall.   Patient is status post ventral hernia repair in the upper abdomen.   LIVER: No acute hepatic parenchymal abnormality is present. Portal vein is unremarkable in appearance. Ill-defined, somewhat linear area of hypodense attenuation near the gallbladder fossa is unchanged in appearance since prior exam in March of 2025, possibly representing focal fatty infiltration.   BILE DUCTS: No intrahepatic or extrahepatic biliary dilatation is present.   GALLBLADDER: Gallbladder is surgically absent.   PANCREAS: No pancreatic ductal dilatation or peripancreatic stranding is present.   SPLEEN: A 2.5 cm by 3.5 cm (series 3, image 27; series 5, image 71) hypodensity in the medial spleen is new since prior exam in March of 2025, and is somewhat concerning for underlying splenic laceration, although there is no significant adjacent free fluid/hemorrhage.   ADRENALS: Bilateral adrenal glands are unremarkable in appearance.   KIDNEYS, URETERS, BLADDER: Kidneys are symmetric in size and enhancement without evidence of hydronephrosis or radiopaque nephrolithiasis, although evaluation for stones is limited by contrast bolus timing. Several low-density cysts are present bilaterally.   Visualized upper ureters are unremarkable.   Bladder is distended without evidence of wall thickening.   REPRODUCTIVE ORGANS: Prostate is mildly enlarged.   VESSELS: Extensive atherosclerotic plaques are present in the abdominal aorta and iliac arteries  without evidence of acute vascular abnormality.   RETROPERITONEUM/LYMPH NODES: No acute retroperitoneal abnormality. No enlarged lymph nodes.   BOWEL/MESENTERY/PERITONEUM: There are surgical changes of esophagectomy with gastric pull-through. No abnormal small bowel dilatation is present. Large volume of solid stool is present throughout the colon without inflammatory wall thickening. Appendix is not definitely identified, although no secondary signs of acute appendicitis are present. Normal appendix.   No ascites, free air, or fluid collection.     MUSCULOSKELETAL: Mild new fat stranding is present in the presacral space, with a suspected slightly impacted acute fracture in the S4 vertebral body (series 6, image 74). No suspicious osseous lesion.   CT LUMBAR SPINE:   Lumbar vertebral alignment is maintained, without evidence of spondylolisthesis.   In the interim since prior exam on 03/11/2025, there is a new compression deformity present along the superior and inferior endplate of L1 vertebral body, with a upwards of 30-40% height loss total without evidence of significant bony retropulsion.   No other compression fractures are identified in the lumbar spine. Schmorl's nodes are present along superior endplate of the L4 on L5, similar to prior exam. Posterior elements of the lumbar spine do not demonstrate any evidence of acute trauma. Facet joints are intact without evidence of subluxation or widening, although multilevel degenerate facet osteoarthropathy is present, worst at L3-L4 and L4-L5.   Moderate intervertebral disc height loss is present at L5-S1.   Multilevel spinal canal stenosis is present, with likely at least moderate narrowing suspected at L3-L4 and L4-L5 due to disc osteophyte complex and ligamentum flavum thickening.   Multilevel neural foraminal stenosis is present, with moderate to severe narrowing present at L2-L3, L3-L4, and L4-L5 due to hypertrophic facet changes, somewhat worse on the left.    Paraspinal musculature does not demonstrate any acute abnormality.       CT ABDOMEN AND PELVIS: 1. A new 3.5 x 2.5 linear hypodensity is present in the medial spleen in the interim since prior exam in March of 2025, suspicious for a new splenic laceration, although there is no significant adjacent free fluid/hemorrhage. 2. Slight fat stranding is present in the presacral space, with a new/acute minimally displaced fracture of the S4 vertebral body. 3. Surgical changes of esophagectomy with gastric pull-through, with the left anterior percutaneous jejunostomy tube in place.   CT LUMBAR SPINE: 1. New/acute compression fracture along the superior and inferior endplate of L1 with upwards of 30-40% height loss, but without significant bony retropulsion. 2. No other acute trauma is identified in the lumbar spine. 3. Multilevel degenerative changes of the lumbar spine with least moderate stenosis suspected in the spinal canal at L3-L4 and L4-L5 due to disc osteophyte complex and ligamentum flavum thickening and moderate to severe neural foraminal stenosis suspected at several levels.   MACRO: None.   Signed by: Jeffrey Lares 5/17/2025 8:39 PM Dictation workstation:   [id number]    CT lumbar spine retrospective reconstruction protocol  Result Date: 5/17/2025  Interpreted By:  Jeffrey Lares, STUDY: CT ABDOMEN PELVIS W IV CONTRAST; CT LUMBAR SPINE RETROSPECTIVE RECONSTRUCTION PROTOCOL;  5/17/2025 7:27 pm; 5/17/2025 7:26 pm   INDICATION: Signs/Symptoms:Generalized weakness and legs gave out yesterday while walking causing him to strike his left torso during fall, esophagectomy 3-31-35, left upper quadrant abdominal pain and left torso pain; Signs/Symptoms:recons.     COMPARISON: CT of the chest, abdomen and pelvis dated 03/11/2025.   ACCESSION NUMBER(S): [id number]; [id number]   ORDERING CLINICIAN: BRI COSTA   TECHNIQUE: Contiguous axial images of the abdomen and pelvis were  obtained after the intravenous administration of 75 mL of Omnipaque 350 iodinated contrast. Coronal and sagittal reformatted images were reconstructed from the axial data.   Multiplanar reformatted images of the lumbar spine were reconstructed from source data of concurrent CT abdomen and pelvis acquisition.   FINDINGS: CT ABDOMEN AND PELVIS:   LOWER CHEST: Please refer to separately dictated same day CTA chest for further characterization of thoracic findings.     ABDOMEN/PELVIS:   ABDOMINAL WALL: Cutaneous tissues of the abdomen and pelvis do not demonstrate any acute abnormality. Percutaneous jejunostomy tube is present in the left anterior abdominal wall.   Patient is status post ventral hernia repair in the upper abdomen.   LIVER: No acute hepatic parenchymal abnormality is present. Portal vein is unremarkable in appearance. Ill-defined, somewhat linear area of hypodense attenuation near the gallbladder fossa is unchanged in appearance since prior exam in March of 2025, possibly representing focal fatty infiltration.   BILE DUCTS: No intrahepatic or extrahepatic biliary dilatation is present.   GALLBLADDER: Gallbladder is surgically absent.   PANCREAS: No pancreatic ductal dilatation or peripancreatic stranding is present.   SPLEEN: A 2.5 cm by 3.5 cm (series 3, image 27; series 5, image 71) hypodensity in the medial spleen is new since prior exam in March of 2025, and is somewhat concerning for underlying splenic laceration, although there is no significant adjacent free fluid/hemorrhage.   ADRENALS: Bilateral adrenal glands are unremarkable in appearance.   KIDNEYS, URETERS, BLADDER: Kidneys are symmetric in size and enhancement without evidence of hydronephrosis or radiopaque nephrolithiasis, although evaluation for stones is limited by contrast bolus timing. Several low-density cysts are present bilaterally.   Visualized upper ureters are unremarkable.   Bladder is distended without evidence of wall  thickening.   REPRODUCTIVE ORGANS: Prostate is mildly enlarged.   VESSELS: Extensive atherosclerotic plaques are present in the abdominal aorta and iliac arteries without evidence of acute vascular abnormality.   RETROPERITONEUM/LYMPH NODES: No acute retroperitoneal abnormality. No enlarged lymph nodes.   BOWEL/MESENTERY/PERITONEUM: There are surgical changes of esophagectomy with gastric pull-through. No abnormal small bowel dilatation is present. Large volume of solid stool is present throughout the colon without inflammatory wall thickening. Appendix is not definitely identified, although no secondary signs of acute appendicitis are present. Normal appendix.   No ascites, free air, or fluid collection.     MUSCULOSKELETAL: Mild new fat stranding is present in the presacral space, with a suspected slightly impacted acute fracture in the S4 vertebral body (series 6, image 74). No suspicious osseous lesion.   CT LUMBAR SPINE:   Lumbar vertebral alignment is maintained, without evidence of spondylolisthesis.   In the interim since prior exam on 03/11/2025, there is a new compression deformity present along the superior and inferior endplate of L1 vertebral body, with a upwards of 30-40% height loss total without evidence of significant bony retropulsion.   No other compression fractures are identified in the lumbar spine. Schmorl's nodes are present along superior endplate of the L4 on L5, similar to prior exam. Posterior elements of the lumbar spine do not demonstrate any evidence of acute trauma. Facet joints are intact without evidence of subluxation or widening, although multilevel degenerate facet osteoarthropathy is present, worst at L3-L4 and L4-L5.   Moderate intervertebral disc height loss is present at L5-S1.   Multilevel spinal canal stenosis is present, with likely at least moderate narrowing suspected at L3-L4 and L4-L5 due to disc osteophyte complex and ligamentum flavum thickening.   Multilevel neural  foraminal stenosis is present, with moderate to severe narrowing present at L2-L3, L3-L4, and L4-L5 due to hypertrophic facet changes, somewhat worse on the left.   Paraspinal musculature does not demonstrate any acute abnormality.       CT ABDOMEN AND PELVIS: 1. A new 3.5 x 2.5 linear hypodensity is present in the medial spleen in the interim since prior exam in March of 2025, suspicious for a new splenic laceration, although there is no significant adjacent free fluid/hemorrhage. 2. Slight fat stranding is present in the presacral space, with a new/acute minimally displaced fracture of the S4 vertebral body. 3. Surgical changes of esophagectomy with gastric pull-through, with the left anterior percutaneous jejunostomy tube in place.   CT LUMBAR SPINE: 1. New/acute compression fracture along the superior and inferior endplate of L1 with upwards of 30-40% height loss, but without significant bony retropulsion. 2. No other acute trauma is identified in the lumbar spine. 3. Multilevel degenerative changes of the lumbar spine with least moderate stenosis suspected in the spinal canal at L3-L4 and L4-L5 due to disc osteophyte complex and ligamentum flavum thickening and moderate to severe neural foraminal stenosis suspected at several levels.   MACRO: None.   Signed by: Jeffrey Lares 5/17/2025 8:39 PM Dictation workstation:   [id number]    CT angio chest for pulmonary embolism  Addendum Date: 5/17/2025  Interpreted By:  Jeffrey Lares, ADDENDUM: Subtle new buckling is present within the inner table of the 6th and 7th left anterior rib at the costochondral junction (series 6, image 199 and 232), new since prior exam on 03/11/2025, likely representing acute nondisplaced rib fractures.   Signed by: Jeffrey Lares 5/17/2025 8:21 PM   -------- ORIGINAL REPORT -------- Dictation workstation:   [id number]    Result Date: 5/17/2025  Interpreted By:  Jeffrey Lares, STUDY: CT ANGIO CHEST FOR  PULMONARY EMBOLISM; CT THORACIC SPINE RETROSPECTIVE RECONSTRUCTION PROTOCOL;  5/17/2025 7:27 pm; 5/17/2025 7:26 pm   INDICATION: Signs/Symptoms:Generalized weakness and legs gave out yesterday while walking causing him to strike his left torso during fall, esophagectomy 3-31-35, left upper quadrant abdominal pain and left torso pain, R/O rib fx and PE; Signs/Symptoms:recons.     COMPARISON: CT chest, abdomen and pelvis dated 03/11/2025.   ACCESSION NUMBER(S): [id number]; [id number]   ORDERING CLINICIAN: BRI COSTA   TECHNIQUE: Helical data acquisition of the chest was obtained after intravenous administration of 75 mL Omnipaque 350; N/A, as per PE protocol. Images were reformatted in coronal and sagittal planes. Axial and coronal maximum intensity projection (MIP) images were created and reviewed.   Multiplanar reformatted images of the thoracic spine were reconstructed from source data of concurrent CTA chest.   FINDINGS: CT PE:   POTENTIAL LIMITATIONS OF THE STUDY: Images are somewhat limited by beam hardening artifact from the contrast bolus in the left subclavian vein.   HEART AND VESSELS: There are no discrete filling defects within main pulmonary artery and its branches to suggest acute pulmonary embolism. Main pulmonary artery and its branches are normal in caliber.   The thoracic aorta normal in course and caliber.Mild vascular calcifications are present in the thoracic aorta.Although, the study is not tailored for evaluation of aorta, there is no definite evidence of acute aortic pathology. Marked coronary artery calcifications are seen. Please note, the study is not optimized for evaluation of coronary arteries.Status post coronary artery bypass grafting (CABG) with bypass grafts not optimally evaluated on present study.   The cardiac chambers are not enlarged.There are no findings to suggest right heart strain. There is no pericardial effusion seen.   MEDIASTINUM AND NAYA,  LOWER NECK AND AXILLA: Thyroid is not well visualized due to beam hardening artifact. Mildly prominent upper mediastinal lymph nodes are similar in appearance to prior exam. No new lymphadenopathy is identified within the chest by CT imaging criteria. There are surgical changes consistent with a esophagectomy, with the gastric pull-through. The gastric conduit is slightly distended with fluid and demonstrates mild wall edema without evidence of pneumomediastinum or adjacent fluid collection/inflammatory stranding.   LUNGS AND AIRWAYS: The trachea and central airways are patent. No endobronchial lesion is seen.Images were obtained during expiratory phase of respiration with somewhat low lung volumes and bronchovascular crowding.   Mild upper lobe predominant paraseptal and centrilobular emphysematous changes are present, with some atelectasis at the lung bases, without evidence of consolidation, pleural effusion or pneumothorax. Bandlike atelectasis is present in the lingula.   UPPER ABDOMEN: Please refer to separately dictated same day CT of the abdomen and pelvis for further characterization of the abdominal findings.   CHEST WALL AND OSSEOUS STRUCTURES: Right jugular MediPort is in place. No acute abnormality is identified within the cutaneous tissues of the chest. No acute osseous pathology.There are no suspicious osseous lesions.No displaced rib fracture or sternal fractures are identified.   CT THORACIC SPINE:   Thoracic vertebral alignment is maintained, without evidence of spondylolisthesis.   No compression fractures are identified in the thoracic spine, although multilevel Schmorl's nodes are present, similar in appearance to prior exam in March of 2025.   There is no evidence of acute trauma to the posterior elements of the thoracic spine. Facet joints are intact without evidence of subluxation or widening.   Mild intervertebral disc height loss is present in the mid and lower thoracic spine.   No  posterior disc contour abnormality or high-grade spinal canal stenosis is present.   Paraspinal musculature does not demonstrate any acute abnormality.       CTA CHEST: 1. No evidence of pulmonary embolism or acute trauma to the thorax. No displaced rib fractures are identified. 2. Changes of esophagectomy with gastric pull-through, new since prior exam in March of 2025. The gastric conduit in the mediastinum appears slightly edematous and is fluid-filled, although there is no evidence of adjacent fluid collections, pneumomediastinum or reactive soft tissue changes. 3. Upper lobe predominant paraseptal and centrilobular emphysema with some mild atelectasis in the lung bases without evidence of new consolidation, pleural effusion or other acute cardiopulmonary abnormality.   CT THORACIC SPINE: 1. No evidence of acute trauma to the thoracic spine. 2. Multilevel insufficiency endplate changes with numerous Schmorl's nodes are similar in appearance to prior exam in March of 2025.   MACRO: None   Signed by: Jeffrey Lares 5/17/2025 8:12 PM Dictation workstation:   [id number]    CT thoracic spine retrospective reconstruction protocol  Addendum Date: 5/17/2025  Interpreted By:  Jeffrey Lares, ADDENDUM: Subtle new buckling is present within the inner table of the 6th and 7th left anterior rib at the costochondral junction (series 6, image 199 and 232), new since prior exam on 03/11/2025, likely representing acute nondisplaced rib fractures.   Signed by: Jeffrey Lares 5/17/2025 8:21 PM   -------- ORIGINAL REPORT -------- Dictation workstation:   [id number]    Result Date: 5/17/2025  Interpreted By:  Jeffrey Lares, STUDY: CT ANGIO CHEST FOR PULMONARY EMBOLISM; CT THORACIC SPINE RETROSPECTIVE RECONSTRUCTION PROTOCOL;  5/17/2025 7:27 pm; 5/17/2025 7:26 pm   INDICATION: Signs/Symptoms:Generalized weakness and legs gave out yesterday while walking causing him to strike his left torso during fall,  esophagectomy 3-31-35, left upper quadrant abdominal pain and left torso pain, R/O rib fx and PE; Signs/Symptoms:recons.     COMPARISON: CT chest, abdomen and pelvis dated 03/11/2025.   ACCESSION NUMBER(S): [id number]; [id number]   ORDERING CLINICIAN: BRI BARRETO; VANDANA COSTA   TECHNIQUE: Helical data acquisition of the chest was obtained after intravenous administration of 75 mL Omnipaque 350; N/A, as per PE protocol. Images were reformatted in coronal and sagittal planes. Axial and coronal maximum intensity projection (MIP) images were created and reviewed.   Multiplanar reformatted images of the thoracic spine were reconstructed from source data of concurrent CTA chest.   FINDINGS: CT PE:   POTENTIAL LIMITATIONS OF THE STUDY: Images are somewhat limited by beam hardening artifact from the contrast bolus in the left subclavian vein.   HEART AND VESSELS: There are no discrete filling defects within main pulmonary artery and its branches to suggest acute pulmonary embolism. Main pulmonary artery and its branches are normal in caliber.   The thoracic aorta normal in course and caliber.Mild vascular calcifications are present in the thoracic aorta.Although, the study is not tailored for evaluation of aorta, there is no definite evidence of acute aortic pathology. Marked coronary artery calcifications are seen. Please note, the study is not optimized for evaluation of coronary arteries.Status post coronary artery bypass grafting (CABG) with bypass grafts not optimally evaluated on present study.   The cardiac chambers are not enlarged.There are no findings to suggest right heart strain. There is no pericardial effusion seen.   MEDIASTINUM AND NAYA, LOWER NECK AND AXILLA: Thyroid is not well visualized due to beam hardening artifact. Mildly prominent upper mediastinal lymph nodes are similar in appearance to prior exam. No new lymphadenopathy is identified within the chest by CT imaging criteria. There  are surgical changes consistent with a esophagectomy, with the gastric pull-through. The gastric conduit is slightly distended with fluid and demonstrates mild wall edema without evidence of pneumomediastinum or adjacent fluid collection/inflammatory stranding.   LUNGS AND AIRWAYS: The trachea and central airways are patent. No endobronchial lesion is seen.Images were obtained during expiratory phase of respiration with somewhat low lung volumes and bronchovascular crowding.   Mild upper lobe predominant paraseptal and centrilobular emphysematous changes are present, with some atelectasis at the lung bases, without evidence of consolidation, pleural effusion or pneumothorax. Bandlike atelectasis is present in the lingula.   UPPER ABDOMEN: Please refer to separately dictated same day CT of the abdomen and pelvis for further characterization of the abdominal findings.   CHEST WALL AND OSSEOUS STRUCTURES: Right jugular MediPort is in place. No acute abnormality is identified within the cutaneous tissues of the chest. No acute osseous pathology.There are no suspicious osseous lesions.No displaced rib fracture or sternal fractures are identified.   CT THORACIC SPINE:   Thoracic vertebral alignment is maintained, without evidence of spondylolisthesis.   No compression fractures are identified in the thoracic spine, although multilevel Schmorl's nodes are present, similar in appearance to prior exam in March of 2025.   There is no evidence of acute trauma to the posterior elements of the thoracic spine. Facet joints are intact without evidence of subluxation or widening.   Mild intervertebral disc height loss is present in the mid and lower thoracic spine.   No posterior disc contour abnormality or high-grade spinal canal stenosis is present.   Paraspinal musculature does not demonstrate any acute abnormality.       CTA CHEST: 1. No evidence of pulmonary embolism or acute trauma to the thorax. No displaced rib fractures  are identified. 2. Changes of esophagectomy with gastric pull-through, new since prior exam in March of 2025. The gastric conduit in the mediastinum appears slightly edematous and is fluid-filled, although there is no evidence of adjacent fluid collections, pneumomediastinum or reactive soft tissue changes. 3. Upper lobe predominant paraseptal and centrilobular emphysema with some mild atelectasis in the lung bases without evidence of new consolidation, pleural effusion or other acute cardiopulmonary abnormality.   CT THORACIC SPINE: 1. No evidence of acute trauma to the thoracic spine. 2. Multilevel insufficiency endplate changes with numerous Schmorl's nodes are similar in appearance to prior exam in March of 2025.   MACRO: None   Signed by: Jeffrey Lares 5/17/2025 8:12 PM Dictation workstation:   [id number]    CT cervical spine wo IV contrast  Result Date: 5/17/2025  Interpreted By:  Jeffrey Lares, STUDY: CT HEAD WO IV CONTRAST; CT CERVICAL SPINE WO IV CONTRAST; CT FACIAL BONES WO IV CONTRAST; CT 3D RECONSTRUCTION;  5/17/2025 7:27 pm; 5/17/2025 7:25 pm; 5/17/2025 7:26 pm   INDICATION: Signs/Symptoms:Generalized weakness and legs gave out yesterday while walking causing him to strike his left torso during fall, esophagectomy 3-31-35, head and facial injury, no anticoagulation; Signs/Symptoms:Generalized weakness and legs gave out yesterday while walking causing him to strike his left torso during fall, esophagectomy 3-31-35, head and facial injury, nexus positive; Signs/Symptoms:fall.     COMPARISON: CT neck dated 07/07/2022.   ACCESSION NUMBER(S): [id number]; [id number]; [id number]; [id number]   ORDERING CLINICIAN: BRI COSTA   TECHNIQUE: Noncontrast axial CT scan of head was performed, with coronal and sagittal reformats provided. The images were reviewed in bone, brain, blood and soft tissue windows.   Thin cut axial CT images through the facial bones were  obtained and reconstructed in the coronal and sagittal plane. 3D reconstruction of the facial bones was created on a dedicated workstation and provided for interpretation.   Axial CT images of the cervical spine are obtained. Axial, coronal and sagittal reconstructions are provided for review.   FINDINGS: CT HEAD:   No hyperdense intracranial hemorrhage is present. There is no mass effect or midline shift present.   Gray-white differentiation is intact, without evidence of CT apparent transcortical infarct. Low volume of the attenuation changes are present in the periventricular and subcortical white matter of bilateral cerebral hemispheres, nonspecific findings favored to represent sequela of microvascular disease.   No abnormal ventricular dilatation is present. Basal cisterns are patent. No abnormal extra-axial fluid collection is identified.   Scalp soft tissues do not demonstrate any acute abnormality. No skull fracture or other acute calvarial abnormality is present.   Mastoid air cells and middle ear cavities are clear.   CT FACIAL BONES:   Bony orbits are intact, without evidence of displaced fracture. Mild left asymmetric periorbital soft tissue swelling is present, extending into the left cheek, without evidence of acute intraorbital trauma/abnormality. Patient is status post cataract extraction bilaterally.   No acute facial bone fracture or nasal bone fracture is identified.   Mild submucosal thickening is present in the inferior maxillary sinuses, left-greater-than-right, without evidence of air-fluid levels.   There are numerous missing teeth, with large dental cavities and periapical lucencies present in the bilateral maxillary and mandibular teeth, with several areas of cortical dehiscence/erosion within the maxilla (series 2, image 163), although no significant inflammatory changes are present in the adjacent buccal soft tissues.   A 2 cm mass is present in the left parotid gland, with a additional  subcentimeter hyperdense nodule is present in the parotid glands bilaterally. Submandibular glands are symmetric in appearance and otherwise unremarkable.   No soft tissue gas or fluid collections are identified.   CT C-SPINE:   Cervical vertebral alignment is maintained without evidence of spondylolisthesis.   Cervical vertebral body heights are preserved without compression fractures. Posterior elements do not demonstrate any evidence of acute trauma.   Craniocervical junction is intact. Facet joints are preserved without evidence of traumatic subluxation widening.   No significant intervertebral disc height loss is present.   Mild spinal canal narrowing is suspected at C3-C4 and C4-C5 due to bulging disc and ligamentum flavum thickening with some encroachment of the cervical spinal cord.   Mild neural foraminal narrowing is present at C5-C6 and C6-C7 on the left due to uncovertebral and facet joint hypertrophy.   Visualized prevertebral and paraspinal soft tissues do not demonstrate any acute abnormalities. A 2 cm mildly hyperdense mass in the left parotid gland is unchanged in appearance to prior exam in July of 2022.       CT HEAD: 1. No evidence of hemorrhage, skull fracture, or other acute intracranial trauma/abnormality. 2. Low volume of the attenuation changes present in the periventricular and subcortical white matter of bilateral cerebral hemispheres is nonspecific, and favored to represent chronic sequela of microvascular disease.   CT FACIAL BONES: 1. Mild left periorbital soft tissue swelling and edema, likely posttraumatic, without evidence of acute orbital, facial bone or nasal bone fracture. 2. Numerous missing teeth, with large dental cavities and periapical lucencies present in the remaining teeth, with several areas of cortical dehiscence/erosion is present in the outer cortex of the maxilla, although no significant inflammatory changes are present in the adjacent soft tissues. Correlate with  dental exam.   CT C-SPINE: 1. No evidence of acute trauma to the cervical spine. 2. Spondylotic changes of the cervical spine as described above. 3. Mildly hyperdense, 2 cm mass in the left parotid gland is unchanged in appearance to prior exam in July of 2022.   MACRO: None   Signed by: Jeffrey Lares 5/17/2025 7:59 PM Dictation workstation:   [id number]    CT head wo IV contrast  Result Date: 5/17/2025  Interpreted By:  Jeffrey Lares, STUDY: CT HEAD WO IV CONTRAST; CT CERVICAL SPINE WO IV CONTRAST; CT FACIAL BONES WO IV CONTRAST; CT 3D RECONSTRUCTION;  5/17/2025 7:27 pm; 5/17/2025 7:25 pm; 5/17/2025 7:26 pm   INDICATION: Signs/Symptoms:Generalized weakness and legs gave out yesterday while walking causing him to strike his left torso during fall, esophagectomy 3-31-35, head and facial injury, no anticoagulation; Signs/Symptoms:Generalized weakness and legs gave out yesterday while walking causing him to strike his left torso during fall, esophagectomy 3-31-35, head and facial injury, nexus positive; Signs/Symptoms:fall.     COMPARISON: CT neck dated 07/07/2022.   ACCESSION NUMBER(S): [id number]; [id number]; [id number]; [id number]   ORDERING CLINICIAN: BRI COSTA   TECHNIQUE: Noncontrast axial CT scan of head was performed, with coronal and sagittal reformats provided. The images were reviewed in bone, brain, blood and soft tissue windows.   Thin cut axial CT images through the facial bones were obtained and reconstructed in the coronal and sagittal plane. 3D reconstruction of the facial bones was created on a dedicated workstation and provided for interpretation.   Axial CT images of the cervical spine are obtained. Axial, coronal and sagittal reconstructions are provided for review.   FINDINGS: CT HEAD:   No hyperdense intracranial hemorrhage is present. There is no mass effect or midline shift present.   Gray-white differentiation is intact, without evidence of  CT apparent transcortical infarct. Low volume of the attenuation changes are present in the periventricular and subcortical white matter of bilateral cerebral hemispheres, nonspecific findings favored to represent sequela of microvascular disease.   No abnormal ventricular dilatation is present. Basal cisterns are patent. No abnormal extra-axial fluid collection is identified.   Scalp soft tissues do not demonstrate any acute abnormality. No skull fracture or other acute calvarial abnormality is present.   Mastoid air cells and middle ear cavities are clear.   CT FACIAL BONES:   Bony orbits are intact, without evidence of displaced fracture. Mild left asymmetric periorbital soft tissue swelling is present, extending into the left cheek, without evidence of acute intraorbital trauma/abnormality. Patient is status post cataract extraction bilaterally.   No acute facial bone fracture or nasal bone fracture is identified.   Mild submucosal thickening is present in the inferior maxillary sinuses, left-greater-than-right, without evidence of air-fluid levels.   There are numerous missing teeth, with large dental cavities and periapical lucencies present in the bilateral maxillary and mandibular teeth, with several areas of cortical dehiscence/erosion within the maxilla (series 2, image 163), although no significant inflammatory changes are present in the adjacent buccal soft tissues.   A 2 cm mass is present in the left parotid gland, with a additional subcentimeter hyperdense nodule is present in the parotid glands bilaterally. Submandibular glands are symmetric in appearance and otherwise unremarkable.   No soft tissue gas or fluid collections are identified.   CT C-SPINE:   Cervical vertebral alignment is maintained without evidence of spondylolisthesis.   Cervical vertebral body heights are preserved without compression fractures. Posterior elements do not demonstrate any evidence of acute trauma.   Craniocervical  junction is intact. Facet joints are preserved without evidence of traumatic subluxation widening.   No significant intervertebral disc height loss is present.   Mild spinal canal narrowing is suspected at C3-C4 and C4-C5 due to bulging disc and ligamentum flavum thickening with some encroachment of the cervical spinal cord.   Mild neural foraminal narrowing is present at C5-C6 and C6-C7 on the left due to uncovertebral and facet joint hypertrophy.   Visualized prevertebral and paraspinal soft tissues do not demonstrate any acute abnormalities. A 2 cm mildly hyperdense mass in the left parotid gland is unchanged in appearance to prior exam in July of 2022.       CT HEAD: 1. No evidence of hemorrhage, skull fracture, or other acute intracranial trauma/abnormality. 2. Low volume of the attenuation changes present in the periventricular and subcortical white matter of bilateral cerebral hemispheres is nonspecific, and favored to represent chronic sequela of microvascular disease.   CT FACIAL BONES: 1. Mild left periorbital soft tissue swelling and edema, likely posttraumatic, without evidence of acute orbital, facial bone or nasal bone fracture. 2. Numerous missing teeth, with large dental cavities and periapical lucencies present in the remaining teeth, with several areas of cortical dehiscence/erosion is present in the outer cortex of the maxilla, although no significant inflammatory changes are present in the adjacent soft tissues. Correlate with dental exam.   CT C-SPINE: 1. No evidence of acute trauma to the cervical spine. 2. Spondylotic changes of the cervical spine as described above. 3. Mildly hyperdense, 2 cm mass in the left parotid gland is unchanged in appearance to prior exam in July of 2022.   MACRO: None   Signed by: Jeffrey Lares 5/17/2025 7:59 PM Dictation workstation:   [id number]    CT maxillofacial bones wo IV contrast  Result Date: 5/17/2025  Interpreted By:  Jeffrey Lares, STUDY:  CT HEAD WO IV CONTRAST; CT CERVICAL SPINE WO IV CONTRAST; CT FACIAL BONES WO IV CONTRAST; CT 3D RECONSTRUCTION;  5/17/2025 7:27 pm; 5/17/2025 7:25 pm; 5/17/2025 7:26 pm   INDICATION: Signs/Symptoms:Generalized weakness and legs gave out yesterday while walking causing him to strike his left torso during fall, esophagectomy 3-31-35, head and facial injury, no anticoagulation; Signs/Symptoms:Generalized weakness and legs gave out yesterday while walking causing him to strike his left torso during fall, esophagectomy 3-31-35, head and facial injury, nexus positive; Signs/Symptoms:fall.     COMPARISON: CT neck dated 07/07/2022.   ACCESSION NUMBER(S): [id number]; [id number]; [id number]; [id number]   ORDERING CLINICIAN: BRI BARRETO; VANDANA COSTA   TECHNIQUE: Noncontrast axial CT scan of head was performed, with coronal and sagittal reformats provided. The images were reviewed in bone, brain, blood and soft tissue windows.   Thin cut axial CT images through the facial bones were obtained and reconstructed in the coronal and sagittal plane. 3D reconstruction of the facial bones was created on a dedicated workstation and provided for interpretation.   Axial CT images of the cervical spine are obtained. Axial, coronal and sagittal reconstructions are provided for review.   FINDINGS: CT HEAD:   No hyperdense intracranial hemorrhage is present. There is no mass effect or midline shift present.   Gray-white differentiation is intact, without evidence of CT apparent transcortical infarct. Low volume of the attenuation changes are present in the periventricular and subcortical white matter of bilateral cerebral hemispheres, nonspecific findings favored to represent sequela of microvascular disease.   No abnormal ventricular dilatation is present. Basal cisterns are patent. No abnormal extra-axial fluid collection is identified.   Scalp soft tissues do not demonstrate any acute abnormality. No skull fracture or  other acute calvarial abnormality is present.   Mastoid air cells and middle ear cavities are clear.   CT FACIAL BONES:   Bony orbits are intact, without evidence of displaced fracture. Mild left asymmetric periorbital soft tissue swelling is present, extending into the left cheek, without evidence of acute intraorbital trauma/abnormality. Patient is status post cataract extraction bilaterally.   No acute facial bone fracture or nasal bone fracture is identified.   Mild submucosal thickening is present in the inferior maxillary sinuses, left-greater-than-right, without evidence of air-fluid levels.   There are numerous missing teeth, with large dental cavities and periapical lucencies present in the bilateral maxillary and mandibular teeth, with several areas of cortical dehiscence/erosion within the maxilla (series 2, image 163), although no significant inflammatory changes are present in the adjacent buccal soft tissues.   A 2 cm mass is present in the left parotid gland, with a additional subcentimeter hyperdense nodule is present in the parotid glands bilaterally. Submandibular glands are symmetric in appearance and otherwise unremarkable.   No soft tissue gas or fluid collections are identified.   CT C-SPINE:   Cervical vertebral alignment is maintained without evidence of spondylolisthesis.   Cervical vertebral body heights are preserved without compression fractures. Posterior elements do not demonstrate any evidence of acute trauma.   Craniocervical junction is intact. Facet joints are preserved without evidence of traumatic subluxation widening.   No significant intervertebral disc height loss is present.   Mild spinal canal narrowing is suspected at C3-C4 and C4-C5 due to bulging disc and ligamentum flavum thickening with some encroachment of the cervical spinal cord.   Mild neural foraminal narrowing is present at C5-C6 and C6-C7 on the left due to uncovertebral and facet joint hypertrophy.   Visualized  prevertebral and paraspinal soft tissues do not demonstrate any acute abnormalities. A 2 cm mildly hyperdense mass in the left parotid gland is unchanged in appearance to prior exam in July of 2022.       CT HEAD: 1. No evidence of hemorrhage, skull fracture, or other acute intracranial trauma/abnormality. 2. Low volume of the attenuation changes present in the periventricular and subcortical white matter of bilateral cerebral hemispheres is nonspecific, and favored to represent chronic sequela of microvascular disease.   CT FACIAL BONES: 1. Mild left periorbital soft tissue swelling and edema, likely posttraumatic, without evidence of acute orbital, facial bone or nasal bone fracture. 2. Numerous missing teeth, with large dental cavities and periapical lucencies present in the remaining teeth, with several areas of cortical dehiscence/erosion is present in the outer cortex of the maxilla, although no significant inflammatory changes are present in the adjacent soft tissues. Correlate with dental exam.   CT C-SPINE: 1. No evidence of acute trauma to the cervical spine. 2. Spondylotic changes of the cervical spine as described above. 3. Mildly hyperdense, 2 cm mass in the left parotid gland is unchanged in appearance to prior exam in July of 2022.   MACRO: None   Signed by: Jeffrey Lares 5/17/2025 7:59 PM Dictation workstation:   [id number]    CT 3D reconstruction  Result Date: 5/17/2025  Interpreted By:  Jeffrey Lares, STUDY: CT HEAD WO IV CONTRAST; CT CERVICAL SPINE WO IV CONTRAST; CT FACIAL BONES WO IV CONTRAST; CT 3D RECONSTRUCTION;  5/17/2025 7:27 pm; 5/17/2025 7:25 pm; 5/17/2025 7:26 pm   INDICATION: Signs/Symptoms:Generalized weakness and legs gave out yesterday while walking causing him to strike his left torso during fall, esophagectomy 3-31-35, head and facial injury, no anticoagulation; Signs/Symptoms:Generalized weakness and legs gave out yesterday while walking causing him to strike his  left torso during fall, esophagectomy 3-31-35, head and facial injury, nexus positive; Signs/Symptoms:fall.     COMPARISON: CT neck dated 07/07/2022.   ACCESSION NUMBER(S): [id number]; [id number]; [id number]; [id number]   ORDERING CLINICIAN: BRI BARRETO; VANDANA COSTA   TECHNIQUE: Noncontrast axial CT scan of head was performed, with coronal and sagittal reformats provided. The images were reviewed in bone, brain, blood and soft tissue windows.   Thin cut axial CT images through the facial bones were obtained and reconstructed in the coronal and sagittal plane. 3D reconstruction of the facial bones was created on a dedicated workstation and provided for interpretation.   Axial CT images of the cervical spine are obtained. Axial, coronal and sagittal reconstructions are provided for review.   FINDINGS: CT HEAD:   No hyperdense intracranial hemorrhage is present. There is no mass effect or midline shift present.   Gray-white differentiation is intact, without evidence of CT apparent transcortical infarct. Low volume of the attenuation changes are present in the periventricular and subcortical white matter of bilateral cerebral hemispheres, nonspecific findings favored to represent sequela of microvascular disease.   No abnormal ventricular dilatation is present. Basal cisterns are patent. No abnormal extra-axial fluid collection is identified.   Scalp soft tissues do not demonstrate any acute abnormality. No skull fracture or other acute calvarial abnormality is present.   Mastoid air cells and middle ear cavities are clear.   CT FACIAL BONES:   Bony orbits are intact, without evidence of displaced fracture. Mild left asymmetric periorbital soft tissue swelling is present, extending into the left cheek, without evidence of acute intraorbital trauma/abnormality. Patient is status post cataract extraction bilaterally.   No acute facial bone fracture or nasal bone fracture is identified.   Mild  submucosal thickening is present in the inferior maxillary sinuses, left-greater-than-right, without evidence of air-fluid levels.   There are numerous missing teeth, with large dental cavities and periapical lucencies present in the bilateral maxillary and mandibular teeth, with several areas of cortical dehiscence/erosion within the maxilla (series 2, image 163), although no significant inflammatory changes are present in the adjacent buccal soft tissues.   A 2 cm mass is present in the left parotid gland, with a additional subcentimeter hyperdense nodule is present in the parotid glands bilaterally. Submandibular glands are symmetric in appearance and otherwise unremarkable.   No soft tissue gas or fluid collections are identified.   CT C-SPINE:   Cervical vertebral alignment is maintained without evidence of spondylolisthesis.   Cervical vertebral body heights are preserved without compression fractures. Posterior elements do not demonstrate any evidence of acute trauma.   Craniocervical junction is intact. Facet joints are preserved without evidence of traumatic subluxation widening.   No significant intervertebral disc height loss is present.   Mild spinal canal narrowing is suspected at C3-C4 and C4-C5 due to bulging disc and ligamentum flavum thickening with some encroachment of the cervical spinal cord.   Mild neural foraminal narrowing is present at C5-C6 and C6-C7 on the left due to uncovertebral and facet joint hypertrophy.   Visualized prevertebral and paraspinal soft tissues do not demonstrate any acute abnormalities. A 2 cm mildly hyperdense mass in the left parotid gland is unchanged in appearance to prior exam in July of 2022.       CT HEAD: 1. No evidence of hemorrhage, skull fracture, or other acute intracranial trauma/abnormality. 2. Low volume of the attenuation changes present in the periventricular and subcortical white matter of bilateral cerebral hemispheres is nonspecific, and favored to  represent chronic sequela of microvascular disease.   CT FACIAL BONES: 1. Mild left periorbital soft tissue swelling and edema, likely posttraumatic, without evidence of acute orbital, facial bone or nasal bone fracture. 2. Numerous missing teeth, with large dental cavities and periapical lucencies present in the remaining teeth, with several areas of cortical dehiscence/erosion is present in the outer cortex of the maxilla, although no significant inflammatory changes are present in the adjacent soft tissues. Correlate with dental exam.   CT C-SPINE: 1. No evidence of acute trauma to the cervical spine. 2. Spondylotic changes of the cervical spine as described above. 3. Mildly hyperdense, 2 cm mass in the left parotid gland is unchanged in appearance to prior exam in July of 2022.   MACRO: None   Signed by: Jeffrey Lares 5/17/2025 7:59 PM Dictation workstation:   [id number]    XR shoulder left 2+ views  Result Date: 5/17/2025  Interpreted By:  Tye Banks, STUDY: Left shoulder dated  5/17/2025.   INDICATION: Signs/Symptoms:Generalized weakness and legs gave out yesterday while walking causing him to strike his left torso during fall, esophagectomy 3-31-35, left arm injury, no anticoagulation   COMPARISON: None.   ACCESSION NUMBER(S): [id number]   ORDERING CLINICIAN: BRI BARRETO   TECHNIQUE: Three views of the left shoulder.   FINDINGS: No fracture or dislocation is evident. There is mild glenohumeral and acromioclavicular joint degenerative change. Degenerative changes seen of the spine. Surgical changes are seen of the mediastinum.       Degenerative changes without osseous injury evident.   MACRO: None   Signed by: Tye Banks 5/17/2025 5:51 PM Dictation workstation:   [id number]    XR elbow left 3+ views  Result Date: 5/17/2025  Interpreted By:  Tye Banks, STUDY: Left elbow dated  5/17/2025.   INDICATION: Signs/Symptoms:Generalized weakness and legs gave out yesterday while walking  causing him to strike his left torso during fall, esophagectomy 3-31-35, left arm injury, no anticoagulation   COMPARISON: None.   ACCESSION NUMBER(S): [id number].   ORDERING CLINICIAN: BRI BARRETO.   TECHNIQUE: Four views of the left elbow.   FINDINGS: No fracture or dislocation is evident. No elbow joint effusion is evident.  There is lateral epicondyle enthesophyte formation. No soft tissue gas or radiopaque foreign body is evident.       No osseous injury is evident.   MACRO: None   Signed by: Tye Banks 5/17/2025 5:50 PM Dictation workstation:   [id number]    XR chest 2 views  Result Date: 4/24/2025  Interpreted By:  Tristan Randolph, STUDY: XR CHEST 2 VIEWS; 4/24/2025 11:14 am   INDICATION: Signs/Symptoms:POST-OP.   COMPARISON: 04/06/2025.   ACCESSION NUMBER(S): [id number]   ORDERING CLINICIAN: TERE DOUGLAS   FINDINGS: Port-A-Cath catheter overlies the SVC.   CARDIOMEDIASTINAL SILHOUETTE: The patient is status post median sternotomy. Patient is status post CABG with extensive calcifications of the coronary arteries.   LUNGS: Lungs are clear of focal airspace disease or edema. No evidence of effusions. No pneumothorax.   ABDOMEN: There are air-filled loops of small large bowel. PA exam upper quadrant lucencies most likely represents small bowel interposition.   BONES: No acute osseous changes.       1.  No active airspace disease. No pneumothorax. 2. Upper quadrant lucencies most likely represent interposed small bowel and not pneumoperitoneum. If there is any concern for perforation, consider decubitus views.     Signed by: Tristan Randolph 4/24/2025 5:21 PM Dictation workstation:   [id number]                    This patient has a central line   Reason for the central line remaining today? Parenteral medication            Assessment & Plan  Fall, initial encounter    Hyponatremia    Splenic laceration    Closed compression fracture of L1 lumbar vertebra, initial encounter (Multi)    Abrasion  of left arm    Facial bruising, initial encounter      1. Fall, initial encounter      PT/OT, supportive care      2. Hyponatremia      monitor      3. Compression fracture of L1 vertebra, initial encounter (Multi)      supportive care      4. Laceration of spleen, initial encounter      no signs of active bleeding, monitor H/H, surgery note appreciated.      5. Facial contusion, initial encounter        6. Skin tear of left elbow without complication, initial encounter        7. Benign essential hypertension      resume home meds, monitor      8. Hypothyroidism, unspecified type      continue synthroid      9. Depression, major, recurrent, moderate      home meds                   Meg Cam MD

## 2025-05-18 NOTE — CARE PLAN
Problem: Pain - Adult  Goal: Verbalizes/displays adequate comfort level or baseline comfort level  Outcome: Progressing     Problem: Safety - Adult  Goal: Free from fall injury  Outcome: Progressing     Problem: Discharge Planning  Goal: Discharge to home or other facility with appropriate resources  Outcome: Progressing     Problem: Chronic Conditions and Co-morbidities  Goal: Patient's chronic conditions and co-morbidity symptoms are monitored and maintained or improved  Outcome: Progressing     Problem: Nutrition  Goal: Nutrient intake appropriate for maintaining nutritional needs  Outcome: Progressing     Problem: Fall/Injury  Goal: Not fall by end of shift  Outcome: Progressing  Goal: Be free from injury by end of the shift  Outcome: Progressing  Goal: Verbalize understanding of personal risk factors for fall in the hospital  Outcome: Progressing  Goal: Verbalize understanding of risk factor reduction measures to prevent injury from fall in the home  Outcome: Progressing  Goal: Use assistive devices by end of the shift  Outcome: Progressing  Goal: Pace activities to prevent fatigue by end of the shift  Outcome: Progressing     Problem: Pain  Goal: Takes deep breaths with improved pain control throughout the shift  Outcome: Progressing  Goal: Turns in bed with improved pain control throughout the shift  Outcome: Progressing  Goal: Walks with improved pain control throughout the shift  Outcome: Progressing  Goal: Performs ADL's with improved pain control throughout shift  Outcome: Progressing  Goal: Participates in PT with improved pain control throughout the shift  Outcome: Progressing  Goal: Free from opioid side effects throughout the shift  Outcome: Progressing  Goal: Free from acute confusion related to pain meds throughout the shift  Outcome: Progressing     Problem: Respiratory  Goal: Clear secretions with interventions this shift  Outcome: Progressing  Goal: Minimize anxiety/maximize coping throughout  shift  Outcome: Progressing  Goal: Minimal/no exertional discomfort or dyspnea this shift  Outcome: Progressing  Goal: No signs of respiratory distress (eg. Use of accessory muscles. Peds grunting)  Outcome: Progressing  Goal: Patent airway maintained this shift  Outcome: Progressing  Goal: Tolerate mechanical ventilation evidenced by VS/agitation level this shift  Outcome: Progressing  Goal: Tolerate pulmonary toileting this shift  Outcome: Progressing  Goal: Verbalize decreased shortness of breath this shift  Outcome: Progressing  Goal: Wean oxygen to maintain O2 saturation per order/standard this shift  Outcome: Progressing  Goal: Increase self care and/or family involvement in next 24 hours  Outcome: Progressing     Problem: Diabetes  Goal: Achieve decreasing blood glucose levels by end of shift  Outcome: Progressing  Goal: Increase stability of blood glucose readings by end of shift  Outcome: Progressing  Goal: Decrease in ketones present in urine by end of shift  Outcome: Progressing  Goal: Maintain electrolyte levels within acceptable range throughout shift  Outcome: Progressing  Goal: Maintain glucose levels >70mg/dl to <250mg/dl throughout shift  Outcome: Progressing  Goal: No changes in neurological exam by end of shift  Outcome: Progressing  Goal: Learn about and adhere to nutrition recommendations by end of shift  Outcome: Progressing  Goal: Vital signs within normal range for age by end of shift  Outcome: Progressing  Goal: Increase self care and/or family involovement by end of shift  Outcome: Progressing  Goal: Receive DSME education by end of shift  Outcome: Progressing    The patient's goals for the shift include get discharged    The clinical goals for the shift include Patient will remain free from falls and injury during shift

## 2025-05-18 NOTE — CARE PLAN
Problem: Pain - Adult  Goal: Verbalizes/displays adequate comfort level or baseline comfort level  Outcome: Progressing     Problem: Safety - Adult  Goal: Free from fall injury  Outcome: Progressing     Problem: Discharge Planning  Goal: Discharge to home or other facility with appropriate resources  Outcome: Progressing     Problem: Chronic Conditions and Co-morbidities  Goal: Patient's chronic conditions and co-morbidity symptoms are monitored and maintained or improved  Outcome: Progressing     Problem: Nutrition  Goal: Nutrient intake appropriate for maintaining nutritional needs  Outcome: Progressing     Problem: Fall/Injury  Goal: Not fall by end of shift  Outcome: Progressing  Goal: Be free from injury by end of the shift  Outcome: Progressing  Goal: Verbalize understanding of personal risk factors for fall in the hospital  Outcome: Progressing  Goal: Verbalize understanding of risk factor reduction measures to prevent injury from fall in the home  Outcome: Progressing  Goal: Use assistive devices by end of the shift  Outcome: Progressing  Goal: Pace activities to prevent fatigue by end of the shift  Outcome: Progressing     Problem: Pain  Goal: Takes deep breaths with improved pain control throughout the shift  Outcome: Progressing  Goal: Turns in bed with improved pain control throughout the shift  Outcome: Progressing  Goal: Walks with improved pain control throughout the shift  Outcome: Progressing  Goal: Performs ADL's with improved pain control throughout shift  Outcome: Progressing  Goal: Participates in PT with improved pain control throughout the shift  Outcome: Progressing  Goal: Free from opioid side effects throughout the shift  Outcome: Progressing  Goal: Free from acute confusion related to pain meds throughout the shift  Outcome: Progressing     Problem: Respiratory  Goal: Clear secretions with interventions this shift  Outcome: Progressing  Goal: Minimize anxiety/maximize coping throughout  shift  Outcome: Progressing  Goal: Minimal/no exertional discomfort or dyspnea this shift  Outcome: Progressing  Goal: No signs of respiratory distress (eg. Use of accessory muscles. Peds grunting)  Outcome: Progressing  Goal: Patent airway maintained this shift  Outcome: Progressing  Goal: Tolerate mechanical ventilation evidenced by VS/agitation level this shift  Outcome: Progressing  Goal: Tolerate pulmonary toileting this shift  Outcome: Progressing  Goal: Verbalize decreased shortness of breath this shift  Outcome: Progressing  Goal: Wean oxygen to maintain O2 saturation per order/standard this shift  Outcome: Progressing  Goal: Increase self care and/or family involvement in next 24 hours  Outcome: Progressing

## 2025-05-18 NOTE — ED NOTES
Pt arrives to ED from home    Code Status:  Full Code    HPI     Patient presents the emergency department for evaluation of increased falls and status post fall last night. Patient had walked down to the mailbox last night and upon getting in the house, his legs just gave out before he could get all the way to the chair to sit down. He states he did not pass out. He did strike his head and face. He denies being on any anticoagulants. He also struck his left arm and torso in which he has concerns for a broken rib or two. He believes his tetanus vaccine is up-to-date. He did incur some skin tears to his left elbow in which he put a dressing to the area and the bleeding did stop. He does have some painful movements of the left arm due to his injury. He also notices pain in the left upper abdomen and lower ribs. He has not noticed an increase in shortness of breath but it does hurt to take a deep breath since his injury but not beforehand. He believes his weakness may be secondary to the 13 pound weight loss since his follow-up with Dr. Salas on 4-24-25. On that date, they decided to stop using the G-tube and start oral diet. Patient has been steadily losing weight since that day. He does have a follow-up appointment this coming Thursday to remove the G-tube however he thinks that might be a bad idea. Patient overall feels weak, falling and has concerned in regards to his weight loss.     Chief Complaint   Patient presents with   • Fall     Pt has increased falls. Pt fell last night and hit his head. Pt states he did not pass out. Pt is not on blood thinners. Pt has a hx of cancer.    • Rib Injury     Left sided rib pain. Pain on a deep breath   • Arm Injury     Pt has abrasions to left arm       /67   Pulse 67   Temp 37.2 °C (98.9 °F)   Resp 20   Wt 81.6 kg (180 lb)   SpO2 100%     Lawrence Coma Scale Score: 15      LDA:   Peripheral IV 05/17/25 20 G Left Antecubital (Active)   Placement Date/Time: 05/17/25  1726   Hand Hygiene Completed: Yes  Size (Gauge): 20 G  Orientation: Left  Location: Antecubital  Site Prep: Chlorhexidine   Technique: Anatomical landmarks  Placed by: Naima PABLO RN  Insertion attempts: 1  Patient Toleran...   Number of days: 0       Implantable Port 12/18/24 Right Chest Single lumen port (Active)   Placement Date/Time: 12/18/24 1258   Hand Hygiene Completed: Yes  Orientation: Right  Implantable Port Location: Chest  Port Type: (c) Single lumen port  Placed by: Timoteo Michel MD   Number of days: 150       Gastrostomy/Enterostomy Jejunostomy 1 14 Fr. LLQ (Active)   Placement Date/Time: 04/02/25 1147   Placed by: Dr. Fabio Boyd  Hand Hygiene Completed: Yes  Type: Jejunostomy  Tube Number: 1  Tube Size (Fr.): 14 Fr.  Location: LLQ   Number of days: 45        BACKGROUND  Medical History[1]  Surgical History[2]  Medications Ordered Prior to Encounter[3]     ASSESSMENT  ED Course as of 05/18/25 1506   Sat May 17, 2025   1737 HEMOGLOBIN(!): 12.9  Uptrending [NA]   1801 EKG as interpreted by myself demonstrate sinus rhythm with a ventricular rate of 74, right bundle branch block present, normal WA interval with prolonged QRS and borderline prolonged QTc interval, no evidence of an acute STEMI. [NS]   1836 SODIUM(!): 126  136 one month ago [NA]   1937 18:28  Vital Signs  Heart Rate: 77BP: 105/61MAP (mmHg): 71Patient Position: Standing  18:27  Vital Signs  Heart Rate: 75BP: 131/69MAP (mmHg): 95Patient Position: Sitting  18:25  Vital Signs  Heart Rate: 74BP: 130/70MAP (mmHg): 97Patient Position: Lying [NA]   1953 Patient updated on lab results and partial diagnostics.  Awaiting CTs for admission.  Patient reinstructed on urine specimen.  He states he has required admission for hyponatremia in the past as low as 117. [NA]   2130 Dr. Alatorre spoke with Dr. Henry on-call for trauma services and discussed the patient's case given he has a documented splenic laceration as well as a new compression  deformity at L1.  Patient accepted by trauma with medicine on consultation.  IMS consult completed as well by attending. Patient made aware of happy and he preferred to stay at [facility] for his care.  [NA]      ED Course User Index  [NA] UMU Padilla-CNP  [NS] Star Alatorre MD         Diagnoses as of 05/18/25 1506   Fall, initial encounter - PT/OT, supportive care   Hyponatremia - monitor   Compression fracture of L1 vertebra, initial encounter (Multi) - supportive care   Laceration of spleen, initial encounter - no signs of active bleeding, monitor H/H, surgery note appreciated.   Facial contusion, initial encounter   Skin tear of left elbow without complication, initial encounter       Medications Currently Running:  Continuous Medications[4]     Medications Given:  ED Medication Administration from 05/17/2025 1637 to 05/17/2025 2202         Date/Time Order Dose Route Action Action by     05/17/2025 1729 EDT fentaNYL PF (Sublimaze) injection 50 mcg 50 mcg intravenous Given Brutto, R     05/17/2025 1729 EDT ondansetron (Zofran) injection 4 mg 4 mg intravenous Given Brutto, R     05/17/2025 1729 EDT sodium chloride 0.9 % bolus 1,000 mL 1,000 mL intravenous New Bag Brutto, R     05/17/2025 1730 EDT bacitracin ointment 1 Application Topical Given Brutto, R     05/17/2025 1831 EDT sodium chloride 0.9 % bolus 1,000 mL 0 mL intravenous Stopped Brutto, R     05/17/2025 1859 EDT iohexol (OMNIPaque) 350 mg iodine/mL solution 75 mL 75 mL intravenous Given Baad, J     05/17/2025 2011 EDT fentaNYL PF (Sublimaze) injection 50 mcg 50 mcg intravenous Given Brutto, R                 RESULTS    Imaging:  CT abdomen pelvis w IV contrast   Final Result   CT ABDOMEN AND PELVIS:   1. A new 3.5 x 2.5 linear hypodensity is present in the medial spleen   in the interim since prior exam in March of 2025, suspicious for a   new splenic laceration, although there is no significant adjacent   free fluid/hemorrhage.   2.  Slight fat stranding is present in the presacral space, with a   new/acute minimally displaced fracture of the S4 vertebral body.   3. Surgical changes of esophagectomy with gastric pull-through, with   the left anterior percutaneous jejunostomy tube in place.        CT LUMBAR SPINE:   1. New/acute compression fracture along the superior and inferior   endplate of L1 with upwards of 30-40% height loss, but without   significant bony retropulsion.   2. No other acute trauma is identified in the lumbar spine.   3. Multilevel degenerative changes of the lumbar spine with least   moderate stenosis suspected in the spinal canal at L3-L4 and L4-L5   due to disc osteophyte complex and ligamentum flavum thickening and   moderate to severe neural foraminal stenosis suspected at several   levels.        MACRO:   None.        Signed by: Jeffrey Lares 5/17/2025 8:39 PM   Dictation workstation:   [id number]      CT head wo IV contrast   Final Result   CT HEAD:   1. No evidence of hemorrhage, skull fracture, or other acute   intracranial trauma/abnormality.   2. Low volume of the attenuation changes present in the   periventricular and subcortical white matter of bilateral cerebral   hemispheres is nonspecific, and favored to represent chronic sequela   of microvascular disease.        CT FACIAL BONES:   1. Mild left periorbital soft tissue swelling and edema, likely   posttraumatic, without evidence of acute orbital, facial bone or   nasal bone fracture.   2. Numerous missing teeth, with large dental cavities and periapical   lucencies present in the remaining teeth, with several areas of   cortical dehiscence/erosion is present in the outer cortex of the   maxilla, although no significant inflammatory changes are present in   the adjacent soft tissues. Correlate with dental exam.        CT C-SPINE:   1. No evidence of acute trauma to the cervical spine.   2. Spondylotic changes of the cervical spine as described above.    3. Mildly hyperdense, 2 cm mass in the left parotid gland is   unchanged in appearance to prior exam in July of 2022.        MACRO:   None        Signed by: Jeffrey Lares 5/17/2025 7:59 PM   Dictation workstation:   [id number]      CT angio chest for pulmonary embolism   Final Result   Addendum (preliminary) 1 of 1   Interpreted By:  Jeffrey Lares,    ADDENDUM:   Subtle new buckling is present within the inner table of the 6th and   7th left anterior rib at the costochondral junction (series 6, image   199 and 232), new since prior exam on 03/11/2025, likely representing   acute nondisplaced rib fractures.        Signed by: Jeffrey Lares 5/17/2025 8:21 PM        -------- ORIGINAL REPORT --------   Dictation workstation:   [id number]      Final   CTA CHEST:   1. No evidence of pulmonary embolism or acute trauma to the thorax.   No displaced rib fractures are identified.   2. Changes of esophagectomy with gastric pull-through, new since   prior exam in March of 2025. The gastric conduit in the mediastinum   appears slightly edematous and is fluid-filled, although there is no   evidence of adjacent fluid collections, pneumomediastinum or reactive   soft tissue changes.   3. Upper lobe predominant paraseptal and centrilobular emphysema with   some mild atelectasis in the lung bases without evidence of new   consolidation, pleural effusion or other acute cardiopulmonary   abnormality.        CT THORACIC SPINE:   1. No evidence of acute trauma to the thoracic spine.   2. Multilevel insufficiency endplate changes with numerous Schmorl's   nodes are similar in appearance to prior exam in March of 2025.        MACRO:   None        Signed by: Jeffrey Lares 5/17/2025 8:12 PM   Dictation workstation:   [id number]      CT thoracic spine retrospective reconstruction protocol   Final Result   Addendum (preliminary) 1 of 1   Interpreted By:  Jeffrey Lares,    ADDENDUM:   Subtle new  buckling is present within the inner table of the 6th and   7th left anterior rib at the costochondral junction (series 6, image   199 and 232), new since prior exam on 03/11/2025, likely representing   acute nondisplaced rib fractures.        Signed by: Jeffrey Lares 5/17/2025 8:21 PM        -------- ORIGINAL REPORT --------   Dictation workstation:   [id number]      Final   CTA CHEST:   1. No evidence of pulmonary embolism or acute trauma to the thorax.   No displaced rib fractures are identified.   2. Changes of esophagectomy with gastric pull-through, new since   prior exam in March of 2025. The gastric conduit in the mediastinum   appears slightly edematous and is fluid-filled, although there is no   evidence of adjacent fluid collections, pneumomediastinum or reactive   soft tissue changes.   3. Upper lobe predominant paraseptal and centrilobular emphysema with   some mild atelectasis in the lung bases without evidence of new   consolidation, pleural effusion or other acute cardiopulmonary   abnormality.        CT THORACIC SPINE:   1. No evidence of acute trauma to the thoracic spine.   2. Multilevel insufficiency endplate changes with numerous Schmorl's   nodes are similar in appearance to prior exam in March of 2025.        MACRO:   None        Signed by: Jeffrey Lares 5/17/2025 8:12 PM   Dictation workstation:   [id number]      CT lumbar spine retrospective reconstruction protocol   Final Result   CT ABDOMEN AND PELVIS:   1. A new 3.5 x 2.5 linear hypodensity is present in the medial spleen   in the interim since prior exam in March of 2025, suspicious for a   new splenic laceration, although there is no significant adjacent   free fluid/hemorrhage.   2. Slight fat stranding is present in the presacral space, with a   new/acute minimally displaced fracture of the S4 vertebral body.   3. Surgical changes of esophagectomy with gastric pull-through, with   the left anterior percutaneous  jejunostomy tube in place.        CT LUMBAR SPINE:   1. New/acute compression fracture along the superior and inferior   endplate of L1 with upwards of 30-40% height loss, but without   significant bony retropulsion.   2. No other acute trauma is identified in the lumbar spine.   3. Multilevel degenerative changes of the lumbar spine with least   moderate stenosis suspected in the spinal canal at L3-L4 and L4-L5   due to disc osteophyte complex and ligamentum flavum thickening and   moderate to severe neural foraminal stenosis suspected at several   levels.        MACRO:   None.        Signed by: Jeffrey Lares 5/17/2025 8:39 PM   Dictation workstation:   [id number]      CT 3D reconstruction   Final Result   CT HEAD:   1. No evidence of hemorrhage, skull fracture, or other acute   intracranial trauma/abnormality.   2. Low volume of the attenuation changes present in the   periventricular and subcortical white matter of bilateral cerebral   hemispheres is nonspecific, and favored to represent chronic sequela   of microvascular disease.        CT FACIAL BONES:   1. Mild left periorbital soft tissue swelling and edema, likely   posttraumatic, without evidence of acute orbital, facial bone or   nasal bone fracture.   2. Numerous missing teeth, with large dental cavities and periapical   lucencies present in the remaining teeth, with several areas of   cortical dehiscence/erosion is present in the outer cortex of the   maxilla, although no significant inflammatory changes are present in   the adjacent soft tissues. Correlate with dental exam.        CT C-SPINE:   1. No evidence of acute trauma to the cervical spine.   2. Spondylotic changes of the cervical spine as described above.   3. Mildly hyperdense, 2 cm mass in the left parotid gland is   unchanged in appearance to prior exam in July of 2022.        MACRO:   None        Signed by: Jeffrey Lares 5/17/2025 7:59 PM   Dictation workstation:    [id number]      CT maxillofacial bones wo IV contrast   Final Result   CT HEAD:   1. No evidence of hemorrhage, skull fracture, or other acute   intracranial trauma/abnormality.   2. Low volume of the attenuation changes present in the   periventricular and subcortical white matter of bilateral cerebral   hemispheres is nonspecific, and favored to represent chronic sequela   of microvascular disease.        CT FACIAL BONES:   1. Mild left periorbital soft tissue swelling and edema, likely   posttraumatic, without evidence of acute orbital, facial bone or   nasal bone fracture.   2. Numerous missing teeth, with large dental cavities and periapical   lucencies present in the remaining teeth, with several areas of   cortical dehiscence/erosion is present in the outer cortex of the   maxilla, although no significant inflammatory changes are present in   the adjacent soft tissues. Correlate with dental exam.        CT C-SPINE:   1. No evidence of acute trauma to the cervical spine.   2. Spondylotic changes of the cervical spine as described above.   3. Mildly hyperdense, 2 cm mass in the left parotid gland is   unchanged in appearance to prior exam in July of 2022.        MACRO:   None        Signed by: Jeffrey Lares 5/17/2025 7:59 PM   Dictation workstation:   [id number]      CT cervical spine wo IV contrast   Final Result   CT HEAD:   1. No evidence of hemorrhage, skull fracture, or other acute   intracranial trauma/abnormality.   2. Low volume of the attenuation changes present in the   periventricular and subcortical white matter of bilateral cerebral   hemispheres is nonspecific, and favored to represent chronic sequela   of microvascular disease.        CT FACIAL BONES:   1. Mild left periorbital soft tissue swelling and edema, likely   posttraumatic, without evidence of acute orbital, facial bone or   nasal bone fracture.   2. Numerous missing teeth, with large dental cavities and periapical   lucencies  present in the remaining teeth, with several areas of   cortical dehiscence/erosion is present in the outer cortex of the   maxilla, although no significant inflammatory changes are present in   the adjacent soft tissues. Correlate with dental exam.        CT C-SPINE:   1. No evidence of acute trauma to the cervical spine.   2. Spondylotic changes of the cervical spine as described above.   3. Mildly hyperdense, 2 cm mass in the left parotid gland is   unchanged in appearance to prior exam in July of 2022.        MACRO:   None        Signed by: Jeffrey Lares 5/17/2025 7:59 PM   Dictation workstation:   [id number]      XR shoulder left 2+ views   Final Result   Degenerative changes without osseous injury evident.        MACRO:   None        Signed by: Tye Banks 5/17/2025 5:51 PM   Dictation workstation:   [id number]      XR elbow left 3+ views   Final Result   No osseous injury is evident.        MACRO:   None        Signed by: Tye Banks 5/17/2025 5:50 PM   Dictation workstation:   [id number]         }  Labs ::99  Abnormal Labs Reviewed   CBC WITH AUTO DIFFERENTIAL - Abnormal; Notable for the following components:       Result Value    RBC 4.10 (*)     Hemoglobin 12.9 (*)     Hematocrit 37.4 (*)     All other components within normal limits   COMPREHENSIVE METABOLIC PANEL - Abnormal; Notable for the following components:    Glucose 106 (*)     Sodium 126 (*)     Chloride 97 (*)     Bicarbonate 20 (*)     All other components within normal limits   URINALYSIS WITH REFLEX CULTURE AND MICROSCOPIC - Abnormal; Notable for the following components:    Specific Gravity, Urine 1.038 (*)     All other components within normal limits                  Claudette Andrews RN  05/17/25 2202         [1]  Past Medical History:  Diagnosis Date   • CAD (coronary artery disease)    • Esophageal carcinoma    • Hyperlipidemia    • Hypertension    • Hypothyroidism    [2]  Past Surgical History:  Procedure Laterality  Date   • CATARACT EXTRACTION Right 08/25/2023   • CHOLECYSTECTOMY     • CORONARY ARTERY BYPASS GRAFT  2005    4-vessel   • ESOPHAGUS SURGERY  03/31/2025    Three field esophagectomy   • HERNIA REPAIR     [3]  No current facility-administered medications on file prior to encounter.     Current Outpatient Medications on File Prior to Encounter   Medication Sig Dispense Refill   • acetaminophen (Tylenol) 650 mg/20.3 mL solution oral liquid 20.3 mL (650 mg) by j-tube route every 6 hours if needed for pain.     • amitriptyline (Elavil) 100 mg tablet 1 tablet (100 mg) by j-tube route once daily at bedtime. 30 tablet 11   • aspirin 81 mg chewable tablet 1 tablet (81 mg) by j-tube route once daily.     • atorvastatin (Lipitor) 40 mg tablet 1 tablet (40 mg) by j-tube route once daily. 90 tablet 1   • buPROPion (Wellbutrin) 75 mg tablet 2 tablets (150 mg) by j-tube route 2 times a day. 360 tablet 1   • clobetasol (Temovate) 0.05 % cream Apply small amount topically to hands and feet two times daily. Do not use for more than 14 days. 60 g 0   • levothyroxine (Synthroid, Levoxyl) 175 mcg tablet 1 tablet (175 mcg) by j-tube route early in the morning.. Take on an empty stomach at the same time each day, either 30 to 60 minutes prior to breakfast 90 tablet 1   • lidocaine-prilocaine (Emla) 2.5-2.5 % cream Apply topically to affected area 30-45 minutes before Mediport access. 30 g 0   • losartan (Cozaar) 50 mg tablet Take 1 tab daily via j-tube. 90 tablet 1   • morphine 10 mg/5 mL solution 2.5 mL (5 mg) by j-tube route every 6 hours if needed for severe pain (7 - 10). 60 mL 0   • ondansetron ODT (Zofran-ODT) 8 mg disintegrating tablet Dissolve 1 tablet (8 mg) in the mouth every 8 hours if needed for nausea or vomiting. 30 tablet 5   • prochlorperazine (Compazine) 10 mg tablet Take 1 tablet (10 mg) by mouth every 6 hours if needed for nausea or vomiting. 30 tablet 5   • spironolactone (Aldactone) 50 mg tablet 1 tablet (50 mg) by  j-tube route once daily. 90 tablet 1   • verapamil (Calan) 80 mg tablet 3 tablets (240 mg) by j-tube route once daily. (Patient taking differently: 3 tablets (240 mg) by j-tube route once daily. Takes #1 tablet of verapamil ER 240mg once daily) 90 tablet 1   [4]        Claudette Andrews RN  05/18/25 9006

## 2025-05-18 NOTE — H&P
[facility] - GENERAL MEDICINE HISTORY AND PHYSICAL    HISTORY OF PRESENT ILLNESS     History Obtained From (Primary Source): Patient  Collateral History (Secondary Sources): D/w ED    History Of Present Illness (HPI):  Timoteo Victoria is a 69 y.o. male with PMHx s/f CAD (CABG 2005), HTN, HLD, chronic tobacco use, hypothyroidism, esophageal cancer status post an esophagectomy with Dr. Salas on 3/31/2025 (on J-tube) presenting with increased falls and status post fall last night.  He states he walked down to the mailbox last night and went back in the house his leg just gave out before he could get his way to the chair to sit down.  He fell on his left side and strike his left temple.  Denies LOC or being on any anticoagulants.  Today he has increasing pain in his left side like he might have broken a rib or two. Has short of breath, have been breathing swallow secondary to pain. He has some pain with movements to his left arm due to the injury.  He has been tolerating oral diet for about a month and does not use J-tube anymore. He has an appointment next Thursday to remove G-tube. His daughter states he has been slowly increasing his diet to more than a meal a day but due to his esophageal surgery, he has been eating slow and more frequently. Has lightheadedness with standing up.    ED Course:   Vitals on presentation: T 37.2 °C (98.9 °F)  HR 93  /72  RR 16  O2 99 % None (Room air)  Labs:   CBC with WBC 7.5, Hgb 12.9, Plts 199.   CMP with glucose 106, Na 126, K 4.0, BUN 12, sCr 0.89, alk phos 96, ALT 16, AST 13, bilirubin 0.5. Magnesium 1.75.   BNP 35. Trop 4.   Lactate 1.0  EKG: sinus rhythm at 74 bpm, RBBB, no acute STEMI. , qtc 494  Imaging - agree with radiology interpretation(s):   X-ray elbow left-no osseous injury   XR shoulder left-degenerative changes without osseous injury  CT head, cervical spine, maxillofacial bones, reconstruction-no acute changes.  Numerous missing teeth  with large dental cavities.  CTA for PE, CT thoracic spine-no acute PE or displaced fractures.  Post surgical changes of esophagectomy with gastric pull-through.  Upper lobe predominant paraseptal and centrilobular emphysema with some mild atelectasis in the lung bases without evidence of new consolidation.  No acute trauma to thoracic spine.  CT abdomen pelvis, lumbar spine-new 3.5 x 2.5 linear hypodensity is present in the medial spleen in the interim since prior exam in March of 2025, suspicious for a new splenic laceration.  New/acute minimally displaced fracture of the S4 vertebral body.  New/acute compression fracture along the superior and inferior endplate of L1.  Interventions: Bacitracin ointment, Zofran 4 mg, normal saline 1 L, admission for further management    12-point ROS reviewed and found to be negative aside from aforementioned positives in HPI and/or noted in dedicated ROS section below.     Decision made to admit the patient to the hospitalist service after evaluation of the patient, review of the above, and discussion with ED provider.     LABS AND IMAGING     I have personally reviewed the following labs from 05/17/25: CBC, CMP, Mag, Troponin, BNP, and Lactate  I have personally reviewed the following imaging studies from 05/17/25: CT head, CT chest AP, lumbar spine, thoracic spine, cervical spine, with my personal interpretations as documented in ED course above.   I have personally reviewed any obtained EKGs on 05/17/25, with my interpretation as listed above in the ED summary course.   I have personally reviewed the patient's vitals on presentation to the ED and any/all changes through to time of admission (on 05/17/25).     ED Course (From ED Provider):  ED Course as of 05/17/25 2153   Sat May 17, 2025   1737 HEMOGLOBIN(!): 12.9  Uptrending [NA]   1801 EKG as interpreted by myself demonstrate sinus rhythm with a ventricular rate of 74, right bundle branch block present, normal IN interval  with prolonged QRS and borderline prolonged QTc interval, no evidence of an acute STEMI. [NS]   1836 SODIUM(!): 126  136 one month ago [NA]   1937 18:28  Vital Signs  Heart Rate: 77BP: 105/61MAP (mmHg): 71Patient Position: Standing  18:27  Vital Signs  Heart Rate: 75BP: 131/69MAP (mmHg): 95Patient Position: Sitting  18:25  Vital Signs  Heart Rate: 74BP: 130/70MAP (mmHg): 97Patient Position: Lying [NA]   1953 Patient updated on lab results and partial diagnostics.  Awaiting CTs for admission.  Patient reinstructed on urine specimen.  He states he has required admission for hyponatremia in the past as low as 117. [NA]   2130 Dr. Alatorre spoke with Dr. Henry on-call for trauma services and discussed the patient's case given he has a documented splenic laceration as well as a new compression deformity at L1.  Patient accepted by trauma with medicine on consultation.  IMS consult completed as well by attending. Patient made aware of happy and he preferred to stay at [facility] for his care.  [NA]      ED Course User Index  [NA] Melissa Trevino, APRN-CNP  [NS] Star Alatorre MD         Diagnoses as of 05/17/25 2153   Fall, initial encounter   Hyponatremia   Compression fracture of L1 vertebra, initial encounter (Multi)   Laceration of spleen, initial encounter   Facial contusion, initial encounter   Skin tear of left elbow without complication, initial encounter     Relevant Results  Results for orders placed or performed during the hospital encounter of 05/17/25 (from the past 24 hours)   Magnesium   Result Value Ref Range    Magnesium 1.75 1.60 - 2.40 mg/dL   Lactate   Result Value Ref Range    Lactate 1.0 0.4 - 2.0 mmol/L   CBC and Auto Differential   Result Value Ref Range    WBC 7.5 4.4 - 11.3 x10*3/uL    nRBC 0.0 0.0 - 0.0 /100 WBCs    RBC 4.10 (L) 4.50 - 5.90 x10*6/uL    Hemoglobin 12.9 (L) 13.5 - 17.5 g/dL    Hematocrit 37.4 (L) 41.0 - 52.0 %    MCV 91 80 - 100 fL    MCH 31.5 26.0 - 34.0 pg    MCHC  34.5 32.0 - 36.0 g/dL    RDW 12.5 11.5 - 14.5 %    Platelets 199 150 - 450 x10*3/uL    Neutrophils % 73.7 40.0 - 80.0 %    Immature Granulocytes %, Automated 0.5 0.0 - 0.9 %    Lymphocytes % 16.8 13.0 - 44.0 %    Monocytes % 7.0 2.0 - 10.0 %    Eosinophils % 1.5 0.0 - 6.0 %    Basophils % 0.5 0.0 - 2.0 %    Neutrophils Absolute 5.54 1.20 - 7.70 x10*3/uL    Immature Granulocytes Absolute, Automated 0.04 0.00 - 0.70 x10*3/uL    Lymphocytes Absolute 1.26 1.20 - 4.80 x10*3/uL    Monocytes Absolute 0.53 0.10 - 1.00 x10*3/uL    Eosinophils Absolute 0.11 0.00 - 0.70 x10*3/uL    Basophils Absolute 0.04 0.00 - 0.10 x10*3/uL   Comprehensive Metabolic Panel   Result Value Ref Range    Glucose 106 (H) 74 - 99 mg/dL    Sodium 126 (L) 136 - 145 mmol/L    Potassium 4.0 3.5 - 5.3 mmol/L    Chloride 97 (L) 98 - 107 mmol/L    Bicarbonate 20 (L) 21 - 32 mmol/L    Anion Gap 13 10 - 20 mmol/L    Urea Nitrogen 12 6 - 23 mg/dL    Creatinine 0.89 0.50 - 1.30 mg/dL    eGFR >90 >60 mL/min/1.73m*2    Calcium 9.7 8.6 - 10.3 mg/dL    Albumin 4.2 3.4 - 5.0 g/dL    Alkaline Phosphatase 96 33 - 136 U/L    Total Protein 7.1 6.4 - 8.2 g/dL    AST 13 9 - 39 U/L    Bilirubin, Total 0.5 0.0 - 1.2 mg/dL    ALT 16 10 - 52 U/L   Troponin I, High Sensitivity, Initial   Result Value Ref Range    Troponin I, High Sensitivity 6 0 - 20 ng/L   Troponin, High Sensitivity, 1 Hour   Result Value Ref Range    Troponin I, High Sensitivity 4 0 - 20 ng/L   Urinalysis with Reflex Culture and Microscopic   Result Value Ref Range    Color, Urine Light-Yellow Light-Yellow, Yellow, Dark-Yellow    Appearance, Urine Clear Clear    Specific Gravity, Urine 1.038 (N) 1.005 - 1.035    pH, Urine 6.5 5.0, 5.5, 6.0, 6.5, 7.0, 7.5, 8.0    Protein, Urine NEGATIVE NEGATIVE, 10 (TRACE), 20 (TRACE) mg/dL    Glucose, Urine Normal Normal mg/dL    Blood, Urine NEGATIVE NEGATIVE mg/dL    Ketones, Urine NEGATIVE NEGATIVE mg/dL    Bilirubin, Urine NEGATIVE NEGATIVE mg/dL    Urobilinogen,  Urine Normal Normal mg/dL    Nitrite, Urine NEGATIVE NEGATIVE    Leukocyte Esterase, Urine NEGATIVE NEGATIVE      Imaging  CT abdomen pelvis w IV contrast  Result Date: 5/17/2025  CT ABDOMEN AND PELVIS: 1. A new 3.5 x 2.5 linear hypodensity is present in the medial spleen in the interim since prior exam in March of 2025, suspicious for a new splenic laceration, although there is no significant adjacent free fluid/hemorrhage. 2. Slight fat stranding is present in the presacral space, with a new/acute minimally displaced fracture of the S4 vertebral body. 3. Surgical changes of esophagectomy with gastric pull-through, with the left anterior percutaneous jejunostomy tube in place.   CT LUMBAR SPINE: 1. New/acute compression fracture along the superior and inferior endplate of L1 with upwards of 30-40% height loss, but without significant bony retropulsion. 2. No other acute trauma is identified in the lumbar spine. 3. Multilevel degenerative changes of the lumbar spine with least moderate stenosis suspected in the spinal canal at L3-L4 and L4-L5 due to disc osteophyte complex and ligamentum flavum thickening and moderate to severe neural foraminal stenosis suspected at several levels.   MACRO: None.   Signed by: Jeffrey Lares 5/17/2025 8:39 PM Dictation workstation:   [id number]    CT lumbar spine retrospective reconstruction protocol  Result Date: 5/17/2025  CT ABDOMEN AND PELVIS: 1. A new 3.5 x 2.5 linear hypodensity is present in the medial spleen in the interim since prior exam in March of 2025, suspicious for a new splenic laceration, although there is no significant adjacent free fluid/hemorrhage. 2. Slight fat stranding is present in the presacral space, with a new/acute minimally displaced fracture of the S4 vertebral body. 3. Surgical changes of esophagectomy with gastric pull-through, with the left anterior percutaneous jejunostomy tube in place.   CT LUMBAR SPINE: 1. New/acute compression fracture  along the superior and inferior endplate of L1 with upwards of 30-40% height loss, but without significant bony retropulsion. 2. No other acute trauma is identified in the lumbar spine. 3. Multilevel degenerative changes of the lumbar spine with least moderate stenosis suspected in the spinal canal at L3-L4 and L4-L5 due to disc osteophyte complex and ligamentum flavum thickening and moderate to severe neural foraminal stenosis suspected at several levels.   MACRO: None.   Signed by: Jeffrey Lares 5/17/2025 8:39 PM Dictation workstation:   [id number]    CT angio chest for pulmonary embolism  Addendum Date: 5/17/2025  Interpreted By:  Jeffrey Lares, ADDENDUM: Subtle new buckling is present within the inner table of the 6th and 7th left anterior rib at the costochondral junction (series 6, image 199 and 232), new since prior exam on 03/11/2025, likely representing acute nondisplaced rib fractures.   Signed by: Jeffrey Lares 5/17/2025 8:21 PM   -------- ORIGINAL REPORT -------- Dictation workstation:   [id number]    Result Date: 5/17/2025  CTA CHEST: 1. No evidence of pulmonary embolism or acute trauma to the thorax. No displaced rib fractures are identified. 2. Changes of esophagectomy with gastric pull-through, new since prior exam in March of 2025. The gastric conduit in the mediastinum appears slightly edematous and is fluid-filled, although there is no evidence of adjacent fluid collections, pneumomediastinum or reactive soft tissue changes. 3. Upper lobe predominant paraseptal and centrilobular emphysema with some mild atelectasis in the lung bases without evidence of new consolidation, pleural effusion or other acute cardiopulmonary abnormality.   CT THORACIC SPINE: 1. No evidence of acute trauma to the thoracic spine. 2. Multilevel insufficiency endplate changes with numerous Schmorl's nodes are similar in appearance to prior exam in March of 2025.   MACRO: None   Signed by: Jeffrey  Arnaud 5/17/2025 8:12 PM Dictation workstation:   [id number]    CT thoracic spine retrospective reconstruction protocol  Addendum Date: 5/17/2025  Interpreted By:  Jeffrey Lares, ADDENDUM: Subtle new buckling is present within the inner table of the 6th and 7th left anterior rib at the costochondral junction (series 6, image 199 and 232), new since prior exam on 03/11/2025, likely representing acute nondisplaced rib fractures.   Signed by: Jeffrey Lares 5/17/2025 8:21 PM   -------- ORIGINAL REPORT -------- Dictation workstation:   [id number]    Result Date: 5/17/2025  CTA CHEST: 1. No evidence of pulmonary embolism or acute trauma to the thorax. No displaced rib fractures are identified. 2. Changes of esophagectomy with gastric pull-through, new since prior exam in March of 2025. The gastric conduit in the mediastinum appears slightly edematous and is fluid-filled, although there is no evidence of adjacent fluid collections, pneumomediastinum or reactive soft tissue changes. 3. Upper lobe predominant paraseptal and centrilobular emphysema with some mild atelectasis in the lung bases without evidence of new consolidation, pleural effusion or other acute cardiopulmonary abnormality.   CT THORACIC SPINE: 1. No evidence of acute trauma to the thoracic spine. 2. Multilevel insufficiency endplate changes with numerous Schmorl's nodes are similar in appearance to prior exam in March of 2025.   MACRO: None   Signed by: Jeffrey Lares 5/17/2025 8:12 PM Dictation workstation:   [id number]    CT cervical spine wo IV contrast  Result Date: 5/17/2025  CT HEAD: 1. No evidence of hemorrhage, skull fracture, or other acute intracranial trauma/abnormality. 2. Low volume of the attenuation changes present in the periventricular and subcortical white matter of bilateral cerebral hemispheres is nonspecific, and favored to represent chronic sequela of microvascular disease.   CT FACIAL BONES: 1. Mild left  periorbital soft tissue swelling and edema, likely posttraumatic, without evidence of acute orbital, facial bone or nasal bone fracture. 2. Numerous missing teeth, with large dental cavities and periapical lucencies present in the remaining teeth, with several areas of cortical dehiscence/erosion is present in the outer cortex of the maxilla, although no significant inflammatory changes are present in the adjacent soft tissues. Correlate with dental exam.   CT C-SPINE: 1. No evidence of acute trauma to the cervical spine. 2. Spondylotic changes of the cervical spine as described above. 3. Mildly hyperdense, 2 cm mass in the left parotid gland is unchanged in appearance to prior exam in July of 2022.   MACRO: None   Signed by: Jeffrey Lares 5/17/2025 7:59 PM Dictation workstation:   [id number]    CT head wo IV contrast  Result Date: 5/17/2025  CT HEAD: 1. No evidence of hemorrhage, skull fracture, or other acute intracranial trauma/abnormality. 2. Low volume of the attenuation changes present in the periventricular and subcortical white matter of bilateral cerebral hemispheres is nonspecific, and favored to represent chronic sequela of microvascular disease.   CT FACIAL BONES: 1. Mild left periorbital soft tissue swelling and edema, likely posttraumatic, without evidence of acute orbital, facial bone or nasal bone fracture. 2. Numerous missing teeth, with large dental cavities and periapical lucencies present in the remaining teeth, with several areas of cortical dehiscence/erosion is present in the outer cortex of the maxilla, although no significant inflammatory changes are present in the adjacent soft tissues. Correlate with dental exam.   CT C-SPINE: 1. No evidence of acute trauma to the cervical spine. 2. Spondylotic changes of the cervical spine as described above. 3. Mildly hyperdense, 2 cm mass in the left parotid gland is unchanged in appearance to prior exam in July of 2022.   MACRO: None   Signed  by: Jeffrey Lares 5/17/2025 7:59 PM Dictation workstation:   [id number]    CT maxillofacial bones wo IV contrast  Result Date: 5/17/2025  CT HEAD: 1. No evidence of hemorrhage, skull fracture, or other acute intracranial trauma/abnormality. 2. Low volume of the attenuation changes present in the periventricular and subcortical white matter of bilateral cerebral hemispheres is nonspecific, and favored to represent chronic sequela of microvascular disease.   CT FACIAL BONES: 1. Mild left periorbital soft tissue swelling and edema, likely posttraumatic, without evidence of acute orbital, facial bone or nasal bone fracture. 2. Numerous missing teeth, with large dental cavities and periapical lucencies present in the remaining teeth, with several areas of cortical dehiscence/erosion is present in the outer cortex of the maxilla, although no significant inflammatory changes are present in the adjacent soft tissues. Correlate with dental exam.   CT C-SPINE: 1. No evidence of acute trauma to the cervical spine. 2. Spondylotic changes of the cervical spine as described above. 3. Mildly hyperdense, 2 cm mass in the left parotid gland is unchanged in appearance to prior exam in July of 2022.   MACRO: None   Signed by: Jeffrey Lares 5/17/2025 7:59 PM Dictation workstation:   [id number]    CT 3D reconstruction  Result Date: 5/17/2025  CT HEAD: 1. No evidence of hemorrhage, skull fracture, or other acute intracranial trauma/abnormality. 2. Low volume of the attenuation changes present in the periventricular and subcortical white matter of bilateral cerebral hemispheres is nonspecific, and favored to represent chronic sequela of microvascular disease.   CT FACIAL BONES: 1. Mild left periorbital soft tissue swelling and edema, likely posttraumatic, without evidence of acute orbital, facial bone or nasal bone fracture. 2. Numerous missing teeth, with large dental cavities and periapical lucencies present in the  remaining teeth, with several areas of cortical dehiscence/erosion is present in the outer cortex of the maxilla, although no significant inflammatory changes are present in the adjacent soft tissues. Correlate with dental exam.   CT C-SPINE: 1. No evidence of acute trauma to the cervical spine. 2. Spondylotic changes of the cervical spine as described above. 3. Mildly hyperdense, 2 cm mass in the left parotid gland is unchanged in appearance to prior exam in July of 2022.   MACRO: None   Signed by: Jeffrey Lares 5/17/2025 7:59 PM Dictation workstation:   [id number]    XR shoulder left 2+ views  Result Date: 5/17/2025  Degenerative changes without osseous injury evident.   MACRO: None   Signed by: Tye Banks 5/17/2025 5:51 PM Dictation workstation:   [id number]    XR elbow left 3+ views  Result Date: 5/17/2025  No osseous injury is evident.   MACRO: None   Signed by: Tye Banks 5/17/2025 5:50 PM Dictation workstation:   [id number]      Cardiology, Vascular, and Other Imaging  No other imaging results found for the past 2 days       PAST HISTORIES AND ALLERGIES     Past Medical History  He has a past medical history of CAD (coronary artery disease), Esophageal carcinoma, Hyperlipidemia, Hypertension, and Hypothyroidism.    Surgical History  He has a past surgical history that includes Cholecystectomy; Hernia repair; Coronary artery bypass graft (2005); Cataract extraction (Right, 08/25/2023); and Esophagus surgery (03/31/2025).     Social History  He reports that he has been smoking cigarettes. He started smoking about 46 years ago. He has a 45.3 pack-year smoking history. He has been exposed to tobacco smoke. He has never used smokeless tobacco. He reports that he does not currently use alcohol. He reports that he does not use drugs.    Family History  Family History[1]    Allergies  Ibuprofen    MEDICATIONS     Scheduled Medications:  Scheduled Medications[2]  Continuous  Medications:  Continuous Medications[3]  PRN Medications:  PRN Medications[4]     REVIEW OF SYSTEMS     Review of Systems   Constitutional:  Positive for activity change and appetite change. Negative for chills, fatigue and fever.   Respiratory:  Positive for shortness of breath. Negative for cough, chest tightness and wheezing.    Cardiovascular:  Negative for chest pain, palpitations and leg swelling.   Gastrointestinal:  Negative for abdominal pain, constipation, diarrhea, nausea and vomiting.   Genitourinary:  Negative for flank pain and hematuria.   Musculoskeletal:  Negative for back pain and joint swelling.        Left sided rib pain   Skin:  Negative for rash and wound.   Neurological:  Positive for light-headedness. Negative for tremors, syncope, weakness and headaches.       OBJECTIVE     Last Recorded Vitals  /67   Pulse 67   Temp 37.2 °C (98.9 °F)   Resp 20   Wt 81.6 kg (180 lb)   SpO2 100%      Physical Exam:  Vital signs and nursing notes reviewed.   Constitutional: Pleasant and cooperative. Laying in bed in no acute distress. Conversant.   Skin: Warm and dry; no obvious lesions, rashes, pallor, or jaundice.   Eyes: EOMI. Anicteric sclera.   ENT: Mucous membranes moist; no obvious injury or deformity appreciated.   Head and Neck: Normocephalic, atraumatic. Abrasion and contusion under left eye and left temple.  Respiratory: Nonlabored on RA. Lungs clear to auscultation bilaterally without obvious adventitious sounds. Chest rise is equal.  Cardiovascular: RRR. No gross murmur, gallop, or rub. Extremities are warm and well-perfused with good capillary refill (< 3 seconds). Left chest wall tenderness.   GI: Abdomen soft, tenderness in epigastric and LUQ with some guarding, nondistended. No obvious organomegaly appreciated. Bowel sounds are present.  : No CVA tenderness.   MSK: No tenderness to cervical, thoracic or lumbar spine.    Extremities: No cyanosis, edema, or clubbing evident.  Neurovascularly intact.   Neuro: A&Ox3. CN 2-12 grossly intact. Able to respond to questions appropriately and clearly. No acute focal neurologic deficits appreciated.  Psych: Appropriate mood and behavior.    ASSESSMENT AND PLAN   Assessment/Plan     69 y.o. male with PMHx s/f CAD (CABG 2005), HTN, HLD, chronic tobacco use, hypothyroidism, esophageal cancer status post an esophagectomy with Dr. Salas on 3/31/2025 (on J-tube) presenting with increased falls and status post fall last night.     Recurrent falls  Mechanical fall with acute displaced fx of S4 vertebral body and compression fx of L1  -pain control scheduled and PRN, antiemetics  -incentive spirometry  -PT/OT eval    Grade I splenic laceration  -Hgb 12.9 at admit; trend H&H q8h, close monitoring  -general surgery consult    Hyponatremia  -likely secondary to poor oral intake, pain related to SIADH  -No overtly volume overloaded or depleted on exam, await repeat Na level and make adjustments from there. Received NS 1L in ED.  -UA unremarkable  -urine/serum osmolality, and random sodium ordered    CAD, HTN, HLD  -CAD: EKG and troponin negative, continue home meds  -HTN: BP stable, continue home antihypertensives therapy  -HLD: continue home lipitor    Hypothyroidism  -continue home levothyroxine    Hx esophageal cancer  -pt follows with palliative care    Diet: Regular  DVT Prophylaxis: SCDs, Lovenox   Code Status: Full Code   Case Discussed With: ED provider  Additional Sources Reviewed: ED note day of admission; Thoracic Surgery and Heme-Onc    Anticipated Length of Stay (LOS): Patient will require two-plus midnight stay for further evaluation and management of the above.      John Shore PA-C    Dragon dictation software was used to dictate this note and thus there may be minor errors in translation/transcription including garbled speech or misspellings. Please contact for clarification if needed.       [1]   Family History  Problem Relation Name Age of  Onset    COPD Mother     [2]    [3]    [4] PRN medications: fentaNYL

## 2025-05-18 NOTE — CONSULTS
TRAUMA CONSULT    Patient: Timoteo Victoria  Room: 3327/3327-A    Age: 69 y.o.   Gender: male  Attending: Meg Cam MD    MRN: [id number]  Admission Date: 5/17/2025    PCP: Dafne Bedolla DO         SUBJECTIVE     Chief Complaint  Fall (Pt has increased falls. Pt fell last night and hit his head. Pt states he did not pass out. Pt is not on blood thinners. Pt has a hx of cancer. ), Rib Injury (Left sided rib pain. Pain on a deep breath), and Arm Injury (Pt has abrasions to left arm)       BASIC INJURY INFORMATION:  Level of activation: none  Mechanism of injury: fall from standing      HPI  Timoteo Victoria is a 69 y.o. male on day 0 of admission presenting with Fall, initial encounter.  Timoteo is a 69-year-old white male who had presented to the emergency room on Saturday, 5/17/2025 after sustaining a fall on Friday, 5/16/2025.  He has had a known history of hyponatremia.  He is less than 2 months status post esophagectomy with gastric pull-up procedure.  He had been giving himself tube feeds through his jejunostomy feeding tube until about 1.5 weeks ago when he was instructed to just take p.o. intake.  He states he was walking back from the mailbox and felt his legs starting to become very weak.  He fell when he was in the garage and landed on the left side of his body.  He initially felt some back pain which subsequently resolved.  He now complains of left chest pain and bleeding from the abrasions on his left arm which is what brought him to the emergency room.  In the emergency room, he underwent radiographic evaluation demonstrating a grade 1 splenic laceration without any active bleeding, as for vertebral body fracture, L1 compression fracture and hyponatremia.  He required admission for the hyponatremia treatment and monitoring for the splenic laceration.    PRIMARY SURVEY:  Airway: intact  Breathing: Normal respiratory effort.  Breath sounds are clear to auscultation bilaterally.  Circulation:  Skin: warm. Capillary refill <3s   Pulses: palpable bilateral radial, femoral, dorsalis pedis and posterior tibial arteries  Disability: Pupils: equal, round and reactive to light   GCS:15    ROS  Review of Systems   Constitutional:  Positive for fatigue. Negative for chills and fever.   HENT:  Negative for congestion, ear pain and hearing loss.    Eyes:  Negative for pain and redness.   Respiratory:  Positive for shortness of breath. Negative for cough.    Cardiovascular:  Positive for chest pain and leg swelling.   Gastrointestinal:  Negative for abdominal pain, constipation, diarrhea, nausea and vomiting.   Endocrine: Negative for polyphagia.   Genitourinary:  Negative for dysuria, flank pain, frequency and hematuria.   Musculoskeletal:  Positive for arthralgias and myalgias.   Skin:  Positive for wound. Negative for rash.   Allergic/Immunologic: Negative for immunocompromised state.   Neurological:  Positive for weakness. Negative for speech difficulty and headaches.   Hematological:  Bruises/bleeds easily.   Psychiatric/Behavioral:  Negative for agitation and confusion.         HISTORY     Past Medical History:   Diagnosis Date    CAD (coronary artery disease)     Esophageal carcinoma     Hyperlipidemia     Hypertension     Hypothyroidism         Past Surgical History:   Procedure Laterality Date    CATARACT EXTRACTION Right 08/25/2023    CHOLECYSTECTOMY      CORONARY ARTERY BYPASS GRAFT  2005    4-vessel    ESOPHAGUS SURGERY  03/31/2025    Three field esophagectomy    HERNIA REPAIR          Allergies   Allergen Reactions    Ibuprofen Swelling        Social History     Tobacco Use   Smoking Status Every Day    Current packs/day: 0.25    Average packs/day: 1 pack/day for 46.4 years (45.3 ttl pk-yrs)    Types: Cigarettes    Start date: 1979    Passive exposure: Current   Smokeless Tobacco Never   Tobacco Comments    Pt is down to 2 cigarettes.        Social History     Substance and Sexual Activity   Alcohol Use  "Not Currently        Family History[1]     HOME MEDICATIONS  Current Outpatient Medications   Medication Instructions    acetaminophen (TYLENOL) 650 mg, j-tube, Every 6 hours PRN    amitriptyline (ELAVIL) 100 mg, j-tube, Nightly    aspirin 81 mg, Daily    atorvastatin (LIPITOR) 40 mg, j-tube, Daily    baclofen (Lioresal) 5 mg tablet 1 tablet, 3 times daily (0900,1400,1900)    buPROPion (WELLBUTRIN) 150 mg, j-tube, 2 times daily    clobetasol (Temovate) 0.05 % cream Apply small amount topically to hands and feet two times daily. Do not use for more than 14 days.    levothyroxine (SYNTHROID, LEVOXYL) 175 mcg, j-tube, Daily, Take on an empty stomach at the same time each day, either 30 to 60 minutes prior to breakfast    lidocaine-prilocaine (Emla) 2.5-2.5 % cream Apply topically to affected area 30-45 minutes before Mediport access.    losartan (Cozaar) 50 mg tablet Take 1 tab daily via j-tube.    morphine 5 mg, j-tube, Every 6 hours PRN    ondansetron ODT (ZOFRAN-ODT) 8 mg, oral, Every 8 hours PRN    prochlorperazine (COMPAZINE) 10 mg, oral, Every 6 hours PRN    spironolactone (ALDACTONE) 50 mg, j-tube, Daily    tamsulosin (FLOMAX) 0.4 mg, Nightly    verapamil (CALAN) 240 mg, j-tube, Daily    verapamil SR (CALAN-SR) 240 mg, Nightly          OBJECTIVE   Last Recorded Vitals.  Blood pressure 121/68, pulse 73, temperature 36.3 °C (97.4 °F), temperature source Temporal, resp. rate 16, height 1.778 m (5' 10\"), weight 82.8 kg (182 lb 9.6 oz), SpO2 98%.     PHYSICAL EXAM  Physical Exam   SECONDARY SURVEY:  Neurologic: sensation and motor function intact in bilateral upper and lower extremities  HEENT:   Head: Skull intact. Midface stable to palpation.  Ecchymosis to left forehead and facial area without any open wounds.   Eyes: Equal, round and reactive to light.   Ears: No hemotympanum. No abrasions or lacerations.   Nose: no abrasions or lacerations. No blood per nares   Oral Cavity: No blood noted. Dentition " intact.  Neck: Soft and supple. Trachea midline. No abrasions, lacerations or contusions. No crepitus.  Pulmonary: Clear to auscultation bilaterally. External: No abrasions, lacerations or contusions. No crepitus.  Cardiovascular: Pulses: palpable bilateral radial, femoral, dorsalis pedis and posterior tibial arteries  Abdomen: Soft, nondistended and nontender to palpation.  Well-healed midline scar consistent with recent surgery.  Jejunostomy feeding tube in left abdomen without any redness or drainage.  Pelvis/Perineum: Pelvis stable to palpation. No pubic symphysis widening palpated. Perineum without ecchymosis.  Musculoskeletal: Extremities: No deformities or joint swelling.  Abrasions of left elbow and forearm are currently wrapped in a dressing without any active bleeding.  Back/Spine: Nontender and no stepoffs. Back without abrasions, lacerations or contusions.       RESULTS   Labs  Results for orders placed or performed during the hospital encounter of 05/17/25 (from the past 24 hours)   Magnesium   Result Value Ref Range    Magnesium 1.75 1.60 - 2.40 mg/dL   Lactate   Result Value Ref Range    Lactate 1.0 0.4 - 2.0 mmol/L   CBC and Auto Differential   Result Value Ref Range    WBC 7.5 4.4 - 11.3 x10*3/uL    nRBC 0.0 0.0 - 0.0 /100 WBCs    RBC 4.10 (L) 4.50 - 5.90 x10*6/uL    Hemoglobin 12.9 (L) 13.5 - 17.5 g/dL    Hematocrit 37.4 (L) 41.0 - 52.0 %    MCV 91 80 - 100 fL    MCH 31.5 26.0 - 34.0 pg    MCHC 34.5 32.0 - 36.0 g/dL    RDW 12.5 11.5 - 14.5 %    Platelets 199 150 - 450 x10*3/uL    Neutrophils % 73.7 40.0 - 80.0 %    Immature Granulocytes %, Automated 0.5 0.0 - 0.9 %    Lymphocytes % 16.8 13.0 - 44.0 %    Monocytes % 7.0 2.0 - 10.0 %    Eosinophils % 1.5 0.0 - 6.0 %    Basophils % 0.5 0.0 - 2.0 %    Neutrophils Absolute 5.54 1.20 - 7.70 x10*3/uL    Immature Granulocytes Absolute, Automated 0.04 0.00 - 0.70 x10*3/uL    Lymphocytes Absolute 1.26 1.20 - 4.80 x10*3/uL    Monocytes Absolute 0.53 0.10 -  1.00 x10*3/uL    Eosinophils Absolute 0.11 0.00 - 0.70 x10*3/uL    Basophils Absolute 0.04 0.00 - 0.10 x10*3/uL   Comprehensive Metabolic Panel   Result Value Ref Range    Glucose 106 (H) 74 - 99 mg/dL    Sodium 126 (L) 136 - 145 mmol/L    Potassium 4.0 3.5 - 5.3 mmol/L    Chloride 97 (L) 98 - 107 mmol/L    Bicarbonate 20 (L) 21 - 32 mmol/L    Anion Gap 13 10 - 20 mmol/L    Urea Nitrogen 12 6 - 23 mg/dL    Creatinine 0.89 0.50 - 1.30 mg/dL    eGFR >90 >60 mL/min/1.73m*2    Calcium 9.7 8.6 - 10.3 mg/dL    Albumin 4.2 3.4 - 5.0 g/dL    Alkaline Phosphatase 96 33 - 136 U/L    Total Protein 7.1 6.4 - 8.2 g/dL    AST 13 9 - 39 U/L    Bilirubin, Total 0.5 0.0 - 1.2 mg/dL    ALT 16 10 - 52 U/L   Troponin I, High Sensitivity, Initial   Result Value Ref Range    Troponin I, High Sensitivity 6 0 - 20 ng/L   Troponin, High Sensitivity, 1 Hour   Result Value Ref Range    Troponin I, High Sensitivity 4 0 - 20 ng/L   Osmolality   Result Value Ref Range    Osmolality, Serum 269 (L) 280 - 300 mOsm/kg   Urinalysis with Reflex Culture and Microscopic   Result Value Ref Range    Color, Urine Light-Yellow Light-Yellow, Yellow, Dark-Yellow    Appearance, Urine Clear Clear    Specific Gravity, Urine 1.038 (N) 1.005 - 1.035    pH, Urine 6.5 5.0, 5.5, 6.0, 6.5, 7.0, 7.5, 8.0    Protein, Urine NEGATIVE NEGATIVE, 10 (TRACE), 20 (TRACE) mg/dL    Glucose, Urine Normal Normal mg/dL    Blood, Urine NEGATIVE NEGATIVE mg/dL    Ketones, Urine NEGATIVE NEGATIVE mg/dL    Bilirubin, Urine NEGATIVE NEGATIVE mg/dL    Urobilinogen, Urine Normal Normal mg/dL    Nitrite, Urine NEGATIVE NEGATIVE    Leukocyte Esterase, Urine NEGATIVE NEGATIVE   Extra Urine Gray Tube   Result Value Ref Range    Extra Tube Hold for add-ons.    Osmolality, Urine   Result Value Ref Range    Osmolality, Urine Random 262 200 - 1,200 mOsm/kg   Sodium, Urine Random   Result Value Ref Range    Sodium, Urine Random 24 mmol/L    Creatinine, Urine Random 45.3 20.0 - 370.0 mg/dL     Sodium/Creatinine Ratio 53 Not established. mmol/g Creat   Hemoglobin and hematocrit, blood   Result Value Ref Range    Hemoglobin 12.0 (L) 13.5 - 17.5 g/dL    Hematocrit 35.1 (L) 41.0 - 52.0 %   Comprehensive metabolic panel   Result Value Ref Range    Glucose 88 74 - 99 mg/dL    Sodium 129 (L) 136 - 145 mmol/L    Potassium 3.7 3.5 - 5.3 mmol/L    Chloride 98 98 - 107 mmol/L    Bicarbonate 23 21 - 32 mmol/L    Anion Gap 12 10 - 20 mmol/L    Urea Nitrogen 10 6 - 23 mg/dL    Creatinine 0.79 0.50 - 1.30 mg/dL    eGFR >90 >60 mL/min/1.73m*2    Calcium 9.4 8.6 - 10.3 mg/dL    Albumin 4.0 3.4 - 5.0 g/dL    Alkaline Phosphatase 88 33 - 136 U/L    Total Protein 6.6 6.4 - 8.2 g/dL    AST 12 9 - 39 U/L    Bilirubin, Total 0.4 0.0 - 1.2 mg/dL    ALT 15 10 - 52 U/L   Coagulation Screen   Result Value Ref Range    Protime 12.2 9.8 - 12.4 seconds    INR 1.1 0.9 - 1.1    aPTT 32 26 - 36 seconds   CBC   Result Value Ref Range    WBC 6.0 4.4 - 11.3 x10*3/uL    nRBC 0.0 0.0 - 0.0 /100 WBCs    RBC 3.81 (L) 4.50 - 5.90 x10*6/uL    Hemoglobin 12.0 (L) 13.5 - 17.5 g/dL    Hematocrit 35.1 (L) 41.0 - 52.0 %    MCV 92 80 - 100 fL    MCH 31.5 26.0 - 34.0 pg    MCHC 34.2 32.0 - 36.0 g/dL    RDW 12.7 11.5 - 14.5 %    Platelets 177 150 - 450 x10*3/uL   Magnesium   Result Value Ref Range    Magnesium 1.77 1.60 - 2.40 mg/dL   Lactate   Result Value Ref Range    Lactate 1.3 0.4 - 2.0 mmol/L   Hemoglobin and hematocrit, blood   Result Value Ref Range    Hemoglobin 11.4 (L) 13.5 - 17.5 g/dL    Hematocrit 33.0 (L) 41.0 - 52.0 %       Radiology Resutls  Imaging  XR chest 1 view  Result Date: 5/18/2025  No acute cardiopulmonary process.   MACRO: None   Signed by: Janine Jacobson 5/18/2025 12:41 AM Dictation workstation:   [id number]    CT abdomen pelvis w IV contrast  Result Date: 5/17/2025  CT ABDOMEN AND PELVIS: 1. A new 3.5 x 2.5 linear hypodensity is present in the medial spleen in the interim since prior exam in March of 2025, suspicious for a  new splenic laceration, although there is no significant adjacent free fluid/hemorrhage. 2. Slight fat stranding is present in the presacral space, with a new/acute minimally displaced fracture of the S4 vertebral body. 3. Surgical changes of esophagectomy with gastric pull-through, with the left anterior percutaneous jejunostomy tube in place.   CT LUMBAR SPINE: 1. New/acute compression fracture along the superior and inferior endplate of L1 with upwards of 30-40% height loss, but without significant bony retropulsion. 2. No other acute trauma is identified in the lumbar spine. 3. Multilevel degenerative changes of the lumbar spine with least moderate stenosis suspected in the spinal canal at L3-L4 and L4-L5 due to disc osteophyte complex and ligamentum flavum thickening and moderate to severe neural foraminal stenosis suspected at several levels.   MACRO: None.   Signed by: Jeffrey Lares 5/17/2025 8:39 PM Dictation workstation:   [id number]    CT lumbar spine retrospective reconstruction protocol  Result Date: 5/17/2025  CT ABDOMEN AND PELVIS: 1. A new 3.5 x 2.5 linear hypodensity is present in the medial spleen in the interim since prior exam in March of 2025, suspicious for a new splenic laceration, although there is no significant adjacent free fluid/hemorrhage. 2. Slight fat stranding is present in the presacral space, with a new/acute minimally displaced fracture of the S4 vertebral body. 3. Surgical changes of esophagectomy with gastric pull-through, with the left anterior percutaneous jejunostomy tube in place.   CT LUMBAR SPINE: 1. New/acute compression fracture along the superior and inferior endplate of L1 with upwards of 30-40% height loss, but without significant bony retropulsion. 2. No other acute trauma is identified in the lumbar spine. 3. Multilevel degenerative changes of the lumbar spine with least moderate stenosis suspected in the spinal canal at L3-L4 and L4-L5 due to disc osteophyte  complex and ligamentum flavum thickening and moderate to severe neural foraminal stenosis suspected at several levels.   MACRO: None.   Signed by: Jeffrey Lares 5/17/2025 8:39 PM Dictation workstation:   [id number]    CT angio chest for pulmonary embolism  Addendum Date: 5/17/2025  Interpreted By:  Jeffrey Lares, ADDENDUM: Subtle new buckling is present within the inner table of the 6th and 7th left anterior rib at the costochondral junction (series 6, image 199 and 232), new since prior exam on 03/11/2025, likely representing acute nondisplaced rib fractures.   Signed by: Jeffrey Lares 5/17/2025 8:21 PM   -------- ORIGINAL REPORT -------- Dictation workstation:   [id number]    Result Date: 5/17/2025  CTA CHEST: 1. No evidence of pulmonary embolism or acute trauma to the thorax. No displaced rib fractures are identified. 2. Changes of esophagectomy with gastric pull-through, new since prior exam in March of 2025. The gastric conduit in the mediastinum appears slightly edematous and is fluid-filled, although there is no evidence of adjacent fluid collections, pneumomediastinum or reactive soft tissue changes. 3. Upper lobe predominant paraseptal and centrilobular emphysema with some mild atelectasis in the lung bases without evidence of new consolidation, pleural effusion or other acute cardiopulmonary abnormality.   CT THORACIC SPINE: 1. No evidence of acute trauma to the thoracic spine. 2. Multilevel insufficiency endplate changes with numerous Schmorl's nodes are similar in appearance to prior exam in March of 2025.   MACRO: None   Signed by: Jeffrey Lares 5/17/2025 8:12 PM Dictation workstation:   [id number]    CT thoracic spine retrospective reconstruction protocol  Addendum Date: 5/17/2025  Interpreted By:  Jeffrey Lares, ADDENDUM: Subtle new buckling is present within the inner table of the 6th and 7th left anterior rib at the costochondral junction (series 6, image  199 and 232), new since prior exam on 03/11/2025, likely representing acute nondisplaced rib fractures.   Signed by: Jeffrey Lares 5/17/2025 8:21 PM   -------- ORIGINAL REPORT -------- Dictation workstation:   [id number]    Result Date: 5/17/2025  CTA CHEST: 1. No evidence of pulmonary embolism or acute trauma to the thorax. No displaced rib fractures are identified. 2. Changes of esophagectomy with gastric pull-through, new since prior exam in March of 2025. The gastric conduit in the mediastinum appears slightly edematous and is fluid-filled, although there is no evidence of adjacent fluid collections, pneumomediastinum or reactive soft tissue changes. 3. Upper lobe predominant paraseptal and centrilobular emphysema with some mild atelectasis in the lung bases without evidence of new consolidation, pleural effusion or other acute cardiopulmonary abnormality.   CT THORACIC SPINE: 1. No evidence of acute trauma to the thoracic spine. 2. Multilevel insufficiency endplate changes with numerous Schmorl's nodes are similar in appearance to prior exam in March of 2025.   MACRO: None   Signed by: Jeffrey Lares 5/17/2025 8:12 PM Dictation workstation:   [id number]    CT cervical spine wo IV contrast  Result Date: 5/17/2025  CT HEAD: 1. No evidence of hemorrhage, skull fracture, or other acute intracranial trauma/abnormality. 2. Low volume of the attenuation changes present in the periventricular and subcortical white matter of bilateral cerebral hemispheres is nonspecific, and favored to represent chronic sequela of microvascular disease.   CT FACIAL BONES: 1. Mild left periorbital soft tissue swelling and edema, likely posttraumatic, without evidence of acute orbital, facial bone or nasal bone fracture. 2. Numerous missing teeth, with large dental cavities and periapical lucencies present in the remaining teeth, with several areas of cortical dehiscence/erosion is present in the outer cortex of the  maxilla, although no significant inflammatory changes are present in the adjacent soft tissues. Correlate with dental exam.   CT C-SPINE: 1. No evidence of acute trauma to the cervical spine. 2. Spondylotic changes of the cervical spine as described above. 3. Mildly hyperdense, 2 cm mass in the left parotid gland is unchanged in appearance to prior exam in July of 2022.   MACRO: None   Signed by: Jeffrey Lares 5/17/2025 7:59 PM Dictation workstation:   [id number]    CT head wo IV contrast  Result Date: 5/17/2025  CT HEAD: 1. No evidence of hemorrhage, skull fracture, or other acute intracranial trauma/abnormality. 2. Low volume of the attenuation changes present in the periventricular and subcortical white matter of bilateral cerebral hemispheres is nonspecific, and favored to represent chronic sequela of microvascular disease.   CT FACIAL BONES: 1. Mild left periorbital soft tissue swelling and edema, likely posttraumatic, without evidence of acute orbital, facial bone or nasal bone fracture. 2. Numerous missing teeth, with large dental cavities and periapical lucencies present in the remaining teeth, with several areas of cortical dehiscence/erosion is present in the outer cortex of the maxilla, although no significant inflammatory changes are present in the adjacent soft tissues. Correlate with dental exam.   CT C-SPINE: 1. No evidence of acute trauma to the cervical spine. 2. Spondylotic changes of the cervical spine as described above. 3. Mildly hyperdense, 2 cm mass in the left parotid gland is unchanged in appearance to prior exam in July of 2022.   MACRO: None   Signed by: Jeffrey Lares 5/17/2025 7:59 PM Dictation workstation:   [id number]    CT maxillofacial bones wo IV contrast  Result Date: 5/17/2025  CT HEAD: 1. No evidence of hemorrhage, skull fracture, or other acute intracranial trauma/abnormality. 2. Low volume of the attenuation changes present in the periventricular and  subcortical white matter of bilateral cerebral hemispheres is nonspecific, and favored to represent chronic sequela of microvascular disease.   CT FACIAL BONES: 1. Mild left periorbital soft tissue swelling and edema, likely posttraumatic, without evidence of acute orbital, facial bone or nasal bone fracture. 2. Numerous missing teeth, with large dental cavities and periapical lucencies present in the remaining teeth, with several areas of cortical dehiscence/erosion is present in the outer cortex of the maxilla, although no significant inflammatory changes are present in the adjacent soft tissues. Correlate with dental exam.   CT C-SPINE: 1. No evidence of acute trauma to the cervical spine. 2. Spondylotic changes of the cervical spine as described above. 3. Mildly hyperdense, 2 cm mass in the left parotid gland is unchanged in appearance to prior exam in July of 2022.   MACRO: None   Signed by: Jeffrey Lares 5/17/2025 7:59 PM Dictation workstation:   [id number]    CT 3D reconstruction  Result Date: 5/17/2025  CT HEAD: 1. No evidence of hemorrhage, skull fracture, or other acute intracranial trauma/abnormality. 2. Low volume of the attenuation changes present in the periventricular and subcortical white matter of bilateral cerebral hemispheres is nonspecific, and favored to represent chronic sequela of microvascular disease.   CT FACIAL BONES: 1. Mild left periorbital soft tissue swelling and edema, likely posttraumatic, without evidence of acute orbital, facial bone or nasal bone fracture. 2. Numerous missing teeth, with large dental cavities and periapical lucencies present in the remaining teeth, with several areas of cortical dehiscence/erosion is present in the outer cortex of the maxilla, although no significant inflammatory changes are present in the adjacent soft tissues. Correlate with dental exam.   CT C-SPINE: 1. No evidence of acute trauma to the cervical spine. 2. Spondylotic changes of the  cervical spine as described above. 3. Mildly hyperdense, 2 cm mass in the left parotid gland is unchanged in appearance to prior exam in July of 2022.   MACRO: None   Signed by: Jeffrey Lares 5/17/2025 7:59 PM Dictation workstation:   [id number]    XR shoulder left 2+ views  Result Date: 5/17/2025  Degenerative changes without osseous injury evident.   MACRO: None   Signed by: Tye Banks 5/17/2025 5:51 PM Dictation workstation:   [id number]    XR elbow left 3+ views  Result Date: 5/17/2025  No osseous injury is evident.   MACRO: None   Signed by: Tye Banks 5/17/2025 5:50 PM Dictation workstation:   [id number]      Cardiology, Vascular, and Other Imaging  No other imaging results found for the past 2 days         ASSESSMENT / PLAN     Assessment & Plan  Fall, initial encounter    Hyponatremia    Splenic laceration    Closed compression fracture of L1 lumbar vertebra, initial encounter (Multi)    Abrasion of left arm    Facial bruising, initial encounter         Plan  With regards to the splenic laceration, there does not appear to be any active bleeding.  He actually presented the day after his injury, and there was no hematoma or blush on the CT evaluation.  Continue to monitor hemoglobin levels every 8 hours.  Current hemoglobin is remaining stable.  Discussed no activity for the next 2 months which could potentially cause further trauma to the splenic region.  If his hemoglobin levels remained stable, can discharge from a trauma standpoint in the next 24 hours.  He has multiple abrasions, bruises and vertebral compression fractures.  He seems relatively asymptomatic.  He complains more of left-sided chest pain, but radiographic evaluation did not identify any rib fractures.  Can provide a lidocaine patch to the left chest to help with pain control.  Local wound care to left arm abrasions.  Having recurrent hyponatremia.  He has had some dietary changes in the last several weeks as he has  stopped taking his tube feeds (as instructed by his surgeon) approximately 1.5 weeks ago.  Will provide oral nutritional supplements.  Discussed eating 6-8 small meals per day since he is status post esophagectomy with gastric pull-up.  Noted plans for nutritional consultation.  Should follow-up with his surgeon as planned.      Kerry Henry MD, FACS  [facility]  [address] [place] [address];   [facility] [address]; [address]  [place] [id number] [phone]        [1]   Family History  Problem Relation Name Age of Onset    COPD Mother

## 2025-05-19 ENCOUNTER — APPOINTMENT (OUTPATIENT)
Dept: RADIOLOGY | Facility: HOSPITAL | Age: 70
DRG: 964 | End: 2025-05-19
Payer: MEDICARE

## 2025-05-19 VITALS
BODY MASS INDEX: 26.14 KG/M2 | HEIGHT: 70 IN | OXYGEN SATURATION: 99 % | DIASTOLIC BLOOD PRESSURE: 61 MMHG | HEART RATE: 87 BPM | RESPIRATION RATE: 16 BRPM | SYSTOLIC BLOOD PRESSURE: 102 MMHG | TEMPERATURE: 96.6 F | WEIGHT: 182.6 LBS

## 2025-05-19 LAB
ANION GAP SERPL CALC-SCNC: 15 MMOL/L (ref 10–20)
ATRIAL RATE: 65 BPM
ATRIAL RATE: 74 BPM
BUN SERPL-MCNC: 15 MG/DL (ref 6–23)
CALCIUM SERPL-MCNC: 8.9 MG/DL (ref 8.6–10.3)
CHLORIDE SERPL-SCNC: 98 MMOL/L (ref 98–107)
CO2 SERPL-SCNC: 23 MMOL/L (ref 21–32)
CREAT SERPL-MCNC: 0.75 MG/DL (ref 0.5–1.3)
EGFRCR SERPLBLD CKD-EPI 2021: >90 ML/MIN/1.73M*2
ERYTHROCYTE [DISTWIDTH] IN BLOOD BY AUTOMATED COUNT: 12.6 % (ref 11.5–14.5)
GLUCOSE SERPL-MCNC: 80 MG/DL (ref 74–99)
HCT VFR BLD AUTO: 32.7 % (ref 41–52)
HCT VFR BLD AUTO: 33.7 % (ref 41–52)
HCT VFR BLD AUTO: 34 % (ref 41–52)
HGB BLD-MCNC: 11.2 G/DL (ref 13.5–17.5)
HGB BLD-MCNC: 11.5 G/DL (ref 13.5–17.5)
HGB BLD-MCNC: 11.6 G/DL (ref 13.5–17.5)
MCH RBC QN AUTO: 31.5 PG (ref 26–34)
MCHC RBC AUTO-ENTMCNC: 34.3 G/DL (ref 32–36)
MCV RBC AUTO: 92 FL (ref 80–100)
NRBC BLD-RTO: 0 /100 WBCS (ref 0–0)
P AXIS: 33 DEGREES
P AXIS: 55 DEGREES
PLATELET # BLD AUTO: 169 X10*3/UL (ref 150–450)
POTASSIUM SERPL-SCNC: 3.8 MMOL/L (ref 3.5–5.3)
PR INTERVAL: 133 MS
PR INTERVAL: 149 MS
Q ONSET: 249 MS
Q ONSET: 254 MS
QRS COUNT: 11 BEATS
QRS COUNT: 12 BEATS
QRS DURATION: 151 MS
QRS DURATION: 165 MS
QT INTERVAL: 445 MS
QT INTERVAL: 460 MS
QTC CALCULATION(BAZETT): 479 MS
QTC CALCULATION(BAZETT): 494 MS
QTC FREDERICIA: 472 MS
QTC FREDERICIA: 477 MS
R AXIS: -17 DEGREES
R AXIS: -21 DEGREES
RBC # BLD AUTO: 3.55 X10*6/UL (ref 4.5–5.9)
SODIUM SERPL-SCNC: 132 MMOL/L (ref 136–145)
T AXIS: 13 DEGREES
T AXIS: 31 DEGREES
T OFFSET: 472 MS
T OFFSET: 484 MS
VENTRICULAR RATE: 65 BPM
VENTRICULAR RATE: 74 BPM
WBC # BLD AUTO: 7.4 X10*3/UL (ref 4.4–11.3)

## 2025-05-19 PROCEDURE — 2500000005 HC RX 250 GENERAL PHARMACY W/O HCPCS: Performed by: SURGERY

## 2025-05-19 PROCEDURE — 97161 PT EVAL LOW COMPLEX 20 MIN: CPT | Mod: GP | Performed by: PHYSICAL THERAPIST

## 2025-05-19 PROCEDURE — 2500000004 HC RX 250 GENERAL PHARMACY W/ HCPCS (ALT 636 FOR OP/ED): Mod: JZ

## 2025-05-19 PROCEDURE — 99232 SBSQ HOSP IP/OBS MODERATE 35: CPT | Performed by: SURGERY

## 2025-05-19 PROCEDURE — 2500000002 HC RX 250 W HCPCS SELF ADMINISTERED DRUGS (ALT 637 FOR MEDICARE OP, ALT 636 FOR OP/ED)

## 2025-05-19 PROCEDURE — 2500000001 HC RX 250 WO HCPCS SELF ADMINISTERED DRUGS (ALT 637 FOR MEDICARE OP)

## 2025-05-19 PROCEDURE — 85014 HEMATOCRIT: CPT | Performed by: SURGERY

## 2025-05-19 PROCEDURE — 71045 X-RAY EXAM CHEST 1 VIEW: CPT

## 2025-05-19 PROCEDURE — 99239 HOSP IP/OBS DSCHRG MGMT >30: CPT | Performed by: INTERNAL MEDICINE

## 2025-05-19 PROCEDURE — 85027 COMPLETE CBC AUTOMATED: CPT | Performed by: INTERNAL MEDICINE

## 2025-05-19 PROCEDURE — 2500000001 HC RX 250 WO HCPCS SELF ADMINISTERED DRUGS (ALT 637 FOR MEDICARE OP): Performed by: INTERNAL MEDICINE

## 2025-05-19 PROCEDURE — 97165 OT EVAL LOW COMPLEX 30 MIN: CPT | Mod: GO | Performed by: OCCUPATIONAL THERAPIST

## 2025-05-19 PROCEDURE — 80048 BASIC METABOLIC PNL TOTAL CA: CPT | Performed by: INTERNAL MEDICINE

## 2025-05-19 RX ORDER — VERAPAMIL HCL 240 MG
240 TABLET, EXTENDED RELEASE ORAL NIGHTLY
Status: DISCONTINUED | OUTPATIENT
Start: 2025-05-19 | End: 2025-05-19 | Stop reason: HOSPADM

## 2025-05-19 RX ADMIN — ACETAMINOPHEN 650 MG: 325 TABLET ORAL at 06:51

## 2025-05-19 RX ADMIN — ENOXAPARIN SODIUM 40 MG: 40 INJECTION SUBCUTANEOUS at 08:05

## 2025-05-19 RX ADMIN — LOSARTAN POTASSIUM 50 MG: 50 TABLET, FILM COATED ORAL at 08:03

## 2025-05-19 RX ADMIN — BACLOFEN 5 MG: 10 TABLET ORAL at 15:47

## 2025-05-19 RX ADMIN — ASPIRIN 81 MG CHEWABLE TABLET 81 MG: 81 TABLET CHEWABLE at 08:04

## 2025-05-19 RX ADMIN — SPIRONOLACTONE 50 MG: 25 TABLET ORAL at 08:04

## 2025-05-19 RX ADMIN — LEVOTHYROXINE SODIUM 175 MCG: 0.12 TABLET ORAL at 05:29

## 2025-05-19 RX ADMIN — BUPROPION HYDROCHLORIDE 150 MG: 100 TABLET, FILM COATED ORAL at 08:03

## 2025-05-19 RX ADMIN — ACETAMINOPHEN 650 MG: 325 TABLET ORAL at 00:07

## 2025-05-19 RX ADMIN — LIDOCAINE 4% 1 PATCH: 40 PATCH TOPICAL at 08:05

## 2025-05-19 RX ADMIN — BACLOFEN 5 MG: 10 TABLET ORAL at 08:04

## 2025-05-19 RX ADMIN — ACETAMINOPHEN 650 MG: 325 TABLET ORAL at 11:58

## 2025-05-19 RX ADMIN — BACITRACIN ZINC: 500 OINTMENT TOPICAL at 08:07

## 2025-05-19 ASSESSMENT — COGNITIVE AND FUNCTIONAL STATUS - GENERAL
TOILETING: A LITTLE
MOVING TO AND FROM BED TO CHAIR: A LITTLE
DRESSING REGULAR UPPER BODY CLOTHING: A LITTLE
MOVING TO AND FROM BED TO CHAIR: A LITTLE
WALKING IN HOSPITAL ROOM: A LITTLE
STANDING UP FROM CHAIR USING ARMS: A LITTLE
PERSONAL GROOMING: A LITTLE
MOBILITY SCORE: 19
WALKING IN HOSPITAL ROOM: A LITTLE
HELP NEEDED FOR BATHING: A LITTLE
MOBILITY SCORE: 21
DAILY ACTIVITIY SCORE: 20
CLIMB 3 TO 5 STEPS WITH RAILING: A LITTLE
DAILY ACTIVITIY SCORE: 24
CLIMB 3 TO 5 STEPS WITH RAILING: A LOT

## 2025-05-19 ASSESSMENT — ACTIVITIES OF DAILY LIVING (ADL)
BATHING_ASSISTANCE: INDEPENDENT
ADL_ASSISTANCE: INDEPENDENT
LACK_OF_TRANSPORTATION: NO

## 2025-05-19 ASSESSMENT — PAIN - FUNCTIONAL ASSESSMENT
PAIN_FUNCTIONAL_ASSESSMENT: 0-10

## 2025-05-19 ASSESSMENT — PAIN DESCRIPTION - LOCATION
LOCATION: RIB CAGE
LOCATION: RIB CAGE

## 2025-05-19 ASSESSMENT — PAIN DESCRIPTION - DESCRIPTORS
DESCRIPTORS: ACHING;SORE
DESCRIPTORS: ACHING;SORE

## 2025-05-19 ASSESSMENT — PAIN SCALES - GENERAL
PAINLEVEL_OUTOF10: 8
PAINLEVEL_OUTOF10: 3
PAINLEVEL_OUTOF10: 6
PAINLEVEL_OUTOF10: 4

## 2025-05-19 ASSESSMENT — PAIN DESCRIPTION - ORIENTATION
ORIENTATION: LEFT
ORIENTATION: LEFT

## 2025-05-19 NOTE — HOSPITAL COURSE
69-year-old male with past medical history of CAD status post CABG in 2005, hypertension, hyperlipidemia, chronic nicotine dependence, hypothyroidism, esophageal cancer status post esophagectomy diet Dr. Salas in May 2025 on J-tube presented for evaluation after a fall.  Patient was found to have 3.5 x 2.5 linear hypodensity is present in the medial spleen in the interim since prior exam in March of 2025, suspicious for a new splenic laceration.  New/acute minimally displaced fracture of the S4 vertebral body.  New/acute compression fracture along the superior and inferior endplate of L1.  Acute care surgery evaluated patient and recommended monitoring his H&H.  His H&H has remained stable and they cleared him for discharge.  Patient is supposed to not have any activity for 2 months which could potentially cause trauma to his splenic area.  Patient has stopped recently with his tube feeds and we recommended increasing oral intake with small frequent meals.  His sodium has improved to 132.  Currently is in stable condition for discharge with follow-up with PCP and primary surgeon as outpatient.    39 minutes spent in discharge timing

## 2025-05-19 NOTE — NURSING NOTE
Pt being discharged home. Discharge instructions reviewed with the patient reguarding new/continued medications and follow up appointments. Medications at bedside via Med to Beds. All questions answered at this time. All belongings to go with the patient. IV removed with tip of catheter intact.  Pt wheeled out via wheelchair with PCA for discharge.

## 2025-05-19 NOTE — PROGRESS NOTES
"    GENERAL SURGERY / TRAUMA PROGRESS NOTE    Patient: Timoteo Victoria  Room: 3327/3327-A    Age: 69 y.o.   Gender: male  Attending: Wolfgang Pascual MD    MRN: [id number]  Admission Date: 5/17/2025    PCP: Dafne Bedolla DO       Timoteo Victoria is a 69 y.o. male on day 1  of admission presenting with Fall, initial encounter.    SUBJECTIVE   Interval History:  No new issues overnight.  Tolerating some diet.  Having left-sided chest wall pain.    ROS  Review of Systems   Constitutional:  Positive for fatigue. Negative for chills and fever.   HENT:  Negative for congestion, ear pain and hearing loss.    Eyes:  Negative for pain and redness.   Respiratory:  Positive for shortness of breath. Negative for cough.    Cardiovascular:  Positive for chest pain and leg swelling.   Gastrointestinal:  Negative for abdominal pain, constipation, diarrhea, nausea and vomiting.   Endocrine: Negative for polyphagia.   Genitourinary:  Negative for dysuria, flank pain, frequency and hematuria.   Musculoskeletal:  Positive for arthralgias and myalgias.   Skin:  Positive for wound. Negative for rash.   Allergic/Immunologic: Negative for immunocompromised state.   Neurological:  Positive for weakness. Negative for speech difficulty and headaches.   Hematological:  Bruises/bleeds easily.   Psychiatric/Behavioral:  Negative for agitation and confusion.      OBJECTIVE   Last Recorded Vitals  Blood pressure 127/72, pulse 94, temperature 36.4 °C (97.5 °F), temperature source Temporal, resp. rate 16, height 1.778 m (5' 10\"), weight 82.8 kg (182 lb 9.6 oz), SpO2 100%.    Intake/Output last 3 Shifts:  I/O last 3 completed shifts:  In: 2010 (24.3 mL/kg) [P.O.:2010]  Out: - (0 mL/kg)   Weight: 82.8 kg     PHYSICAL EXAM  Physical Exam   Neurologic: sensation and motor function intact in bilateral upper and lower extremities  HEENT:              Head: Skull intact. Midface stable to palpation.  Ecchymosis to left forehead and facial area " without any open wounds.              Eyes: Equal, round and reactive to light.              Ears: No hemotympanum. No abrasions or lacerations.              Nose: no abrasions or lacerations. No blood per nares              Oral Cavity: No blood noted. Dentition intact.  Neck: Soft and supple. Trachea midline. No abrasions, lacerations or contusions. No crepitus.  Pulmonary: Clear to auscultation bilaterally. External: No abrasions, lacerations or contusions. No crepitus.  Cardiovascular: Pulses: palpable bilateral radial, femoral, dorsalis pedis and posterior tibial arteries  Abdomen: Soft, nondistended and nontender to palpation.  Well-healed midline scar consistent with recent surgery.  Jejunostomy feeding tube in left abdomen without any redness or drainage.  Pelvis/Perineum: Pelvis stable to palpation. No pubic symphysis widening palpated. Perineum without ecchymosis.  Musculoskeletal: Extremities: No deformities or joint swelling.  Abrasions of left elbow and forearm are currently wrapped in a dressing without any active bleeding.  Back/Spine: Nontender and no stepoffs. Back without abrasions, lacerations or contusions.                  RESULTS   Labs  Results for orders placed or performed during the hospital encounter of 05/17/25 (from the past 24 hours)   Hemoglobin and hematocrit, blood   Result Value Ref Range    Hemoglobin 10.4 (L) 13.5 - 17.5 g/dL    Hematocrit 30.2 (L) 41.0 - 52.0 %   Hemoglobin and hematocrit, blood   Result Value Ref Range    Hemoglobin 11.5 (L) 13.5 - 17.5 g/dL    Hematocrit 33.7 (L) 41.0 - 52.0 %   Basic Metabolic Panel   Result Value Ref Range    Glucose 80 74 - 99 mg/dL    Sodium 132 (L) 136 - 145 mmol/L    Potassium 3.8 3.5 - 5.3 mmol/L    Chloride 98 98 - 107 mmol/L    Bicarbonate 23 21 - 32 mmol/L    Anion Gap 15 10 - 20 mmol/L    Urea Nitrogen 15 6 - 23 mg/dL    Creatinine 0.75 0.50 - 1.30 mg/dL    eGFR >90 >60 mL/min/1.73m*2    Calcium 8.9 8.6 - 10.3 mg/dL   CBC   Result  Value Ref Range    WBC 7.4 4.4 - 11.3 x10*3/uL    nRBC 0.0 0.0 - 0.0 /100 WBCs    RBC 3.55 (L) 4.50 - 5.90 x10*6/uL    Hemoglobin 11.2 (L) 13.5 - 17.5 g/dL    Hematocrit 32.7 (L) 41.0 - 52.0 %    MCV 92 80 - 100 fL    MCH 31.5 26.0 - 34.0 pg    MCHC 34.3 32.0 - 36.0 g/dL    RDW 12.6 11.5 - 14.5 %    Platelets 169 150 - 450 x10*3/uL   Hemoglobin and hematocrit, blood   Result Value Ref Range    Hemoglobin 11.6 (L) 13.5 - 17.5 g/dL    Hematocrit 34.0 (L) 41.0 - 52.0 %     *Note: Due to a large number of results and/or encounters for the requested time period, some results have not been displayed. A complete set of results can be found in Results Review.       Radiology Resutls  Imaging  XR chest 1 view  Result Date: 5/19/2025  No acute radiographic abnormality   MACRO: None.   Signed by: Myrna House 5/19/2025 8:46 AM Dictation workstation:   [id number]    XR chest 1 view  Result Date: 5/18/2025  No acute cardiopulmonary process.   MACRO: None   Signed by: Janine Jacobson 5/18/2025 12:41 AM Dictation workstation:   [id number]    CT abdomen pelvis w IV contrast  Result Date: 5/17/2025  CT ABDOMEN AND PELVIS: 1. A new 3.5 x 2.5 linear hypodensity is present in the medial spleen in the interim since prior exam in March of 2025, suspicious for a new splenic laceration, although there is no significant adjacent free fluid/hemorrhage. 2. Slight fat stranding is present in the presacral space, with a new/acute minimally displaced fracture of the S4 vertebral body. 3. Surgical changes of esophagectomy with gastric pull-through, with the left anterior percutaneous jejunostomy tube in place.   CT LUMBAR SPINE: 1. New/acute compression fracture along the superior and inferior endplate of L1 with upwards of 30-40% height loss, but without significant bony retropulsion. 2. No other acute trauma is identified in the lumbar spine. 3. Multilevel degenerative changes of the lumbar spine with least moderate stenosis suspected in  the spinal canal at L3-L4 and L4-L5 due to disc osteophyte complex and ligamentum flavum thickening and moderate to severe neural foraminal stenosis suspected at several levels.   MACRO: None.   Signed by: Jeffrey Lares 5/17/2025 8:39 PM Dictation workstation:   [id number]    CT lumbar spine retrospective reconstruction protocol  Result Date: 5/17/2025  CT ABDOMEN AND PELVIS: 1. A new 3.5 x 2.5 linear hypodensity is present in the medial spleen in the interim since prior exam in March of 2025, suspicious for a new splenic laceration, although there is no significant adjacent free fluid/hemorrhage. 2. Slight fat stranding is present in the presacral space, with a new/acute minimally displaced fracture of the S4 vertebral body. 3. Surgical changes of esophagectomy with gastric pull-through, with the left anterior percutaneous jejunostomy tube in place.   CT LUMBAR SPINE: 1. New/acute compression fracture along the superior and inferior endplate of L1 with upwards of 30-40% height loss, but without significant bony retropulsion. 2. No other acute trauma is identified in the lumbar spine. 3. Multilevel degenerative changes of the lumbar spine with least moderate stenosis suspected in the spinal canal at L3-L4 and L4-L5 due to disc osteophyte complex and ligamentum flavum thickening and moderate to severe neural foraminal stenosis suspected at several levels.   MACRO: None.   Signed by: Jeffrey Lares 5/17/2025 8:39 PM Dictation workstation:   [id number]    CT angio chest for pulmonary embolism  Addendum Date: 5/17/2025  Interpreted By:  Jeffrey Lares, ADDENDUM: Subtle new buckling is present within the inner table of the 6th and 7th left anterior rib at the costochondral junction (series 6, image 199 and 232), new since prior exam on 03/11/2025, likely representing acute nondisplaced rib fractures.   Signed by: Jeffrey Lares 5/17/2025 8:21 PM   -------- ORIGINAL REPORT -------- Dictation  workstation:   [id number]    Result Date: 5/17/2025  CTA CHEST: 1. No evidence of pulmonary embolism or acute trauma to the thorax. No displaced rib fractures are identified. 2. Changes of esophagectomy with gastric pull-through, new since prior exam in March of 2025. The gastric conduit in the mediastinum appears slightly edematous and is fluid-filled, although there is no evidence of adjacent fluid collections, pneumomediastinum or reactive soft tissue changes. 3. Upper lobe predominant paraseptal and centrilobular emphysema with some mild atelectasis in the lung bases without evidence of new consolidation, pleural effusion or other acute cardiopulmonary abnormality.   CT THORACIC SPINE: 1. No evidence of acute trauma to the thoracic spine. 2. Multilevel insufficiency endplate changes with numerous Schmorl's nodes are similar in appearance to prior exam in March of 2025.   MACRO: None   Signed by: Jeffrey Lares 5/17/2025 8:12 PM Dictation workstation:   [id number]    CT thoracic spine retrospective reconstruction protocol  Addendum Date: 5/17/2025  Interpreted By:  Jeffrey Lares, ADDENDUM: Subtle new buckling is present within the inner table of the 6th and 7th left anterior rib at the costochondral junction (series 6, image 199 and 232), new since prior exam on 03/11/2025, likely representing acute nondisplaced rib fractures.   Signed by: Jeffrey Lares 5/17/2025 8:21 PM   -------- ORIGINAL REPORT -------- Dictation workstation:   [id number]    Result Date: 5/17/2025  CTA CHEST: 1. No evidence of pulmonary embolism or acute trauma to the thorax. No displaced rib fractures are identified. 2. Changes of esophagectomy with gastric pull-through, new since prior exam in March of 2025. The gastric conduit in the mediastinum appears slightly edematous and is fluid-filled, although there is no evidence of adjacent fluid collections, pneumomediastinum or reactive soft tissue changes. 3. Upper  lobe predominant paraseptal and centrilobular emphysema with some mild atelectasis in the lung bases without evidence of new consolidation, pleural effusion or other acute cardiopulmonary abnormality.   CT THORACIC SPINE: 1. No evidence of acute trauma to the thoracic spine. 2. Multilevel insufficiency endplate changes with numerous Schmorl's nodes are similar in appearance to prior exam in March of 2025.   MACRO: None   Signed by: Jeffrey Lares 5/17/2025 8:12 PM Dictation workstation:   [id number]    CT cervical spine wo IV contrast  Result Date: 5/17/2025  CT HEAD: 1. No evidence of hemorrhage, skull fracture, or other acute intracranial trauma/abnormality. 2. Low volume of the attenuation changes present in the periventricular and subcortical white matter of bilateral cerebral hemispheres is nonspecific, and favored to represent chronic sequela of microvascular disease.   CT FACIAL BONES: 1. Mild left periorbital soft tissue swelling and edema, likely posttraumatic, without evidence of acute orbital, facial bone or nasal bone fracture. 2. Numerous missing teeth, with large dental cavities and periapical lucencies present in the remaining teeth, with several areas of cortical dehiscence/erosion is present in the outer cortex of the maxilla, although no significant inflammatory changes are present in the adjacent soft tissues. Correlate with dental exam.   CT C-SPINE: 1. No evidence of acute trauma to the cervical spine. 2. Spondylotic changes of the cervical spine as described above. 3. Mildly hyperdense, 2 cm mass in the left parotid gland is unchanged in appearance to prior exam in July of 2022.   MACRO: None   Signed by: Jeffrey Lares 5/17/2025 7:59 PM Dictation workstation:   [id number]    CT head wo IV contrast  Result Date: 5/17/2025  CT HEAD: 1. No evidence of hemorrhage, skull fracture, or other acute intracranial trauma/abnormality. 2. Low volume of the attenuation changes present in the  periventricular and subcortical white matter of bilateral cerebral hemispheres is nonspecific, and favored to represent chronic sequela of microvascular disease.   CT FACIAL BONES: 1. Mild left periorbital soft tissue swelling and edema, likely posttraumatic, without evidence of acute orbital, facial bone or nasal bone fracture. 2. Numerous missing teeth, with large dental cavities and periapical lucencies present in the remaining teeth, with several areas of cortical dehiscence/erosion is present in the outer cortex of the maxilla, although no significant inflammatory changes are present in the adjacent soft tissues. Correlate with dental exam.   CT C-SPINE: 1. No evidence of acute trauma to the cervical spine. 2. Spondylotic changes of the cervical spine as described above. 3. Mildly hyperdense, 2 cm mass in the left parotid gland is unchanged in appearance to prior exam in July of 2022.   MACRO: None   Signed by: Jeffrey Lares 5/17/2025 7:59 PM Dictation workstation:   [id number]    CT maxillofacial bones wo IV contrast  Result Date: 5/17/2025  CT HEAD: 1. No evidence of hemorrhage, skull fracture, or other acute intracranial trauma/abnormality. 2. Low volume of the attenuation changes present in the periventricular and subcortical white matter of bilateral cerebral hemispheres is nonspecific, and favored to represent chronic sequela of microvascular disease.   CT FACIAL BONES: 1. Mild left periorbital soft tissue swelling and edema, likely posttraumatic, without evidence of acute orbital, facial bone or nasal bone fracture. 2. Numerous missing teeth, with large dental cavities and periapical lucencies present in the remaining teeth, with several areas of cortical dehiscence/erosion is present in the outer cortex of the maxilla, although no significant inflammatory changes are present in the adjacent soft tissues. Correlate with dental exam.   CT C-SPINE: 1. No evidence of acute trauma to the cervical  spine. 2. Spondylotic changes of the cervical spine as described above. 3. Mildly hyperdense, 2 cm mass in the left parotid gland is unchanged in appearance to prior exam in July of 2022.   MACRO: None   Signed by: Jeffrey Lares 5/17/2025 7:59 PM Dictation workstation:   [id number]    CT 3D reconstruction  Result Date: 5/17/2025  CT HEAD: 1. No evidence of hemorrhage, skull fracture, or other acute intracranial trauma/abnormality. 2. Low volume of the attenuation changes present in the periventricular and subcortical white matter of bilateral cerebral hemispheres is nonspecific, and favored to represent chronic sequela of microvascular disease.   CT FACIAL BONES: 1. Mild left periorbital soft tissue swelling and edema, likely posttraumatic, without evidence of acute orbital, facial bone or nasal bone fracture. 2. Numerous missing teeth, with large dental cavities and periapical lucencies present in the remaining teeth, with several areas of cortical dehiscence/erosion is present in the outer cortex of the maxilla, although no significant inflammatory changes are present in the adjacent soft tissues. Correlate with dental exam.   CT C-SPINE: 1. No evidence of acute trauma to the cervical spine. 2. Spondylotic changes of the cervical spine as described above. 3. Mildly hyperdense, 2 cm mass in the left parotid gland is unchanged in appearance to prior exam in July of 2022.   MACRO: None   Signed by: Jeffrey Lares 5/17/2025 7:59 PM Dictation workstation:   [id number]    XR shoulder left 2+ views  Result Date: 5/17/2025  Degenerative changes without osseous injury evident.   MACRO: None   Signed by: Tye Banks 5/17/2025 5:51 PM Dictation workstation:   [id number]    XR elbow left 3+ views  Result Date: 5/17/2025  No osseous injury is evident.   MACRO: None   Signed by: Tye Banks 5/17/2025 5:50 PM Dictation workstation:   [id number]      Cardiology, Vascular, and Other Imaging  ECG 12  lead  Result Date: 5/19/2025  Sinus rhythm Right bundle branch block Baseline wander in lead(s) V6    ECG 12 Lead  Result Date: 5/19/2025  Sinus rhythm Right bundle branch block           ASSESSMENT / PLAN     Assessment & Plan  Fall, initial encounter    Hyponatremia    Splenic laceration    Closed compression fracture of L1 lumbar vertebra, initial encounter (Multi)    Abrasion of left arm    Facial bruising, initial encounter         PLAN  With regards to the splenic laceration, there does not appear to be any active bleeding.  He actually presented the day after his injury, and there was no hematoma or blush on the CT evaluation.  Hemoglobin levels have been very stable for 48 hours.  Discussed no activity for the next 2 months which could potentially cause further trauma to the splenic region.  Okay to discharge from a trauma standpoint.  He has multiple abrasions, bruises and vertebral compression fractures.  He seems relatively asymptomatic.  He complains more of left-sided chest pain, but radiographic evaluation did not identify any rib fractures.  Can provide a lidocaine patch to the left chest to help with pain control.  Local wound care to left arm abrasions.  Having recurrent hyponatremia.  He has had some dietary changes in the last several weeks as he has stopped taking his tube feeds (as instructed by his surgeon) approximately 1.5 weeks ago.  Will provide oral nutritional supplements.  Discussed eating 6-8 small meals per day since he is status post esophagectomy with gastric pull-up.  Noted plans for nutritional consultation.  Should follow-up with his surgeon as planned.         Kerry Henry MD, FACS   [facility]  [address] [place] [address];   [address]  [place] [id number] [phone]

## 2025-05-19 NOTE — DISCHARGE INSTRUCTIONS
1.  Avoid any physical activity for the next 2 months which can potentially cause any chest/abdominal trauma.  Would recommend not riding your motorcycle for 2 months.  2.  You may remove the bandage from your left arm before showering.  After drying, place a small amount of bacitracin/Neosporin on the wounds and cover with a dry gauze/tape dressing.  Try to avoid tape on your skin.  3.  You should drink 2-3 protein drinks per day in between meals to optimize your nutrition.  4.  Follow-up with your surgeon regarding your recent esophageal cancer as already scheduled.

## 2025-05-19 NOTE — CARE PLAN
Problem: Pain - Adult  Goal: Verbalizes/displays adequate comfort level or baseline comfort level  Outcome: Progressing     Problem: Safety - Adult  Goal: Free from fall injury  Outcome: Progressing     Problem: Discharge Planning  Goal: Discharge to home or other facility with appropriate resources  Outcome: Progressing     Problem: Chronic Conditions and Co-morbidities  Goal: Patient's chronic conditions and co-morbidity symptoms are monitored and maintained or improved  Outcome: Progressing     Problem: Nutrition  Goal: Nutrient intake appropriate for maintaining nutritional needs  Outcome: Progressing     Problem: Fall/Injury  Goal: Not fall by end of shift  Outcome: Progressing  Goal: Be free from injury by end of the shift  Outcome: Progressing  Goal: Verbalize understanding of personal risk factors for fall in the hospital  Outcome: Progressing  Goal: Verbalize understanding of risk factor reduction measures to prevent injury from fall in the home  Outcome: Progressing  Goal: Use assistive devices by end of the shift  Outcome: Progressing  Goal: Pace activities to prevent fatigue by end of the shift  Outcome: Progressing     Problem: Pain  Goal: Takes deep breaths with improved pain control throughout the shift  Outcome: Progressing  Goal: Turns in bed with improved pain control throughout the shift  Outcome: Progressing  Goal: Walks with improved pain control throughout the shift  Outcome: Progressing  Goal: Performs ADL's with improved pain control throughout shift  Outcome: Progressing  Goal: Participates in PT with improved pain control throughout the shift  Outcome: Progressing  Goal: Free from acute confusion related to pain meds throughout the shift  Outcome: Progressing     Problem: Respiratory  Goal: Clear secretions with interventions this shift  Outcome: Progressing  Goal: Minimize anxiety/maximize coping throughout shift  Outcome: Progressing  Goal: Minimal/no exertional discomfort or dyspnea  this shift  Outcome: Progressing  Goal: No signs of respiratory distress (eg. Use of accessory muscles. Peds grunting)  Outcome: Progressing  Goal: Patent airway maintained this shift  Outcome: Progressing  Goal: Tolerate pulmonary toileting this shift  Outcome: Progressing  Goal: Verbalize decreased shortness of breath this shift  Outcome: Progressing  Goal: Wean oxygen to maintain O2 saturation per order/standard this shift  Outcome: Progressing  Goal: Increase self care and/or family involvement in next 24 hours  Outcome: Progressing     Problem: Diabetes  Goal: Achieve decreasing blood glucose levels by end of shift  Outcome: Progressing  Goal: Increase stability of blood glucose readings by end of shift  Outcome: Progressing  Goal: Maintain electrolyte levels within acceptable range throughout shift  Outcome: Progressing  Goal: Maintain glucose levels >70mg/dl to <250mg/dl throughout shift  Outcome: Progressing  Goal: No changes in neurological exam by end of shift  Outcome: Progressing  Goal: Learn about and adhere to nutrition recommendations by end of shift  Outcome: Progressing  Goal: Vital signs within normal range for age by end of shift  Outcome: Progressing  Goal: Increase self care and/or family involovement by end of shift  Outcome: Progressing

## 2025-05-19 NOTE — PROGRESS NOTES
Occupational Therapy    Evaluation    Patient Name: Timoteo Victoria  MRN: [id number]  Department: [facility] 3 E  Room: [address] [place] [address]  Today's Date: 5/19/2025  Time Calculation  Start Time: 1058  Stop Time: 1110  Time Calculation (min): 12 min    Assessment  IP OT Assessment  OT Assessment: Initial evaluation complete. Pt appears to be at/close to his functional baseline. Pt aware of limitation due to weakness and use of energy conservation techniques while working to increase his caloric intake s/p espohogeal sx. Skilled OT intervention is not indicated at this time. Pt agrees. Orders to be discontinued.  Prognosis: Good  Barriers to Discharge Home: No anticipated barriers  Evaluation/Treatment Tolerance: Patient tolerated treatment well  Medical Staff Made Aware: Yes  End of Session Communication: Bedside nurse  End of Session Patient Position: Up in chair, Alarm off, not on at start of session (nursing indicates alarm is not needed)  Plan:  No Skilled OT: No acute OT goals identified  OT Frequency: OT eval only  OT Discharge Recommendations: No further acute OT  OT - OK to Discharge: Yes    Subjective   Current Problem:  1. Fall, initial encounter  Referral to Occupational Therapy    Referral to Physical Therapy    PT/OT, supportive care      2. Hyponatremia  Referral to Occupational Therapy    Referral to Physical Therapy    monitor      3. Compression fracture of L1 vertebra, initial encounter (Multi)      supportive care      4. Laceration of spleen, initial encounter      no signs of active bleeding, monitor H/H, surgery note appreciated.      5. Facial contusion, initial encounter        6. Skin tear of left elbow without complication, initial encounter        7. Benign essential hypertension      resume home meds, monitor      8. Hypothyroidism, unspecified type      continue synthroid      9. Depression, major, recurrent, moderate      home meds        OT Visit Info:  OT Received On: 05/19/25  General Visit  Info:  General  Reason for Referral: OT for ADL assessment  Referred By: John Shore PA-C  Past Medical History Relevant to Rehab: 69 YOM presented from home with fall; Pt sustained laceration of spleen, facial contusion, left elbow skin tear and compression fracture of L1 and found to be hyponatremic (Hx of esophageal carcinoma s/p sx and removal of esophagus and placement of PEG tube, CAD, HLD, HTN, hypothyroidism, cholecystectomy, hernia repar, CABG, cataract extraction)  Family/Caregiver Present: No  Prior to Session Communication: Bedside nurse  Patient Position Received: Up in chair, Alarm off, not on at start of session  Preferred Learning Style: verbal, visual  General Comment: OT orders received and chart reviewed. Pt educated on reason for OT consultation and acute services. Pt agreeable to participate.  Precautions:  Hearing/Visual Limitations: WFL  UE Weight Bearing Status: Weight Bearing as Tolerated  LE Weight Bearing Status: Weight Bearing as Tolerated  Medical Precautions: Fall precautions (Aspiration precautions)  Precautions Comment: PEG tube      Pain:  Pain Assessment  Pain Assessment: 0-10  0-10 (Numeric) Pain Score: 3  Pain Type: Acute pain  Pain Location: Generalized  Pain Descriptors: Aching, Sore  Pain Frequency: Constant/continuous  Clinical Progression: Gradually improving    Objective   Cognition:  Overall Cognitive Status: Within Functional Limits  Orientation Level: Oriented X4  Safety/Judgement: Within Functional Limits  Insight: Within function limits     Confusion Assessment Method (CAM)  Acute Onset and Fluctuating Course (1A): No  Sethi Agitation Sedation Scale  Sethi Agitation Sedation Scale (RASS): Alert and calm  Home Living:  Type of Home: House  Lives With: Alone  Home Adaptive Equipment: Walker rolling or standard  Home Layout: One level  Home Access: Level entry  Bathroom Shower/Tub: Tub/shower unit  Bathroom Toilet: Standard  Bathroom Equipment: Grab bars in  shower  Bathroom Accessibility: no concerns  Home Living Comments: no concerns   Prior Function:  Level of [place]: Independent with ADLs and functional transfers, Independent with homemaking with ambulation  ADL Assistance: Independent  Homemaking Assistance: Independent  Ambulatory Assistance: Independent  Vocational: Retired  Leisure: Riding his motorcycle  Prior Function Comments: Pt reports wt loss and low energy since esphogeal surgery are impacting his day to day activities (pt educated on advocating for dietery/nutritionist consultation. Pt able to complete all tasks however at risk of additional falls due to potential nutrition issues)  IADL History:  Homemaking Responsibilities: Yes  Meal Prep Responsibility: Primary  Laundry Responsibility: Primary  Cleaning Responsibility: Primary  Bill Paying/Finance Responsibility: Primary  Shopping Responsibility: Primary  Current License: Yes  Mode of Transportation: Car, Motorcycle  ADL:  Eating Assistance: Independent  Eating Deficit: Feeding tube (pt states he is working with surgeon to increase oral intake)  Grooming Assistance: Independent  Bathing Assistance: Independent  UE Dressing Assistance: Independent  LE Dressing Assistance: Independent  Toileting Assistance with Device: Independent  Functional Assistance: Independent  ADL Comments: pt requires frequent rest breaks due to poor energy levels; pt educated on use of energy conservation techniques. Pt demonstrates awareness of pacing himself and being mindful of safety.  Activity Tolerance:  Endurance: Tolerates 10 - 20 min exercise with multiple rests  Bed Mobility/Transfers: Bed Mobility  Bed Mobility: No    Transfers  Transfer: Yes (independent)  Functional Mobility:  Functional Mobility  Functional Mobility Performed: No (pt reports and PT confirms ambulating out of room and down hallway prior to OT arrival)  Sitting Balance:  Static Sitting Balance  Static Sitting-Level of Assistance:  "Independent  Dynamic Sitting Balance  Dynamic Sitting-Level of Assistance: Independent  Standing Balance:  Static Standing Balance  Static Standing-Level of Assistance: Independent  Dynamic Standing Balance  Dynamic Standing-Level of Assistance: Contact guard  Dynamic Standing-Comments: independent to contact guard for longer distances based on pt and PT report of pt feeling fatiqued with longer distances/extended standing  Modalities:  Modalities Used: No  IADL's:   Homemaking Responsibilities: Yes  Meal Prep Responsibility: Primary  Laundry Responsibility: Primary  Cleaning Responsibility: Primary  Bill Paying/Finance Responsibility: Primary  Shopping Responsibility: Primary  Current License: Yes  Mode of Transportation: Car, Motorcycle  Vision: Vision - Basic Assessment  Current Vision: No visual deficits  Visual History: Cataracts  Sensation:  Sensation Comment: WFL  Strength:  Strength Comments: BUE overall WFL as observed during functional activity  Perception:  Inattention/Neglect: Appears intact  Initiation: Appears intact  Motor Planning: Appears intact  Perseveration: Not present  Coordination:  Movements are Fluid and Coordinated: Yes   Hand Function:  Hand Function  Gross Grasp: Functional  Coordination: Functional  Extremities: RUE   RUE : Within Functional Limits, LUE   LUE: Within Functional Limits (pt reports mild \"stiffness\" of LUE following fall; pt educated on AROM  exercises in sitting throughout the day to maintain full use and range of LUE and prevent further stiffness/weakness), RLE   RLE : Within Functional Limits, and LLE   LLE : Within Functional Limits      Outcome Measures: [facility] Daily Activity  Putting on and taking off regular lower body clothing: None  Bathing (including washing, rinsing, drying): None  Putting on and taking off regular upper body clothing: None  Toileting, which includes using toilet, bedpan or urinal: None  Taking care of personal grooming such as brushing teeth: " None  Eating Meals: None  Daily Activity - Total Score: 24      Education Documentation  Body Mechanics, taught by Maria R Mcintosh OT at 5/19/2025  1:34 PM.  Learner: Patient  Readiness: Acceptance  Method: Explanation  Response: Verbalizes Understanding, Demonstrated Understanding    Precautions, taught by Maria R Mcintosh OT at 5/19/2025  1:34 PM.  Learner: Patient  Readiness: Acceptance  Method: Explanation  Response: Verbalizes Understanding, Demonstrated Understanding    ADL Training, taught by Maria R Mcintosh OT at 5/19/2025  1:34 PM.  Learner: Patient  Readiness: Acceptance  Method: Explanation  Response: Verbalizes Understanding, Demonstrated Understanding

## 2025-05-19 NOTE — PROGRESS NOTES
Physical Therapy    Physical Therapy Evaluation    Patient Name: Timoteo Victoria  MRN: [id number]  Today's Date: 5/19/2025   Time Calculation  Start Time: 1046  Stop Time: 1108  Time Calculation (min): 22 min  3327/3327-A    Assessment/Plan   PT Assessment  PT Assessment Results: Decreased strength, Decreased endurance, Impaired balance, Decreased mobility, Pain  Rehab Prognosis: Fair  Barriers to Discharge Home: Caregiver assistance, Physical needs  Caregiver Assistance: Patient lives alone and/or does not have reliable caregiver assistance  Physical Needs: High falls risk due to function or environment (ambulating community distances)  Evaluation/Treatment Tolerance: Patient limited by fatigue  End of Session Communication: Care Coordinator, Bedside nurse (mobility aide)  Assessment Comment: Recommend LOW INTENSITY REHAB:enforce/train on use of FWW for longer distances, LE strengthening. Also CONCERN PATIENT NOT EATING ENOUGH, DIFFICULTY TO ACCESS GROCERIES.  End of Session Patient Position: Bed, 3 rail up, Alarm off, not on at start of session  IP OR SWING BED PT PLAN  Inpatient or Swing Bed: Inpatient  PT Plan  Treatment/Interventions: Bed mobility, Transfer training, Gait training, Neuromuscular re-education, Therapeutic exercise, Therapeutic activity  PT Plan: Ongoing PT  PT Frequency: 4 times per week  PT Discharge Recommendations: Low intensity level of continued care  PT - OK to Discharge: Yes    Subjective     Current Problem:  1. Fall, initial encounter  Referral to Occupational Therapy    Referral to Physical Therapy    PT/OT, supportive care      2. Hyponatremia  Referral to Occupational Therapy    Referral to Physical Therapy    monitor      3. Compression fracture of L1 vertebra, initial encounter (Multi)      supportive care      4. Laceration of spleen, initial encounter      no signs of active bleeding, monitor H/H, surgery note appreciated.      5. Facial contusion, initial encounter        6.  "Skin tear of left elbow without complication, initial encounter        7. Benign essential hypertension      resume home meds, monitor      8. Hypothyroidism, unspecified type      continue synthroid      9. Depression, major, recurrent, moderate      home meds        Problem List[1]    General Visit Information:  General  Reason for Referral: SOB, CP, LE swelling  Referred By: PT FOR IMPAIRED MOBILITY/GAIT TRAINING  Past Medical History Relevant to Rehab: esophageal carcinoma s/p sx and removal of esophagus  and placement of PEG tube, CAD, HLD, HTN, hypothyroidism, cholecystectomy, hernia repar, CABG, cataract extraction  Family/Caregiver Present: No  Co-Treatment: OT  Co-Treatment Reason: optimize pt safety while focus on discipline specific goals  Prior to Session Communication: Bedside nurse  Patient Position Received: Bed, 3 rail up, Alarm on  General Comment: pt seen in 3327, dangling EOB, very motivated to walk. bruising L face    Home Living:  Home Living  Type of Home: House  Lives With: Alone  Home Adaptive Equipment: Walker rolling or standard  Home Layout: One level  Home Access: Level entry  Bathroom Shower/Tub: Tub/shower unit  Bathroom Toilet: Standard  Bathroom Equipment: Grab bars in shower    Prior Level of Function:  Prior Function Per Pt/Caregiver Report  Prior Function Comments: Pt reports wt loss and low energy since esphogeal surgery are impacting his day to day activities    Precautions:  Precautions  Medical Precautions: Fall precautions  Precautions Comment: PEG tube. swallowing difficulty     Objective     Pain:  Pain Assessment  Pain Assessment: 0-10  Pain Type: Acute pain  Pain Location: Generalized  Pain Orientation: Left  Pain Radiating Towards: rib area  Pain Descriptors:  (\"bruised\")  Pain Onset: Other (Comment) (\"only hurts when coughing\")  Pain Interventions: Ambulation/increased activity  Response to Interventions: Content/relaxed    Cognition:  Cognition  Orientation Level: " Oriented X4    General Assessments:  General Observation  General Observation: gait training hallway, education re: use of FWW for longer distances for energy /strength conservation      Dynamic Sitting Balance  Dynamic Sitting-Comments: good: sits/stands with lean to right to reduce L rib pain  Dynamic Standing Balance  Dynamic Standing-Comments: fair mildly unsteady amb    Functional Assessments:     Bed Mobility  Bed Mobility: No  Transfer 1  Transfer From 1: Bed to  Technique 1: Sit to stand  Transfer Level of Assistance 1: Close supervision  Ambulation/Gait Training 1  Assistance 1: Hand held assistance, Minimum assistance (increased assist with further distance c/o legs felt weaker. RECOMMEND TRIAL USE FWW NEXT SESSION. Education completed for recomm to USE FWW FOR COMMUNITY AMB, TRIPS TO MAILBOX (longer distances) d/t LE weakness)  Quality of Gait 1:  (mildly unsteady, lean to Right d/t L rib pain)  Comments/Distance (ft) 1: 150  Stairs  Stairs: No       Extremity/Trunk Assessments:        RLE   RLE : Within Functional Limits  LLE   LLE : Exceptions to WFL  Strength LLE  LLE Overall Strength: Greater than or equal to 3/5 as evidenced by functional mobility (noted LLE slightly weaker (grosslyy 3+/5) than RLE 4-/5)    Outcome Measures:     [facility] Basic Mobility  Turning from your back to your side while in a flat bed without using bedrails: None  Moving from lying on your back to sitting on the side of a flat bed without using bedrails: None  Moving to and from bed to chair (including a wheelchair): A little  Standing up from a chair using your arms (e.g. wheelchair or bedside chair): A little  To walk in hospital room: A little  Climbing 3-5 steps with railing: A lot  Basic Mobility - Total Score: 19          Goals:  Encounter Problems       Encounter Problems (Active)       Mobility       STG - Patient will ambulate community distance Mod Indep WITH USE OF FWW FOR ENERGY CONSERVATION       Start:  05/19/25     Expected End:  05/26/25            STG - Patient will ambulate household distance without AD needed, NO LOB       Start:  05/19/25    Expected End:  05/26/25               Pain - Adult            Education Documentation  Mobility Training, taught by Rika Herrera, PT at 5/19/2025  5:15 PM.  Learner: Patient  Readiness: Acceptance  Method: Explanation, Demonstration  Response: Verbalizes Understanding    Education Comments  No comments found.              [1]   Patient Active Problem List  Diagnosis    Allergic rhinitis    Benign essential hypertension    Hyperlipidemia    Hypothyroidism    Nicotine dependence, cigarettes, uncomplicated    Chronic constipation    Episodic cluster headache, not intractable    Atherosclerosis of coronary artery    Hyponatremia    Esophageal dysphagia    Depression, major, recurrent, moderate    Esophageal mass    Primary esophageal adenocarcinoma (Multi)    Centrilobular emphysema (Multi)    Malignant neoplasm of lower third of esophagus (Multi)    Fall, initial encounter    Splenic laceration    Closed compression fracture of L1 lumbar vertebra, initial encounter (Multi)    Abrasion of left arm    Facial bruising, initial encounter

## 2025-05-19 NOTE — DISCHARGE SUMMARY
Discharge Diagnosis  Fall, initial encounter           Issues Requiring Follow-Up  Follow-up with primary care provider and surgeon  We recommend no activity that could potentially cause any trauma to splenic area    Discharge Meds     Medication List      CHANGE how you take these medications     * verapamil 80 mg tablet; Commonly known as: Calan; 3 tablets (240 mg)   by j-tube route once daily.; What changed: additional instructions   * verapamil  mg ER tablet; Commonly known as: Calan-SR; What   changed: Another medication with the same name was changed. Make sure you   understand how and when to take each.  * This list has 2 medication(s) that are the same as other medications   prescribed for you. Read the directions carefully, and ask your doctor or   other care provider to review them with you.     CONTINUE taking these medications     acetaminophen 650 mg/20.3 mL solution oral liquid; Commonly known as:   Tylenol; 20.3 mL (650 mg) by j-tube route every 6 hours if needed for   pain.   amitriptyline 100 mg tablet; Commonly known as: Elavil; 1 tablet (100   mg) by j-tube route once daily at bedtime.   aspirin 81 mg chewable tablet   atorvastatin 40 mg tablet; Commonly known as: Lipitor; 1 tablet (40 mg)   by j-tube route once daily.   baclofen 5 mg tablet; Commonly known as: Lioresal   buPROPion 75 mg tablet; Commonly known as: Wellbutrin; 2 tablets (150   mg) by j-tube route 2 times a day.   levothyroxine 175 mcg tablet; Commonly known as: Synthroid, Levoxyl; 1   tablet (175 mcg) by j-tube route early in the morning.. Take on an empty   stomach at the same time each day, either 30 to 60 minutes prior to   breakfast   losartan 50 mg tablet; Commonly known as: Cozaar; Take 1 tab daily via   j-tube.   morphine 10 mg/5 mL solution; 2.5 mL (5 mg) by j-tube route every 6   hours if needed for severe pain (7 - 10).   ondansetron ODT 8 mg disintegrating tablet; Commonly known as:   Zofran-ODT; Dissolve 1 tablet  (8 mg) in the mouth every 8 hours if needed   for nausea or vomiting.   prochlorperazine 10 mg tablet; Commonly known as: Compazine; Take 1   tablet (10 mg) by mouth every 6 hours if needed for nausea or vomiting.   spironolactone 50 mg tablet; Commonly known as: Aldactone; 1 tablet (50   mg) by j-tube route once daily.   tamsulosin 0.4 mg 24 hr capsule; Commonly known as: Flomax     STOP taking these medications     clobetasol 0.05 % cream; Commonly known as: Temovate   lidocaine-prilocaine 2.5-2.5 % cream; Commonly known as: Emla       Test Results Pending At Discharge  Pending Labs       No current pending labs.            Hospital Course  69-year-old male with past medical history of CAD status post CABG in 2005, hypertension, hyperlipidemia, chronic nicotine dependence, hypothyroidism, esophageal cancer status post esophagectomy diet Dr. Salas in May 2025 on J-tube presented for evaluation after a fall.  Patient was found to have 3.5 x 2.5 linear hypodensity is present in the medial spleen in the interim since prior exam in March of 2025, suspicious for a new splenic laceration.  New/acute minimally displaced fracture of the S4 vertebral body.  New/acute compression fracture along the superior and inferior endplate of L1.  Acute care surgery evaluated patient and recommended monitoring his H&H.  His H&H has remained stable and they cleared him for discharge.  Patient is supposed to not have any activity for 2 months which could potentially cause trauma to his splenic area.  Patient has stopped recently with his tube feeds and we recommended increasing oral intake with small frequent meals.  His sodium has improved to 132.  Currently is in stable condition for discharge with follow-up with PCP and primary surgeon as outpatient.    39 minutes spent in discharge timing    Pertinent Physical Exam At Time of Discharge  General: Not in acute distress, alert  HEENT: PERRLA, head intact and normocephalic  Neck: Normal  to inspection  Lungs: Clear to auscultation, work of breathing within normal limit  Cardiac: Regular rate and rhythm  Abdomen: Soft nontender, abdominal scar with G-tube noted  : Exam deferred  Skin: Intact  Hematology: No petechia or excessive ecchymosis  Musculoskeletal: Without significant trauma  Neurological: Alert awake oriented, no focal deficit, cranial nerves grossly intact  Psych: No suicidal ideation or homicidal ideation    Outpatient Follow-Up  Future Appointments   Date Time Provider Department Center   5/22/2025  9:15 AM Fabio Salas MD [id number] [facility]   5/29/2025  4:00 PM Madan Wilson MD [id number] [facility]   6/2/2025  2:00 PM Misael Hayden MD [id number]   6/17/2025  4:30 PM Dafne Bedolla  [id number] [facility]   8/12/2025  4:30 PM Dafne Bedolla DO [id number] [facility]   8/13/2025  2:00 PM Trip LEON MD [id number] [facility]         Wolfgang Pascual MD

## 2025-05-19 NOTE — PROGRESS NOTES
05/19/25 1232   Discharge Planning   Living Arrangements Alone   Support Systems Children   Assistance Needed Independent   Type of Residence Private residence   Home or Post Acute Services None   Expected Discharge Disposition Home   Does the patient need discharge transport arranged? No   Financial Resource Strain   How hard is it for you to pay for the very basics like food, housing, medical care, and heating? Not hard   Housing Stability   In the last 12 months, was there a time when you were not able to pay the mortgage or rent on time? N   At any time in the past 12 months, were you homeless or living in a shelter (including now)? N   Transportation Needs   In the past 12 months, has lack of transportation kept you from medical appointments or from getting medications? no   In the past 12 months, has lack of transportation kept you from meetings, work, or from getting things needed for daily living? No     PCP is Dafne Bedolla DO. Patient is from home alone with support from his daughter. Patient is independent with ambulation, self care, driving, shopping, and meals.  He had a walker and cane at home but does not currently use. He states he has had increased weakness over the past month and has had 4 falls in that time, all due to his legs getting too weak. Patient is agreeable to Outpatient PT/OT on discharge but denies any other home going needs at this time. [facility] will continue to follow for needs if they arise.

## 2025-05-19 NOTE — CARE PLAN
Problem: Pain - Adult  Goal: Verbalizes/displays adequate comfort level or baseline comfort level  Outcome: Progressing  Flowsheets (Taken 5/18/2025 2111)  Verbalizes/displays adequate comfort level or baseline comfort level:   Encourage patient to monitor pain and request assistance   Assess pain using appropriate pain scale   Administer analgesics based on type and severity of pain and evaluate response   Implement non-pharmacological measures as appropriate and evaluate response     Problem: Safety - Adult  Goal: Free from fall injury  Outcome: Progressing     Problem: Discharge Planning  Goal: Discharge to home or other facility with appropriate resources  Outcome: Progressing     Problem: Chronic Conditions and Co-morbidities  Goal: Patient's chronic conditions and co-morbidity symptoms are monitored and maintained or improved  Outcome: Progressing     Problem: Nutrition  Goal: Nutrient intake appropriate for maintaining nutritional needs  Outcome: Progressing     Problem: Fall/Injury  Goal: Not fall by end of shift  Outcome: Progressing  Goal: Be free from injury by end of the shift  Outcome: Progressing  Goal: Verbalize understanding of personal risk factors for fall in the hospital  Outcome: Progressing  Goal: Verbalize understanding of risk factor reduction measures to prevent injury from fall in the home  Outcome: Progressing  Goal: Use assistive devices by end of the shift  Outcome: Progressing  Goal: Pace activities to prevent fatigue by end of the shift  Outcome: Progressing     Problem: Pain  Goal: Takes deep breaths with improved pain control throughout the shift  Outcome: Progressing  Goal: Turns in bed with improved pain control throughout the shift  Outcome: Progressing  Goal: Walks with improved pain control throughout the shift  Outcome: Progressing  Goal: Performs ADL's with improved pain control throughout shift  Outcome: Progressing  Goal: Participates in PT with improved pain control  throughout the shift  Outcome: Progressing  Goal: Free from opioid side effects throughout the shift  Outcome: Progressing  Goal: Free from acute confusion related to pain meds throughout the shift  Outcome: Progressing     Problem: Respiratory  Goal: Clear secretions with interventions this shift  Outcome: Progressing  Goal: Minimize anxiety/maximize coping throughout shift  Outcome: Progressing  Goal: Minimal/no exertional discomfort or dyspnea this shift  Outcome: Progressing  Goal: No signs of respiratory distress (eg. Use of accessory muscles. Peds grunting)  Outcome: Progressing  Goal: Patent airway maintained this shift  Outcome: Progressing  Goal: Tolerate mechanical ventilation evidenced by VS/agitation level this shift  Outcome: Progressing  Goal: Tolerate pulmonary toileting this shift  Outcome: Progressing  Goal: Verbalize decreased shortness of breath this shift  Outcome: Progressing  Goal: Wean oxygen to maintain O2 saturation per order/standard this shift  Outcome: Progressing  Goal: Increase self care and/or family involvement in next 24 hours  Outcome: Progressing     Problem: Diabetes  Goal: Achieve decreasing blood glucose levels by end of shift  Outcome: Progressing  Goal: Increase stability of blood glucose readings by end of shift  Outcome: Progressing  Goal: Decrease in ketones present in urine by end of shift  Outcome: Progressing  Goal: Maintain electrolyte levels within acceptable range throughout shift  Outcome: Progressing  Goal: Maintain glucose levels >70mg/dl to <250mg/dl throughout shift  Outcome: Progressing  Goal: No changes in neurological exam by end of shift  Outcome: Progressing  Goal: Learn about and adhere to nutrition recommendations by end of shift  Outcome: Progressing  Goal: Vital signs within normal range for age by end of shift  Outcome: Progressing  Goal: Increase self care and/or family involovement by end of shift  Outcome: Progressing  Goal: Receive DSME education by  end of shift  Outcome: Progressing   The patient's goals for the shift include get discharged    The clinical goals for the shift include Patient will remain free from falls and injury during shift

## 2025-05-20 ENCOUNTER — PATIENT OUTREACH (OUTPATIENT)
Dept: PRIMARY CARE | Facility: CLINIC | Age: 70
End: 2025-05-20
Payer: MEDICARE

## 2025-05-21 ENCOUNTER — PATIENT OUTREACH (OUTPATIENT)
Dept: PRIMARY CARE | Facility: CLINIC | Age: 70
End: 2025-05-21
Payer: MEDICARE

## 2025-05-21 DIAGNOSIS — C15.5 MALIGNANT NEOPLASM OF LOWER THIRD OF ESOPHAGUS (MULTI): Primary | ICD-10-CM

## 2025-05-21 NOTE — PROGRESS NOTES
Discharge Facility: [facility]  Discharge Diagnosis: Fall  Admission Date: 17 May 25  Discharge Date: 19 May 25    PCP Appointment Date: Pt declined  Specialist Appointment Date: 22 May 25 (Thoracic Surg, [facility])  Hospital Encounter and Summary Linked: Yes  ED to Hosp-Admission (Discharged) with Wolfgang Pascual MD; Star Alatorre MD (05/17/2025)     See discharge assessment below for further details     Wrap Up  Wrap Up Additional Comments: Pt stating he is doing well since his discharge from the hospital. pt able to obtain all current medications without difficulty. no new medications on discharge from [facility]. pt has no questions regarding any medications or discharge instructions. pt not wishing to follow up with PCP at this time. (5/21/2025  8:41 AM)  Call End Time: 0841 (5/21/2025  8:41 AM)    Engagement  Call Start Time: 0831 (Spoke with patient) (5/21/2025  8:41 AM)    Medications  Medications reviewed with patient/caregiver?: Yes (5/21/2025  8:41 AM)  Is the patient having any side effects they believe may be caused by any medication additions or changes?: No (5/21/2025  8:41 AM)  Does the patient have all medications ordered at discharge?: Yes (5/21/2025  8:41 AM)  Prescription Comments: pt able to obtain all medications without difficulty (5/21/2025  8:41 AM)  Is the patient taking all medications as directed (includes completed medication regime)?: Yes (5/21/2025  8:41 AM)  Medication Comments: no questions on medications at this time. (5/21/2025  8:41 AM)    Appointments  Does the patient have a primary care provider?: Yes (Pt declined) (5/21/2025  8:41 AM)  Has the patient kept scheduled appointments due by today?: Yes (5/21/2025  8:41 AM)    Self Management  What is the home health agency?: None (5/21/2025  8:41 AM)  Has home health visited the patient within 72 hours of discharge?: Not applicable (5/21/2025  8:41 AM)  What Durable Medical Equipment (DME) was ordered?: None (5/21/2025  8:41  AM)    Patient Teaching  Does the patient have access to their discharge instructions?: Yes (5/21/2025  8:41 AM)  Care Management Interventions: Reviewed instructions with patient (5/21/2025  8:41 AM)  What is the patient's perception of their health status since discharge?: Improving (5/21/2025  8:41 AM)  Is the patient/caregiver able to teach back the hierarchy of who to call/visit for symptoms/problems? PCP, Specialist, Home Health nurse, Urgent Care, ED, 911: Yes (5/21/2025  8:41 AM)  Patient/Caregiver Education Comments: None (5/21/2025  8:41 AM)

## 2025-05-22 ENCOUNTER — HOSPITAL ENCOUNTER (OUTPATIENT)
Dept: RADIOLOGY | Facility: HOSPITAL | Age: 70
Discharge: HOME | End: 2025-05-22
Payer: MEDICARE

## 2025-05-22 ENCOUNTER — OFFICE VISIT (OUTPATIENT)
Dept: SURGERY | Facility: HOSPITAL | Age: 70
End: 2025-05-22
Payer: MEDICARE

## 2025-05-22 VITALS
DIASTOLIC BLOOD PRESSURE: 53 MMHG | BODY MASS INDEX: 26.43 KG/M2 | HEART RATE: 92 BPM | OXYGEN SATURATION: 100 % | RESPIRATION RATE: 18 BRPM | SYSTOLIC BLOOD PRESSURE: 121 MMHG | WEIGHT: 184.2 LBS | TEMPERATURE: 96.3 F

## 2025-05-22 DIAGNOSIS — C15.9 MALIGNANT NEOPLASM OF ESOPHAGUS, UNSPECIFIED LOCATION (MULTI): Primary | ICD-10-CM

## 2025-05-22 DIAGNOSIS — C15.5 MALIGNANT NEOPLASM OF LOWER THIRD OF ESOPHAGUS (MULTI): ICD-10-CM

## 2025-05-22 PROCEDURE — 99211 OFF/OP EST MAY X REQ PHY/QHP: CPT | Performed by: THORACIC SURGERY (CARDIOTHORACIC VASCULAR SURGERY)

## 2025-05-22 PROCEDURE — 1111F DSCHRG MED/CURRENT MED MERGE: CPT | Performed by: THORACIC SURGERY (CARDIOTHORACIC VASCULAR SURGERY)

## 2025-05-22 PROCEDURE — 71046 X-RAY EXAM CHEST 2 VIEWS: CPT

## 2025-05-22 PROCEDURE — 71046 X-RAY EXAM CHEST 2 VIEWS: CPT | Performed by: RADIOLOGY

## 2025-05-22 PROCEDURE — 3078F DIAST BP <80 MM HG: CPT | Performed by: THORACIC SURGERY (CARDIOTHORACIC VASCULAR SURGERY)

## 2025-05-22 PROCEDURE — 1125F AMNT PAIN NOTED PAIN PRSNT: CPT | Performed by: THORACIC SURGERY (CARDIOTHORACIC VASCULAR SURGERY)

## 2025-05-22 PROCEDURE — 3074F SYST BP LT 130 MM HG: CPT | Performed by: THORACIC SURGERY (CARDIOTHORACIC VASCULAR SURGERY)

## 2025-05-22 ASSESSMENT — PAIN SCALES - GENERAL: PAINLEVEL_OUTOF10: 8

## 2025-05-23 ENCOUNTER — NUTRITION (OUTPATIENT)
Dept: HEMATOLOGY/ONCOLOGY | Facility: HOSPITAL | Age: 70
End: 2025-05-23
Payer: MEDICARE

## 2025-05-23 DIAGNOSIS — Z51.81 THERAPEUTIC DRUG MONITORING: ICD-10-CM

## 2025-05-23 NOTE — PROGRESS NOTES
NUTRITION Follow-Up NOTE    Nutrition Assessment     Reason for Visit:  Timoteo Victoria is a 69 y.o. male who presents for nutrition follow regarding use of TF and po intake.  .      FLOT- began  1/2/2025 today is cycle 4  Surgery - was 3-31 to 4-6-2025  Then will complete FLOT- appt with Med Onc 5- (virtual)     Pt with esophageal Cancer     Has a j-tube 11-  Went to ED 2-25 to 2-26- tube was dislodged was replaced by IR    Saw thoracic surgeon on 5- at that time was decided he needed to stay with EN  Pt is in need of supplies  Pt with tube since November so this is interesting to me...   [facility] was called to re-order   Last order was placed March 14, 2025    Pt was called to let him know that I ordered supplies and to make sure he had the phone number to [facility]  There was no answer.  VM left       Lab Results   Component Value Date/Time    GLUCOSE 80 05/19/2025 0529     (L) 05/19/2025 0529    K 3.8 05/19/2025 0529    CL 98 05/19/2025 0529    CO2 23 05/19/2025 0529    ANIONGAP 15 05/19/2025 0529    BUN 15 05/19/2025 0529    CREATININE 0.75 05/19/2025 0529    EGFR >90 05/19/2025 0529    CALCIUM 8.9 05/19/2025 0529    ALBUMIN 4.0 05/18/2025 0111    ALKPHOS 88 05/18/2025 0111    PROT 6.6 05/18/2025 0111    AST 12 05/18/2025 0111    BILITOT 0.4 05/18/2025 0111    ALT 15 05/18/2025 0111    MG 1.77 05/18/2025 0111    PHOS 3.7 04/01/2025 0315     Lab Results   Component Value Date/Time    VITD25 66 03/12/2024 1533          Anthropometrics:     Steady weight with feeding tube  HT:  177.8 cm- 5'10  IBW:  75.5 kg  110.7 % IBW    BMI: 26.43    Wt Readings from Last 10 Encounters:   05/22/25 83.6 kg (184 lb 3.2 oz)   05/18/25 82.8 kg (182 lb 9.6 oz)   05/08/25 87.9 kg (193 lb 12.6 oz)   04/24/25 88.6 kg (195 lb 6.4 oz)   04/06/25 90.1 kg (198 lb 9.6 oz)   03/27/25 94.8 kg (209 lb)   03/13/25 90.9 kg (200 lb 6.4 oz)   03/05/25 93.1 kg (205 lb 3.2 oz)   02/27/25 93.8 kg (206 lb 12.7 oz)    02/25/25 92.1 kg (203 lb)      UBW:  250#  Pt is down 11.2 kg since his surgery- essentially 2 month  (11.8%)  This is significant     Meds noted           Food And Nutrient Intake:      Unable to get a full picture of po intake and TF intake     TF order had been for   Isosource 1.5  5 cartons per day  105  to 120 ml per hour  11 hours   Has a pole   Isosource 1.5 5 x/day provides: 1875 kCal, 85 g PRO, 74 g fat, 220 g CHO, 19 g fiber, and 955 mL free water                                                          Nutrition Focused Physical Exam Findings:  DEFERRED - phone                         Energy Needs     Dosing weight: 75.5 to 93.5 kg  Calories per day: 2265 to 2800 determined by 30 kcal/kg  Protein (g) per day: 90 to 112 determined by 1.2 g/kg  Estimated fluid needs: 2265 to 2800 determined by 1 kcal/mL       Nutrition Diagnosis        Nutrition Diagnosis  Patient has Nutrition Diagnosis: Yes  Diagnosis Status (1): Active  Nutrition Diagnosis 1: Altered GI function  Related to (1): esophageal cancer and now s/p surgery  As Evidenced by (1): s/p esophagectomy, has J-tube- with significant weight loss    Nutrition Interventions/Recommendations   Nutrition Prescription   Oral and enteral nutrtion     Food and Nutrition Delivery       Nutrition Education       Coordination of Care   Thoracic surgeon and team along with Pankaj     There are no Patient Instructions on file for this visit.    Nutrition Monitoring and Evaluation        DME is [facility] PLEITEZ left will follow up as appropriate         Time Spent  Prep time on day of patient encounter: 10 minutes  Time spent directly with patient, family or caregiver: 0 minutes  Additional Time Spent on Patient Care Activities: 10 minutes  Documentation Time: 10 minutes  Other Time Spent: 5 minutes  Total: 35 minutes

## 2025-05-29 ENCOUNTER — TELEMEDICINE (OUTPATIENT)
Dept: HEMATOLOGY/ONCOLOGY | Facility: HOSPITAL | Age: 70
End: 2025-05-29
Payer: MEDICARE

## 2025-05-29 DIAGNOSIS — C15.9 PRIMARY ESOPHAGEAL ADENOCARCINOMA (MULTI): Primary | ICD-10-CM

## 2025-05-29 PROCEDURE — 99213 OFFICE O/P EST LOW 20 MIN: CPT | Performed by: INTERNAL MEDICINE

## 2025-05-29 PROCEDURE — 1111F DSCHRG MED/CURRENT MED MERGE: CPT | Performed by: INTERNAL MEDICINE

## 2025-05-29 NOTE — PROGRESS NOTES
Patient ID: Timoteo Victoria is a 69 y.o. male.  This was a phone appointment.  Consent obtained.  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  Underwent esophagectomy on 3/31/2025  after 4 cycles of neoadjuvant FLOT.  Pathology showed residual tumor (pT3 pN3), close radial margin (less than 1 mm).  PET-avid parotid and thyroid nodule.    Genomic profile:  Normal MMR expresison    Assessment and Plan:  69M with CAD (CABG 2005), HTN, HLD, chronic tobacco use, hypothyroidism, presented with 4-6 weeks of progressive dysphagia (solid to liquid/mediations) 40lb wt loss over 6 months, and was found to have distal esophageal mass covering half of lumen on EGD 11/21/24  and Bx showed intramucosal adenocarcinoma with normal MMR expression. S/p J tube placement 11/25/2024.    PET-CT showed no distant mets.    We discussed FLOT for chemotherapy regimen with him in details. After a detailed discussion about the chemo, he consented and started cycle 1 on 1/2/25. Completed cycle 4 on 2-.  His post neoadjuvant therapy PET/CT which did not show any PET avid lesions suggestive of metastatic disease.  He underwent esophagectomy on March 31, 2025.  The pathology showed pT3 pN3 disease with close radial margin (less than 1 mm).      He is gradually recovering from the surgery.  His overall oral intake is improving.  I talked to him about starting adjuvant chemotherapy with FLOT x 4 cycles.  He is not well enough yet to receive chemotherapy.  I have requested an appointment on 5/29/2025 to reevaluate him for chemotherapy.    His radial margin is close.  I have referred him to radiation oncology to discuss adjuvant radiation therapy.    Plan: Postoperative FLOT x 4 cycles and postoperative radiation therapy because of the close radial margin.    His prior PET/CT scans showed FDG avid parotid and thyroid nodules.  I have referred him to ENT for further workup.  He is still  not recovered and having difficulty with eating.  His J-tube has not been pulled out yet.  I plan to wait another 2 weeks before planning on postoperative chemotherapy.    Follow-up: Phone appointment in 2 weeks.    I have placed all orders as outlined above. I advised the patient to schedule the tests and follow-up appointment as discussed by contacting the  on the way out or calling by phone. Patient knows to call with any issues or concerns.     Providers:  Surgeon: Dr. Maritza Mark: Dr Katie Apple:    Chief complaint: Esophageal adenocarcinoma    HPI:  Timoteo Victoria is a 69 y.o. male with a notable history of CAD (CABG 2005), HTN, HLD, chronic tobacco use, hypothyroidism presented on 11/25/2024 as a transfer from [facility] for cancer workup. He had been having trouble swallowing for the past month, initially solids and progressing to liquids and medications. He has lost 40 lbs in the past 6 months.     EGD on 11/21/24 showed an esophageal mass with a malignant appearance, with biopsy showing intramucosal adenocarcinoma. The patient had a J tube placed on 11/25.     ONCOLOGIC HISTORY-  11/21/24 [facility] admission for weight loss, dysphagia; EGD showed esophageal mass with biopsy confirming intramucosal adenocarcinoma  11/20/24 CT neck with no masses  11/22/24 CT CAP with contrast with irregular masslike thickening of distal esophagus, no obvious metastases  11/25/24 J tube placement.    12-: pet-ct showed-  IMPRESSION:  1. Hypermetabolic esophageal mass as described above is consistent  with biopsy-proven esophageal adenocarcinoma. Few minimally  hypermetabolic subcentimeter periesophageal lymph nodes are likely  reactive.  2. No other evidence of hypermetabolic thoracic or abdominal  lymphadenopathy or hypermetabolic metastatic disease.  3. Multiple hypermetabolic intraparotid nodules/lymph nodes, which  have been present since 2022, likely represents a benign and  indolent  process. However, recommend continued attention on follow-up.  4. Asymmetrically increased hypermetabolic activity within the right  thyroid gland. Correlate with thyroid ultrasound may be of value.    1-2-2025: started FLOT-4.  Completed cycle 4 on February 27, 2025.    March 11, 2025: CT scan has indeterminate lung lesions (7 mm).    March 14, 2025: PET-CT did not show any definitive metastatic lesion.    3-: Underwent esophagectomy.  Pathology showed the following-      Component    FINAL DIAGNOSIS   A. Lymph Node, Level 7, Excision:   -- Three lymph nodes, negative for carcinoma (0/3).     B. Esophagus and Stomach, Esophagogastrectomy:   -- Residual invasive adenocarcinoma, poorly differentiated, 3.6 cm, with treatment effect present (residual cancer showing evident tumor regression, but more than single cells or rare small groups of cancer cells (partial response, score 2).               -- The tumor invades adventitia and extends to less than 1 mm from the inked cauterized adventitia soft tissue / radial margin.               -- Lymphovascular including extramural large venous invasion identified.               -- Perineural invasion identified.               -- Metastatic carcinoma involving ten of seventeen lymph nodes (10/17).               -- Other surgical resection (proximal and distal) margins, negative for carcinoma (see final distal margin in Part C).  -- See note and synoptic report.     C. Additional Gastric Margin, Excision:   -- Portion of stomach with focal chronic inflammation, negative for carcinoma.  -- One lymph node, negative for carcinoma (0/1).     Note: Cytokeratin AE1/AE3 immunostain (Block B60) highlights neoplastic cells in lymphovascular space. Movat special stain (Block B38) confirms extramural large venous invasion.     DNA mismatch repair protein immunohistochemical stains were performed on prior biopsy ([id number]).      Selective slides (B5, B8, B38, B45, B46,  and B60) have been additionally reviewed by Dr. Vernell Ponce who concurs with the above diagnosis.   Electronically signed by Wilder Kowalski MD PhD on 4/14/2025 at 1721        By the signature on this report, the individual or group listed as making the Final Interpretation/Diagnosis certifies that they have reviewed this case.    Case Summary Report   ESOPHAGUS   8th Edition - Protocol posted: 6/22/2022ESOPHAGUS - All Specimens  SPECIMEN   Procedure  Esophagogastrectomy   TUMOR   Tumor Site  Distal esophagus (low thoracic esophagus)   Relationship of Tumor to Esophagogastric Junction  Tumor midpoint lies in the distal esophagus AND tumor involves the esophagogastric junction   Distance of Tumor Center from Esophagogastric Junction  1.8 cm   Histologic Type  Adenocarcinoma   Histologic Grade  G3, poorly differentiated, undifferentiated   Tumor Size  Greatest Dimension (Centimeters): 3.6 cm   Tumor Extent  Invades adventitia   Treatment Effect  Present, with residual cancer showing evident tumor regression, but more than single cells or rare small groups of cancer cells (partial response, score 2)   Lymphovascular Invasion  Present   Perineural Invasion  Present   MARGINS   Margin Status for Invasive Carcinoma  The tumor extends to less than 1 mm from the inked cauterized adventitia soft tissue / radial margin   Margin Status for Dysplasia and Intestinal Metaplasia  All margins negative for dysplasia   Margin Comment  Other surgical resection (proximal and distal) margins, negative for carcinoma   REGIONAL LYMPH NODES   Regional Lymph Node Status  Tumor present in regional lymph node(s)   Number of Lymph Nodes with Tumor  10   Number of Lymph Nodes Examined  21   PATHOLOGIC STAGE CLASSIFICATION (pTNM, AJCC 8th Edition)   Reporting of pT, pN, and (when applicable) pM categories is based on information available to the pathologist at the time the report is issued. As per the AJCC (Chapter 1, 8th Ed.) it is the managing  physician’s responsibility to establish the final pathologic stage based upon all pertinent information, including but potentially not limited to this pathology report.   TNM Descriptors  y (post-treatment)   pT Category  pT3   pN Category  pN3   Comment(s)  DNA mismatch repair protein immunohistochemical stains were performed on prior biopsy ([id number])             Interval history:   His swallowing is gradually improving.  Denies any significant pain.  He is up and about without any help.  Has some fatigue. Currently denies: fevers, chills, chest pain, SOB, cough, N/V, bowel changes, urinary symptoms.    Past Medical History:   Past Medical History:  No date: CAD (coronary artery disease)  No date: Esophageal carcinoma  No date: Hyperlipidemia  No date: Hypertension  No date: Hypothyroidism   Surgical History:    Past Surgical History:   Procedure Laterality Date    CATARACT EXTRACTION Right 08/25/2023    CHOLECYSTECTOMY      CORONARY ARTERY BYPASS GRAFT  2005    4-vessel    ESOPHAGUS SURGERY  03/31/2025    Three field esophagectomy    HERNIA REPAIR        Family History:    Family History   Problem Relation Name Age of Onset    COPD Mother       Family Oncology History:    Cancer-related family history is not on file.  Social History:    Social History     Tobacco Use    Smoking status: Every Day     Current packs/day: 0.25     Average packs/day: 1 pack/day for 46.4 years (45.4 ttl pk-yrs)     Types: Cigarettes     Start date: 1979     Passive exposure: Current    Smokeless tobacco: Never    Tobacco comments:     Pt is down to 2 cigarettes.   Vaping Use    Vaping status: Never Used   Substance Use Topics    Alcohol use: Not Currently    Drug use: Never        Allergies  Allergies   Allergen Reactions    Ibuprofen Swelling        Medications  Current Outpatient Medications   Medication Instructions    acetaminophen (TYLENOL) 650 mg, j-tube, Every 6 hours PRN    amitriptyline (ELAVIL) 100 mg, j-tube, Nightly     aspirin 81 mg, Daily    atorvastatin (LIPITOR) 40 mg, j-tube, Daily    baclofen (Lioresal) 5 mg tablet 1 tablet, 3 times daily (0900,1400,1900)    buPROPion (WELLBUTRIN) 150 mg, j-tube, 2 times daily    levothyroxine (SYNTHROID, LEVOXYL) 175 mcg, j-tube, Daily, Take on an empty stomach at the same time each day, either 30 to 60 minutes prior to breakfast    losartan (Cozaar) 50 mg tablet Take 1 tab daily via j-tube.    morphine 5 mg, j-tube, Every 6 hours PRN    ondansetron ODT (ZOFRAN-ODT) 8 mg, oral, Every 8 hours PRN    prochlorperazine (COMPAZINE) 10 mg, oral, Every 6 hours PRN    spironolactone (ALDACTONE) 50 mg, j-tube, Daily    tamsulosin (FLOMAX) 0.4 mg, Nightly    verapamil (CALAN) 240 mg, j-tube, Daily    verapamil SR (CALAN-SR) 240 mg, Nightly          Objective   VS: There were no vitals taken for this visit.    PHYSICAL EXAMINATION    Labs              Image  EGD (11/21/2024)  Single malignant-appearing and invasive mass (not traversable) in the lower third of the esophagus, covering one half of the circumference; performed cold forceps biopsy     A. Esophagus, Distal, Mass, Biopsy:  -- Intramucosal adenocarcinoma. See note.     Note: The endoscopic impression of a friable and invasive lesion in the lower third of the esophagus is noted. This biopsy may be not representative of the entire lesion. Clinical correlation is recommended.    MISMATCH REPAIR PROTEIN EXPRESSION                    Protein:           Result                 MLH-1:             Expression Present                                                   PMS-2:            Expression Present                                                   MSH-2:            Expression Present                                                   MSH-6:            Expression Present                                       INTERPRETATION: Neoplasm with normal mismatch repair protein expression.     C/A/P CT (11/22/2024)  IMPRESSION:  1. Irregular masslike thickening  of the mid to distal esophagus is  difficult to accurately measure, and may represent a neoplastic  process. Correlate with recent endoscopic imaging and biopsy results.  There is also proximal esophageal wall thickening, and a moderate  size hiatal hernia.  2. Small area of centrilobular nodularity in the right middle lobe  may represent focal aspiration/mucoid impaction or less likely  atypical infection.  3. Mild apical predominant centrilobular emphysema.  4. Severe coronary artery calcifications. Please note this exam is  not optimized for evaluation of the coronary arteries.  5. Nonobstructing 0.4 cm calculus in the left kidney.    Neck CT (11/20/2024)  IMPRESSION:  New maxillary periapical lucencies with adjacent mucosal thickening.  No discrete fluid collection identified. Dental follow-up recommended.      No discrete mass or cervical lymphadenopathy identified however  direct visualization suggested.      Bilateral parotid mass/nodule similar to prior imaging.      Postsurgical changes, emphysematous changes, mild mediastinal  lymphadenopathy and additional findings as detailed.  This note was created using a voice recognition system ( the Dragon dictation system). Inaccuracies and misspellings are unintentional.     Total time spent 20 minutes.  Madan Wilson MD.

## 2025-06-02 ENCOUNTER — APPOINTMENT (OUTPATIENT)
Dept: OTOLARYNGOLOGY | Facility: CLINIC | Age: 70
End: 2025-06-02
Payer: MEDICARE

## 2025-06-02 VITALS — BODY MASS INDEX: 26.34 KG/M2 | WEIGHT: 184 LBS | HEIGHT: 70 IN

## 2025-06-02 DIAGNOSIS — E04.1 THYROID NODULE: ICD-10-CM

## 2025-06-02 DIAGNOSIS — K11.8 PAROTID MASS: Primary | ICD-10-CM

## 2025-06-02 DIAGNOSIS — R22.1 NECK MASS: ICD-10-CM

## 2025-06-02 DIAGNOSIS — C15.9 PRIMARY ESOPHAGEAL ADENOCARCINOMA (MULTI): ICD-10-CM

## 2025-06-02 PROCEDURE — 42400 BIOPSY OF SALIVARY GLAND: CPT | Performed by: STUDENT IN AN ORGANIZED HEALTH CARE EDUCATION/TRAINING PROGRAM

## 2025-06-02 PROCEDURE — 3008F BODY MASS INDEX DOCD: CPT | Performed by: STUDENT IN AN ORGANIZED HEALTH CARE EDUCATION/TRAINING PROGRAM

## 2025-06-02 PROCEDURE — 99205 OFFICE O/P NEW HI 60 MIN: CPT | Performed by: STUDENT IN AN ORGANIZED HEALTH CARE EDUCATION/TRAINING PROGRAM

## 2025-06-02 PROCEDURE — 4004F PT TOBACCO SCREEN RCVD TLK: CPT | Performed by: STUDENT IN AN ORGANIZED HEALTH CARE EDUCATION/TRAINING PROGRAM

## 2025-06-02 PROCEDURE — 1111F DSCHRG MED/CURRENT MED MERGE: CPT | Performed by: STUDENT IN AN ORGANIZED HEALTH CARE EDUCATION/TRAINING PROGRAM

## 2025-06-02 PROCEDURE — 31575 DIAGNOSTIC LARYNGOSCOPY: CPT | Performed by: STUDENT IN AN ORGANIZED HEALTH CARE EDUCATION/TRAINING PROGRAM

## 2025-06-02 PROCEDURE — 88305 TISSUE EXAM BY PATHOLOGIST: CPT

## 2025-06-02 PROCEDURE — 1159F MED LIST DOCD IN RCRD: CPT | Performed by: STUDENT IN AN ORGANIZED HEALTH CARE EDUCATION/TRAINING PROGRAM

## 2025-06-02 NOTE — PROGRESS NOTES
HPI  Timoteo Victoria is a 69 y.o. male male with a history of esophageal adenocarcinoma s/p chemotherapy followed by esophagectomy 3/31/2025 presenting for evaluation of parotid and thyroid nodules.  He is here with his son.  He had multiple CT PET scans which showed hypermetabolic activity along the parotid regions more noticeable in the left in addition to hypermetabolic activity along the right thyroid region.  The patient denies family history of thyroid cancer or previous radiation exposure.  Endorses current tobacco use.      Medical History[1]    Surgical History[2]      Medications Ordered Prior to Encounter[3]     RX Allergies[4]     Review of Systems  A detailed 12 point ROS was performed and is negative except as noted in the intake form, HPI and/or Past Medical History        Physical Exam   CONSTITUTIONAL: Well developed, well nourished.  VOICE: Normal voice quality  RESPIRATION: Breathing comfortably, no stridor.  CV: No clubbing/cyanosis/edema in hands.  EYES: EOM Intact, sclera normal.  NEURO: Alert and oriented times 3, Cranial nerves V,VII intact and symmetric bilaterally.  HEAD AND FACE: Symmetric facial features, no masses or lesions, sinuses nontender to palpation.  SALIVARY GLANDS: Parotid and submandibular glands normal bilaterally.  EARS: Normal external ears, external auditory canals, and TMs to otoscopy  NOSE: External nose midline, anterior rhinoscopy is normal with limited visualization to the anterior aspect of the interior turbinates. No lesions noted.  ORAL CAVITY/OROPHARYNX/LIPS: Normal mucous membranes, normal floor of mouth/tongue/OP, no masses or lesions are noted.  PHARYNGEAL WALLS AND NASOPHARYNX: No masses noted. Mucosa appears clean and moist  NECK/LYMPH: No LAD, no thyroid masses. Trachea palpably midline  SKIN: Neck skin is without injury  PSYCH: Alert and oriented with appropriate mood and affect     PROCEDURE NOTE:  FLEXIBLE NASOLARYNGOSCOPY     The risks, benefits, and  alternatives were explained.  The patient wished to proceed and provided verbal consent.       INDICATIONS: To visualize anatomy in specific detail; evaluation of symptomatic disorder involving the voice, swallowing, or upper aerodigestive tract, including RAPHAEL.      DESCRIPTION OF PROCEDURE: After adequate afrin and lidocaine spray, I advanced the endoscope into the nasal cavity.  The nasal cavity, nasopharynx, tongue base, vallecula, posterior/lateral pharyngeal walls, pyriform, epiglottis, post cricoid area, and larynx were within normal limits.  The following specific findings should be noted:  No lesions/masses  Mobile TVCs bilaterally, complete glottic closure  No pooling of secretions  The patient tolerated the procedure without difficulty.     Procedure: FNA left parotid mass.  The procedure was reviewed and explained, risks, benefits and alternatives discussed.  Consent was obtained.  The area was prepped and draped sterilely.  1 cc of 1% lidocaine with epinephrine was injected into the region.  FNA was performed with a 22 ga needle.  Specimens were sent to pathology of analysis, 2 passes were performed.  Hemostasis confirmed, Band-Aid was placed.  No complications were encountered.      Results/ data reviewed:   PET 3/14/25  Intense focal hypermetabolic activity within the inferior  left parotid gland associated with a solid hyperdense 2.0 cm nodule  with a smaller mildly hypermetabolic solid nodule within the inferior  right parotid lobe measuring 1.1 cm.     IMPRESSION:  Artifact through the upper abdomen secondary to patient's arms being  in the down position partially limits evaluation of the abdomen;  within this limitation:      1. Persistent but decreased metabolic activity within the distal  esophagus/gastroesophageal junction compatible with improving but  residual biopsy-proven esophageal adenocarcinoma.\  2. No evidence of hypermetabolic jose disease.  3. No evidence of hypermetabolic distant  metastatic disease.  4. Non FDG avid subcentimeter solid pulmonary nodules within the  right upper lobe although below PET resolution. Continued attention  on follow-up chest CT to ensure stability is recommended. Resolving  non FDG avid ground-glass opacities within the perihilar right lung  likely representing a resolving infectious/inflammatory process.      PET 12/23/24  IMPRESSION:  1. Hypermetabolic esophageal mass as described above is consistent  with biopsy-proven esophageal adenocarcinoma. Few minimally  hypermetabolic subcentimeter periesophageal lymph nodes are likely  reactive.  2. No other evidence of hypermetabolic thoracic or abdominal  lymphadenopathy or hypermetabolic metastatic disease.  3. Multiple hypermetabolic intraparotid nodules/lymph nodes, which  have been present since 2022, likely represents a benign and indolent  process. However, recommend continued attention on follow-up.  4. Asymmetrically increased hypermetabolic activity within the right  thyroid gland. Correlate with thyroid ultrasound may be of value.        Assessment  Parotid mass   Hypermetabolic activity along the parotid and right thyroid region.   History of esophageal adenocarcinoma     Plan  69-year-old male with a history of esophageal adenocarcinoma s/p chemotherapy and esophagectomy 3/31/2025.   Close radial margin, undergoing evaluation for postoperative FLOT and RT.   PET/CT notable for hypermetabolic activity along the parotid (L>R) and right thyroid region.   Exam notable for palpable mass along the posterior - inferior aspect of the left parotid gland, FNA performed.    CT neck ordered to further evaluate the region.  He was referred to head and neck surgery for further management.  Fu via telehealth 3-4w    6/6/25  FNA results reviewed discussed with patient consistent with Warthin's tumor.         [1]   Past Medical History:  Diagnosis Date    CAD (coronary artery disease)     Esophageal carcinoma      Hyperlipidemia     Hypertension     Hypothyroidism    [2]   Past Surgical History:  Procedure Laterality Date    CATARACT EXTRACTION Right 08/25/2023    CHOLECYSTECTOMY      CORONARY ARTERY BYPASS GRAFT  2005    4-vessel    ESOPHAGUS SURGERY  03/31/2025    Three field esophagectomy    HERNIA REPAIR     [3]   Current Outpatient Medications on File Prior to Visit   Medication Sig Dispense Refill    amitriptyline (Elavil) 100 mg tablet 1 tablet (100 mg) by j-tube route once daily at bedtime. 30 tablet 11    aspirin 81 mg chewable tablet 1 tablet (81 mg) by j-tube route once daily.      atorvastatin (Lipitor) 40 mg tablet 1 tablet (40 mg) by j-tube route once daily. 90 tablet 1    baclofen (Lioresal) 5 mg tablet Take 1 tablet (5 mg) by mouth 3 times a day.      buPROPion (Wellbutrin) 75 mg tablet 2 tablets (150 mg) by j-tube route 2 times a day. 360 tablet 1    levothyroxine (Synthroid, Levoxyl) 175 mcg tablet 1 tablet (175 mcg) by j-tube route early in the morning.. Take on an empty stomach at the same time each day, either 30 to 60 minutes prior to breakfast 90 tablet 1    losartan (Cozaar) 50 mg tablet Take 1 tab daily via j-tube. 90 tablet 1    morphine 10 mg/5 mL solution 2.5 mL (5 mg) by j-tube route every 6 hours if needed for severe pain (7 - 10). 60 mL 0    ondansetron ODT (Zofran-ODT) 8 mg disintegrating tablet Dissolve 1 tablet (8 mg) in the mouth every 8 hours if needed for nausea or vomiting. 30 tablet 5    prochlorperazine (Compazine) 10 mg tablet Take 1 tablet (10 mg) by mouth every 6 hours if needed for nausea or vomiting. 30 tablet 5    spironolactone (Aldactone) 50 mg tablet 1 tablet (50 mg) by j-tube route once daily. 90 tablet 1    tamsulosin (Flomax) 0.4 mg 24 hr capsule Take 1 capsule (0.4 mg) by mouth once daily at bedtime. Take at bedtime.      verapamil SR (Calan-SR) 240 mg ER tablet Take 1 tablet (240 mg) by mouth once daily at bedtime.      acetaminophen (Tylenol) 650 mg/20.3 mL solution oral  liquid 20.3 mL (650 mg) by j-tube route every 6 hours if needed for pain. (Patient not taking: Reported on 6/2/2025)      verapamil (Calan) 80 mg tablet 3 tablets (240 mg) by j-tube route once daily. (Patient taking differently: 3 tablets (240 mg) by j-tube route once daily. Takes #1 tablet of verapamil ER 240mg once daily) 90 tablet 1     No current facility-administered medications on file prior to visit.   [4]   Allergies  Allergen Reactions    Ibuprofen Swelling

## 2025-06-03 ENCOUNTER — DOCUMENTATION (OUTPATIENT)
Dept: PRIMARY CARE | Facility: CLINIC | Age: 70
End: 2025-06-03
Payer: MEDICARE

## 2025-06-05 LAB
LABORATORY COMMENT REPORT: NORMAL
LABORATORY COMMENT REPORT: NORMAL
PATH REPORT.COMMENTS IMP SPEC: NORMAL
PATH REPORT.FINAL DX SPEC: NORMAL
PATH REPORT.GROSS SPEC: NORMAL
PATH REPORT.RELEVANT HX SPEC: NORMAL
PATH REPORT.TOTAL CANCER: NORMAL

## 2025-06-10 ENCOUNTER — APPOINTMENT (OUTPATIENT)
Dept: OTOLARYNGOLOGY | Facility: HOSPITAL | Age: 70
End: 2025-06-10
Payer: MEDICARE

## 2025-06-10 ENCOUNTER — EVALUATION (OUTPATIENT)
Dept: PHYSICAL THERAPY | Facility: HOSPITAL | Age: 70
End: 2025-06-10
Payer: MEDICARE
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
## 2025-06-10 DIAGNOSIS — R29.898 LEG WEAKNESS, BILATERAL: ICD-10-CM

## 2025-06-10 PROCEDURE — 97162 PT EVAL MOD COMPLEX 30 MIN: CPT | Mod: GP

## 2025-06-10 ASSESSMENT — PAIN SCALES - GENERAL: PAINLEVEL_OUTOF10: 0 - NO PAIN

## 2025-06-10 ASSESSMENT — ENCOUNTER SYMPTOMS
OCCASIONAL FEELINGS OF UNSTEADINESS: 1
DEPRESSION: 1
LOSS OF SENSATION IN FEET: 0

## 2025-06-10 ASSESSMENT — PAIN - FUNCTIONAL ASSESSMENT: PAIN_FUNCTIONAL_ASSESSMENT: 0-10

## 2025-06-10 NOTE — PROGRESS NOTES
Physical Therapy    Physical Therapy Evaluation    Patient Name: Timoteo Victoria  MRN: [id number]  Today's Date: 6/10/2025    Time Entry:  Time Calculation  Start Time: 1645  Stop Time: 1745  Time Calculation (min): 60 min  PT Evaluation Time Entry  PT Evaluation (Moderate) Time Entry: 60                      Assessment   The patient presents with generalized weakness, diminished endurance, imbalance, and limited functional capabilities. He has an extensive history of esophageal cancer requiring esophagectomy, and chemotherapy.    Plan  Treatment/Interventions: Gait training, Education/ Instruction, Neuromuscular re-education, Therapeutic exercises    Current Problem  1. Fall, initial encounter  Referral to Physical Therapy    PT/OT, supportive care      2. Leg weakness, bilateral            Subjective   General:  General  Reason for Referral: PT for general weakness  Referred By: Wolfgang Pascual MD  Precautions:  Precautions  STEADI Fall Risk Score (The score of 4 or more indicates an increased risk of falling): 4  Vital Signs:     Pain:  Pain Assessment: 0-10  0-10 (Numeric) Pain Score: 0 - No pain  Home Living:   The patient lives alone and has been performing basic self-care and household activities. He has to pace himself to complete all activities.   Prior Function Per Pt/Caregiver Report:   The patient was previously I with all activities.  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   Range of Motion:   UE and LE flexibility is actively WFL throughout  Strength:   Overall UE and LE strength is 3+ to 4-/5  Gait:   The patient ambulated with SBA  with diminished trunk control and stability. The patient notes poor endurance during limited activity.   Balance:   Static standing 3+/5, dynamic 3 to 3-/5, gait 3 to 3-/5  Stairs:   TBA  Bed Mobility:   I supine / sit  Transfers:   The patient has limited  transfers to / from 90* surface due to quad and gluteal weakness    Outcome Measures:   The Activities Specific Balance Confidence Scale score is 88.75    OP EDUCATION:   Problems, goals, and treatments were discussed and patient agreed to plan of care.     Goals:  Active       PT Problem       PT Goal 1       Start:  06/10/25    Expected End:  08/14/25       STG's -- within 4 to 6 sessions  The patient will demonstrate improve knowledge of posture and body mechanics  The patient will be able to perform a basic home exercise program     LTG's -- by completion of the POC by 8/14/25  1. The patient will regain normal UE and LE ROM and flexibility to restore motion during daily functional activities   2. The patient will be able to reach and squat 10 X with good tolerance to assimilate household related activities    3. The patient will be able to dress and bath and complete household activities without painful restriction or need for compensation   4. The patient will be able to perform transfers and bed mobility I 10 times in 20 seconds with good safety    5. The patient will be able to ambulate with or without use of an assistive device with a more normal pattern 10 to 15 minutes   6.  The patient will be able to negotiate 12 steps with use of a railing utilizing a reciprocal gait pattern   7. The patient will be able to perform an advanced home exercise program to carry over flexibility and strength benefits                           MARI ROMERO, PT

## 2025-06-11 ENCOUNTER — APPOINTMENT (OUTPATIENT)
Dept: RADIOLOGY | Facility: HOSPITAL | Age: 70
End: 2025-06-11
Payer: MEDICARE

## 2025-06-11 ENCOUNTER — HOSPITAL ENCOUNTER (EMERGENCY)
Facility: HOSPITAL | Age: 70
Discharge: HOME | End: 2025-06-11
Payer: MEDICARE

## 2025-06-11 VITALS
HEIGHT: 70 IN | OXYGEN SATURATION: 100 % | RESPIRATION RATE: 16 BRPM | BODY MASS INDEX: 26.05 KG/M2 | HEART RATE: 85 BPM | TEMPERATURE: 98.3 F | SYSTOLIC BLOOD PRESSURE: 160 MMHG | WEIGHT: 182 LBS | DIASTOLIC BLOOD PRESSURE: 79 MMHG

## 2025-06-11 DIAGNOSIS — K11.8 PAROTID MASS: Primary | ICD-10-CM

## 2025-06-11 LAB
ALBUMIN SERPL BCP-MCNC: 4.1 G/DL (ref 3.4–5)
ALBUMIN SERPL-MCNC: 3.9 G/DL (ref 3.6–5.1)
ALP SERPL-CCNC: 69 U/L (ref 33–136)
ALP SERPL-CCNC: 72 U/L (ref 35–144)
ALT SERPL W P-5'-P-CCNC: 32 U/L (ref 10–52)
ALT SERPL-CCNC: 24 U/L (ref 9–46)
ANION GAP SERPL CALC-SCNC: 13 MMOL/L (ref 10–20)
ANION GAP SERPL CALCULATED.4IONS-SCNC: 8 MMOL/L (CALC) (ref 7–17)
AST SERPL W P-5'-P-CCNC: 21 U/L (ref 9–39)
AST SERPL-CCNC: 13 U/L (ref 10–35)
BASOPHILS # BLD AUTO: 0.03 X10*3/UL (ref 0–0.1)
BASOPHILS # BLD AUTO: 50 CELLS/UL (ref 0–200)
BASOPHILS NFR BLD AUTO: 0.3 %
BASOPHILS NFR BLD AUTO: 0.8 %
BILIRUB SERPL-MCNC: 0.4 MG/DL (ref 0.2–1.2)
BILIRUB SERPL-MCNC: 0.5 MG/DL (ref 0–1.2)
BUN SERPL-MCNC: 13 MG/DL (ref 6–23)
BUN SERPL-MCNC: 16 MG/DL (ref 7–25)
CALCIUM SERPL-MCNC: 9.2 MG/DL (ref 8.6–10.3)
CALCIUM SERPL-MCNC: 9.5 MG/DL (ref 8.6–10.3)
CHLORIDE SERPL-SCNC: 103 MMOL/L (ref 98–107)
CHLORIDE SERPL-SCNC: 104 MMOL/L (ref 98–110)
CHOLEST SERPL-MCNC: 106 MG/DL
CHOLEST/HDLC SERPL: 2.3 (CALC)
CO2 SERPL-SCNC: 25 MMOL/L (ref 21–32)
CO2 SERPL-SCNC: 26 MMOL/L (ref 20–32)
CREAT SERPL-MCNC: 0.8 MG/DL (ref 0.5–1.3)
CREAT SERPL-MCNC: 0.92 MG/DL (ref 0.7–1.35)
CRP SERPL-MCNC: 0.11 MG/DL
EGFRCR SERPLBLD CKD-EPI 2021: 90 ML/MIN/1.73M2
EGFRCR SERPLBLD CKD-EPI 2021: >90 ML/MIN/1.73M*2
EOSINOPHIL # BLD AUTO: 0.18 X10*3/UL (ref 0–0.7)
EOSINOPHIL # BLD AUTO: 139 CELLS/UL (ref 15–500)
EOSINOPHIL NFR BLD AUTO: 2.1 %
EOSINOPHIL NFR BLD AUTO: 2.2 %
ERYTHROCYTE [DISTWIDTH] IN BLOOD BY AUTOMATED COUNT: 12.5 % (ref 11–15)
ERYTHROCYTE [DISTWIDTH] IN BLOOD BY AUTOMATED COUNT: 13.2 % (ref 11.5–14.5)
ERYTHROCYTE [SEDIMENTATION RATE] IN BLOOD BY WESTERGREN METHOD: 14 MM/H (ref 0–20)
GLUCOSE SERPL-MCNC: 94 MG/DL (ref 65–99)
GLUCOSE SERPL-MCNC: 96 MG/DL (ref 74–99)
HCT VFR BLD AUTO: 37.1 % (ref 38.5–50)
HCT VFR BLD AUTO: 39.2 % (ref 41–52)
HDLC SERPL-MCNC: 46 MG/DL
HGB BLD-MCNC: 12.1 G/DL (ref 13.2–17.1)
HGB BLD-MCNC: 13.1 G/DL (ref 13.5–17.5)
IMM GRANULOCYTES # BLD AUTO: 0.03 X10*3/UL (ref 0–0.7)
IMM GRANULOCYTES NFR BLD AUTO: 0.3 % (ref 0–0.9)
LDLC SERPL CALC-MCNC: 45 MG/DL (CALC)
LYMPHOCYTES # BLD AUTO: 1.36 X10*3/UL (ref 1.2–4.8)
LYMPHOCYTES # BLD AUTO: 1449 CELLS/UL (ref 850–3900)
LYMPHOCYTES NFR BLD AUTO: 15.5 %
LYMPHOCYTES NFR BLD AUTO: 23 %
MCH RBC QN AUTO: 31.4 PG (ref 26–34)
MCH RBC QN AUTO: 31.6 PG (ref 27–33)
MCHC RBC AUTO-ENTMCNC: 32.6 G/DL (ref 32–36)
MCHC RBC AUTO-ENTMCNC: 33.4 G/DL (ref 32–36)
MCV RBC AUTO: 94 FL (ref 80–100)
MCV RBC AUTO: 96.9 FL (ref 80–100)
MONOCYTES # BLD AUTO: 0.65 X10*3/UL (ref 0.1–1)
MONOCYTES # BLD AUTO: 466 CELLS/UL (ref 200–950)
MONOCYTES NFR BLD AUTO: 7.4 %
MONOCYTES NFR BLD AUTO: 7.4 %
NEUTROPHILS # BLD AUTO: 4196 CELLS/UL (ref 1500–7800)
NEUTROPHILS # BLD AUTO: 6.51 X10*3/UL (ref 1.2–7.7)
NEUTROPHILS NFR BLD AUTO: 66.6 %
NEUTROPHILS NFR BLD AUTO: 74.4 %
NONHDLC SERPL-MCNC: 60 MG/DL (CALC)
NRBC BLD-RTO: 0 /100 WBCS (ref 0–0)
PLATELET # BLD AUTO: 198 X10*3/UL (ref 150–450)
PLATELET # BLD AUTO: 211 THOUSAND/UL (ref 140–400)
PMV BLD REES-ECKER: 10.4 FL (ref 7.5–12.5)
POTASSIUM SERPL-SCNC: 3.6 MMOL/L (ref 3.5–5.3)
POTASSIUM SERPL-SCNC: 3.7 MMOL/L (ref 3.5–5.3)
PROT SERPL-MCNC: 6.3 G/DL (ref 6.1–8.1)
PROT SERPL-MCNC: 6.8 G/DL (ref 6.4–8.2)
PSA SERPL-MCNC: 0.67 NG/ML
RBC # BLD AUTO: 3.83 MILLION/UL (ref 4.2–5.8)
RBC # BLD AUTO: 4.17 X10*6/UL (ref 4.5–5.9)
SODIUM SERPL-SCNC: 137 MMOL/L (ref 136–145)
SODIUM SERPL-SCNC: 138 MMOL/L (ref 135–146)
TRIGL SERPL-MCNC: 69 MG/DL
TSH SERPL-ACNC: 0.09 MIU/L (ref 0.4–4.5)
WBC # BLD AUTO: 6.3 THOUSAND/UL (ref 3.8–10.8)
WBC # BLD AUTO: 8.8 X10*3/UL (ref 4.4–11.3)

## 2025-06-11 PROCEDURE — 70491 CT SOFT TISSUE NECK W/DYE: CPT

## 2025-06-11 PROCEDURE — 80053 COMPREHEN METABOLIC PANEL: CPT | Performed by: NURSE PRACTITIONER

## 2025-06-11 PROCEDURE — 85652 RBC SED RATE AUTOMATED: CPT | Performed by: NURSE PRACTITIONER

## 2025-06-11 PROCEDURE — 96361 HYDRATE IV INFUSION ADD-ON: CPT

## 2025-06-11 PROCEDURE — 36415 COLL VENOUS BLD VENIPUNCTURE: CPT | Performed by: NURSE PRACTITIONER

## 2025-06-11 PROCEDURE — 2500000004 HC RX 250 GENERAL PHARMACY W/ HCPCS (ALT 636 FOR OP/ED): Performed by: NURSE PRACTITIONER

## 2025-06-11 PROCEDURE — 2550000001 HC RX 255 CONTRASTS: Performed by: NURSE PRACTITIONER

## 2025-06-11 PROCEDURE — 85025 COMPLETE CBC W/AUTO DIFF WBC: CPT | Performed by: NURSE PRACTITIONER

## 2025-06-11 PROCEDURE — 86140 C-REACTIVE PROTEIN: CPT | Performed by: NURSE PRACTITIONER

## 2025-06-11 PROCEDURE — 99285 EMERGENCY DEPT VISIT HI MDM: CPT | Mod: 25

## 2025-06-11 PROCEDURE — 2500000001 HC RX 250 WO HCPCS SELF ADMINISTERED DRUGS (ALT 637 FOR MEDICARE OP): Performed by: NURSE PRACTITIONER

## 2025-06-11 PROCEDURE — 96375 TX/PRO/DX INJ NEW DRUG ADDON: CPT | Mod: 59

## 2025-06-11 PROCEDURE — 96374 THER/PROPH/DIAG INJ IV PUSH: CPT | Mod: 59

## 2025-06-11 RX ORDER — ONDANSETRON HYDROCHLORIDE 2 MG/ML
4 INJECTION, SOLUTION INTRAVENOUS ONCE
Status: COMPLETED | OUTPATIENT
Start: 2025-06-11 | End: 2025-06-11

## 2025-06-11 RX ORDER — MORPHINE SULFATE 4 MG/ML
4 INJECTION INTRAVENOUS ONCE
Status: COMPLETED | OUTPATIENT
Start: 2025-06-11 | End: 2025-06-11

## 2025-06-11 RX ORDER — HYDROMORPHONE HYDROCHLORIDE 1 MG/ML
1 INJECTION, SOLUTION INTRAMUSCULAR; INTRAVENOUS; SUBCUTANEOUS ONCE
Status: COMPLETED | OUTPATIENT
Start: 2025-06-11 | End: 2025-06-11

## 2025-06-11 RX ORDER — NAPROXEN 500 MG/1
500 TABLET ORAL
Qty: 14 TABLET | Refills: 0 | Status: SHIPPED | OUTPATIENT
Start: 2025-06-11 | End: 2025-06-18

## 2025-06-11 RX ORDER — OXYCODONE AND ACETAMINOPHEN 5; 325 MG/1; MG/1
1 TABLET ORAL EVERY 6 HOURS PRN
Qty: 5 TABLET | Refills: 0 | Status: SHIPPED | OUTPATIENT
Start: 2025-06-11 | End: 2025-06-14

## 2025-06-11 RX ORDER — OXYCODONE AND ACETAMINOPHEN 5; 325 MG/1; MG/1
2 TABLET ORAL ONCE
Refills: 0 | Status: COMPLETED | OUTPATIENT
Start: 2025-06-11 | End: 2025-06-11

## 2025-06-11 RX ORDER — AMOXICILLIN AND CLAVULANATE POTASSIUM 875; 125 MG/1; MG/1
1 TABLET, FILM COATED ORAL EVERY 12 HOURS
Qty: 14 TABLET | Refills: 0 | Status: SHIPPED | OUTPATIENT
Start: 2025-06-11 | End: 2025-06-18

## 2025-06-11 RX ADMIN — SODIUM CHLORIDE 500 ML: 0.9 INJECTION, SOLUTION INTRAVENOUS at 16:03

## 2025-06-11 RX ADMIN — ONDANSETRON 4 MG: 2 INJECTION, SOLUTION INTRAMUSCULAR; INTRAVENOUS at 16:03

## 2025-06-11 RX ADMIN — IOHEXOL 75 ML: 350 INJECTION, SOLUTION INTRAVENOUS at 17:46

## 2025-06-11 RX ADMIN — HYDROMORPHONE HYDROCHLORIDE 1 MG: 1 INJECTION, SOLUTION INTRAMUSCULAR; INTRAVENOUS; SUBCUTANEOUS at 17:36

## 2025-06-11 RX ADMIN — OXYCODONE HYDROCHLORIDE AND ACETAMINOPHEN 2 TABLET: 5; 325 TABLET ORAL at 19:26

## 2025-06-11 RX ADMIN — MORPHINE SULFATE 4 MG: 4 INJECTION INTRAVENOUS at 16:04

## 2025-06-11 ASSESSMENT — PAIN SCALES - GENERAL
PAINLEVEL_OUTOF10: 7
PAINLEVEL_OUTOF10: 7
PAINLEVEL_OUTOF10: 8
PAINLEVEL_OUTOF10: 8
PAINLEVEL_OUTOF10: 7

## 2025-06-11 ASSESSMENT — PAIN DESCRIPTION - LOCATION: LOCATION: THROAT

## 2025-06-11 ASSESSMENT — PAIN DESCRIPTION - ORIENTATION: ORIENTATION: LEFT

## 2025-06-11 ASSESSMENT — PAIN - FUNCTIONAL ASSESSMENT
PAIN_FUNCTIONAL_ASSESSMENT: 0-10
PAIN_FUNCTIONAL_ASSESSMENT: 0-10

## 2025-06-11 ASSESSMENT — PAIN DESCRIPTION - PAIN TYPE: TYPE: ACUTE PAIN

## 2025-06-11 NOTE — ED PROVIDER NOTES
"    HPI   Chief Complaint   Patient presents with    Neck Mass     Pt states he had a mass on the left side of his neck; ENT assessed it 1x week ago and states it's benign; hx esophageal cancer; pt states the mass feels larger and more painful for the last 24 hours.       This is a 69 yo  male with a hx of esophageal cancer that is undergoing chemotherapy, who presents with a lump on his left parotid region.  The patient has had a small lump in that region for years.  The patient was seen at the ENT and was concerned about the lump.  He reports having a biopsy 1 week ago.  It was reported that the mass was a noncancerous tumor.  ENT wants to remove the lump however the patient wants to wait until he is done with his cancer treatment.  He has 4 more treatments to go.  They are postponing the treatments until the patient gains weight and become stronger.  The patient states that the swelling increased last night and became very painful.   He denies any pain with turning his head or swallowing food. He denies any hearing changes, speech changes, difficulty chewing, fevers, chills, nausea, or vomiting.  The patient took Tylenol for his discomfort with no relief of his symptoms.      History provided by:  Patient   used: No                          Misa Coma Scale Score: 15                  Patient History   Medical History[1]  Surgical History[2]  Family History[3]  Social History[4]    Physical Exam   Visit Vitals  /84   Pulse 65   Temp 36.8 °C (98.3 °F)   Resp 18   Ht 1.778 m (5' 10\")   Wt 82.6 kg (182 lb)   SpO2 100%   BMI 26.11 kg/m²   Smoking Status Every Day   BSA 2.02 m²      Physical Exam  Vitals and nursing note reviewed.   Constitutional:       Appearance: Normal appearance.   HENT:      Head: Normocephalic and atraumatic.      Right Ear: Tympanic membrane normal.      Left Ear: Tympanic membrane normal.      Nose: Nose normal.      Mouth/Throat:      Mouth: Mucous " membranes are moist.      Pharynx: Oropharynx is clear.      Comments: The patient has a small lump noted to the left parotid region.  The area is firm.  No fluctuance or induration appreciated.  Eyes:      Extraocular Movements: Extraocular movements intact.      Conjunctiva/sclera: Conjunctivae normal.   Cardiovascular:      Rate and Rhythm: Normal rate.      Pulses: Normal pulses.   Pulmonary:      Effort: Pulmonary effort is normal.   Abdominal:      General: Abdomen is flat.   Genitourinary:     Comments: No CVA tenderness or pubic pain.  Musculoskeletal:         General: Normal range of motion.      Cervical back: Normal range of motion.   Skin:     General: Skin is warm and dry.   Neurological:      General: No focal deficit present.      Mental Status: He is alert and oriented to person, place, and time.   Psychiatric:         Mood and Affect: Mood normal.         Behavior: Behavior normal.         CT soft tissue neck w IV contrast    (Results Pending)       Labs Reviewed   CBC WITH AUTO DIFFERENTIAL - Abnormal       Result Value    WBC 8.8      nRBC 0.0      RBC 4.17 (*)     Hemoglobin 13.1 (*)     Hematocrit 39.2 (*)     MCV 94      MCH 31.4      MCHC 33.4      RDW 13.2      Platelets 198      Neutrophils % 74.4      Immature Granulocytes %, Automated 0.3      Lymphocytes % 15.5      Monocytes % 7.4      Eosinophils % 2.1      Basophils % 0.3      Neutrophils Absolute 6.51      Immature Granulocytes Absolute, Automated 0.03      Lymphocytes Absolute 1.36      Monocytes Absolute 0.65      Eosinophils Absolute 0.18      Basophils Absolute 0.03     COMPREHENSIVE METABOLIC PANEL - Normal    Glucose 96      Sodium 137      Potassium 3.6      Chloride 103      Bicarbonate 25      Anion Gap 13      Urea Nitrogen 13      Creatinine 0.80      eGFR >90      Calcium 9.5      Albumin 4.1      Alkaline Phosphatase 69      Total Protein 6.8      AST 21      Bilirubin, Total 0.5      ALT 32     C-REACTIVE PROTEIN -  Normal    C-Reactive Protein 0.11     SEDIMENTATION RATE, AUTOMATED - Normal    Sedimentation Rate 14           ED Course & MDM   Diagnoses as of 06/11/25 1912   Parotid mass           Medical Decision Making  Patient was seen and evaluated by the nurse practitioner, Prudence Jensen.  The patient is presenting to the emergency room with complaints of enlarging mass to the left jaw.  Differential diagnosis includes parotid mass, salivary gland stone, cellulitis, abscess, metastasis, or other acute process.  A saline lock was established.  Laboratory studies were drawn with results as noted.  The patient was administered normal saline for hydration, Zofran, and morphine.  The patient reported persistent pain and was further medicated with 1 mg of Dilaudid IV.  Laboratory studies showed that the patient did not have an acute leukocytosis.  Sed rate and C-reactive protein was within normal limits.  CMP was unremarkable.  A CT of the neck soft tissues revealed a chronic left parotid mass that has not appreciably changed in size but has changed in attenuation.  Now has a low-attenuation consistent with a cyst.  There is mild edema noted in the adjacent subcutaneous soft tissues near the parotid mass.  Findings could be consistent with infection, inflammation, sequelae of therapy.  The patient was notified of his imaging and laboratory results.  The patient will be treated empirically.  He was provided a prescription for Augmentin for antibiotic coverage, naproxen for inflammation, and Percocet for pain.  He has to follow-up with his ENT to make arrangements for removal.  He was discharged in stable condition with computer discharge instructions given.  He is to return if worse in any way.  Patient verbalized understanding of his discharge instructions.           Your medication list        ASK your doctor about these medications        Instructions Last Dose Given Next Dose Due   acetaminophen 650 mg/20.3 mL solution oral  liquid  Commonly known as: Tylenol      20.3 mL (650 mg) by j-tube route every 6 hours if needed for pain.       amitriptyline 100 mg tablet  Commonly known as: Elavil      1 tablet (100 mg) by j-tube route once daily at bedtime.       aspirin 81 mg chewable tablet           atorvastatin 40 mg tablet  Commonly known as: Lipitor      1 tablet (40 mg) by j-tube route once daily.       baclofen 5 mg tablet  Commonly known as: Lioresal           buPROPion 75 mg tablet  Commonly known as: Wellbutrin      2 tablets (150 mg) by j-tube route 2 times a day.       levothyroxine 175 mcg tablet  Commonly known as: Synthroid, Levoxyl      1 tablet (175 mcg) by j-tube route early in the morning.. Take on an empty stomach at the same time each day, either 30 to 60 minutes prior to breakfast       losartan 50 mg tablet  Commonly known as: Cozaar      Take 1 tab daily via j-tube.       morphine 10 mg/5 mL solution      2.5 mL (5 mg) by j-tube route every 6 hours if needed for severe pain (7 - 10).       ondansetron ODT 8 mg disintegrating tablet  Commonly known as: Zofran-ODT      Dissolve 1 tablet (8 mg) in the mouth every 8 hours if needed for nausea or vomiting.       prochlorperazine 10 mg tablet  Commonly known as: Compazine      Take 1 tablet (10 mg) by mouth every 6 hours if needed for nausea or vomiting.       spironolactone 50 mg tablet  Commonly known as: Aldactone      1 tablet (50 mg) by j-tube route once daily.       tamsulosin 0.4 mg 24 hr capsule  Commonly known as: Flomax           verapamil 80 mg tablet  Commonly known as: Calan      3 tablets (240 mg) by j-tube route once daily.       verapamil  mg ER tablet  Commonly known as: Calan-SR                    Procedure       *This report was transcribed using voice recognition software.  Every effort was made to ensure accuracy; however, inadvertent computerized transcription errors may be present.*  Prudence SANZ-CNP  06/11/25           [1]   Past Medical  History:  Diagnosis Date    CAD (coronary artery disease)     Esophageal carcinoma     Hyperlipidemia     Hypertension     Hypothyroidism    [2]   Past Surgical History:  Procedure Laterality Date    CATARACT EXTRACTION Right 08/25/2023    CHOLECYSTECTOMY      CORONARY ARTERY BYPASS GRAFT  2005    4-vessel    ESOPHAGUS SURGERY  03/31/2025    Three field esophagectomy    HERNIA REPAIR     [3]   Family History  Problem Relation Name Age of Onset    COPD Mother     [4]   Social History  Tobacco Use    Smoking status: Every Day     Current packs/day: 0.25     Average packs/day: 1 pack/day for 46.4 years (45.4 ttl pk-yrs)     Types: Cigarettes     Start date: 1979     Passive exposure: Current    Smokeless tobacco: Never    Tobacco comments:     Pt is down to 2 cigarettes.   Vaping Use    Vaping status: Never Used   Substance Use Topics    Alcohol use: Not Currently    Drug use: Never        [facility] Peterson-CNP  06/11/25 [id number]

## 2025-06-16 NOTE — H&P (VIEW-ONLY)
"Subjective   Timoteo Victoria  is a 70 y.o. male who presents for evaluation of esophageal cancer status post three field esophagectomy on 3/31/25.      This patient presents with a notable history of CAD (CABG 2005), HTN, HLD, chronic tobacco use, hypothyroidism presented on 11/25 as a transfer from [facility] for cancer workup. He had been having trouble swallowing for the past month, initially solids and progressing to liquids and medications. He has lost 40 lbs over ~6 months. EGD on 11/21/24 showed an esophageal mass with a malignant appearance, with biopsy showing intramucosal adenocarcinoma. The patient had a J tube placed on 11/25. Oncology was consulted for further management of this newly diagnosed esophageal adenocarcinoma and recommended outpatient PET. He was admitted to the hospital from 1/12/2025 to 1/18/2025 in the setting ofInfection, and during that time, had concerns about his feeding tube. The feeding tube was replaced.  He received neoadjuvant treatment.  His post neoadjuvant PET/CT did not suggest metastatic disease, and I recommended surgical resection.  He was taken the operating room for a 3-hole esophagectomy on 3/31/2025.  His postop course was unremarkable and he was discharged home on oral diet. In 5/22/25 the patient was losing weight nad I recommended he eat more and take some tube feedings    Currently the patient is feeling great. He reports some weight gain and he can \"Eat whatever I want\" He does sometimes food \"gets caught\". He denies the following symptoms: chest pain, shortness of breath at rest, shortness of breath with activity, cough, hemoptysis, and weight loss.      He did have a neck mass and is seeing an ENT about removal.     He  reports that he has been smoking cigarettes. He started smoking about 46 years ago. He has a 45.4 pack-year smoking history. He has been exposed to tobacco smoke. He has never used smokeless tobacco. He reports that he does not currently use " alcohol. He reports that he does not use drugs.    Objective   Physical Exam  The patient is well-appearing and in no acute distress. The trachea is midline and there is no crepitus. The lungs were clear to auscultation grossly. There was good effort and excursion. The heart had a regular rate and rhythm. The abdomen was soft, nontender and nondistended. The extremities had no edema or gross deformities. Mood and affect are appropriate. Ventral hernia at laparotomy site.   Diagnostic Studies  No new/pertinent diagnostic imaging available    Assessment/Plan   I believe that the patient is doing well.     I am pleased that the patient's nutritional status is improving as evidenced by his weight gain.  He also clinically appears better nourished.  He is reporting difficulty swallowing, and I believe an esophagram could help determine if he has an anastomotic stricture.  If he does, dilation may be appropriate.  Perhaps I could do this dilation or if I am on vacation one of my partners could do it as an outpatient.    In regards to the patient's ventral hernia, this is currently asymptomatic, and I believe this can be safely observed at this time as his parotid tumor and chemotherapy treatment are his current priorities    If the patient is able to swallow, over time we could reduce his tube feedings and if he is able to maintain his weight without the feeding tube, this could be removed in the office.    I recommend esophagram    I discussed this in detail with the patient, including a discussion of alternatives. They were comfortable with this approach.     Fabio Salas MD  [phone]

## 2025-06-16 NOTE — PROGRESS NOTES
"Subjective   Timoteo Victoria  is a 70 y.o. male who presents for evaluation of esophageal cancer status post three field esophagectomy on 3/31/25.      This patient presents with a notable history of CAD (CABG 2005), HTN, HLD, chronic tobacco use, hypothyroidism presented on 11/25 as a transfer from [facility] for cancer workup. He had been having trouble swallowing for the past month, initially solids and progressing to liquids and medications. He has lost 40 lbs over ~6 months. EGD on 11/21/24 showed an esophageal mass with a malignant appearance, with biopsy showing intramucosal adenocarcinoma. The patient had a J tube placed on 11/25. Oncology was consulted for further management of this newly diagnosed esophageal adenocarcinoma and recommended outpatient PET. He was admitted to the hospital from 1/12/2025 to 1/18/2025 in the setting ofInfection, and during that time, had concerns about his feeding tube. The feeding tube was replaced.  He received neoadjuvant treatment.  His post neoadjuvant PET/CT did not suggest metastatic disease, and I recommended surgical resection.  He was taken the operating room for a 3-hole esophagectomy on 3/31/2025.  His postop course was unremarkable and he was discharged home on oral diet. In 5/22/25 the patient was losing weight nad I recommended he eat more and take some tube feedings    Currently the patient is feeling great. He reports some weight gain and he can \"Eat whatever I want\" He does sometimes food \"gets caught\". He denies the following symptoms: chest pain, shortness of breath at rest, shortness of breath with activity, cough, hemoptysis, and weight loss.      He did have a neck mass and is seeing an ENT about removal.     He  reports that he has been smoking cigarettes. He started smoking about 46 years ago. He has a 45.4 pack-year smoking history. He has been exposed to tobacco smoke. He has never used smokeless tobacco. He reports that he does not currently use " alcohol. He reports that he does not use drugs.    Objective   Physical Exam  The patient is well-appearing and in no acute distress. The trachea is midline and there is no crepitus. The lungs were clear to auscultation grossly. There was good effort and excursion. The heart had a regular rate and rhythm. The abdomen was soft, nontender and nondistended. The extremities had no edema or gross deformities. Mood and affect are appropriate. Ventral hernia at laparotomy site.   Diagnostic Studies  No new/pertinent diagnostic imaging available    Assessment/Plan   I believe that the patient is doing well.     I am pleased that the patient's nutritional status is improving as evidenced by his weight gain.  He also clinically appears better nourished.  He is reporting difficulty swallowing, and I believe an esophagram could help determine if he has an anastomotic stricture.  If he does, dilation may be appropriate.  Perhaps I could do this dilation or if I am on vacation one of my partners could do it as an outpatient.    In regards to the patient's ventral hernia, this is currently asymptomatic, and I believe this can be safely observed at this time as his parotid tumor and chemotherapy treatment are his current priorities    If the patient is able to swallow, over time we could reduce his tube feedings and if he is able to maintain his weight without the feeding tube, this could be removed in the office.    I recommend esophagram    I discussed this in detail with the patient, including a discussion of alternatives. They were comfortable with this approach.     Fabio Salas MD  [phone]

## 2025-06-17 ENCOUNTER — TELEMEDICINE (OUTPATIENT)
Dept: HEMATOLOGY/ONCOLOGY | Facility: HOSPITAL | Age: 70
End: 2025-06-17
Payer: MEDICARE

## 2025-06-17 ENCOUNTER — APPOINTMENT (OUTPATIENT)
Dept: PRIMARY CARE | Facility: CLINIC | Age: 70
End: 2025-06-17
Payer: MEDICARE

## 2025-06-17 VITALS
SYSTOLIC BLOOD PRESSURE: 102 MMHG | BODY MASS INDEX: 26.83 KG/M2 | WEIGHT: 187 LBS | TEMPERATURE: 97.3 F | HEART RATE: 82 BPM | DIASTOLIC BLOOD PRESSURE: 58 MMHG | OXYGEN SATURATION: 99 %

## 2025-06-17 DIAGNOSIS — I10 BENIGN ESSENTIAL HYPERTENSION: Primary | Chronic | ICD-10-CM

## 2025-06-17 DIAGNOSIS — G44.019 EPISODIC CLUSTER HEADACHE, NOT INTRACTABLE: ICD-10-CM

## 2025-06-17 DIAGNOSIS — F33.1 DEPRESSION, MAJOR, RECURRENT, MODERATE: ICD-10-CM

## 2025-06-17 DIAGNOSIS — E03.9 HYPOTHYROIDISM, UNSPECIFIED TYPE: ICD-10-CM

## 2025-06-17 DIAGNOSIS — C15.9 PRIMARY ESOPHAGEAL ADENOCARCINOMA (MULTI): ICD-10-CM

## 2025-06-17 DIAGNOSIS — K11.20 INFECTION OF PAROTID GLAND: ICD-10-CM

## 2025-06-17 DIAGNOSIS — E78.5 HYPERLIPIDEMIA, UNSPECIFIED HYPERLIPIDEMIA TYPE: Chronic | ICD-10-CM

## 2025-06-17 PROBLEM — S40.812A ABRASION OF LEFT ARM: Status: RESOLVED | Noted: 2025-05-18 | Resolved: 2025-06-17

## 2025-06-17 PROBLEM — S00.83XA FACIAL BRUISING, INITIAL ENCOUNTER: Status: RESOLVED | Noted: 2025-05-18 | Resolved: 2025-06-17
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
## 2025-06-17 PROBLEM — E87.1 HYPONATREMIA: Status: RESOLVED | Noted: 2024-03-22 | Resolved: 2025-06-17

## 2025-06-17 PROCEDURE — 99214 OFFICE O/P EST MOD 30 MIN: CPT | Performed by: FAMILY MEDICINE

## 2025-06-17 PROCEDURE — 1111F DSCHRG MED/CURRENT MED MERGE: CPT

## 2025-06-17 PROCEDURE — 99214 OFFICE O/P EST MOD 30 MIN: CPT

## 2025-06-17 PROCEDURE — 4004F PT TOBACCO SCREEN RCVD TLK: CPT | Performed by: FAMILY MEDICINE

## 2025-06-17 PROCEDURE — 3074F SYST BP LT 130 MM HG: CPT | Performed by: FAMILY MEDICINE

## 2025-06-17 PROCEDURE — 1159F MED LIST DOCD IN RCRD: CPT | Performed by: FAMILY MEDICINE

## 2025-06-17 PROCEDURE — 1111F DSCHRG MED/CURRENT MED MERGE: CPT | Performed by: FAMILY MEDICINE

## 2025-06-17 PROCEDURE — 3078F DIAST BP <80 MM HG: CPT | Performed by: FAMILY MEDICINE

## 2025-06-17 PROCEDURE — 1160F RVW MEDS BY RX/DR IN RCRD: CPT

## 2025-06-17 PROCEDURE — 1159F MED LIST DOCD IN RCRD: CPT

## 2025-06-17 PROCEDURE — G2211 COMPLEX E/M VISIT ADD ON: HCPCS | Performed by: FAMILY MEDICINE

## 2025-06-17 PROCEDURE — 1160F RVW MEDS BY RX/DR IN RCRD: CPT | Performed by: FAMILY MEDICINE

## 2025-06-17 RX ORDER — VERAPAMIL HCL 240 MG
240 TABLET, EXTENDED RELEASE ORAL NIGHTLY
Qty: 90 TABLET | Refills: 1 | Status: SHIPPED | OUTPATIENT
Start: 2025-06-17

## 2025-06-17 RX ORDER — LOSARTAN POTASSIUM 50 MG/1
TABLET ORAL
Start: 2025-06-17 | End: 2025-06-17 | Stop reason: ALTCHOICE

## 2025-06-17 RX ORDER — SPIRONOLACTONE 25 MG/1
25 TABLET ORAL DAILY
Qty: 90 TABLET | Refills: 1 | Status: SHIPPED | OUTPATIENT
Start: 2025-06-17 | End: 2025-12-14

## 2025-06-17 RX ORDER — ATORVASTATIN CALCIUM 40 MG/1
40 TABLET, FILM COATED ORAL DAILY
Qty: 90 TABLET | Refills: 1 | Status: SHIPPED | OUTPATIENT
Start: 2025-06-17

## 2025-06-17 RX ORDER — LOSARTAN POTASSIUM 100 MG/1
100 TABLET ORAL DAILY
Start: 2025-06-17

## 2025-06-17 RX ORDER — BUPROPION HYDROCHLORIDE 75 MG/1
150 TABLET ORAL 2 TIMES DAILY
Qty: 360 TABLET | Refills: 1 | Status: SHIPPED | OUTPATIENT
Start: 2025-06-17

## 2025-06-17 RX ORDER — LEVOTHYROXINE SODIUM 150 UG/1
150 TABLET ORAL DAILY
Qty: 60 TABLET | Refills: 0 | Status: SHIPPED | OUTPATIENT
Start: 2025-06-17

## 2025-06-17 ASSESSMENT — ENCOUNTER SYMPTOMS
CHILLS: 0
NAUSEA: 0
FATIGUE: 1
VOMITING: 0
WEAKNESS: 1
FEVER: 0
COUGH: 0

## 2025-06-17 NOTE — PATIENT INSTRUCTIONS
Reduce spironolactone from 50mg to 25mg daily. Check home BP and let Dr. FIELDS know if <100/60 or >140/90.    Reduce your levothyroxine to 150mcg. Please go to the lab in 6 weeks (7/29) to recheck your thyroid levels. You do not need to fast.

## 2025-06-17 NOTE — ASSESSMENT & PLAN NOTE
Suspect tight control is contributing to fatigue and weakness. Reduce spironolactone from 50mg to 25mg. Monitor home Bps; parameters given.

## 2025-06-17 NOTE — PROGRESS NOTES
Virtual or Telephone Consent    While technically available, the patient was unable to connect via audio/video telehealth technology; therefore, I performed this visit using a real-time audio only connection between Timoteo Victoria & UMU SawantCNP.  Verbal consent was requested and obtained from Timoteo Victoria on this date, 06/17/25 for a telehealth visit and the patient's location was confirmed at the time of the visit.     Patient ID: Timoteo Victoria is a 70 y.o. male.  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  Underwent esophagectomy on 3/31/2025  after 4 cycles of neoadjuvant FLOT.  Pathology showed residual tumor (pT3 pN3), close radial margin (less than 1 mm).  PET-avid parotid and thyroid nodule.    Genomic profile:  Normal MMR expresison    Assessment and Plan:  70M with CAD (CABG 2005), HTN, HLD, chronic tobacco use, hypothyroidism, presented with 4-6 weeks of progressive dysphagia (solid to liquid/mediations) 40lb wt loss over 6 months, and was found to have distal esophageal mass covering half of lumen on EGD 11/21/24  and Bx showed intramucosal adenocarcinoma with normal MMR expression. S/p J tube placement 11/25/2024.    PET-CT showed no distant mets.    We discussed FLOT for chemotherapy regimen with him in details. After a detailed discussion about the chemo, he consented and started cycle 1 on 1/2/25. Completed cycle 4 on 2-.  His post neoadjuvant therapy PET/CT which did not show any PET avid lesions suggestive of metastatic disease.  He underwent esophagectomy on March 31, 2025.  The pathology showed pT3 pN3 disease with close radial margin (less than 1 mm).      He is gradually recovering from the surgery.  His overall oral intake is improving.  I talked to him about starting adjuvant chemotherapy with FLOT x 4 cycles.  He is not well enough yet to receive chemotherapy.  I have requested an appointment on  5/29/2025 to reevaluate him for chemotherapy.    His radial margin is close. Dr. Wilson referred him to radiation oncology to discuss adjuvant radiation therapy last visit.     Plan: Postoperative FLOT x 4 cycles and postoperative radiation therapy because of the close radial margin.    His prior PET/CT scans showed FDG avid parotid and thyroid nodules.  I have referred him to ENT for further workup. He is currently following with team. Next appt is 6/30.     In terms of chemotherapy, he is still not recovered fully and is having some difficulty with eating. His J-tube has not been pulled out yet. He reports using J-tube mostly in the evening daily. Mr. Victoria will see Dr. Salas this week.     He also was recently seen in the ER and treated for infection at parotid biopsy site. He is seeing general surgery on 6.27 to discuss surgical interventions.     We will  plan to wait another 2 weeks before planning on postoperative chemotherapy until he sees above specialties.     Follow-up: Phone appointment in 2 weeks.    I have placed all orders as outlined above. I advised the patient to schedule the tests and follow-up appointment as discussed by contacting the  on the way out or calling by phone. Patient knows to call with any issues or concerns.     Providers:  Surgeon: Dr. Salas  MedOnc: Dr Katie Rosen:    Chief complaint: Esophageal adenocarcinoma    HPI:  Timoteo Victoria is a 70 y.o. male with a notable history of CAD (CABG 2005), HTN, HLD, chronic tobacco use, hypothyroidism presented on 11/25/2024 as a transfer from [facility] for cancer workup. He had been having trouble swallowing for the past month, initially solids and progressing to liquids and medications. He has lost 40 lbs in the past 6 months.     EGD on 11/21/24 showed an esophageal mass with a malignant appearance, with biopsy showing intramucosal adenocarcinoma. The patient had a J tube placed on 11/25.     ONCOLOGIC  HISTORY-  11/21/24  [facility] admission for weight loss, dysphagia; EGD showed esophageal mass with biopsy confirming intramucosal adenocarcinoma  11/20/24 CT neck with no masses  11/22/24 CT CAP with contrast with irregular masslike thickening of distal esophagus, no obvious metastases  11/25/24 J tube placement.    12-: pet-ct showed-  IMPRESSION:  1. Hypermetabolic esophageal mass as described above is consistent  with biopsy-proven esophageal adenocarcinoma. Few minimally  hypermetabolic subcentimeter periesophageal lymph nodes are likely  reactive.  2. No other evidence of hypermetabolic thoracic or abdominal  lymphadenopathy or hypermetabolic metastatic disease.  3. Multiple hypermetabolic intraparotid nodules/lymph nodes, which  have been present since 2022, likely represents a benign and indolent  process. However, recommend continued attention on follow-up.  4. Asymmetrically increased hypermetabolic activity within the right  thyroid gland. Correlate with thyroid ultrasound may be of value.    1-2-2025: started FLOT-4.  Completed cycle 4 on February 27, 2025.    March 11, 2025: CT scan has indeterminate lung lesions (7 mm).    March 14, 2025: PET-CT did not show any definitive metastatic lesion.    3-: Underwent esophagectomy.  Pathology showed the following-      Component    FINAL DIAGNOSIS   A. Lymph Node, Level 7, Excision:   -- Three lymph nodes, negative for carcinoma (0/3).     B. Esophagus and Stomach, Esophagogastrectomy:   -- Residual invasive adenocarcinoma, poorly differentiated, 3.6 cm, with treatment effect present (residual cancer showing evident tumor regression, but more than single cells or rare small groups of cancer cells (partial response, score 2).               -- The tumor invades adventitia and extends to less than 1 mm from the inked cauterized adventitia soft tissue / radial margin.               -- Lymphovascular including extramural large venous invasion  identified.               -- Perineural invasion identified.               -- Metastatic carcinoma involving ten of seventeen lymph nodes (10/17).               -- Other surgical resection (proximal and distal) margins, negative for carcinoma (see final distal margin in Part C).  -- See note and synoptic report.     C. Additional Gastric Margin, Excision:   -- Portion of stomach with focal chronic inflammation, negative for carcinoma.  -- One lymph node, negative for carcinoma (0/1).     Note: Cytokeratin AE1/AE3 immunostain (Block B60) highlights neoplastic cells in lymphovascular space. Movat special stain (Block B38) confirms extramural large venous invasion.     DNA mismatch repair protein immunohistochemical stains were performed on prior biopsy ([id number]).      Selective slides (B5, B8, B38, B45, B46, and B60) have been additionally reviewed by Dr. Vernell Ponce who concurs with the above diagnosis.   Electronically signed by Wilder Kowalski MD PhD on 4/14/2025 at 1721        By the signature on this report, the individual or group listed as making the Final Interpretation/Diagnosis certifies that they have reviewed this case.    Case Summary Report   ESOPHAGUS   8th Edition - Protocol posted: 6/22/2022ESOPHAGUS - All Specimens  SPECIMEN   Procedure  Esophagogastrectomy   TUMOR   Tumor Site  Distal esophagus (low thoracic esophagus)   Relationship of Tumor to Esophagogastric Junction  Tumor midpoint lies in the distal esophagus AND tumor involves the esophagogastric junction   Distance of Tumor Center from Esophagogastric Junction  1.8 cm   Histologic Type  Adenocarcinoma   Histologic Grade  G3, poorly differentiated, undifferentiated   Tumor Size  Greatest Dimension (Centimeters): 3.6 cm   Tumor Extent  Invades adventitia   Treatment Effect  Present, with residual cancer showing evident tumor regression, but more than single cells or rare small groups of cancer cells (partial response, score 2)   Lymphovascular  Invasion  Present   Perineural Invasion  Present   MARGINS   Margin Status for Invasive Carcinoma  The tumor extends to less than 1 mm from the inked cauterized adventitia soft tissue / radial margin   Margin Status for Dysplasia and Intestinal Metaplasia  All margins negative for dysplasia   Margin Comment  Other surgical resection (proximal and distal) margins, negative for carcinoma   REGIONAL LYMPH NODES   Regional Lymph Node Status  Tumor present in regional lymph node(s)   Number of Lymph Nodes with Tumor  10   Number of Lymph Nodes Examined  21   PATHOLOGIC STAGE CLASSIFICATION (pTNM, AJCC 8th Edition)   Reporting of pT, pN, and (when applicable) pM categories is based on information available to the pathologist at the time the report is issued. As per the AJCC (Chapter 1, 8th Ed.) it is the managing physician’s responsibility to establish the final pathologic stage based upon all pertinent information, including but potentially not limited to this pathology report.   TNM Descriptors  y (post-treatment)   pT Category  pT3   pN Category  pN3   Comment(s)  DNA mismatch repair protein immunohistochemical stains were performed on prior biopsy ([id number])             Interval history:   Mr. Victoria continues to recover from surgery at home. His swallowing is gradually improving and he is trying to eat more by mouth. Denies any significant pain currently.  He is up and about without any help.  Has some fatigue. He was seen in the ER last week for parotid biopsy site pain in which he was treated with antibiotics and due to see surgery on 6/27. He is also following with ENT in the near future too.     Currently denies: fevers, chills, chest pain, SOB, cough, N/V, bowel changes, urinary symptoms.    Past Medical History:   Past Medical History:  No date: CAD (coronary artery disease)  No date: Esophageal carcinoma  No date: Hyperlipidemia  No date: Hypertension  No date: Hypothyroidism   Surgical History:    Past  Surgical History:   Procedure Laterality Date    CATARACT EXTRACTION Right 08/25/2023    CHOLECYSTECTOMY      CORONARY ARTERY BYPASS GRAFT  2005    4-vessel    ESOPHAGUS SURGERY  03/31/2025    Three field esophagectomy    HERNIA REPAIR        Family History:    Family History   Problem Relation Name Age of Onset    COPD Mother       Family Oncology History:    Cancer-related family history is not on file.  Social History:    Social History     Tobacco Use    Smoking status: Every Day     Current packs/day: 0.25     Average packs/day: 1 pack/day for 46.5 years (45.4 ttl pk-yrs)     Types: Cigarettes     Start date: 1979     Passive exposure: Current    Smokeless tobacco: Never    Tobacco comments:     Pt is down to 2 cigarettes.   Vaping Use    Vaping status: Never Used   Substance Use Topics    Alcohol use: Not Currently    Drug use: Never        Allergies  Allergies   Allergen Reactions    Ibuprofen Swelling        Medications  Current Outpatient Medications   Medication Instructions    acetaminophen (TYLENOL) 650 mg, j-tube, Every 6 hours PRN    amitriptyline (ELAVIL) 100 mg, j-tube, Nightly    amoxicillin-clavulanate (Augmentin) 875-125 mg tablet 1 tablet, oral, Every 12 hours    aspirin 81 mg, Daily    atorvastatin (LIPITOR) 40 mg, j-tube, Daily    baclofen (Lioresal) 5 mg tablet 1 tablet, 3 times daily (0900,1400,1900)    buPROPion (WELLBUTRIN) 150 mg, j-tube, 2 times daily    levothyroxine (SYNTHROID, LEVOXYL) 175 mcg, j-tube, Daily, Take on an empty stomach at the same time each day, either 30 to 60 minutes prior to breakfast    losartan (Cozaar) 50 mg tablet Take 1 tab daily via j-tube.    morphine 5 mg, j-tube, Every 6 hours PRN    naproxen (NAPROSYN) 500 mg, oral, 2 times daily (morning and late afternoon)    ondansetron ODT (ZOFRAN-ODT) 8 mg, oral, Every 8 hours PRN    prochlorperazine (COMPAZINE) 10 mg, oral, Every 6 hours PRN    spironolactone (ALDACTONE) 50 mg, j-tube, Daily    tamsulosin (FLOMAX) 0.4  mg, Nightly    verapamil (CALAN) 240 mg, j-tube, Daily    verapamil SR (CALAN-SR) 240 mg, Nightly          Objective   VS: There were no vitals taken for this visit.    PHYSICAL EXAMINATION    Labs  Results from last 7 days   Lab Units 06/11/25  1558 06/10/25  1611   WBC AUTO x10*3/uL 8.8  --    [facility] WBC AUTO [place]/uL  --  6.3   HEMOGLOBIN g/dL 13.1*  --    [facility] HEMOGLOBIN g/dL  --  12.1*   HEMATOCRIT % 39.2*  --    [facility] HEMATOCRIT %  --  37.1*   PLATELETS AUTO x10*3/uL 198  --    [facility] PLATELETS AUT[facility] [place]/uL  --  211   NEUTROS ABS x10*3/uL 6.51  --    LYMPHS ABS AUTO x10*3/uL 1.36  --    MONOS ABS AUTO x10*3/uL 0.65  --    EOS ABS AUTO x10*3/uL 0.18  --    [facility] EOS ABS MAN cells/uL  --  139   [facility] LYMPHO ABS MAN cells/uL  --  1,449   [facility] MONO ABS MAN cells/uL  --  466   NEUTROS PCT AUTO % 74.4  --    LYMPHS PCT AUTO % 15.5  --    [facility] LYMPHO PCT MAN %  --  23.0   MONOS PCT AUTO % 7.4  --    [facility] MONO PCT MAN %  --  7.4   EOS PCT AUTO % 2.1  --    [facility] EOSINO PCT MAN %  --  2.2       Results from last 7 days   Lab Units 06/11/25  1558 06/10/25  1611   [facility] GLUCOSE mg/dL  --  94   GLUCOSE mg/dL 96  --    [facility] SODIUM mmol/L  --  138   SODIUM mmol/L 137  --    [facility] POTASSIUM mmol/L  --  3.7   POTASSIUM mmol/L 3.6  --    [facility] CHLORIDE mmol/L  --  104   CHLORIDE mmol/L 103  --    [facility] CO2 mmol/L  --  26   CO2 mmol/L 25  --    BUN mg/dL 13  --    [facility] BUN mg/dL  --  16   CREATININE mg/dL 0.80  --    [facility] CREATININE mg/dL  --  0.92   [facility] EGFR mL/min/1.73m2  --  90   EGFR mL/min/1.73m*2 >90  --    [facility] CALCIUM mg/dL  --  9.2   CALCIUM mg/dL 9.5  --    [facility] ALBUMIN g/dL  --  3.9   ALBUMIN g/dL 4.1  --    [facility] PROTEIN TOTAL g/dL  --  6.3   PROTEIN TOTAL g/dL 6.8  --    BILIRUBIN TOTAL mg/dL 0.5  --    [facility] BILIRUBIN TOTAL mg/dL  --  0.4   [facility] ALK PHOS U/L  --  72   ALK PHOS U/L 69  --    [facility] ALT U/L  --  24   ALT U/L 32  --    [facility] AST U/L  --  13   AST U/L 21  --        Image  EGD  (11/21/2024)  Single malignant-appearing and invasive mass (not traversable) in the lower third of the esophagus, covering one half of the circumference; performed cold forceps biopsy     A. Esophagus, Distal, Mass, Biopsy:  -- Intramucosal adenocarcinoma. See note.     Note: The endoscopic impression of a friable and invasive lesion in the lower third of the esophagus is noted. This biopsy may be not representative of the entire lesion. Clinical correlation is recommended.    MISMATCH REPAIR PROTEIN EXPRESSION                    Protein:           Result                 MLH-1:             Expression Present                                                   PMS-2:            Expression Present                                                   MSH-2:            Expression Present                                                   MSH-6:            Expression Present                                       INTERPRETATION: Neoplasm with normal mismatch repair protein expression.     C/A/P CT (11/22/2024)  IMPRESSION:  1. Irregular masslike thickening of the mid to distal esophagus is  difficult to accurately measure, and may represent a neoplastic  process. Correlate with recent endoscopic imaging and biopsy results.  There is also proximal esophageal wall thickening, and a moderate  size hiatal hernia.  2. Small area of centrilobular nodularity in the right middle lobe  may represent focal aspiration/mucoid impaction or less likely  atypical infection.  3. Mild apical predominant centrilobular emphysema.  4. Severe coronary artery calcifications. Please note this exam is  not optimized for evaluation of the coronary arteries.  5. Nonobstructing 0.4 cm calculus in the left kidney.    Neck CT (11/20/2024)  IMPRESSION:  New maxillary periapical lucencies with adjacent mucosal thickening.  No discrete fluid collection identified. Dental follow-up recommended.      No discrete mass or cervical lymphadenopathy identified  however  direct visualization suggested.      Bilateral parotid mass/nodule similar to prior imaging.      Postsurgical changes, emphysematous changes, mild mediastinal  lymphadenopathy and additional findings as detailed.  This note was created using a voice recognition system ( the Dragon dictation system). Inaccuracies and misspellings are unintentional.       Marychuy Garcia APRN-CNP

## 2025-06-17 NOTE — PROGRESS NOTES
Subjective   Patient ID: Timoteo Victoria is a 70 y.o. male who presents for Cancer, Hypertension (Recheck ), Hyperlipidemia (Review BW), and Hypothyroidism.    Rolo recently was in the ER for an infected parotid gland. Was treated with Augmentin. Reports that swelling, warmth and tenderness have significantly improved. He si scheduled with ENT for follow-up.     He has been feeling more fatigued lately. Has had several episodes of being weak in the knees. He never reduced his losartan dose from 100mg to 50mg.     Has been taking his thyroid medication regularly. No missed doses.          Review of Systems   Constitutional:  Positive for fatigue. Negative for chills and fever.   HENT:  Negative for congestion.    Respiratory:  Negative for cough.    Gastrointestinal:  Negative for nausea and vomiting.   Neurological:  Positive for weakness.       Objective   /58   Pulse 82   Temp 36.3 °C (97.3 °F)   Wt 84.8 kg (187 lb)   SpO2 99%   BMI 26.83 kg/m²     Physical Exam  Constitutional:       General: He is not in acute distress.     Appearance: Normal appearance.   HENT:      Head: Normocephalic.      Mouth/Throat:      Mouth: Mucous membranes are moist.   Eyes:      Extraocular Movements: Extraocular movements intact.      Conjunctiva/sclera: Conjunctivae normal.   Cardiovascular:      Rate and Rhythm: Normal rate and regular rhythm.      Heart sounds: No murmur heard.  Pulmonary:      Breath sounds: No wheezing or rhonchi.   Musculoskeletal:      Cervical back: Neck supple.   Skin:     General: Skin is warm and dry.   Neurological:      Mental Status: He is alert.   Psychiatric:         Mood and Affect: Mood normal.         Behavior: Behavior normal.         Assessment/Plan   Problem List Items Addressed This Visit           ICD-10-CM    Benign essential hypertension - Primary (Chronic) I10    Suspect tight control is contributing to fatigue and weakness. Reduce spironolactone from 50mg to 25mg. Monitor  home Bps; parameters given.          Relevant Medications    verapamil SR (Calan-SR) 240 mg ER tablet    losartan (Cozaar) 100 mg tablet    spironolactone (Aldactone) 25 mg tablet    Hyperlipidemia (Chronic) E78.5    LDL at goal. Continue atorvastatin.          Relevant Medications    atorvastatin (Lipitor) 40 mg tablet    Other Relevant Orders    Lipid Panel    Comprehensive Metabolic Panel    Hypothyroidism E03.9    Relevant Medications    levothyroxine (Synthroid, Levoxyl) 150 mcg tablet    Other Relevant Orders    Thyroid Stimulating Hormone    Thyroid Stimulating Hormone    Episodic cluster headache, not intractable G44.019    Continue amitriptyline.          Depression, major, recurrent, moderate F33.1    Stable. Continue bupropion.          Primary esophageal adenocarcinoma (Multi) C15.9    Relevant Medications    buPROPion (Wellbutrin) 75 mg tablet     Other Visit Diagnoses         Codes      Infection of parotid gland     K11.20    Improving. No redness, warmth or tenderness on eaxm. Follow-up with ENT as scheduled.

## 2025-06-19 ENCOUNTER — NUTRITION (OUTPATIENT)
Dept: HEMATOLOGY/ONCOLOGY | Facility: HOSPITAL | Age: 70
End: 2025-06-19

## 2025-06-19 ENCOUNTER — OFFICE VISIT (OUTPATIENT)
Dept: SURGERY | Facility: HOSPITAL | Age: 70
End: 2025-06-19
Payer: MEDICARE

## 2025-06-19 VITALS
BODY MASS INDEX: 27.03 KG/M2 | TEMPERATURE: 96.6 F | DIASTOLIC BLOOD PRESSURE: 73 MMHG | OXYGEN SATURATION: 100 % | WEIGHT: 188.4 LBS | SYSTOLIC BLOOD PRESSURE: 145 MMHG | HEART RATE: 81 BPM | RESPIRATION RATE: 16 BRPM

## 2025-06-19 DIAGNOSIS — C15.5 MALIGNANT NEOPLASM OF LOWER THIRD OF ESOPHAGUS (MULTI): ICD-10-CM

## 2025-06-19 DIAGNOSIS — C15.5 CANCER OF DISTAL THIRD OF ESOPHAGUS (MULTI): Primary | ICD-10-CM

## 2025-06-19 DIAGNOSIS — R13.10 DYSPHAGIA, UNSPECIFIED TYPE: ICD-10-CM

## 2025-06-19 PROCEDURE — 3078F DIAST BP <80 MM HG: CPT | Performed by: THORACIC SURGERY (CARDIOTHORACIC VASCULAR SURGERY)

## 2025-06-19 PROCEDURE — 1126F AMNT PAIN NOTED NONE PRSNT: CPT | Performed by: THORACIC SURGERY (CARDIOTHORACIC VASCULAR SURGERY)

## 2025-06-19 PROCEDURE — 99211 OFF/OP EST MAY X REQ PHY/QHP: CPT | Performed by: THORACIC SURGERY (CARDIOTHORACIC VASCULAR SURGERY)

## 2025-06-19 PROCEDURE — 3077F SYST BP >= 140 MM HG: CPT | Performed by: THORACIC SURGERY (CARDIOTHORACIC VASCULAR SURGERY)

## 2025-06-19 ASSESSMENT — PAIN SCALES - GENERAL: PAINLEVEL_OUTOF10: 0-NO PAIN

## 2025-06-19 NOTE — PROGRESS NOTES
"NUTRITION Follow-Up NOTE    Nutrition Assessment     Reason for Visit:  Timoteo Victoria \"Rolo\" is a 70 y.o. male who presents for nutrition follow regarding use of TF and po intake.  .      FLOT- began  1/2/2025 today is cycle 4  Surgery - was 3-31 to 4-6-2025  Then will complete FLOT- appt with Med Onc 5- (virtual)     Pt with esophageal Cancer     Has a j-tube 11-  Went to ED 2-25 to 2-26- tube was dislodged was replaced by IR      today  Seeing towe for follow up   Still using 2 cartons per day  120 ml  Minimal diarrhea    Intake   PB jelly and jelly regular bread- 1 sandich in 1 setting  Ice cream  Macaroni and cheese    Beverages   Feels like he drinks too much   Iced tea- no sugar  Gatorade            Lab Results   Component Value Date/Time    GLUCOSE 96 06/11/2025 1558    GLUCOSE 94 06/10/2025 1611     06/11/2025 1558     06/10/2025 1611    K 3.6 06/11/2025 1558    K 3.7 06/10/2025 1611     06/11/2025 1558     06/10/2025 1611    CO2 25 06/11/2025 1558    CO2 26 06/10/2025 1611    ANIONGAP 13 06/11/2025 1558    ANIONGAP 8 06/10/2025 1611    BUN 13 06/11/2025 1558    BUN 16 06/10/2025 1611    CREATININE 0.80 06/11/2025 1558    CREATININE 0.92 06/10/2025 1611    EGFR >90 06/11/2025 1558    EGFR 90 06/10/2025 1611    CALCIUM 9.5 06/11/2025 1558    CALCIUM 9.2 06/10/2025 1611    ALBUMIN 4.1 06/11/2025 1558    ALBUMIN 3.9 06/10/2025 1611    ALKPHOS 69 06/11/2025 1558    ALKPHOS 72 06/10/2025 1611    PROT 6.8 06/11/2025 1558    PROT 6.3 06/10/2025 1611    AST 21 06/11/2025 1558    AST 13 06/10/2025 1611    BILITOT 0.5 06/11/2025 1558    BILITOT 0.4 06/10/2025 1611    ALT 32 06/11/2025 1558    ALT 24 06/10/2025 1611    MG 1.77 05/18/2025 0111    PHOS 3.7 04/01/2025 0315     Lab Results   Component Value Date/Time    VITD25 66 03/12/2024 1533          Anthropometrics:     Steady weight with feeding tube  HT:  177.8 cm- 5'10  IBW:  75.5 kg  112.3 % IBW    BMI: 26.6    Wt " Readings from Last 10 Encounters:   07/02/25 84.1 kg (185 lb 6.5 oz)   06/27/25 84.4 kg (186 lb)   06/19/25 85.5 kg (188 lb 6.4 oz)   06/17/25 84.8 kg (187 lb)   06/11/25 82.6 kg (182 lb)   06/02/25 83.5 kg (184 lb)   05/22/25 83.6 kg (184 lb 3.2 oz)   05/18/25 82.8 kg (182 lb 9.6 oz)   05/08/25 87.9 kg (193 lb 12.6 oz)   04/24/25 88.6 kg (195 lb 6.4 oz)      UBW:  250#      Meds noted           Food And Nutrient Intake:           TF order had been for   Isosource 1.5  5 cartons per day  105  to 120 ml per hour  11 hours   Has a pole   Isosource 1.5 5 x/day provides: 1875 kCal, 85 g PRO, 74 g fat, 220 g CHO, 19 g fiber, and 955 mL free water        Pt is taking   Isosource 1.5 2 x/day provides: 750 kCal, 34 g PRO, 30 g fat, 88 g CHO, 7.6 g fiber, and 382 mL free water     This along with po intake  has resulted in increased weight     He does appear to have some issues with swallow- waiting on Dr. Salas's impression and continued reliance on tube                                                          Nutrition Focused Physical Exam Findings:  DEFERRED - phone                         Energy Needs     Dosing weight: 75.5 to 93.5 kg  Calories per day: 2265 to 2800 determined by 30 kcal/kg  Protein (g) per day: 90 to 112 determined by 1.2 g/kg  Estimated fluid needs: 2265 to 2800 determined by 1 kcal/mL       Nutrition Diagnosis        Nutrition Diagnosis  Patient has Nutrition Diagnosis: Yes  Diagnosis Status (1): Active  Nutrition Diagnosis 1: Altered GI function  Related to (1): esophageal cancer and now s/p surgery  As Evidenced by (1): s/p esophagectomy, has J-tube-    Nutrition Interventions/Recommendations   Nutrition Prescription   Oral and enteral nutrtion     Food and Nutrition Delivery       Nutrition Education       Coordination of Care   Thoracic surgeon and team along with [facility]     There are no Patient Instructions on file for this visit.    Nutrition Monitoring and Evaluation        DME is  [facility]

## 2025-06-20 DIAGNOSIS — Z51.81 THERAPEUTIC DRUG MONITORING: ICD-10-CM

## 2025-06-23 ENCOUNTER — HOSPITAL ENCOUNTER (OUTPATIENT)
Dept: RADIOLOGY | Facility: HOSPITAL | Age: 70
Discharge: HOME | End: 2025-06-23
Payer: MEDICARE

## 2025-06-23 DIAGNOSIS — K11.8 PAROTID MASS: ICD-10-CM

## 2025-06-23 DIAGNOSIS — R13.10 DYSPHAGIA, UNSPECIFIED TYPE: ICD-10-CM

## 2025-06-23 DIAGNOSIS — C15.5 MALIGNANT NEOPLASM OF LOWER THIRD OF ESOPHAGUS (MULTI): ICD-10-CM

## 2025-06-23 PROCEDURE — 74221 X-RAY XM ESOPHAGUS 2CNTRST: CPT | Performed by: RADIOLOGY

## 2025-06-23 PROCEDURE — 70491 CT SOFT TISSUE NECK W/DYE: CPT | Performed by: RADIOLOGY

## 2025-06-23 PROCEDURE — 2500000005 HC RX 250 GENERAL PHARMACY W/O HCPCS: Performed by: THORACIC SURGERY (CARDIOTHORACIC VASCULAR SURGERY)

## 2025-06-23 PROCEDURE — 2500000001 HC RX 250 WO HCPCS SELF ADMINISTERED DRUGS (ALT 637 FOR MEDICARE OP): Performed by: THORACIC SURGERY (CARDIOTHORACIC VASCULAR SURGERY)

## 2025-06-23 PROCEDURE — 2550000001 HC RX 255 CONTRASTS: Performed by: STUDENT IN AN ORGANIZED HEALTH CARE EDUCATION/TRAINING PROGRAM

## 2025-06-23 PROCEDURE — 70491 CT SOFT TISSUE NECK W/DYE: CPT

## 2025-06-23 PROCEDURE — 74220 X-RAY XM ESOPHAGUS 1CNTRST: CPT

## 2025-06-23 RX ADMIN — BARIUM SULFATE 120 ML: 0.6 SUSPENSION ORAL at 09:25

## 2025-06-23 RX ADMIN — ANTACID/ANTIFLATULENT 1 PACKET: 380; 550; 10; 10 GRANULE, EFFERVESCENT ORAL at 09:25

## 2025-06-23 RX ADMIN — IOHEXOL 75 ML: 350 INJECTION, SOLUTION INTRAVENOUS at 08:35

## 2025-06-23 RX ADMIN — BARIUM SULFATE 700 MG: 700 TABLET ORAL at 09:25

## 2025-06-24 ENCOUNTER — TELEPHONE (OUTPATIENT)
Dept: OTOLARYNGOLOGY | Facility: CLINIC | Age: 70
End: 2025-06-24
Payer: MEDICARE

## 2025-06-24 NOTE — TELEPHONE ENCOUNTER
CT neck results reviewed and discussed with patient over the phone, completed an antibiotic course after feeling his left parotid mass increased in size, currently reports no  associated pain.  Scheduled for H&N evaluation later this week.

## 2025-06-25 ENCOUNTER — TREATMENT (OUTPATIENT)
Dept: PHYSICAL THERAPY | Facility: HOSPITAL | Age: 70
End: 2025-06-25
Payer: MEDICARE

## 2025-06-25 DIAGNOSIS — R29.898 LEG WEAKNESS, BILATERAL: ICD-10-CM
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
## 2025-06-25 PROCEDURE — 97110 THERAPEUTIC EXERCISES: CPT | Mod: GP,CQ

## 2025-06-25 ASSESSMENT — PAIN SCALES - GENERAL: PAINLEVEL_OUTOF10: 0 - NO PAIN

## 2025-06-25 ASSESSMENT — PAIN - FUNCTIONAL ASSESSMENT: PAIN_FUNCTIONAL_ASSESSMENT: 0-10

## 2025-06-25 NOTE — PROGRESS NOTES
"Physical Therapy    Physical Therapy Treatment    Patient Name: Timoteo Victoria  MRN: [id number]  : 1955  Today's Date: 2025  Time Calculation  Start Time: 1645  Stop Time: 1730  Time Calculation (min): 45 min    PT Therapeutic Procedures Time Entry  Therapeutic Exercise Time Entry: 45          VISIT:# 2    Current Problem  Problem List Items Addressed This Visit    None  Visit Diagnoses         Codes      Fall, initial encounter     W19.XXXA    PT/OT, supportive care       Leg weakness, bilateral     R29.898             Subjective   Reason for Referral: PT for general weakness  Referred By: Wolfgang Pascual MD     Pt name and  confirmed at the beginning of the session. Pt states that he is not feeling any pain at the moment. He has had no falls since his last visit.   Pain  Pain Assessment: 0-10  0-10 (Numeric) Pain Score: 0 - No pain       Objective    Pt presents with forward rounder shoulders and head forward. He is able to correct posture with VC's.             Precautions  Precautions  STEADI Fall Risk Score (The score of 4 or more indicates an increased risk of falling): 4      Treatments:     EXERCISES       Date 2025              VISIT # #2 # # #    REPS REPS REPS REPS          Nustep L3 10 min      Bike              Shuttle  DLP 5B 2x10      Shuttle SLP 4B 2x10 Cecilio      Shuttle TR/HR              Qhip Flexion 10# 1x10 Cecilio      Qhip Abduction 10# 1x10 Cecilio      Qhip Extension 10# 1x10 Cecilio      Qhip Adduction        Qhip  TKE              Step ups  FWD  LAT   1x10 Cecilio  1x10 Cecilio             Q Quad       Q Hamstring               RB stretch 3x30\"H      HS stretch                                                          Assessment:   At the end of the session pt stated that he had some increased pain in BLE. He stated that he felt muscle soreness that he rated around a 6-7/10. Before he left he stated that the pain had started to come down with rest. Overall he tolerated treatment well today. He " did require minimal cues for proper posture and for proper technique/form for exercises. He did report mild fatigue but was able to complete all exercises with minimal difficulty.       Plan:   OP PT Plan  Treatment/Interventions: Gait training, Education/ Instruction, Neuromuscular re-education, Therapeutic exercises    Goals:  Active       PT Problem       PT Goal 1       Start:  06/10/25    Expected End:  08/14/25       STG's -- within 4 to 6 sessions  The patient will demonstrate improve knowledge of posture and body mechanics  The patient will be able to perform a basic home exercise program     LTG's -- by completion of the POC by 8/14/25  1. The patient will regain normal UE and LE ROM and flexibility to restore motion during daily functional activities   2. The patient will be able to reach and squat 10 X with good tolerance to assimilate household related activities    3. The patient will be able to dress and bath and complete household activities without painful restriction or need for compensation   4. The patient will be able to perform transfers and bed mobility I 10 times in 20 seconds with good safety    5. The patient will be able to ambulate with or without use of an assistive device with a more normal pattern 10 to 15 minutes   6.  The patient will be able to negotiate 12 steps with use of a railing utilizing a reciprocal gait pattern   7. The patient will be able to perform an advanced home exercise program to carry over flexibility and strength benefits

## 2025-06-27 ENCOUNTER — OFFICE VISIT (OUTPATIENT)
Dept: OTOLARYNGOLOGY | Facility: HOSPITAL | Age: 70
End: 2025-06-27
Payer: MEDICARE

## 2025-06-27 VITALS — BODY MASS INDEX: 26.63 KG/M2 | HEIGHT: 70 IN | WEIGHT: 186 LBS

## 2025-06-27 DIAGNOSIS — E04.1 THYROID NODULE: ICD-10-CM

## 2025-06-27 DIAGNOSIS — K11.8 PAROTID MASS: Primary | ICD-10-CM

## 2025-06-27 DIAGNOSIS — K11.20 INFECTION OF PAROTID GLAND: ICD-10-CM

## 2025-06-27 PROCEDURE — 1126F AMNT PAIN NOTED NONE PRSNT: CPT | Performed by: STUDENT IN AN ORGANIZED HEALTH CARE EDUCATION/TRAINING PROGRAM

## 2025-06-27 PROCEDURE — 1159F MED LIST DOCD IN RCRD: CPT | Performed by: STUDENT IN AN ORGANIZED HEALTH CARE EDUCATION/TRAINING PROGRAM

## 2025-06-27 PROCEDURE — 3008F BODY MASS INDEX DOCD: CPT | Performed by: STUDENT IN AN ORGANIZED HEALTH CARE EDUCATION/TRAINING PROGRAM

## 2025-06-27 PROCEDURE — 99214 OFFICE O/P EST MOD 30 MIN: CPT | Performed by: STUDENT IN AN ORGANIZED HEALTH CARE EDUCATION/TRAINING PROGRAM

## 2025-06-27 PROCEDURE — 99204 OFFICE O/P NEW MOD 45 MIN: CPT | Performed by: STUDENT IN AN ORGANIZED HEALTH CARE EDUCATION/TRAINING PROGRAM

## 2025-06-27 RX ORDER — SULFAMETHOXAZOLE AND TRIMETHOPRIM 800; 160 MG/1; MG/1
1 TABLET ORAL 2 TIMES DAILY
Qty: 20 TABLET | Refills: 0 | Status: SHIPPED | OUTPATIENT
Start: 2025-06-27 | End: 2025-07-07

## 2025-06-27 ASSESSMENT — PAIN SCALES - GENERAL: PAINLEVEL_OUTOF10: 0-NO PAIN

## 2025-06-27 NOTE — PROGRESS NOTES
"HEAD AND NECK SURGERY CONSULT  [facility]    Referring Provider: Thalia SHARMA  I had the pleasure of seeing Timoteo Victoria as a consultation today for evaluation of parotid and thyroid masses.     The patient reports a history of esophageal adenocarcinoma s/p chemotherapy and esophagectomy 3/31/25. Patient had recurrent/residual disease and is undergoing additional chemoradiation. On multiple scans including PET/CT, uptake was noted in the L parotid gland and L thyroid lobe, stable from prior. Patient reports noticing a lump in his left neck several years ago, no changes in side. 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 After the biopsy, patient developed swelling, pain and CT scan noted a fluid collection at the site of biopsy. This was treated abx. Today he states that the swelling has gone down, but he has numbness of the ear lobe.    Denies history of skin cancers or lesions. No thyroid US or biopsy previously, no history of thyroid cancer in the family. Ongoing treatments for the esophageal cancer     There is not a personal or family history of blood clots, easy bleeding or bruising, or anesthesia concerns. Patient      Tobacco use: The patient  reports that he has been smoking cigarettes. He started smoking about 46 years ago. He has a 45.4 pack-year smoking history. He has been exposed to tobacco smoke. He has never used smokeless tobacco.   Alcohol Use: The patient  reports that he does not currently use alcohol.     Physical Examination  Vitals:  Ht 1.778 m (5' 10\")   Wt 84.4 kg (186 lb)   BMI 26.69 kg/m²   General: Examination reveals a well-developed, well-nourished patient in no apparent distress.  The patient has no audible dysphonia, stridor or airway distress.  The patient is oriented, alert and responsive.    Oral Cavity:  The patient is able to open the mouth widely without trismus.  The floor of mouth and oral tongue are soft, and no mucosal abnormalities are " noted on the lips or within the oral cavity.  The oral tongue is fully mobile and midline on protrusion.  The patient's teeth are in poor condition (especially upper dentition)   Oropharynx: There are no mucosal abnormalities noted within the oropharynx.  The soft palate elevates symmetrically, the tonsils and base of tongue are normal to inspection and palpation.       Salivary glands: There is a small, palpable nodule at the right tail of parotid, mild tenderness to palpation  Neuro: Cranial nerves 2-12 are without obvious abnormality.  Neck: The neck is soft and symmetric. There is no palpable adenopathy. Thyroid gland is symmetric without palpable nodularity.     DATA REVIEWED:  Labs:  Lab Results   Component Value Date    WBC 8.8 06/11/2025    HGB 13.1 (L) 06/11/2025    HCT 39.2 (L) 06/11/2025     06/11/2025    NEUTROABS 6.51 06/11/2025     Lab Results   Component Value Date    TSH 0.09 (L) 06/10/2025    HGBA1C 5.3 09/17/2024    [id number] 709 04/26/2021        Radiology: I personally reviewed the parotid and thyroid gland from recent CT and previous PET/CT which demonstrated bilaterally parotid lesions (L > R) with a small fluid collection of the Left parotid after biopsy. FDG avidity of thyroid     Review of prior medical records: I reviewed the patient's medical records which included a clinic note from Dr. Thalia Hayden dated 6/2/25 in which he detailed parotid biopsy results.     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 Discussed biopsy result of likely warthins tumor which is benign  - Reviewed options including observation and surgical excision. Discussed surgery risks and benefits. Given stability of lesion and ongoing esophageal treatment, would defer  parotid intervention at this time. Recommend period of observation with repeat imaging  - Regarding residual fluid collection and pain on palpation, will do another round of antibiotics (bactrim)    2.   Thyroid nodule  - Left thyroid lobe focal calcification noted on CT. Recommend thyroid US to further evaluate, this was ordered today  - Next steps pending US results    Questions answered with patient and family and they are in agreement with the plan    Nancy Dozier MD

## 2025-06-30 ENCOUNTER — PREP FOR PROCEDURE (OUTPATIENT)
Dept: CARDIOTHORACIC SURGERY | Facility: HOSPITAL | Age: 70
End: 2025-06-30

## 2025-06-30 ENCOUNTER — APPOINTMENT (OUTPATIENT)
Dept: OTOLARYNGOLOGY | Facility: CLINIC | Age: 70
End: 2025-06-30
Payer: MEDICARE

## 2025-06-30 DIAGNOSIS — K91.89 ANASTOMOTIC STRICTURE AFTER ESOPHAGECTOMY: Primary | ICD-10-CM

## 2025-06-30 DIAGNOSIS — K22.2 ANASTOMOTIC STRICTURE AFTER ESOPHAGECTOMY: Primary | ICD-10-CM

## 2025-07-02 ENCOUNTER — APPOINTMENT (OUTPATIENT)
Dept: RADIOLOGY | Facility: HOSPITAL | Age: 70
End: 2025-07-02
Payer: MEDICARE

## 2025-07-02 ENCOUNTER — ANESTHESIA EVENT (OUTPATIENT)
Dept: OPERATING ROOM | Facility: HOSPITAL | Age: 70
End: 2025-07-02
Payer: MEDICARE

## 2025-07-02 ENCOUNTER — ANESTHESIA (OUTPATIENT)
Dept: OPERATING ROOM | Facility: HOSPITAL | Age: 70
End: 2025-07-02
Payer: MEDICARE

## 2025-07-02 ENCOUNTER — HOSPITAL ENCOUNTER (OUTPATIENT)
Facility: HOSPITAL | Age: 70
Setting detail: OUTPATIENT SURGERY
Discharge: HOME | End: 2025-07-02
Attending: STUDENT IN AN ORGANIZED HEALTH CARE EDUCATION/TRAINING PROGRAM | Admitting: STUDENT IN AN ORGANIZED HEALTH CARE EDUCATION/TRAINING PROGRAM
Payer: MEDICARE

## 2025-07-02 VITALS
OXYGEN SATURATION: 100 % | RESPIRATION RATE: 14 BRPM | WEIGHT: 185.41 LBS | BODY MASS INDEX: 26.6 KG/M2 | DIASTOLIC BLOOD PRESSURE: 63 MMHG | SYSTOLIC BLOOD PRESSURE: 128 MMHG | HEART RATE: 82 BPM | TEMPERATURE: 97.3 F

## 2025-07-02 DIAGNOSIS — K22.2 ANASTOMOTIC STRICTURE AFTER ESOPHAGECTOMY: ICD-10-CM

## 2025-07-02 DIAGNOSIS — K91.89 ANASTOMOTIC STRICTURE AFTER ESOPHAGECTOMY: ICD-10-CM

## 2025-07-02 PROCEDURE — 2500000005 HC RX 250 GENERAL PHARMACY W/O HCPCS: Performed by: ANESTHESIOLOGY

## 2025-07-02 PROCEDURE — 71045 X-RAY EXAM CHEST 1 VIEW: CPT

## 2025-07-02 PROCEDURE — 3600000003 HC OR TIME - INITIAL BASE CHARGE - PROCEDURE LEVEL THREE: Performed by: STUDENT IN AN ORGANIZED HEALTH CARE EDUCATION/TRAINING PROGRAM

## 2025-07-02 PROCEDURE — 3600000008 HC OR TIME - EACH INCREMENTAL 1 MINUTE - PROCEDURE LEVEL THREE: Performed by: STUDENT IN AN ORGANIZED HEALTH CARE EDUCATION/TRAINING PROGRAM

## 2025-07-02 PROCEDURE — 2500000004 HC RX 250 GENERAL PHARMACY W/ HCPCS (ALT 636 FOR OP/ED): Performed by: ANESTHESIOLOGIST ASSISTANT

## 2025-07-02 PROCEDURE — 43248 EGD GUIDE WIRE INSERTION: CPT | Performed by: STUDENT IN AN ORGANIZED HEALTH CARE EDUCATION/TRAINING PROGRAM

## 2025-07-02 PROCEDURE — 2500000005 HC RX 250 GENERAL PHARMACY W/O HCPCS: Performed by: STUDENT IN AN ORGANIZED HEALTH CARE EDUCATION/TRAINING PROGRAM

## 2025-07-02 PROCEDURE — C1769 GUIDE WIRE: HCPCS | Performed by: STUDENT IN AN ORGANIZED HEALTH CARE EDUCATION/TRAINING PROGRAM

## 2025-07-02 PROCEDURE — 71045 X-RAY EXAM CHEST 1 VIEW: CPT | Performed by: RADIOLOGY

## 2025-07-02 PROCEDURE — 7100000001 HC RECOVERY ROOM TIME - INITIAL BASE CHARGE: Performed by: STUDENT IN AN ORGANIZED HEALTH CARE EDUCATION/TRAINING PROGRAM

## 2025-07-02 PROCEDURE — 7100000009 HC PHASE TWO TIME - INITIAL BASE CHARGE: Performed by: STUDENT IN AN ORGANIZED HEALTH CARE EDUCATION/TRAINING PROGRAM

## 2025-07-02 PROCEDURE — A43248 PR EDG INSERT GUIDE WIRE DILATOR PASSAGE ESOPHAGUS: Performed by: ANESTHESIOLOGY

## 2025-07-02 PROCEDURE — 7100000010 HC PHASE TWO TIME - EACH INCREMENTAL 1 MINUTE: Performed by: STUDENT IN AN ORGANIZED HEALTH CARE EDUCATION/TRAINING PROGRAM

## 2025-07-02 PROCEDURE — 3700000001 HC GENERAL ANESTHESIA TIME - INITIAL BASE CHARGE: Performed by: STUDENT IN AN ORGANIZED HEALTH CARE EDUCATION/TRAINING PROGRAM

## 2025-07-02 PROCEDURE — 2720000007 HC OR 272 NO HCPCS: Performed by: STUDENT IN AN ORGANIZED HEALTH CARE EDUCATION/TRAINING PROGRAM

## 2025-07-02 PROCEDURE — 3700000002 HC GENERAL ANESTHESIA TIME - EACH INCREMENTAL 1 MINUTE: Performed by: STUDENT IN AN ORGANIZED HEALTH CARE EDUCATION/TRAINING PROGRAM

## 2025-07-02 PROCEDURE — 0753T DGTZ GLS MCRSCP SLD LEVEL IV: CPT | Mod: TC,SUR | Performed by: STUDENT IN AN ORGANIZED HEALTH CARE EDUCATION/TRAINING PROGRAM

## 2025-07-02 PROCEDURE — 7100000002 HC RECOVERY ROOM TIME - EACH INCREMENTAL 1 MINUTE: Performed by: STUDENT IN AN ORGANIZED HEALTH CARE EDUCATION/TRAINING PROGRAM

## 2025-07-02 PROCEDURE — A43248 PR EDG INSERT GUIDE WIRE DILATOR PASSAGE ESOPHAGUS: Performed by: ANESTHESIOLOGIST ASSISTANT

## 2025-07-02 RX ORDER — ONDANSETRON HYDROCHLORIDE 2 MG/ML
4 INJECTION, SOLUTION INTRAVENOUS ONCE AS NEEDED
Status: DISCONTINUED | OUTPATIENT
Start: 2025-07-02 | End: 2025-07-02 | Stop reason: HOSPADM

## 2025-07-02 RX ORDER — WATER 1 ML/ML
INJECTION IRRIGATION AS NEEDED
Status: DISCONTINUED | OUTPATIENT
Start: 2025-07-02 | End: 2025-07-02 | Stop reason: HOSPADM

## 2025-07-02 RX ORDER — ACETAMINOPHEN 325 MG/1
650 TABLET ORAL ONCE
Status: DISCONTINUED | OUTPATIENT
Start: 2025-07-02 | End: 2025-07-02 | Stop reason: HOSPADM

## 2025-07-02 RX ORDER — LABETALOL HYDROCHLORIDE 5 MG/ML
5 INJECTION, SOLUTION INTRAVENOUS ONCE AS NEEDED
Status: DISCONTINUED | OUTPATIENT
Start: 2025-07-02 | End: 2025-07-02 | Stop reason: HOSPADM

## 2025-07-02 RX ORDER — SUCCINYLCHOLINE CHLORIDE 20 MG/ML
INJECTION INTRAMUSCULAR; INTRAVENOUS AS NEEDED
Status: DISCONTINUED | OUTPATIENT
Start: 2025-07-02 | End: 2025-07-02

## 2025-07-02 RX ORDER — LIDOCAINE HCL/PF 100 MG/5ML
SYRINGE (ML) INTRAVENOUS AS NEEDED
Status: DISCONTINUED | OUTPATIENT
Start: 2025-07-02 | End: 2025-07-02

## 2025-07-02 RX ORDER — DROPERIDOL 2.5 MG/ML
0.62 INJECTION, SOLUTION INTRAMUSCULAR; INTRAVENOUS ONCE AS NEEDED
Status: DISCONTINUED | OUTPATIENT
Start: 2025-07-02 | End: 2025-07-02 | Stop reason: HOSPADM

## 2025-07-02 RX ORDER — OXYCODONE HYDROCHLORIDE 5 MG/1
5 TABLET ORAL EVERY 4 HOURS PRN
Status: DISCONTINUED | OUTPATIENT
Start: 2025-07-02 | End: 2025-07-02 | Stop reason: HOSPADM

## 2025-07-02 RX ORDER — MIDAZOLAM HYDROCHLORIDE 1 MG/ML
INJECTION INTRAMUSCULAR; INTRAVENOUS AS NEEDED
Status: DISCONTINUED | OUTPATIENT
Start: 2025-07-02 | End: 2025-07-02

## 2025-07-02 RX ORDER — HYDROMORPHONE HYDROCHLORIDE 0.2 MG/ML
0.2 INJECTION INTRAMUSCULAR; INTRAVENOUS; SUBCUTANEOUS EVERY 5 MIN PRN
Status: DISCONTINUED | OUTPATIENT
Start: 2025-07-02 | End: 2025-07-02 | Stop reason: HOSPADM

## 2025-07-02 RX ORDER — MEPERIDINE HYDROCHLORIDE 25 MG/ML
12.5 INJECTION INTRAMUSCULAR; INTRAVENOUS; SUBCUTANEOUS EVERY 10 MIN PRN
Status: DISCONTINUED | OUTPATIENT
Start: 2025-07-02 | End: 2025-07-02 | Stop reason: HOSPADM

## 2025-07-02 RX ORDER — LIDOCAINE HYDROCHLORIDE 10 MG/ML
0.1 INJECTION, SOLUTION INFILTRATION; PERINEURAL ONCE
Status: DISCONTINUED | OUTPATIENT
Start: 2025-07-02 | End: 2025-07-02 | Stop reason: HOSPADM

## 2025-07-02 RX ORDER — MIDAZOLAM HYDROCHLORIDE 1 MG/ML
1 INJECTION INTRAMUSCULAR; INTRAVENOUS ONCE AS NEEDED
Status: DISCONTINUED | OUTPATIENT
Start: 2025-07-02 | End: 2025-07-02 | Stop reason: HOSPADM

## 2025-07-02 RX ORDER — ONDANSETRON HYDROCHLORIDE 2 MG/ML
INJECTION, SOLUTION INTRAVENOUS AS NEEDED
Status: DISCONTINUED | OUTPATIENT
Start: 2025-07-02 | End: 2025-07-02

## 2025-07-02 RX ORDER — FENTANYL CITRATE 50 UG/ML
INJECTION, SOLUTION INTRAMUSCULAR; INTRAVENOUS AS NEEDED
Status: DISCONTINUED | OUTPATIENT
Start: 2025-07-02 | End: 2025-07-02

## 2025-07-02 RX ORDER — PROPOFOL 10 MG/ML
INJECTION, EMULSION INTRAVENOUS AS NEEDED
Status: DISCONTINUED | OUTPATIENT
Start: 2025-07-02 | End: 2025-07-02

## 2025-07-02 RX ORDER — ALBUTEROL SULFATE 0.83 MG/ML
2.5 SOLUTION RESPIRATORY (INHALATION) ONCE AS NEEDED
Status: DISCONTINUED | OUTPATIENT
Start: 2025-07-02 | End: 2025-07-02 | Stop reason: HOSPADM

## 2025-07-02 RX ORDER — ROCURONIUM BROMIDE 10 MG/ML
INJECTION, SOLUTION INTRAVENOUS AS NEEDED
Status: DISCONTINUED | OUTPATIENT
Start: 2025-07-02 | End: 2025-07-02

## 2025-07-02 RX ORDER — SODIUM CHLORIDE, SODIUM LACTATE, POTASSIUM CHLORIDE, CALCIUM CHLORIDE 600; 310; 30; 20 MG/100ML; MG/100ML; MG/100ML; MG/100ML
100 INJECTION, SOLUTION INTRAVENOUS CONTINUOUS
Status: DISCONTINUED | OUTPATIENT
Start: 2025-07-02 | End: 2025-07-02 | Stop reason: HOSPADM

## 2025-07-02 RX ADMIN — FENTANYL CITRATE 25 MCG: 50 INJECTION, SOLUTION INTRAMUSCULAR; INTRAVENOUS at 09:50

## 2025-07-02 RX ADMIN — ONDANSETRON 4 MG: 2 INJECTION INTRAMUSCULAR; INTRAVENOUS at 10:00

## 2025-07-02 RX ADMIN — PROPOFOL 150 MG: 10 INJECTION, EMULSION INTRAVENOUS at 09:34

## 2025-07-02 RX ADMIN — SODIUM CHLORIDE, SODIUM LACTATE, POTASSIUM CHLORIDE, AND CALCIUM CHLORIDE: 600; 310; 30; 20 INJECTION, SOLUTION INTRAVENOUS at 09:23

## 2025-07-02 RX ADMIN — LIDOCAINE HYDROCHLORIDE 40 MG: 20 INJECTION INTRAVENOUS at 09:34

## 2025-07-02 RX ADMIN — SUCCINYLCHOLINE CHLORIDE 140 MG: 20 INJECTION, SOLUTION INTRAMUSCULAR; INTRAVENOUS at 09:34

## 2025-07-02 RX ADMIN — Medication 6 L/MIN: at 10:11

## 2025-07-02 RX ADMIN — PROPOFOL 20 MG: 10 INJECTION, EMULSION INTRAVENOUS at 09:56

## 2025-07-02 RX ADMIN — PROPOFOL 30 MG: 10 INJECTION, EMULSION INTRAVENOUS at 09:50

## 2025-07-02 RX ADMIN — ROCURONIUM BROMIDE 5 MG: 10 INJECTION INTRAVENOUS at 09:34

## 2025-07-02 RX ADMIN — MIDAZOLAM HYDROCHLORIDE 1 MG: 2 INJECTION, SOLUTION INTRAMUSCULAR; INTRAVENOUS at 09:23

## 2025-07-02 RX ADMIN — DEXAMETHASONE SODIUM PHOSPHATE 4 MG: 4 INJECTION INTRA-ARTICULAR; INTRALESIONAL; INTRAMUSCULAR; INTRAVENOUS; SOFT TISSUE at 09:34

## 2025-07-02 RX ADMIN — FENTANYL CITRATE 50 MCG: 50 INJECTION, SOLUTION INTRAMUSCULAR; INTRAVENOUS at 09:34

## 2025-07-02 SDOH — HEALTH STABILITY: MENTAL HEALTH: CURRENT SMOKER: 1

## 2025-07-02 ASSESSMENT — PAIN SCALES - GENERAL
PAINLEVEL_OUTOF10: 0 - NO PAIN

## 2025-07-02 ASSESSMENT — PAIN - FUNCTIONAL ASSESSMENT
PAIN_FUNCTIONAL_ASSESSMENT: 0-10

## 2025-07-02 NOTE — ANESTHESIA PROCEDURE NOTES
Airway  Date/Time: 7/2/2025 9:36 AM  Reason: elective    Airway not difficult    Staffing  Performed: attending   Authorized by: Maik Max MD    Performed by: BRYSON Varghese  Patient location during procedure: OR    Patient Condition  Indications for airway management: airway protection and anesthesia  Patient position: sniffing  MILS not maintained throughout  Planned trial extubation  Sedation level: deep     Final Airway Details   Preoxygenated: yes  Final airway type: endotracheal airway  Successful airway: ETT  Cuffed: yes   Successful intubation technique: direct laryngoscopy  Adjuncts used in placement: intubating stylet  Endotracheal tube insertion site: oral  Blade: Juan A  Blade size: #4  ETT size (mm): 7.5  Cormack-Lehane Classification: grade IIa - partial view of glottis  Placement verified by: chest auscultation and capnometry   Ventilation between attempts: none  Number of attempts at approach: 1    Additional Comments  RSI  No emesis

## 2025-07-02 NOTE — BRIEF OP NOTE
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]     Procedures  EGD, WITH DILATION AND BIOPSY  49293 - [place] EGD INSERT GUIDE WIRE DILATOR PASSAGE ESOPHAGUS    Surgeons      * Cecelia Moreno - Primary    Resident/Fellow/Other Assistant:  Surgeons and Role:     * YRIS LariosC - BRAYAN First Assist    Staff:   Circulator: Edyta Wilburn Person: Linda    Anesthesia Staff: Anesthesiologist: Maik Max MD  C-AA: BRYSON Varghese    Procedure Summary  Anesthesia: General  ASA: III  Estimated Blood Loss: 0mL  Intra-op Medications:   Administrations occurring from 0855 to 1010 on 25:   Medication Name Total Dose   surgical lubricant gel 1 Application   sterile water irrigation solution 1,000 mL   dexAMETHasone (Decadron) 4 mg/mL IV Syringe 2 mL 4 mg   fentaNYL (Sublimaze) injection 50 mcg/mL 75 mcg   LR bolus Cannot be calculated   lidocaine (cardiac) injection 2% prefilled syringe 40 mg   midazolam PF (Versed) injection 1 mg/mL 1 mg   ondansetron (Zofran) 2 mg/mL injection 4 mg   propofol (Diprivan) injection 10 mg/mL 200 mg   rocuronium (ZeMuron) 50 mg/5 mL injection 5 mg   succinylcholine (Anectine) 20 mg/mL injection 140 mg          Anesthesia Record               Intraprocedure I/O Totals          Intake    LR bolus 900.00 mL    Total Intake 900 mL          Specimen:   ID Type Source Tests Collected by Time   1 : ANASTOMOTIC ESOPHOGEAL BIOPSY AT 25CM Tissue ESOPHAGUS PROXIMAL BIOPSY SURGICAL PATHOLOGY EXAM Cecelia Moreno,  2025 0956      Findings: See Dr. Moreno's operative note for additional details.  Complications:  None; patient tolerated the procedure well.     Disposition: PACU - hemodynamically stable.  Condition: " stable

## 2025-07-02 NOTE — ANESTHESIA POSTPROCEDURE EVALUATION
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])    Surgeons: Cecelia Moreno DO Responsible Provider: Maik Max MD    Anesthesia Type: general ASA Status: 3            Anesthesia Type: general    Vitals Value Taken Time   /72 07/02/25 10:13   Temp 36.4 07/02/25 10:13   Pulse 84 07/02/25 10:13   Resp 20 07/02/25 10:13   SpO2 100 07/02/25 10:13       Anesthesia Post Evaluation    Patient location during evaluation: PACU  Patient participation: complete - patient participated  Level of consciousness: awake and alert  Pain management: adequate  Airway patency: patent  Cardiovascular status: acceptable  Respiratory status: acceptable and face mask  Hydration status: acceptable  Postoperative Nausea and Vomiting: none        No notable events documented.    "

## 2025-07-02 NOTE — OP NOTE
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]     Procedures  EGD, WITH DILATION AND BIOPSY  62152 - [place] EGD INSERT GUIDE WIRE DILATOR PASSAGE ESOPHAGUS      Surgeons      * Cecelia Moreno - Primary    Resident/Fellow/Other Assistant:  Surgeons and Role:     * Betsy Barlow PA-C - BRAYAN First Assist    Staff:   Circulator: Edyta Mcgrathub Person: Linda    Anesthesia Staff: Anesthesiologist: Maik Max MD  C-AA: BRYSON Varghese    Procedure Summary  Anesthesia: General  ASA: III  Estimated Blood Loss: 20mL  Intra-op Medications:   Administrations occurring from 0855 to 1010 on 25:   Medication Name Total Dose   surgical lubricant gel 1 Application   sterile water irrigation solution 1,000 mL   dexAMETHasone (Decadron) 4 mg/mL IV Syringe 2 mL 4 mg   fentaNYL (Sublimaze) injection 50 mcg/mL 75 mcg   LR bolus Cannot be calculated   lidocaine (cardiac) injection 2% prefilled syringe 40 mg   midazolam PF (Versed) injection 1 mg/mL 1 mg   ondansetron (Zofran) 2 mg/mL injection 4 mg   propofol (Diprivan) injection 10 mg/mL 200 mg   rocuronium (ZeMuron) 50 mg/5 mL injection 5 mg   succinylcholine (Anectine) 20 mg/mL injection 140 mg              Anesthesia Record               Intraprocedure I/O Totals          Intake    LR bolus 900.00 mL    Total Intake 900 mL          Specimen:   ID Type Source Tests Collected by Time   1 : ANASTOMOTIC ESOPHOGEAL BIOPSY AT 25CM Tissue ESOPHAGUS PROXIMAL BIOPSY SURGICAL PATHOLOGY EXAM Cecelia Moreno,  2025 0956                 Drains and/or Catheters:   Gastrostomy/Enterostomy Jejunostomy 1 14 Fr. LLQ (Active)       Tourniquet Times:     " "    Implants:     Findings: Anastomotic stricture ~25cm    Indications: Timoteo Victoria \"Tarun" is an 70 y.o. 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 Patient has been having slowly worsening dysphagia since May. Here today for evaluation.     The patient was seen in the preoperative area. The risks, benefits, complications, treatment options, non-operative alternatives, expected recovery and outcomes were discussed with the patient. The possibilities of reaction to medication, pulmonary aspiration, injury to surrounding structures, bleeding, recurrent infection, the need for additional procedures, failure to diagnose a condition, and creating a complication requiring transfusion or operation were discussed with the patient. The patient concurred with the proposed plan, giving informed consent.  The site of surgery was properly noted/marked if necessary per policy. The patient has been actively warmed in preoperative area. Preoperative antibiotics are not indicated. Venous thrombosis prophylaxis have been ordered including bilateral sequential compression devices and chemical prophylaxis    Procedure Details: Patient was brought in the operating suite and procedural information was confirmed.  General anesthesia was induced and a single-lumen endotracheal tube was placed.  The endoscope was inserted in the proximal esophagus was overall normal-appearing, anastomosis was encountered at approximately 25 cm and was significantly strictured, we were unable to traverse with the adult endoscope.  We therefore passed a wire and began savory dilations at 8 mm up to 12 mm.  We reinserted the scope and evaluated, there was minimal bleeding and the anastomosis was traversable.  The remainder of the conduit looked well-appearing and the pylorus was traversable.  We passed the wire again and dilated up to 16 mm.  There was small areas of hypertrophic appearing tissue just above " and below the anastomosis at 25 cm which were biopsied and sent for permanent pathology.  At the cessation of the procedure everything was well-appearing.  The conduit was emptied and the endoscope was removed.  Patient was extubated and taken to PACU in stable condition.    Evidence of Infection: No   Complications:  None; patient tolerated the procedure well.    Disposition: PACU - hemodynamically stable.  Condition: stable             Task Performed by BRAYAN First Assist or Physician Assistant:   Betsy FLETCHER/MARILOU, was necessary to assist on this case due to the nature of the case and difficulty. During the case she served as my assist by endoscopically assisting with wire/biopsy forceps      Additional Details: n/a    Attending Attestation: I was present for the entire procedure.    Cecelia Moreno  Phone Number: [phone]

## 2025-07-02 NOTE — DISCHARGE INSTRUCTIONS
Liquid diet today. Regular diet tomorrow as tolerated.   Follow up with Dr. Salas as needed in the outpatient setting.  Please call the thoracic surgery office with any questions or concerns post discharge at [phone].

## 2025-07-02 NOTE — ANESTHESIA PREPROCEDURE EVALUATION
"Patient: Timoteo Victoria \"Rolo\"    Procedure Information       Date/Time: 07/02/25 0855    Procedure: EGD, WITH DILATION (Abdomen)    Location: [facility] OR  / [facility] OR    Surgeons: Cecelia Moreno DO        Timoteo Victoria  is a 70 y.o. male status post three field esophagectomy on 3/31/25. This patient has a notable history of CAD (CABG 2005), HTN, HLD, chronic tobacco use, hypothyroidism, intramucosal adenocarcinoma. The patient had a J tube placed on 11/25.  He was taken the operating room for a 3-hole esophagectomy on 3/31/2025.  His postop course was unremarkable and he was discharged home on oral diet.       Relevant Problems   Anesthesia (within normal limits)      Cardiac   (+) Atherosclerosis of coronary artery   (+) Benign essential hypertension   (+) Hyperlipidemia      Pulmonary   (+) Centrilobular emphysema (Multi)      Neuro   (+) Depression, major, recurrent, moderate      GI   (+) Esophageal dysphagia   (+) Malignant neoplasm of lower third of esophagus (Multi)   (+) Primary esophageal adenocarcinoma (Multi)      Liver   (+) Malignant neoplasm of lower third of esophagus (Multi)   (+) Primary esophageal adenocarcinoma (Multi)      Endocrine   (+) Hypothyroidism     TTE 3/2025  CONCLUSIONS:   1. The left ventricular systolic function is normal, with a visually estimated ejection fraction of 55-60%.   2. Abnormal left venticular wall motion.   3. Spectral Doppler shows a Grade II (pseudonormal pattern) of left ventricular diastolic filling with an elevated left atrial pressure.   4. Abnormal septal motion consistent with post-operative status and abnormal septal motion, consistent with intraventricular conduction delay.   5. There is basal inferior and inferolateral, and inferoseptal hypokinesia.   6. There is low normal right ventricular systolic function.   7. Mildly enlarged right ventricle.   8. Mildly elevated right ventricular systolic pressure.    Clinical information " reviewed:    Allergies                NPO Detail:  NPO/Void Status  Carbohydrate Drink Given Prior to Surgery? : N  Date of Last Liquid: 07/01/25  Time of Last Liquid: 2300  Date of Last Solid: 07/01/25  Time of Last Solid: 2000  Last Intake Type: Clear fluids; Light meal  Time of Last Void: 0742         Physical Exam    Airway  Mallampati: I  TM distance: >3 FB  Neck ROM: full  Mouth opening: 3 or more finger widths     Cardiovascular    Dental        Pulmonary    Abdominal      Other findings: VERY poor dentition. Multiple chipped, broken, & missing teeth        Anesthesia Plan    History of general anesthesia?: yes  History of complications of general anesthesia?: no    ASA 3     general     The patient is a current smoker.    intravenous induction   Postoperative pain plan includes opioids.  Trial extubation is planned.  Anesthetic plan and risks discussed with patient.    Plan discussed with CAA.    Plan general endotracheal anesthesia with peripheral IV placement and ASA standard noninvasive monitors.  The possibility of blood product transfusion was also described in detail.  Risks, benefits, alternatives of this plan were described in detail to the patient, who indicated understanding and agreed to proceed.    aMik Max MD

## 2025-07-03 ENCOUNTER — TELEMEDICINE (OUTPATIENT)
Dept: HEMATOLOGY/ONCOLOGY | Facility: HOSPITAL | Age: 70
End: 2025-07-03
Payer: MEDICARE

## 2025-07-03 DIAGNOSIS — C15.9 PRIMARY ESOPHAGEAL ADENOCARCINOMA (MULTI): Primary | ICD-10-CM

## 2025-07-03 PROCEDURE — 99213 OFFICE O/P EST LOW 20 MIN: CPT | Performed by: INTERNAL MEDICINE

## 2025-07-03 NOTE — PROGRESS NOTES
"Virtual or Telephone Consent    While technically available, the patient was unable to connect via audio/video telehealth technology; therefore, I performed this visit using a real-time audio only connection between Timoteo Victoria & Madan Wilson MD.  Verbal consent was requested and obtained from Timoteo Victoria on this date, 07/03/25 for a telehealth visit and the patient's location was confirmed at the time of the visit.     Patient ID: Timoteo Victoria \"Tarun" is a 70 y.o. male.  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  Underwent esophagectomy on 3/31/2025  after 4 cycles of neoadjuvant FLOT.  Pathology showed residual tumor (pT3 pN3), close radial margin (less than 1 mm).  PET-avid parotid and thyroid nodule.    Genomic profile:  Normal MMR expresison    Assessment and Plan:  70M with CAD (CABG 2005), HTN, HLD, chronic tobacco use, hypothyroidism, presented with 4-6 weeks of progressive dysphagia (solid to liquid/mediations) 40lb wt loss over 6 months, and was found to have distal esophageal mass covering half of lumen on EGD 11/21/24  and Bx showed intramucosal adenocarcinoma with normal MMR expression. S/p J tube placement 11/25/2024.    PET-CT showed no distant mets.    We discussed FLOT for chemotherapy regimen with him in details. After a detailed discussion about the chemo, he consented and started cycle 1 on 1/2/25. Completed cycle 4 on 2-.  His post neoadjuvant therapy PET/CT which did not show any PET avid lesions suggestive of metastatic disease.  He underwent esophagectomy on March 31, 2025.  The pathology showed pT3 pN3 disease with close radial margin (less than 1 mm).      He is gradually recovering from the surgery.  His overall oral intake is improving.  I talked to him about starting adjuvant chemotherapy with FLOT x 4 cycles.  He is not well enough yet to receive chemotherapy. He still is using the feeding tube. I " have requested an appointment on 7/17/2025 to reevaluate him for chemotherapy.    His radial margin is close. I referred him to radiation oncology to discuss adjuvant radiation therapy.     Plan: Postoperative FLOT x 4 cycles and postoperative radiation therapy because of the close radial margin.      Follow-up: 7/17/2025.    I have placed all orders as outlined above. I advised the patient to schedule the tests and follow-up appointment as discussed by contacting the  on the way out or calling by phone. Patient knows to call with any issues or concerns.     Providers:  Surgeon: Dr. Maritza Mark: Dr Katie Apple:    Chief complaint: Esophageal adenocarcinoma    HPI:  Timoteo Victoria is a 70 y.o. male with a notable history of CAD (CABG 2005), HTN, HLD, chronic tobacco use, hypothyroidism presented on 11/25/2024 as a transfer from [facility] for cancer workup. He had been having trouble swallowing for the past month, initially solids and progressing to liquids and medications. He has lost 40 lbs in the past 6 months.     EGD on 11/21/24 showed an esophageal mass with a malignant appearance, with biopsy showing intramucosal adenocarcinoma. The patient had a J tube placed on 11/25.     ONCOLOGIC HISTORY-  11/21/24  [facility] admission for weight loss, dysphagia; EGD showed esophageal mass with biopsy confirming intramucosal adenocarcinoma  11/20/24 CT neck with no masses  11/22/24 CT CAP with contrast with irregular masslike thickening of distal esophagus, no obvious metastases  11/25/24 J tube placement.    12-: pet-ct showed-  IMPRESSION:  1. Hypermetabolic esophageal mass as described above is consistent  with biopsy-proven esophageal adenocarcinoma. Few minimally  hypermetabolic subcentimeter periesophageal lymph nodes are likely  reactive.  2. No other evidence of hypermetabolic thoracic or abdominal  lymphadenopathy or hypermetabolic metastatic disease.  3. Multiple hypermetabolic  intraparotid nodules/lymph nodes, which  have been present since 2022, likely represents a benign and indolent  process. However, recommend continued attention on follow-up.  4. Asymmetrically increased hypermetabolic activity within the right  thyroid gland. Correlate with thyroid ultrasound may be of value.    1-2-2025: started FLOT-4.  Completed cycle 4 on February 27, 2025.    March 11, 2025: CT scan has indeterminate lung lesions (7 mm).    March 14, 2025: PET-CT did not show any definitive metastatic lesion.    3-: Underwent esophagectomy.  Pathology showed the following-      Component    FINAL DIAGNOSIS   A. Lymph Node, Level 7, Excision:   -- Three lymph nodes, negative for carcinoma (0/3).     B. Esophagus and Stomach, Esophagogastrectomy:   -- Residual invasive adenocarcinoma, poorly differentiated, 3.6 cm, with treatment effect present (residual cancer showing evident tumor regression, but more than single cells or rare small groups of cancer cells (partial response, score 2).               -- The tumor invades adventitia and extends to less than 1 mm from the inked cauterized adventitia soft tissue / radial margin.               -- Lymphovascular including extramural large venous invasion identified.               -- Perineural invasion identified.               -- Metastatic carcinoma involving ten of seventeen lymph nodes (10/17).               -- Other surgical resection (proximal and distal) margins, negative for carcinoma (see final distal margin in Part C).  -- See note and synoptic report.     C. Additional Gastric Margin, Excision:   -- Portion of stomach with focal chronic inflammation, negative for carcinoma.  -- One lymph node, negative for carcinoma (0/1).     Note: Cytokeratin AE1/AE3 immunostain (Block B60) highlights neoplastic cells in lymphovascular space. Movat special stain (Block B38) confirms extramural large venous invasion.     DNA mismatch repair protein  immunohistochemical stains were performed on prior biopsy ([id number]).      Selective slides (B5, B8, B38, B45, B46, and B60) have been additionally reviewed by Dr. Vernell Ponce who concurs with the above diagnosis.   Electronically signed by Wilder Kowalski MD PhD on 4/14/2025 at 1721        By the signature on this report, the individual or group listed as making the Final Interpretation/Diagnosis certifies that they have reviewed this case.    Case Summary Report   ESOPHAGUS   8th Edition - Protocol posted: 6/22/2022ESOPHAGUS - All Specimens  SPECIMEN   Procedure  Esophagogastrectomy   TUMOR   Tumor Site  Distal esophagus (low thoracic esophagus)   Relationship of Tumor to Esophagogastric Junction  Tumor midpoint lies in the distal esophagus AND tumor involves the esophagogastric junction   Distance of Tumor Center from Esophagogastric Junction  1.8 cm   Histologic Type  Adenocarcinoma   Histologic Grade  G3, poorly differentiated, undifferentiated   Tumor Size  Greatest Dimension (Centimeters): 3.6 cm   Tumor Extent  Invades adventitia   Treatment Effect  Present, with residual cancer showing evident tumor regression, but more than single cells or rare small groups of cancer cells (partial response, score 2)   Lymphovascular Invasion  Present   Perineural Invasion  Present   MARGINS   Margin Status for Invasive Carcinoma  The tumor extends to less than 1 mm from the inked cauterized adventitia soft tissue / radial margin   Margin Status for Dysplasia and Intestinal Metaplasia  All margins negative for dysplasia   Margin Comment  Other surgical resection (proximal and distal) margins, negative for carcinoma   REGIONAL LYMPH NODES   Regional Lymph Node Status  Tumor present in regional lymph node(s)   Number of Lymph Nodes with Tumor  10   Number of Lymph Nodes Examined  21   PATHOLOGIC STAGE CLASSIFICATION (pTNM, AJCC 8th Edition)   Reporting of pT, pN, and (when applicable) pM categories is based on information  available to the pathologist at the time the report is issued. As per the AJCC (Chapter 1, 8th Ed.) it is the managing physician’s responsibility to establish the final pathologic stage based upon all pertinent information, including but potentially not limited to this pathology report.   TNM Descriptors  y (post-treatment)   pT Category  pT3   pN Category  pN3   Comment(s)  DNA mismatch repair protein immunohistochemical stains were performed on prior biopsy ([id number])             Interval history:   Mr. Victoria continues to recover from surgery at home. His swallowing is gradually improving and he is trying to eat more by mouth. Denies any significant pain currently.  He is up and about without any help.  Has some fatigue. STILL USING THE FEEDING TUBE.    Currently denies: fevers, chills, chest pain, SOB, cough, N/V, bowel changes, urinary symptoms.    Past Medical History:   Past Medical History:  No date: CAD (coronary artery disease)  No date: Esophageal carcinoma  No date: Hyperlipidemia  No date: Hypertension  No date: Hypothyroidism   Surgical History:    Past Surgical History:   Procedure Laterality Date    CATARACT EXTRACTION Right 08/25/2023    CHOLECYSTECTOMY      CORONARY ARTERY BYPASS GRAFT  2005    4-vessel    ESOPHAGUS SURGERY  03/31/2025    Three field esophagectomy    HERNIA REPAIR        Family History:    Family History   Problem Relation Name Age of Onset    COPD Mother       Family Oncology History:    Cancer-related family history is not on file.  Social History:    Social History     Tobacco Use    Smoking status: Every Day     Current packs/day: 0.25     Average packs/day: 1 pack/day for 46.5 years (45.4 ttl pk-yrs)     Types: Cigarettes     Start date: 1979     Passive exposure: Current    Smokeless tobacco: Never    Tobacco comments:     Pt is down to 2 cigarettes.   Vaping Use    Vaping status: Never Used   Substance Use Topics    Alcohol use: Not Currently    Drug use: Never         Allergies  Allergies   Allergen Reactions    Ibuprofen Swelling        Medications  Current Outpatient Medications   Medication Instructions    acetaminophen (TYLENOL) 650 mg, j-tube, Every 6 hours PRN    amitriptyline (ELAVIL) 100 mg, j-tube, Nightly    aspirin 81 mg, Daily    atorvastatin (LIPITOR) 40 mg, j-tube, Daily    baclofen (Lioresal) 5 mg tablet 1 tablet, 3 times daily (0900,1400,1900)    buPROPion (WELLBUTRIN) 150 mg, j-tube, 2 times daily    levothyroxine (SYNTHROID, LEVOXYL) 150 mcg, oral, Daily, Take on an empty stomach at the same time each day, either 30 to 60 minutes prior to breakfast    losartan (COZAAR) 100 mg, oral, Daily    ondansetron ODT (ZOFRAN-ODT) 8 mg, oral, Every 8 hours PRN    prochlorperazine (COMPAZINE) 10 mg, oral, Every 6 hours PRN    spironolactone (ALDACTONE) 25 mg, oral, Daily    sulfamethoxazole-trimethoprim (Bactrim DS) 800-160 mg tablet 1 tablet, oral, 2 times daily    tamsulosin (FLOMAX) 0.4 mg, Nightly    verapamil SR (CALAN-SR) 240 mg, oral, Nightly          Objective   VS: There were no vitals taken for this visit.    PHYSICAL EXAMINATION    Labs                  Image  EGD (11/21/2024)  Single malignant-appearing and invasive mass (not traversable) in the lower third of the esophagus, covering one half of the circumference; performed cold forceps biopsy     A. Esophagus, Distal, Mass, Biopsy:  -- Intramucosal adenocarcinoma. See note.     Note: The endoscopic impression of a friable and invasive lesion in the lower third of the esophagus is noted. This biopsy may be not representative of the entire lesion. Clinical correlation is recommended.    MISMATCH REPAIR PROTEIN EXPRESSION                    Protein:           Result                 MLH-1:             Expression Present                                                   PMS-2:            Expression Present                                                   MSH-2:            Expression Present                                                    MSH-6:            Expression Present                                       INTERPRETATION: Neoplasm with normal mismatch repair protein expression.     C/A/P CT (11/22/2024)  IMPRESSION:  1. Irregular masslike thickening of the mid to distal esophagus is  difficult to accurately measure, and may represent a neoplastic  process. Correlate with recent endoscopic imaging and biopsy results.  There is also proximal esophageal wall thickening, and a moderate  size hiatal hernia.  2. Small area of centrilobular nodularity in the right middle lobe  may represent focal aspiration/mucoid impaction or less likely  atypical infection.  3. Mild apical predominant centrilobular emphysema.  4. Severe coronary artery calcifications. Please note this exam is  not optimized for evaluation of the coronary arteries.  5. Nonobstructing 0.4 cm calculus in the left kidney.    Neck CT (11/20/2024)  IMPRESSION:  New maxillary periapical lucencies with adjacent mucosal thickening.  No discrete fluid collection identified. Dental follow-up recommended.      No discrete mass or cervical lymphadenopathy identified however  direct visualization suggested.      Bilateral parotid mass/nodule similar to prior imaging.      Postsurgical changes, emphysematous changes, mild mediastinal  lymphadenopathy and additional findings as detailed.    This note was created using a voice recognition system ( the Dragon dictation system). Inaccuracies and misspellings are unintentional.   TOTAL TIME SPENT 20 MINUTES.  Madan Wilson MD.

## 2025-07-09 ENCOUNTER — TREATMENT (OUTPATIENT)
Dept: PHYSICAL THERAPY | Facility: HOSPITAL | Age: 70
End: 2025-07-09
Payer: MEDICARE
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
## 2025-07-09 DIAGNOSIS — R29.898 LEG WEAKNESS, BILATERAL: ICD-10-CM

## 2025-07-09 LAB
LABORATORY COMMENT REPORT: NORMAL
PATH REPORT.FINAL DX SPEC: NORMAL
PATH REPORT.GROSS SPEC: NORMAL
PATH REPORT.RELEVANT HX SPEC: NORMAL
PATH REPORT.TOTAL CANCER: NORMAL

## 2025-07-09 PROCEDURE — 97110 THERAPEUTIC EXERCISES: CPT | Mod: GP,CQ

## 2025-07-09 ASSESSMENT — PAIN - FUNCTIONAL ASSESSMENT: PAIN_FUNCTIONAL_ASSESSMENT: 0-10

## 2025-07-09 ASSESSMENT — PAIN SCALES - GENERAL: PAINLEVEL_OUTOF10: 0 - NO PAIN

## 2025-07-09 NOTE — PROGRESS NOTES
"Physical Therapy    Physical Therapy Treatment    Patient Name: Timoteo Victoria  MRN: [id number]  : 1955  Today's Date: 2025  Time Calculation  Start Time: 1600  Stop Time: 1645  Time Calculation (min): 45 min    PT Therapeutic Procedures Time Entry  Therapeutic Exercise Time Entry: 45          VISIT:# 3    Current Problem  Problem List Items Addressed This Visit    None  Visit Diagnoses         Codes      Fall, initial encounter     W19.XXXA    PT/OT, supportive care       Leg weakness, bilateral     R29.898             Subjective   Reason for Referral: PT for general weakness  Referred By: Wolfgnag Pascual MD     Pt reported no pain at the beginning of the session. He stated that on 2025 he fell at home when his knees buckled while helping his son fix a mower. He sustained some minor bruising on his R arm but stated that he was not in any pain from the fall and did not feel the need to seek medical attention.    Pain  Pain Assessment: 0-10  0-10 (Numeric) Pain Score: 0 - No pain       Objective     Precautions  Precautions  STEADI Fall Risk Score (The score of 4 or more indicates an increased risk of falling): 4      Treatments:     EXERCISES       Date 2025             VISIT # #2 #3 # #    REPS REPS REPS REPS          Nustep L3 10 min L3 10 min     Bike              Shuttle  DLP 5B 2x10 5B 2x10     Shuttle SLP 4B 2x10 Cecilio      Shuttle TR/HR              Qhip Flexion 10# 1x10 Cecilio 10# 2x10 Cecilio     Qhip Abduction 10# 1x10 Cecilio 10# 2x10 Cecilio     Qhip Extension 10# 1x10 Cecilio 10# 2x10 Cecilio     Qhip Adduction        Qhip  TKE              Step ups  FWD  LAT   1x10 Cecilio  1x10 Cecilio   6 inch 1x10 Cecilio  6 inch 1x10 Cecilio            Q Quad       Q Hamstring               RB stretch 3x30\"H 3x30\"H     HS stretch                                   HEP given by Tomasz Small PTA      Heel raises at counter: 3 sets - 10 reps - 2x per day   Mini squats at counter: 3 sets - 10 reps - 2x per day   Bridges: " 3 sets - 10 reps -  2x per day   Clamshells Cecilio: 3 sets - 10 reps - 2x per day   Lat side stepping at counter: 3 sets - 10 reps - 2x per day    Assessment:   Pt reported no increases in pain at the end of the session. He tolerated treatment well. He was able to complete all exercises with minimal difficulty. Pt required less cuing today for proper form.       Plan:   Continue to focus sessions on improving LE strength, balance, and gait for improved daily activity.    OP PT Plan  Treatment/Interventions: Gait training, Education/ Instruction, Neuromuscular re-education, Therapeutic exercises    Goals:  Active       PT Problem       PT Goal 1       Start:  06/10/25    Expected End:  08/14/25       STG's -- within 4 to 6 sessions  The patient will demonstrate improve knowledge of posture and body mechanics  The patient will be able to perform a basic home exercise program     LTG's -- by completion of the POC by 8/14/25  1. The patient will regain normal UE and LE ROM and flexibility to restore motion during daily functional activities   2. The patient will be able to reach and squat 10 X with good tolerance to assimilate household related activities    3. The patient will be able to dress and bath and complete household activities without painful restriction or need for compensation   4. The patient will be able to perform transfers and bed mobility I 10 times in 20 seconds with good safety    5. The patient will be able to ambulate with or without use of an assistive device with a more normal pattern 10 to 15 minutes   6.  The patient will be able to negotiate 12 steps with use of a railing utilizing a reciprocal gait pattern   7. The patient will be able to perform an advanced home exercise program to carry over flexibility and strength benefits

## 2025-07-10 ENCOUNTER — HOSPITAL ENCOUNTER (OUTPATIENT)
Dept: RADIOLOGY | Facility: HOSPITAL | Age: 70
Discharge: HOME | End: 2025-07-10
Payer: MEDICARE

## 2025-07-10 DIAGNOSIS — E04.1 THYROID NODULE: ICD-10-CM

## 2025-07-10 PROCEDURE — 76536 US EXAM OF HEAD AND NECK: CPT

## 2025-07-10 PROCEDURE — 76536 US EXAM OF HEAD AND NECK: CPT | Performed by: RADIOLOGY

## 2025-07-11 ENCOUNTER — TREATMENT (OUTPATIENT)
Dept: PHYSICAL THERAPY | Facility: HOSPITAL | Age: 70
End: 2025-07-11
Payer: MEDICARE
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
## 2025-07-11 DIAGNOSIS — R29.898 LEG WEAKNESS, BILATERAL: ICD-10-CM

## 2025-07-11 PROCEDURE — 97110 THERAPEUTIC EXERCISES: CPT | Mod: GP,CQ

## 2025-07-11 ASSESSMENT — PAIN SCALES - GENERAL: PAINLEVEL_OUTOF10: 0 - NO PAIN

## 2025-07-11 ASSESSMENT — PAIN - FUNCTIONAL ASSESSMENT: PAIN_FUNCTIONAL_ASSESSMENT: 0-10

## 2025-07-11 NOTE — PROGRESS NOTES
"Physical Therapy    Physical Therapy Treatment    Patient Name: Timoteo Victoria  MRN: [id number]  : 1955  Today's Date: 2025  Time Calculation  Start Time: 1500  Stop Time: 1545  Time Calculation (min): 45 min    PT Therapeutic Procedures Time Entry  Therapeutic Exercise Time Entry: 45          VISIT:# 4    Current Problem  Problem List Items Addressed This Visit    None  Visit Diagnoses         Codes      Fall, initial encounter     W19.XXXA    PT/OT, supportive care       Leg weakness, bilateral     R29.898             Subjective   Reason for Referral: PT for general weakness  Referred By: Wolfgang Pascual MD     Pt arrives today stating that he is not currently experiencing any pain or discomfort. He has not had another fall since his last visit. Pt's bruising from previous fall on 25 is still present but is healing well. He states that he has been keeping up with his exercises since they were given.    Pain  Pain Assessment: 0-10  0-10 (Numeric) Pain Score: 0 - No pain       Objective    Increased weight on Q hip and resistance of shuttle to progress LE strengthening.             Precautions  Precautions  STEADI Fall Risk Score (The score of 4 or more indicates an increased risk of falling): 4      Treatments:     EXERCISES       Date 2025            VISIT # #2 #3 #4 #    REPS REPS REPS REPS          Nustep L3 10 min L3 10 min L3 15 min    Bike              Shuttle  DLP 5B 2x10 5B 2x10 6B 2x10    Shuttle SLP 4B 2x10 Cecilio  5B 2x10 ea    Shuttle TR/HR              Qhip Flexion 10# 1x10 Cecilio 10# 2x10 Cecilio 15# 2x10 Cecilio    Qhip Abduction 10# 1x10 Cecilio 10# 2x10 Cecilio 15# 2x10 Cecilio    Qhip Extension 10# 1x10 Cecilio 10# 2x10 Cecilio 15# 2x10 Cecilio    Qhip Adduction        Qhip  TKE              Step ups  FWD  LAT   1x10 Cecilio  1x10 Cecilio   6 inch 1x10 Cecilio  6 inch 1x10 Cecilio   6 inch 2x10 Cecilio  6 inch 2x10 Cecilio           Q Quad       Q Hamstring               RB stretch 3x30\"H 3x30\"H 3x30\"H    HS stretch    "                                HEP given by Tomasz Small PTA      Heel raises at counter: 3 sets - 10 reps - 2x per day   Mini squats at counter: 3 sets - 10 reps - 2x per day   Bridges: 3 sets - 10 reps -  2x per day   Clamshells Cecilio: 3 sets - 10 reps - 2x per day   Lat side stepping at counter: 3 sets - 10 reps - 2x per day                      Assessment:   At the end of the session pt reported no increased pain or discomfort. Pt tolerated increased weight and resistance well today. He was able to complete all exercises with minimal difficulty.        Plan:   Pt scheduled for follow up with Miguelito Knox PT in next visit to determine if he needs to continue treatment. 7/16/25 will be his last approved visit. He will need authorization for extended visits.     OP PT Plan  Treatment/Interventions: Gait training, Education/ Instruction, Neuromuscular re-education, Therapeutic exercises    Goals:  Active       PT Problem       PT Goal 1       Start:  06/10/25    Expected End:  08/14/25       STG's -- within 4 to 6 sessions  The patient will demonstrate improve knowledge of posture and body mechanics  The patient will be able to perform a basic home exercise program     LTG's -- by completion of the POC by 8/14/25  1. The patient will regain normal UE and LE ROM and flexibility to restore motion during daily functional activities   2. The patient will be able to reach and squat 10 X with good tolerance to assimilate household related activities    3. The patient will be able to dress and bath and complete household activities without painful restriction or need for compensation   4. The patient will be able to perform transfers and bed mobility I 10 times in 20 seconds with good safety    5. The patient will be able to ambulate with or without use of an assistive device with a more normal pattern 10 to 15 minutes   6.  The patient will be able to negotiate 12 steps with use of a railing utilizing a reciprocal gait  pattern   7. The patient will be able to perform an advanced home exercise program to carry over flexibility and strength benefits

## 2025-07-14 ENCOUNTER — TELEMEDICINE (OUTPATIENT)
Dept: HEMATOLOGY/ONCOLOGY | Facility: CLINIC | Age: 70
End: 2025-07-14
Payer: MEDICARE

## 2025-07-14 DIAGNOSIS — C15.9 PRIMARY ESOPHAGEAL ADENOCARCINOMA (MULTI): Primary | ICD-10-CM

## 2025-07-14 PROCEDURE — 99213 OFFICE O/P EST LOW 20 MIN: CPT | Performed by: INTERNAL MEDICINE

## 2025-07-14 RX ORDER — PROCHLORPERAZINE EDISYLATE 5 MG/ML
10 INJECTION INTRAMUSCULAR; INTRAVENOUS EVERY 6 HOURS PRN
Status: CANCELLED | OUTPATIENT
Start: 2025-07-17

## 2025-07-14 RX ORDER — ALBUTEROL SULFATE 0.83 MG/ML
3 SOLUTION RESPIRATORY (INHALATION) AS NEEDED
Status: CANCELLED | OUTPATIENT
Start: 2025-07-17

## 2025-07-14 RX ORDER — LORAZEPAM 2 MG/ML
1 INJECTION INTRAMUSCULAR AS NEEDED
Status: CANCELLED | OUTPATIENT
Start: 2025-07-17

## 2025-07-14 RX ORDER — DIPHENHYDRAMINE HYDROCHLORIDE 50 MG/ML
50 INJECTION, SOLUTION INTRAMUSCULAR; INTRAVENOUS AS NEEDED
Status: CANCELLED | OUTPATIENT
Start: 2025-07-19

## 2025-07-14 RX ORDER — ALBUTEROL SULFATE 0.83 MG/ML
3 SOLUTION RESPIRATORY (INHALATION) AS NEEDED
Status: CANCELLED | OUTPATIENT
Start: 2025-07-19

## 2025-07-14 RX ORDER — FAMOTIDINE 10 MG/ML
20 INJECTION, SOLUTION INTRAVENOUS ONCE AS NEEDED
Status: CANCELLED | OUTPATIENT
Start: 2025-07-17

## 2025-07-14 RX ORDER — PROCHLORPERAZINE MALEATE 10 MG
10 TABLET ORAL EVERY 6 HOURS PRN
Status: CANCELLED | OUTPATIENT
Start: 2025-07-17

## 2025-07-14 RX ORDER — PALONOSETRON 0.05 MG/ML
0.25 INJECTION, SOLUTION INTRAVENOUS ONCE
Status: CANCELLED | OUTPATIENT
Start: 2025-07-17

## 2025-07-14 RX ORDER — FAMOTIDINE 10 MG/ML
20 INJECTION, SOLUTION INTRAVENOUS ONCE AS NEEDED
Status: CANCELLED | OUTPATIENT
Start: 2025-07-19

## 2025-07-14 RX ORDER — DIPHENHYDRAMINE HYDROCHLORIDE 50 MG/ML
50 INJECTION, SOLUTION INTRAMUSCULAR; INTRAVENOUS AS NEEDED
Status: CANCELLED | OUTPATIENT
Start: 2025-07-17

## 2025-07-14 RX ORDER — EPINEPHRINE 0.3 MG/.3ML
0.3 INJECTION SUBCUTANEOUS EVERY 5 MIN PRN
Status: CANCELLED | OUTPATIENT
Start: 2025-07-19

## 2025-07-14 RX ORDER — EPINEPHRINE 0.3 MG/.3ML
0.3 INJECTION SUBCUTANEOUS EVERY 5 MIN PRN
Status: CANCELLED | OUTPATIENT
Start: 2025-07-17

## 2025-07-14 RX ORDER — DIPHENHYDRAMINE HYDROCHLORIDE 50 MG/ML
25 INJECTION, SOLUTION INTRAMUSCULAR; INTRAVENOUS ONCE
Status: CANCELLED | OUTPATIENT
Start: 2025-07-17 | End: 2025-07-17

## 2025-07-14 NOTE — PROGRESS NOTES
"Virtual or Telephone Consent    While technically available, the patient was unable to connect via audio/video telehealth technology; therefore, I performed this visit using a real-time audio only connection between Timoteo Victoria & Madan Wilson MD.  Verbal consent was requested and obtained from Timoteo Victoria on this date, 07/14/25 for a telehealth visit and the patient's location was confirmed at the time of the visit.     Patient ID: Timoteo Victoria \"Tarun" is a 70 y.o. male.  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  Underwent esophagectomy on 3/31/2025  after 4 cycles of neoadjuvant FLOT.  Pathology showed residual tumor (pT3 pN3), close radial margin (less than 1 mm).  PET-avid parotid and thyroid nodule.    Genomic profile:  Normal MMR expresison    Assessment and Plan:  70M with CAD (CABG 2005), HTN, HLD, chronic tobacco use, hypothyroidism, presented with 4-6 weeks of progressive dysphagia (solid to liquid/mediations) 40lb wt loss over 6 months, and was found to have distal esophageal mass covering half of lumen on EGD 11/21/24  and Bx showed intramucosal adenocarcinoma with normal MMR expression. S/p J tube placement 11/25/2024.    PET-CT showed no distant mets.    We discussed FLOT for chemotherapy regimen with him in details. After a detailed discussion about the chemo, he consented and started cycle 1 on 1/2/25. Completed cycle 4 on 2-.  His post neoadjuvant therapy PET/CT which did not show any PET avid lesions suggestive of metastatic disease.  He underwent esophagectomy on March 31, 2025.  The pathology showed pT3 pN3 disease with close radial margin (less than 1 mm).      He is gradually recovering from the surgery.  His overall oral intake is improving.  I talked to him about starting adjuvant chemotherapy with FLOT x 4 cycles.  He feels he is well enough to start chemotherapy.  Because of neuropathy and fatigue as " well as severe hand-foot syndrome with chemotherapy last time, I have modified his dosage accordingly.  I have ordered his cycle 5 chemo for July 18, 2025.    Plan: Postoperative FLOT x 4 cycles and postoperative radiation therapy because of the close radial margin.      Follow-up: July 31, 2025.    I have placed all orders as outlined above. I advised the patient to schedule the tests and follow-up appointment as discussed by contacting the  on the way out or calling by phone. Patient knows to call with any issues or concerns.     Providers:  Surgeon: Dr. Maritza Mariac: Dr Katie Apple:    Chief complaint: Esophageal adenocarcinoma    HPI:  Timoteo Victoria is a 70 y.o. male with a notable history of CAD (CABG 2005), HTN, HLD, chronic tobacco use, hypothyroidism presented on 11/25/2024 as a transfer from [facility] for cancer workup. He had been having trouble swallowing for the past month, initially solids and progressing to liquids and medications. He has lost 40 lbs in the past 6 months.     EGD on 11/21/24 showed an esophageal mass with a malignant appearance, with biopsy showing intramucosal adenocarcinoma. The patient had a J tube placed on 11/25.     ONCOLOGIC HISTORY-  11/21/24  [place] admission for weight loss, dysphagia; EGD showed esophageal mass with biopsy confirming intramucosal adenocarcinoma  11/20/24 CT neck with no masses  11/22/24 CT CAP with contrast with irregular masslike thickening of distal esophagus, no obvious metastases  11/25/24 J tube placement.    12-: pet-ct showed-  IMPRESSION:  1. Hypermetabolic esophageal mass as described above is consistent  with biopsy-proven esophageal adenocarcinoma. Few minimally  hypermetabolic subcentimeter periesophageal lymph nodes are likely  reactive.  2. No other evidence of hypermetabolic thoracic or abdominal  lymphadenopathy or hypermetabolic metastatic disease.  3. Multiple hypermetabolic intraparotid nodules/lymph nodes,  which  have been present since 2022, likely represents a benign and indolent  process. However, recommend continued attention on follow-up.  4. Asymmetrically increased hypermetabolic activity within the right  thyroid gland. Correlate with thyroid ultrasound may be of value.    1-2-2025: started FLOT-4.  Completed cycle 4 on February 27, 2025.    March 11, 2025: CT scan has indeterminate lung lesions (7 mm).    March 14, 2025: PET-CT did not show any definitive metastatic lesion.    3-: Underwent esophagectomy.  Pathology showed the following-      Component    FINAL DIAGNOSIS   A. Lymph Node, Level 7, Excision:   -- Three lymph nodes, negative for carcinoma (0/3).     B. Esophagus and Stomach, Esophagogastrectomy:   -- Residual invasive adenocarcinoma, poorly differentiated, 3.6 cm, with treatment effect present (residual cancer showing evident tumor regression, but more than single cells or rare small groups of cancer cells (partial response, score 2).               -- The tumor invades adventitia and extends to less than 1 mm from the inked cauterized adventitia soft tissue / radial margin.               -- Lymphovascular including extramural large venous invasion identified.               -- Perineural invasion identified.               -- Metastatic carcinoma involving ten of seventeen lymph nodes (10/17).               -- Other surgical resection (proximal and distal) margins, negative for carcinoma (see final distal margin in Part C).  -- See note and synoptic report.     C. Additional Gastric Margin, Excision:   -- Portion of stomach with focal chronic inflammation, negative for carcinoma.  -- One lymph node, negative for carcinoma (0/1).     Note: Cytokeratin AE1/AE3 immunostain (Block B60) highlights neoplastic cells in lymphovascular space. Movat special stain (Block B38) confirms extramural large venous invasion.     DNA mismatch repair protein immunohistochemical stains were performed on prior  biopsy ([id number]).      Selective slides (B5, B8, B38, B45, B46, and B60) have been additionally reviewed by Dr. Vernell Ponce who concurs with the above diagnosis.   Electronically signed by Wilder Kowalski MD PhD on 4/14/2025 at 1721        By the signature on this report, the individual or group listed as making the Final Interpretation/Diagnosis certifies that they have reviewed this case.    Case Summary Report   ESOPHAGUS   8th Edition - Protocol posted: 6/22/2022ESOPHAGUS - All Specimens  SPECIMEN   Procedure  Esophagogastrectomy   TUMOR   Tumor Site  Distal esophagus (low thoracic esophagus)   Relationship of Tumor to Esophagogastric Junction  Tumor midpoint lies in the distal esophagus AND tumor involves the esophagogastric junction   Distance of Tumor Center from Esophagogastric Junction  1.8 cm   Histologic Type  Adenocarcinoma   Histologic Grade  G3, poorly differentiated, undifferentiated   Tumor Size  Greatest Dimension (Centimeters): 3.6 cm   Tumor Extent  Invades adventitia   Treatment Effect  Present, with residual cancer showing evident tumor regression, but more than single cells or rare small groups of cancer cells (partial response, score 2)   Lymphovascular Invasion  Present   Perineural Invasion  Present   MARGINS   Margin Status for Invasive Carcinoma  The tumor extends to less than 1 mm from the inked cauterized adventitia soft tissue / radial margin   Margin Status for Dysplasia and Intestinal Metaplasia  All margins negative for dysplasia   Margin Comment  Other surgical resection (proximal and distal) margins, negative for carcinoma   REGIONAL LYMPH NODES   Regional Lymph Node Status  Tumor present in regional lymph node(s)   Number of Lymph Nodes with Tumor  10   Number of Lymph Nodes Examined  21   PATHOLOGIC STAGE CLASSIFICATION (pTNM, AJCC 8th Edition)   Reporting of pT, pN, and (when applicable) pM categories is based on information available to the pathologist at the time the report is  issued. As per the AJCC (Chapter 1, 8th Ed.) it is the managing physician’s responsibility to establish the final pathologic stage based upon all pertinent information, including but potentially not limited to this pathology report.   TNM Descriptors  y (post-treatment)   pT Category  pT3   pN Category  pN3   Comment(s)  DNA mismatch repair protein immunohistochemical stains were performed on prior biopsy ([id number])           Interval history:   Mr. Victoria continues to recover from surgery at home. His swallowing is gradually improving and his overall oral intake has improved remarkably.  He feels that he is ready to get started with chemo.      Past Medical History:   Past Medical History:  No date: CAD (coronary artery disease)  No date: Esophageal carcinoma  No date: Hyperlipidemia  No date: Hypertension  No date: Hypothyroidism   Surgical History:    Past Surgical History:   Procedure Laterality Date    CATARACT EXTRACTION Right 08/25/2023    CHOLECYSTECTOMY      CORONARY ARTERY BYPASS GRAFT  2005    4-vessel    ESOPHAGUS SURGERY  03/31/2025    Three field esophagectomy    HERNIA REPAIR        Family History:    Family History   Problem Relation Name Age of Onset    COPD Mother       Family Oncology History:    Cancer-related family history is not on file.  Social History:    Social History     Tobacco Use    Smoking status: Every Day     Current packs/day: 0.25     Average packs/day: 1 pack/day for 46.5 years (45.4 ttl pk-yrs)     Types: Cigarettes     Start date: 1979     Passive exposure: Current    Smokeless tobacco: Never    Tobacco comments:     Pt is down to 2 cigarettes.   Vaping Use    Vaping status: Never Used   Substance Use Topics    Alcohol use: Not Currently    Drug use: Never        Allergies  Allergies   Allergen Reactions    Ibuprofen Swelling        Medications  Current Outpatient Medications   Medication Instructions    acetaminophen (TYLENOL) 650 mg, j-tube, Every 6 hours PRN     amitriptyline (ELAVIL) 100 mg, j-tube, Nightly    aspirin 81 mg, Daily    atorvastatin (LIPITOR) 40 mg, j-tube, Daily    baclofen (Lioresal) 5 mg tablet 1 tablet, 3 times daily (0900,1400,1900)    buPROPion (WELLBUTRIN) 150 mg, j-tube, 2 times daily    levothyroxine (SYNTHROID, LEVOXYL) 150 mcg, oral, Daily, Take on an empty stomach at the same time each day, either 30 to 60 minutes prior to breakfast    losartan (COZAAR) 100 mg, oral, Daily    ondansetron ODT (ZOFRAN-ODT) 8 mg, oral, Every 8 hours PRN    prochlorperazine (COMPAZINE) 10 mg, oral, Every 6 hours PRN    spironolactone (ALDACTONE) 25 mg, oral, Daily    tamsulosin (FLOMAX) 0.4 mg, Nightly    verapamil SR (CALAN-SR) 240 mg, oral, Nightly          Objective   VS: There were no vitals taken for this visit.    PHYSICAL EXAMINATION    Labs                  Image  EGD (11/21/2024)  Single malignant-appearing and invasive mass (not traversable) in the lower third of the esophagus, covering one half of the circumference; performed cold forceps biopsy     A. Esophagus, Distal, Mass, Biopsy:  -- Intramucosal adenocarcinoma. See note.     Note: The endoscopic impression of a friable and invasive lesion in the lower third of the esophagus is noted. This biopsy may be not representative of the entire lesion. Clinical correlation is recommended.    MISMATCH REPAIR PROTEIN EXPRESSION                    Protein:           Result                 MLH-1:             Expression Present                                                   PMS-2:            Expression Present                                                   MSH-2:            Expression Present                                                   MSH-6:            Expression Present                                       INTERPRETATION: Neoplasm with normal mismatch repair protein expression.     C/A/P CT (11/22/2024)  IMPRESSION:  1. Irregular masslike thickening of the mid to distal esophagus is  difficult to  accurately measure, and may represent a neoplastic  process. Correlate with recent endoscopic imaging and biopsy results.  There is also proximal esophageal wall thickening, and a moderate  size hiatal hernia.  2. Small area of centrilobular nodularity in the right middle lobe  may represent focal aspiration/mucoid impaction or less likely  atypical infection.  3. Mild apical predominant centrilobular emphysema.  4. Severe coronary artery calcifications. Please note this exam is  not optimized for evaluation of the coronary arteries.  5. Nonobstructing 0.4 cm calculus in the left kidney.    Neck CT (11/20/2024)  IMPRESSION:  New maxillary periapical lucencies with adjacent mucosal thickening.  No discrete fluid collection identified. Dental follow-up recommended.      No discrete mass or cervical lymphadenopathy identified however  direct visualization suggested.      Bilateral parotid mass/nodule similar to prior imaging.      Postsurgical changes, emphysematous changes, mild mediastinal  lymphadenopathy and additional findings as detailed.    This note was created using a voice recognition system ( the Dragon dictation system). Inaccuracies and misspellings are unintentional.   TOTAL TIME SPENT 20 MINUTES.  Madan Wilson MD.

## 2025-07-16 ENCOUNTER — TREATMENT (OUTPATIENT)
Dept: PHYSICAL THERAPY | Facility: HOSPITAL | Age: 70
End: 2025-07-16
Payer: MEDICARE
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
## 2025-07-16 DIAGNOSIS — R29.898 LEG WEAKNESS, BILATERAL: ICD-10-CM

## 2025-07-16 PROCEDURE — 97110 THERAPEUTIC EXERCISES: CPT | Mod: GP

## 2025-07-16 ASSESSMENT — PAIN SCALES - GENERAL: PAINLEVEL_OUTOF10: 0 - NO PAIN

## 2025-07-16 ASSESSMENT — PAIN - FUNCTIONAL ASSESSMENT: PAIN_FUNCTIONAL_ASSESSMENT: 0-10

## 2025-07-16 NOTE — PROGRESS NOTES
"Physical Therapy    Physical Therapy Treatment    Patient Name: Timoteo Victoria  MRN: [id number]  : 1955  Today's Date: 2025    Time Calculation  Start Time: 1515  Stop Time: 1600  Time Calculation (min): 45 min       PT Therapeutic Procedures Time Entry  Therapeutic Exercise Time Entry: 45                   VISIT:# 5    Current Problem  Problem List Items Addressed This Visit    None  Visit Diagnoses         Codes      Fall, initial encounter     W19.XXXA    PT/OT, supportive care       Leg weakness, bilateral     R29.898             Subjective   Reason for Referral: PT for general weakness  Referred By: Wolfgang Pascual MD     Pain  Pain Assessment: 0-10  0-10 (Numeric) Pain Score: 0 - No pain          Objective                 Precautions  Precautions  STEADI Fall Risk Score (The score of 4 or more indicates an increased risk of falling): 4      Treatments:     EXERCISES       Date 2025          VISIT # #2 #3 #4 #5    REPS REPS REPS REPS          Nustep L3 10 min L3 10 min L3 15 min 15 min   Bike              Shuttle  DLP 5B 2x10 5B 2x10 6B 2x10 6B 2x10   Shuttle SLP 4B 2x10 Cecilio  5B 2x10 ea 5B 2x10 ea   Shuttle TR/HR              Qhip Flexion 10# 1x10 Cecilio 10# 2x10 Cecilio 15# 2x10 Cecilio    Qhip Abduction 10# 1x10 Cecilio 10# 2x10 Cecilio 15# 2x10 Cecilio 15# 2x10 Cecilio   Qhip Extension 10# 1x10 Cecilio 10# 2x10 Cecilio 15# 2x10 Cecilio 15# 2x10 Cecilio   Qhip Adduction        Qhip  TKE              Step ups  FWD  LAT   1x10 Cecilio  1x10 Cecilio   6 inch 1x10 Cecilio  6 inch 1x10 Cecilio   6 inch 2x10 Ceciilo  6 inch 2x10 Cecilio   8 inch 2x10 Cecilio  8 inch 2x10 Cecilio          Q Quad       Q Hamstring               RB stretch 3x30\"H 3x30\"H 3x30\"H 5 min   HS stretch                                                         Assessment:   The patient performed all exercises with good tolerance.        Plan:   OP PT Plan  Treatment/Interventions: Gait training, Education/ Instruction, Neuromuscular re-education, Therapeutic " exercises    Goals:  Active       PT Problem       PT Goal 1       Start:  06/10/25    Expected End:  08/14/25       STG's -- within 4 to 6 sessions  The patient will demonstrate improve knowledge of posture and body mechanics  The patient will be able to perform a basic home exercise program     LTG's -- by completion of the POC by 8/14/25  1. The patient will regain normal UE and LE ROM and flexibility to restore motion during daily functional activities   2. The patient will be able to reach and squat 10 X with good tolerance to assimilate household related activities    3. The patient will be able to dress and bath and complete household activities without painful restriction or need for compensation   4. The patient will be able to perform transfers and bed mobility I 10 times in 20 seconds with good safety    5. The patient will be able to ambulate with or without use of an assistive device with a more normal pattern 10 to 15 minutes   6.  The patient will be able to negotiate 12 steps with use of a railing utilizing a reciprocal gait pattern   7. The patient will be able to perform an advanced home exercise program to carry over flexibility and strength benefits

## 2025-07-17 ENCOUNTER — APPOINTMENT (OUTPATIENT)
Dept: HEMATOLOGY/ONCOLOGY | Facility: CLINIC | Age: 70
End: 2025-07-17
Payer: MEDICARE

## 2025-07-18 ENCOUNTER — NUTRITION (OUTPATIENT)
Dept: HEMATOLOGY/ONCOLOGY | Facility: HOSPITAL | Age: 70
End: 2025-07-18
Payer: MEDICARE

## 2025-07-18 ENCOUNTER — INFUSION (OUTPATIENT)
Dept: HEMATOLOGY/ONCOLOGY | Facility: HOSPITAL | Age: 70
End: 2025-07-18
Payer: MEDICARE

## 2025-07-18 ENCOUNTER — APPOINTMENT (OUTPATIENT)
Dept: HEMATOLOGY/ONCOLOGY | Facility: HOSPITAL | Age: 70
End: 2025-07-18
Payer: MEDICARE

## 2025-07-18 VITALS
TEMPERATURE: 96.8 F | DIASTOLIC BLOOD PRESSURE: 58 MMHG | WEIGHT: 187.61 LBS | SYSTOLIC BLOOD PRESSURE: 138 MMHG | RESPIRATION RATE: 18 BRPM | OXYGEN SATURATION: 100 % | HEART RATE: 81 BPM | BODY MASS INDEX: 26.92 KG/M2

## 2025-07-18 DIAGNOSIS — C15.9 PRIMARY ESOPHAGEAL ADENOCARCINOMA (MULTI): ICD-10-CM

## 2025-07-18 LAB
ALBUMIN SERPL BCP-MCNC: 3.8 G/DL (ref 3.4–5)
ALP SERPL-CCNC: 80 U/L (ref 33–136)
ALT SERPL W P-5'-P-CCNC: 71 U/L (ref 10–52)
ANION GAP SERPL CALC-SCNC: 12 MMOL/L (ref 10–20)
AST SERPL W P-5'-P-CCNC: 25 U/L (ref 9–39)
BASOPHILS # BLD AUTO: 0.05 X10*3/UL (ref 0–0.1)
BASOPHILS NFR BLD AUTO: 0.7 %
BILIRUB SERPL-MCNC: 0.4 MG/DL (ref 0–1.2)
BUN SERPL-MCNC: 22 MG/DL (ref 6–23)
CALCIUM SERPL-MCNC: 9.1 MG/DL (ref 8.6–10.3)
CHLORIDE SERPL-SCNC: 108 MMOL/L (ref 98–107)
CO2 SERPL-SCNC: 23 MMOL/L (ref 21–32)
CREAT SERPL-MCNC: 0.88 MG/DL (ref 0.5–1.3)
EGFRCR SERPLBLD CKD-EPI 2021: >90 ML/MIN/1.73M*2
EOSINOPHIL # BLD AUTO: 0.2 X10*3/UL (ref 0–0.7)
EOSINOPHIL NFR BLD AUTO: 2.6 %
ERYTHROCYTE [DISTWIDTH] IN BLOOD BY AUTOMATED COUNT: 14.6 % (ref 11.5–14.5)
GLUCOSE SERPL-MCNC: 89 MG/DL (ref 74–99)
HCT VFR BLD AUTO: 32.3 % (ref 41–52)
HGB BLD-MCNC: 10.7 G/DL (ref 13.5–17.5)
IMM GRANULOCYTES # BLD AUTO: 0.05 X10*3/UL (ref 0–0.7)
IMM GRANULOCYTES NFR BLD AUTO: 0.7 % (ref 0–0.9)
LYMPHOCYTES # BLD AUTO: 2.22 X10*3/UL (ref 1.2–4.8)
LYMPHOCYTES NFR BLD AUTO: 29.1 %
MCH RBC QN AUTO: 32.7 PG (ref 26–34)
MCHC RBC AUTO-ENTMCNC: 33.1 G/DL (ref 32–36)
MCV RBC AUTO: 99 FL (ref 80–100)
MONOCYTES # BLD AUTO: 0.49 X10*3/UL (ref 0.1–1)
MONOCYTES NFR BLD AUTO: 6.4 %
NEUTROPHILS # BLD AUTO: 4.62 X10*3/UL (ref 1.2–7.7)
NEUTROPHILS NFR BLD AUTO: 60.5 %
NRBC BLD-RTO: 0 /100 WBCS (ref 0–0)
PLATELET # BLD AUTO: 156 X10*3/UL (ref 150–450)
POTASSIUM SERPL-SCNC: 3.4 MMOL/L (ref 3.5–5.3)
PROT SERPL-MCNC: 6.3 G/DL (ref 6.4–8.2)
RBC # BLD AUTO: 3.27 X10*6/UL (ref 4.5–5.9)
SODIUM SERPL-SCNC: 140 MMOL/L (ref 136–145)
WBC # BLD AUTO: 7.6 X10*3/UL (ref 4.4–11.3)

## 2025-07-18 PROCEDURE — 80053 COMPREHEN METABOLIC PANEL: CPT

## 2025-07-18 PROCEDURE — 96417 CHEMO IV INFUS EACH ADDL SEQ: CPT

## 2025-07-18 PROCEDURE — 96413 CHEMO IV INFUSION 1 HR: CPT

## 2025-07-18 PROCEDURE — 2500000004 HC RX 250 GENERAL PHARMACY W/ HCPCS (ALT 636 FOR OP/ED): Performed by: INTERNAL MEDICINE

## 2025-07-18 PROCEDURE — 85025 COMPLETE CBC W/AUTO DIFF WBC: CPT

## 2025-07-18 PROCEDURE — 96416 CHEMO PROLONG INFUSE W/PUMP: CPT

## 2025-07-18 PROCEDURE — 96415 CHEMO IV INFUSION ADDL HR: CPT

## 2025-07-18 PROCEDURE — 96361 HYDRATE IV INFUSION ADD-ON: CPT | Mod: INF

## 2025-07-18 PROCEDURE — 96375 TX/PRO/DX INJ NEW DRUG ADDON: CPT | Mod: INF

## 2025-07-18 RX ORDER — HEPARIN 100 UNIT/ML
500 SYRINGE INTRAVENOUS AS NEEDED
OUTPATIENT
Start: 2025-07-18

## 2025-07-18 RX ORDER — DIPHENHYDRAMINE HYDROCHLORIDE 50 MG/ML
25 INJECTION, SOLUTION INTRAMUSCULAR; INTRAVENOUS ONCE
Status: COMPLETED | OUTPATIENT
Start: 2025-07-18 | End: 2025-07-18

## 2025-07-18 RX ORDER — HEPARIN SODIUM,PORCINE/PF 10 UNIT/ML
50 SYRINGE (ML) INTRAVENOUS AS NEEDED
OUTPATIENT
Start: 2025-07-18

## 2025-07-18 RX ORDER — PALONOSETRON 0.05 MG/ML
0.25 INJECTION, SOLUTION INTRAVENOUS ONCE
Status: COMPLETED | OUTPATIENT
Start: 2025-07-18 | End: 2025-07-18

## 2025-07-18 RX ADMIN — DOCETAXEL 50 MG: 20 INJECTION, SOLUTION, CONCENTRATE INTRAVENOUS at 09:31

## 2025-07-18 RX ADMIN — DEXAMETHASONE SODIUM PHOSPHATE 12 MG: 10 INJECTION, SOLUTION INTRAMUSCULAR; INTRAVENOUS at 09:00

## 2025-07-18 RX ADMIN — FLUOROURACIL 3850 MG: 50 INJECTION, SOLUTION INTRAVENOUS at 12:31

## 2025-07-18 RX ADMIN — DIPHENHYDRAMINE HYDROCHLORIDE 25 MG: 50 INJECTION INTRAMUSCULAR; INTRAVENOUS at 09:01

## 2025-07-18 RX ADMIN — OXALIPLATIN 105 MG: 5 INJECTION, SOLUTION, CONCENTRATE INTRAVENOUS at 10:28

## 2025-07-18 RX ADMIN — PALONOSETRON HYDROCHLORIDE 0.25 MG: 0.25 INJECTION, SOLUTION INTRAVENOUS at 09:01

## 2025-07-18 RX ADMIN — SODIUM CHLORIDE 1000 ML: 0.9 INJECTION, SOLUTION INTRAVENOUS at 08:00

## 2025-07-18 NOTE — PROGRESS NOTES
"NUTRITION Follow-Up NOTE    Nutrition Assessment     Reason for Visit:  Timoteo Victoria \"Rolo\" is a 70 y.o. male who presents for nutrition follow regarding use of TF and po intake.  .      FLOT- began  1/2/2025 today is cycle 4  Surgery - was 3-31 to 4-6-2025 6- esophagramIMPRESSION:  A moderate stricture is present at the anastomosis for the gastric  pull through. A small outpouching extends to the left at this level  as well. The barium tablet would not pass through the stricture.  7/2/2025  EGD with dialtion   FLOT- today is D1C5    Pt with esophageal Cancer     Has a j-tube 11-  Went to ED 2-25 to 2-26- tube was dislodged was replaced by IR  Tube fell out 7-                    Lab Results   Component Value Date/Time    GLUCOSE 89 07/18/2025 0741    GLUCOSE 94 06/10/2025 1611     07/18/2025 0741     06/10/2025 1611    K 3.4 (L) 07/18/2025 0741    K 3.7 06/10/2025 1611     (H) 07/18/2025 0741     06/10/2025 1611    CO2 23 07/18/2025 0741    CO2 26 06/10/2025 1611    ANIONGAP 12 07/18/2025 0741    ANIONGAP 8 06/10/2025 1611    BUN 22 07/18/2025 0741    BUN 16 06/10/2025 1611    CREATININE 0.88 07/18/2025 0741    CREATININE 0.92 06/10/2025 1611    EGFR >90 07/18/2025 0741    EGFR 90 06/10/2025 1611    CALCIUM 9.1 07/18/2025 0741    CALCIUM 9.2 06/10/2025 1611    ALBUMIN 3.8 07/18/2025 0741    ALBUMIN 3.9 06/10/2025 1611    ALKPHOS 80 07/18/2025 0741    ALKPHOS 72 06/10/2025 1611    PROT 6.3 (L) 07/18/2025 0741    PROT 6.3 06/10/2025 1611    AST 25 07/18/2025 0741    AST 13 06/10/2025 1611    BILITOT 0.4 07/18/2025 0741    BILITOT 0.4 06/10/2025 1611    ALT 71 (H) 07/18/2025 0741    ALT 24 06/10/2025 1611    MG 1.77 05/18/2025 0111    PHOS 3.7 04/01/2025 0315     Lab Results   Component Value Date/Time    VITD25 66 03/12/2024 1533          Anthropometrics:     Steady weight with feeding tube  HT:  177.8 cm- 5'10  IBW:  75.5 kg  112.7 % IBW    BMI: 26.9    Wt Readings " from Last 10 Encounters:   07/18/25 85.1 kg (187 lb 9.8 oz)   07/02/25 84.1 kg (185 lb 6.5 oz)   06/27/25 84.4 kg (186 lb)   06/19/25 85.5 kg (188 lb 6.4 oz)   06/17/25 84.8 kg (187 lb)   06/11/25 82.6 kg (182 lb)   06/02/25 83.5 kg (184 lb)   05/22/25 83.6 kg (184 lb 3.2 oz)   05/18/25 82.8 kg (182 lb 9.6 oz)   05/08/25 87.9 kg (193 lb 12.6 oz)      UBW:  250#      Meds noted           Food And Nutrient Intake:       Tube feel out Monday 7-  Had weaned himself off TF at that point  Prior to this was taking 1-2 cartons per day     He is eating well   Eating a regular diet  Nothing is giving truble  Is having a hard time adapting to  volume and frequency     Intake   PB jelly and jelly regular bread- 1 sandich in 1 setting  Ice cream  Macaroni and cheese  Pizza  [employer] frozen dinners     Beverages   Iced tea- no sugar  Gatorade  No juices and milk       Discussed returning pump to [facility] was called for pt  They will  pump and poll from pt on 7/25/2025                                                        Nutrition Focused Physical Exam Findings:      Subcutaneous Fat Loss  Orbital Fat Pads: Well nourished (slightly bulging fat pads)  Buccal Fat Pads: Mild-Moderate (flat cheeks, minimal bounce)  Ribs: Well nourished (chest is full, ribs do not show, slight to no protrusion of the iliac crest)    Muscle Wasting  Temporalis: Well nourished (well-defined muscle)  Pectoralis (Clavicular Region): Well nourished (clavicle not visible)  Interosseous: Well nourished (muscle bulges)    Edema  Edema: none    Physical Findings  Teeth Findings: Broken teeth    Energy Needs     Dosing weight: 75.5 to 93.5 kg  Calories per day: 2265 to 2800 determined by 30 kcal/kg  Protein (g) per day: 90 to 112 determined by 1.2 g/kg  Estimated fluid needs: 2265 to 2800 determined by 1 kcal/mL       Nutrition Diagnosis   Malnutrition Diagnosis  Patient has Malnutrition Diagnosis: No    Nutrition Diagnosis  Patient  has Nutrition Diagnosis: Yes  Diagnosis Status (1): Active  Nutrition Diagnosis 1: Altered GI function  Related to (1): esophageal cancer and now s/p surgery with treatment commencing  As Evidenced by (1): s/p esophagectomy, and now without  J-tube-    Nutrition Interventions/Recommendations   Nutrition Prescription   Oral nutrition continue with small frquent meals that are well paced and well chewed along with calorie     Food and Nutrition Delivery       Nutrition Education       Coordination of Care  Coordination of Nutrition Care by a Nutrition Professional  Collaboration and referral of nutrition care: Collaboration and Referral of Nutrition Care  Goals: Anais, Med onc, and thoracic surgical team    There are no Patient Instructions on file for this visit.    Nutrition Monitoring and Evaluation   Food and Nutrient Intake  Monitoring and Evaluation Plan: Energy intake, Fluid intake, Protein intake  Energy Intake: Estimated energy intake  Criteria: 75% of needs  Fluid Intake: Estimated fluid intake  Criteria: 75 % of needs  Estimated protein intake: Estimated protein intake  Criteria: 75% of needs  Anthropometric measurements  Monitoring and Evaluation Plan: Weight  Body Weight: Body weight  Criteria: maintain  Biochemical Data, Medical Tests and Procedures  Monitoring and Evaluation Plan: Electrolyte/renal panel, Glucose/endocrine profile  Electrolyte and Renal Panel: BUN, Chloride, Creatinine, Magnesium, Phosphorus, Potassium, Sodium  Criteria: WNL  Glucose/Endocrine Profile: Glucose, casual  Criteria: WNL

## 2025-07-18 NOTE — PROGRESS NOTES
Pt received C5 d1FLOT as ordered without incidence. Feeling well, denied new complaints. Pt reviewed home disconnect video (disconnected self in past), and was able to verbalize correct procedure. Pt did not want to receive Udenyca that was ordered for tomorrow (Saturday) because of the 1hr drive to main campus- lives in [place]. Per Dr Wilson, ok to SKIP UDENYCA this cycle- pt will be treated next on a Thursday, and can go to [place] with the next cycle if needed. Pt ambulated from [facility] with son in law- uses MyChart and is aware of upcoming appts.

## 2025-07-28 ENCOUNTER — TREATMENT (OUTPATIENT)
Dept: PHYSICAL THERAPY | Facility: HOSPITAL | Age: 70
End: 2025-07-28
Payer: MEDICARE
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
## 2025-07-28 DIAGNOSIS — R29.898 LEG WEAKNESS, BILATERAL: ICD-10-CM

## 2025-07-28 PROCEDURE — 97110 THERAPEUTIC EXERCISES: CPT | Mod: GP

## 2025-07-28 ASSESSMENT — PAIN - FUNCTIONAL ASSESSMENT: PAIN_FUNCTIONAL_ASSESSMENT: 0-10

## 2025-07-28 ASSESSMENT — PAIN SCALES - GENERAL: PAINLEVEL_OUTOF10: 0 - NO PAIN

## 2025-07-28 NOTE — PROGRESS NOTES
"Physical Therapy    Physical Therapy Treatment     Patient Name: Timoteo Victoria  MRN: [id number]  : 1955  Today's Date: 2025    Time Calculation  Start Time: 1645  Stop Time: 1730  Time Calculation (min): 45 min       PT Therapeutic Procedures Time Entry  Therapeutic Exercise Time Entry: 45                   VISIT:# 6    Current Problem  Problem List Items Addressed This Visit    None  Visit Diagnoses         Codes      Fall, initial encounter     W19.XXXA    PT/OT, supportive care       Leg weakness, bilateral     R29.898             Subjective   Reason for Referral: PT for general weakness  Referred By: Wolfgang Pascual MD     Pain  Pain Assessment: 0-10  0-10 (Numeric) Pain Score: 0 - No pain          Objective    R hip, knee, and trunk  strength are 4-/5 strength generally.             Precautions  Precautions  STEADI Fall Risk Score (The score of 4 or more indicates an increased risk of falling): 4      Treatments:     EXERCISES        Date 2025           VISIT # #2 #3 #4 #5 #6    REPS REPS REPS REPS REPS           Nustep L3 10 min L3 10 min L3 15 min 15 min 15 min    Bike                Shuttle  DLP 5B 2x10 5B 2x10 6B 2x10 6B 2x10    Shuttle SLP 4B 2x10 Cecilio  5B 2x10 ea 5B 2x10 ea    Shuttle TR/HR                Qhip Flexion 10# 1x10 Cecilio 10# 2x10 Cecilio 15# 2x10 Cecilio     Qhip Abduction 10# 1x10 Cecilio 10# 2x10 Cecilio 15# 2x10 Cecilio 15# 2x10 Cecilio 20# 2x10 Cecilio   Qhip Extension 10# 1x10 Cecilio 10# 2x10 Cecilio 15# 2x10 Cecilio 15# 2x10 Cecilio 20# 2x10 Cecilio   Qhip Adduction         Qhip  TKE                Step ups  FWD  LAT   1x10 Cecilio  1x10 Cecilio   6 inch 1x10 Cecilio  6 inch 1x10 Cecilio   6 inch 2x10 Cecilio  6 inch 2x10 Cecilio   8 inch 2x10 Cecilio  8 inch 2x10 Cecilio   6 inch 2x10 Cecilio  6 inch 2x10 Cecilio           Q Quad        Q Hamstring                 RB stretch 3x30\"H 3x30\"H 3x30\"H 5 min 5 min   HS stretch                                                              Assessment:   The patient has made " improvements of strength but still has R hip and knee weakness and trunk weakness. Recommending patient continue another 6 sessions after completion of his next session. Focus with be on B LE strengthening with both open and closed chain activities and endurance exercises.  Will perform a reassessment next time.        Plan:   OP PT Plan  Treatment/Interventions: Gait training, Education/ Instruction, Neuromuscular re-education, Therapeutic exercises  PT Plan: Skilled PT  PT Frequency: 2 times per week  Duration: 1 more approved visit    Goals:  Active       PT Problem       PT Goal 1       Start:  06/10/25    Expected End:  08/14/25       STG's -- within 4 to 6 sessions  The patient will demonstrate improve knowledge of posture and body mechanics  The patient will be able to perform a basic home exercise program     LTG's -- by completion of the POC by 8/14/25  1. The patient will regain normal UE and LE ROM and flexibility to restore motion during daily functional activities   2. The patient will be able to reach and squat 10 X with good tolerance to assimilate household related activities    3. The patient will be able to dress and bath and complete household activities without painful restriction or need for compensation   4. The patient will be able to perform transfers and bed mobility I 10 times in 20 seconds with good safety    5. The patient will be able to ambulate with or without use of an assistive device with a more normal pattern 10 to 15 minutes   6.  The patient will be able to negotiate 12 steps with use of a railing utilizing a reciprocal gait pattern   7. The patient will be able to perform an advanced home exercise program to carry over flexibility and strength benefits

## 2025-07-29 DIAGNOSIS — E03.9 HYPOTHYROIDISM, UNSPECIFIED TYPE: ICD-10-CM

## 2025-07-30 ENCOUNTER — TREATMENT (OUTPATIENT)
Dept: PHYSICAL THERAPY | Facility: HOSPITAL | Age: 70
End: 2025-07-30
Payer: MEDICARE
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
## 2025-07-30 DIAGNOSIS — R29.898 LEG WEAKNESS, BILATERAL: ICD-10-CM

## 2025-07-30 PROCEDURE — 97110 THERAPEUTIC EXERCISES: CPT | Mod: GP

## 2025-07-30 ASSESSMENT — PAIN - FUNCTIONAL ASSESSMENT: PAIN_FUNCTIONAL_ASSESSMENT: 0-10

## 2025-07-30 ASSESSMENT — PAIN SCALES - GENERAL: PAINLEVEL_OUTOF10: 0 - NO PAIN

## 2025-07-30 NOTE — PROGRESS NOTES
Physical Therapy    Physical Therapy Treatment  And Recheck     Patient Name: Timoteo Victoria  MRN: [id number]  : 1955  Today's Date: 2025    Time Calculation  Start Time: 1700  Stop Time: 1745  Time Calculation (min): 45 min       PT Therapeutic Procedures Time Entry  Therapeutic Exercise Time Entry: 45                   VISIT:# 7    Current Problem  Problem List Items Addressed This Visit    None  Visit Diagnoses         Codes      Fall, initial encounter     W19.XXXA    PT/OT, supportive care       Leg weakness, bilateral     R29.898             Subjective   Reason for Referral: PT for general weakness  Referred By: Wolfgang Pascual MD  The patient is asking to be on hold for a while until he stabilizes after completing his chemotherapy     Pain  Pain Assessment: 0-10  0-10 (Numeric) Pain Score: 0 - No pain          Objective     The patient has less difficulty retaining an erect standing posture due to fatigue.   Range of Motion:   UE and LE flexibility is actively WFL throughout  Strength:   Overall UE and LE strength is 4- to 4/5  Gait:   The patient ambulated with independently  with diminished improved trunk control and stability. The patient notes fair endurance during limited activity.   Balance:   Static standing 4-/5, dynamic 3+/5, gait 3+/5  Bed Mobility:   I supine / sit  Transfers:   The patient is able to transfer from 90* ~ 5 x without hand support but has quad and gluteal weakness             Precautions  Precautions  STEADI Fall Risk Score (The score of 4 or more indicates an increased risk of falling): 4      Treatments:     EXERCISES         Date 2025            VISIT # #2 #3 #4 #5 #6 #7    REPS REPS REPS REPS REPS REPS            Nustep L3 10 min L3 10 min L3 15 min 15 min 15 min  16 min   Bike                  Shuttle  DLP 5B 2x10 5B 2x10 6B 2x10 6B 2x10     Shuttle SLP 4B 2x10 Cecilio  5B 2x10 ea 5B 2x10 ea     Shuttle TR/HR                "   Qhip Flexion 10# 1x10 Cecilio 10# 2x10 Cecilio 15# 2x10 Cecilio      Qhip Abduction 10# 1x10 Cecilio 10# 2x10 Cecilio 15# 2x10 Cecilio 15# 2x10 Cecilio 20# 2x10 Cecilio 20# 2x10 Cecilio   Qhip Extension 10# 1x10 Cecilio 10# 2x10 Cecilio 15# 2x10 Cecilio 15# 2x10 Cecilio 20# 2x10 Cecilio 20# 2x10 Cecilio   Qhip Adduction          Qhip  TKE                  Step ups  FWD  LAT   1x10 Cecilio  1x10 Cecilio   6 inch 1x10 Cecilio  6 inch 1x10 Cecilio   6 inch 2x10 Cecilio  6 inch 2x10 Cecilio   8 inch 2x10 Cecilio  8 inch 2x10 Cecilio   6 inch 2x10 Cecilio  6 inch 2x10 Cecilio   6 inch 2x10 Cecilio  6 inch 2x10 Cecilio            Q Quad         Q Hamstring                   RB stretch 3x30\"H 3x30\"H 3x30\"H 5 min 5 min 5 min   HS stretch                                                                   Assessment:   Will place on hold until the patient has recovered from his last 3 chemotherapy sessions. The patient should meet all established goals but will require additional PT after he recovers from his last 3 chemotherapy sessions.        Plan:   OP PT Plan  PT Plan: Other (Comment) (The patient is requesting a hold until he recovers from chemotherapy.)    Goals:  Active       PT Problem       PT Goal 1 (Progressing)       Start:  06/10/25    Expected End:  08/14/25       STG's -- within 4 to 6 sessions  The patient will demonstrate improve knowledge of posture and body mechanics  The patient will be able to perform a basic home exercise program     LTG's -- by completion of the POC by 8/14/25  1. The patient will regain normal UE and LE ROM and flexibility to restore motion during daily functional activities   2. The patient will be able to reach and squat 10 X with good tolerance to assimilate household related activities    3. The patient will be able to dress and bath and complete household activities without painful restriction or need for compensation   4. The patient will be able to perform transfers and bed mobility I 10 times in 20 seconds with good safety    5. The patient will be able to ambulate with or without " use of an assistive device with a more normal pattern 10 to 15 minutes   6.  The patient will be able to negotiate 12 steps with use of a railing utilizing a reciprocal gait pattern   7. The patient will be able to perform an advanced home exercise program to carry over flexibility and strength benefits

## 2025-07-31 ENCOUNTER — OFFICE VISIT (OUTPATIENT)
Dept: SURGERY | Facility: HOSPITAL | Age: 70
End: 2025-07-31
Payer: MEDICARE

## 2025-07-31 ENCOUNTER — OFFICE VISIT (OUTPATIENT)
Dept: HEMATOLOGY/ONCOLOGY | Facility: HOSPITAL | Age: 70
End: 2025-07-31
Payer: MEDICARE

## 2025-07-31 ENCOUNTER — LAB (OUTPATIENT)
Dept: HEMATOLOGY/ONCOLOGY | Facility: HOSPITAL | Age: 70
End: 2025-07-31
Payer: MEDICARE

## 2025-07-31 ENCOUNTER — APPOINTMENT (OUTPATIENT)
Dept: HEMATOLOGY/ONCOLOGY | Facility: HOSPITAL | Age: 70
End: 2025-07-31
Payer: MEDICARE

## 2025-07-31 ENCOUNTER — INFUSION (OUTPATIENT)
Dept: HEMATOLOGY/ONCOLOGY | Facility: HOSPITAL | Age: 70
End: 2025-07-31
Payer: MEDICARE

## 2025-07-31 VITALS
HEART RATE: 73 BPM | OXYGEN SATURATION: 100 % | RESPIRATION RATE: 18 BRPM | DIASTOLIC BLOOD PRESSURE: 66 MMHG | TEMPERATURE: 96.4 F | BODY MASS INDEX: 26.86 KG/M2 | SYSTOLIC BLOOD PRESSURE: 142 MMHG | WEIGHT: 187.17 LBS

## 2025-07-31 DIAGNOSIS — C15.9 PRIMARY ESOPHAGEAL ADENOCARCINOMA (MULTI): ICD-10-CM

## 2025-07-31 DIAGNOSIS — C15.5 CANCER OF DISTAL THIRD OF ESOPHAGUS (MULTI): Primary | ICD-10-CM

## 2025-07-31 DIAGNOSIS — C15.9 PRIMARY ESOPHAGEAL ADENOCARCINOMA (MULTI): Primary | ICD-10-CM

## 2025-07-31 LAB
ALBUMIN SERPL BCP-MCNC: 3.6 G/DL (ref 3.4–5)
ALP SERPL-CCNC: 71 U/L (ref 33–136)
ALT SERPL W P-5'-P-CCNC: 24 U/L (ref 10–52)
ANION GAP SERPL CALC-SCNC: 13 MMOL/L (ref 10–20)
AST SERPL W P-5'-P-CCNC: 23 U/L (ref 9–39)
BASOPHILS # BLD AUTO: 0.05 X10*3/UL (ref 0–0.1)
BASOPHILS NFR BLD AUTO: 0.7 %
BILIRUB SERPL-MCNC: 0.5 MG/DL (ref 0–1.2)
BUN SERPL-MCNC: 17 MG/DL (ref 6–23)
CALCIUM SERPL-MCNC: 9 MG/DL (ref 8.6–10.3)
CHLORIDE SERPL-SCNC: 106 MMOL/L (ref 98–107)
CO2 SERPL-SCNC: 25 MMOL/L (ref 21–32)
CREAT SERPL-MCNC: 0.91 MG/DL (ref 0.5–1.3)
EGFRCR SERPLBLD CKD-EPI 2021: >90 ML/MIN/1.73M*2
EOSINOPHIL # BLD AUTO: 0.14 X10*3/UL (ref 0–0.7)
EOSINOPHIL NFR BLD AUTO: 2 %
ERYTHROCYTE [DISTWIDTH] IN BLOOD BY AUTOMATED COUNT: 14.3 % (ref 11.5–14.5)
GLUCOSE SERPL-MCNC: 80 MG/DL (ref 74–99)
HCT VFR BLD AUTO: 30.8 % (ref 41–52)
HGB BLD-MCNC: 10.3 G/DL (ref 13.5–17.5)
IMM GRANULOCYTES # BLD AUTO: 0.03 X10*3/UL (ref 0–0.7)
IMM GRANULOCYTES NFR BLD AUTO: 0.4 % (ref 0–0.9)
LYMPHOCYTES # BLD AUTO: 1.92 X10*3/UL (ref 1.2–4.8)
LYMPHOCYTES NFR BLD AUTO: 27 %
MCH RBC QN AUTO: 32.5 PG (ref 26–34)
MCHC RBC AUTO-ENTMCNC: 33.4 G/DL (ref 32–36)
MCV RBC AUTO: 97 FL (ref 80–100)
MONOCYTES # BLD AUTO: 0.61 X10*3/UL (ref 0.1–1)
MONOCYTES NFR BLD AUTO: 8.6 %
NEUTROPHILS # BLD AUTO: 4.37 X10*3/UL (ref 1.2–7.7)
NEUTROPHILS NFR BLD AUTO: 61.3 %
NRBC BLD-RTO: 0 /100 WBCS (ref 0–0)
PLATELET # BLD AUTO: 181 X10*3/UL (ref 150–450)
POTASSIUM SERPL-SCNC: 2.9 MMOL/L (ref 3.5–5.3)
PROT SERPL-MCNC: 6.2 G/DL (ref 6.4–8.2)
RBC # BLD AUTO: 3.17 X10*6/UL (ref 4.5–5.9)
SODIUM SERPL-SCNC: 141 MMOL/L (ref 136–145)
WBC # BLD AUTO: 7.1 X10*3/UL (ref 4.4–11.3)

## 2025-07-31 PROCEDURE — 1126F AMNT PAIN NOTED NONE PRSNT: CPT | Performed by: INTERNAL MEDICINE

## 2025-07-31 PROCEDURE — 85025 COMPLETE CBC W/AUTO DIFF WBC: CPT

## 2025-07-31 PROCEDURE — 96417 CHEMO IV INFUS EACH ADDL SEQ: CPT

## 2025-07-31 PROCEDURE — 99214 OFFICE O/P EST MOD 30 MIN: CPT | Mod: 25 | Performed by: INTERNAL MEDICINE

## 2025-07-31 PROCEDURE — 99212 OFFICE O/P EST SF 10 MIN: CPT | Mod: 25 | Performed by: THORACIC SURGERY (CARDIOTHORACIC VASCULAR SURGERY)

## 2025-07-31 PROCEDURE — 96361 HYDRATE IV INFUSION ADD-ON: CPT | Mod: INF

## 2025-07-31 PROCEDURE — 96413 CHEMO IV INFUSION 1 HR: CPT

## 2025-07-31 PROCEDURE — 96366 THER/PROPH/DIAG IV INF ADDON: CPT | Mod: INF

## 2025-07-31 PROCEDURE — 96409 CHEMO IV PUSH SNGL DRUG: CPT

## 2025-07-31 PROCEDURE — 99214 OFFICE O/P EST MOD 30 MIN: CPT | Performed by: INTERNAL MEDICINE

## 2025-07-31 PROCEDURE — 2500000004 HC RX 250 GENERAL PHARMACY W/ HCPCS (ALT 636 FOR OP/ED): Performed by: INTERNAL MEDICINE

## 2025-07-31 PROCEDURE — 3077F SYST BP >= 140 MM HG: CPT | Performed by: INTERNAL MEDICINE

## 2025-07-31 PROCEDURE — 96375 TX/PRO/DX INJ NEW DRUG ADDON: CPT | Mod: INF

## 2025-07-31 PROCEDURE — 80053 COMPREHEN METABOLIC PANEL: CPT

## 2025-07-31 PROCEDURE — 96415 CHEMO IV INFUSION ADDL HR: CPT

## 2025-07-31 PROCEDURE — 96367 TX/PROPH/DG ADDL SEQ IV INF: CPT

## 2025-07-31 PROCEDURE — 3078F DIAST BP <80 MM HG: CPT | Performed by: INTERNAL MEDICINE

## 2025-07-31 PROCEDURE — 99212 OFFICE O/P EST SF 10 MIN: CPT | Performed by: THORACIC SURGERY (CARDIOTHORACIC VASCULAR SURGERY)

## 2025-07-31 RX ORDER — EPINEPHRINE 0.3 MG/.3ML
0.3 INJECTION SUBCUTANEOUS EVERY 5 MIN PRN
Status: DISCONTINUED | OUTPATIENT
Start: 2025-07-31 | End: 2025-07-31 | Stop reason: HOSPADM

## 2025-07-31 RX ORDER — POTASSIUM CHLORIDE 14.9 MG/ML
20 INJECTION INTRAVENOUS ONCE
Status: COMPLETED | OUTPATIENT
Start: 2025-07-31 | End: 2025-07-31

## 2025-07-31 RX ORDER — PROCHLORPERAZINE EDISYLATE 5 MG/ML
10 INJECTION INTRAMUSCULAR; INTRAVENOUS EVERY 6 HOURS PRN
Status: DISCONTINUED | OUTPATIENT
Start: 2025-07-31 | End: 2025-07-31 | Stop reason: HOSPADM

## 2025-07-31 RX ORDER — HEPARIN SODIUM,PORCINE/PF 10 UNIT/ML
50 SYRINGE (ML) INTRAVENOUS AS NEEDED
Status: CANCELLED | OUTPATIENT
Start: 2025-07-31

## 2025-07-31 RX ORDER — DIPHENHYDRAMINE HYDROCHLORIDE 50 MG/ML
25 INJECTION, SOLUTION INTRAMUSCULAR; INTRAVENOUS ONCE
Status: CANCELLED | OUTPATIENT
Start: 2025-08-01 | End: 2025-08-01

## 2025-07-31 RX ORDER — DIPHENHYDRAMINE HYDROCHLORIDE 50 MG/ML
50 INJECTION, SOLUTION INTRAMUSCULAR; INTRAVENOUS AS NEEDED
Status: DISCONTINUED | OUTPATIENT
Start: 2025-07-31 | End: 2025-07-31 | Stop reason: HOSPADM

## 2025-07-31 RX ORDER — DIPHENHYDRAMINE HYDROCHLORIDE 50 MG/ML
50 INJECTION, SOLUTION INTRAMUSCULAR; INTRAVENOUS AS NEEDED
Status: CANCELLED | OUTPATIENT
Start: 2025-08-01

## 2025-07-31 RX ORDER — LORAZEPAM 2 MG/ML
1 INJECTION INTRAMUSCULAR AS NEEDED
Status: CANCELLED | OUTPATIENT
Start: 2025-08-01

## 2025-07-31 RX ORDER — FAMOTIDINE 10 MG/ML
20 INJECTION, SOLUTION INTRAVENOUS ONCE AS NEEDED
Status: CANCELLED | OUTPATIENT
Start: 2025-08-02

## 2025-07-31 RX ORDER — DIPHENHYDRAMINE HYDROCHLORIDE 50 MG/ML
50 INJECTION, SOLUTION INTRAMUSCULAR; INTRAVENOUS AS NEEDED
Status: CANCELLED | OUTPATIENT
Start: 2025-08-02

## 2025-07-31 RX ORDER — ALBUTEROL SULFATE 0.83 MG/ML
3 SOLUTION RESPIRATORY (INHALATION) AS NEEDED
Status: DISCONTINUED | OUTPATIENT
Start: 2025-07-31 | End: 2025-07-31 | Stop reason: HOSPADM

## 2025-07-31 RX ORDER — ALBUTEROL SULFATE 0.83 MG/ML
3 SOLUTION RESPIRATORY (INHALATION) AS NEEDED
Status: CANCELLED | OUTPATIENT
Start: 2025-08-02

## 2025-07-31 RX ORDER — LORAZEPAM 2 MG/ML
1 INJECTION INTRAMUSCULAR AS NEEDED
Status: DISCONTINUED | OUTPATIENT
Start: 2025-07-31 | End: 2025-07-31 | Stop reason: HOSPADM

## 2025-07-31 RX ORDER — ALBUTEROL SULFATE 0.83 MG/ML
3 SOLUTION RESPIRATORY (INHALATION) AS NEEDED
Status: CANCELLED | OUTPATIENT
Start: 2025-08-01

## 2025-07-31 RX ORDER — PALONOSETRON 0.05 MG/ML
0.25 INJECTION, SOLUTION INTRAVENOUS ONCE
Status: CANCELLED | OUTPATIENT
Start: 2025-08-01

## 2025-07-31 RX ORDER — EPINEPHRINE 0.3 MG/.3ML
0.3 INJECTION SUBCUTANEOUS EVERY 5 MIN PRN
Status: CANCELLED | OUTPATIENT
Start: 2025-08-01

## 2025-07-31 RX ORDER — PROCHLORPERAZINE MALEATE 10 MG
10 TABLET ORAL EVERY 6 HOURS PRN
Status: CANCELLED | OUTPATIENT
Start: 2025-08-01

## 2025-07-31 RX ORDER — HEPARIN 100 UNIT/ML
500 SYRINGE INTRAVENOUS AS NEEDED
Status: CANCELLED | OUTPATIENT
Start: 2025-07-31

## 2025-07-31 RX ORDER — PALONOSETRON 0.05 MG/ML
0.25 INJECTION, SOLUTION INTRAVENOUS ONCE
Status: COMPLETED | OUTPATIENT
Start: 2025-07-31 | End: 2025-07-31

## 2025-07-31 RX ORDER — POTASSIUM CHLORIDE 29.8 MG/ML
20 INJECTION INTRAVENOUS ONCE
Status: CANCELLED | OUTPATIENT
Start: 2025-08-01

## 2025-07-31 RX ORDER — DIPHENHYDRAMINE HYDROCHLORIDE 50 MG/ML
25 INJECTION, SOLUTION INTRAMUSCULAR; INTRAVENOUS ONCE
Status: COMPLETED | OUTPATIENT
Start: 2025-07-31 | End: 2025-07-31

## 2025-07-31 RX ORDER — FAMOTIDINE 10 MG/ML
20 INJECTION, SOLUTION INTRAVENOUS ONCE AS NEEDED
Status: DISCONTINUED | OUTPATIENT
Start: 2025-07-31 | End: 2025-07-31 | Stop reason: HOSPADM

## 2025-07-31 RX ORDER — POTASSIUM CHLORIDE 750 MG/1
10 TABLET, FILM COATED, EXTENDED RELEASE ORAL 3 TIMES DAILY
Qty: 90 TABLET | Refills: 3 | Status: SHIPPED | OUTPATIENT
Start: 2025-07-31 | End: 2026-07-31

## 2025-07-31 RX ORDER — PROCHLORPERAZINE MALEATE 10 MG
10 TABLET ORAL EVERY 6 HOURS PRN
Status: DISCONTINUED | OUTPATIENT
Start: 2025-07-31 | End: 2025-07-31 | Stop reason: HOSPADM

## 2025-07-31 RX ORDER — FAMOTIDINE 10 MG/ML
20 INJECTION, SOLUTION INTRAVENOUS ONCE AS NEEDED
Status: CANCELLED | OUTPATIENT
Start: 2025-08-01

## 2025-07-31 RX ORDER — PROCHLORPERAZINE EDISYLATE 5 MG/ML
10 INJECTION INTRAMUSCULAR; INTRAVENOUS EVERY 6 HOURS PRN
Status: CANCELLED | OUTPATIENT
Start: 2025-08-01

## 2025-07-31 RX ORDER — EPINEPHRINE 0.3 MG/.3ML
0.3 INJECTION SUBCUTANEOUS EVERY 5 MIN PRN
Status: CANCELLED | OUTPATIENT
Start: 2025-08-02

## 2025-07-31 RX ADMIN — FLUOROURACIL 3850 MG: 50 INJECTION, SOLUTION INTRAVENOUS at 14:35

## 2025-07-31 RX ADMIN — SODIUM CHLORIDE 1000 ML: 0.9 INJECTION, SOLUTION INTRAVENOUS at 09:30

## 2025-07-31 RX ADMIN — POTASSIUM CHLORIDE 20 MEQ: 14.9 INJECTION, SOLUTION INTRAVENOUS at 11:48

## 2025-07-31 RX ADMIN — DOCETAXEL 50 MG: 20 INJECTION, SOLUTION, CONCENTRATE INTRAVENOUS at 11:26

## 2025-07-31 RX ADMIN — POTASSIUM CHLORIDE 20 MEQ: 14.9 INJECTION, SOLUTION INTRAVENOUS at 10:47

## 2025-07-31 RX ADMIN — DEXAMETHASONE SODIUM PHOSPHATE 12 MG: 10 INJECTION, SOLUTION INTRAMUSCULAR; INTRAVENOUS at 10:51

## 2025-07-31 RX ADMIN — OXALIPLATIN 105 MG: 5 INJECTION, SOLUTION, CONCENTRATE INTRAVENOUS at 12:32

## 2025-07-31 RX ADMIN — DIPHENHYDRAMINE HYDROCHLORIDE 25 MG: 50 INJECTION, SOLUTION INTRAMUSCULAR; INTRAVENOUS at 10:51

## 2025-07-31 RX ADMIN — PALONOSETRON HYDROCHLORIDE 0.25 MG: 0.25 INJECTION INTRAVENOUS at 10:51

## 2025-07-31 ASSESSMENT — PAIN SCALES - GENERAL: PAINLEVEL_OUTOF10: 0-NO PAIN

## 2025-07-31 NOTE — PROGRESS NOTES
"Patient ID: Timoteo Victoria \"Tarun" is a 70 y.o. male.  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  Underwent esophagectomy on 3/31/2025  after 4 cycles of neoadjuvant FLOT.  Pathology showed residual tumor (ypT3 ypN3), close radial margin (less than 1 mm).  PET-avid parotid and thyroid nodule.    Genomic profile:  Normal MMR expresison    Assessment and Plan:  70M with CAD (CABG 2005), HTN, HLD, chronic tobacco use, hypothyroidism, presented with 4-6 weeks of progressive dysphagia (solid to liquid/mediations) 40lb wt loss over 6 months, and was found to have distal esophageal mass covering half of lumen on EGD 11/21/24  and Bx showed intramucosal adenocarcinoma with normal MMR expression. S/p J tube placement 11/25/2024. PET-CT showed no distant mets.    We discussed FLOT for chemotherapy regimen with him in details. After a detailed discussion about the chemo, he consented and started cycle 1 on 1/2/25. Completed cycle 4 on 2-.  His post neoadjuvant therapy PET/CT which did not show any PET avid lesions suggestive of metastatic disease.  He underwent esophagectomy on March 31, 2025.  The pathology showed ypT3 ypN3 disease with close radial margin (less than 1 mm).      He is gradually recovering from the surgery.  His overall oral intake is improving.  I talked to him about starting adjuvant chemotherapy with FLOT x 4 cycles.  He feels he is well enough to start chemotherapy.  Because of neuropathy and fatigue as well as severe hand-foot syndrome with chemotherapy last time, I have modified his dosage for tolerability.  He received cycle 5 chemo on July 18, 2025, which he tolerated well.    Plan: Postoperative FLOT x 4 cycles and postoperative radiation therapy because of the close radial margin.    He is feeling well today.  No significant complaints except some fatigue.  He is here for evaluation before cycle 6 of chemotherapy.  If his labs are " in acceptable range, I plan to proceed with cycle 6 today.    Follow-up: 2 weeks.    I have placed all orders as outlined above. I advised the patient to schedule the tests and follow-up appointment as discussed by contacting the  on the way out or calling by phone. Patient knows to call with any issues or concerns.     Providers:  Surgeon: Dr. Maritza Mariac: Dr Katie Apple:    Chief complaint: Esophageal adenocarcinoma    HPI:  Timoteo Victoria is a 70 y.o. male with a notable history of CAD (CABG 2005), HTN, HLD, chronic tobacco use, hypothyroidism presented on 11/25/2024 as a transfer from [facility] for cancer workup. He had been having trouble swallowing for the past month, initially solids and progressing to liquids and medications. He has lost 40 lbs in the past 6 months.     EGD on 11/21/24 showed an esophageal mass with a malignant appearance, with biopsy showing intramucosal adenocarcinoma. The patient had a J tube placed on 11/25.     ONCOLOGIC HISTORY-  11/21/24 [facility] admission for weight loss, dysphagia; EGD showed esophageal mass with biopsy confirming intramucosal adenocarcinoma  11/20/24 CT neck with no masses  11/22/24 CT CAP with contrast with irregular masslike thickening of distal esophagus, no obvious metastases  11/25/24 J tube placement.    12-: pet-ct showed-  IMPRESSION:  1. Hypermetabolic esophageal mass as described above is consistent  with biopsy-proven esophageal adenocarcinoma. Few minimally  hypermetabolic subcentimeter periesophageal lymph nodes are likely  reactive.  2. No other evidence of hypermetabolic thoracic or abdominal  lymphadenopathy or hypermetabolic metastatic disease.  3. Multiple hypermetabolic intraparotid nodules/lymph nodes, which  have been present since 2022, likely represents a benign and indolent  process. However, recommend continued attention on follow-up.  4. Asymmetrically increased hypermetabolic activity within the  right  thyroid gland. Correlate with thyroid ultrasound may be of value.    1-2-2025: started FLOT-4.  Completed cycle 4 on February 27, 2025.    March 11, 2025: CT scan has indeterminate lung lesions (7 mm).    March 14, 2025: PET-CT did not show any definitive metastatic lesion.    3-: Underwent esophagectomy.  Pathology showed the following-      Component    FINAL DIAGNOSIS   A. Lymph Node, Level 7, Excision:   -- Three lymph nodes, negative for carcinoma (0/3).     B. Esophagus and Stomach, Esophagogastrectomy:   -- Residual invasive adenocarcinoma, poorly differentiated, 3.6 cm, with treatment effect present (residual cancer showing evident tumor regression, but more than single cells or rare small groups of cancer cells (partial response, score 2).               -- The tumor invades adventitia and extends to less than 1 mm from the inked cauterized adventitia soft tissue / radial margin.               -- Lymphovascular including extramural large venous invasion identified.               -- Perineural invasion identified.               -- Metastatic carcinoma involving ten of seventeen lymph nodes (10/17).               -- Other surgical resection (proximal and distal) margins, negative for carcinoma (see final distal margin in Part C).  -- See note and synoptic report.     C. Additional Gastric Margin, Excision:   -- Portion of stomach with focal chronic inflammation, negative for carcinoma.  -- One lymph node, negative for carcinoma (0/1).     Note: Cytokeratin AE1/AE3 immunostain (Block B60) highlights neoplastic cells in lymphovascular space. Movat special stain (Block B38) confirms extramural large venous invasion.     DNA mismatch repair protein immunohistochemical stains were performed on prior biopsy ([id number]).      Selective slides (B5, B8, B38, B45, B46, and B60) have been additionally reviewed by Dr. Vernell Ponce who concurs with the above diagnosis.   Electronically signed by Wilder Kowalski,  MD PhD on 4/14/2025 at 1721        By the signature on this report, the individual or group listed as making the Final Interpretation/Diagnosis certifies that they have reviewed this case.    Case Summary Report   ESOPHAGUS   8th Edition - Protocol posted: 6/22/2022ESOPHAGUS - All Specimens  SPECIMEN   Procedure  Esophagogastrectomy   TUMOR   Tumor Site  Distal esophagus (low thoracic esophagus)   Relationship of Tumor to Esophagogastric Junction  Tumor midpoint lies in the distal esophagus AND tumor involves the esophagogastric junction   Distance of Tumor Center from Esophagogastric Junction  1.8 cm   Histologic Type  Adenocarcinoma   Histologic Grade  G3, poorly differentiated, undifferentiated   Tumor Size  Greatest Dimension (Centimeters): 3.6 cm   Tumor Extent  Invades adventitia   Treatment Effect  Present, with residual cancer showing evident tumor regression, but more than single cells or rare small groups of cancer cells (partial response, score 2)   Lymphovascular Invasion  Present   Perineural Invasion  Present   MARGINS   Margin Status for Invasive Carcinoma  The tumor extends to less than 1 mm from the inked cauterized adventitia soft tissue / radial margin   Margin Status for Dysplasia and Intestinal Metaplasia  All margins negative for dysplasia   Margin Comment  Other surgical resection (proximal and distal) margins, negative for carcinoma   REGIONAL LYMPH NODES   Regional Lymph Node Status  Tumor present in regional lymph node(s)   Number of Lymph Nodes with Tumor  10   Number of Lymph Nodes Examined  21   PATHOLOGIC STAGE CLASSIFICATION (pTNM, AJCC 8th Edition)   Reporting of pT, pN, and (when applicable) pM categories is based on information available to the pathologist at the time the report is issued. As per the AJCC (Chapter 1, 8th Ed.) it is the managing physician’s responsibility to establish the final pathologic stage based upon all pertinent information, including but potentially not  limited to this pathology report.   TNM Descriptors  y (post-treatment)   pT Category  pT3   pN Category  pN3   Comment(s)  DNA mismatch repair protein immunohistochemical stains were performed on prior biopsy ([id number])           July 18, 2022: Started postoperative FLOT-4 (cycle 5).    Interval history:   Mr. Victoria is here for evaluation before cycle 6 of chemotherapy with FLOT-4.  He has tolerated cycle 5 well.  Today, apart from some fatigue he has no other complaints.  He denies nausea or vomiting or diarrhea.  He denies chills or rigor.  He is up and about without any help.      Past Medical History:   Past Medical History:  No date: CAD (coronary artery disease)  No date: Esophageal carcinoma  No date: Hyperlipidemia  No date: Hypertension  No date: Hypothyroidism   Surgical History:    Past Surgical History:   Procedure Laterality Date    CATARACT EXTRACTION Right 08/25/2023    CHOLECYSTECTOMY      CORONARY ARTERY BYPASS GRAFT  2005    4-vessel    ESOPHAGUS SURGERY  03/31/2025    Three field esophagectomy    HERNIA REPAIR        Family History:    Family History   Problem Relation Name Age of Onset    COPD Mother       Family Oncology History:    Cancer-related family history is not on file.  Social History:    Social History     Tobacco Use    Smoking status: Every Day     Current packs/day: 0.25     Average packs/day: 1 pack/day for 46.6 years (45.4 ttl pk-yrs)     Types: Cigarettes     Start date: 1979     Passive exposure: Current    Smokeless tobacco: Never    Tobacco comments:     Pt is down to 2 cigarettes.   Vaping Use    Vaping status: Never Used   Substance Use Topics    Alcohol use: Not Currently    Drug use: Never        Allergies  Allergies   Allergen Reactions    Ibuprofen Swelling        Medications  Current Outpatient Medications   Medication Instructions    acetaminophen (TYLENOL) 650 mg, j-tube, Every 6 hours PRN    amitriptyline (ELAVIL) 100 mg, j-tube, Nightly    aspirin 81 mg, Daily     atorvastatin (LIPITOR) 40 mg, j-tube, Daily    baclofen (Lioresal) 5 mg tablet 1 tablet, 3 times daily (0900,1400,1900)    buPROPion (WELLBUTRIN) 150 mg, j-tube, 2 times daily    levothyroxine (SYNTHROID, LEVOXYL) 150 mcg, oral, Daily, Take on an empty stomach at the same time each day, either 30 to 60 minutes prior to breakfast    losartan (COZAAR) 100 mg, oral, Daily    ondansetron ODT (ZOFRAN-ODT) 8 mg, oral, Every 8 hours PRN    prochlorperazine (COMPAZINE) 10 mg, oral, Every 6 hours PRN    spironolactone (ALDACTONE) 25 mg, oral, Daily    tamsulosin (FLOMAX) 0.4 mg, Nightly    verapamil SR (CALAN-SR) 240 mg, oral, Nightly          Objective   VS: There were no vitals taken for this visit.    PHYSICAL EXAMINATION  ECOG PS- 1  Alert, ambulant, and answers questions appropriately.  Eyes- No pallor or icterus.  Neck- no swelling, lymph node enlargement or thyroid gland enlargement.  Chest- Bilateral good air entry. No crackles/ronchi.  Heart- no audible abnormal heart sounds.  Abdomen- Soft, non-tender. No mass or ascites.  Extremities- no swelling or pitting edema.    Labs                  Image  EGD (11/21/2024)  Single malignant-appearing and invasive mass (not traversable) in the lower third of the esophagus, covering one half of the circumference; performed cold forceps biopsy     A. Esophagus, Distal, Mass, Biopsy:  -- Intramucosal adenocarcinoma. See note.     Note: The endoscopic impression of a friable and invasive lesion in the lower third of the esophagus is noted. This biopsy may be not representative of the entire lesion. Clinical correlation is recommended.    MISMATCH REPAIR PROTEIN EXPRESSION                    Protein:           Result                 MLH-1:             Expression Present                                                   PMS-2:            Expression Present                                                   MSH-2:            Expression Present                                                    MSH-6:            Expression Present                                       INTERPRETATION: Neoplasm with normal mismatch repair protein expression.     C/A/P CT (11/22/2024)  IMPRESSION:  1. Irregular masslike thickening of the mid to distal esophagus is  difficult to accurately measure, and may represent a neoplastic  process. Correlate with recent endoscopic imaging and biopsy results.  There is also proximal esophageal wall thickening, and a moderate  size hiatal hernia.  2. Small area of centrilobular nodularity in the right middle lobe  may represent focal aspiration/mucoid impaction or less likely  atypical infection.  3. Mild apical predominant centrilobular emphysema.  4. Severe coronary artery calcifications. Please note this exam is  not optimized for evaluation of the coronary arteries.  5. Nonobstructing 0.4 cm calculus in the left kidney.    Neck CT (11/20/2024)  IMPRESSION:  New maxillary periapical lucencies with adjacent mucosal thickening.  No discrete fluid collection identified. Dental follow-up recommended.      No discrete mass or cervical lymphadenopathy identified however  direct visualization suggested.      Bilateral parotid mass/nodule similar to prior imaging.      Postsurgical changes, emphysematous changes, mild mediastinal  lymphadenopathy and additional findings as detailed.    This note was created using a voice recognition system ( the Dragon dictation system). Inaccuracies and misspellings are unintentional.       Madan Wilson MD.

## 2025-07-31 NOTE — PROGRESS NOTES
Rolo Victoria is a 70 y.o. male who presents in stable condition for C6D1 infusion after seeing his provider this morning    He is on the following chemotherapy regimen:     Treatment Plans       Name Type Plan Dates Plan Provider         Active    FLOT (Fluorouracil Continuous Infusion / Leucovorin / Oxaliplatin / DOCEtaxel), 14 Day Cycles Oncology Treatment 12/12/2024 - Present Madan Wilson MD             Since his last visit, he has been doing well.  Overall, he states his energy level is stable.  His appetite & hydration status has been unchanged.  He reports fatigue and muscle weakness.  He has no new or worsening symptoms to report at this time.     Patient tolerated infusion well and has been educated with the overall therapy plan. Questions & concerns addressed by his provider during visit. AVS, lab results & future appointment provided. Patient discharged in stable condition with his daughter Maia and son-in-law Maik.     Learner: family and patient  Educated on: hyponatremia, fatigue and muscle weakness  Readiness: acceptance  Preferred learning method: preferred: listening  Method used: explanation  Response: verbalizes understanding  Barriers: None  Preferred language: English  Resources given: none    He was seen by thoracic surgery via bedside regarding his previously pulled J-tube  Pump #[id number] assigned and logged into [employer]  Self-disconnect kit provided to patient and family.   Patient will likely have nurse disconnect his pump upon his pegfilgrastim appointment tomorrow 8/1/2025 at 1530--- pump will finish infusing at 1435 tomorrow but patient prefers to arrive at 1530.

## 2025-07-31 NOTE — PROGRESS NOTES
"Subjective   Rolo Victoria  is a 70 y.o. male who presents for wound complaint    The patient's feeding tube \"fell out\", and he noted unusual buildup of tissue in this area.  He denies fevers and chills.  He is tolerating a diet without difficulty.  His weight has been stable.  He denies abdominal pain, fevers chills, cough, hemoptysis, or other complaints.    He  reports that he has been smoking cigarettes. He started smoking about 46 years ago. He has a 45.4 pack-year smoking history. He has been exposed to tobacco smoke. He has never used smokeless tobacco. He reports that he does not currently use alcohol. He reports that he does not use drugs.    Objective   Physical Exam  The patient is well-appearing and in no acute distress. The trachea is midline and there is no crepitus. The lungs were clear to auscultation grossly. There was good effort and excursion. The heart had a regular rate and rhythm. The abdomen was soft, nontender and nondistended. The extremities had no edema or gross deformities. Mood and affect are appropriate.  Small palpable ventral hernia, reducible.  Scab at J-tube site, which fell off revealing a normal healing wound.  Diagnostic Studies  No new/pertinent diagnostic imaging available    Assessment/Plan   I believe that the patient is doing well.     I believe this is just a scab that formed over the J-tube site, and is not dangerous or worrisome for him.  I recommend ongoing \"routine\" follow-up of his esophageal cancer in parallel with medical oncology, perhaps with clinic visits every 6 months    I recommend surveillance of esophaegal cancer.     I discussed this in detail with the patient, including a discussion of alternatives. They were comfortable with this approach.     Fabio Salas MD  [phone]      "

## 2025-08-01 ENCOUNTER — INFUSION (OUTPATIENT)
Dept: HEMATOLOGY/ONCOLOGY | Facility: CLINIC | Age: 70
End: 2025-08-01
Payer: MEDICARE

## 2025-08-01 VITALS
HEIGHT: 70 IN | RESPIRATION RATE: 16 BRPM | WEIGHT: 188.9 LBS | HEART RATE: 81 BPM | BODY MASS INDEX: 27.04 KG/M2 | TEMPERATURE: 98.4 F | DIASTOLIC BLOOD PRESSURE: 58 MMHG | SYSTOLIC BLOOD PRESSURE: 127 MMHG | OXYGEN SATURATION: 98 %

## 2025-08-01 DIAGNOSIS — C15.9 PRIMARY ESOPHAGEAL ADENOCARCINOMA (MULTI): ICD-10-CM

## 2025-08-01 PROCEDURE — 96523 IRRIG DRUG DELIVERY DEVICE: CPT

## 2025-08-01 PROCEDURE — 2500000004 HC RX 250 GENERAL PHARMACY W/ HCPCS (ALT 636 FOR OP/ED): Mod: JZ,TB | Performed by: INTERNAL MEDICINE

## 2025-08-01 PROCEDURE — 96372 THER/PROPH/DIAG INJ SC/IM: CPT

## 2025-08-01 RX ORDER — HEPARIN 100 UNIT/ML
500 SYRINGE INTRAVENOUS AS NEEDED
Status: DISCONTINUED | OUTPATIENT
Start: 2025-08-01 | End: 2025-08-01 | Stop reason: HOSPADM

## 2025-08-01 RX ORDER — HEPARIN 100 UNIT/ML
500 SYRINGE INTRAVENOUS AS NEEDED
OUTPATIENT
Start: 2025-08-01

## 2025-08-01 RX ORDER — HEPARIN SODIUM,PORCINE/PF 10 UNIT/ML
50 SYRINGE (ML) INTRAVENOUS AS NEEDED
OUTPATIENT
Start: 2025-08-01

## 2025-08-01 RX ADMIN — PEGFILGRASTIM-CBQV 6 MG: 6 INJECTION, SOLUTION SUBCUTANEOUS at 15:29

## 2025-08-01 ASSESSMENT — PAIN SCALES - GENERAL: PAINLEVEL_OUTOF10: 0-NO PAIN

## 2025-08-01 NOTE — PROGRESS NOTES
Patient presents for pump disconnect and growth factor shot, has no complaints alert and oriented x 4. .CADD pump disconnected, verified total volume infused.  + blood return noted, flushed with 10cc normal saline and 5cc 10 units heparin.  Mae needle removed and site covered with bandaid. Patient tolerated procedure well. Pt tolerated procedure well. Patient feels well and has no complaints, vital signs stable. Patient discharged in stable condition with no further needs.

## 2025-08-02 ENCOUNTER — APPOINTMENT (OUTPATIENT)
Dept: HEMATOLOGY/ONCOLOGY | Facility: HOSPITAL | Age: 70
End: 2025-08-02
Payer: MEDICARE

## 2025-08-06 ENCOUNTER — TELEPHONE (OUTPATIENT)
Dept: PRIMARY CARE | Facility: CLINIC | Age: 70
End: 2025-08-06
Payer: MEDICARE

## 2025-08-06 NOTE — TELEPHONE ENCOUNTER
----- Message from Dafne Bedolla sent at 8/6/2025  9:08 AM EDT -----  Regarding: FW: recheck tsh  Please remind patient that he is due for repeat thyroid blood test since I adjusted his medicine. He does not need to fast.  ----- Message -----  From: Dafne Bedolla DO  Sent: 8/5/2025  12:00 AM EDT  To: Dafne Bedolla DO  Subject: recheck tsh                                      Recheck tsh

## 2025-08-09 LAB — TSH SERPL-ACNC: 2.19 MIU/L (ref 0.4–4.5)

## 2025-08-11 ENCOUNTER — TELEPHONE (OUTPATIENT)
Dept: PRIMARY CARE | Facility: CLINIC | Age: 70
End: 2025-08-11
Payer: MEDICARE

## 2025-08-11 DIAGNOSIS — E03.9 HYPOTHYROIDISM, UNSPECIFIED TYPE: ICD-10-CM

## 2025-08-11 RX ORDER — LEVOTHYROXINE SODIUM 150 UG/1
150 TABLET ORAL DAILY
Qty: 90 TABLET | Refills: 1 | Status: SHIPPED | OUTPATIENT
Start: 2025-08-11

## 2025-08-12 ENCOUNTER — APPOINTMENT (OUTPATIENT)
Dept: PRIMARY CARE | Facility: CLINIC | Age: 70
End: 2025-08-12
Payer: MEDICARE

## 2025-08-13 ENCOUNTER — HOSPITAL ENCOUNTER (OUTPATIENT)
Dept: RADIATION ONCOLOGY | Facility: CLINIC | Age: 70
Setting detail: RADIATION/ONCOLOGY SERIES
Discharge: HOME | End: 2025-08-13
Payer: MEDICARE

## 2025-08-13 VITALS
RESPIRATION RATE: 16 BRPM | DIASTOLIC BLOOD PRESSURE: 67 MMHG | TEMPERATURE: 97 F | OXYGEN SATURATION: 99 % | BODY MASS INDEX: 26.56 KG/M2 | SYSTOLIC BLOOD PRESSURE: 105 MMHG | HEART RATE: 86 BPM | WEIGHT: 184.5 LBS

## 2025-08-13 DIAGNOSIS — C15.9 PRIMARY ESOPHAGEAL ADENOCARCINOMA (MULTI): Primary | ICD-10-CM

## 2025-08-13 PROCEDURE — 99205 OFFICE O/P NEW HI 60 MIN: CPT | Performed by: STUDENT IN AN ORGANIZED HEALTH CARE EDUCATION/TRAINING PROGRAM

## 2025-08-13 PROCEDURE — 99215 OFFICE O/P EST HI 40 MIN: CPT | Performed by: STUDENT IN AN ORGANIZED HEALTH CARE EDUCATION/TRAINING PROGRAM

## 2025-08-13 ASSESSMENT — ENCOUNTER SYMPTOMS
OCCASIONAL FEELINGS OF UNSTEADINESS: 0
DEPRESSION: 0
LOSS OF SENSATION IN FEET: 0

## 2025-08-13 ASSESSMENT — PAIN SCALES - GENERAL: PAINLEVEL_OUTOF10: 0-NO PAIN

## 2025-08-14 ENCOUNTER — OFFICE VISIT (OUTPATIENT)
Dept: HEMATOLOGY/ONCOLOGY | Facility: HOSPITAL | Age: 70
End: 2025-08-14
Payer: MEDICARE

## 2025-08-14 ENCOUNTER — INFUSION (OUTPATIENT)
Dept: HEMATOLOGY/ONCOLOGY | Facility: HOSPITAL | Age: 70
End: 2025-08-14
Payer: MEDICARE

## 2025-08-14 ENCOUNTER — LAB (OUTPATIENT)
Dept: HEMATOLOGY/ONCOLOGY | Facility: HOSPITAL | Age: 70
End: 2025-08-14
Payer: MEDICARE

## 2025-08-14 VITALS
TEMPERATURE: 95.7 F | RESPIRATION RATE: 20 BRPM | DIASTOLIC BLOOD PRESSURE: 71 MMHG | WEIGHT: 184.9 LBS | BODY MASS INDEX: 26.62 KG/M2 | SYSTOLIC BLOOD PRESSURE: 143 MMHG | HEART RATE: 71 BPM | OXYGEN SATURATION: 100 %

## 2025-08-14 DIAGNOSIS — C15.9 PRIMARY ESOPHAGEAL ADENOCARCINOMA (MULTI): ICD-10-CM

## 2025-08-14 DIAGNOSIS — C15.9 PRIMARY ESOPHAGEAL ADENOCARCINOMA (MULTI): Primary | ICD-10-CM

## 2025-08-14 LAB
ALBUMIN SERPL BCP-MCNC: 3.6 G/DL (ref 3.4–5)
ALP SERPL-CCNC: 88 U/L (ref 33–136)
ALT SERPL W P-5'-P-CCNC: 31 U/L (ref 10–52)
ANION GAP SERPL CALC-SCNC: 12 MMOL/L (ref 10–20)
AST SERPL W P-5'-P-CCNC: 31 U/L (ref 9–39)
BASOPHILS # BLD AUTO: 0.07 X10*3/UL (ref 0–0.1)
BASOPHILS NFR BLD AUTO: 0.5 %
BILIRUB SERPL-MCNC: 0.3 MG/DL (ref 0–1.2)
BUN SERPL-MCNC: 14 MG/DL (ref 6–23)
CALCIUM SERPL-MCNC: 8.6 MG/DL (ref 8.6–10.3)
CHLORIDE SERPL-SCNC: 107 MMOL/L (ref 98–107)
CO2 SERPL-SCNC: 24 MMOL/L (ref 21–32)
CREAT SERPL-MCNC: 0.91 MG/DL (ref 0.5–1.3)
EGFRCR SERPLBLD CKD-EPI 2021: >90 ML/MIN/1.73M*2
EOSINOPHIL # BLD AUTO: 0.17 X10*3/UL (ref 0–0.7)
EOSINOPHIL NFR BLD AUTO: 1.2 %
ERYTHROCYTE [DISTWIDTH] IN BLOOD BY AUTOMATED COUNT: 14.8 % (ref 11.5–14.5)
GLUCOSE SERPL-MCNC: 79 MG/DL (ref 74–99)
HCT VFR BLD AUTO: 31.2 % (ref 41–52)
HGB BLD-MCNC: 10.4 G/DL (ref 13.5–17.5)
IMM GRANULOCYTES # BLD AUTO: 0.19 X10*3/UL (ref 0–0.7)
IMM GRANULOCYTES NFR BLD AUTO: 1.3 % (ref 0–0.9)
LYMPHOCYTES # BLD AUTO: 1.96 X10*3/UL (ref 1.2–4.8)
LYMPHOCYTES NFR BLD AUTO: 13.5 %
MCH RBC QN AUTO: 33.1 PG (ref 26–34)
MCHC RBC AUTO-ENTMCNC: 33.3 G/DL (ref 32–36)
MCV RBC AUTO: 99 FL (ref 80–100)
MONOCYTES # BLD AUTO: 0.66 X10*3/UL (ref 0.1–1)
MONOCYTES NFR BLD AUTO: 4.6 %
NEUTROPHILS # BLD AUTO: 11.44 X10*3/UL (ref 1.2–7.7)
NEUTROPHILS NFR BLD AUTO: 78.9 %
NRBC BLD-RTO: 0 /100 WBCS (ref 0–0)
PLATELET # BLD AUTO: 166 X10*3/UL (ref 150–450)
POTASSIUM SERPL-SCNC: 4.2 MMOL/L (ref 3.5–5.3)
PROT SERPL-MCNC: 6.4 G/DL (ref 6.4–8.2)
RBC # BLD AUTO: 3.14 X10*6/UL (ref 4.5–5.9)
SODIUM SERPL-SCNC: 139 MMOL/L (ref 136–145)
WBC # BLD AUTO: 14.5 X10*3/UL (ref 4.4–11.3)

## 2025-08-14 PROCEDURE — 3078F DIAST BP <80 MM HG: CPT

## 2025-08-14 PROCEDURE — 1160F RVW MEDS BY RX/DR IN RCRD: CPT

## 2025-08-14 PROCEDURE — 96413 CHEMO IV INFUSION 1 HR: CPT

## 2025-08-14 PROCEDURE — 99215 OFFICE O/P EST HI 40 MIN: CPT

## 2025-08-14 PROCEDURE — 96375 TX/PRO/DX INJ NEW DRUG ADDON: CPT | Mod: INF

## 2025-08-14 PROCEDURE — 2500000004 HC RX 250 GENERAL PHARMACY W/ HCPCS (ALT 636 FOR OP/ED)

## 2025-08-14 PROCEDURE — 36591 DRAW BLOOD OFF VENOUS DEVICE: CPT

## 2025-08-14 PROCEDURE — 96417 CHEMO IV INFUS EACH ADDL SEQ: CPT

## 2025-08-14 PROCEDURE — 96415 CHEMO IV INFUSION ADDL HR: CPT

## 2025-08-14 PROCEDURE — 3077F SYST BP >= 140 MM HG: CPT

## 2025-08-14 PROCEDURE — 85025 COMPLETE CBC W/AUTO DIFF WBC: CPT

## 2025-08-14 PROCEDURE — 96361 HYDRATE IV INFUSION ADD-ON: CPT | Mod: INF

## 2025-08-14 PROCEDURE — 1126F AMNT PAIN NOTED NONE PRSNT: CPT

## 2025-08-14 PROCEDURE — 96416 CHEMO PROLONG INFUSE W/PUMP: CPT

## 2025-08-14 PROCEDURE — 1159F MED LIST DOCD IN RCRD: CPT

## 2025-08-14 PROCEDURE — 80053 COMPREHEN METABOLIC PANEL: CPT

## 2025-08-14 RX ORDER — PROCHLORPERAZINE EDISYLATE 5 MG/ML
10 INJECTION INTRAMUSCULAR; INTRAVENOUS EVERY 6 HOURS PRN
Status: DISCONTINUED | OUTPATIENT
Start: 2025-08-14 | End: 2025-08-14 | Stop reason: HOSPADM

## 2025-08-14 RX ORDER — ALBUTEROL SULFATE 0.83 MG/ML
3 SOLUTION RESPIRATORY (INHALATION) AS NEEDED
Status: CANCELLED | OUTPATIENT
Start: 2025-08-14

## 2025-08-14 RX ORDER — DIPHENHYDRAMINE HYDROCHLORIDE 50 MG/ML
50 INJECTION, SOLUTION INTRAMUSCULAR; INTRAVENOUS AS NEEDED
Status: CANCELLED | OUTPATIENT
Start: 2025-08-15

## 2025-08-14 RX ORDER — ALBUTEROL SULFATE 0.83 MG/ML
3 SOLUTION RESPIRATORY (INHALATION) AS NEEDED
Status: CANCELLED | OUTPATIENT
Start: 2025-08-15

## 2025-08-14 RX ORDER — DIPHENHYDRAMINE HYDROCHLORIDE 50 MG/ML
25 INJECTION, SOLUTION INTRAMUSCULAR; INTRAVENOUS ONCE
Status: CANCELLED | OUTPATIENT
Start: 2025-08-14 | End: 2025-08-14

## 2025-08-14 RX ORDER — PROCHLORPERAZINE EDISYLATE 5 MG/ML
10 INJECTION INTRAMUSCULAR; INTRAVENOUS EVERY 6 HOURS PRN
Status: CANCELLED | OUTPATIENT
Start: 2025-08-14

## 2025-08-14 RX ORDER — ALBUTEROL SULFATE 0.83 MG/ML
3 SOLUTION RESPIRATORY (INHALATION) AS NEEDED
Status: DISCONTINUED | OUTPATIENT
Start: 2025-08-14 | End: 2025-08-14 | Stop reason: HOSPADM

## 2025-08-14 RX ORDER — LORAZEPAM 2 MG/ML
1 INJECTION INTRAMUSCULAR AS NEEDED
Status: DISCONTINUED | OUTPATIENT
Start: 2025-08-14 | End: 2025-08-14 | Stop reason: HOSPADM

## 2025-08-14 RX ORDER — EPINEPHRINE 0.3 MG/.3ML
0.3 INJECTION SUBCUTANEOUS EVERY 5 MIN PRN
Status: CANCELLED | OUTPATIENT
Start: 2025-08-15

## 2025-08-14 RX ORDER — FAMOTIDINE 10 MG/ML
20 INJECTION, SOLUTION INTRAVENOUS ONCE AS NEEDED
Status: CANCELLED | OUTPATIENT
Start: 2025-08-14

## 2025-08-14 RX ORDER — DIPHENHYDRAMINE HYDROCHLORIDE 50 MG/ML
50 INJECTION, SOLUTION INTRAMUSCULAR; INTRAVENOUS AS NEEDED
Status: CANCELLED | OUTPATIENT
Start: 2025-08-14

## 2025-08-14 RX ORDER — EPINEPHRINE 0.3 MG/.3ML
0.3 INJECTION SUBCUTANEOUS EVERY 5 MIN PRN
Status: DISCONTINUED | OUTPATIENT
Start: 2025-08-14 | End: 2025-08-14 | Stop reason: HOSPADM

## 2025-08-14 RX ORDER — DIPHENHYDRAMINE HYDROCHLORIDE 50 MG/ML
25 INJECTION, SOLUTION INTRAMUSCULAR; INTRAVENOUS ONCE
Status: COMPLETED | OUTPATIENT
Start: 2025-08-14 | End: 2025-08-14

## 2025-08-14 RX ORDER — HEPARIN SODIUM,PORCINE/PF 10 UNIT/ML
50 SYRINGE (ML) INTRAVENOUS AS NEEDED
Status: CANCELLED | OUTPATIENT
Start: 2025-08-14

## 2025-08-14 RX ORDER — FAMOTIDINE 10 MG/ML
20 INJECTION, SOLUTION INTRAVENOUS ONCE AS NEEDED
Status: CANCELLED | OUTPATIENT
Start: 2025-08-15

## 2025-08-14 RX ORDER — LORAZEPAM 2 MG/ML
1 INJECTION INTRAMUSCULAR AS NEEDED
Status: CANCELLED | OUTPATIENT
Start: 2025-08-14

## 2025-08-14 RX ORDER — FAMOTIDINE 10 MG/ML
20 INJECTION, SOLUTION INTRAVENOUS ONCE AS NEEDED
Status: DISCONTINUED | OUTPATIENT
Start: 2025-08-14 | End: 2025-08-14 | Stop reason: HOSPADM

## 2025-08-14 RX ORDER — HEPARIN 100 UNIT/ML
500 SYRINGE INTRAVENOUS AS NEEDED
Status: CANCELLED | OUTPATIENT
Start: 2025-08-14

## 2025-08-14 RX ORDER — EPINEPHRINE 0.3 MG/.3ML
0.3 INJECTION SUBCUTANEOUS EVERY 5 MIN PRN
Status: CANCELLED | OUTPATIENT
Start: 2025-08-14

## 2025-08-14 RX ORDER — PROCHLORPERAZINE MALEATE 10 MG
10 TABLET ORAL EVERY 6 HOURS PRN
Status: CANCELLED | OUTPATIENT
Start: 2025-08-14

## 2025-08-14 RX ORDER — PROCHLORPERAZINE MALEATE 10 MG
10 TABLET ORAL EVERY 6 HOURS PRN
Status: DISCONTINUED | OUTPATIENT
Start: 2025-08-14 | End: 2025-08-14 | Stop reason: HOSPADM

## 2025-08-14 RX ORDER — DIPHENHYDRAMINE HYDROCHLORIDE 50 MG/ML
50 INJECTION, SOLUTION INTRAMUSCULAR; INTRAVENOUS AS NEEDED
Status: DISCONTINUED | OUTPATIENT
Start: 2025-08-14 | End: 2025-08-14 | Stop reason: HOSPADM

## 2025-08-14 RX ORDER — PALONOSETRON 0.05 MG/ML
0.25 INJECTION, SOLUTION INTRAVENOUS ONCE
Status: CANCELLED | OUTPATIENT
Start: 2025-08-14

## 2025-08-14 RX ORDER — PALONOSETRON 0.05 MG/ML
0.25 INJECTION, SOLUTION INTRAVENOUS ONCE
Status: COMPLETED | OUTPATIENT
Start: 2025-08-14 | End: 2025-08-14

## 2025-08-14 RX ADMIN — DIPHENHYDRAMINE HYDROCHLORIDE 25 MG: 50 INJECTION INTRAMUSCULAR; INTRAVENOUS at 09:36

## 2025-08-14 RX ADMIN — DOCETAXEL 50 MG: 20 INJECTION, SOLUTION, CONCENTRATE INTRAVENOUS at 10:30

## 2025-08-14 RX ADMIN — DEXAMETHASONE SODIUM PHOSPHATE 12 MG: 10 INJECTION, SOLUTION INTRAMUSCULAR; INTRAVENOUS at 10:01

## 2025-08-14 RX ADMIN — OXALIPLATIN 90 MG: 5 INJECTION, SOLUTION INTRAVENOUS at 11:25

## 2025-08-14 RX ADMIN — FLUOROURACIL 3850 MG: 50 INJECTION, SOLUTION INTRAVENOUS at 13:32

## 2025-08-14 RX ADMIN — PALONOSETRON HYDROCHLORIDE 0.25 MG: 0.25 INJECTION INTRAVENOUS at 09:36

## 2025-08-14 RX ADMIN — SODIUM CHLORIDE 1000 ML: 0.9 INJECTION, SOLUTION INTRAVENOUS at 09:25

## 2025-08-14 ASSESSMENT — PAIN SCALES - GENERAL: PAINLEVEL_OUTOF10: 0-NO PAIN

## 2025-08-15 ENCOUNTER — INFUSION (OUTPATIENT)
Dept: HEMATOLOGY/ONCOLOGY | Facility: CLINIC | Age: 70
End: 2025-08-15
Payer: MEDICARE

## 2025-08-15 VITALS
TEMPERATURE: 98.1 F | RESPIRATION RATE: 16 BRPM | HEART RATE: 93 BPM | BODY MASS INDEX: 26.62 KG/M2 | DIASTOLIC BLOOD PRESSURE: 52 MMHG | SYSTOLIC BLOOD PRESSURE: 87 MMHG | HEIGHT: 70 IN | OXYGEN SATURATION: 98 %

## 2025-08-15 DIAGNOSIS — C15.9 PRIMARY ESOPHAGEAL ADENOCARCINOMA (MULTI): ICD-10-CM

## 2025-08-15 PROCEDURE — 96372 THER/PROPH/DIAG INJ SC/IM: CPT

## 2025-08-15 PROCEDURE — 2500000004 HC RX 250 GENERAL PHARMACY W/ HCPCS (ALT 636 FOR OP/ED): Performed by: INTERNAL MEDICINE

## 2025-08-15 PROCEDURE — 2500000004 HC RX 250 GENERAL PHARMACY W/ HCPCS (ALT 636 FOR OP/ED): Mod: JZ,TB

## 2025-08-15 RX ORDER — HEPARIN 100 UNIT/ML
500 SYRINGE INTRAVENOUS AS NEEDED
OUTPATIENT
Start: 2025-08-15

## 2025-08-15 RX ORDER — HEPARIN 100 UNIT/ML
500 SYRINGE INTRAVENOUS AS NEEDED
Status: DISCONTINUED | OUTPATIENT
Start: 2025-08-15 | End: 2025-08-15 | Stop reason: HOSPADM

## 2025-08-15 RX ORDER — HEPARIN SODIUM,PORCINE/PF 10 UNIT/ML
50 SYRINGE (ML) INTRAVENOUS AS NEEDED
OUTPATIENT
Start: 2025-08-15

## 2025-08-15 RX ADMIN — PEGFILGRASTIM-CBQV 6 MG: 6 INJECTION, SOLUTION SUBCUTANEOUS at 14:20

## 2025-08-15 RX ADMIN — Medication 500 UNITS: at 14:04

## 2025-08-15 ASSESSMENT — PAIN SCALES - GENERAL: PAINLEVEL_OUTOF10: 0-NO PAIN

## 2025-08-25 ENCOUNTER — TELEMEDICINE (OUTPATIENT)
Dept: HEMATOLOGY/ONCOLOGY | Facility: CLINIC | Age: 70
End: 2025-08-25
Payer: MEDICARE

## 2025-08-25 DIAGNOSIS — C15.9 PRIMARY ESOPHAGEAL ADENOCARCINOMA (MULTI): Primary | ICD-10-CM

## 2025-08-25 PROCEDURE — 1160F RVW MEDS BY RX/DR IN RCRD: CPT

## 2025-08-25 PROCEDURE — 99214 OFFICE O/P EST MOD 30 MIN: CPT

## 2025-08-25 PROCEDURE — 1159F MED LIST DOCD IN RCRD: CPT

## 2025-08-25 RX ORDER — ALBUTEROL SULFATE 0.83 MG/ML
3 SOLUTION RESPIRATORY (INHALATION) AS NEEDED
Status: CANCELLED | OUTPATIENT
Start: 2025-08-28

## 2025-08-25 RX ORDER — EPINEPHRINE 0.3 MG/.3ML
0.3 INJECTION SUBCUTANEOUS EVERY 5 MIN PRN
Status: CANCELLED | OUTPATIENT
Start: 2025-08-29

## 2025-08-25 RX ORDER — EPINEPHRINE 0.3 MG/.3ML
0.3 INJECTION SUBCUTANEOUS EVERY 5 MIN PRN
Status: CANCELLED | OUTPATIENT
Start: 2025-08-28

## 2025-08-25 RX ORDER — LORAZEPAM 2 MG/ML
1 INJECTION INTRAMUSCULAR AS NEEDED
Status: CANCELLED | OUTPATIENT
Start: 2025-08-28

## 2025-08-25 RX ORDER — ALBUTEROL SULFATE 0.83 MG/ML
3 SOLUTION RESPIRATORY (INHALATION) AS NEEDED
Status: CANCELLED | OUTPATIENT
Start: 2025-08-29

## 2025-08-25 RX ORDER — DIPHENHYDRAMINE HYDROCHLORIDE 50 MG/ML
25 INJECTION, SOLUTION INTRAMUSCULAR; INTRAVENOUS ONCE
Status: CANCELLED | OUTPATIENT
Start: 2025-08-28 | End: 2025-08-28

## 2025-08-25 RX ORDER — PROCHLORPERAZINE EDISYLATE 5 MG/ML
10 INJECTION INTRAMUSCULAR; INTRAVENOUS EVERY 6 HOURS PRN
Status: CANCELLED | OUTPATIENT
Start: 2025-08-28

## 2025-08-25 RX ORDER — FAMOTIDINE 10 MG/ML
20 INJECTION, SOLUTION INTRAVENOUS ONCE AS NEEDED
Status: CANCELLED | OUTPATIENT
Start: 2025-08-28

## 2025-08-25 RX ORDER — DIPHENHYDRAMINE HYDROCHLORIDE 50 MG/ML
50 INJECTION, SOLUTION INTRAMUSCULAR; INTRAVENOUS AS NEEDED
Status: CANCELLED | OUTPATIENT
Start: 2025-08-29

## 2025-08-25 RX ORDER — PALONOSETRON 0.05 MG/ML
0.25 INJECTION, SOLUTION INTRAVENOUS ONCE
Status: CANCELLED | OUTPATIENT
Start: 2025-08-28

## 2025-08-25 RX ORDER — DIPHENHYDRAMINE HYDROCHLORIDE 50 MG/ML
50 INJECTION, SOLUTION INTRAMUSCULAR; INTRAVENOUS AS NEEDED
Status: CANCELLED | OUTPATIENT
Start: 2025-08-28

## 2025-08-25 RX ORDER — PROCHLORPERAZINE MALEATE 10 MG
10 TABLET ORAL EVERY 6 HOURS PRN
Status: CANCELLED | OUTPATIENT
Start: 2025-08-28

## 2025-08-25 RX ORDER — FAMOTIDINE 10 MG/ML
20 INJECTION, SOLUTION INTRAVENOUS ONCE AS NEEDED
Status: CANCELLED | OUTPATIENT
Start: 2025-08-29

## 2025-08-28 ENCOUNTER — INFUSION (OUTPATIENT)
Dept: HEMATOLOGY/ONCOLOGY | Facility: CLINIC | Age: 70
End: 2025-08-28
Payer: MEDICARE

## 2025-08-28 ENCOUNTER — SOCIAL WORK (OUTPATIENT)
Dept: HEMATOLOGY/ONCOLOGY | Facility: CLINIC | Age: 70
End: 2025-08-28
Payer: MEDICARE

## 2025-08-28 VITALS
HEART RATE: 82 BPM | DIASTOLIC BLOOD PRESSURE: 60 MMHG | WEIGHT: 179.68 LBS | SYSTOLIC BLOOD PRESSURE: 105 MMHG | RESPIRATION RATE: 18 BRPM | BODY MASS INDEX: 25.72 KG/M2 | OXYGEN SATURATION: 99 % | TEMPERATURE: 97.3 F | HEIGHT: 70 IN

## 2025-08-28 DIAGNOSIS — C15.9 PRIMARY ESOPHAGEAL ADENOCARCINOMA (MULTI): ICD-10-CM

## 2025-08-28 LAB
ALBUMIN SERPL BCP-MCNC: 3.6 G/DL (ref 3.4–5)
ALP SERPL-CCNC: 99 U/L (ref 33–136)
ALT SERPL W P-5'-P-CCNC: 33 U/L (ref 10–52)
ANION GAP SERPL CALC-SCNC: 11 MMOL/L (ref 10–20)
AST SERPL W P-5'-P-CCNC: 33 U/L (ref 9–39)
BASOPHILS # BLD AUTO: 0.05 X10*3/UL (ref 0–0.1)
BASOPHILS NFR BLD AUTO: 0.3 %
BILIRUB SERPL-MCNC: 0.3 MG/DL (ref 0–1.2)
BUN SERPL-MCNC: 16 MG/DL (ref 6–23)
CALCIUM SERPL-MCNC: 8.5 MG/DL (ref 8.6–10.3)
CHLORIDE SERPL-SCNC: 108 MMOL/L (ref 98–107)
CO2 SERPL-SCNC: 24 MMOL/L (ref 21–32)
CREAT SERPL-MCNC: 0.81 MG/DL (ref 0.5–1.3)
EGFRCR SERPLBLD CKD-EPI 2021: >90 ML/MIN/1.73M*2
EOSINOPHIL # BLD AUTO: 0.27 X10*3/UL (ref 0–0.7)
EOSINOPHIL NFR BLD AUTO: 1.8 %
ERYTHROCYTE [DISTWIDTH] IN BLOOD BY AUTOMATED COUNT: 14.6 % (ref 11.5–14.5)
GLUCOSE SERPL-MCNC: 55 MG/DL (ref 74–99)
HCT VFR BLD AUTO: 33.2 % (ref 41–52)
HGB BLD-MCNC: 11.2 G/DL (ref 13.5–17.5)
IMM GRANULOCYTES # BLD AUTO: 0.09 X10*3/UL (ref 0–0.7)
IMM GRANULOCYTES NFR BLD AUTO: 0.6 % (ref 0–0.9)
LYMPHOCYTES # BLD AUTO: 2.25 X10*3/UL (ref 1.2–4.8)
LYMPHOCYTES NFR BLD AUTO: 15.4 %
MCH RBC QN AUTO: 34 PG (ref 26–34)
MCHC RBC AUTO-ENTMCNC: 33.7 G/DL (ref 32–36)
MCV RBC AUTO: 101 FL (ref 80–100)
MONOCYTES # BLD AUTO: 0.7 X10*3/UL (ref 0.1–1)
MONOCYTES NFR BLD AUTO: 4.8 %
NEUTROPHILS # BLD AUTO: 11.24 X10*3/UL (ref 1.2–7.7)
NEUTROPHILS NFR BLD AUTO: 77.1 %
NRBC BLD-RTO: ABNORMAL /100{WBCS}
PLATELET # BLD AUTO: 152 X10*3/UL (ref 150–450)
POTASSIUM SERPL-SCNC: 3.5 MMOL/L (ref 3.5–5.3)
PROT SERPL-MCNC: 6.1 G/DL (ref 6.4–8.2)
RBC # BLD AUTO: 3.29 X10*6/UL (ref 4.5–5.9)
SODIUM SERPL-SCNC: 139 MMOL/L (ref 136–145)
WBC # BLD AUTO: 14.6 X10*3/UL (ref 4.4–11.3)

## 2025-08-28 PROCEDURE — 96417 CHEMO IV INFUS EACH ADDL SEQ: CPT

## 2025-08-28 PROCEDURE — 96416 CHEMO PROLONG INFUSE W/PUMP: CPT

## 2025-08-28 PROCEDURE — 96413 CHEMO IV INFUSION 1 HR: CPT

## 2025-08-28 PROCEDURE — 96361 HYDRATE IV INFUSION ADD-ON: CPT | Mod: INF

## 2025-08-28 PROCEDURE — 84075 ASSAY ALKALINE PHOSPHATASE: CPT

## 2025-08-28 PROCEDURE — 2500000004 HC RX 250 GENERAL PHARMACY W/ HCPCS (ALT 636 FOR OP/ED)

## 2025-08-28 PROCEDURE — 96375 TX/PRO/DX INJ NEW DRUG ADDON: CPT | Mod: INF

## 2025-08-28 PROCEDURE — 85025 COMPLETE CBC W/AUTO DIFF WBC: CPT

## 2025-08-28 RX ORDER — DIPHENHYDRAMINE HYDROCHLORIDE 50 MG/ML
25 INJECTION, SOLUTION INTRAMUSCULAR; INTRAVENOUS ONCE
Status: COMPLETED | OUTPATIENT
Start: 2025-08-28 | End: 2025-08-28

## 2025-08-28 RX ORDER — HEPARIN SODIUM,PORCINE/PF 10 UNIT/ML
50 SYRINGE (ML) INTRAVENOUS AS NEEDED
Status: CANCELLED | OUTPATIENT
Start: 2025-08-28

## 2025-08-28 RX ORDER — PALONOSETRON 0.05 MG/ML
0.25 INJECTION, SOLUTION INTRAVENOUS ONCE
Status: COMPLETED | OUTPATIENT
Start: 2025-08-28 | End: 2025-08-28

## 2025-08-28 RX ORDER — HEPARIN 100 UNIT/ML
500 SYRINGE INTRAVENOUS AS NEEDED
Status: CANCELLED | OUTPATIENT
Start: 2025-08-28

## 2025-08-28 RX ORDER — LORAZEPAM 0.5 MG/1
1 TABLET ORAL AS NEEDED
Status: CANCELLED | OUTPATIENT
Start: 2025-08-28

## 2025-08-28 RX ADMIN — SODIUM CHLORIDE 1000 ML: 0.9 INJECTION, SOLUTION INTRAVENOUS at 11:07

## 2025-08-28 RX ADMIN — DOCETAXEL 50 MG: 20 INJECTION, SOLUTION INTRAVENOUS at 12:36

## 2025-08-28 RX ADMIN — DEXTROSE MONOHYDRATE 90 MG: 50 INJECTION, SOLUTION INTRAVENOUS at 13:47

## 2025-08-28 RX ADMIN — PALONOSETRON HYDROCHLORIDE 0.25 MG: 0.25 INJECTION INTRAVENOUS at 11:43

## 2025-08-28 RX ADMIN — DIPHENHYDRAMINE HYDROCHLORIDE 25 MG: 50 INJECTION INTRAMUSCULAR; INTRAVENOUS at 11:43

## 2025-08-28 RX ADMIN — DEXAMETHASONE SODIUM PHOSPHATE 12 MG: 4 INJECTION, SOLUTION INTRA-ARTICULAR; INTRALESIONAL; INTRAMUSCULAR; INTRAVENOUS; SOFT TISSUE at 11:43

## 2025-08-28 RX ADMIN — FLUOROURACIL 3850 MG: 50 INJECTION, SOLUTION INTRAVENOUS at 15:52

## 2025-08-28 ASSESSMENT — PAIN SCALES - GENERAL: PAINLEVEL_OUTOF10: 0-NO PAIN

## 2025-08-29 ENCOUNTER — INFUSION (OUTPATIENT)
Dept: HEMATOLOGY/ONCOLOGY | Facility: CLINIC | Age: 70
End: 2025-08-29
Payer: MEDICARE

## 2025-08-29 VITALS
RESPIRATION RATE: 16 BRPM | BODY MASS INDEX: 25.87 KG/M2 | HEIGHT: 70 IN | DIASTOLIC BLOOD PRESSURE: 56 MMHG | TEMPERATURE: 97.7 F | OXYGEN SATURATION: 100 % | SYSTOLIC BLOOD PRESSURE: 101 MMHG | HEART RATE: 95 BPM

## 2025-08-29 DIAGNOSIS — C15.9 PRIMARY ESOPHAGEAL ADENOCARCINOMA (MULTI): ICD-10-CM

## 2025-08-29 PROCEDURE — 96372 THER/PROPH/DIAG INJ SC/IM: CPT

## 2025-08-29 PROCEDURE — 2500000004 HC RX 250 GENERAL PHARMACY W/ HCPCS (ALT 636 FOR OP/ED): Mod: JZ,TB

## 2025-08-29 PROCEDURE — 2500000004 HC RX 250 GENERAL PHARMACY W/ HCPCS (ALT 636 FOR OP/ED): Performed by: INTERNAL MEDICINE

## 2025-08-29 RX ORDER — HEPARIN 100 UNIT/ML
500 SYRINGE INTRAVENOUS AS NEEDED
OUTPATIENT
Start: 2025-08-29

## 2025-08-29 RX ORDER — HEPARIN 100 UNIT/ML
500 SYRINGE INTRAVENOUS AS NEEDED
Status: DISCONTINUED | OUTPATIENT
Start: 2025-08-29 | End: 2025-08-29 | Stop reason: HOSPADM

## 2025-08-29 RX ORDER — HEPARIN SODIUM,PORCINE/PF 10 UNIT/ML
50 SYRINGE (ML) INTRAVENOUS AS NEEDED
OUTPATIENT
Start: 2025-08-29

## 2025-08-29 RX ADMIN — PEGFILGRASTIM-CBQV 6 MG: 6 INJECTION, SOLUTION SUBCUTANEOUS at 16:09

## 2025-08-29 RX ADMIN — Medication 500 UNITS: at 16:00

## 2025-08-29 ASSESSMENT — PAIN SCALES - GENERAL: PAINLEVEL_OUTOF10: 0-NO PAIN

## 2025-12-30 ENCOUNTER — APPOINTMENT (OUTPATIENT)
Dept: PRIMARY CARE | Facility: CLINIC | Age: 70
End: 2025-12-30
Payer: MEDICARE

## (undated) DEVICE — ADAPTER, WATER BOTTLE, SMART CAP, 140/160/180 SERIES

## (undated) DEVICE — Device

## (undated) DEVICE — SYRINGE, 35 CC, LUER LOCK, MONOJECT, W/O CAP, LF

## (undated) DEVICE — COVER, CART, 45 X 27 X 48 IN, CLEAR

## (undated) DEVICE — SUTURE, MONOCRYL, 4-0, 18 IN, PS2, UNDYED

## (undated) DEVICE — ADAPTER, LUER STUB, 16 G

## (undated) DEVICE — COVER, TABLE, UHC

## (undated) DEVICE — EVACUATOR, WOUND, SUCTION, CLOSED, JACKSON-PRATT, 100 CC, SILICONE

## (undated) DEVICE — CATHETER, DRAINAGE, NASOGASTRIC, DOUBLE LUMEN, FUNNEL END, SUMP, SALEM, 18 FR, 48 IN, PVC, STERILE

## (undated) DEVICE — SHEARS, HARMONIC 5 X 36 CVD ACE+7

## (undated) DEVICE — SUTURE, SILK, 1, 30 IN, LABYRINTH, BLACK

## (undated) DEVICE — DRESSING, ADHESIVE, ISLAND, TELFA, 2 X 3.75 IN, LF

## (undated) DEVICE — MANIFOLD, 4 PORT NEPTUNE STANDARD

## (undated) DEVICE — CLIP, LIGATING, W/ADHESIVE PAD, MEDIUM, TITANIUM

## (undated) DEVICE — SUTURE, ETHILON, 2-0, 18 IN, FS, BLACK, BX/12

## (undated) DEVICE — CUTTER, PROX LINEAR, 75MM, REG TISSUE, W/ SAFETY LOCK OUT

## (undated) DEVICE — SYRINGE, 10 CC, LUER LOCK

## (undated) DEVICE — STAPLER,  LINEAR RELOAD, 60MM, WHITE, DISP

## (undated) DEVICE — DRAPE, TIBURON, SPLIT SHEET, REINF ADH STRIP, 77X122

## (undated) DEVICE — CATHETER TRAY, SURESTEP, 16FR, URINE METER W/STATLOCK

## (undated) DEVICE — ROLL, DENTAL, 3/8 X 1-1/2, STERILE, 5/PK

## (undated) DEVICE — STAPLER, SKIN PROXIMATE, 35 WIDE

## (undated) DEVICE — APPLIER,  LIGACLIP, ENDO ROTATE, ROTATING, 10MM 20M/L, DISP

## (undated) DEVICE — TUBING, SUCTION, CONNECTING, STERILE 0.25 X 120 IN., LF

## (undated) DEVICE — COLLECTION UNIT, DRAINAGE, THORACIC, SINGLE TUBE, DRY SUCTION, ATS COMPATIBLE, OASIS 3600, LF

## (undated) DEVICE — CLIP, LIGATING, HORIZON, LARGE, TITANIUM

## (undated) DEVICE — SHEET, SPLIT, CARDIOVASCULAR TIBURON

## (undated) DEVICE — STAPLER, LINEAR, RELOADABLE, 60MM 4.8, GREEN

## (undated) DEVICE — CUTTER,  LINEAR RELOAD, THICK TISSUE, 75MM, GREEN

## (undated) DEVICE — DRESSING, ADHESIVE, ISLAND, TELFA, 4 X 10 IN

## (undated) DEVICE — STAPLER, SKIN, PLUS, WIDE, 35

## (undated) DEVICE — NEEDLE, TAPER, MAYO, 1/2 CIRCLE, DISPOSABLE, 3

## (undated) DEVICE — GOWN, ASTOUND, XL

## (undated) DEVICE — HOLSTER, JET SAFETY

## (undated) DEVICE — SUTURE, VICRYL, 2-0, 36 IN, CT-1, UNDYED

## (undated) DEVICE — DRAIN, WOUND, FLAT, HUBLESS, FULL LENGTH PERFORATION, 7 MM X 20 CM

## (undated) DEVICE — STRIP, SKIN CLOSURE, STERI STRIP, REINFORCED, 0.5 X 4 IN

## (undated) DEVICE — SYRINGE, 60 CC, IRRIGATION, PISTON, CATH TIP, W/LUER ADAPTER,DISP

## (undated) DEVICE — COVER, EQUIPMENT, CAMERA, 5 X 96 IN, STERILE

## (undated) DEVICE — PLUG, CATHETER

## (undated) DEVICE — SUTURE, SILK, 3-0, 18 IN, MULTIPACK, BLACK

## (undated) DEVICE — CLEANER, ELECTROSURGICAL, TIP, 5 X 5 CM, LF

## (undated) DEVICE — DRAPE, SHEET, LAPAROTOMY, W/ISO-BAC, W/ARMBOARD COVERS, 98 X 122 IN, DISPOSABLE, LF, STERILE

## (undated) DEVICE — STAPLER,  LINEAR ENDO 60MM RELOAD, BLACK, DISP

## (undated) DEVICE — SPONGE, DISSECTOR, PEANUT, 3/8, STERILE 5 FOAM HOLDER"

## (undated) DEVICE — SUTURE, PDSII, 1, TP-1, VIL, MONO, 48LP

## (undated) DEVICE — MAYO TRAY, SMALL

## (undated) DEVICE — SPONGE, HEMOSTATIC, CELLULOSE, SURGICEL, FIBRILLAR, 2 X 4 IN

## (undated) DEVICE — SUTURE, SILK, 3-0, 30 IN, MULTIPACK, BLACK

## (undated) DEVICE — CUTTER, PROXIMATE LINEAR RELOAD, 75MM, BLUE

## (undated) DEVICE — STAPLER, ECHELON 3000, 60MM STD

## (undated) DEVICE — TIP, SUCTION, YANKAUER, BULB, ADULT

## (undated) DEVICE — SUTURE, MONOCRYL, 3-0, 18 IN, PS2, UNDYED

## (undated) DEVICE — RELOAD, ECHELON 60MM, GREY

## (undated) DEVICE — CARE KIT, LAPAROSCOPIC, ADVANCED

## (undated) DEVICE — SUTURE, VICRYL, 3-0, 27 IN, SH

## (undated) DEVICE — STAPLER,  LINEAR ENDO 60MM RELOAD, GREEN, DISP

## (undated) DEVICE — SUTURE, ETHIBOND, XTRA, 30 IN, 0, CT-1, GREEN

## (undated) DEVICE — CAUTERY, PENCIL, PUSH BUTTON, SMOKE EVAC, 70MM

## (undated) DEVICE — SYRINGE, 60 CC, LUER LOCK, MONOJECT, W/O CAP, LF

## (undated) DEVICE — COVER, LIGHT HANDLE, SURGICAL, FLEXIBLE, DISPOSABLE, STERILE

## (undated) DEVICE — CATHETER, URETHRAL, FOLEY, 2 WAY, BARDEX IC, 22 FR, 30 CC, SILVER, LATEX

## (undated) DEVICE — ADHESIVE, SKIN, DERMABOND ADVANCED, 15CM, PEN-STYLE

## (undated) DEVICE — GUIDEWIRE, BENTSON, COATED, 0.035 IN X 180 CM

## (undated) DEVICE — DRAPE, INCISE, ANTIMICROBIAL, IOBAN 2, LARGE, 17 X 23 IN, DISPOSABLE, STERILE

## (undated) DEVICE — BOWL, BASIN, 32 OZ, STERILE

## (undated) DEVICE — SUTURE, SILK, 2, BLACK BR, SUTUPAK, LF

## (undated) DEVICE — REST, HEAD, BAGEL, 9 IN

## (undated) DEVICE — ANTIFOG, SOLUTION, FOG-OUT

## (undated) DEVICE — COVER, TABLE, 44 X 75 IN, DISPOSABLE, LF, STERILE

## (undated) DEVICE — CATHETER, URETHRAL, WHISTLE TIP, 14 FR, STERIL

## (undated) DEVICE — ELECTRODE, ELECTROSURGICAL, BLADE, INSULATED, ENT/IMA, STERILE

## (undated) DEVICE — SUTURE, SILK, 0, 24 IN, SUTUPAK, BLACK

## (undated) DEVICE — DRESSING, ISLAND, TELFA, 4 X 5 IN

## (undated) DEVICE — SUTURE, ETHIBOND EXCEL 1, TAPER POINT CT-1 GREEN 30 INCH

## (undated) DEVICE — CATHETER, THORACIC, STRAIGHT, ADULT, 28 FR, PVC

## (undated) DEVICE — TOWEL, SURGICAL, NEURO, O/R, 16 X 26, BLUE, STERILE

## (undated) DEVICE — APPLIER, CLIP,  PISTOL GRIP, 10 MM, TITANIUM

## (undated) DEVICE — SUTURE, ETHIBOND, XTRA, 2-0, GREEN